# Patient Record
Sex: MALE | Race: WHITE | NOT HISPANIC OR LATINO | Employment: OTHER | URBAN - METROPOLITAN AREA
[De-identification: names, ages, dates, MRNs, and addresses within clinical notes are randomized per-mention and may not be internally consistent; named-entity substitution may affect disease eponyms.]

---

## 2020-08-28 ENCOUNTER — HOSPITAL ENCOUNTER (OUTPATIENT)
Dept: RADIOLOGY | Facility: HOSPITAL | Age: 60
Discharge: HOME | End: 2020-08-28
Attending: ORTHOPAEDIC SURGERY
Payer: MEDICARE

## 2020-08-28 ENCOUNTER — OFFICE VISIT (OUTPATIENT)
Dept: ORTHOPEDICS | Facility: CLINIC | Age: 60
End: 2020-08-28
Payer: MEDICARE

## 2020-08-28 VITALS — BODY MASS INDEX: 28 KG/M2 | RESPIRATION RATE: 16 BRPM | HEIGHT: 71 IN | WEIGHT: 200 LBS

## 2020-08-28 DIAGNOSIS — M25.511 ACUTE PAIN OF RIGHT SHOULDER: Primary | ICD-10-CM

## 2020-08-28 DIAGNOSIS — G89.29 CHRONIC RIGHT SHOULDER PAIN: Primary | ICD-10-CM

## 2020-08-28 DIAGNOSIS — M25.511 CHRONIC RIGHT SHOULDER PAIN: Primary | ICD-10-CM

## 2020-08-28 DIAGNOSIS — M25.511 ACUTE PAIN OF RIGHT SHOULDER: ICD-10-CM

## 2020-08-28 PROCEDURE — 99203 OFFICE O/P NEW LOW 30 MIN: CPT | Mod: PBBFAC,25,PN | Performed by: ORTHOPAEDIC SURGERY

## 2020-08-28 PROCEDURE — 73030 X-RAY EXAM OF SHOULDER: CPT | Mod: 26,RT,, | Performed by: RADIOLOGY

## 2020-08-28 PROCEDURE — 99203 OFFICE O/P NEW LOW 30 MIN: CPT | Mod: S$PBB,,, | Performed by: ORTHOPAEDIC SURGERY

## 2020-08-28 PROCEDURE — 99999 PR PBB SHADOW E&M-NEW PATIENT-LVL III: CPT | Mod: PBBFAC,,, | Performed by: ORTHOPAEDIC SURGERY

## 2020-08-28 PROCEDURE — 73030 X-RAY EXAM OF SHOULDER: CPT | Mod: TC,PN,RT

## 2020-08-28 PROCEDURE — 99203 PR OFFICE/OUTPT VISIT, NEW, LEVL III, 30-44 MIN: ICD-10-PCS | Mod: S$PBB,,, | Performed by: ORTHOPAEDIC SURGERY

## 2020-08-28 PROCEDURE — 99999 PR PBB SHADOW E&M-NEW PATIENT-LVL III: ICD-10-PCS | Mod: PBBFAC,,, | Performed by: ORTHOPAEDIC SURGERY

## 2020-08-28 PROCEDURE — 73030 XR SHOULDER COMPLETE 2 OR MORE VIEWS RIGHT: ICD-10-PCS | Mod: 26,RT,, | Performed by: RADIOLOGY

## 2020-08-28 RX ORDER — LISINOPRIL 40 MG/1
40 TABLET ORAL DAILY
COMMUNITY
End: 2020-09-17 | Stop reason: SDUPTHER

## 2020-08-28 RX ORDER — OMEPRAZOLE 40 MG/1
20 CAPSULE, DELAYED RELEASE ORAL DAILY
COMMUNITY
End: 2020-10-26 | Stop reason: SDUPTHER
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
## 2020-09-01 ENCOUNTER — HOSPITAL ENCOUNTER (OUTPATIENT)
Dept: RADIOLOGY | Facility: HOSPITAL | Age: 60
Discharge: HOME | End: 2020-09-01
Attending: ORTHOPAEDIC SURGERY
Payer: MEDICARE

## 2020-09-01 DIAGNOSIS — G89.29 CHRONIC RIGHT SHOULDER PAIN: ICD-10-CM

## 2020-09-01 DIAGNOSIS — M25.511 CHRONIC RIGHT SHOULDER PAIN: ICD-10-CM

## 2020-09-01 PROCEDURE — 73221 MRI JOINT UPR EXTREM W/O DYE: CPT | Mod: TC,PO,RT

## 2020-09-04 ENCOUNTER — OFFICE VISIT (OUTPATIENT)
Dept: ORTHOPEDICS | Facility: CLINIC | Age: 60
End: 2020-09-04
Payer: MEDICARE

## 2020-09-04 VITALS — RESPIRATION RATE: 16 BRPM | WEIGHT: 200 LBS | HEIGHT: 71 IN | BODY MASS INDEX: 28 KG/M2

## 2020-09-04 DIAGNOSIS — M19.011 ARTHRITIS OF SHOULDER REGION, RIGHT: Primary | ICD-10-CM

## 2020-09-04 PROCEDURE — 99213 PR OFFICE/OUTPT VISIT, EST, LEVL III, 20-29 MIN: ICD-10-PCS | Mod: S$PBB,,, | Performed by: ORTHOPAEDIC SURGERY

## 2020-09-04 PROCEDURE — 99213 OFFICE O/P EST LOW 20 MIN: CPT | Mod: S$PBB,,, | Performed by: ORTHOPAEDIC SURGERY

## 2020-09-04 PROCEDURE — 99999 PR PBB SHADOW E&M-EST. PATIENT-LVL III: CPT | Mod: PBBFAC,,, | Performed by: ORTHOPAEDIC SURGERY

## 2020-09-04 PROCEDURE — 99999 PR PBB SHADOW E&M-EST. PATIENT-LVL III: ICD-10-PCS | Mod: PBBFAC,,, | Performed by: ORTHOPAEDIC SURGERY

## 2020-09-04 PROCEDURE — 99213 OFFICE O/P EST LOW 20 MIN: CPT | Mod: PBBFAC,PN | Performed by: ORTHOPAEDIC SURGERY
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
## 2020-09-17 ENCOUNTER — OFFICE VISIT (OUTPATIENT)
Dept: FAMILY MEDICINE | Facility: CLINIC | Age: 60
End: 2020-09-17
Payer: MEDICARE

## 2020-09-17 VITALS
HEART RATE: 100 BPM | DIASTOLIC BLOOD PRESSURE: 74 MMHG | BODY MASS INDEX: 31.83 KG/M2 | TEMPERATURE: 98 F | WEIGHT: 210 LBS | SYSTOLIC BLOOD PRESSURE: 130 MMHG | HEIGHT: 68 IN

## 2020-09-17 DIAGNOSIS — I10 ESSENTIAL HYPERTENSION: ICD-10-CM

## 2020-09-17 DIAGNOSIS — Z12.5 PROSTATE CANCER SCREENING: ICD-10-CM

## 2020-09-17 DIAGNOSIS — Z28.21 INFLUENZA VACCINATION DECLINED: ICD-10-CM

## 2020-09-17 DIAGNOSIS — Z12.11 COLON CANCER SCREENING: Primary | ICD-10-CM

## 2020-09-17 DIAGNOSIS — J30.2 SEASONAL ALLERGIES: ICD-10-CM

## 2020-09-17 DIAGNOSIS — Z00.00 GENERAL MEDICAL EXAM: ICD-10-CM

## 2020-09-17 PROCEDURE — 99204 PR OFFICE/OUTPT VISIT, NEW, LEVL IV, 45-59 MIN: ICD-10-PCS | Mod: S$GLB,,, | Performed by: NURSE PRACTITIONER

## 2020-09-17 PROCEDURE — 99204 OFFICE O/P NEW MOD 45 MIN: CPT | Mod: S$GLB,,, | Performed by: NURSE PRACTITIONER

## 2020-09-17 RX ORDER — HYDROCHLOROTHIAZIDE 25 MG/1
25 TABLET ORAL DAILY
COMMUNITY
End: 2020-10-26 | Stop reason: SDUPTHER

## 2020-09-17 RX ORDER — LISINOPRIL 40 MG/1
40 TABLET ORAL DAILY
Qty: 30 TABLET | Refills: 4 | Status: SHIPPED | OUTPATIENT
Start: 2020-09-17 | End: 2020-10-26 | Stop reason: SDUPTHER

## 2020-09-17 RX ORDER — MINERAL OIL
180 ENEMA (ML) RECTAL DAILY
Qty: 30 TABLET | Refills: 2 | Status: SHIPPED | OUTPATIENT
Start: 2020-09-17 | End: 2021-05-22 | Stop reason: SDUPTHER

## 2020-09-17 RX ORDER — FLUTICASONE PROPIONATE 50 MCG
1 SPRAY, SUSPENSION (ML) NASAL DAILY
Qty: 15.8 ML | Refills: 2 | Status: SHIPPED | OUTPATIENT
Start: 2020-09-17 | End: 2021-06-17

## 2020-09-17 NOTE — PROGRESS NOTES
SUBJECTIVE:    Patient ID: Omar Trotter is a 60 y.o. male.    Chief Complaint: Establish Care (brought bottles, FLU shot declined// SW)    Pt presents to establish care. Recently moved to the area from [place]. History includes HTN. Recently has been seeing Dr. Rock d/t shoulder pain. C/o seasonal allergies and does not take any medication regularly to treat. States he was told by previous provider a few months ago that he has Hep C and needed to follow-up with someone. Scheduled to see GI doctor later this month. Unsure of name; wife made appointment. Declines colonoscopy but is agreeable to cologuard. Declines flu vaccine. Previously worked as . Now retired.       No results found for any previous visit.       Past Medical History:   Diagnosis Date    Arthritis     GERD (gastroesophageal reflux disease)     Hypertension      Past Surgical History:   Procedure Laterality Date    HERNIA REPAIR       Family History   Problem Relation Age of Onset    Cancer Mother         leukemia       Marital Status:   Alcohol History:  reports no history of alcohol use.  Tobacco History:  reports that he has been smoking cigarettes. He has a 15.00 pack-year smoking history. He has never used smokeless tobacco.  Drug History:  has no history on file for drug.    Review of patient's allergies indicates:   Allergen Reactions    Pcn [penicillins]        Current Outpatient Medications:     hydroCHLOROthiazide (HYDRODIURIL) 25 MG tablet, Take 25 mg by mouth once daily., Disp: , Rfl:     lisinopriL (PRINIVIL,ZESTRIL) 40 MG tablet, Take 1 tablet (40 mg total) by mouth once daily., Disp: 30 tablet, Rfl: 4    omeprazole (PRILOSEC) 40 MG capsule, Take 20 mg by mouth once daily., Disp: , Rfl:     fexofenadine (ALLEGRA) 180 MG tablet, Take 1 tablet (180 mg total) by mouth once daily., Disp: 30 tablet, Rfl: 2    fluticasone propionate (FLONASE) 50 mcg/actuation nasal spray, 1 spray (50 mcg total) by Each  "Nostril route once daily., Disp: 15.8 mL, Rfl: 2    Review of Systems   Constitutional: Negative.    HENT: Positive for congestion and postnasal drip. Negative for ear pain, sinus pressure, sinus pain, tinnitus and trouble swallowing.    Eyes: Negative for pain and redness.   Respiratory: Positive for cough. Negative for chest tightness, shortness of breath and wheezing.    Cardiovascular: Negative for chest pain and palpitations.   Gastrointestinal: Negative for abdominal pain, nausea and vomiting.   Genitourinary: Negative for dysuria, frequency and urgency.   Musculoskeletal: Positive for arthralgias and back pain. Negative for myalgias.   Skin: Negative for rash and wound.   Neurological: Negative for dizziness, weakness, light-headedness and headaches.   Psychiatric/Behavioral: Negative.  Negative for sleep disturbance.          Objective:      Vitals:    09/17/20 1137   BP: 130/74   Pulse: 100   Temp: 98 °F (36.7 °C)   Weight: 95.3 kg (210 lb)   Height: 5' 8" (1.727 m)     Body mass index is 31.93 kg/m².  Physical Exam  Vitals signs reviewed.   Constitutional:       General: He is not in acute distress.     Appearance: Normal appearance. He is well-developed.   HENT:      Head: Normocephalic and atraumatic.      Right Ear: Tympanic membrane normal.      Left Ear: Tympanic membrane normal.      Nose: Nose normal. No nasal deformity or mucosal edema.      Mouth/Throat:      Dentition: No dental caries or dental abscesses.      Pharynx: No oropharyngeal exudate.   Eyes:      General: Lids are normal.      Conjunctiva/sclera: Conjunctivae normal.      Pupils: Pupils are equal, round, and reactive to light.   Neck:      Musculoskeletal: Normal range of motion.      Thyroid: No thyromegaly.      Vascular: No JVD.      Trachea: No tracheal tenderness or tracheal deviation.   Cardiovascular:      Rate and Rhythm: Normal rate and regular rhythm.      Heart sounds: Normal heart sounds. No murmur.   Pulmonary:      " Effort: Pulmonary effort is normal. No accessory muscle usage or respiratory distress.      Breath sounds: Normal breath sounds.   Abdominal:      General: Bowel sounds are normal.      Palpations: Abdomen is soft.      Tenderness: There is no abdominal tenderness.   Musculoskeletal:         General: No tenderness.      Comments: Decreased ROM of right shoulder   Lymphadenopathy:      Cervical: No cervical adenopathy.   Skin:     General: Skin is warm and dry.      Capillary Refill: Capillary refill takes less than 2 seconds.      Findings: No ecchymosis.   Neurological:      Mental Status: He is alert and oriented to person, place, and time.   Psychiatric:         Mood and Affect: Mood normal.         Behavior: Behavior normal.           Assessment:       1. Colon cancer screening    2. Essential hypertension    3. Prostate cancer screening    4. Seasonal allergies    5. General medical exam    6. BMI 31.0-31.9,adult    7. Influenza vaccination declined         Plan:       Colon cancer screening  -     Cologuard Screening (Multitarget Stool DNA); Future; Expected date: 09/17/2020    Essential hypertension  -     CBC auto differential; Future; Expected date: 09/17/2020  -     Comprehensive metabolic panel; Future; Expected date: 09/17/2020  -     Lipid Panel; Future; Expected date: 09/17/2020  -     TSH w/reflex to FT4; Future; Expected date: 09/17/2020  -     Urinalysis, Reflex to Urine Culture Urine, Clean Catch; Future; Expected date: 09/17/2020  -     lisinopriL (PRINIVIL,ZESTRIL) 40 MG tablet; Take 1 tablet (40 mg total) by mouth once daily.  Dispense: 30 tablet; Refill: 4    Prostate cancer screening  -     PSA, Screening; Future; Expected date: 09/17/2020    Seasonal allergies  -     fexofenadine (ALLEGRA) 180 MG tablet; Take 1 tablet (180 mg total) by mouth once daily.  Dispense: 30 tablet; Refill: 2  -     fluticasone propionate (FLONASE) 50 mcg/actuation nasal spray; 1 spray (50 mcg total) by Each Nostril  route once daily.  Dispense: 15.8 mL; Refill: 2    General medical exam    BMI 31.0-31.9,adult    Influenza vaccination declined      Follow up in about 6 months (around 3/17/2021) for HTN.

## 2020-09-24 DIAGNOSIS — B18.2 CHRONIC VIRAL HEPATITIS C: Primary | ICD-10-CM

## 2020-10-02 ENCOUNTER — OFFICE VISIT (OUTPATIENT)
Dept: ORTHOPEDICS | Facility: CLINIC | Age: 60
End: 2020-10-02
Payer: COMMERCIAL

## 2020-10-02 VITALS — HEIGHT: 68 IN | BODY MASS INDEX: 31.83 KG/M2 | RESPIRATION RATE: 18 BRPM | WEIGHT: 210 LBS

## 2020-10-02 DIAGNOSIS — M19.011 PRIMARY OSTEOARTHRITIS OF RIGHT SHOULDER: ICD-10-CM

## 2020-10-02 DIAGNOSIS — M19.011 OSTEOARTHRITIS OF RIGHT SHOULDER: ICD-10-CM

## 2020-10-02 DIAGNOSIS — M19.011 ARTHRITIS OF SHOULDER REGION, RIGHT: Primary | ICD-10-CM

## 2020-10-02 PROCEDURE — 99204 PR OFFICE/OUTPT VISIT, NEW, LEVL IV, 45-59 MIN: ICD-10-PCS | Mod: S$PBB,,, | Performed by: ORTHOPAEDIC SURGERY

## 2020-10-02 PROCEDURE — 99999 PR PBB SHADOW E&M-EST. PATIENT-LVL III: ICD-10-PCS | Mod: PBBFAC,,, | Performed by: ORTHOPAEDIC SURGERY

## 2020-10-02 PROCEDURE — 99999 PR PBB SHADOW E&M-EST. PATIENT-LVL III: CPT | Mod: PBBFAC,,, | Performed by: ORTHOPAEDIC SURGERY

## 2020-10-02 PROCEDURE — 99213 OFFICE O/P EST LOW 20 MIN: CPT | Mod: PBBFAC,PN | Performed by: ORTHOPAEDIC SURGERY

## 2020-10-02 PROCEDURE — 99204 OFFICE O/P NEW MOD 45 MIN: CPT | Mod: S$PBB,,, | Performed by: ORTHOPAEDIC SURGERY

## 2020-10-02 RX ORDER — IBUPROFEN 800 MG/1
800 TABLET ORAL 3 TIMES DAILY
Qty: 60 TABLET | Refills: 0 | Status: SHIPPED | OUTPATIENT
Start: 2020-10-02 | End: 2020-10-27 | Stop reason: SDUPTHER

## 2020-10-02 NOTE — LETTER
October 2, 2020      Collin Rock MD  [address] [place] [address] [address] [address]           [facility]  [address] [place] [address] [address]  [place] [address]  Phone: [phone]          Patient: Omar Trotter   MR Number: [id number]   YOB: 1960   Date of Visit: 10/2/2020       Dear Dr. Collin Rock:    Thank you for referring Omar Trotter to me for evaluation. Attached you will find relevant portions of my assessment and plan of care.    If you have questions, please do not hesitate to call me. I look forward to following Omar Trotter along with you.    Sincerely,    Emilio Siddiqui MD    Enclosure  CC:  No Recipients    If you would like to receive this communication electronically, please contact [email or web address] or [phone] to request more information on [employer] [facility] access.    For providers and/or their staff who would like to refer a patient to [facility], please contact us through our one-stop-shop provider referral line, [facility] , at [phone].    If you feel you have received this communication in error or would no longer like to receive these types of communications, please e-mail [email or web address]

## 2020-10-02 NOTE — PROGRESS NOTES
Past Medical History:   Diagnosis Date    Arthritis     GERD (gastroesophageal reflux disease)     Hypertension        Past Surgical History:   Procedure Laterality Date    HERNIA REPAIR         Current Outpatient Medications   Medication Sig    fexofenadine (ALLEGRA) 180 MG tablet Take 1 tablet (180 mg total) by mouth once daily.    fluticasone propionate (FLONASE) 50 mcg/actuation nasal spray 1 spray (50 mcg total) by Each Nostril route once daily.    hydroCHLOROthiazide (HYDRODIURIL) 25 MG tablet Take 25 mg by mouth once daily.    lisinopriL (PRINIVIL,ZESTRIL) 40 MG tablet Take 1 tablet (40 mg total) by mouth once daily.    omeprazole (PRILOSEC) 40 MG capsule Take 20 mg by mouth once daily.    ibuprofen (ADVIL,MOTRIN) 800 MG tablet Take 1 tablet (800 mg total) by mouth 3 (three) times daily.     No current facility-administered medications for this visit.        Review of patient's allergies indicates:   Allergen Reactions    Pcn [penicillins]        Family History   Problem Relation Age of Onset    Cancer Mother         leukemia       Social History     Socioeconomic History    Marital status:      Spouse name: Not on file    Number of children: Not on file    Years of education: Not on file    Highest education level: Not on file   Occupational History    Not on file   Social Needs    Financial resource strain: Not on file    Food insecurity     Worry: Not on file     Inability: Not on file    Transportation needs     Medical: Not on file     Non-medical: Not on file   Tobacco Use    Smoking status: Current Every Day Smoker     Packs/day: 0.50     Years: 30.00     Pack years: 15.00     Types: Cigarettes    Smokeless tobacco: Never Used   Substance and Sexual Activity    Alcohol use: Never     Frequency: Never    Drug use: Not on file    Sexual activity: Not on file   Lifestyle    Physical activity     Days per week: Not on file     Minutes per session: Not on file    Stress:  "Not on file   Relationships    Social connections     Talks on phone: Not on file     Gets together: Not on file     Attends Hindu service: Not on file     Active member of club or organization: Not on file     Attends meetings of clubs or organizations: Not on file     Relationship status: Not on file   Other Topics Concern    Not on file   Social History Narrative    Not on file       Chief Complaint:   Chief Complaint   Patient presents with    Right Shoulder - Pain       Consulting Physician: Collin Rock MD    History of present illness:    This is a 60 y.o. year old male who complains of right shoulder pain for years.  He was referred over by my partner.  He puts pain in her shoulder at night of 10 worse with activities.  He states that his shoulder is becoming less mobile and he is having more problems.    Review of Systems:    Constitution:   Denies chills, fever, and sweats.  HENT:   Denies headaches or blurry vision.  Cardiovascular:  Denies chest pain or irregular heart beat.  Respiratory:   Denies cough or shortness of breath.  Gastrointestinal:  Denies abdominal pain, nausea, or vomiting.  Musculoskeletal:   Denies muscle cramps.  Neurological:   Denies dizziness or focal weakness.  Psychiatric/Behavior: Normal mental status.  Hematology/Lymph:  Denies bleeding problem or easy bruising/bleeding.  Skin:    Denies rash or suspicious lesions.    Examination:    Vital Signs:    Vitals:    10/02/20 1030   Resp: 18   Weight: 95.3 kg (210 lb)   Height: 5' 8" (1.727 m)   PainSc:   9       Body mass index is 31.93 kg/m².    Constitution:   Well-developed, well nourished patient in no acute distress.  Neurological:   Alert and oriented x 3 and cooperative to examination.     Psychiatric/Behavior: Normal mental status.  Respiratory:   No shortness of breath.  Eyes:    Extraoccular muscles intact  Skin:    No scars, rash or suspicious lesions.    Physical Exam: Right Shoulder Exam "     Skin  Rash:   None  Scars:   None    Inspection/Observation   Swelling:   None  Erythema:   None  Bruising:   None  Wounds:   None  Scapular Winging:  None  Scapular Dyskinesia:  None  Atrophy:   None  Masses:   None  Lymphadenopathy: None    Tenderness   AC Joint:   Yes    Range of Motion   Active Forward Flexion:  70  Active External Rotation:  0  Active Internal Rotation: Hip    Passive Forward Flexion:  90  Passive External Rotation: 10    Muscle Strength   Forward Flexion:  4+/5  External Rotation: 4+/5  Internal Rotation:  4+/5    Tests & Signs   Cross Arm:   positive  Hawkin's test:   positive  Impingement:   positive    Other   Sensation:  normal  Pulse:    2+ radial          Imaging: X-rays ordered and images interpreted today personally by me of right shoulder show severe degenerative changes.  MRI shows that there is no obviously torn rotator cuff other than a small partial tear.         Assessment: Arthritis of shoulder region, right        Plan:  We discussed treatment options including both reverse and total shoulder arthroplasties.  After our discussion he elected to proceed with a right total shoulder arthroplasty.  We discussed the risks and benefits including the potential for incomplete pain relief in the need for further procedures.  He expressed he understood and wished to proceed.    After discussing the diagnosis and reviewing treatment options, the patient/family elected to proceed with surgical intervention.    In preparation for surgery:    A pre-operative clearance request was placed with primary care physician.    Appropriate pre-operative labs were ordered.    An EKG examination was ordered.    A chest X-ray was ordered.    Pertinent risk factors and comorbidities for surgery were identified and reviewed, including HTN    Pre-operative antibiotics were ordered.    The risks, benefits, and alternatives to the procedure were explained to the patient/family including, but not limited  to: incomplete pain relief, surgical failure, hardware failure, need for further procedures, damage to nerves, arteries, blood vessels and other structures, infection, Deep Vein Thrombosis (DVT), Pulmonary Embolus (PE), Complex Regional Pain Syndrome as well as general anesthetic complications including seizure, stroke, heart attack and even death. The patient/family understood these risks and wished to proceed and signed the informed consent. All questions were answered. No guarantees were implied or stated.    We discussed COVID19 with the patient. They are aware of our current policies and procedures, were given the option of delaying surgery, and they elected to proceed.    We will obtain the necessary clearances and schedule the patient for surgery.            DISCLAIMER: This note may have been dictated using voice recognition software and may contain grammatical errors.     NOTE: Consult report sent to referring provider via [employer] EMR.

## 2020-10-05 ENCOUNTER — HOSPITAL ENCOUNTER (OUTPATIENT)
Dept: RADIOLOGY | Facility: HOSPITAL | Age: 60
Discharge: HOME | End: 2020-10-05
Attending: INTERNAL MEDICINE
Payer: COMMERCIAL

## 2020-10-05 DIAGNOSIS — B18.2 CHRONIC VIRAL HEPATITIS C: ICD-10-CM

## 2020-10-05 PROCEDURE — 76705 ECHO EXAM OF ABDOMEN: CPT | Mod: TC,PO

## 2020-10-06 DIAGNOSIS — Z01.818 PRE-OP TESTING: ICD-10-CM

## 2020-10-06 RX ORDER — SODIUM CHLORIDE 9 MG/ML
INJECTION, SOLUTION INTRAVENOUS CONTINUOUS
Status: CANCELLED | OUTPATIENT
Start: 2020-10-06

## 2020-10-06 RX ORDER — MUPIROCIN 20 MG/G
OINTMENT TOPICAL
Status: CANCELLED | OUTPATIENT
Start: 2020-10-06

## 2020-10-06 RX ORDER — TRANEXAMIC ACID 100 MG/ML
1000 INJECTION, SOLUTION INTRAVENOUS
Status: CANCELLED | OUTPATIENT
Start: 2020-10-06

## 2020-10-06 RX ORDER — CLINDAMYCIN PHOSPHATE 900 MG/50ML
900 INJECTION, SOLUTION INTRAVENOUS
Status: CANCELLED | OUTPATIENT
Start: 2020-10-06

## 2020-10-07 ENCOUNTER — TELEPHONE (OUTPATIENT)
Dept: FAMILY MEDICINE | Facility: CLINIC | Age: 60
End: 2020-10-07

## 2020-10-07 ENCOUNTER — TELEPHONE (OUTPATIENT)
Dept: ORTHOPEDICS | Facility: CLINIC | Age: 60
End: 2020-10-07

## 2020-10-07 NOTE — TELEPHONE ENCOUNTER
----- Message from Linh Sparks LPN sent at 10/6/2020  5:46 PM CDT -----  Good Evening! Mr Trotter is scheduled to have a right total shoulder replacement with Dr Siddiqui on 11/4/20. He is requiring a medical clearance. Please contact the patient to schedule an appointment.      Thanks!  Linh Sparks LPN

## 2020-10-07 NOTE — TELEPHONE ENCOUNTER
Tried calling pt no answer. Please try calling him later to schedule him with Vivien for a surgery clearance. Thanks

## 2020-10-07 NOTE — TELEPHONE ENCOUNTER
Spoke to patient. Advised we received a message from the pre service department regarding a new insurance coverage beginning in November. Advised we would need to push his surgery back to give time for the pre authorization process. Pt surgery is scheduled for 11/4/2020. Pt states his [employer] insurance does not go into effect until January. Pre service notified of effect date.

## 2020-10-07 NOTE — TELEPHONE ENCOUNTER
----- Message from Jeannine Velazquez sent at 10/7/2020 12:08 PM CDT -----  LAB, [facility], 10/1/20

## 2020-10-07 NOTE — TELEPHONE ENCOUNTER
Spoke with pt and let him know the cologuard was positive. States he sees Dr. Gooden. Stool test faxed to her. He agrees to call and get scheduled. F/u reminder created.

## 2020-10-08 ENCOUNTER — TELEPHONE (OUTPATIENT)
Dept: ORTHOPEDICS | Facility: CLINIC | Age: 60
End: 2020-10-08

## 2020-10-08 NOTE — TELEPHONE ENCOUNTER
----- Message from Lester De Santiago sent at 10/8/2020 12:58 PM CDT -----  Regarding: wants to re inna Sx  Contact: pt   call

## 2020-10-08 NOTE — TELEPHONE ENCOUNTER
Called the patient he stated he has decided to wait on his surgery until after the 1st of the new year due to insurance changes.

## 2020-10-08 NOTE — TELEPHONE ENCOUNTER
Spoke to patient. States he would like to push his surgery back to January. Surgery rescheduled to 1/13/21. Pre op testing and covid testing rescheduled. Pt verbalized understanding. Surgery notified.

## 2020-10-26 DIAGNOSIS — I10 ESSENTIAL HYPERTENSION: ICD-10-CM

## 2020-10-26 RX ORDER — LISINOPRIL 40 MG/1
40 TABLET ORAL DAILY
Qty: 30 TABLET | Refills: 4 | Status: SHIPPED | OUTPATIENT
Start: 2020-10-26 | End: 2021-09-21 | Stop reason: SDUPTHER

## 2020-10-26 RX ORDER — HYDROCHLOROTHIAZIDE 25 MG/1
25 TABLET ORAL DAILY
Qty: 90 TABLET | Refills: 1 | Status: SHIPPED | OUTPATIENT
Start: 2020-10-26 | End: 2021-05-22 | Stop reason: SDUPTHER

## 2020-10-26 RX ORDER — OMEPRAZOLE 20 MG/1
20 CAPSULE, DELAYED RELEASE ORAL DAILY
Qty: 90 CAPSULE | Refills: 1 | Status: SHIPPED | OUTPATIENT
Start: 2020-10-26 | End: 2021-03-17

## 2020-10-26 NOTE — TELEPHONE ENCOUNTER
----- Message from Wesley Wylie sent at 10/26/2020 12:32 PM CDT -----  Regarding: REFILL  Contact: EULALIA RAYO  NEEDS REFILL ON LISINOPRIL AND OMEPRAZOLE AND HYDROCHLOROTHIAZIDE  SEND TO [facility]   PT# [phone]

## 2020-10-27 RX ORDER — IBUPROFEN 800 MG/1
800 TABLET ORAL 3 TIMES DAILY
Qty: 60 TABLET | Refills: 0 | Status: SHIPPED | OUTPATIENT
Start: 2020-10-27 | End: 2020-12-01 | Stop reason: SDUPTHER

## 2020-10-27 NOTE — TELEPHONE ENCOUNTER
----- Message from Maya Guzman MA sent at 10/27/2020  7:50 AM CDT -----  Contact: pt  Pain medication   Ibuprofen     [facility]   Call back

## 2020-11-13 ENCOUNTER — TELEPHONE (OUTPATIENT)
Dept: FAMILY MEDICINE | Facility: CLINIC | Age: 60
End: 2020-11-13

## 2020-12-01 RX ORDER — IBUPROFEN 800 MG/1
800 TABLET ORAL 3 TIMES DAILY
Qty: 60 TABLET | Refills: 0 | Status: SHIPPED | OUTPATIENT
Start: 2020-12-01 | End: 2021-01-06

## 2020-12-01 NOTE — TELEPHONE ENCOUNTER
----- Message from Maya Guzman MA sent at 12/1/2020  9:24 AM CST -----  Contact: pt  Ibuprofen   [facility]   Call back

## 2020-12-01 NOTE — TELEPHONE ENCOUNTER
Last refilled 10/27/2020, last seen 10/02/2020, next appt /surgery 1/13/2021( total shoulder scheduled)

## 2020-12-15 ENCOUNTER — TELEPHONE (OUTPATIENT)
Dept: FAMILY MEDICINE | Facility: CLINIC | Age: 60
End: 2020-12-15

## 2020-12-15 NOTE — TELEPHONE ENCOUNTER
LMOR for pt to call back - need to see if he got scheduled with the GI doctor regarding his positive cologuard testing.

## 2020-12-30 ENCOUNTER — HOSPITAL ENCOUNTER (OUTPATIENT)
Dept: PREADMISSION TESTING | Facility: HOSPITAL | Age: 60
Discharge: HOME | End: 2020-12-30
Payer: MEDICARE

## 2020-12-30 DIAGNOSIS — M19.011 PRIMARY OSTEOARTHRITIS OF RIGHT SHOULDER: ICD-10-CM

## 2020-12-30 DIAGNOSIS — Z01.818 PREOP TESTING: Primary | ICD-10-CM

## 2021-01-04 RX ORDER — IBUPROFEN 800 MG/1
800 TABLET ORAL 3 TIMES DAILY
Qty: 60 TABLET | Refills: 0 | OUTPATIENT
Start: 2021-01-04

## 2021-01-06 ENCOUNTER — HOSPITAL ENCOUNTER (OUTPATIENT)
Dept: PREADMISSION TESTING | Facility: HOSPITAL | Age: 61
Discharge: HOME | End: 2021-01-06
Attending: ORTHOPAEDIC SURGERY
Payer: MEDICARE

## 2021-01-06 ENCOUNTER — HOSPITAL ENCOUNTER (OUTPATIENT)
Dept: RADIOLOGY | Facility: HOSPITAL | Age: 61
Discharge: HOME | End: 2021-01-06
Attending: ORTHOPAEDIC SURGERY
Payer: MEDICARE

## 2021-01-06 VITALS — HEIGHT: 70 IN | WEIGHT: 210 LBS | BODY MASS INDEX: 30.06 KG/M2

## 2021-01-06 DIAGNOSIS — M19.011 PRIMARY OSTEOARTHRITIS OF RIGHT SHOULDER: ICD-10-CM

## 2021-01-06 DIAGNOSIS — Z01.818 PREOP TESTING: ICD-10-CM

## 2021-01-06 LAB
ABO + RH BLD: NORMAL
ALBUMIN SERPL BCP-MCNC: 3.4 G/DL (ref 3.5–5.2)
ALP SERPL-CCNC: 162 U/L (ref 55–135)
ALT SERPL W/O P-5'-P-CCNC: 35 U/L (ref 10–44)
ANION GAP SERPL CALC-SCNC: 9 MMOL/L (ref 8–16)
AST SERPL-CCNC: 39 U/L (ref 10–40)
BASOPHILS # BLD AUTO: 0.06 K/UL (ref 0–0.2)
BASOPHILS NFR BLD: 1.1 % (ref 0–1.9)
BILIRUB DIRECT SERPL-MCNC: 0.2 MG/DL (ref 0.1–0.3)
BILIRUB SERPL-MCNC: 0.3 MG/DL (ref 0.1–1)
BLD GP AB SCN CELLS X3 SERPL QL: NORMAL
BUN SERPL-MCNC: 15 MG/DL (ref 8–23)
CALCIUM SERPL-MCNC: 9 MG/DL (ref 8.7–10.5)
CHLORIDE SERPL-SCNC: 102 MMOL/L (ref 95–110)
CO2 SERPL-SCNC: 23 MMOL/L (ref 23–29)
CREAT SERPL-MCNC: 0.9 MG/DL (ref 0.5–1.4)
DIFFERENTIAL METHOD: NORMAL
EOSINOPHIL # BLD AUTO: 0.4 K/UL (ref 0–0.5)
EOSINOPHIL NFR BLD: 7 % (ref 0–8)
ERYTHROCYTE [DISTWIDTH] IN BLOOD BY AUTOMATED COUNT: 13.1 % (ref 11.5–14.5)
EST. GFR  (AFRICAN AMERICAN): >60 ML/MIN/1.73 M^2
EST. GFR  (NON AFRICAN AMERICAN): >60 ML/MIN/1.73 M^2
GLUCOSE SERPL-MCNC: 103 MG/DL (ref 70–110)
HCT VFR BLD AUTO: 42.2 % (ref 40–54)
HGB BLD-MCNC: 14.2 G/DL (ref 14–18)
IMM GRANULOCYTES # BLD AUTO: 0.03 K/UL (ref 0–0.04)
IMM GRANULOCYTES NFR BLD AUTO: 0.5 % (ref 0–0.5)
INR PPP: 1 (ref 0.8–1.2)
LYMPHOCYTES # BLD AUTO: 1.8 K/UL (ref 1–4.8)
LYMPHOCYTES NFR BLD: 31.4 % (ref 18–48)
MCH RBC QN AUTO: 29.8 PG (ref 27–31)
MCHC RBC AUTO-ENTMCNC: 33.6 G/DL (ref 32–36)
MCV RBC AUTO: 89 FL (ref 82–98)
MONOCYTES # BLD AUTO: 0.6 K/UL (ref 0.3–1)
MONOCYTES NFR BLD: 10.3 % (ref 4–15)
NEUTROPHILS # BLD AUTO: 2.8 K/UL (ref 1.8–7.7)
NEUTROPHILS NFR BLD: 49.7 % (ref 38–73)
NRBC BLD-RTO: 0 /100 WBC
PLATELET # BLD AUTO: 164 K/UL (ref 150–350)
PMV BLD AUTO: 10.9 FL (ref 9.2–12.9)
POTASSIUM SERPL-SCNC: 4.1 MMOL/L (ref 3.5–5.1)
PROT SERPL-MCNC: 7.2 G/DL (ref 6–8.4)
PROTHROMBIN TIME: 10.8 SEC (ref 9–12.5)
RBC # BLD AUTO: 4.77 M/UL (ref 4.6–6.2)
SODIUM SERPL-SCNC: 134 MMOL/L (ref 136–145)
WBC # BLD AUTO: 5.61 K/UL (ref 3.9–12.7)

## 2021-01-06 PROCEDURE — 71046 XR CHEST PA AND LATERAL: ICD-10-PCS | Mod: 26,,, | Performed by: RADIOLOGY

## 2021-01-06 PROCEDURE — 71046 X-RAY EXAM CHEST 2 VIEWS: CPT | Mod: TC,FY

## 2021-01-06 PROCEDURE — 99900104 DSU ONLY-NO CHARGE-EA ADD'L HR (STAT)

## 2021-01-06 PROCEDURE — 80076 HEPATIC FUNCTION PANEL: CPT

## 2021-01-06 PROCEDURE — 80048 BASIC METABOLIC PNL TOTAL CA: CPT

## 2021-01-06 PROCEDURE — 93010 ELECTROCARDIOGRAM REPORT: CPT | Mod: ,,, | Performed by: INTERNAL MEDICINE

## 2021-01-06 PROCEDURE — 86900 BLOOD TYPING SEROLOGIC ABO: CPT

## 2021-01-06 PROCEDURE — 85610 PROTHROMBIN TIME: CPT | Mod: GA

## 2021-01-06 PROCEDURE — 93010 EKG 12-LEAD: ICD-10-PCS | Mod: ,,, | Performed by: INTERNAL MEDICINE

## 2021-01-06 PROCEDURE — 71046 X-RAY EXAM CHEST 2 VIEWS: CPT | Mod: 26,,, | Performed by: RADIOLOGY

## 2021-01-06 PROCEDURE — 87081 CULTURE SCREEN ONLY: CPT

## 2021-01-06 PROCEDURE — 99900103 DSU ONLY-NO CHARGE-INITIAL HR (STAT)

## 2021-01-06 PROCEDURE — 93005 ELECTROCARDIOGRAM TRACING: CPT

## 2021-01-06 PROCEDURE — 85025 COMPLETE CBC W/AUTO DIFF WBC: CPT

## 2021-01-07 ENCOUNTER — HOSPITAL ENCOUNTER (OUTPATIENT)
Dept: PREADMISSION TESTING | Facility: HOSPITAL | Age: 61
Discharge: HOME | End: 2021-01-07
Attending: ORTHOPAEDIC SURGERY
Payer: MEDICARE

## 2021-01-07 PROCEDURE — 99900103 DSU ONLY-NO CHARGE-INITIAL HR (STAT)

## 2021-01-07 PROCEDURE — 99900104 DSU ONLY-NO CHARGE-EA ADD'L HR (STAT)

## 2021-01-08 LAB — MRSA SPEC QL CULT: NORMAL

## 2021-01-10 ENCOUNTER — LAB VISIT (OUTPATIENT)
Dept: PRIMARY CARE CLINIC | Facility: CLINIC | Age: 61
End: 2021-01-10
Payer: MEDICARE

## 2021-01-10 DIAGNOSIS — Z01.818 PRE-OP TESTING: ICD-10-CM

## 2021-01-10 PROCEDURE — U0003 INFECTIOUS AGENT DETECTION BY NUCLEIC ACID (DNA OR RNA); SEVERE ACUTE RESPIRATORY SYNDROME CORONAVIRUS 2 (SARS-COV-2) (CORONAVIRUS DISEASE [COVID-19]), AMPLIFIED PROBE TECHNIQUE, MAKING USE OF HIGH THROUGHPUT TECHNOLOGIES AS DESCRIBED BY CMS-2020-01-R: HCPCS

## 2021-01-11 ENCOUNTER — TELEPHONE (OUTPATIENT)
Dept: FAMILY MEDICINE | Facility: CLINIC | Age: 61
End: 2021-01-11

## 2021-01-11 LAB — SARS-COV-2 RNA RESP QL NAA+PROBE: NOT DETECTED

## 2021-01-12 ENCOUNTER — ANESTHESIA EVENT (OUTPATIENT)
Dept: SURGERY | Facility: HOSPITAL | Age: 61
End: 2021-01-12
Payer: MEDICARE

## 2021-01-12 ENCOUNTER — OFFICE VISIT (OUTPATIENT)
Dept: FAMILY MEDICINE | Facility: CLINIC | Age: 61
End: 2021-01-12
Payer: MEDICARE

## 2021-01-12 VITALS
HEIGHT: 70 IN | HEART RATE: 80 BPM | DIASTOLIC BLOOD PRESSURE: 68 MMHG | BODY MASS INDEX: 30.49 KG/M2 | SYSTOLIC BLOOD PRESSURE: 102 MMHG | WEIGHT: 213 LBS

## 2021-01-12 DIAGNOSIS — Z01.818 PREOPERATIVE CLEARANCE: ICD-10-CM

## 2021-01-12 DIAGNOSIS — M25.511 CHRONIC RIGHT SHOULDER PAIN: ICD-10-CM

## 2021-01-12 DIAGNOSIS — I10 ESSENTIAL HYPERTENSION: Primary | ICD-10-CM

## 2021-01-12 DIAGNOSIS — R19.5 POSITIVE COLORECTAL CANCER SCREENING USING COLOGUARD TEST: ICD-10-CM

## 2021-01-12 DIAGNOSIS — G89.29 CHRONIC RIGHT SHOULDER PAIN: ICD-10-CM

## 2021-01-12 PROCEDURE — 3008F PR BODY MASS INDEX (BMI) DOCUMENTED: ICD-10-PCS | Mod: S$GLB,,, | Performed by: NURSE PRACTITIONER

## 2021-01-12 PROCEDURE — 3078F DIAST BP <80 MM HG: CPT | Mod: S$GLB,,, | Performed by: NURSE PRACTITIONER

## 2021-01-12 PROCEDURE — 3008F BODY MASS INDEX DOCD: CPT | Mod: S$GLB,,, | Performed by: NURSE PRACTITIONER

## 2021-01-12 PROCEDURE — 3074F PR MOST RECENT SYSTOLIC BLOOD PRESSURE < 130 MM HG: ICD-10-PCS | Mod: S$GLB,,, | Performed by: NURSE PRACTITIONER

## 2021-01-12 PROCEDURE — 99213 PR OFFICE/OUTPT VISIT, EST, LEVL III, 20-29 MIN: ICD-10-PCS | Mod: S$GLB,,, | Performed by: NURSE PRACTITIONER

## 2021-01-12 PROCEDURE — 3078F PR MOST RECENT DIASTOLIC BLOOD PRESSURE < 80 MM HG: ICD-10-PCS | Mod: S$GLB,,, | Performed by: NURSE PRACTITIONER

## 2021-01-12 PROCEDURE — 3074F SYST BP LT 130 MM HG: CPT | Mod: S$GLB,,, | Performed by: NURSE PRACTITIONER

## 2021-01-12 PROCEDURE — 99213 OFFICE O/P EST LOW 20 MIN: CPT | Mod: S$GLB,,, | Performed by: NURSE PRACTITIONER

## 2021-01-12 RX ORDER — SODIUM CHLORIDE, SODIUM LACTATE, POTASSIUM CHLORIDE, CALCIUM CHLORIDE 600; 310; 30; 20 MG/100ML; MG/100ML; MG/100ML; MG/100ML
INJECTION, SOLUTION INTRAVENOUS CONTINUOUS
Status: CANCELLED | OUTPATIENT
Start: 2021-01-12

## 2021-01-13 ENCOUNTER — HOSPITAL ENCOUNTER (OUTPATIENT)
Facility: HOSPITAL | Age: 61
Discharge: HOME | End: 2021-01-13
Attending: ORTHOPAEDIC SURGERY | Admitting: ORTHOPAEDIC SURGERY
Payer: MEDICARE

## 2021-01-13 ENCOUNTER — ANESTHESIA (OUTPATIENT)
Dept: SURGERY | Facility: HOSPITAL | Age: 61
End: 2021-01-13
Payer: MEDICARE

## 2021-01-13 VITALS
BODY MASS INDEX: 30.49 KG/M2 | RESPIRATION RATE: 18 BRPM | OXYGEN SATURATION: 100 % | DIASTOLIC BLOOD PRESSURE: 56 MMHG | WEIGHT: 213 LBS | SYSTOLIC BLOOD PRESSURE: 116 MMHG | HEART RATE: 100 BPM | TEMPERATURE: 98 F | HEIGHT: 70 IN

## 2021-01-13 DIAGNOSIS — M19.011 PRIMARY OSTEOARTHRITIS OF RIGHT SHOULDER: Primary | ICD-10-CM

## 2021-01-13 DIAGNOSIS — M19.011 OSTEOARTHRITIS OF RIGHT SHOULDER: ICD-10-CM

## 2021-01-13 PROCEDURE — 63600175 PHARM REV CODE 636 W HCPCS: Performed by: ANESTHESIOLOGY

## 2021-01-13 PROCEDURE — 25000003 PHARM REV CODE 250: Performed by: ORTHOPAEDIC SURGERY

## 2021-01-13 PROCEDURE — 25000003 PHARM REV CODE 250: Performed by: ANESTHESIOLOGY

## 2021-01-13 PROCEDURE — 63600175 PHARM REV CODE 636 W HCPCS

## 2021-01-13 PROCEDURE — C9290 INJ, BUPIVACAINE LIPOSOME: HCPCS | Performed by: ANESTHESIOLOGY

## 2021-01-13 PROCEDURE — P9045 ALBUMIN (HUMAN), 5%, 250 ML: HCPCS | Performed by: NURSE ANESTHETIST, CERTIFIED REGISTERED

## 2021-01-13 PROCEDURE — 27201423 OPTIME MED/SURG SUP & DEVICES STERILE SUPPLY: Performed by: ORTHOPAEDIC SURGERY

## 2021-01-13 PROCEDURE — C1776 JOINT DEVICE (IMPLANTABLE): HCPCS | Performed by: ORTHOPAEDIC SURGERY

## 2021-01-13 PROCEDURE — 64415 PR NERVE BLOCK INJ, ANES/STEROID, BRACHIAL PLEXUS, INCL IMAG GUIDANCE: ICD-10-PCS | Mod: 59,RT,, | Performed by: ANESTHESIOLOGY

## 2021-01-13 PROCEDURE — 99900104 DSU ONLY-NO CHARGE-EA ADD'L HR (STAT): Performed by: ORTHOPAEDIC SURGERY

## 2021-01-13 PROCEDURE — 27200750 HC INSULATED NEEDLE/ STIMUPLEX: Performed by: ANESTHESIOLOGY

## 2021-01-13 PROCEDURE — 99900103 DSU ONLY-NO CHARGE-INITIAL HR (STAT): Performed by: ORTHOPAEDIC SURGERY

## 2021-01-13 PROCEDURE — D9220A PRA ANESTHESIA: Mod: ANES,,, | Performed by: ANESTHESIOLOGY

## 2021-01-13 PROCEDURE — 71000039 HC RECOVERY, EACH ADD'L HOUR: Performed by: ORTHOPAEDIC SURGERY

## 2021-01-13 PROCEDURE — C1769 GUIDE WIRE: HCPCS | Performed by: ORTHOPAEDIC SURGERY

## 2021-01-13 PROCEDURE — 25000003 PHARM REV CODE 250: Performed by: NURSE ANESTHETIST, CERTIFIED REGISTERED

## 2021-01-13 PROCEDURE — 37000009 HC ANESTHESIA EA ADD 15 MINS: Performed by: ORTHOPAEDIC SURGERY

## 2021-01-13 PROCEDURE — 71000033 HC RECOVERY, INTIAL HOUR: Performed by: ORTHOPAEDIC SURGERY

## 2021-01-13 PROCEDURE — D9220A PRA ANESTHESIA: ICD-10-PCS | Mod: ANES,,, | Performed by: ANESTHESIOLOGY

## 2021-01-13 PROCEDURE — 71000015 HC POSTOP RECOV 1ST HR: Performed by: ORTHOPAEDIC SURGERY

## 2021-01-13 PROCEDURE — 63600175 PHARM REV CODE 636 W HCPCS: Performed by: NURSE ANESTHETIST, CERTIFIED REGISTERED

## 2021-01-13 PROCEDURE — D9220A PRA ANESTHESIA: ICD-10-PCS | Mod: CRNA,,, | Performed by: NURSE ANESTHETIST, CERTIFIED REGISTERED

## 2021-01-13 PROCEDURE — 36000710: Performed by: ORTHOPAEDIC SURGERY

## 2021-01-13 PROCEDURE — 64415 NJX AA&/STRD BRCH PLXS IMG: CPT | Mod: 59,RT,, | Performed by: ANESTHESIOLOGY

## 2021-01-13 PROCEDURE — 23472 RECONSTRUCT SHOULDER JOINT: CPT | Mod: RT,,, | Performed by: ORTHOPAEDIC SURGERY

## 2021-01-13 PROCEDURE — C1713 ANCHOR/SCREW BN/BN,TIS/BN: HCPCS | Performed by: ORTHOPAEDIC SURGERY

## 2021-01-13 PROCEDURE — 36000711: Performed by: ORTHOPAEDIC SURGERY

## 2021-01-13 PROCEDURE — 71000016 HC POSTOP RECOV ADDL HR: Performed by: ORTHOPAEDIC SURGERY

## 2021-01-13 PROCEDURE — 64415 NJX AA&/STRD BRCH PLXS IMG: CPT | Mod: 59,RT | Performed by: ANESTHESIOLOGY

## 2021-01-13 PROCEDURE — D9220A PRA ANESTHESIA: Mod: CRNA,,, | Performed by: NURSE ANESTHETIST, CERTIFIED REGISTERED

## 2021-01-13 PROCEDURE — 76942 ECHO GUIDE FOR BIOPSY: CPT | Performed by: ANESTHESIOLOGY

## 2021-01-13 PROCEDURE — 37000008 HC ANESTHESIA 1ST 15 MINUTES: Performed by: ORTHOPAEDIC SURGERY

## 2021-01-13 PROCEDURE — 76942 PR U/S GUIDANCE FOR NEEDLE GUIDANCE: ICD-10-PCS | Mod: 26,,, | Performed by: ANESTHESIOLOGY

## 2021-01-13 PROCEDURE — 76942 ECHO GUIDE FOR BIOPSY: CPT | Mod: 26,,, | Performed by: ANESTHESIOLOGY

## 2021-01-13 PROCEDURE — 23472 PR RECONSTR TOTAL SHOULDER IMPLANT: ICD-10-PCS | Mod: RT,,, | Performed by: ORTHOPAEDIC SURGERY

## 2021-01-13 DEVICE — CEMENT BONE SIMPLEX HV RADPQ: Type: IMPLANTABLE DEVICE | Site: SHOULDER | Status: FUNCTIONAL

## 2021-01-13 RX ORDER — SUCCINYLCHOLINE CHLORIDE 20 MG/ML
INJECTION INTRAMUSCULAR; INTRAVENOUS
Status: DISCONTINUED | OUTPATIENT
Start: 2021-01-13 | End: 2021-01-13

## 2021-01-13 RX ORDER — SODIUM CHLORIDE 9 MG/ML
INJECTION, SOLUTION INTRAVENOUS CONTINUOUS
Status: DISCONTINUED | OUTPATIENT
Start: 2021-01-13 | End: 2021-01-13 | Stop reason: HOSPADM

## 2021-01-13 RX ORDER — SODIUM CHLORIDE 0.9 % (FLUSH) 0.9 %
3 SYRINGE (ML) INJECTION
Status: DISCONTINUED | OUTPATIENT
Start: 2021-01-13 | End: 2021-01-13 | Stop reason: HOSPADM

## 2021-01-13 RX ORDER — SODIUM CHLORIDE, SODIUM LACTATE, POTASSIUM CHLORIDE, CALCIUM CHLORIDE 600; 310; 30; 20 MG/100ML; MG/100ML; MG/100ML; MG/100ML
75 INJECTION, SOLUTION INTRAVENOUS CONTINUOUS
Status: DISCONTINUED | OUTPATIENT
Start: 2021-01-13 | End: 2021-01-13 | Stop reason: HOSPADM

## 2021-01-13 RX ORDER — IBUPROFEN 400 MG/1
800 TABLET ORAL 3 TIMES DAILY
Status: DISCONTINUED | OUTPATIENT
Start: 2021-01-13 | End: 2021-01-13 | Stop reason: HOSPADM

## 2021-01-13 RX ORDER — HYDROMORPHONE HYDROCHLORIDE 2 MG/ML
0.2 INJECTION, SOLUTION INTRAMUSCULAR; INTRAVENOUS; SUBCUTANEOUS EVERY 5 MIN PRN
Status: DISCONTINUED | OUTPATIENT
Start: 2021-01-13 | End: 2021-01-13 | Stop reason: HOSPADM

## 2021-01-13 RX ORDER — CLINDAMYCIN PHOSPHATE 900 MG/50ML
900 INJECTION, SOLUTION INTRAVENOUS
Status: COMPLETED | OUTPATIENT
Start: 2021-01-13 | End: 2021-01-13

## 2021-01-13 RX ORDER — HYDROCODONE BITARTRATE AND ACETAMINOPHEN 5; 325 MG/1; MG/1
1 TABLET ORAL EVERY 4 HOURS PRN
Status: DISCONTINUED | OUTPATIENT
Start: 2021-01-13 | End: 2021-01-13 | Stop reason: HOSPADM

## 2021-01-13 RX ORDER — LIDOCAINE HYDROCHLORIDE 10 MG/ML
1 INJECTION, SOLUTION EPIDURAL; INFILTRATION; INTRACAUDAL; PERINEURAL ONCE
Status: COMPLETED | OUTPATIENT
Start: 2021-01-13 | End: 2021-01-13

## 2021-01-13 RX ORDER — ONDANSETRON 2 MG/ML
INJECTION INTRAMUSCULAR; INTRAVENOUS
Status: DISCONTINUED | OUTPATIENT
Start: 2021-01-13 | End: 2021-01-13

## 2021-01-13 RX ORDER — FENTANYL CITRATE 50 UG/ML
25 INJECTION, SOLUTION INTRAMUSCULAR; INTRAVENOUS EVERY 5 MIN PRN
Status: DISCONTINUED | OUTPATIENT
Start: 2021-01-13 | End: 2021-01-13 | Stop reason: HOSPADM

## 2021-01-13 RX ORDER — FENTANYL CITRATE 50 UG/ML
INJECTION, SOLUTION INTRAMUSCULAR; INTRAVENOUS
Status: DISCONTINUED | OUTPATIENT
Start: 2021-01-13 | End: 2021-01-13

## 2021-01-13 RX ORDER — DEXAMETHASONE SODIUM PHOSPHATE 4 MG/ML
INJECTION, SOLUTION INTRA-ARTICULAR; INTRALESIONAL; INTRAMUSCULAR; INTRAVENOUS; SOFT TISSUE
Status: DISCONTINUED | OUTPATIENT
Start: 2021-01-13 | End: 2021-01-13

## 2021-01-13 RX ORDER — KETOROLAC TROMETHAMINE 30 MG/ML
INJECTION, SOLUTION INTRAMUSCULAR; INTRAVENOUS
Status: DISCONTINUED | OUTPATIENT
Start: 2021-01-13 | End: 2021-01-13

## 2021-01-13 RX ORDER — MIDAZOLAM HYDROCHLORIDE 1 MG/ML
INJECTION, SOLUTION INTRAMUSCULAR; INTRAVENOUS
Status: DISCONTINUED | OUTPATIENT
Start: 2021-01-13 | End: 2021-01-13

## 2021-01-13 RX ORDER — OXYCODONE AND ACETAMINOPHEN 5; 325 MG/1; MG/1
1-2 TABLET ORAL EVERY 6 HOURS PRN
Qty: 28 TABLET | Refills: 0 | Status: SHIPPED | OUTPATIENT
Start: 2021-01-13 | End: 2021-01-19 | Stop reason: SDUPTHER

## 2021-01-13 RX ORDER — OXYCODONE HYDROCHLORIDE 5 MG/1
5 TABLET ORAL
Status: DISCONTINUED | OUTPATIENT
Start: 2021-01-13 | End: 2021-01-13 | Stop reason: HOSPADM

## 2021-01-13 RX ORDER — TRANEXAMIC ACID 100 MG/ML
1000 INJECTION, SOLUTION INTRAVENOUS
Status: COMPLETED | OUTPATIENT
Start: 2021-01-13 | End: 2021-01-13

## 2021-01-13 RX ORDER — DIPHENHYDRAMINE HYDROCHLORIDE 50 MG/ML
25 INJECTION INTRAMUSCULAR; INTRAVENOUS EVERY 6 HOURS PRN
Status: DISCONTINUED | OUTPATIENT
Start: 2021-01-13 | End: 2021-01-13 | Stop reason: HOSPADM

## 2021-01-13 RX ORDER — MUPIROCIN 20 MG/G
OINTMENT TOPICAL
Status: DISCONTINUED | OUTPATIENT
Start: 2021-01-13 | End: 2021-01-13 | Stop reason: HOSPADM

## 2021-01-13 RX ORDER — ROCURONIUM BROMIDE 10 MG/ML
INJECTION, SOLUTION INTRAVENOUS
Status: DISCONTINUED | OUTPATIENT
Start: 2021-01-13 | End: 2021-01-13

## 2021-01-13 RX ORDER — IBUPROFEN 800 MG/1
800 TABLET ORAL 3 TIMES DAILY
Qty: 60 TABLET | Refills: 0 | Status: SHIPPED | OUTPATIENT
Start: 2021-01-13 | End: 2021-01-26 | Stop reason: SDUPTHER

## 2021-01-13 RX ORDER — BUPIVACAINE HYDROCHLORIDE 5 MG/ML
INJECTION, SOLUTION EPIDURAL; INTRACAUDAL
Status: COMPLETED | OUTPATIENT
Start: 2021-01-13 | End: 2021-01-13

## 2021-01-13 RX ORDER — EPHEDRINE SULFATE 50 MG/ML
INJECTION, SOLUTION INTRAVENOUS
Status: DISCONTINUED | OUTPATIENT
Start: 2021-01-13 | End: 2021-01-13

## 2021-01-13 RX ORDER — LIDOCAINE HYDROCHLORIDE 20 MG/ML
INJECTION INTRAVENOUS
Status: DISCONTINUED | OUTPATIENT
Start: 2021-01-13 | End: 2021-01-13

## 2021-01-13 RX ORDER — HYDROCODONE BITARTRATE AND ACETAMINOPHEN 10; 325 MG/1; MG/1
1 TABLET ORAL EVERY 4 HOURS PRN
Status: DISCONTINUED | OUTPATIENT
Start: 2021-01-13 | End: 2021-01-13 | Stop reason: HOSPADM

## 2021-01-13 RX ORDER — PROPOFOL 10 MG/ML
VIAL (ML) INTRAVENOUS
Status: DISCONTINUED | OUTPATIENT
Start: 2021-01-13 | End: 2021-01-13

## 2021-01-13 RX ORDER — ONDANSETRON 2 MG/ML
4 INJECTION INTRAMUSCULAR; INTRAVENOUS ONCE AS NEEDED
Status: DISCONTINUED | OUTPATIENT
Start: 2021-01-13 | End: 2021-01-13 | Stop reason: HOSPADM

## 2021-01-13 RX ORDER — ALBUMIN HUMAN 50 G/1000ML
SOLUTION INTRAVENOUS CONTINUOUS PRN
Status: DISCONTINUED | OUTPATIENT
Start: 2021-01-13 | End: 2021-01-13

## 2021-01-13 RX ORDER — PHENYLEPHRINE HYDROCHLORIDE 10 MG/ML
INJECTION INTRAVENOUS
Status: DISCONTINUED | OUTPATIENT
Start: 2021-01-13 | End: 2021-01-13

## 2021-01-13 RX ORDER — VASOPRESSIN 20 [USP'U]/ML
INJECTION, SOLUTION INTRAMUSCULAR; SUBCUTANEOUS
Status: DISCONTINUED | OUTPATIENT
Start: 2021-01-13 | End: 2021-01-13

## 2021-01-13 RX ADMIN — PHENYLEPHRINE HYDROCHLORIDE 200 MCG: 10 INJECTION INTRAVENOUS at 08:01

## 2021-01-13 RX ADMIN — ROCURONIUM BROMIDE 5 MG: 10 INJECTION, SOLUTION INTRAVENOUS at 07:01

## 2021-01-13 RX ADMIN — PHENYLEPHRINE HYDROCHLORIDE 100 MCG: 10 INJECTION INTRAVENOUS at 07:01

## 2021-01-13 RX ADMIN — ROCURONIUM BROMIDE 10 MG: 10 INJECTION, SOLUTION INTRAVENOUS at 09:01

## 2021-01-13 RX ADMIN — ROCURONIUM BROMIDE 35 MG: 10 INJECTION, SOLUTION INTRAVENOUS at 08:01

## 2021-01-13 RX ADMIN — BUPIVACAINE HYDROCHLORIDE 10 ML: 5 INJECTION, SOLUTION EPIDURAL; INTRACAUDAL; PERINEURAL at 07:01

## 2021-01-13 RX ADMIN — ALBUMIN (HUMAN): 12.5 SOLUTION INTRAVENOUS at 08:01

## 2021-01-13 RX ADMIN — CLINDAMYCIN PHOSPHATE 900 MG: 18 INJECTION, SOLUTION INTRAVENOUS at 07:01

## 2021-01-13 RX ADMIN — KETOROLAC TROMETHAMINE 30 MG: 30 INJECTION, SOLUTION INTRAMUSCULAR; INTRAVENOUS at 10:01

## 2021-01-13 RX ADMIN — FENTANYL CITRATE 50 MCG: 0.05 INJECTION, SOLUTION INTRAMUSCULAR; INTRAVENOUS at 07:01

## 2021-01-13 RX ADMIN — EPHEDRINE SULFATE 15 MG: 50 INJECTION, SOLUTION INTRAMUSCULAR; INTRAVENOUS; SUBCUTANEOUS at 08:01

## 2021-01-13 RX ADMIN — OXYCODONE 5 MG: 5 TABLET ORAL at 11:01

## 2021-01-13 RX ADMIN — EPHEDRINE SULFATE 5 MG: 50 INJECTION, SOLUTION INTRAMUSCULAR; INTRAVENOUS; SUBCUTANEOUS at 08:01

## 2021-01-13 RX ADMIN — VASOPRESSIN 0.5 UNITS: 20 INJECTION, SOLUTION INTRAMUSCULAR; SUBCUTANEOUS at 09:01

## 2021-01-13 RX ADMIN — SUCCINYLCHOLINE CHLORIDE 60 MG: 20 INJECTION, SOLUTION INTRAMUSCULAR; INTRAVENOUS; PARENTERAL at 07:01

## 2021-01-13 RX ADMIN — EPHEDRINE SULFATE 10 MG: 50 INJECTION, SOLUTION INTRAMUSCULAR; INTRAVENOUS; SUBCUTANEOUS at 08:01

## 2021-01-13 RX ADMIN — VASOPRESSIN 0.5 UNITS: 20 INJECTION, SOLUTION INTRAMUSCULAR; SUBCUTANEOUS at 10:01

## 2021-01-13 RX ADMIN — PROPOFOL 40 MG: 10 INJECTION, EMULSION INTRAVENOUS at 07:01

## 2021-01-13 RX ADMIN — MIDAZOLAM 2 MG: 1 INJECTION INTRAMUSCULAR; INTRAVENOUS at 07:01

## 2021-01-13 RX ADMIN — TRANEXAMIC ACID 1000 MG: 100 INJECTION, SOLUTION INTRAVENOUS at 08:01

## 2021-01-13 RX ADMIN — ROCURONIUM BROMIDE 15 MG: 10 INJECTION, SOLUTION INTRAVENOUS at 09:01

## 2021-01-13 RX ADMIN — SODIUM CHLORIDE, SODIUM GLUCONATE, SODIUM ACETATE, POTASSIUM CHLORIDE, MAGNESIUM CHLORIDE, SODIUM PHOSPHATE, DIBASIC, AND POTASSIUM PHOSPHATE: .53; .5; .37; .037; .03; .012; .00082 INJECTION, SOLUTION INTRAVENOUS at 08:01

## 2021-01-13 RX ADMIN — PROPOFOL 160 MG: 10 INJECTION, EMULSION INTRAVENOUS at 07:01

## 2021-01-13 RX ADMIN — SUGAMMADEX 200 MG: 100 INJECTION, SOLUTION INTRAVENOUS at 10:01

## 2021-01-13 RX ADMIN — ROCURONIUM BROMIDE 10 MG: 10 INJECTION, SOLUTION INTRAVENOUS at 08:01

## 2021-01-13 RX ADMIN — SODIUM CHLORIDE, SODIUM GLUCONATE, SODIUM ACETATE, POTASSIUM CHLORIDE, MAGNESIUM CHLORIDE, SODIUM PHOSPHATE, DIBASIC, AND POTASSIUM PHOSPHATE: .53; .5; .37; .037; .03; .012; .00082 INJECTION, SOLUTION INTRAVENOUS at 07:01

## 2021-01-13 RX ADMIN — PHENYLEPHRINE HYDROCHLORIDE 0.3 MCG/KG/MIN: 10 INJECTION INTRAVENOUS at 07:01

## 2021-01-13 RX ADMIN — PHENYLEPHRINE HYDROCHLORIDE 100 MCG: 10 INJECTION INTRAVENOUS at 08:01

## 2021-01-13 RX ADMIN — SUCCINYLCHOLINE CHLORIDE 140 MG: 20 INJECTION, SOLUTION INTRAMUSCULAR; INTRAVENOUS; PARENTERAL at 07:01

## 2021-01-13 RX ADMIN — FENTANYL CITRATE 25 MCG: 0.05 INJECTION, SOLUTION INTRAMUSCULAR; INTRAVENOUS at 12:01

## 2021-01-13 RX ADMIN — ONDANSETRON 4 MG: 2 INJECTION, SOLUTION INTRAMUSCULAR; INTRAVENOUS at 10:01

## 2021-01-13 RX ADMIN — LIDOCAINE HYDROCHLORIDE: 10 INJECTION, SOLUTION EPIDURAL; INFILTRATION; INTRACAUDAL; PERINEURAL at 07:01

## 2021-01-13 RX ADMIN — DEXAMETHASONE SODIUM PHOSPHATE 4 MG: 4 INJECTION, SOLUTION INTRA-ARTICULAR; INTRALESIONAL; INTRAMUSCULAR; INTRAVENOUS; SOFT TISSUE at 08:01

## 2021-01-13 RX ADMIN — FENTANYL CITRATE 25 MCG: 0.05 INJECTION, SOLUTION INTRAMUSCULAR; INTRAVENOUS at 10:01

## 2021-01-13 RX ADMIN — BUPIVACAINE 10 ML: 13.3 INJECTION, SUSPENSION, LIPOSOMAL INFILTRATION at 07:01

## 2021-01-13 RX ADMIN — MUPIROCIN: 20 OINTMENT TOPICAL at 07:01

## 2021-01-13 RX ADMIN — LIDOCAINE HYDROCHLORIDE 100 MG: 20 INJECTION, SOLUTION INTRAVENOUS at 07:01

## 2021-01-13 RX ADMIN — VASOPRESSIN 1 UNITS: 20 INJECTION, SOLUTION INTRAMUSCULAR; SUBCUTANEOUS at 08:01

## 2021-01-15 ENCOUNTER — TELEPHONE (OUTPATIENT)
Dept: ORTHOPEDICS | Facility: CLINIC | Age: 61
End: 2021-01-15

## 2021-01-19 DIAGNOSIS — Z96.611 S/P SHOULDER REPLACEMENT, RIGHT: Primary | ICD-10-CM

## 2021-01-19 RX ORDER — OXYCODONE AND ACETAMINOPHEN 5; 325 MG/1; MG/1
1-2 TABLET ORAL EVERY 6 HOURS PRN
Qty: 28 TABLET | Refills: 0 | Status: SHIPPED | OUTPATIENT
Start: 2021-01-19 | End: 2021-01-26 | Stop reason: SDUPTHER

## 2021-01-25 DIAGNOSIS — M25.511 RIGHT SHOULDER PAIN, UNSPECIFIED CHRONICITY: Primary | ICD-10-CM

## 2021-01-26 ENCOUNTER — HOSPITAL ENCOUNTER (OUTPATIENT)
Dept: RADIOLOGY | Facility: HOSPITAL | Age: 61
Discharge: HOME | End: 2021-01-26
Attending: ORTHOPAEDIC SURGERY
Payer: MEDICARE

## 2021-01-26 ENCOUNTER — OFFICE VISIT (OUTPATIENT)
Dept: ORTHOPEDICS | Facility: CLINIC | Age: 61
End: 2021-01-26
Payer: MEDICARE

## 2021-01-26 VITALS — RESPIRATION RATE: 17 BRPM | HEIGHT: 70 IN | WEIGHT: 213 LBS | BODY MASS INDEX: 30.49 KG/M2

## 2021-01-26 DIAGNOSIS — Z96.611 S/P REVERSE TOTAL SHOULDER ARTHROPLASTY, RIGHT: Primary | ICD-10-CM

## 2021-01-26 DIAGNOSIS — Z96.611 S/P SHOULDER REPLACEMENT, RIGHT: ICD-10-CM

## 2021-01-26 DIAGNOSIS — M76.892 HIP FLEXOR TENDONITIS, LEFT: ICD-10-CM

## 2021-01-26 DIAGNOSIS — M25.511 RIGHT SHOULDER PAIN, UNSPECIFIED CHRONICITY: ICD-10-CM

## 2021-01-26 DIAGNOSIS — Z96.611 STATUS POST TOTAL SHOULDER ARTHROPLASTY, RIGHT: Primary | ICD-10-CM

## 2021-01-26 PROCEDURE — 1125F PR PAIN SEVERITY QUANTIFIED, PAIN PRESENT: ICD-10-PCS | Mod: S$GLB,,, | Performed by: ORTHOPAEDIC SURGERY

## 2021-01-26 PROCEDURE — 3008F BODY MASS INDEX DOCD: CPT | Mod: CPTII,S$GLB,, | Performed by: ORTHOPAEDIC SURGERY

## 2021-01-26 PROCEDURE — 99999 PR PBB SHADOW E&M-EST. PATIENT-LVL III: ICD-10-PCS | Mod: PBBFAC,,, | Performed by: ORTHOPAEDIC SURGERY

## 2021-01-26 PROCEDURE — 99999 PR PBB SHADOW E&M-EST. PATIENT-LVL III: CPT | Mod: PBBFAC,,, | Performed by: ORTHOPAEDIC SURGERY

## 2021-01-26 PROCEDURE — 73030 X-RAY EXAM OF SHOULDER: CPT | Mod: 26,RT,, | Performed by: RADIOLOGY

## 2021-01-26 PROCEDURE — 73030 X-RAY EXAM OF SHOULDER: CPT | Mod: TC,PN,RT

## 2021-01-26 PROCEDURE — 3008F PR BODY MASS INDEX (BMI) DOCUMENTED: ICD-10-PCS | Mod: CPTII,S$GLB,, | Performed by: ORTHOPAEDIC SURGERY

## 2021-01-26 PROCEDURE — 1125F AMNT PAIN NOTED PAIN PRSNT: CPT | Mod: S$GLB,,, | Performed by: ORTHOPAEDIC SURGERY

## 2021-01-26 PROCEDURE — 99024 PR POST-OP FOLLOW-UP VISIT: ICD-10-PCS | Mod: S$GLB,,, | Performed by: ORTHOPAEDIC SURGERY

## 2021-01-26 PROCEDURE — 73030 XR SHOULDER COMPLETE 2 OR MORE VIEWS RIGHT: ICD-10-PCS | Mod: 26,RT,, | Performed by: RADIOLOGY

## 2021-01-26 PROCEDURE — 99024 POSTOP FOLLOW-UP VISIT: CPT | Mod: S$GLB,,, | Performed by: ORTHOPAEDIC SURGERY

## 2021-01-26 RX ORDER — OXYCODONE AND ACETAMINOPHEN 5; 325 MG/1; MG/1
1-2 TABLET ORAL EVERY 6 HOURS PRN
Qty: 28 TABLET | Refills: 0 | Status: SHIPPED | OUTPATIENT
Start: 2021-01-26 | End: 2021-02-02 | Stop reason: SDUPTHER

## 2021-01-26 RX ORDER — IBUPROFEN 800 MG/1
800 TABLET ORAL 3 TIMES DAILY
Qty: 60 TABLET | Refills: 0 | Status: SHIPPED | OUTPATIENT
Start: 2021-01-26 | End: 2021-02-23 | Stop reason: SDUPTHER

## 2021-02-01 ENCOUNTER — TELEPHONE (OUTPATIENT)
Dept: ORTHOPEDICS | Facility: CLINIC | Age: 61
End: 2021-02-01

## 2021-02-01 ENCOUNTER — HOSPITAL ENCOUNTER (EMERGENCY)
Facility: HOSPITAL | Age: 61
Discharge: HOME | End: 2021-02-01
Attending: EMERGENCY MEDICINE
Payer: MEDICARE

## 2021-02-01 VITALS
DIASTOLIC BLOOD PRESSURE: 76 MMHG | WEIGHT: 215 LBS | TEMPERATURE: 97 F | OXYGEN SATURATION: 96 % | HEIGHT: 71 IN | HEART RATE: 99 BPM | SYSTOLIC BLOOD PRESSURE: 135 MMHG | RESPIRATION RATE: 20 BRPM | BODY MASS INDEX: 30.1 KG/M2

## 2021-02-01 DIAGNOSIS — M25.552 LEFT HIP PAIN: Primary | ICD-10-CM

## 2021-02-01 PROCEDURE — 96372 THER/PROPH/DIAG INJ SC/IM: CPT

## 2021-02-01 PROCEDURE — 99284 EMERGENCY DEPT VISIT MOD MDM: CPT | Mod: 25

## 2021-02-01 PROCEDURE — 63600175 PHARM REV CODE 636 W HCPCS: Performed by: EMERGENCY MEDICINE

## 2021-02-01 RX ORDER — MORPHINE SULFATE 8 MG/ML
8 INJECTION INTRAMUSCULAR; INTRAVENOUS; SUBCUTANEOUS
Status: COMPLETED | OUTPATIENT
Start: 2021-02-01 | End: 2021-02-01

## 2021-02-01 RX ADMIN — MORPHINE SULFATE 8 MG: 8 INJECTION, SOLUTION INTRAMUSCULAR; INTRAVENOUS at 01:02

## 2021-02-02 DIAGNOSIS — Z96.611 S/P SHOULDER REPLACEMENT, RIGHT: ICD-10-CM

## 2021-02-02 RX ORDER — OXYCODONE AND ACETAMINOPHEN 5; 325 MG/1; MG/1
1-2 TABLET ORAL EVERY 6 HOURS PRN
Qty: 28 TABLET | Refills: 0 | Status: SHIPPED | OUTPATIENT
Start: 2021-02-02 | End: 2021-02-10 | Stop reason: SDUPTHER

## 2021-02-04 ENCOUNTER — OFFICE VISIT (OUTPATIENT)
Dept: ORTHOPEDICS | Facility: CLINIC | Age: 61
End: 2021-02-04
Payer: MEDICARE

## 2021-02-04 VITALS — RESPIRATION RATE: 16 BRPM | HEIGHT: 71 IN | BODY MASS INDEX: 30.1 KG/M2 | WEIGHT: 215 LBS

## 2021-02-04 DIAGNOSIS — B18.2 CHRONIC VIRAL HEPATITIS C: Primary | ICD-10-CM

## 2021-02-04 DIAGNOSIS — M76.892 HIP FLEXOR TENDONITIS, LEFT: Primary | ICD-10-CM

## 2021-02-04 PROCEDURE — 3008F BODY MASS INDEX DOCD: CPT | Mod: CPTII,S$GLB,, | Performed by: ORTHOPAEDIC SURGERY

## 2021-02-04 PROCEDURE — 99999 PR PBB SHADOW E&M-EST. PATIENT-LVL III: CPT | Mod: PBBFAC,,, | Performed by: ORTHOPAEDIC SURGERY

## 2021-02-04 PROCEDURE — 1125F AMNT PAIN NOTED PAIN PRSNT: CPT | Mod: S$GLB,,, | Performed by: ORTHOPAEDIC SURGERY

## 2021-02-04 PROCEDURE — 99214 OFFICE O/P EST MOD 30 MIN: CPT | Mod: S$GLB,,, | Performed by: ORTHOPAEDIC SURGERY

## 2021-02-04 PROCEDURE — 3008F PR BODY MASS INDEX (BMI) DOCUMENTED: ICD-10-PCS | Mod: CPTII,S$GLB,, | Performed by: ORTHOPAEDIC SURGERY

## 2021-02-04 PROCEDURE — 99214 PR OFFICE/OUTPT VISIT, EST, LEVL IV, 30-39 MIN: ICD-10-PCS | Mod: S$GLB,,, | Performed by: ORTHOPAEDIC SURGERY

## 2021-02-04 PROCEDURE — 99999 PR PBB SHADOW E&M-EST. PATIENT-LVL III: ICD-10-PCS | Mod: PBBFAC,,, | Performed by: ORTHOPAEDIC SURGERY

## 2021-02-04 PROCEDURE — 1125F PR PAIN SEVERITY QUANTIFIED, PAIN PRESENT: ICD-10-PCS | Mod: S$GLB,,, | Performed by: ORTHOPAEDIC SURGERY

## 2021-02-05 ENCOUNTER — TELEPHONE (OUTPATIENT)
Dept: ORTHOPEDICS | Facility: CLINIC | Age: 61
End: 2021-02-05

## 2021-02-08 ENCOUNTER — TELEPHONE (OUTPATIENT)
Dept: ORTHOPEDICS | Facility: CLINIC | Age: 61
End: 2021-02-08

## 2021-02-10 DIAGNOSIS — Z96.611 S/P SHOULDER REPLACEMENT, RIGHT: ICD-10-CM

## 2021-02-10 RX ORDER — OXYCODONE AND ACETAMINOPHEN 5; 325 MG/1; MG/1
1-2 TABLET ORAL EVERY 6 HOURS PRN
Qty: 28 TABLET | Refills: 0 | Status: SHIPPED | OUTPATIENT
Start: 2021-02-10 | End: 2021-02-17 | Stop reason: SDUPTHER

## 2021-02-17 DIAGNOSIS — Z96.611 S/P SHOULDER REPLACEMENT, RIGHT: ICD-10-CM

## 2021-02-17 RX ORDER — OXYCODONE AND ACETAMINOPHEN 5; 325 MG/1; MG/1
1-2 TABLET ORAL EVERY 6 HOURS PRN
Qty: 28 TABLET | Refills: 0 | Status: SHIPPED | OUTPATIENT
Start: 2021-02-17 | End: 2021-02-23 | Stop reason: SDUPTHER

## 2021-02-23 ENCOUNTER — HOSPITAL ENCOUNTER (OUTPATIENT)
Dept: RADIOLOGY | Facility: HOSPITAL | Age: 61
Discharge: HOME | End: 2021-02-23
Attending: ORTHOPAEDIC SURGERY
Payer: MEDICARE

## 2021-02-23 ENCOUNTER — OFFICE VISIT (OUTPATIENT)
Dept: ORTHOPEDICS | Facility: CLINIC | Age: 61
End: 2021-02-23
Payer: MEDICARE

## 2021-02-23 VITALS — HEIGHT: 71 IN | BODY MASS INDEX: 30.1 KG/M2 | WEIGHT: 215 LBS | RESPIRATION RATE: 16 BRPM

## 2021-02-23 DIAGNOSIS — Z96.611 S/P SHOULDER REPLACEMENT, RIGHT: ICD-10-CM

## 2021-02-23 DIAGNOSIS — M76.892 HIP FLEXOR TENDONITIS, LEFT: ICD-10-CM

## 2021-02-23 DIAGNOSIS — Z96.611 S/P REVERSE TOTAL SHOULDER ARTHROPLASTY, RIGHT: Primary | ICD-10-CM

## 2021-02-23 PROCEDURE — 73722 MRI JOINT OF LWR EXTR W/DYE: CPT | Mod: TC,LT

## 2021-02-23 PROCEDURE — 3008F BODY MASS INDEX DOCD: CPT | Mod: CPTII,S$GLB,, | Performed by: ORTHOPAEDIC SURGERY

## 2021-02-23 PROCEDURE — 73525 CONTRAST X-RAY OF HIP: CPT | Mod: 26,LT,, | Performed by: RADIOLOGY

## 2021-02-23 PROCEDURE — A9577 INJ MULTIHANCE: HCPCS | Performed by: ORTHOPAEDIC SURGERY

## 2021-02-23 PROCEDURE — 27093 XR ARTHROGRAM HIP LEFT WITH MRI TO FOLLOW: ICD-10-PCS | Mod: LT,,, | Performed by: RADIOLOGY

## 2021-02-23 PROCEDURE — 73525 CONTRAST X-RAY OF HIP: CPT | Mod: TC,LT

## 2021-02-23 PROCEDURE — 27093 INJECTION FOR HIP X-RAY: CPT

## 2021-02-23 PROCEDURE — 99024 POSTOP FOLLOW-UP VISIT: CPT | Mod: S$GLB,,, | Performed by: ORTHOPAEDIC SURGERY

## 2021-02-23 PROCEDURE — 99999 PR PBB SHADOW E&M-EST. PATIENT-LVL III: CPT | Mod: PBBFAC,,, | Performed by: ORTHOPAEDIC SURGERY

## 2021-02-23 PROCEDURE — 99999 PR PBB SHADOW E&M-EST. PATIENT-LVL III: ICD-10-PCS | Mod: PBBFAC,,, | Performed by: ORTHOPAEDIC SURGERY

## 2021-02-23 PROCEDURE — 73525 XR ARTHROGRAM HIP LEFT WITH MRI TO FOLLOW: ICD-10-PCS | Mod: 26,LT,, | Performed by: RADIOLOGY

## 2021-02-23 PROCEDURE — 99024 PR POST-OP FOLLOW-UP VISIT: ICD-10-PCS | Mod: S$GLB,,, | Performed by: ORTHOPAEDIC SURGERY

## 2021-02-23 PROCEDURE — 3008F PR BODY MASS INDEX (BMI) DOCUMENTED: ICD-10-PCS | Mod: CPTII,S$GLB,, | Performed by: ORTHOPAEDIC SURGERY

## 2021-02-23 PROCEDURE — 73722 MRI JOINT OF LWR EXTR W/DYE: CPT | Mod: 26,LT,, | Performed by: RADIOLOGY

## 2021-02-23 PROCEDURE — 73722 MRI ARTHROGRAM HIP LEFT: ICD-10-PCS | Mod: 26,LT,, | Performed by: RADIOLOGY

## 2021-02-23 PROCEDURE — 27093 INJECTION FOR HIP X-RAY: CPT | Mod: LT,,, | Performed by: RADIOLOGY

## 2021-02-23 PROCEDURE — 25500020 PHARM REV CODE 255: Performed by: ORTHOPAEDIC SURGERY

## 2021-02-23 RX ORDER — OXYCODONE AND ACETAMINOPHEN 5; 325 MG/1; MG/1
1-2 TABLET ORAL EVERY 6 HOURS PRN
Qty: 28 TABLET | Refills: 0 | Status: SHIPPED | OUTPATIENT
Start: 2021-02-23 | End: 2021-03-02 | Stop reason: SDUPTHER

## 2021-02-23 RX ORDER — ALPRAZOLAM 0.25 MG/1
0.25 TABLET ORAL ONCE AS NEEDED
Qty: 2 TABLET | Refills: 0 | Status: SHIPPED | OUTPATIENT
Start: 2021-02-23 | End: 2021-03-10

## 2021-02-23 RX ORDER — IBUPROFEN 800 MG/1
800 TABLET ORAL 3 TIMES DAILY
Qty: 60 TABLET | Refills: 0 | Status: SHIPPED | OUTPATIENT
Start: 2021-02-23 | End: 2021-03-29 | Stop reason: SDUPTHER

## 2021-02-23 RX ADMIN — IOHEXOL 10 ML: 350 INJECTION, SOLUTION INTRAVENOUS at 11:02

## 2021-02-23 RX ADMIN — GADOBENATE DIMEGLUMINE 0.15 ML: 529 INJECTION, SOLUTION INTRAVENOUS at 11:02

## 2021-02-25 ENCOUNTER — OFFICE VISIT (OUTPATIENT)
Dept: ORTHOPEDICS | Facility: CLINIC | Age: 61
End: 2021-02-25
Payer: MEDICARE

## 2021-02-25 VITALS — HEIGHT: 71 IN | BODY MASS INDEX: 30.1 KG/M2 | WEIGHT: 215 LBS | RESPIRATION RATE: 16 BRPM

## 2021-02-25 DIAGNOSIS — M16.12 UNILATERAL PRIMARY OSTEOARTHRITIS, LEFT HIP: Primary | ICD-10-CM

## 2021-02-25 PROCEDURE — 99213 PR OFFICE/OUTPT VISIT, EST, LEVL III, 20-29 MIN: ICD-10-PCS | Mod: 25,S$GLB,, | Performed by: ORTHOPAEDIC SURGERY

## 2021-02-25 PROCEDURE — 3008F BODY MASS INDEX DOCD: CPT | Mod: CPTII,S$GLB,, | Performed by: ORTHOPAEDIC SURGERY

## 2021-02-25 PROCEDURE — 3008F PR BODY MASS INDEX (BMI) DOCUMENTED: ICD-10-PCS | Mod: CPTII,S$GLB,, | Performed by: ORTHOPAEDIC SURGERY

## 2021-02-25 PROCEDURE — 99999 PR PBB SHADOW E&M-EST. PATIENT-LVL III: ICD-10-PCS | Mod: PBBFAC,,, | Performed by: ORTHOPAEDIC SURGERY

## 2021-02-25 PROCEDURE — 1125F PR PAIN SEVERITY QUANTIFIED, PAIN PRESENT: ICD-10-PCS | Mod: S$GLB,,, | Performed by: ORTHOPAEDIC SURGERY

## 2021-02-25 PROCEDURE — 1125F AMNT PAIN NOTED PAIN PRSNT: CPT | Mod: S$GLB,,, | Performed by: ORTHOPAEDIC SURGERY

## 2021-02-25 PROCEDURE — 99213 OFFICE O/P EST LOW 20 MIN: CPT | Mod: 25,S$GLB,, | Performed by: ORTHOPAEDIC SURGERY

## 2021-02-25 PROCEDURE — 99999 PR PBB SHADOW E&M-EST. PATIENT-LVL III: CPT | Mod: PBBFAC,,, | Performed by: ORTHOPAEDIC SURGERY

## 2021-02-25 PROCEDURE — 20611 DRAIN/INJ JOINT/BURSA W/US: CPT | Mod: LT,S$GLB,, | Performed by: ORTHOPAEDIC SURGERY

## 2021-02-25 PROCEDURE — 20611 LARGE JOINT ASPIRATION/INJECTION: L HIP JOINT: ICD-10-PCS | Mod: LT,S$GLB,, | Performed by: ORTHOPAEDIC SURGERY

## 2021-02-25 RX ORDER — METHYLPREDNISOLONE ACETATE 40 MG/ML
40 INJECTION, SUSPENSION INTRA-ARTICULAR; INTRALESIONAL; INTRAMUSCULAR; SOFT TISSUE
Status: DISCONTINUED | OUTPATIENT
Start: 2021-02-25 | End: 2021-02-25 | Stop reason: HOSPADM

## 2021-02-25 RX ADMIN — METHYLPREDNISOLONE ACETATE 40 MG: 40 INJECTION, SUSPENSION INTRA-ARTICULAR; INTRALESIONAL; INTRAMUSCULAR; SOFT TISSUE at 09:02

## 2021-03-02 DIAGNOSIS — Z96.611 S/P SHOULDER REPLACEMENT, RIGHT: ICD-10-CM

## 2021-03-02 RX ORDER — OXYCODONE AND ACETAMINOPHEN 5; 325 MG/1; MG/1
1-2 TABLET ORAL EVERY 6 HOURS PRN
Qty: 28 TABLET | Refills: 0 | Status: SHIPPED | OUTPATIENT
Start: 2021-03-02 | End: 2021-03-08 | Stop reason: SDUPTHER

## 2021-03-04 ENCOUNTER — TELEPHONE (OUTPATIENT)
Dept: ORTHOPEDICS | Facility: CLINIC | Age: 61
End: 2021-03-04

## 2021-03-04 DIAGNOSIS — Z01.818 PREOP TESTING: ICD-10-CM

## 2021-03-04 DIAGNOSIS — M16.12 UNILATERAL PRIMARY OSTEOARTHRITIS, LEFT HIP: Primary | ICD-10-CM

## 2021-03-04 RX ORDER — CLINDAMYCIN PHOSPHATE 900 MG/50ML
900 INJECTION, SOLUTION INTRAVENOUS
Status: CANCELLED | OUTPATIENT
Start: 2021-03-04

## 2021-03-04 RX ORDER — MUPIROCIN 20 MG/G
OINTMENT TOPICAL
Status: CANCELLED | OUTPATIENT
Start: 2021-03-04

## 2021-03-04 RX ORDER — TRANEXAMIC ACID 100 MG/ML
1000 INJECTION, SOLUTION INTRAVENOUS
Status: CANCELLED | OUTPATIENT
Start: 2021-03-04

## 2021-03-05 ENCOUNTER — PATIENT MESSAGE (OUTPATIENT)
Dept: ORTHOPEDICS | Facility: CLINIC | Age: 61
End: 2021-03-05

## 2021-03-08 DIAGNOSIS — Z96.611 S/P SHOULDER REPLACEMENT, RIGHT: ICD-10-CM

## 2021-03-08 RX ORDER — OXYCODONE AND ACETAMINOPHEN 5; 325 MG/1; MG/1
1-2 TABLET ORAL EVERY 6 HOURS PRN
Qty: 28 TABLET | Refills: 0 | Status: SHIPPED | OUTPATIENT
Start: 2021-03-08 | End: 2021-03-15 | Stop reason: SDUPTHER

## 2021-03-10 ENCOUNTER — HOSPITAL ENCOUNTER (OUTPATIENT)
Dept: RADIOLOGY | Facility: HOSPITAL | Age: 61
Discharge: HOME | End: 2021-03-10
Attending: ORTHOPAEDIC SURGERY
Payer: MEDICARE

## 2021-03-10 ENCOUNTER — HOSPITAL ENCOUNTER (OUTPATIENT)
Dept: PREADMISSION TESTING | Facility: HOSPITAL | Age: 61
Discharge: HOME | End: 2021-03-10
Attending: ORTHOPAEDIC SURGERY
Payer: MEDICARE

## 2021-03-10 VITALS — BODY MASS INDEX: 30.1 KG/M2 | WEIGHT: 215 LBS | HEIGHT: 71 IN

## 2021-03-10 DIAGNOSIS — M16.12 UNILATERAL PRIMARY OSTEOARTHRITIS, LEFT HIP: Primary | ICD-10-CM

## 2021-03-10 DIAGNOSIS — Z01.818 PREOP TESTING: ICD-10-CM

## 2021-03-10 LAB
ABO + RH BLD: NORMAL
ANION GAP SERPL CALC-SCNC: 10 MMOL/L (ref 8–16)
BASOPHILS # BLD AUTO: 0.03 K/UL (ref 0–0.2)
BASOPHILS NFR BLD: 0.6 % (ref 0–1.9)
BLD GP AB SCN CELLS X3 SERPL QL: NORMAL
BUN SERPL-MCNC: 22 MG/DL (ref 8–23)
CALCIUM SERPL-MCNC: 8.5 MG/DL (ref 8.7–10.5)
CHLORIDE SERPL-SCNC: 105 MMOL/L (ref 95–110)
CO2 SERPL-SCNC: 26 MMOL/L (ref 23–29)
CREAT SERPL-MCNC: 0.9 MG/DL (ref 0.5–1.4)
DIFFERENTIAL METHOD: ABNORMAL
EOSINOPHIL # BLD AUTO: 0.2 K/UL (ref 0–0.5)
EOSINOPHIL NFR BLD: 3.9 % (ref 0–8)
ERYTHROCYTE [DISTWIDTH] IN BLOOD BY AUTOMATED COUNT: 13.7 % (ref 11.5–14.5)
EST. GFR  (AFRICAN AMERICAN): >60 ML/MIN/1.73 M^2
EST. GFR  (NON AFRICAN AMERICAN): >60 ML/MIN/1.73 M^2
GLUCOSE SERPL-MCNC: 138 MG/DL (ref 70–110)
HCT VFR BLD AUTO: 37.3 % (ref 40–54)
HGB BLD-MCNC: 12.1 G/DL (ref 14–18)
IMM GRANULOCYTES # BLD AUTO: 0.02 K/UL (ref 0–0.04)
IMM GRANULOCYTES NFR BLD AUTO: 0.4 % (ref 0–0.5)
LYMPHOCYTES # BLD AUTO: 1 K/UL (ref 1–4.8)
LYMPHOCYTES NFR BLD: 19.3 % (ref 18–48)
MCH RBC QN AUTO: 29.2 PG (ref 27–31)
MCHC RBC AUTO-ENTMCNC: 32.4 G/DL (ref 32–36)
MCV RBC AUTO: 90 FL (ref 82–98)
MONOCYTES # BLD AUTO: 0.3 K/UL (ref 0.3–1)
MONOCYTES NFR BLD: 6.4 % (ref 4–15)
NEUTROPHILS # BLD AUTO: 3.6 K/UL (ref 1.8–7.7)
NEUTROPHILS NFR BLD: 69.4 % (ref 38–73)
NRBC BLD-RTO: 0 /100 WBC
PLATELET # BLD AUTO: 167 K/UL (ref 150–350)
PMV BLD AUTO: 10.2 FL (ref 9.2–12.9)
POTASSIUM SERPL-SCNC: 4.2 MMOL/L (ref 3.5–5.1)
RBC # BLD AUTO: 4.14 M/UL (ref 4.6–6.2)
SODIUM SERPL-SCNC: 141 MMOL/L (ref 136–145)
WBC # BLD AUTO: 5.18 K/UL (ref 3.9–12.7)

## 2021-03-10 PROCEDURE — 87081 CULTURE SCREEN ONLY: CPT | Performed by: ORTHOPAEDIC SURGERY

## 2021-03-10 PROCEDURE — 86900 BLOOD TYPING SEROLOGIC ABO: CPT | Performed by: ORTHOPAEDIC SURGERY

## 2021-03-10 PROCEDURE — 99900104 DSU ONLY-NO CHARGE-EA ADD'L HR (STAT)

## 2021-03-10 PROCEDURE — 99900103 DSU ONLY-NO CHARGE-INITIAL HR (STAT)

## 2021-03-10 PROCEDURE — 80048 BASIC METABOLIC PNL TOTAL CA: CPT | Performed by: ORTHOPAEDIC SURGERY

## 2021-03-10 PROCEDURE — 85025 COMPLETE CBC W/AUTO DIFF WBC: CPT | Performed by: ORTHOPAEDIC SURGERY

## 2021-03-12 LAB — MRSA SPEC QL CULT: NORMAL

## 2021-03-15 DIAGNOSIS — Z96.611 S/P SHOULDER REPLACEMENT, RIGHT: ICD-10-CM

## 2021-03-15 RX ORDER — OXYCODONE AND ACETAMINOPHEN 5; 325 MG/1; MG/1
1 TABLET ORAL EVERY 6 HOURS PRN
Qty: 28 TABLET | Refills: 0 | Status: SHIPPED | OUTPATIENT
Start: 2021-03-15 | End: 2021-03-22 | Stop reason: SDUPTHER

## 2021-03-16 ENCOUNTER — TELEPHONE (OUTPATIENT)
Dept: ORTHOPEDICS | Facility: CLINIC | Age: 61
End: 2021-03-16

## 2021-03-16 DIAGNOSIS — Z01.818 PREOP TESTING: Primary | ICD-10-CM

## 2021-03-17 ENCOUNTER — OFFICE VISIT (OUTPATIENT)
Dept: FAMILY MEDICINE | Facility: CLINIC | Age: 61
End: 2021-03-17
Payer: MEDICARE

## 2021-03-17 VITALS
SYSTOLIC BLOOD PRESSURE: 100 MMHG | WEIGHT: 222 LBS | HEIGHT: 71 IN | HEART RATE: 60 BPM | DIASTOLIC BLOOD PRESSURE: 68 MMHG | BODY MASS INDEX: 31.08 KG/M2

## 2021-03-17 DIAGNOSIS — K21.9 GASTROESOPHAGEAL REFLUX DISEASE WITHOUT ESOPHAGITIS: Primary | ICD-10-CM

## 2021-03-17 DIAGNOSIS — I10 ESSENTIAL HYPERTENSION: ICD-10-CM

## 2021-03-17 DIAGNOSIS — M16.12 ARTHRITIS OF LEFT HIP: ICD-10-CM

## 2021-03-17 DIAGNOSIS — R19.5 POSITIVE COLORECTAL CANCER SCREENING USING COLOGUARD TEST: ICD-10-CM

## 2021-03-17 DIAGNOSIS — Z96.611 STATUS POST TOTAL SHOULDER ARTHROPLASTY, RIGHT: ICD-10-CM

## 2021-03-17 PROCEDURE — 3078F DIAST BP <80 MM HG: CPT | Mod: S$GLB,,, | Performed by: NURSE PRACTITIONER

## 2021-03-17 PROCEDURE — 3008F BODY MASS INDEX DOCD: CPT | Mod: S$GLB,,, | Performed by: NURSE PRACTITIONER

## 2021-03-17 PROCEDURE — 3074F SYST BP LT 130 MM HG: CPT | Mod: S$GLB,,, | Performed by: NURSE PRACTITIONER

## 2021-03-17 PROCEDURE — 3078F PR MOST RECENT DIASTOLIC BLOOD PRESSURE < 80 MM HG: ICD-10-PCS | Mod: S$GLB,,, | Performed by: NURSE PRACTITIONER

## 2021-03-17 PROCEDURE — 99213 PR OFFICE/OUTPT VISIT, EST, LEVL III, 20-29 MIN: ICD-10-PCS | Mod: S$GLB,,, | Performed by: NURSE PRACTITIONER

## 2021-03-17 PROCEDURE — 3008F PR BODY MASS INDEX (BMI) DOCUMENTED: ICD-10-PCS | Mod: S$GLB,,, | Performed by: NURSE PRACTITIONER

## 2021-03-17 PROCEDURE — 3074F PR MOST RECENT SYSTOLIC BLOOD PRESSURE < 130 MM HG: ICD-10-PCS | Mod: S$GLB,,, | Performed by: NURSE PRACTITIONER

## 2021-03-17 PROCEDURE — 99213 OFFICE O/P EST LOW 20 MIN: CPT | Mod: S$GLB,,, | Performed by: NURSE PRACTITIONER

## 2021-03-17 RX ORDER — OMEPRAZOLE 20 MG/1
20 CAPSULE, DELAYED RELEASE ORAL DAILY
Qty: 90 CAPSULE | Refills: 1 | Status: SHIPPED | OUTPATIENT
Start: 2021-03-17 | End: 2021-09-21 | Stop reason: SDUPTHER

## 2021-03-18 ENCOUNTER — TELEPHONE (OUTPATIENT)
Dept: FAMILY MEDICINE | Facility: CLINIC | Age: 61
End: 2021-03-18

## 2021-03-18 LAB
ALBUMIN SERPL-MCNC: 3.4 G/DL (ref 3.6–5.1)
ALBUMIN/GLOB SERPL: 1 (CALC) (ref 1–2.5)
ALP SERPL-CCNC: 249 U/L (ref 35–144)
ALT SERPL-CCNC: 33 U/L (ref 9–46)
APPEARANCE UR: CLEAR
AST SERPL-CCNC: 56 U/L (ref 10–35)
BACTERIA #/AREA URNS HPF: ABNORMAL /HPF
BACTERIA UR CULT: ABNORMAL
BASOPHILS # BLD AUTO: 58 CELLS/UL (ref 0–200)
BASOPHILS NFR BLD AUTO: 1.1 %
BILIRUB SERPL-MCNC: 0.6 MG/DL (ref 0.2–1.2)
BILIRUB UR QL STRIP: NEGATIVE
BUN SERPL-MCNC: 19 MG/DL (ref 7–25)
BUN/CREAT SERPL: ABNORMAL (CALC) (ref 6–22)
CALCIUM SERPL-MCNC: 9 MG/DL (ref 8.6–10.3)
CHLORIDE SERPL-SCNC: 101 MMOL/L (ref 98–110)
CHOLEST SERPL-MCNC: 160 MG/DL
CHOLEST/HDLC SERPL: 3.3 (CALC)
CO2 SERPL-SCNC: 25 MMOL/L (ref 20–32)
COLOR UR: ABNORMAL
CREAT SERPL-MCNC: 0.94 MG/DL (ref 0.7–1.25)
EOSINOPHIL # BLD AUTO: 360 CELLS/UL (ref 15–500)
EOSINOPHIL NFR BLD AUTO: 6.8 %
ERYTHROCYTE [DISTWIDTH] IN BLOOD BY AUTOMATED COUNT: 13.2 % (ref 11–15)
GFRSERPLBLD MDRD-ARVRAT: 87 ML/MIN/1.73M2
GLOBULIN SER CALC-MCNC: 3.5 G/DL (CALC) (ref 1.9–3.7)
GLUCOSE SERPL-MCNC: 89 MG/DL (ref 65–99)
GLUCOSE UR QL STRIP: NEGATIVE
HCT VFR BLD AUTO: 35.9 % (ref 38.5–50)
HDLC SERPL-MCNC: 48 MG/DL
HGB BLD-MCNC: 12.1 G/DL (ref 13.2–17.1)
HGB UR QL STRIP: NEGATIVE
HYALINE CASTS #/AREA URNS LPF: ABNORMAL /LPF
KETONES UR QL STRIP: ABNORMAL
LDLC SERPL CALC-MCNC: 89 MG/DL (CALC)
LEUKOCYTE ESTERASE UR QL STRIP: NEGATIVE
LYMPHOCYTES # BLD AUTO: 1399 CELLS/UL (ref 850–3900)
LYMPHOCYTES NFR BLD AUTO: 26.4 %
MCH RBC QN AUTO: 28.4 PG (ref 27–33)
MCHC RBC AUTO-ENTMCNC: 33.7 G/DL (ref 32–36)
MCV RBC AUTO: 84.3 FL (ref 80–100)
MONOCYTES # BLD AUTO: 551 CELLS/UL (ref 200–950)
MONOCYTES NFR BLD AUTO: 10.4 %
NEUTROPHILS # BLD AUTO: 2931 CELLS/UL (ref 1500–7800)
NEUTROPHILS NFR BLD AUTO: 55.3 %
NITRITE UR QL STRIP: NEGATIVE
NONHDLC SERPL-MCNC: 112 MG/DL (CALC)
PH UR STRIP: ABNORMAL [PH] (ref 5–8)
PLATELET # BLD AUTO: 220 THOUSAND/UL (ref 140–400)
PMV BLD REES-ECKER: 10.8 FL (ref 7.5–12.5)
POTASSIUM SERPL-SCNC: 4.4 MMOL/L (ref 3.5–5.3)
PROT SERPL-MCNC: 6.9 G/DL (ref 6.1–8.1)
PROT UR QL STRIP: NEGATIVE
PSA SERPL-MCNC: 0.3 NG/ML
RBC # BLD AUTO: 4.26 MILLION/UL (ref 4.2–5.8)
RBC #/AREA URNS HPF: ABNORMAL /HPF
SODIUM SERPL-SCNC: 135 MMOL/L (ref 135–146)
SP GR UR STRIP: 1.02 (ref 1–1.03)
SQUAMOUS #/AREA URNS HPF: ABNORMAL /HPF
TRIGL SERPL-MCNC: 124 MG/DL
TSH SERPL-ACNC: 3.81 MIU/L (ref 0.4–4.5)
WBC # BLD AUTO: 5.3 THOUSAND/UL (ref 3.8–10.8)
WBC #/AREA URNS HPF: ABNORMAL /HPF

## 2021-03-21 ENCOUNTER — LAB VISIT (OUTPATIENT)
Dept: PRIMARY CARE CLINIC | Facility: CLINIC | Age: 61
End: 2021-03-21
Payer: MEDICARE

## 2021-03-21 DIAGNOSIS — Z01.818 PREOP TESTING: ICD-10-CM

## 2021-03-21 PROCEDURE — U0003 INFECTIOUS AGENT DETECTION BY NUCLEIC ACID (DNA OR RNA); SEVERE ACUTE RESPIRATORY SYNDROME CORONAVIRUS 2 (SARS-COV-2) (CORONAVIRUS DISEASE [COVID-19]), AMPLIFIED PROBE TECHNIQUE, MAKING USE OF HIGH THROUGHPUT TECHNOLOGIES AS DESCRIBED BY CMS-2020-01-R: HCPCS | Performed by: ORTHOPAEDIC SURGERY

## 2021-03-21 PROCEDURE — U0005 INFEC AGEN DETEC AMPLI PROBE: HCPCS | Performed by: ORTHOPAEDIC SURGERY

## 2021-03-22 DIAGNOSIS — Z96.611 S/P SHOULDER REPLACEMENT, RIGHT: ICD-10-CM

## 2021-03-22 LAB — SARS-COV-2 RNA RESP QL NAA+PROBE: NOT DETECTED

## 2021-03-22 RX ORDER — OXYCODONE AND ACETAMINOPHEN 5; 325 MG/1; MG/1
1 TABLET ORAL EVERY 6 HOURS PRN
Qty: 28 TABLET | Refills: 0 | Status: ON HOLD | OUTPATIENT
Start: 2021-03-22 | End: 2021-03-25 | Stop reason: HOSPADM

## 2021-03-23 ENCOUNTER — ANESTHESIA EVENT (OUTPATIENT)
Dept: SURGERY | Facility: HOSPITAL | Age: 61
End: 2021-03-23
Payer: MEDICARE

## 2021-03-24 ENCOUNTER — HOSPITAL ENCOUNTER (OUTPATIENT)
Facility: HOSPITAL | Age: 61
Discharge: HOME HEALTH | End: 2021-03-25
Attending: ORTHOPAEDIC SURGERY | Admitting: ORTHOPAEDIC SURGERY
Payer: MEDICARE

## 2021-03-24 ENCOUNTER — CLINICAL SUPPORT (OUTPATIENT)
Dept: SMOKING CESSATION | Facility: CLINIC | Age: 61
End: 2021-03-24
Payer: COMMERCIAL

## 2021-03-24 ENCOUNTER — ANESTHESIA (OUTPATIENT)
Dept: SURGERY | Facility: HOSPITAL | Age: 61
End: 2021-03-24
Payer: MEDICARE

## 2021-03-24 DIAGNOSIS — Z01.818 PREOP TESTING: Primary | ICD-10-CM

## 2021-03-24 DIAGNOSIS — M16.12 UNILATERAL PRIMARY OSTEOARTHRITIS, LEFT HIP: ICD-10-CM

## 2021-03-24 DIAGNOSIS — F17.200 NICOTINE DEPENDENCE: Primary | ICD-10-CM

## 2021-03-24 PROCEDURE — 99999 PR PBB SHADOW E&M-EST. PATIENT-LVL I: CPT | Mod: PBBFAC,,,

## 2021-03-24 PROCEDURE — 97110 THERAPEUTIC EXERCISES: CPT

## 2021-03-24 PROCEDURE — D9220A PRA ANESTHESIA: Mod: ANES,,, | Performed by: ANESTHESIOLOGY

## 2021-03-24 PROCEDURE — 25000003 PHARM REV CODE 250: Performed by: ANESTHESIOLOGY

## 2021-03-24 PROCEDURE — 37000008 HC ANESTHESIA 1ST 15 MINUTES: Performed by: ORTHOPAEDIC SURGERY

## 2021-03-24 PROCEDURE — 37000009 HC ANESTHESIA EA ADD 15 MINS: Performed by: ORTHOPAEDIC SURGERY

## 2021-03-24 PROCEDURE — 99900103 DSU ONLY-NO CHARGE-INITIAL HR (STAT): Performed by: ORTHOPAEDIC SURGERY

## 2021-03-24 PROCEDURE — D9220A PRA ANESTHESIA: ICD-10-PCS | Mod: CRNA,,, | Performed by: NURSE ANESTHETIST, CERTIFIED REGISTERED

## 2021-03-24 PROCEDURE — 25000003 PHARM REV CODE 250: Performed by: ORTHOPAEDIC SURGERY

## 2021-03-24 PROCEDURE — 27201423 OPTIME MED/SURG SUP & DEVICES STERILE SUPPLY: Performed by: ORTHOPAEDIC SURGERY

## 2021-03-24 PROCEDURE — 27130 TOTAL HIP ARTHROPLASTY: CPT | Mod: LT,,, | Performed by: ORTHOPAEDIC SURGERY

## 2021-03-24 PROCEDURE — 99999 PR PBB SHADOW E&M-EST. PATIENT-LVL I: ICD-10-PCS | Mod: PBBFAC,,,

## 2021-03-24 PROCEDURE — 71000039 HC RECOVERY, EACH ADD'L HOUR: Performed by: ORTHOPAEDIC SURGERY

## 2021-03-24 PROCEDURE — 27130 PR TOTAL HIP ARTHROPLASTY: ICD-10-PCS | Mod: LT,,, | Performed by: ORTHOPAEDIC SURGERY

## 2021-03-24 PROCEDURE — D9220A PRA ANESTHESIA: ICD-10-PCS | Mod: ANES,,, | Performed by: ANESTHESIOLOGY

## 2021-03-24 PROCEDURE — 99900104 DSU ONLY-NO CHARGE-EA ADD'L HR (STAT): Performed by: ORTHOPAEDIC SURGERY

## 2021-03-24 PROCEDURE — 99406 BEHAV CHNG SMOKING 3-10 MIN: CPT | Mod: S$GLB,,, | Performed by: HOSPITALIST

## 2021-03-24 PROCEDURE — 63600175 PHARM REV CODE 636 W HCPCS: Performed by: NURSE ANESTHETIST, CERTIFIED REGISTERED

## 2021-03-24 PROCEDURE — 94799 UNLISTED PULMONARY SVC/PX: CPT

## 2021-03-24 PROCEDURE — D9220A PRA ANESTHESIA: Mod: CRNA,,, | Performed by: NURSE ANESTHETIST, CERTIFIED REGISTERED

## 2021-03-24 PROCEDURE — 36000711: Performed by: ORTHOPAEDIC SURGERY

## 2021-03-24 PROCEDURE — 99900035 HC TECH TIME PER 15 MIN (STAT)

## 2021-03-24 PROCEDURE — 25000003 PHARM REV CODE 250: Performed by: NURSE ANESTHETIST, CERTIFIED REGISTERED

## 2021-03-24 PROCEDURE — C1713 ANCHOR/SCREW BN/BN,TIS/BN: HCPCS | Performed by: ORTHOPAEDIC SURGERY

## 2021-03-24 PROCEDURE — 63600175 PHARM REV CODE 636 W HCPCS: Performed by: ORTHOPAEDIC SURGERY

## 2021-03-24 PROCEDURE — 94761 N-INVAS EAR/PLS OXIMETRY MLT: CPT

## 2021-03-24 PROCEDURE — C1776 JOINT DEVICE (IMPLANTABLE): HCPCS | Performed by: ORTHOPAEDIC SURGERY

## 2021-03-24 PROCEDURE — 71000033 HC RECOVERY, INTIAL HOUR: Performed by: ORTHOPAEDIC SURGERY

## 2021-03-24 PROCEDURE — 99406 PR TOBACCO USE CESSATION INTERMEDIATE 3-10 MINUTES: ICD-10-PCS | Mod: S$GLB,,, | Performed by: HOSPITALIST

## 2021-03-24 PROCEDURE — 97162 PT EVAL MOD COMPLEX 30 MIN: CPT

## 2021-03-24 PROCEDURE — 97116 GAIT TRAINING THERAPY: CPT

## 2021-03-24 PROCEDURE — 36000710: Performed by: ORTHOPAEDIC SURGERY

## 2021-03-24 DEVICE — IMPLANTABLE DEVICE: Type: IMPLANTABLE DEVICE | Site: HIP | Status: FUNCTIONAL

## 2021-03-24 RX ORDER — ONDANSETRON HYDROCHLORIDE 2 MG/ML
INJECTION, SOLUTION INTRAMUSCULAR; INTRAVENOUS
Status: DISCONTINUED | OUTPATIENT
Start: 2021-03-24 | End: 2021-03-24

## 2021-03-24 RX ORDER — LOPERAMIDE HYDROCHLORIDE 2 MG/1
2 CAPSULE ORAL CONTINUOUS PRN
Status: DISCONTINUED | OUTPATIENT
Start: 2021-03-24 | End: 2021-03-25 | Stop reason: HOSPADM

## 2021-03-24 RX ORDER — PHENYLEPHRINE HYDROCHLORIDE 10 MG/ML
INJECTION INTRAVENOUS
Status: DISCONTINUED | OUTPATIENT
Start: 2021-03-24 | End: 2021-03-24

## 2021-03-24 RX ORDER — KETOROLAC TROMETHAMINE 30 MG/ML
INJECTION, SOLUTION INTRAMUSCULAR; INTRAVENOUS
Status: DISCONTINUED | OUTPATIENT
Start: 2021-03-24 | End: 2021-03-24

## 2021-03-24 RX ORDER — FAMOTIDINE 20 MG/1
20 TABLET, FILM COATED ORAL 2 TIMES DAILY
Status: DISCONTINUED | OUTPATIENT
Start: 2021-03-24 | End: 2021-03-25 | Stop reason: HOSPADM

## 2021-03-24 RX ORDER — ZOLPIDEM TARTRATE 5 MG/1
5 TABLET ORAL NIGHTLY PRN
Status: DISCONTINUED | OUTPATIENT
Start: 2021-03-24 | End: 2021-03-25 | Stop reason: HOSPADM

## 2021-03-24 RX ORDER — DEXAMETHASONE SODIUM PHOSPHATE 4 MG/ML
INJECTION, SOLUTION INTRA-ARTICULAR; INTRALESIONAL; INTRAMUSCULAR; INTRAVENOUS; SOFT TISSUE
Status: DISCONTINUED | OUTPATIENT
Start: 2021-03-24 | End: 2021-03-24

## 2021-03-24 RX ORDER — ONDANSETRON 8 MG/1
8 TABLET, ORALLY DISINTEGRATING ORAL EVERY 8 HOURS PRN
Status: DISCONTINUED | OUTPATIENT
Start: 2021-03-24 | End: 2021-03-25 | Stop reason: HOSPADM

## 2021-03-24 RX ORDER — HYDROCHLOROTHIAZIDE 25 MG/1
25 TABLET ORAL DAILY
Status: DISCONTINUED | OUTPATIENT
Start: 2021-03-24 | End: 2021-03-25 | Stop reason: HOSPADM

## 2021-03-24 RX ORDER — CLINDAMYCIN PHOSPHATE 900 MG/50ML
900 INJECTION, SOLUTION INTRAVENOUS
Status: COMPLETED | OUTPATIENT
Start: 2021-03-24 | End: 2021-03-24

## 2021-03-24 RX ORDER — LIDOCAINE HCL/PF 100 MG/5ML
SYRINGE (ML) INTRAVENOUS
Status: DISCONTINUED | OUTPATIENT
Start: 2021-03-24 | End: 2021-03-24

## 2021-03-24 RX ORDER — MUPIROCIN 20 MG/G
OINTMENT TOPICAL
Status: DISCONTINUED | OUTPATIENT
Start: 2021-03-24 | End: 2021-03-24 | Stop reason: HOSPADM

## 2021-03-24 RX ORDER — CETIRIZINE HYDROCHLORIDE 10 MG/1
10 TABLET ORAL DAILY
Status: DISCONTINUED | OUTPATIENT
Start: 2021-03-24 | End: 2021-03-25 | Stop reason: HOSPADM

## 2021-03-24 RX ORDER — PROCHLORPERAZINE EDISYLATE 5 MG/ML
5 INJECTION INTRAMUSCULAR; INTRAVENOUS EVERY 30 MIN PRN
Status: DISCONTINUED | OUTPATIENT
Start: 2021-03-24 | End: 2021-03-24 | Stop reason: HOSPADM

## 2021-03-24 RX ORDER — ACETAMINOPHEN 500 MG
1000 TABLET ORAL EVERY 8 HOURS
Status: DISCONTINUED | OUTPATIENT
Start: 2021-03-24 | End: 2021-03-25 | Stop reason: HOSPADM

## 2021-03-24 RX ORDER — ACETAMINOPHEN 10 MG/ML
1000 INJECTION, SOLUTION INTRAVENOUS ONCE
Status: DISPENSED | OUTPATIENT
Start: 2021-03-24 | End: 2021-03-25

## 2021-03-24 RX ORDER — MIDAZOLAM HYDROCHLORIDE 1 MG/ML
INJECTION INTRAMUSCULAR; INTRAVENOUS
Status: DISCONTINUED | OUTPATIENT
Start: 2021-03-24 | End: 2021-03-24

## 2021-03-24 RX ORDER — OXYCODONE HYDROCHLORIDE 5 MG/1
5 TABLET ORAL
Status: DISCONTINUED | OUTPATIENT
Start: 2021-03-24 | End: 2021-03-24 | Stop reason: HOSPADM

## 2021-03-24 RX ORDER — CELECOXIB 100 MG/1
400 CAPSULE ORAL ONCE
Status: COMPLETED | OUTPATIENT
Start: 2021-03-24 | End: 2021-03-24

## 2021-03-24 RX ORDER — FENTANYL CITRATE 50 UG/ML
INJECTION, SOLUTION INTRAMUSCULAR; INTRAVENOUS
Status: DISCONTINUED | OUTPATIENT
Start: 2021-03-24 | End: 2021-03-24

## 2021-03-24 RX ORDER — MUPIROCIN 20 MG/G
1 OINTMENT TOPICAL 2 TIMES DAILY
Status: DISCONTINUED | OUTPATIENT
Start: 2021-03-24 | End: 2021-03-25 | Stop reason: HOSPADM

## 2021-03-24 RX ORDER — NAPROXEN SODIUM 220 MG/1
81 TABLET, FILM COATED ORAL 2 TIMES DAILY
Status: DISCONTINUED | OUTPATIENT
Start: 2021-03-24 | End: 2021-03-25 | Stop reason: HOSPADM

## 2021-03-24 RX ORDER — METOCLOPRAMIDE HYDROCHLORIDE 5 MG/ML
10 INJECTION INTRAMUSCULAR; INTRAVENOUS EVERY 10 MIN PRN
Status: DISCONTINUED | OUTPATIENT
Start: 2021-03-24 | End: 2021-03-24 | Stop reason: HOSPADM

## 2021-03-24 RX ORDER — TRAMADOL HYDROCHLORIDE 50 MG/1
50 TABLET ORAL EVERY 4 HOURS PRN
Status: DISCONTINUED | OUTPATIENT
Start: 2021-03-24 | End: 2021-03-25 | Stop reason: HOSPADM

## 2021-03-24 RX ORDER — EPHEDRINE SULFATE 50 MG/ML
INJECTION, SOLUTION INTRAVENOUS
Status: DISCONTINUED | OUTPATIENT
Start: 2021-03-24 | End: 2021-03-24

## 2021-03-24 RX ORDER — PROPOFOL 10 MG/ML
VIAL (ML) INTRAVENOUS CONTINUOUS PRN
Status: DISCONTINUED | OUTPATIENT
Start: 2021-03-24 | End: 2021-03-24

## 2021-03-24 RX ORDER — LACTULOSE 10 G/15ML
20 SOLUTION ORAL EVERY 6 HOURS PRN
Status: DISCONTINUED | OUTPATIENT
Start: 2021-03-24 | End: 2021-03-25 | Stop reason: HOSPADM

## 2021-03-24 RX ORDER — PREGABALIN 75 MG/1
75 CAPSULE ORAL 2 TIMES DAILY
Status: DISCONTINUED | OUTPATIENT
Start: 2021-03-24 | End: 2021-03-25 | Stop reason: HOSPADM

## 2021-03-24 RX ORDER — HYDROMORPHONE HYDROCHLORIDE 2 MG/ML
0.2 INJECTION, SOLUTION INTRAMUSCULAR; INTRAVENOUS; SUBCUTANEOUS EVERY 5 MIN PRN
Status: DISCONTINUED | OUTPATIENT
Start: 2021-03-24 | End: 2021-03-24 | Stop reason: HOSPADM

## 2021-03-24 RX ORDER — BISACODYL 10 MG
10 SUPPOSITORY, RECTAL RECTAL DAILY PRN
Status: DISCONTINUED | OUTPATIENT
Start: 2021-03-24 | End: 2021-03-25 | Stop reason: HOSPADM

## 2021-03-24 RX ORDER — TRANEXAMIC ACID 100 MG/ML
1000 INJECTION, SOLUTION INTRAVENOUS
Status: COMPLETED | OUTPATIENT
Start: 2021-03-24 | End: 2021-03-24

## 2021-03-24 RX ORDER — FLUTICASONE PROPIONATE 50 MCG
1 SPRAY, SUSPENSION (ML) NASAL DAILY
Status: DISCONTINUED | OUTPATIENT
Start: 2021-03-24 | End: 2021-03-25 | Stop reason: HOSPADM

## 2021-03-24 RX ORDER — OXYCODONE HCL 10 MG/1
10 TABLET, FILM COATED, EXTENDED RELEASE ORAL ONCE
Status: COMPLETED | OUTPATIENT
Start: 2021-03-24 | End: 2021-03-24

## 2021-03-24 RX ORDER — LISINOPRIL 40 MG/1
40 TABLET ORAL DAILY
Status: DISCONTINUED | OUTPATIENT
Start: 2021-03-24 | End: 2021-03-25 | Stop reason: HOSPADM

## 2021-03-24 RX ORDER — BUPIVACAINE HYDROCHLORIDE 5 MG/ML
INJECTION, SOLUTION EPIDURAL; INTRACAUDAL
Status: COMPLETED | OUTPATIENT
Start: 2021-03-24 | End: 2021-03-24

## 2021-03-24 RX ORDER — PROMETHAZINE HYDROCHLORIDE 25 MG/1
25 TABLET ORAL EVERY 6 HOURS PRN
Status: DISCONTINUED | OUTPATIENT
Start: 2021-03-24 | End: 2021-03-25 | Stop reason: HOSPADM

## 2021-03-24 RX ORDER — SODIUM CHLORIDE, SODIUM LACTATE, POTASSIUM CHLORIDE, CALCIUM CHLORIDE 600; 310; 30; 20 MG/100ML; MG/100ML; MG/100ML; MG/100ML
INJECTION, SOLUTION INTRAVENOUS CONTINUOUS
Status: DISCONTINUED | OUTPATIENT
Start: 2021-03-24 | End: 2021-03-24

## 2021-03-24 RX ORDER — DOCUSATE SODIUM 100 MG/1
100 CAPSULE, LIQUID FILLED ORAL EVERY 12 HOURS
Status: DISCONTINUED | OUTPATIENT
Start: 2021-03-24 | End: 2021-03-25 | Stop reason: HOSPADM

## 2021-03-24 RX ORDER — ACETAMINOPHEN 500 MG
1000 TABLET ORAL
Status: COMPLETED | OUTPATIENT
Start: 2021-03-24 | End: 2021-03-24

## 2021-03-24 RX ORDER — MORPHINE SULFATE 2 MG/ML
2 INJECTION, SOLUTION INTRAMUSCULAR; INTRAVENOUS
Status: DISCONTINUED | OUTPATIENT
Start: 2021-03-24 | End: 2021-03-25 | Stop reason: HOSPADM

## 2021-03-24 RX ORDER — SODIUM CHLORIDE 0.9 % (FLUSH) 0.9 %
10 SYRINGE (ML) INJECTION
Status: DISCONTINUED | OUTPATIENT
Start: 2021-03-24 | End: 2021-03-25 | Stop reason: HOSPADM

## 2021-03-24 RX ORDER — HYDROMORPHONE HYDROCHLORIDE 1 MG/ML
1 INJECTION, SOLUTION INTRAMUSCULAR; INTRAVENOUS; SUBCUTANEOUS EVERY 4 HOURS PRN
Status: DISCONTINUED | OUTPATIENT
Start: 2021-03-24 | End: 2021-03-25 | Stop reason: HOSPADM

## 2021-03-24 RX ORDER — LIDOCAINE HYDROCHLORIDE 10 MG/ML
1 INJECTION, SOLUTION EPIDURAL; INFILTRATION; INTRACAUDAL; PERINEURAL ONCE
Status: DISCONTINUED | OUTPATIENT
Start: 2021-03-24 | End: 2021-03-24 | Stop reason: HOSPADM

## 2021-03-24 RX ORDER — ONDANSETRON 2 MG/ML
INJECTION INTRAMUSCULAR; INTRAVENOUS
Status: DISCONTINUED | OUTPATIENT
Start: 2021-03-24 | End: 2021-03-24

## 2021-03-24 RX ORDER — PREGABALIN 75 MG/1
75 CAPSULE ORAL ONCE
Status: COMPLETED | OUTPATIENT
Start: 2021-03-24 | End: 2021-03-24

## 2021-03-24 RX ORDER — PROPOFOL 10 MG/ML
VIAL (ML) INTRAVENOUS
Status: DISCONTINUED | OUTPATIENT
Start: 2021-03-24 | End: 2021-03-24

## 2021-03-24 RX ORDER — CLINDAMYCIN PHOSPHATE 900 MG/50ML
900 INJECTION, SOLUTION INTRAVENOUS
Status: COMPLETED | OUTPATIENT
Start: 2021-03-24 | End: 2021-03-25

## 2021-03-24 RX ADMIN — EPHEDRINE SULFATE 25 MG: 50 INJECTION, SOLUTION INTRAMUSCULAR; INTRAVENOUS; SUBCUTANEOUS at 07:03

## 2021-03-24 RX ADMIN — LIDOCAINE HYDROCHLORIDE 100 MG: 20 INJECTION, SOLUTION INTRAVENOUS at 07:03

## 2021-03-24 RX ADMIN — DOCUSATE SODIUM 100 MG: 100 CAPSULE, LIQUID FILLED ORAL at 08:03

## 2021-03-24 RX ADMIN — CLINDAMYCIN PHOSPHATE 900 MG: 18 INJECTION, SOLUTION INTRAVENOUS at 07:03

## 2021-03-24 RX ADMIN — ACETAMINOPHEN 1000 MG: 500 TABLET ORAL at 06:03

## 2021-03-24 RX ADMIN — CLINDAMYCIN IN 5 PERCENT DEXTROSE 900 MG: 18 INJECTION, SOLUTION INTRAVENOUS at 03:03

## 2021-03-24 RX ADMIN — PROPOFOL 50 MCG/KG/MIN: 10 INJECTION, EMULSION INTRAVENOUS at 07:03

## 2021-03-24 RX ADMIN — CELECOXIB 400 MG: 100 CAPSULE ORAL at 06:03

## 2021-03-24 RX ADMIN — PHENYLEPHRINE HYDROCHLORIDE 200 MCG: 10 INJECTION INTRAVENOUS at 07:03

## 2021-03-24 RX ADMIN — ONDANSETRON 4 MG: 2 INJECTION, SOLUTION INTRAMUSCULAR; INTRAVENOUS at 06:03

## 2021-03-24 RX ADMIN — OXYCODONE HYDROCHLORIDE 5 MG: 5 TABLET ORAL at 09:03

## 2021-03-24 RX ADMIN — MUPIROCIN 1 G: 20 OINTMENT TOPICAL at 08:03

## 2021-03-24 RX ADMIN — EPHEDRINE SULFATE 10 MG: 50 INJECTION, SOLUTION INTRAMUSCULAR; INTRAVENOUS; SUBCUTANEOUS at 08:03

## 2021-03-24 RX ADMIN — HYDROMORPHONE HYDROCHLORIDE 1 MG: 1 INJECTION, SOLUTION INTRAMUSCULAR; INTRAVENOUS; SUBCUTANEOUS at 09:03

## 2021-03-24 RX ADMIN — OXYCODONE HYDROCHLORIDE 10 MG: 10 TABLET, FILM COATED, EXTENDED RELEASE ORAL at 06:03

## 2021-03-24 RX ADMIN — ASPIRIN 81 MG CHEWABLE TABLET 81 MG: 81 TABLET CHEWABLE at 11:03

## 2021-03-24 RX ADMIN — MIDAZOLAM HYDROCHLORIDE 2 MG: 1 INJECTION, SOLUTION INTRAMUSCULAR; INTRAVENOUS at 06:03

## 2021-03-24 RX ADMIN — PREGABALIN 75 MG: 75 CAPSULE ORAL at 08:03

## 2021-03-24 RX ADMIN — CETIRIZINE HYDROCHLORIDE 10 MG: 10 TABLET, FILM COATED ORAL at 11:03

## 2021-03-24 RX ADMIN — PHENYLEPHRINE HYDROCHLORIDE 100 MCG: 10 INJECTION INTRAVENOUS at 07:03

## 2021-03-24 RX ADMIN — BUPIVACAINE HYDROCHLORIDE 3 ML: 5 INJECTION, SOLUTION EPIDURAL; INTRACAUDAL; PERINEURAL at 06:03

## 2021-03-24 RX ADMIN — CLINDAMYCIN IN 5 PERCENT DEXTROSE 900 MG: 18 INJECTION, SOLUTION INTRAVENOUS at 11:03

## 2021-03-24 RX ADMIN — FAMOTIDINE 20 MG: 20 TABLET ORAL at 09:03

## 2021-03-24 RX ADMIN — MUPIROCIN: 20 OINTMENT TOPICAL at 06:03

## 2021-03-24 RX ADMIN — PREGABALIN 75 MG: 75 CAPSULE ORAL at 06:03

## 2021-03-24 RX ADMIN — TRANEXAMIC ACID 1000 MG: 100 INJECTION, SOLUTION INTRAVENOUS at 07:03

## 2021-03-24 RX ADMIN — CALCIUM CHLORIDE 100 MG: 100 INJECTION, SOLUTION INTRAVENOUS at 08:03

## 2021-03-24 RX ADMIN — EPHEDRINE SULFATE 5 MG: 50 INJECTION, SOLUTION INTRAMUSCULAR; INTRAVENOUS; SUBCUTANEOUS at 08:03

## 2021-03-24 RX ADMIN — FAMOTIDINE 20 MG: 20 TABLET ORAL at 08:03

## 2021-03-24 RX ADMIN — HYDROMORPHONE HYDROCHLORIDE 1 MG: 1 INJECTION, SOLUTION INTRAMUSCULAR; INTRAVENOUS; SUBCUTANEOUS at 04:03

## 2021-03-24 RX ADMIN — TRAMADOL HYDROCHLORIDE 50 MG: 50 TABLET, FILM COATED ORAL at 08:03

## 2021-03-24 RX ADMIN — TRAMADOL HYDROCHLORIDE 50 MG: 50 TABLET, FILM COATED ORAL at 11:03

## 2021-03-24 RX ADMIN — KETOROLAC TROMETHAMINE 30 MG: 30 INJECTION, SOLUTION INTRAMUSCULAR; INTRAVENOUS at 08:03

## 2021-03-24 RX ADMIN — FENTANYL CITRATE 50 MCG: 50 INJECTION, SOLUTION INTRAMUSCULAR; INTRAVENOUS at 07:03

## 2021-03-24 RX ADMIN — HYDROMORPHONE HYDROCHLORIDE 1 MG: 1 INJECTION, SOLUTION INTRAMUSCULAR; INTRAVENOUS; SUBCUTANEOUS at 12:03

## 2021-03-24 RX ADMIN — PHENYLEPHRINE HYDROCHLORIDE 0.35 MCG/KG/MIN: 10 INJECTION INTRAVENOUS at 07:03

## 2021-03-24 RX ADMIN — PROPOFOL 20 MG: 10 INJECTION, EMULSION INTRAVENOUS at 07:03

## 2021-03-24 RX ADMIN — ASPIRIN 81 MG CHEWABLE TABLET 81 MG: 81 TABLET CHEWABLE at 08:03

## 2021-03-24 RX ADMIN — DOCUSATE SODIUM 100 MG: 100 CAPSULE, LIQUID FILLED ORAL at 11:03

## 2021-03-24 RX ADMIN — DEXAMETHASONE SODIUM PHOSPHATE 4 MG: 4 INJECTION, SOLUTION INTRA-ARTICULAR; INTRALESIONAL; INTRAMUSCULAR; INTRAVENOUS; SOFT TISSUE at 07:03

## 2021-03-25 ENCOUNTER — TELEPHONE (OUTPATIENT)
Dept: FAMILY MEDICINE | Facility: CLINIC | Age: 61
End: 2021-03-25

## 2021-03-25 VITALS
WEIGHT: 210 LBS | DIASTOLIC BLOOD PRESSURE: 61 MMHG | BODY MASS INDEX: 29.4 KG/M2 | HEIGHT: 71 IN | OXYGEN SATURATION: 94 % | RESPIRATION RATE: 18 BRPM | SYSTOLIC BLOOD PRESSURE: 122 MMHG | HEART RATE: 110 BPM | TEMPERATURE: 98 F

## 2021-03-25 DIAGNOSIS — M16.12 UNILATERAL PRIMARY OSTEOARTHRITIS, LEFT HIP: Primary | ICD-10-CM

## 2021-03-25 LAB
ANION GAP SERPL CALC-SCNC: 8 MMOL/L (ref 8–16)
BUN SERPL-MCNC: 21 MG/DL (ref 8–23)
CALCIUM SERPL-MCNC: 8.4 MG/DL (ref 8.7–10.5)
CHLORIDE SERPL-SCNC: 101 MMOL/L (ref 95–110)
CO2 SERPL-SCNC: 27 MMOL/L (ref 23–29)
CREAT SERPL-MCNC: 0.9 MG/DL (ref 0.5–1.4)
ERYTHROCYTE [DISTWIDTH] IN BLOOD BY AUTOMATED COUNT: 14.2 % (ref 11.5–14.5)
EST. GFR  (AFRICAN AMERICAN): >60 ML/MIN/1.73 M^2
EST. GFR  (NON AFRICAN AMERICAN): >60 ML/MIN/1.73 M^2
GLUCOSE SERPL-MCNC: 216 MG/DL (ref 70–110)
HCT VFR BLD AUTO: 31.8 % (ref 40–54)
HGB BLD-MCNC: 10.2 G/DL (ref 14–18)
MCH RBC QN AUTO: 29 PG (ref 27–31)
MCHC RBC AUTO-ENTMCNC: 32.1 G/DL (ref 32–36)
MCV RBC AUTO: 90 FL (ref 82–98)
PLATELET # BLD AUTO: 220 K/UL (ref 150–350)
PMV BLD AUTO: 10.2 FL (ref 9.2–12.9)
POTASSIUM SERPL-SCNC: 4.8 MMOL/L (ref 3.5–5.1)
RBC # BLD AUTO: 3.52 M/UL (ref 4.6–6.2)
SODIUM SERPL-SCNC: 136 MMOL/L (ref 136–145)
TSH SERPL DL<=0.005 MIU/L-ACNC: 0.4 UIU/ML (ref 0.4–4)
WBC # BLD AUTO: 11.85 K/UL (ref 3.9–12.7)

## 2021-03-25 PROCEDURE — 97110 THERAPEUTIC EXERCISES: CPT

## 2021-03-25 PROCEDURE — 25000003 PHARM REV CODE 250: Performed by: ORTHOPAEDIC SURGERY

## 2021-03-25 PROCEDURE — 94761 N-INVAS EAR/PLS OXIMETRY MLT: CPT

## 2021-03-25 PROCEDURE — 36415 COLL VENOUS BLD VENIPUNCTURE: CPT | Performed by: INTERNAL MEDICINE

## 2021-03-25 PROCEDURE — 80048 BASIC METABOLIC PNL TOTAL CA: CPT | Performed by: INTERNAL MEDICINE

## 2021-03-25 PROCEDURE — 63600175 PHARM REV CODE 636 W HCPCS: Performed by: ORTHOPAEDIC SURGERY

## 2021-03-25 PROCEDURE — 97116 GAIT TRAINING THERAPY: CPT

## 2021-03-25 PROCEDURE — 85027 COMPLETE CBC AUTOMATED: CPT | Performed by: ORTHOPAEDIC SURGERY

## 2021-03-25 PROCEDURE — 84443 ASSAY THYROID STIM HORMONE: CPT | Performed by: INTERNAL MEDICINE

## 2021-03-25 PROCEDURE — 94799 UNLISTED PULMONARY SVC/PX: CPT

## 2021-03-25 PROCEDURE — 97165 OT EVAL LOW COMPLEX 30 MIN: CPT

## 2021-03-25 PROCEDURE — 25000242 PHARM REV CODE 250 ALT 637 W/ HCPCS: Performed by: ORTHOPAEDIC SURGERY

## 2021-03-25 RX ORDER — HYDROCODONE BITARTRATE AND ACETAMINOPHEN 7.5; 325 MG/1; MG/1
1 TABLET ORAL EVERY 6 HOURS PRN
Qty: 28 TABLET | Refills: 0 | Status: SHIPPED | OUTPATIENT
Start: 2021-03-25 | End: 2021-03-25

## 2021-03-25 RX ORDER — OXYCODONE AND ACETAMINOPHEN 7.5; 325 MG/1; MG/1
1 TABLET ORAL EVERY 6 HOURS PRN
Qty: 28 TABLET | Refills: 0 | Status: SHIPPED | OUTPATIENT
Start: 2021-03-25 | End: 2021-04-05 | Stop reason: SDUPTHER

## 2021-03-25 RX ORDER — ASPIRIN 81 MG/1
81 TABLET ORAL 2 TIMES DAILY
Qty: 60 TABLET | Refills: 0 | Status: SHIPPED | OUTPATIENT
Start: 2021-03-25 | End: 2021-06-17

## 2021-03-25 RX ADMIN — DOCUSATE SODIUM 100 MG: 100 CAPSULE, LIQUID FILLED ORAL at 08:03

## 2021-03-25 RX ADMIN — CETIRIZINE HYDROCHLORIDE 10 MG: 10 TABLET, FILM COATED ORAL at 08:03

## 2021-03-25 RX ADMIN — HYDROCHLOROTHIAZIDE 25 MG: 25 TABLET ORAL at 08:03

## 2021-03-25 RX ADMIN — LISINOPRIL 40 MG: 40 TABLET ORAL at 08:03

## 2021-03-25 RX ADMIN — MUPIROCIN 1 G: 20 OINTMENT TOPICAL at 08:03

## 2021-03-25 RX ADMIN — FLUTICASONE PROPIONATE 50 MCG: 50 SPRAY, METERED NASAL at 08:03

## 2021-03-25 RX ADMIN — FAMOTIDINE 20 MG: 20 TABLET ORAL at 08:03

## 2021-03-25 RX ADMIN — PREGABALIN 75 MG: 75 CAPSULE ORAL at 08:03

## 2021-03-25 RX ADMIN — HYDROMORPHONE HYDROCHLORIDE 1 MG: 1 INJECTION, SOLUTION INTRAMUSCULAR; INTRAVENOUS; SUBCUTANEOUS at 06:03

## 2021-03-25 RX ADMIN — HYDROMORPHONE HYDROCHLORIDE 1 MG: 1 INJECTION, SOLUTION INTRAMUSCULAR; INTRAVENOUS; SUBCUTANEOUS at 10:03

## 2021-03-25 RX ADMIN — HYDROMORPHONE HYDROCHLORIDE 1 MG: 1 INJECTION, SOLUTION INTRAMUSCULAR; INTRAVENOUS; SUBCUTANEOUS at 02:03

## 2021-03-25 RX ADMIN — ASPIRIN 81 MG CHEWABLE TABLET 81 MG: 81 TABLET CHEWABLE at 08:03

## 2021-03-25 RX ADMIN — TRAMADOL HYDROCHLORIDE 50 MG: 50 TABLET, FILM COATED ORAL at 05:03

## 2021-03-26 PROCEDURE — G0180 PR HOME HEALTH MD CERTIFICATION: ICD-10-PCS | Mod: ,,, | Performed by: ORTHOPAEDIC SURGERY

## 2021-03-26 PROCEDURE — G0180 MD CERTIFICATION HHA PATIENT: HCPCS | Mod: ,,, | Performed by: ORTHOPAEDIC SURGERY

## 2021-03-29 DIAGNOSIS — Z96.611 S/P SHOULDER REPLACEMENT, RIGHT: ICD-10-CM

## 2021-03-29 RX ORDER — IBUPROFEN 800 MG/1
800 TABLET ORAL 3 TIMES DAILY
Qty: 60 TABLET | Refills: 0 | Status: SHIPPED | OUTPATIENT
Start: 2021-03-29 | End: 2021-04-14 | Stop reason: SDUPTHER

## 2021-04-01 ENCOUNTER — TELEPHONE (OUTPATIENT)
Dept: ORTHOPEDICS | Facility: CLINIC | Age: 61
End: 2021-04-01

## 2021-04-02 ENCOUNTER — NURSE TRIAGE (OUTPATIENT)
Dept: ADMINISTRATIVE | Facility: CLINIC | Age: 61
End: 2021-04-02

## 2021-04-05 DIAGNOSIS — M16.12 UNILATERAL PRIMARY OSTEOARTHRITIS, LEFT HIP: ICD-10-CM

## 2021-04-05 DIAGNOSIS — M16.12 UNILATERAL PRIMARY OSTEOARTHRITIS, LEFT HIP: Primary | ICD-10-CM

## 2021-04-05 RX ORDER — OXYCODONE AND ACETAMINOPHEN 7.5; 325 MG/1; MG/1
1 TABLET ORAL EVERY 6 HOURS PRN
Qty: 28 TABLET | Refills: 0 | Status: SHIPPED | OUTPATIENT
Start: 2021-04-05 | End: 2021-04-16 | Stop reason: SDUPTHER

## 2021-04-06 ENCOUNTER — EXTERNAL HOME HEALTH (OUTPATIENT)
Dept: HOME HEALTH SERVICES | Facility: HOSPITAL | Age: 61
End: 2021-04-06
Payer: MEDICARE

## 2021-04-07 ENCOUNTER — TELEPHONE (OUTPATIENT)
Dept: FAMILY MEDICINE | Facility: CLINIC | Age: 61
End: 2021-04-07

## 2021-04-07 ENCOUNTER — DOCUMENT SCAN (OUTPATIENT)
Dept: HOME HEALTH SERVICES | Facility: HOSPITAL | Age: 61
End: 2021-04-07
Payer: MEDICARE

## 2021-04-08 ENCOUNTER — HOSPITAL ENCOUNTER (OUTPATIENT)
Dept: RADIOLOGY | Facility: HOSPITAL | Age: 61
Discharge: HOME | End: 2021-04-08
Attending: ORTHOPAEDIC SURGERY
Payer: MEDICARE

## 2021-04-08 ENCOUNTER — OFFICE VISIT (OUTPATIENT)
Dept: ORTHOPEDICS | Facility: CLINIC | Age: 61
End: 2021-04-08
Payer: MEDICARE

## 2021-04-08 VITALS — RESPIRATION RATE: 16 BRPM | WEIGHT: 210 LBS | HEIGHT: 71 IN | BODY MASS INDEX: 29.4 KG/M2

## 2021-04-08 DIAGNOSIS — M16.12 UNILATERAL PRIMARY OSTEOARTHRITIS, LEFT HIP: ICD-10-CM

## 2021-04-08 DIAGNOSIS — M16.12 UNILATERAL PRIMARY OSTEOARTHRITIS, LEFT HIP: Primary | ICD-10-CM

## 2021-04-08 PROCEDURE — 99999 PR PBB SHADOW E&M-EST. PATIENT-LVL III: CPT | Mod: PBBFAC,,, | Performed by: ORTHOPAEDIC SURGERY

## 2021-04-08 PROCEDURE — 1125F AMNT PAIN NOTED PAIN PRSNT: CPT | Mod: S$GLB,,, | Performed by: ORTHOPAEDIC SURGERY

## 2021-04-08 PROCEDURE — 73502 X-RAY EXAM HIP UNI 2-3 VIEWS: CPT | Mod: TC,PN,LT

## 2021-04-08 PROCEDURE — 99999 PR PBB SHADOW E&M-EST. PATIENT-LVL III: ICD-10-PCS | Mod: PBBFAC,,, | Performed by: ORTHOPAEDIC SURGERY

## 2021-04-08 PROCEDURE — 73502 X-RAY EXAM HIP UNI 2-3 VIEWS: CPT | Mod: 26,LT,, | Performed by: RADIOLOGY

## 2021-04-08 PROCEDURE — 99024 POSTOP FOLLOW-UP VISIT: CPT | Mod: S$GLB,,, | Performed by: ORTHOPAEDIC SURGERY

## 2021-04-08 PROCEDURE — 99024 PR POST-OP FOLLOW-UP VISIT: ICD-10-PCS | Mod: S$GLB,,, | Performed by: ORTHOPAEDIC SURGERY

## 2021-04-08 PROCEDURE — 3008F BODY MASS INDEX DOCD: CPT | Mod: CPTII,S$GLB,, | Performed by: ORTHOPAEDIC SURGERY

## 2021-04-08 PROCEDURE — 1125F PR PAIN SEVERITY QUANTIFIED, PAIN PRESENT: ICD-10-PCS | Mod: S$GLB,,, | Performed by: ORTHOPAEDIC SURGERY

## 2021-04-08 PROCEDURE — 3008F PR BODY MASS INDEX (BMI) DOCUMENTED: ICD-10-PCS | Mod: CPTII,S$GLB,, | Performed by: ORTHOPAEDIC SURGERY

## 2021-04-08 PROCEDURE — 73502 XR HIP 2 VIEW LEFT: ICD-10-PCS | Mod: 26,LT,, | Performed by: RADIOLOGY

## 2021-04-08 RX ORDER — OXYCODONE AND ACETAMINOPHEN 10; 325 MG/1; MG/1
1 TABLET ORAL EVERY 6 HOURS PRN
Qty: 28 TABLET | Refills: 0 | Status: SHIPPED | OUTPATIENT
Start: 2021-04-08 | End: 2021-04-15

## 2021-04-09 ENCOUNTER — TELEPHONE (OUTPATIENT)
Dept: FAMILY MEDICINE | Facility: CLINIC | Age: 61
End: 2021-04-09

## 2021-04-12 DIAGNOSIS — B18.2 CHRONIC HEPATITIS C: Primary | ICD-10-CM

## 2021-04-14 ENCOUNTER — OFFICE VISIT (OUTPATIENT)
Dept: ORTHOPEDICS | Facility: CLINIC | Age: 61
End: 2021-04-14
Payer: MEDICARE

## 2021-04-14 VITALS — BODY MASS INDEX: 29.4 KG/M2 | WEIGHT: 210 LBS | HEIGHT: 71 IN | RESPIRATION RATE: 20 BRPM

## 2021-04-14 DIAGNOSIS — Z96.611 S/P SHOULDER REPLACEMENT, RIGHT: ICD-10-CM

## 2021-04-14 DIAGNOSIS — Z96.611 S/P REVERSE TOTAL SHOULDER ARTHROPLASTY, RIGHT: Primary | ICD-10-CM

## 2021-04-14 PROCEDURE — 1125F AMNT PAIN NOTED PAIN PRSNT: CPT | Mod: S$GLB,,, | Performed by: ORTHOPAEDIC SURGERY

## 2021-04-14 PROCEDURE — 99024 POSTOP FOLLOW-UP VISIT: CPT | Mod: S$GLB,,, | Performed by: ORTHOPAEDIC SURGERY

## 2021-04-14 PROCEDURE — 3008F PR BODY MASS INDEX (BMI) DOCUMENTED: ICD-10-PCS | Mod: CPTII,S$GLB,, | Performed by: ORTHOPAEDIC SURGERY

## 2021-04-14 PROCEDURE — 99999 PR PBB SHADOW E&M-EST. PATIENT-LVL III: ICD-10-PCS | Mod: PBBFAC,,, | Performed by: ORTHOPAEDIC SURGERY

## 2021-04-14 PROCEDURE — 99024 PR POST-OP FOLLOW-UP VISIT: ICD-10-PCS | Mod: S$GLB,,, | Performed by: ORTHOPAEDIC SURGERY

## 2021-04-14 PROCEDURE — 1125F PR PAIN SEVERITY QUANTIFIED, PAIN PRESENT: ICD-10-PCS | Mod: S$GLB,,, | Performed by: ORTHOPAEDIC SURGERY

## 2021-04-14 PROCEDURE — 99999 PR PBB SHADOW E&M-EST. PATIENT-LVL III: CPT | Mod: PBBFAC,,, | Performed by: ORTHOPAEDIC SURGERY

## 2021-04-14 PROCEDURE — 3008F BODY MASS INDEX DOCD: CPT | Mod: CPTII,S$GLB,, | Performed by: ORTHOPAEDIC SURGERY

## 2021-04-14 RX ORDER — IBUPROFEN 800 MG/1
800 TABLET ORAL 3 TIMES DAILY
Qty: 60 TABLET | Refills: 0 | Status: SHIPPED | OUTPATIENT
Start: 2021-04-14 | End: 2021-04-30 | Stop reason: SDUPTHER

## 2021-04-16 DIAGNOSIS — M16.12 UNILATERAL PRIMARY OSTEOARTHRITIS, LEFT HIP: ICD-10-CM

## 2021-04-16 RX ORDER — OXYCODONE AND ACETAMINOPHEN 7.5; 325 MG/1; MG/1
1 TABLET ORAL EVERY 6 HOURS PRN
Qty: 28 TABLET | Refills: 0 | Status: SHIPPED | OUTPATIENT
Start: 2021-04-16 | End: 2021-04-23 | Stop reason: SDUPTHER

## 2021-04-19 ENCOUNTER — TELEPHONE (OUTPATIENT)
Dept: FAMILY MEDICINE | Facility: CLINIC | Age: 61
End: 2021-04-19

## 2021-04-19 ENCOUNTER — TELEPHONE (OUTPATIENT)
Dept: ORTHOPEDICS | Facility: CLINIC | Age: 61
End: 2021-04-19

## 2021-04-19 DIAGNOSIS — F41.9 ANXIETY: Primary | ICD-10-CM

## 2021-04-23 DIAGNOSIS — M16.12 UNILATERAL PRIMARY OSTEOARTHRITIS, LEFT HIP: ICD-10-CM

## 2021-04-23 RX ORDER — OXYCODONE AND ACETAMINOPHEN 7.5; 325 MG/1; MG/1
1 TABLET ORAL EVERY 6 HOURS PRN
Qty: 28 TABLET | Refills: 0 | Status: SHIPPED | OUTPATIENT
Start: 2021-04-23 | End: 2021-04-30 | Stop reason: SDUPTHER

## 2021-04-26 ENCOUNTER — TELEPHONE (OUTPATIENT)
Dept: FAMILY MEDICINE | Facility: CLINIC | Age: 61
End: 2021-04-26

## 2021-04-26 ENCOUNTER — HOSPITAL ENCOUNTER (OUTPATIENT)
Facility: HOSPITAL | Age: 61
Discharge: LEFT BEFORE DISCHARGE | End: 2021-04-26
Attending: EMERGENCY MEDICINE | Admitting: INTERNAL MEDICINE
Payer: MEDICARE

## 2021-04-26 VITALS
RESPIRATION RATE: 24 BRPM | WEIGHT: 210 LBS | HEIGHT: 71 IN | BODY MASS INDEX: 29.4 KG/M2 | OXYGEN SATURATION: 95 % | SYSTOLIC BLOOD PRESSURE: 131 MMHG | DIASTOLIC BLOOD PRESSURE: 62 MMHG | TEMPERATURE: 99 F | HEART RATE: 101 BPM

## 2021-04-26 DIAGNOSIS — Z53.29 LEFT AGAINST MEDICAL ADVICE: Primary | ICD-10-CM

## 2021-04-26 DIAGNOSIS — J18.9 PNEUMONIA OF RIGHT LOWER LOBE DUE TO INFECTIOUS ORGANISM: ICD-10-CM

## 2021-04-26 DIAGNOSIS — J44.1 COPD EXACERBATION: ICD-10-CM

## 2021-04-26 LAB
ALBUMIN SERPL BCP-MCNC: 2.9 G/DL (ref 3.5–5.2)
ALP SERPL-CCNC: 168 U/L (ref 55–135)
ALT SERPL W/O P-5'-P-CCNC: 33 U/L (ref 10–44)
ANION GAP SERPL CALC-SCNC: 11 MMOL/L (ref 8–16)
AST SERPL-CCNC: 57 U/L (ref 10–40)
BASOPHILS # BLD AUTO: 0.04 K/UL (ref 0–0.2)
BASOPHILS NFR BLD: 0.4 % (ref 0–1.9)
BILIRUB SERPL-MCNC: 0.4 MG/DL (ref 0.1–1)
BNP SERPL-MCNC: 66 PG/ML (ref 0–99)
BUN SERPL-MCNC: 27 MG/DL (ref 8–23)
CALCIUM SERPL-MCNC: 8.9 MG/DL (ref 8.7–10.5)
CHLORIDE SERPL-SCNC: 105 MMOL/L (ref 95–110)
CO2 SERPL-SCNC: 21 MMOL/L (ref 23–29)
CREAT SERPL-MCNC: 1.1 MG/DL (ref 0.5–1.4)
DIFFERENTIAL METHOD: ABNORMAL
EOSINOPHIL # BLD AUTO: 0.3 K/UL (ref 0–0.5)
EOSINOPHIL NFR BLD: 3.2 % (ref 0–8)
ERYTHROCYTE [DISTWIDTH] IN BLOOD BY AUTOMATED COUNT: 14.6 % (ref 11.5–14.5)
EST. GFR  (AFRICAN AMERICAN): >60 ML/MIN/1.73 M^2
EST. GFR  (NON AFRICAN AMERICAN): >60 ML/MIN/1.73 M^2
GLUCOSE SERPL-MCNC: 126 MG/DL (ref 70–110)
HCT VFR BLD AUTO: 31.5 % (ref 40–54)
HGB BLD-MCNC: 10 G/DL (ref 14–18)
IMM GRANULOCYTES # BLD AUTO: 0.09 K/UL (ref 0–0.04)
IMM GRANULOCYTES NFR BLD AUTO: 1 % (ref 0–0.5)
LYMPHOCYTES # BLD AUTO: 2.2 K/UL (ref 1–4.8)
LYMPHOCYTES NFR BLD: 24.1 % (ref 18–48)
MCH RBC QN AUTO: 28.3 PG (ref 27–31)
MCHC RBC AUTO-ENTMCNC: 31.7 G/DL (ref 32–36)
MCV RBC AUTO: 89 FL (ref 82–98)
MONOCYTES # BLD AUTO: 0.6 K/UL (ref 0.3–1)
MONOCYTES NFR BLD: 6.8 % (ref 4–15)
NEUTROPHILS # BLD AUTO: 5.8 K/UL (ref 1.8–7.7)
NEUTROPHILS NFR BLD: 64.5 % (ref 38–73)
NRBC BLD-RTO: 0 /100 WBC
PLATELET # BLD AUTO: 180 K/UL (ref 150–450)
PMV BLD AUTO: 11.6 FL (ref 9.2–12.9)
POTASSIUM SERPL-SCNC: 6.1 MMOL/L (ref 3.5–5.1)
PROT SERPL-MCNC: 7.6 G/DL (ref 6–8.4)
RBC # BLD AUTO: 3.53 M/UL (ref 4.6–6.2)
SARS-COV-2 RDRP RESP QL NAA+PROBE: NEGATIVE
SODIUM SERPL-SCNC: 137 MMOL/L (ref 136–145)
TROPONIN I SERPL DL<=0.01 NG/ML-MCNC: <0.006 NG/ML (ref 0–0.03)
WBC # BLD AUTO: 9 K/UL (ref 3.9–12.7)

## 2021-04-26 PROCEDURE — 80053 COMPREHEN METABOLIC PANEL: CPT | Performed by: EMERGENCY MEDICINE

## 2021-04-26 PROCEDURE — 36415 COLL VENOUS BLD VENIPUNCTURE: CPT | Performed by: EMERGENCY MEDICINE

## 2021-04-26 PROCEDURE — 94640 AIRWAY INHALATION TREATMENT: CPT

## 2021-04-26 PROCEDURE — 27100098 HC SPACER

## 2021-04-26 PROCEDURE — 25000242 PHARM REV CODE 250 ALT 637 W/ HCPCS: Performed by: EMERGENCY MEDICINE

## 2021-04-26 PROCEDURE — U0002 COVID-19 LAB TEST NON-CDC: HCPCS | Performed by: EMERGENCY MEDICINE

## 2021-04-26 PROCEDURE — 63600175 PHARM REV CODE 636 W HCPCS: Performed by: EMERGENCY MEDICINE

## 2021-04-26 PROCEDURE — 83880 ASSAY OF NATRIURETIC PEPTIDE: CPT | Performed by: EMERGENCY MEDICINE

## 2021-04-26 PROCEDURE — G0378 HOSPITAL OBSERVATION PER HR: HCPCS

## 2021-04-26 PROCEDURE — 96365 THER/PROPH/DIAG IV INF INIT: CPT

## 2021-04-26 PROCEDURE — 27000221 HC OXYGEN, UP TO 24 HOURS

## 2021-04-26 PROCEDURE — 85025 COMPLETE CBC W/AUTO DIFF WBC: CPT | Performed by: EMERGENCY MEDICINE

## 2021-04-26 PROCEDURE — 94760 N-INVAS EAR/PLS OXIMETRY 1: CPT

## 2021-04-26 PROCEDURE — 84484 ASSAY OF TROPONIN QUANT: CPT | Performed by: EMERGENCY MEDICINE

## 2021-04-26 PROCEDURE — 99284 EMERGENCY DEPT VISIT MOD MDM: CPT | Mod: 25

## 2021-04-26 RX ORDER — MOXIFLOXACIN HYDROCHLORIDE 400 MG/250ML
400 INJECTION, SOLUTION INTRAVENOUS
Status: DISCONTINUED | OUTPATIENT
Start: 2021-04-26 | End: 2021-04-26

## 2021-04-26 RX ORDER — ONDANSETRON 2 MG/ML
4 INJECTION INTRAMUSCULAR; INTRAVENOUS EVERY 12 HOURS PRN
Status: CANCELLED | OUTPATIENT
Start: 2021-04-26

## 2021-04-26 RX ORDER — LEVOFLOXACIN 5 MG/ML
750 INJECTION, SOLUTION INTRAVENOUS
Status: CANCELLED | OUTPATIENT
Start: 2021-04-26

## 2021-04-26 RX ORDER — LEVOFLOXACIN 5 MG/ML
500 INJECTION, SOLUTION INTRAVENOUS
Status: COMPLETED | OUTPATIENT
Start: 2021-04-26 | End: 2021-04-26

## 2021-04-26 RX ORDER — PREDNISONE 20 MG/1
40 TABLET ORAL DAILY
Status: CANCELLED | OUTPATIENT
Start: 2021-04-27 | End: 2021-05-02

## 2021-04-26 RX ORDER — IPRATROPIUM BROMIDE AND ALBUTEROL SULFATE 2.5; .5 MG/3ML; MG/3ML
3 SOLUTION RESPIRATORY (INHALATION)
Status: CANCELLED | OUTPATIENT
Start: 2021-04-26

## 2021-04-26 RX ORDER — LEVOFLOXACIN 750 MG/1
750 TABLET ORAL DAILY
Qty: 5 TABLET | Refills: 5 | Status: ON HOLD | OUTPATIENT
Start: 2021-04-26 | End: 2021-06-01

## 2021-04-26 RX ORDER — ASPIRIN 81 MG/1
81 TABLET ORAL 2 TIMES DAILY
Status: CANCELLED | OUTPATIENT
Start: 2021-04-26

## 2021-04-26 RX ORDER — ALBUTEROL SULFATE 90 UG/1
8 AEROSOL, METERED RESPIRATORY (INHALATION) ONCE
Status: COMPLETED | OUTPATIENT
Start: 2021-04-26 | End: 2021-04-26

## 2021-04-26 RX ORDER — PANTOPRAZOLE SODIUM 40 MG/1
40 TABLET, DELAYED RELEASE ORAL DAILY
Refills: 1 | Status: CANCELLED | OUTPATIENT
Start: 2021-04-27

## 2021-04-26 RX ORDER — OXYCODONE AND ACETAMINOPHEN 7.5; 325 MG/1; MG/1
1 TABLET ORAL EVERY 8 HOURS PRN
Status: CANCELLED | OUTPATIENT
Start: 2021-04-26

## 2021-04-26 RX ORDER — SODIUM CHLORIDE 0.9 % (FLUSH) 0.9 %
3 SYRINGE (ML) INJECTION
Status: CANCELLED | OUTPATIENT
Start: 2021-04-26

## 2021-04-26 RX ADMIN — LEVOFLOXACIN 500 MG: 500 INJECTION, SOLUTION INTRAVENOUS at 06:04

## 2021-04-26 RX ADMIN — ALBUTEROL SULFATE 8 PUFF: 90 AEROSOL, METERED RESPIRATORY (INHALATION) at 04:04

## 2021-04-27 ENCOUNTER — TELEPHONE (OUTPATIENT)
Dept: FAMILY MEDICINE | Facility: CLINIC | Age: 61
End: 2021-04-27

## 2021-04-28 ENCOUNTER — OFFICE VISIT (OUTPATIENT)
Dept: FAMILY MEDICINE | Facility: CLINIC | Age: 61
End: 2021-04-28
Payer: MEDICARE

## 2021-04-28 VITALS
WEIGHT: 220 LBS | HEIGHT: 71 IN | OXYGEN SATURATION: 95 % | DIASTOLIC BLOOD PRESSURE: 64 MMHG | SYSTOLIC BLOOD PRESSURE: 136 MMHG | BODY MASS INDEX: 30.8 KG/M2 | HEART RATE: 92 BPM

## 2021-04-28 DIAGNOSIS — J44.1 COPD EXACERBATION: Primary | ICD-10-CM

## 2021-04-28 DIAGNOSIS — Z96.642 H/O TOTAL HIP ARTHROPLASTY, LEFT: ICD-10-CM

## 2021-04-28 DIAGNOSIS — G89.29 CHRONIC MIDLINE LOW BACK PAIN WITHOUT SCIATICA: ICD-10-CM

## 2021-04-28 DIAGNOSIS — M54.50 CHRONIC MIDLINE LOW BACK PAIN WITHOUT SCIATICA: ICD-10-CM

## 2021-04-28 PROCEDURE — 99214 PR OFFICE/OUTPT VISIT, EST, LEVL IV, 30-39 MIN: ICD-10-PCS | Mod: S$GLB,,, | Performed by: NURSE PRACTITIONER

## 2021-04-28 PROCEDURE — 3008F BODY MASS INDEX DOCD: CPT | Mod: S$GLB,,, | Performed by: NURSE PRACTITIONER

## 2021-04-28 PROCEDURE — 99214 OFFICE O/P EST MOD 30 MIN: CPT | Mod: S$GLB,,, | Performed by: NURSE PRACTITIONER

## 2021-04-28 PROCEDURE — 3008F PR BODY MASS INDEX (BMI) DOCUMENTED: ICD-10-PCS | Mod: S$GLB,,, | Performed by: NURSE PRACTITIONER

## 2021-04-28 RX ORDER — PREDNISONE 10 MG/1
TABLET ORAL
Qty: 12 TABLET | Refills: 0 | Status: SHIPPED | OUTPATIENT
Start: 2021-04-28 | End: 2021-05-06

## 2021-04-29 ENCOUNTER — TELEPHONE (OUTPATIENT)
Dept: FAMILY MEDICINE | Facility: CLINIC | Age: 61
End: 2021-04-29

## 2021-04-29 ENCOUNTER — PATIENT MESSAGE (OUTPATIENT)
Dept: FAMILY MEDICINE | Facility: CLINIC | Age: 61
End: 2021-04-29

## 2021-04-30 ENCOUNTER — HOSPITAL ENCOUNTER (OUTPATIENT)
Dept: RADIOLOGY | Facility: HOSPITAL | Age: 61
Discharge: HOME | End: 2021-04-30
Attending: INTERNAL MEDICINE
Payer: MEDICARE

## 2021-04-30 DIAGNOSIS — B18.2 CHRONIC HEPATITIS C: ICD-10-CM

## 2021-04-30 DIAGNOSIS — M16.12 UNILATERAL PRIMARY OSTEOARTHRITIS, LEFT HIP: ICD-10-CM

## 2021-04-30 DIAGNOSIS — Z96.611 S/P SHOULDER REPLACEMENT, RIGHT: ICD-10-CM

## 2021-04-30 PROCEDURE — 76705 ECHO EXAM OF ABDOMEN: CPT | Mod: TC,PO

## 2021-04-30 RX ORDER — OXYCODONE AND ACETAMINOPHEN 7.5; 325 MG/1; MG/1
1 TABLET ORAL EVERY 6 HOURS PRN
Qty: 28 TABLET | Refills: 0 | Status: SHIPPED | OUTPATIENT
Start: 2021-04-30 | End: 2021-05-06 | Stop reason: SDUPTHER

## 2021-04-30 RX ORDER — IBUPROFEN 800 MG/1
800 TABLET ORAL 3 TIMES DAILY
Qty: 60 TABLET | Refills: 0 | Status: CANCELLED | OUTPATIENT
Start: 2021-04-30

## 2021-04-30 RX ORDER — IBUPROFEN 800 MG/1
800 TABLET ORAL 3 TIMES DAILY
Qty: 60 TABLET | Refills: 0 | Status: ON HOLD | OUTPATIENT
Start: 2021-04-30 | End: 2021-06-03 | Stop reason: SDUPTHER

## 2021-05-03 ENCOUNTER — TELEPHONE (OUTPATIENT)
Dept: ORTHOPEDICS | Facility: CLINIC | Age: 61
End: 2021-05-03

## 2021-05-04 DIAGNOSIS — M16.12 UNILATERAL PRIMARY OSTEOARTHRITIS, LEFT HIP: Primary | ICD-10-CM

## 2021-05-06 ENCOUNTER — HOSPITAL ENCOUNTER (OUTPATIENT)
Dept: RADIOLOGY | Facility: HOSPITAL | Age: 61
Discharge: HOME | End: 2021-05-06
Attending: ORTHOPAEDIC SURGERY
Payer: MEDICARE

## 2021-05-06 ENCOUNTER — OFFICE VISIT (OUTPATIENT)
Dept: ORTHOPEDICS | Facility: CLINIC | Age: 61
End: 2021-05-06
Payer: MEDICARE

## 2021-05-06 VITALS — BODY MASS INDEX: 30.8 KG/M2 | WEIGHT: 220 LBS | HEIGHT: 71 IN | RESPIRATION RATE: 16 BRPM

## 2021-05-06 DIAGNOSIS — M16.12 UNILATERAL PRIMARY OSTEOARTHRITIS, LEFT HIP: ICD-10-CM

## 2021-05-06 DIAGNOSIS — Z96.642 HISTORY OF TOTAL HIP REPLACEMENT, LEFT: Primary | ICD-10-CM

## 2021-05-06 PROCEDURE — 73502 X-RAY EXAM HIP UNI 2-3 VIEWS: CPT | Mod: TC,PN,LT

## 2021-05-06 PROCEDURE — 73502 XR HIP 2 VIEW LEFT: ICD-10-PCS | Mod: 26,LT,, | Performed by: RADIOLOGY

## 2021-05-06 PROCEDURE — 3008F PR BODY MASS INDEX (BMI) DOCUMENTED: ICD-10-PCS | Mod: CPTII,S$GLB,, | Performed by: ORTHOPAEDIC SURGERY

## 2021-05-06 PROCEDURE — 99024 PR POST-OP FOLLOW-UP VISIT: ICD-10-PCS | Mod: S$GLB,,, | Performed by: ORTHOPAEDIC SURGERY

## 2021-05-06 PROCEDURE — 1125F PR PAIN SEVERITY QUANTIFIED, PAIN PRESENT: ICD-10-PCS | Mod: S$GLB,,, | Performed by: ORTHOPAEDIC SURGERY

## 2021-05-06 PROCEDURE — 99999 PR PBB SHADOW E&M-EST. PATIENT-LVL III: ICD-10-PCS | Mod: PBBFAC,,, | Performed by: ORTHOPAEDIC SURGERY

## 2021-05-06 PROCEDURE — 99999 PR PBB SHADOW E&M-EST. PATIENT-LVL III: CPT | Mod: PBBFAC,,, | Performed by: ORTHOPAEDIC SURGERY

## 2021-05-06 PROCEDURE — 73502 X-RAY EXAM HIP UNI 2-3 VIEWS: CPT | Mod: 26,LT,, | Performed by: RADIOLOGY

## 2021-05-06 PROCEDURE — 3008F BODY MASS INDEX DOCD: CPT | Mod: CPTII,S$GLB,, | Performed by: ORTHOPAEDIC SURGERY

## 2021-05-06 PROCEDURE — 99024 POSTOP FOLLOW-UP VISIT: CPT | Mod: S$GLB,,, | Performed by: ORTHOPAEDIC SURGERY

## 2021-05-06 PROCEDURE — 1125F AMNT PAIN NOTED PAIN PRSNT: CPT | Mod: S$GLB,,, | Performed by: ORTHOPAEDIC SURGERY

## 2021-05-06 RX ORDER — OXYCODONE AND ACETAMINOPHEN 7.5; 325 MG/1; MG/1
1 TABLET ORAL EVERY 6 HOURS PRN
Qty: 28 TABLET | Refills: 0 | Status: SHIPPED | OUTPATIENT
Start: 2021-05-06 | End: 2021-05-12 | Stop reason: SDUPTHER

## 2021-05-12 DIAGNOSIS — M16.12 UNILATERAL PRIMARY OSTEOARTHRITIS, LEFT HIP: ICD-10-CM

## 2021-05-12 RX ORDER — OXYCODONE AND ACETAMINOPHEN 7.5; 325 MG/1; MG/1
1 TABLET ORAL EVERY 6 HOURS PRN
Qty: 28 TABLET | Refills: 0 | Status: SHIPPED | OUTPATIENT
Start: 2021-05-12 | End: 2021-05-20

## 2021-05-17 ENCOUNTER — OFFICE VISIT (OUTPATIENT)
Dept: PAIN MEDICINE | Facility: CLINIC | Age: 61
End: 2021-05-17
Payer: MEDICARE

## 2021-05-17 ENCOUNTER — HOSPITAL ENCOUNTER (OUTPATIENT)
Dept: RADIOLOGY | Facility: HOSPITAL | Age: 61
Discharge: HOME | End: 2021-05-17
Attending: ANESTHESIOLOGY
Payer: MEDICARE

## 2021-05-17 VITALS
HEIGHT: 71 IN | DIASTOLIC BLOOD PRESSURE: 83 MMHG | BODY MASS INDEX: 30.8 KG/M2 | HEART RATE: 105 BPM | SYSTOLIC BLOOD PRESSURE: 148 MMHG | WEIGHT: 220 LBS

## 2021-05-17 DIAGNOSIS — M54.50 CHRONIC MIDLINE LOW BACK PAIN WITHOUT SCIATICA: ICD-10-CM

## 2021-05-17 DIAGNOSIS — G89.29 CHRONIC MIDLINE LOW BACK PAIN WITHOUT SCIATICA: ICD-10-CM

## 2021-05-17 DIAGNOSIS — M19.011 OSTEOARTHRITIS OF RIGHT SHOULDER, UNSPECIFIED OSTEOARTHRITIS TYPE: ICD-10-CM

## 2021-05-17 DIAGNOSIS — M47.896 OTHER SPONDYLOSIS, LUMBAR REGION: Primary | ICD-10-CM

## 2021-05-17 DIAGNOSIS — M47.896 OTHER SPONDYLOSIS, LUMBAR REGION: ICD-10-CM

## 2021-05-17 DIAGNOSIS — M16.12 UNILATERAL PRIMARY OSTEOARTHRITIS, LEFT HIP: ICD-10-CM

## 2021-05-17 PROCEDURE — 3008F BODY MASS INDEX DOCD: CPT | Mod: CPTII,S$GLB,, | Performed by: ANESTHESIOLOGY

## 2021-05-17 PROCEDURE — 99999 PR PBB SHADOW E&M-EST. PATIENT-LVL III: CPT | Mod: PBBFAC,,, | Performed by: ANESTHESIOLOGY

## 2021-05-17 PROCEDURE — 1125F PR PAIN SEVERITY QUANTIFIED, PAIN PRESENT: ICD-10-PCS | Mod: S$GLB,,, | Performed by: ANESTHESIOLOGY

## 2021-05-17 PROCEDURE — 72110 X-RAY EXAM L-2 SPINE 4/>VWS: CPT | Mod: TC,FY

## 2021-05-17 PROCEDURE — 1125F AMNT PAIN NOTED PAIN PRSNT: CPT | Mod: S$GLB,,, | Performed by: ANESTHESIOLOGY

## 2021-05-17 PROCEDURE — 72110 XR LUMBAR SPINE COMPLETE 5 VIEW: ICD-10-PCS | Mod: 26,,, | Performed by: RADIOLOGY

## 2021-05-17 PROCEDURE — 99204 PR OFFICE/OUTPT VISIT, NEW, LEVL IV, 45-59 MIN: ICD-10-PCS | Mod: S$GLB,,, | Performed by: ANESTHESIOLOGY

## 2021-05-17 PROCEDURE — 99204 OFFICE O/P NEW MOD 45 MIN: CPT | Mod: S$GLB,,, | Performed by: ANESTHESIOLOGY

## 2021-05-17 PROCEDURE — 3008F PR BODY MASS INDEX (BMI) DOCUMENTED: ICD-10-PCS | Mod: CPTII,S$GLB,, | Performed by: ANESTHESIOLOGY

## 2021-05-17 PROCEDURE — 72110 X-RAY EXAM L-2 SPINE 4/>VWS: CPT | Mod: 26,,, | Performed by: RADIOLOGY

## 2021-05-17 PROCEDURE — 99999 PR PBB SHADOW E&M-EST. PATIENT-LVL III: ICD-10-PCS | Mod: PBBFAC,,, | Performed by: ANESTHESIOLOGY

## 2021-05-17 RX ORDER — DULOXETIN HYDROCHLORIDE 30 MG/1
30 CAPSULE, DELAYED RELEASE ORAL 2 TIMES DAILY
COMMUNITY
Start: 2021-05-12 | End: 2021-07-06

## 2021-05-20 DIAGNOSIS — Z96.642 HISTORY OF TOTAL HIP REPLACEMENT, LEFT: Primary | ICD-10-CM

## 2021-05-20 RX ORDER — OXYCODONE AND ACETAMINOPHEN 5; 325 MG/1; MG/1
1 TABLET ORAL EVERY 6 HOURS PRN
Qty: 28 TABLET | Refills: 0 | Status: SHIPPED | OUTPATIENT
Start: 2021-05-20 | End: 2021-05-26

## 2021-05-26 ENCOUNTER — OFFICE VISIT (OUTPATIENT)
Dept: PAIN MEDICINE | Facility: CLINIC | Age: 61
End: 2021-05-26
Payer: MEDICARE

## 2021-05-26 ENCOUNTER — OFFICE VISIT (OUTPATIENT)
Dept: ORTHOPEDICS | Facility: CLINIC | Age: 61
End: 2021-05-26
Payer: MEDICARE

## 2021-05-26 VITALS
WEIGHT: 220 LBS | SYSTOLIC BLOOD PRESSURE: 160 MMHG | BODY MASS INDEX: 30.8 KG/M2 | HEIGHT: 71 IN | DIASTOLIC BLOOD PRESSURE: 87 MMHG | HEART RATE: 108 BPM

## 2021-05-26 VITALS — RESPIRATION RATE: 18 BRPM | WEIGHT: 220 LBS | BODY MASS INDEX: 30.8 KG/M2 | HEIGHT: 71 IN

## 2021-05-26 DIAGNOSIS — Z96.611 S/P REVERSE TOTAL SHOULDER ARTHROPLASTY, RIGHT: Primary | ICD-10-CM

## 2021-05-26 DIAGNOSIS — F11.90 CHRONIC, CONTINUOUS USE OF OPIOIDS: ICD-10-CM

## 2021-05-26 DIAGNOSIS — G89.4 CHRONIC PAIN DISORDER: ICD-10-CM

## 2021-05-26 DIAGNOSIS — M47.896 OTHER SPONDYLOSIS, LUMBAR REGION: Primary | ICD-10-CM

## 2021-05-26 DIAGNOSIS — M16.12 UNILATERAL PRIMARY OSTEOARTHRITIS, LEFT HIP: ICD-10-CM

## 2021-05-26 DIAGNOSIS — Z01.818 PRE-OP TESTING: ICD-10-CM

## 2021-05-26 PROCEDURE — 3008F PR BODY MASS INDEX (BMI) DOCUMENTED: ICD-10-PCS | Mod: CPTII,S$GLB,, | Performed by: ANESTHESIOLOGY

## 2021-05-26 PROCEDURE — 99213 OFFICE O/P EST LOW 20 MIN: CPT | Mod: S$GLB,,, | Performed by: ORTHOPAEDIC SURGERY

## 2021-05-26 PROCEDURE — 99213 PR OFFICE/OUTPT VISIT, EST, LEVL III, 20-29 MIN: ICD-10-PCS | Mod: S$GLB,,, | Performed by: ORTHOPAEDIC SURGERY

## 2021-05-26 PROCEDURE — 1126F AMNT PAIN NOTED NONE PRSNT: CPT | Mod: S$GLB,,, | Performed by: ORTHOPAEDIC SURGERY

## 2021-05-26 PROCEDURE — 80307 DRUG TEST PRSMV CHEM ANLYZR: CPT | Performed by: ANESTHESIOLOGY

## 2021-05-26 PROCEDURE — 99999 PR PBB SHADOW E&M-EST. PATIENT-LVL III: CPT | Mod: PBBFAC,,, | Performed by: ANESTHESIOLOGY

## 2021-05-26 PROCEDURE — 99214 OFFICE O/P EST MOD 30 MIN: CPT | Mod: S$GLB,,, | Performed by: ANESTHESIOLOGY

## 2021-05-26 PROCEDURE — 99999 PR PBB SHADOW E&M-EST. PATIENT-LVL III: ICD-10-PCS | Mod: PBBFAC,,, | Performed by: ANESTHESIOLOGY

## 2021-05-26 PROCEDURE — 1125F AMNT PAIN NOTED PAIN PRSNT: CPT | Mod: S$GLB,,, | Performed by: ANESTHESIOLOGY

## 2021-05-26 PROCEDURE — 1125F PR PAIN SEVERITY QUANTIFIED, PAIN PRESENT: ICD-10-PCS | Mod: S$GLB,,, | Performed by: ANESTHESIOLOGY

## 2021-05-26 PROCEDURE — 3008F BODY MASS INDEX DOCD: CPT | Mod: CPTII,S$GLB,, | Performed by: ANESTHESIOLOGY

## 2021-05-26 PROCEDURE — 99999 PR PBB SHADOW E&M-EST. PATIENT-LVL III: CPT | Mod: PBBFAC,,, | Performed by: ORTHOPAEDIC SURGERY

## 2021-05-26 PROCEDURE — 3008F PR BODY MASS INDEX (BMI) DOCUMENTED: ICD-10-PCS | Mod: CPTII,S$GLB,, | Performed by: ORTHOPAEDIC SURGERY

## 2021-05-26 PROCEDURE — 99999 PR PBB SHADOW E&M-EST. PATIENT-LVL III: ICD-10-PCS | Mod: PBBFAC,,, | Performed by: ORTHOPAEDIC SURGERY

## 2021-05-26 PROCEDURE — 99214 PR OFFICE/OUTPT VISIT, EST, LEVL IV, 30-39 MIN: ICD-10-PCS | Mod: S$GLB,,, | Performed by: ANESTHESIOLOGY

## 2021-05-26 PROCEDURE — 3008F BODY MASS INDEX DOCD: CPT | Mod: CPTII,S$GLB,, | Performed by: ORTHOPAEDIC SURGERY

## 2021-05-26 PROCEDURE — 1126F PR PAIN SEVERITY QUANTIFIED, NO PAIN PRESENT: ICD-10-PCS | Mod: S$GLB,,, | Performed by: ORTHOPAEDIC SURGERY

## 2021-05-26 RX ORDER — OXYCODONE AND ACETAMINOPHEN 5; 325 MG/1; MG/1
1 TABLET ORAL EVERY 8 HOURS PRN
Qty: 90 TABLET | Refills: 0 | Status: SHIPPED | OUTPATIENT
Start: 2021-05-26 | End: 2021-06-03

## 2021-05-26 RX ORDER — GABAPENTIN 300 MG/1
300 CAPSULE ORAL 3 TIMES DAILY
Qty: 90 CAPSULE | Refills: 1 | Status: SHIPPED | OUTPATIENT
Start: 2021-05-26 | End: 2021-07-28 | Stop reason: SDUPTHER

## 2021-05-28 ENCOUNTER — HOSPITAL ENCOUNTER (EMERGENCY)
Facility: HOSPITAL | Age: 61
Discharge: HOME | End: 2021-05-28
Attending: EMERGENCY MEDICINE
Payer: MEDICARE

## 2021-05-28 VITALS
HEIGHT: 71 IN | DIASTOLIC BLOOD PRESSURE: 72 MMHG | TEMPERATURE: 98 F | WEIGHT: 220.44 LBS | RESPIRATION RATE: 18 BRPM | BODY MASS INDEX: 30.86 KG/M2 | OXYGEN SATURATION: 99 % | HEART RATE: 96 BPM | SYSTOLIC BLOOD PRESSURE: 146 MMHG

## 2021-05-28 DIAGNOSIS — S73.005A DISLOCATION OF LEFT HIP, INITIAL ENCOUNTER: Primary | ICD-10-CM

## 2021-05-28 PROCEDURE — 63600175 PHARM REV CODE 636 W HCPCS: Performed by: EMERGENCY MEDICINE

## 2021-05-28 PROCEDURE — 96374 THER/PROPH/DIAG INJ IV PUSH: CPT

## 2021-05-28 PROCEDURE — 99284 EMERGENCY DEPT VISIT MOD MDM: CPT | Mod: 25

## 2021-05-28 PROCEDURE — 25000003 PHARM REV CODE 250: Performed by: EMERGENCY MEDICINE

## 2021-05-28 PROCEDURE — 99900035 HC TECH TIME PER 15 MIN (STAT)

## 2021-05-28 RX ORDER — PROPOFOL 10 MG/ML
50 VIAL (ML) INTRAVENOUS
Status: COMPLETED | OUTPATIENT
Start: 2021-05-28 | End: 2021-05-28

## 2021-05-28 RX ORDER — KETAMINE HYDROCHLORIDE 10 MG/ML
50 INJECTION, SOLUTION INTRAMUSCULAR; INTRAVENOUS
Status: COMPLETED | OUTPATIENT
Start: 2021-05-28 | End: 2021-05-28

## 2021-05-28 RX ADMIN — PROPOFOL 50 MG: 10 INJECTION, EMULSION INTRAVENOUS at 02:05

## 2021-05-28 RX ADMIN — KETAMINE HYDROCHLORIDE 50 MG: 10 INJECTION, SOLUTION INTRAMUSCULAR; INTRAVENOUS at 02:05

## 2021-05-31 DIAGNOSIS — Z96.642 HISTORY OF TOTAL HIP REPLACEMENT, LEFT: Primary | ICD-10-CM

## 2021-05-31 LAB
6MAM UR QL: NOT DETECTED
7AMINOCLONAZEPAM UR QL: NOT DETECTED
A-OH ALPRAZ UR QL: NOT DETECTED
ALPHA-OH-MIDAZOLAM: NOT DETECTED
ALPRAZ UR QL: NOT DETECTED
AMPHET UR QL SCN: NOT DETECTED
ANNOTATION COMMENT IMP: NORMAL
ANNOTATION COMMENT IMP: NORMAL
BARBITURATES UR QL: NOT DETECTED
BUPRENORPHINE UR QL: NOT DETECTED
BZE UR QL: NOT DETECTED
CARBOXYTHC UR QL: PRESENT
CARISOPRODOL UR QL: NOT DETECTED
CLONAZEPAM UR QL: NOT DETECTED
CODEINE UR QL: NOT DETECTED
CREAT UR-MCNC: 324.1 MG/DL (ref 20–400)
DIAZEPAM UR QL: NOT DETECTED
ETHYL GLUCURONIDE UR QL: NOT DETECTED
FENTANYL UR QL: NOT DETECTED
GABAPENTIN: NOT DETECTED
HYDROCODONE UR QL: NOT DETECTED
HYDROMORPHONE UR QL: NOT DETECTED
LORAZEPAM UR QL: NOT DETECTED
MDA UR QL: NOT DETECTED
MDEA UR QL: NOT DETECTED
MDMA UR QL: NOT DETECTED
ME-PHENIDATE UR QL: NOT DETECTED
METHADONE UR QL: NOT DETECTED
METHAMPHET UR QL: NOT DETECTED
MIDAZOLAM UR QL SCN: NOT DETECTED
MORPHINE UR QL: NOT DETECTED
NALOXONE: NOT DETECTED
NORBUPRENORPHINE UR QL CFM: NOT DETECTED
NORDIAZEPAM UR QL: PRESENT
NORFENTANYL UR QL: NOT DETECTED
NORHYDROCODONE UR QL CFM: NOT DETECTED
NORMEPERIDINE UR QL CFM: NOT DETECTED
NOROXYCODONE UR QL CFM: PRESENT
NOROXYMORPHONE UR QL SCN: NOT DETECTED
OXAZEPAM UR QL: PRESENT
OXYCODONE UR QL: PRESENT
OXYMORPHONE UR QL: PRESENT
PATHOLOGY STUDY: NORMAL
PCP UR QL: NOT DETECTED
PHENTERMINE UR QL: NOT DETECTED
PREGABALIN: NOT DETECTED
SERVICE CMNT-IMP: NORMAL
TAPENTADOL UR QL SCN: NOT DETECTED
TAPENTADOL-O-SULF: NOT DETECTED
TEMAZEPAM UR QL: PRESENT
TRAMADOL UR QL: NOT DETECTED
ZOLPIDEM METABOLITE: NOT DETECTED
ZOLPIDEM UR QL: NOT DETECTED

## 2021-05-31 RX ORDER — OXYCODONE AND ACETAMINOPHEN 7.5; 325 MG/1; MG/1
1 TABLET ORAL EVERY 4 HOURS PRN
Qty: 28 TABLET | Refills: 0 | Status: SHIPPED | OUTPATIENT
Start: 2021-05-31 | End: 2021-06-03

## 2021-06-01 ENCOUNTER — HOSPITAL ENCOUNTER (INPATIENT)
Facility: HOSPITAL | Age: 61
LOS: 1 days | Discharge: HOME HEALTH | DRG: 468 | End: 2021-06-03
Attending: STUDENT IN AN ORGANIZED HEALTH CARE EDUCATION/TRAINING PROGRAM | Admitting: STUDENT IN AN ORGANIZED HEALTH CARE EDUCATION/TRAINING PROGRAM
Payer: MEDICARE

## 2021-06-01 ENCOUNTER — ANESTHESIA EVENT (OUTPATIENT)
Dept: SURGERY | Facility: HOSPITAL | Age: 61
DRG: 468 | End: 2021-06-01
Payer: MEDICARE

## 2021-06-01 ENCOUNTER — TELEPHONE (OUTPATIENT)
Dept: PAIN MEDICINE | Facility: CLINIC | Age: 61
End: 2021-06-01

## 2021-06-01 ENCOUNTER — OFFICE VISIT (OUTPATIENT)
Dept: ORTHOPEDICS | Facility: CLINIC | Age: 61
End: 2021-06-01
Payer: MEDICARE

## 2021-06-01 VITALS — WEIGHT: 220 LBS | RESPIRATION RATE: 16 BRPM | HEIGHT: 71 IN | BODY MASS INDEX: 30.8 KG/M2

## 2021-06-01 DIAGNOSIS — S73.035A: Primary | ICD-10-CM

## 2021-06-01 DIAGNOSIS — Z96.611 S/P SHOULDER REPLACEMENT, RIGHT: ICD-10-CM

## 2021-06-01 DIAGNOSIS — R07.9 CHEST PAIN: ICD-10-CM

## 2021-06-01 DIAGNOSIS — Z91.89 AT HIGH RISK FOR PRESSURE INJURY OF SKIN: ICD-10-CM

## 2021-06-01 DIAGNOSIS — S73.035A: ICD-10-CM

## 2021-06-01 PROBLEM — K74.60 CIRRHOSIS OF LIVER: Status: ACTIVE | Noted: 2020-10-05

## 2021-06-01 PROBLEM — E66.9 OBESITY (BMI 30-39.9): Status: ACTIVE | Noted: 2021-06-01

## 2021-06-01 PROBLEM — M19.011 OSTEOARTHRITIS OF RIGHT SHOULDER: Status: ACTIVE | Noted: 2019-08-01

## 2021-06-01 LAB
ALBUMIN SERPL BCP-MCNC: 3 G/DL (ref 3.5–5.2)
ALP SERPL-CCNC: 114 U/L (ref 55–135)
ALT SERPL W/O P-5'-P-CCNC: 22 U/L (ref 10–44)
ANION GAP SERPL CALC-SCNC: 4 MMOL/L (ref 8–16)
AST SERPL-CCNC: 24 U/L (ref 10–40)
BASOPHILS # BLD AUTO: 0.03 K/UL (ref 0–0.2)
BASOPHILS NFR BLD: 0.7 % (ref 0–1.9)
BILIRUB SERPL-MCNC: 0.6 MG/DL (ref 0.1–1)
BUN SERPL-MCNC: 18 MG/DL (ref 8–23)
CALCIUM SERPL-MCNC: 8.9 MG/DL (ref 8.7–10.5)
CHLORIDE SERPL-SCNC: 106 MMOL/L (ref 95–110)
CO2 SERPL-SCNC: 31 MMOL/L (ref 23–29)
CREAT SERPL-MCNC: 0.8 MG/DL (ref 0.5–1.4)
DIFFERENTIAL METHOD: ABNORMAL
EOSINOPHIL # BLD AUTO: 0.2 K/UL (ref 0–0.5)
EOSINOPHIL NFR BLD: 5.6 % (ref 0–8)
ERYTHROCYTE [DISTWIDTH] IN BLOOD BY AUTOMATED COUNT: 14.9 % (ref 11.5–14.5)
EST. GFR  (AFRICAN AMERICAN): >60 ML/MIN/1.73 M^2
EST. GFR  (NON AFRICAN AMERICAN): >60 ML/MIN/1.73 M^2
GLUCOSE SERPL-MCNC: 102 MG/DL (ref 70–110)
HCT VFR BLD AUTO: 32.3 % (ref 40–54)
HGB BLD-MCNC: 10.3 G/DL (ref 14–18)
IMM GRANULOCYTES # BLD AUTO: 0.02 K/UL (ref 0–0.04)
IMM GRANULOCYTES NFR BLD AUTO: 0.5 % (ref 0–0.5)
INR PPP: 1.1 (ref 0.8–1.2)
LYMPHOCYTES # BLD AUTO: 1.1 K/UL (ref 1–4.8)
LYMPHOCYTES NFR BLD: 24.5 % (ref 18–48)
MCH RBC QN AUTO: 27.5 PG (ref 27–31)
MCHC RBC AUTO-ENTMCNC: 31.9 G/DL (ref 32–36)
MCV RBC AUTO: 86 FL (ref 82–98)
MONOCYTES # BLD AUTO: 0.4 K/UL (ref 0.3–1)
MONOCYTES NFR BLD: 8.3 % (ref 4–15)
NEUTROPHILS # BLD AUTO: 2.6 K/UL (ref 1.8–7.7)
NEUTROPHILS NFR BLD: 60.4 % (ref 38–73)
NRBC BLD-RTO: 0 /100 WBC
PLATELET # BLD AUTO: 135 K/UL (ref 150–450)
PMV BLD AUTO: 10.1 FL (ref 9.2–12.9)
POTASSIUM SERPL-SCNC: 3.6 MMOL/L (ref 3.5–5.1)
PROT SERPL-MCNC: 6.5 G/DL (ref 6–8.4)
PROTHROMBIN TIME: 11.3 SEC (ref 9–12.5)
RBC # BLD AUTO: 3.74 M/UL (ref 4.6–6.2)
SARS-COV-2 RDRP RESP QL NAA+PROBE: NEGATIVE
SODIUM SERPL-SCNC: 141 MMOL/L (ref 136–145)
WBC # BLD AUTO: 4.32 K/UL (ref 3.9–12.7)

## 2021-06-01 PROCEDURE — 1125F AMNT PAIN NOTED PAIN PRSNT: CPT | Mod: S$GLB,,, | Performed by: ORTHOPAEDIC SURGERY

## 2021-06-01 PROCEDURE — 76937 US GUIDE VASCULAR ACCESS: CPT

## 2021-06-01 PROCEDURE — G0378 HOSPITAL OBSERVATION PER HR: HCPCS

## 2021-06-01 PROCEDURE — 99999 PR PBB SHADOW E&M-EST. PATIENT-LVL III: ICD-10-PCS | Mod: PBBFAC,,, | Performed by: ORTHOPAEDIC SURGERY

## 2021-06-01 PROCEDURE — 99024 POSTOP FOLLOW-UP VISIT: CPT | Mod: S$GLB,,, | Performed by: ORTHOPAEDIC SURGERY

## 2021-06-01 PROCEDURE — 99999 PR PBB SHADOW E&M-EST. PATIENT-LVL III: CPT | Mod: PBBFAC,,, | Performed by: ORTHOPAEDIC SURGERY

## 2021-06-01 PROCEDURE — 85610 PROTHROMBIN TIME: CPT | Performed by: STUDENT IN AN ORGANIZED HEALTH CARE EDUCATION/TRAINING PROGRAM

## 2021-06-01 PROCEDURE — 63600175 PHARM REV CODE 636 W HCPCS: Performed by: STUDENT IN AN ORGANIZED HEALTH CARE EDUCATION/TRAINING PROGRAM

## 2021-06-01 PROCEDURE — 3008F BODY MASS INDEX DOCD: CPT | Mod: CPTII,S$GLB,, | Performed by: ORTHOPAEDIC SURGERY

## 2021-06-01 PROCEDURE — 25000003 PHARM REV CODE 250: Performed by: STUDENT IN AN ORGANIZED HEALTH CARE EDUCATION/TRAINING PROGRAM

## 2021-06-01 PROCEDURE — 96376 TX/PRO/DX INJ SAME DRUG ADON: CPT

## 2021-06-01 PROCEDURE — G0379 DIRECT REFER HOSPITAL OBSERV: HCPCS

## 2021-06-01 PROCEDURE — 99024 PR POST-OP FOLLOW-UP VISIT: ICD-10-PCS | Mod: S$GLB,,, | Performed by: ORTHOPAEDIC SURGERY

## 2021-06-01 PROCEDURE — 80053 COMPREHEN METABOLIC PANEL: CPT | Performed by: STUDENT IN AN ORGANIZED HEALTH CARE EDUCATION/TRAINING PROGRAM

## 2021-06-01 PROCEDURE — 36410 VNPNXR 3YR/> PHY/QHP DX/THER: CPT

## 2021-06-01 PROCEDURE — 36415 COLL VENOUS BLD VENIPUNCTURE: CPT | Performed by: STUDENT IN AN ORGANIZED HEALTH CARE EDUCATION/TRAINING PROGRAM

## 2021-06-01 PROCEDURE — C1751 CATH, INF, PER/CENT/MIDLINE: HCPCS

## 2021-06-01 PROCEDURE — 85025 COMPLETE CBC W/AUTO DIFF WBC: CPT | Performed by: STUDENT IN AN ORGANIZED HEALTH CARE EDUCATION/TRAINING PROGRAM

## 2021-06-01 PROCEDURE — 96374 THER/PROPH/DIAG INJ IV PUSH: CPT

## 2021-06-01 PROCEDURE — U0002 COVID-19 LAB TEST NON-CDC: HCPCS | Performed by: STUDENT IN AN ORGANIZED HEALTH CARE EDUCATION/TRAINING PROGRAM

## 2021-06-01 PROCEDURE — 1125F PR PAIN SEVERITY QUANTIFIED, PAIN PRESENT: ICD-10-PCS | Mod: S$GLB,,, | Performed by: ORTHOPAEDIC SURGERY

## 2021-06-01 PROCEDURE — 3008F PR BODY MASS INDEX (BMI) DOCUMENTED: ICD-10-PCS | Mod: CPTII,S$GLB,, | Performed by: ORTHOPAEDIC SURGERY

## 2021-06-01 RX ORDER — SODIUM CHLORIDE, SODIUM LACTATE, POTASSIUM CHLORIDE, CALCIUM CHLORIDE 600; 310; 30; 20 MG/100ML; MG/100ML; MG/100ML; MG/100ML
10 INJECTION, SOLUTION INTRAVENOUS CONTINUOUS
Status: CANCELLED | OUTPATIENT
Start: 2021-06-01

## 2021-06-01 RX ORDER — HYDROCHLOROTHIAZIDE 25 MG/1
25 TABLET ORAL DAILY
Status: DISCONTINUED | OUTPATIENT
Start: 2021-06-02 | End: 2021-06-01

## 2021-06-01 RX ORDER — LISINOPRIL 40 MG/1
40 TABLET ORAL NIGHTLY
Status: DISCONTINUED | OUTPATIENT
Start: 2021-06-01 | End: 2021-06-03 | Stop reason: HOSPADM

## 2021-06-01 RX ORDER — SUCRALFATE 1 G/10ML
1 SUSPENSION ORAL EVERY 6 HOURS
Status: DISCONTINUED | OUTPATIENT
Start: 2021-06-01 | End: 2021-06-01

## 2021-06-01 RX ORDER — TRAZODONE HYDROCHLORIDE 100 MG/1
100 TABLET ORAL NIGHTLY
COMMUNITY
Start: 2021-05-20 | End: 2021-06-14

## 2021-06-01 RX ORDER — PANTOPRAZOLE SODIUM 40 MG/1
40 TABLET, DELAYED RELEASE ORAL DAILY
Refills: 1 | Status: DISCONTINUED | OUTPATIENT
Start: 2021-06-02 | End: 2021-06-01

## 2021-06-01 RX ORDER — OXYCODONE AND ACETAMINOPHEN 5; 325 MG/1; MG/1
1 TABLET ORAL EVERY 8 HOURS PRN
Status: DISCONTINUED | OUTPATIENT
Start: 2021-06-01 | End: 2021-06-03 | Stop reason: HOSPADM

## 2021-06-01 RX ORDER — PANTOPRAZOLE SODIUM 40 MG/1
40 TABLET, DELAYED RELEASE ORAL NIGHTLY
Status: DISCONTINUED | OUTPATIENT
Start: 2021-06-01 | End: 2021-06-03 | Stop reason: HOSPADM

## 2021-06-01 RX ORDER — LISINOPRIL 40 MG/1
40 TABLET ORAL DAILY
Status: DISCONTINUED | OUTPATIENT
Start: 2021-06-02 | End: 2021-06-01

## 2021-06-01 RX ORDER — GABAPENTIN 300 MG/1
300 CAPSULE ORAL 3 TIMES DAILY
Status: DISCONTINUED | OUTPATIENT
Start: 2021-06-01 | End: 2021-06-03 | Stop reason: HOSPADM

## 2021-06-01 RX ORDER — TRAZODONE HYDROCHLORIDE 50 MG/1
100 TABLET ORAL NIGHTLY
Status: DISCONTINUED | OUTPATIENT
Start: 2021-06-01 | End: 2021-06-03 | Stop reason: HOSPADM

## 2021-06-01 RX ORDER — MUPIROCIN 20 MG/G
OINTMENT TOPICAL
Status: CANCELLED | OUTPATIENT
Start: 2021-06-01

## 2021-06-01 RX ORDER — CLINDAMYCIN PHOSPHATE 900 MG/50ML
900 INJECTION, SOLUTION INTRAVENOUS
Status: CANCELLED | OUTPATIENT
Start: 2021-06-01

## 2021-06-01 RX ORDER — IBUPROFEN 400 MG/1
800 TABLET ORAL EVERY 6 HOURS PRN
Status: DISCONTINUED | OUTPATIENT
Start: 2021-06-01 | End: 2021-06-03 | Stop reason: HOSPADM

## 2021-06-01 RX ORDER — DULOXETIN HYDROCHLORIDE 30 MG/1
30 CAPSULE, DELAYED RELEASE ORAL 2 TIMES DAILY
Status: DISCONTINUED | OUTPATIENT
Start: 2021-06-01 | End: 2021-06-03 | Stop reason: HOSPADM

## 2021-06-01 RX ORDER — MORPHINE SULFATE 4 MG/ML
4 INJECTION, SOLUTION INTRAMUSCULAR; INTRAVENOUS EVERY 4 HOURS PRN
Status: DISCONTINUED | OUTPATIENT
Start: 2021-06-01 | End: 2021-06-03 | Stop reason: HOSPADM

## 2021-06-01 RX ORDER — SODIUM CHLORIDE 0.9 % (FLUSH) 0.9 %
10 SYRINGE (ML) INJECTION
Status: DISCONTINUED | OUTPATIENT
Start: 2021-06-01 | End: 2021-06-03 | Stop reason: HOSPADM

## 2021-06-01 RX ORDER — SODIUM CHLORIDE, SODIUM LACTATE, POTASSIUM CHLORIDE, CALCIUM CHLORIDE 600; 310; 30; 20 MG/100ML; MG/100ML; MG/100ML; MG/100ML
500 INJECTION, SOLUTION INTRAVENOUS ONCE
Status: CANCELLED | OUTPATIENT
Start: 2021-06-01 | End: 2021-06-01

## 2021-06-01 RX ORDER — SODIUM CHLORIDE 0.9 % (FLUSH) 0.9 %
3 SYRINGE (ML) INJECTION EVERY 8 HOURS
Status: CANCELLED | OUTPATIENT
Start: 2021-06-01

## 2021-06-01 RX ORDER — HYDROCHLOROTHIAZIDE 25 MG/1
25 TABLET ORAL NIGHTLY
Status: DISCONTINUED | OUTPATIENT
Start: 2021-06-01 | End: 2021-06-03 | Stop reason: HOSPADM

## 2021-06-01 RX ORDER — MAG HYDROX/ALUMINUM HYD/SIMETH 200-200-20
30 SUSPENSION, ORAL (FINAL DOSE FORM) ORAL
Status: DISCONTINUED | OUTPATIENT
Start: 2021-06-01 | End: 2021-06-01

## 2021-06-01 RX ORDER — FLUTICASONE PROPIONATE 50 MCG
1 SPRAY, SUSPENSION (ML) NASAL DAILY PRN
Status: DISCONTINUED | OUTPATIENT
Start: 2021-06-01 | End: 2021-06-03 | Stop reason: HOSPADM

## 2021-06-01 RX ORDER — AMOXICILLIN 250 MG
1 CAPSULE ORAL 2 TIMES DAILY
Status: DISCONTINUED | OUTPATIENT
Start: 2021-06-01 | End: 2021-06-03 | Stop reason: HOSPADM

## 2021-06-01 RX ORDER — POLYETHYLENE GLYCOL 3350 17 G/17G
17 POWDER, FOR SOLUTION ORAL DAILY
Status: DISCONTINUED | OUTPATIENT
Start: 2021-06-02 | End: 2021-06-03 | Stop reason: HOSPADM

## 2021-06-01 RX ADMIN — DOCUSATE SODIUM 50 MG AND SENNOSIDES 8.6 MG 1 TABLET: 8.6; 5 TABLET, FILM COATED ORAL at 08:06

## 2021-06-01 RX ADMIN — HYDROCHLOROTHIAZIDE 25 MG: 25 TABLET ORAL at 08:06

## 2021-06-01 RX ADMIN — MORPHINE SULFATE 4 MG: 4 INJECTION, SOLUTION INTRAMUSCULAR; INTRAVENOUS at 11:06

## 2021-06-01 RX ADMIN — IBUPROFEN 800 MG: 400 TABLET, FILM COATED ORAL at 05:06

## 2021-06-01 RX ADMIN — MORPHINE SULFATE 4 MG: 4 INJECTION, SOLUTION INTRAMUSCULAR; INTRAVENOUS at 07:06

## 2021-06-01 RX ADMIN — TRAZODONE HYDROCHLORIDE 100 MG: 50 TABLET ORAL at 08:06

## 2021-06-01 RX ADMIN — LISINOPRIL 40 MG: 40 TABLET ORAL at 08:06

## 2021-06-01 RX ADMIN — GABAPENTIN 300 MG: 300 CAPSULE ORAL at 08:06

## 2021-06-01 RX ADMIN — MORPHINE SULFATE 4 MG: 4 INJECTION, SOLUTION INTRAMUSCULAR; INTRAVENOUS at 03:06

## 2021-06-01 RX ADMIN — PANTOPRAZOLE SODIUM 40 MG: 40 TABLET, DELAYED RELEASE ORAL at 08:06

## 2021-06-01 RX ADMIN — GABAPENTIN 300 MG: 300 CAPSULE ORAL at 01:06

## 2021-06-01 RX ADMIN — DULOXETINE HYDROCHLORIDE 30 MG: 30 CAPSULE, DELAYED RELEASE ORAL at 08:06

## 2021-06-01 RX ADMIN — OXYCODONE HYDROCHLORIDE AND ACETAMINOPHEN 1 TABLET: 5; 325 TABLET ORAL at 01:06

## 2021-06-02 ENCOUNTER — TELEPHONE (OUTPATIENT)
Dept: ORTHOPEDICS | Facility: CLINIC | Age: 61
End: 2021-06-02

## 2021-06-02 ENCOUNTER — ANESTHESIA (OUTPATIENT)
Dept: SURGERY | Facility: HOSPITAL | Age: 61
DRG: 468 | End: 2021-06-02
Payer: MEDICARE

## 2021-06-02 LAB
ABO + RH BLD: NORMAL
ALBUMIN SERPL BCP-MCNC: 3 G/DL (ref 3.5–5.2)
ALP SERPL-CCNC: 112 U/L (ref 55–135)
ALT SERPL W/O P-5'-P-CCNC: 20 U/L (ref 10–44)
ANION GAP SERPL CALC-SCNC: 10 MMOL/L (ref 8–16)
AST SERPL-CCNC: 23 U/L (ref 10–40)
BASOPHILS # BLD AUTO: 0.03 K/UL (ref 0–0.2)
BASOPHILS NFR BLD: 0.7 % (ref 0–1.9)
BILIRUB SERPL-MCNC: 0.6 MG/DL (ref 0.1–1)
BLD GP AB SCN CELLS X3 SERPL QL: NORMAL
BUN SERPL-MCNC: 20 MG/DL (ref 8–23)
CALCIUM SERPL-MCNC: 8.9 MG/DL (ref 8.7–10.5)
CHLORIDE SERPL-SCNC: 105 MMOL/L (ref 95–110)
CO2 SERPL-SCNC: 26 MMOL/L (ref 23–29)
CREAT SERPL-MCNC: 0.7 MG/DL (ref 0.5–1.4)
DIFFERENTIAL METHOD: ABNORMAL
EOSINOPHIL # BLD AUTO: 0.3 K/UL (ref 0–0.5)
EOSINOPHIL NFR BLD: 7 % (ref 0–8)
ERYTHROCYTE [DISTWIDTH] IN BLOOD BY AUTOMATED COUNT: 14.6 % (ref 11.5–14.5)
EST. GFR  (AFRICAN AMERICAN): >60 ML/MIN/1.73 M^2
EST. GFR  (NON AFRICAN AMERICAN): >60 ML/MIN/1.73 M^2
GLUCOSE SERPL-MCNC: 90 MG/DL (ref 70–110)
HCT VFR BLD AUTO: 32.1 % (ref 40–54)
HGB BLD-MCNC: 10.1 G/DL (ref 14–18)
IMM GRANULOCYTES # BLD AUTO: 0.03 K/UL (ref 0–0.04)
IMM GRANULOCYTES NFR BLD AUTO: 0.7 % (ref 0–0.5)
LYMPHOCYTES # BLD AUTO: 1.4 K/UL (ref 1–4.8)
LYMPHOCYTES NFR BLD: 32.9 % (ref 18–48)
MCH RBC QN AUTO: 27.2 PG (ref 27–31)
MCHC RBC AUTO-ENTMCNC: 31.5 G/DL (ref 32–36)
MCV RBC AUTO: 87 FL (ref 82–98)
MONOCYTES # BLD AUTO: 0.4 K/UL (ref 0.3–1)
MONOCYTES NFR BLD: 8.9 % (ref 4–15)
NEUTROPHILS # BLD AUTO: 2.1 K/UL (ref 1.8–7.7)
NEUTROPHILS NFR BLD: 49.8 % (ref 38–73)
NRBC BLD-RTO: 0 /100 WBC
PLATELET # BLD AUTO: 141 K/UL (ref 150–450)
PMV BLD AUTO: 10.2 FL (ref 9.2–12.9)
POTASSIUM SERPL-SCNC: 3.7 MMOL/L (ref 3.5–5.1)
PROT SERPL-MCNC: 6.4 G/DL (ref 6–8.4)
RBC # BLD AUTO: 3.71 M/UL (ref 4.6–6.2)
SODIUM SERPL-SCNC: 141 MMOL/L (ref 136–145)
WBC # BLD AUTO: 4.28 K/UL (ref 3.9–12.7)

## 2021-06-02 PROCEDURE — 94761 N-INVAS EAR/PLS OXIMETRY MLT: CPT

## 2021-06-02 PROCEDURE — 25000003 PHARM REV CODE 250: Performed by: ORTHOPAEDIC SURGERY

## 2021-06-02 PROCEDURE — 94760 N-INVAS EAR/PLS OXIMETRY 1: CPT

## 2021-06-02 PROCEDURE — 37000009 HC ANESTHESIA EA ADD 15 MINS: Performed by: ORTHOPAEDIC SURGERY

## 2021-06-02 PROCEDURE — 93010 EKG 12-LEAD: ICD-10-PCS | Mod: ,,, | Performed by: INTERNAL MEDICINE

## 2021-06-02 PROCEDURE — 25000003 PHARM REV CODE 250: Performed by: ANESTHESIOLOGY

## 2021-06-02 PROCEDURE — 85025 COMPLETE CBC W/AUTO DIFF WBC: CPT | Performed by: STUDENT IN AN ORGANIZED HEALTH CARE EDUCATION/TRAINING PROGRAM

## 2021-06-02 PROCEDURE — 63600175 PHARM REV CODE 636 W HCPCS: Performed by: ANESTHESIOLOGY

## 2021-06-02 PROCEDURE — C1776 JOINT DEVICE (IMPLANTABLE): HCPCS | Performed by: ORTHOPAEDIC SURGERY

## 2021-06-02 PROCEDURE — 63600175 PHARM REV CODE 636 W HCPCS

## 2021-06-02 PROCEDURE — 76942 ECHO GUIDE FOR BIOPSY: CPT | Mod: 26,,, | Performed by: ANESTHESIOLOGY

## 2021-06-02 PROCEDURE — 36000711: Performed by: ORTHOPAEDIC SURGERY

## 2021-06-02 PROCEDURE — C1713 ANCHOR/SCREW BN/BN,TIS/BN: HCPCS | Performed by: ORTHOPAEDIC SURGERY

## 2021-06-02 PROCEDURE — 63600175 PHARM REV CODE 636 W HCPCS: Performed by: STUDENT IN AN ORGANIZED HEALTH CARE EDUCATION/TRAINING PROGRAM

## 2021-06-02 PROCEDURE — 25000003 PHARM REV CODE 250: Performed by: REGISTERED NURSE

## 2021-06-02 PROCEDURE — 93005 ELECTROCARDIOGRAM TRACING: CPT

## 2021-06-02 PROCEDURE — 36000710: Performed by: ORTHOPAEDIC SURGERY

## 2021-06-02 PROCEDURE — 99900035 HC TECH TIME PER 15 MIN (STAT)

## 2021-06-02 PROCEDURE — 93010 ELECTROCARDIOGRAM REPORT: CPT | Mod: ,,, | Performed by: INTERNAL MEDICINE

## 2021-06-02 PROCEDURE — 63600175 PHARM REV CODE 636 W HCPCS: Performed by: REGISTERED NURSE

## 2021-06-02 PROCEDURE — D9220A PRA ANESTHESIA: Mod: CRNA,,, | Performed by: REGISTERED NURSE

## 2021-06-02 PROCEDURE — 94799 UNLISTED PULMONARY SVC/PX: CPT

## 2021-06-02 PROCEDURE — 97162 PT EVAL MOD COMPLEX 30 MIN: CPT

## 2021-06-02 PROCEDURE — C9290 INJ, BUPIVACAINE LIPOSOME: HCPCS | Performed by: ANESTHESIOLOGY

## 2021-06-02 PROCEDURE — 37000008 HC ANESTHESIA 1ST 15 MINUTES: Performed by: ORTHOPAEDIC SURGERY

## 2021-06-02 PROCEDURE — 64450 PERIPHERAL BLOCK: ICD-10-PCS | Mod: 59,LT,, | Performed by: ANESTHESIOLOGY

## 2021-06-02 PROCEDURE — 99900103 DSU ONLY-NO CHARGE-INITIAL HR (STAT): Performed by: ORTHOPAEDIC SURGERY

## 2021-06-02 PROCEDURE — 36415 COLL VENOUS BLD VENIPUNCTURE: CPT | Performed by: STUDENT IN AN ORGANIZED HEALTH CARE EDUCATION/TRAINING PROGRAM

## 2021-06-02 PROCEDURE — G0378 HOSPITAL OBSERVATION PER HR: HCPCS

## 2021-06-02 PROCEDURE — 27200750 HC INSULATED NEEDLE/ STIMUPLEX: Performed by: ANESTHESIOLOGY

## 2021-06-02 PROCEDURE — 71000039 HC RECOVERY, EACH ADD'L HOUR: Performed by: ORTHOPAEDIC SURGERY

## 2021-06-02 PROCEDURE — 71000033 HC RECOVERY, INTIAL HOUR: Performed by: ORTHOPAEDIC SURGERY

## 2021-06-02 PROCEDURE — 76942 ECHO GUIDE FOR BIOPSY: CPT | Performed by: ANESTHESIOLOGY

## 2021-06-02 PROCEDURE — 97110 THERAPEUTIC EXERCISES: CPT

## 2021-06-02 PROCEDURE — 80053 COMPREHEN METABOLIC PANEL: CPT | Performed by: STUDENT IN AN ORGANIZED HEALTH CARE EDUCATION/TRAINING PROGRAM

## 2021-06-02 PROCEDURE — 96375 TX/PRO/DX INJ NEW DRUG ADDON: CPT

## 2021-06-02 PROCEDURE — 27134 PR REVISE TOTAL HIP REPLACEMENT: ICD-10-PCS | Mod: 78,LT,, | Performed by: ORTHOPAEDIC SURGERY

## 2021-06-02 PROCEDURE — D9220A PRA ANESTHESIA: ICD-10-PCS | Mod: CRNA,,, | Performed by: REGISTERED NURSE

## 2021-06-02 PROCEDURE — D9220A PRA ANESTHESIA: Mod: ANES,,, | Performed by: ANESTHESIOLOGY

## 2021-06-02 PROCEDURE — 27134 REVISE HIP JOINT REPLACEMENT: CPT | Mod: 78,LT,, | Performed by: ORTHOPAEDIC SURGERY

## 2021-06-02 PROCEDURE — 64450 NJX AA&/STRD OTHER PN/BRANCH: CPT | Mod: 59,LT,, | Performed by: ANESTHESIOLOGY

## 2021-06-02 PROCEDURE — 76942 PERIPHERAL BLOCK: ICD-10-PCS | Mod: 26,,, | Performed by: ANESTHESIOLOGY

## 2021-06-02 PROCEDURE — 96376 TX/PRO/DX INJ SAME DRUG ADON: CPT

## 2021-06-02 PROCEDURE — 63600175 PHARM REV CODE 636 W HCPCS: Performed by: ORTHOPAEDIC SURGERY

## 2021-06-02 PROCEDURE — 87081 CULTURE SCREEN ONLY: CPT | Performed by: ORTHOPAEDIC SURGERY

## 2021-06-02 PROCEDURE — 86900 BLOOD TYPING SEROLOGIC ABO: CPT | Performed by: STUDENT IN AN ORGANIZED HEALTH CARE EDUCATION/TRAINING PROGRAM

## 2021-06-02 PROCEDURE — D9220A PRA ANESTHESIA: ICD-10-PCS | Mod: ANES,,, | Performed by: ANESTHESIOLOGY

## 2021-06-02 DEVICE — COVER REFLECTION I APEX THRD H: Type: IMPLANTABLE DEVICE | Site: HIP | Status: FUNCTIONAL

## 2021-06-02 RX ORDER — FENTANYL CITRATE 50 UG/ML
25 INJECTION, SOLUTION INTRAMUSCULAR; INTRAVENOUS EVERY 5 MIN PRN
Status: COMPLETED | OUTPATIENT
Start: 2021-06-02 | End: 2021-06-02

## 2021-06-02 RX ORDER — OXYCODONE HYDROCHLORIDE 5 MG/1
5 TABLET ORAL ONCE AS NEEDED
Status: DISCONTINUED | OUTPATIENT
Start: 2021-06-02 | End: 2021-06-02 | Stop reason: HOSPADM

## 2021-06-02 RX ORDER — LACTULOSE 10 G/15ML
20 SOLUTION ORAL EVERY 6 HOURS PRN
Status: DISCONTINUED | OUTPATIENT
Start: 2021-06-02 | End: 2021-06-03 | Stop reason: HOSPADM

## 2021-06-02 RX ORDER — PROMETHAZINE HYDROCHLORIDE 25 MG/1
25 TABLET ORAL EVERY 6 HOURS PRN
Status: DISCONTINUED | OUTPATIENT
Start: 2021-06-02 | End: 2021-06-03 | Stop reason: HOSPADM

## 2021-06-02 RX ORDER — ZOLPIDEM TARTRATE 5 MG/1
5 TABLET ORAL NIGHTLY PRN
Status: DISCONTINUED | OUTPATIENT
Start: 2021-06-02 | End: 2021-06-02

## 2021-06-02 RX ORDER — LIDOCAINE HYDROCHLORIDE 10 MG/ML
1 INJECTION, SOLUTION EPIDURAL; INFILTRATION; INTRACAUDAL; PERINEURAL ONCE
Status: DISCONTINUED | OUTPATIENT
Start: 2021-06-02 | End: 2021-06-02 | Stop reason: HOSPADM

## 2021-06-02 RX ORDER — MORPHINE SULFATE 2 MG/ML
2 INJECTION, SOLUTION INTRAMUSCULAR; INTRAVENOUS
Status: DISCONTINUED | OUTPATIENT
Start: 2021-06-02 | End: 2021-06-03 | Stop reason: HOSPADM

## 2021-06-02 RX ORDER — MUPIROCIN 20 MG/G
1 OINTMENT TOPICAL 2 TIMES DAILY
Status: DISCONTINUED | OUTPATIENT
Start: 2021-06-02 | End: 2021-06-03 | Stop reason: HOSPADM

## 2021-06-02 RX ORDER — LOPERAMIDE HYDROCHLORIDE 2 MG/1
2 CAPSULE ORAL CONTINUOUS PRN
Status: DISCONTINUED | OUTPATIENT
Start: 2021-06-02 | End: 2021-06-03 | Stop reason: HOSPADM

## 2021-06-02 RX ORDER — PROPOFOL 10 MG/ML
VIAL (ML) INTRAVENOUS
Status: DISCONTINUED | OUTPATIENT
Start: 2021-06-02 | End: 2021-06-02

## 2021-06-02 RX ORDER — DEXAMETHASONE SODIUM PHOSPHATE 4 MG/ML
INJECTION, SOLUTION INTRA-ARTICULAR; INTRALESIONAL; INTRAMUSCULAR; INTRAVENOUS; SOFT TISSUE
Status: DISCONTINUED | OUTPATIENT
Start: 2021-06-02 | End: 2021-06-02

## 2021-06-02 RX ORDER — BISACODYL 10 MG
10 SUPPOSITORY, RECTAL RECTAL DAILY PRN
Status: DISCONTINUED | OUTPATIENT
Start: 2021-06-02 | End: 2021-06-03 | Stop reason: HOSPADM

## 2021-06-02 RX ORDER — ACETAMINOPHEN 10 MG/ML
1000 INJECTION, SOLUTION INTRAVENOUS ONCE
Status: COMPLETED | OUTPATIENT
Start: 2021-06-02 | End: 2021-06-02

## 2021-06-02 RX ORDER — ACETAMINOPHEN 10 MG/ML
INJECTION, SOLUTION INTRAVENOUS
Status: DISCONTINUED | OUTPATIENT
Start: 2021-06-02 | End: 2021-06-02

## 2021-06-02 RX ORDER — HYDROMORPHONE HYDROCHLORIDE 2 MG/ML
INJECTION, SOLUTION INTRAMUSCULAR; INTRAVENOUS; SUBCUTANEOUS
Status: DISCONTINUED | OUTPATIENT
Start: 2021-06-02 | End: 2021-06-02

## 2021-06-02 RX ORDER — FENTANYL CITRATE 50 UG/ML
INJECTION, SOLUTION INTRAMUSCULAR; INTRAVENOUS
Status: DISCONTINUED | OUTPATIENT
Start: 2021-06-02 | End: 2021-06-02

## 2021-06-02 RX ORDER — MEPERIDINE HYDROCHLORIDE 50 MG/ML
12.5 INJECTION INTRAMUSCULAR; INTRAVENOUS; SUBCUTANEOUS ONCE AS NEEDED
Status: DISCONTINUED | OUTPATIENT
Start: 2021-06-02 | End: 2021-06-02 | Stop reason: HOSPADM

## 2021-06-02 RX ORDER — VASOPRESSIN 20 [USP'U]/ML
INJECTION, SOLUTION INTRAMUSCULAR; SUBCUTANEOUS
Status: DISCONTINUED | OUTPATIENT
Start: 2021-06-02 | End: 2021-06-02

## 2021-06-02 RX ORDER — CLINDAMYCIN PHOSPHATE 900 MG/50ML
900 INJECTION, SOLUTION INTRAVENOUS
Status: COMPLETED | OUTPATIENT
Start: 2021-06-02 | End: 2021-06-02

## 2021-06-02 RX ORDER — ONDANSETRON 2 MG/ML
INJECTION INTRAMUSCULAR; INTRAVENOUS
Status: DISCONTINUED | OUTPATIENT
Start: 2021-06-02 | End: 2021-06-02

## 2021-06-02 RX ORDER — ROCURONIUM BROMIDE 10 MG/ML
INJECTION, SOLUTION INTRAVENOUS
Status: DISCONTINUED | OUTPATIENT
Start: 2021-06-02 | End: 2021-06-02

## 2021-06-02 RX ORDER — KETOROLAC TROMETHAMINE 30 MG/ML
INJECTION, SOLUTION INTRAMUSCULAR; INTRAVENOUS
Status: DISCONTINUED | OUTPATIENT
Start: 2021-06-02 | End: 2021-06-02

## 2021-06-02 RX ORDER — BUPIVACAINE HYDROCHLORIDE 5 MG/ML
INJECTION, SOLUTION EPIDURAL; INTRACAUDAL
Status: DISCONTINUED | OUTPATIENT
Start: 2021-06-02 | End: 2021-06-02

## 2021-06-02 RX ORDER — HYDROMORPHONE HYDROCHLORIDE 1 MG/ML
1 INJECTION, SOLUTION INTRAMUSCULAR; INTRAVENOUS; SUBCUTANEOUS EVERY 4 HOURS PRN
Status: DISCONTINUED | OUTPATIENT
Start: 2021-06-02 | End: 2021-06-03 | Stop reason: HOSPADM

## 2021-06-02 RX ORDER — TRANEXAMIC ACID 100 MG/ML
INJECTION, SOLUTION INTRAVENOUS
Status: DISCONTINUED | OUTPATIENT
Start: 2021-06-02 | End: 2021-06-02

## 2021-06-02 RX ORDER — ACETAMINOPHEN 500 MG
1000 TABLET ORAL EVERY 8 HOURS
Status: DISCONTINUED | OUTPATIENT
Start: 2021-06-03 | End: 2021-06-03 | Stop reason: HOSPADM

## 2021-06-02 RX ORDER — ONDANSETRON 2 MG/ML
4 INJECTION INTRAMUSCULAR; INTRAVENOUS ONCE AS NEEDED
Status: DISCONTINUED | OUTPATIENT
Start: 2021-06-02 | End: 2021-06-02 | Stop reason: HOSPADM

## 2021-06-02 RX ORDER — CLINDAMYCIN PHOSPHATE 900 MG/50ML
900 INJECTION, SOLUTION INTRAVENOUS
Status: COMPLETED | OUTPATIENT
Start: 2021-06-02 | End: 2021-06-03

## 2021-06-02 RX ORDER — PROCHLORPERAZINE EDISYLATE 5 MG/ML
5 INJECTION INTRAMUSCULAR; INTRAVENOUS EVERY 30 MIN PRN
Status: DISCONTINUED | OUTPATIENT
Start: 2021-06-02 | End: 2021-06-02 | Stop reason: HOSPADM

## 2021-06-02 RX ORDER — TRAMADOL HYDROCHLORIDE 50 MG/1
50 TABLET ORAL EVERY 4 HOURS PRN
Status: DISCONTINUED | OUTPATIENT
Start: 2021-06-02 | End: 2021-06-03 | Stop reason: HOSPADM

## 2021-06-02 RX ORDER — LIDOCAINE HYDROCHLORIDE 20 MG/ML
INJECTION INTRAVENOUS
Status: DISCONTINUED | OUTPATIENT
Start: 2021-06-02 | End: 2021-06-02

## 2021-06-02 RX ORDER — SUCCINYLCHOLINE CHLORIDE 20 MG/ML
INJECTION INTRAMUSCULAR; INTRAVENOUS
Status: DISCONTINUED | OUTPATIENT
Start: 2021-06-02 | End: 2021-06-02

## 2021-06-02 RX ORDER — KETAMINE HYDROCHLORIDE 10 MG/ML
INJECTION, SOLUTION INTRAMUSCULAR; INTRAVENOUS
Status: DISCONTINUED | OUTPATIENT
Start: 2021-06-02 | End: 2021-06-02

## 2021-06-02 RX ORDER — HYDROMORPHONE HYDROCHLORIDE 2 MG/ML
0.2 INJECTION, SOLUTION INTRAMUSCULAR; INTRAVENOUS; SUBCUTANEOUS EVERY 5 MIN PRN
Status: DISCONTINUED | OUTPATIENT
Start: 2021-06-02 | End: 2021-06-02 | Stop reason: HOSPADM

## 2021-06-02 RX ORDER — EPHEDRINE SULFATE 50 MG/ML
INJECTION, SOLUTION INTRAVENOUS
Status: DISCONTINUED | OUTPATIENT
Start: 2021-06-02 | End: 2021-06-02

## 2021-06-02 RX ORDER — MUPIROCIN 20 MG/G
OINTMENT TOPICAL
Status: DISCONTINUED | OUTPATIENT
Start: 2021-06-02 | End: 2021-06-02 | Stop reason: HOSPADM

## 2021-06-02 RX ORDER — ONDANSETRON 8 MG/1
8 TABLET, ORALLY DISINTEGRATING ORAL EVERY 8 HOURS PRN
Status: DISCONTINUED | OUTPATIENT
Start: 2021-06-02 | End: 2021-06-03 | Stop reason: HOSPADM

## 2021-06-02 RX ORDER — LORAZEPAM 2 MG/ML
0.25 INJECTION INTRAMUSCULAR ONCE AS NEEDED
Status: DISCONTINUED | OUTPATIENT
Start: 2021-06-02 | End: 2021-06-02 | Stop reason: HOSPADM

## 2021-06-02 RX ORDER — NAPROXEN SODIUM 220 MG/1
81 TABLET, FILM COATED ORAL 2 TIMES DAILY
Status: DISCONTINUED | OUTPATIENT
Start: 2021-06-02 | End: 2021-06-03 | Stop reason: HOSPADM

## 2021-06-02 RX ORDER — MIDAZOLAM HYDROCHLORIDE 1 MG/ML
INJECTION, SOLUTION INTRAMUSCULAR; INTRAVENOUS
Status: DISCONTINUED | OUTPATIENT
Start: 2021-06-02 | End: 2021-06-02

## 2021-06-02 RX ADMIN — FENTANYL CITRATE 25 MCG: 50 INJECTION INTRAMUSCULAR; INTRAVENOUS at 01:06

## 2021-06-02 RX ADMIN — CLINDAMYCIN PHOSPHATE 900 MG: 18 INJECTION, SOLUTION INTRAVENOUS at 10:06

## 2021-06-02 RX ADMIN — ACETAMINOPHEN 1000 MG: 10 INJECTION, SOLUTION INTRAVENOUS at 11:06

## 2021-06-02 RX ADMIN — KETAMINE HYDROCHLORIDE 30 MG: 10 INJECTION, SOLUTION INTRAMUSCULAR; INTRAVENOUS at 10:06

## 2021-06-02 RX ADMIN — MORPHINE SULFATE 4 MG: 4 INJECTION, SOLUTION INTRAMUSCULAR; INTRAVENOUS at 04:06

## 2021-06-02 RX ADMIN — PANTOPRAZOLE SODIUM 40 MG: 40 TABLET, DELAYED RELEASE ORAL at 08:06

## 2021-06-02 RX ADMIN — PROPOFOL 150 MG: 10 INJECTION, EMULSION INTRAVENOUS at 10:06

## 2021-06-02 RX ADMIN — PHENYLEPHRINE HYDROCHLORIDE 0.3 MCG/KG/MIN: 10 INJECTION INTRAVENOUS at 10:06

## 2021-06-02 RX ADMIN — ACETAMINOPHEN 1000 MG: 10 INJECTION INTRAVENOUS at 05:06

## 2021-06-02 RX ADMIN — HYDROMORPHONE HYDROCHLORIDE 0.2 MG: 2 INJECTION INTRAMUSCULAR; INTRAVENOUS; SUBCUTANEOUS at 01:06

## 2021-06-02 RX ADMIN — TRAMADOL HYDROCHLORIDE 50 MG: 50 TABLET, FILM COATED ORAL at 07:06

## 2021-06-02 RX ADMIN — SODIUM CHLORIDE, SODIUM GLUCONATE, SODIUM ACETATE, POTASSIUM CHLORIDE, MAGNESIUM CHLORIDE, SODIUM PHOSPHATE, DIBASIC, AND POTASSIUM PHOSPHATE: .53; .5; .37; .037; .03; .012; .00082 INJECTION, SOLUTION INTRAVENOUS at 10:06

## 2021-06-02 RX ADMIN — LISINOPRIL 40 MG: 40 TABLET ORAL at 08:06

## 2021-06-02 RX ADMIN — HYDROCHLOROTHIAZIDE 25 MG: 25 TABLET ORAL at 08:06

## 2021-06-02 RX ADMIN — DULOXETINE HYDROCHLORIDE 30 MG: 30 CAPSULE, DELAYED RELEASE ORAL at 08:06

## 2021-06-02 RX ADMIN — EPHEDRINE SULFATE 5 MG: 50 INJECTION, SOLUTION INTRAMUSCULAR; INTRAVENOUS; SUBCUTANEOUS at 11:06

## 2021-06-02 RX ADMIN — CLINDAMYCIN IN 5 PERCENT DEXTROSE 900 MG: 18 INJECTION, SOLUTION INTRAVENOUS at 05:06

## 2021-06-02 RX ADMIN — TRANEXAMIC ACID 1000 MG: 100 INJECTION, SOLUTION INTRAVENOUS at 11:06

## 2021-06-02 RX ADMIN — DEXAMETHASONE SODIUM PHOSPHATE 4 MG: 4 INJECTION, SOLUTION INTRA-ARTICULAR; INTRALESIONAL; INTRAMUSCULAR; INTRAVENOUS; SOFT TISSUE at 10:06

## 2021-06-02 RX ADMIN — ONDANSETRON 4 MG: 2 INJECTION, SOLUTION INTRAMUSCULAR; INTRAVENOUS at 11:06

## 2021-06-02 RX ADMIN — FENTANYL CITRATE 50 MCG: 50 INJECTION, SOLUTION INTRAMUSCULAR; INTRAVENOUS at 11:06

## 2021-06-02 RX ADMIN — KETAMINE HYDROCHLORIDE 20 MG: 10 INJECTION, SOLUTION INTRAMUSCULAR; INTRAVENOUS at 11:06

## 2021-06-02 RX ADMIN — LIDOCAINE HYDROCHLORIDE 50 MG: 20 INJECTION, SOLUTION INTRAVENOUS at 10:06

## 2021-06-02 RX ADMIN — BUPIVACAINE HYDROCHLORIDE 10 ML: 5 INJECTION, SOLUTION EPIDURAL; INTRACAUDAL; PERINEURAL at 10:06

## 2021-06-02 RX ADMIN — SUCCINYLCHOLINE CHLORIDE 160 MG: 20 INJECTION, SOLUTION INTRAMUSCULAR; INTRAVENOUS; PARENTERAL at 10:06

## 2021-06-02 RX ADMIN — ROCURONIUM BROMIDE 5 MG: 10 INJECTION, SOLUTION INTRAVENOUS at 10:06

## 2021-06-02 RX ADMIN — MIDAZOLAM 2 MG: 1 INJECTION INTRAMUSCULAR; INTRAVENOUS at 09:06

## 2021-06-02 RX ADMIN — TRAZODONE HYDROCHLORIDE 100 MG: 50 TABLET ORAL at 08:06

## 2021-06-02 RX ADMIN — SODIUM CHLORIDE, SODIUM GLUCONATE, SODIUM ACETATE, POTASSIUM CHLORIDE, MAGNESIUM CHLORIDE, SODIUM PHOSPHATE, DIBASIC, AND POTASSIUM PHOSPHATE: .53; .5; .37; .037; .03; .012; .00082 INJECTION, SOLUTION INTRAVENOUS at 01:06

## 2021-06-02 RX ADMIN — FENTANYL CITRATE 100 MCG: 50 INJECTION, SOLUTION INTRAMUSCULAR; INTRAVENOUS at 09:06

## 2021-06-02 RX ADMIN — MORPHINE SULFATE 4 MG: 4 INJECTION, SOLUTION INTRAMUSCULAR; INTRAVENOUS at 09:06

## 2021-06-02 RX ADMIN — VASOPRESSIN 1 UNITS: 20 INJECTION, SOLUTION INTRAMUSCULAR; SUBCUTANEOUS at 11:06

## 2021-06-02 RX ADMIN — HYDROMORPHONE HYDROCHLORIDE 0.4 MG: 2 INJECTION, SOLUTION INTRAMUSCULAR; INTRAVENOUS; SUBCUTANEOUS at 12:06

## 2021-06-02 RX ADMIN — MORPHINE SULFATE 4 MG: 4 INJECTION, SOLUTION INTRAMUSCULAR; INTRAVENOUS at 08:06

## 2021-06-02 RX ADMIN — EPHEDRINE SULFATE 10 MG: 50 INJECTION, SOLUTION INTRAMUSCULAR; INTRAVENOUS; SUBCUTANEOUS at 10:06

## 2021-06-02 RX ADMIN — BUPIVACAINE 10 ML: 13.3 INJECTION, SUSPENSION, LIPOSOMAL INFILTRATION at 10:06

## 2021-06-02 RX ADMIN — GABAPENTIN 300 MG: 300 CAPSULE ORAL at 08:06

## 2021-06-02 RX ADMIN — EPHEDRINE SULFATE 5 MG: 50 INJECTION, SOLUTION INTRAMUSCULAR; INTRAVENOUS; SUBCUTANEOUS at 10:06

## 2021-06-02 RX ADMIN — OXYCODONE HYDROCHLORIDE AND ACETAMINOPHEN 1 TABLET: 5; 325 TABLET ORAL at 08:06

## 2021-06-02 RX ADMIN — DOCUSATE SODIUM 50 MG AND SENNOSIDES 8.6 MG 1 TABLET: 8.6; 5 TABLET, FILM COATED ORAL at 08:06

## 2021-06-02 RX ADMIN — ASPIRIN 81 MG CHEWABLE TABLET 81 MG: 81 TABLET CHEWABLE at 08:06

## 2021-06-02 RX ADMIN — SODIUM CHLORIDE, SODIUM GLUCONATE, SODIUM ACETATE, POTASSIUM CHLORIDE, MAGNESIUM CHLORIDE, SODIUM PHOSPHATE, DIBASIC, AND POTASSIUM PHOSPHATE: .53; .5; .37; .037; .03; .012; .00082 INJECTION, SOLUTION INTRAVENOUS at 11:06

## 2021-06-02 RX ADMIN — MUPIROCIN 1 G: 20 OINTMENT TOPICAL at 08:06

## 2021-06-02 RX ADMIN — KETOROLAC TROMETHAMINE 30 MG: 30 INJECTION, SOLUTION INTRAMUSCULAR; INTRAVENOUS at 12:06

## 2021-06-02 RX ADMIN — GLYCOPYRROLATE 0.2 MG: 0.2 INJECTION, SOLUTION INTRAMUSCULAR; INTRAVITREAL at 10:06

## 2021-06-02 RX ADMIN — HYDROMORPHONE HYDROCHLORIDE 1 MG: 1 INJECTION, SOLUTION INTRAMUSCULAR; INTRAVENOUS; SUBCUTANEOUS at 11:06

## 2021-06-03 ENCOUNTER — TELEPHONE (OUTPATIENT)
Dept: ORTHOPEDICS | Facility: CLINIC | Age: 61
End: 2021-06-03

## 2021-06-03 ENCOUNTER — HOSPITAL ENCOUNTER (EMERGENCY)
Facility: HOSPITAL | Age: 61
Discharge: HOME | End: 2021-06-03
Attending: EMERGENCY MEDICINE
Payer: MEDICARE

## 2021-06-03 VITALS
HEART RATE: 118 BPM | RESPIRATION RATE: 20 BRPM | OXYGEN SATURATION: 96 % | HEIGHT: 71 IN | WEIGHT: 220.44 LBS | DIASTOLIC BLOOD PRESSURE: 63 MMHG | BODY MASS INDEX: 30.86 KG/M2 | SYSTOLIC BLOOD PRESSURE: 109 MMHG | TEMPERATURE: 98 F

## 2021-06-03 DIAGNOSIS — S73.035A: Primary | ICD-10-CM

## 2021-06-03 DIAGNOSIS — Z51.89 VISIT FOR WOUND CHECK: Primary | ICD-10-CM

## 2021-06-03 PROBLEM — Z96.642 HISTORY OF LEFT HIP REPLACEMENT: Status: ACTIVE | Noted: 2021-06-03

## 2021-06-03 LAB
ALBUMIN SERPL BCP-MCNC: 2.8 G/DL (ref 3.5–5.2)
ALP SERPL-CCNC: 106 U/L (ref 55–135)
ALT SERPL W/O P-5'-P-CCNC: 21 U/L (ref 10–44)
ANION GAP SERPL CALC-SCNC: 10 MMOL/L (ref 8–16)
AST SERPL-CCNC: 30 U/L (ref 10–40)
BASOPHILS # BLD AUTO: 0.01 K/UL (ref 0–0.2)
BASOPHILS NFR BLD: 0.1 % (ref 0–1.9)
BILIRUB SERPL-MCNC: 0.3 MG/DL (ref 0.1–1)
BUN SERPL-MCNC: 18 MG/DL (ref 8–23)
CALCIUM SERPL-MCNC: 8.2 MG/DL (ref 8.7–10.5)
CHLORIDE SERPL-SCNC: 103 MMOL/L (ref 95–110)
CO2 SERPL-SCNC: 27 MMOL/L (ref 23–29)
CREAT SERPL-MCNC: 0.8 MG/DL (ref 0.5–1.4)
DIFFERENTIAL METHOD: ABNORMAL
EOSINOPHIL # BLD AUTO: 0.1 K/UL (ref 0–0.5)
EOSINOPHIL NFR BLD: 0.6 % (ref 0–8)
ERYTHROCYTE [DISTWIDTH] IN BLOOD BY AUTOMATED COUNT: 14.6 % (ref 11.5–14.5)
EST. GFR  (AFRICAN AMERICAN): >60 ML/MIN/1.73 M^2
EST. GFR  (NON AFRICAN AMERICAN): >60 ML/MIN/1.73 M^2
GLUCOSE SERPL-MCNC: 120 MG/DL (ref 70–110)
HCT VFR BLD AUTO: 25.8 % (ref 40–54)
HGB BLD-MCNC: 8.2 G/DL (ref 14–18)
IMM GRANULOCYTES # BLD AUTO: 0.06 K/UL (ref 0–0.04)
IMM GRANULOCYTES NFR BLD AUTO: 0.8 % (ref 0–0.5)
LYMPHOCYTES # BLD AUTO: 1.1 K/UL (ref 1–4.8)
LYMPHOCYTES NFR BLD: 14.5 % (ref 18–48)
MCH RBC QN AUTO: 27.6 PG (ref 27–31)
MCHC RBC AUTO-ENTMCNC: 31.8 G/DL (ref 32–36)
MCV RBC AUTO: 87 FL (ref 82–98)
MONOCYTES # BLD AUTO: 0.6 K/UL (ref 0.3–1)
MONOCYTES NFR BLD: 7.9 % (ref 4–15)
NEUTROPHILS # BLD AUTO: 6 K/UL (ref 1.8–7.7)
NEUTROPHILS NFR BLD: 76.1 % (ref 38–73)
NRBC BLD-RTO: 0 /100 WBC
PLATELET # BLD AUTO: 134 K/UL (ref 150–450)
PMV BLD AUTO: 11 FL (ref 9.2–12.9)
POTASSIUM SERPL-SCNC: 4.2 MMOL/L (ref 3.5–5.1)
PROT SERPL-MCNC: 6 G/DL (ref 6–8.4)
RBC # BLD AUTO: 2.97 M/UL (ref 4.6–6.2)
SODIUM SERPL-SCNC: 140 MMOL/L (ref 136–145)
WBC # BLD AUTO: 7.85 K/UL (ref 3.9–12.7)

## 2021-06-03 PROCEDURE — 97535 SELF CARE MNGMENT TRAINING: CPT

## 2021-06-03 PROCEDURE — 80053 COMPREHEN METABOLIC PANEL: CPT | Performed by: ORTHOPAEDIC SURGERY

## 2021-06-03 PROCEDURE — 25000003 PHARM REV CODE 250: Performed by: ORTHOPAEDIC SURGERY

## 2021-06-03 PROCEDURE — 85025 COMPLETE CBC W/AUTO DIFF WBC: CPT | Performed by: ORTHOPAEDIC SURGERY

## 2021-06-03 PROCEDURE — 36415 COLL VENOUS BLD VENIPUNCTURE: CPT | Performed by: ORTHOPAEDIC SURGERY

## 2021-06-03 PROCEDURE — 97165 OT EVAL LOW COMPLEX 30 MIN: CPT

## 2021-06-03 PROCEDURE — 96376 TX/PRO/DX INJ SAME DRUG ADON: CPT

## 2021-06-03 PROCEDURE — 94761 N-INVAS EAR/PLS OXIMETRY MLT: CPT

## 2021-06-03 PROCEDURE — 94760 N-INVAS EAR/PLS OXIMETRY 1: CPT

## 2021-06-03 PROCEDURE — 94799 UNLISTED PULMONARY SVC/PX: CPT

## 2021-06-03 PROCEDURE — 63600175 PHARM REV CODE 636 W HCPCS: Performed by: ORTHOPAEDIC SURGERY

## 2021-06-03 PROCEDURE — 97110 THERAPEUTIC EXERCISES: CPT

## 2021-06-03 PROCEDURE — 99281 EMR DPT VST MAYX REQ PHY/QHP: CPT | Mod: 25

## 2021-06-03 PROCEDURE — 97116 GAIT TRAINING THERAPY: CPT

## 2021-06-03 PROCEDURE — 12000002 HC ACUTE/MED SURGE SEMI-PRIVATE ROOM

## 2021-06-03 RX ORDER — OXYCODONE AND ACETAMINOPHEN 7.5; 325 MG/1; MG/1
1 TABLET ORAL EVERY 6 HOURS PRN
Qty: 28 TABLET | Refills: 0 | Status: SHIPPED | OUTPATIENT
Start: 2021-06-03 | End: 2021-06-25

## 2021-06-03 RX ORDER — IBUPROFEN 800 MG/1
800 TABLET ORAL EVERY 6 HOURS PRN
Qty: 60 TABLET | Refills: 0 | Status: SHIPPED | OUTPATIENT
Start: 2021-06-03 | End: 2021-06-28 | Stop reason: SDUPTHER

## 2021-06-03 RX ORDER — HYDROCODONE BITARTRATE AND ACETAMINOPHEN 7.5; 325 MG/1; MG/1
1 TABLET ORAL EVERY 6 HOURS PRN
Qty: 28 TABLET | Refills: 0 | Status: SHIPPED | OUTPATIENT
Start: 2021-06-03 | End: 2021-06-10

## 2021-06-03 RX ADMIN — ASPIRIN 81 MG CHEWABLE TABLET 81 MG: 81 TABLET CHEWABLE at 08:06

## 2021-06-03 RX ADMIN — DOCUSATE SODIUM 50 MG AND SENNOSIDES 8.6 MG 1 TABLET: 8.6; 5 TABLET, FILM COATED ORAL at 08:06

## 2021-06-03 RX ADMIN — POLYETHYLENE GLYCOL 3350 17 G: 17 POWDER, FOR SOLUTION ORAL at 10:06

## 2021-06-03 RX ADMIN — CLINDAMYCIN IN 5 PERCENT DEXTROSE 900 MG: 18 INJECTION, SOLUTION INTRAVENOUS at 02:06

## 2021-06-03 RX ADMIN — DULOXETINE HYDROCHLORIDE 30 MG: 30 CAPSULE, DELAYED RELEASE ORAL at 08:06

## 2021-06-03 RX ADMIN — MUPIROCIN 1 G: 20 OINTMENT TOPICAL at 10:06

## 2021-06-03 RX ADMIN — GABAPENTIN 300 MG: 300 CAPSULE ORAL at 08:06

## 2021-06-03 RX ADMIN — MORPHINE SULFATE 4 MG: 4 INJECTION, SOLUTION INTRAMUSCULAR; INTRAVENOUS at 07:06

## 2021-06-03 RX ADMIN — MORPHINE SULFATE 4 MG: 4 INJECTION, SOLUTION INTRAMUSCULAR; INTRAVENOUS at 02:06

## 2021-06-03 RX ADMIN — IBUPROFEN 800 MG: 400 TABLET, FILM COATED ORAL at 10:06

## 2021-06-03 RX ADMIN — OXYCODONE HYDROCHLORIDE AND ACETAMINOPHEN 1 TABLET: 5; 325 TABLET ORAL at 04:06

## 2021-06-04 ENCOUNTER — TELEPHONE (OUTPATIENT)
Dept: SPINE | Facility: CLINIC | Age: 61
End: 2021-06-04

## 2021-06-04 ENCOUNTER — TELEPHONE (OUTPATIENT)
Dept: MEDSURG UNIT | Facility: HOSPITAL | Age: 61
End: 2021-06-04

## 2021-06-04 LAB — MRSA SPEC QL CULT: NORMAL

## 2021-06-04 PROCEDURE — G0180 PR HOME HEALTH MD CERTIFICATION: ICD-10-PCS | Mod: ,,, | Performed by: ORTHOPAEDIC SURGERY

## 2021-06-04 PROCEDURE — G0180 MD CERTIFICATION HHA PATIENT: HCPCS | Mod: ,,, | Performed by: ORTHOPAEDIC SURGERY

## 2021-06-07 ENCOUNTER — PATIENT OUTREACH (OUTPATIENT)
Dept: ADMINISTRATIVE | Facility: CLINIC | Age: 61
End: 2021-06-07

## 2021-06-07 ENCOUNTER — TELEPHONE (OUTPATIENT)
Dept: PAIN MEDICINE | Facility: CLINIC | Age: 61
End: 2021-06-07

## 2021-06-07 ENCOUNTER — PATIENT OUTREACH (OUTPATIENT)
Dept: FAMILY MEDICINE | Facility: CLINIC | Age: 61
End: 2021-06-07

## 2021-06-07 ENCOUNTER — TELEPHONE (OUTPATIENT)
Dept: FAMILY MEDICINE | Facility: CLINIC | Age: 61
End: 2021-06-07

## 2021-06-08 ENCOUNTER — TELEPHONE (OUTPATIENT)
Dept: ORTHOPEDICS | Facility: CLINIC | Age: 61
End: 2021-06-08

## 2021-06-08 ENCOUNTER — HOSPITAL ENCOUNTER (OUTPATIENT)
Dept: RADIOLOGY | Facility: HOSPITAL | Age: 61
Discharge: HOME | End: 2021-06-08
Attending: ORTHOPAEDIC SURGERY
Payer: MEDICARE

## 2021-06-08 ENCOUNTER — OFFICE VISIT (OUTPATIENT)
Dept: ORTHOPEDICS | Facility: CLINIC | Age: 61
End: 2021-06-08
Payer: MEDICARE

## 2021-06-08 VITALS — HEIGHT: 71 IN | RESPIRATION RATE: 16 BRPM | BODY MASS INDEX: 30.8 KG/M2 | WEIGHT: 220 LBS

## 2021-06-08 DIAGNOSIS — Z96.611 S/P REVERSE TOTAL SHOULDER ARTHROPLASTY, RIGHT: ICD-10-CM

## 2021-06-08 DIAGNOSIS — Z96.642 HISTORY OF REVISION OF TOTAL REPLACEMENT OF LEFT HIP JOINT: Primary | ICD-10-CM

## 2021-06-08 PROCEDURE — 1125F AMNT PAIN NOTED PAIN PRSNT: CPT | Mod: S$GLB,,, | Performed by: ORTHOPAEDIC SURGERY

## 2021-06-08 PROCEDURE — 73502 XR HIP WITH PELVIS WHEN PERFORMED, 2 OR 3 VIEWS LEFT: ICD-10-PCS | Mod: 26,LT,, | Performed by: RADIOLOGY

## 2021-06-08 PROCEDURE — 3008F BODY MASS INDEX DOCD: CPT | Mod: CPTII,S$GLB,, | Performed by: ORTHOPAEDIC SURGERY

## 2021-06-08 PROCEDURE — 3008F PR BODY MASS INDEX (BMI) DOCUMENTED: ICD-10-PCS | Mod: CPTII,S$GLB,, | Performed by: ORTHOPAEDIC SURGERY

## 2021-06-08 PROCEDURE — 99024 PR POST-OP FOLLOW-UP VISIT: ICD-10-PCS | Mod: S$GLB,,, | Performed by: ORTHOPAEDIC SURGERY

## 2021-06-08 PROCEDURE — 1125F PR PAIN SEVERITY QUANTIFIED, PAIN PRESENT: ICD-10-PCS | Mod: S$GLB,,, | Performed by: ORTHOPAEDIC SURGERY

## 2021-06-08 PROCEDURE — 73502 X-RAY EXAM HIP UNI 2-3 VIEWS: CPT | Mod: 26,LT,, | Performed by: RADIOLOGY

## 2021-06-08 PROCEDURE — 99999 PR PBB SHADOW E&M-EST. PATIENT-LVL IV: ICD-10-PCS | Mod: PBBFAC,,, | Performed by: ORTHOPAEDIC SURGERY

## 2021-06-08 PROCEDURE — 73502 X-RAY EXAM HIP UNI 2-3 VIEWS: CPT | Mod: TC,PN,LT

## 2021-06-08 PROCEDURE — 99999 PR PBB SHADOW E&M-EST. PATIENT-LVL IV: CPT | Mod: PBBFAC,,, | Performed by: ORTHOPAEDIC SURGERY

## 2021-06-08 PROCEDURE — 99024 POSTOP FOLLOW-UP VISIT: CPT | Mod: S$GLB,,, | Performed by: ORTHOPAEDIC SURGERY

## 2021-06-08 RX ORDER — HYDROCODONE BITARTRATE AND ACETAMINOPHEN 7.5; 325 MG/1; MG/1
1 TABLET ORAL EVERY 6 HOURS PRN
Qty: 28 TABLET | Refills: 0 | Status: SHIPPED | OUTPATIENT
Start: 2021-06-08 | End: 2021-06-14

## 2021-06-09 VITALS
HEIGHT: 70 IN | OXYGEN SATURATION: 95 % | RESPIRATION RATE: 18 BRPM | DIASTOLIC BLOOD PRESSURE: 92 MMHG | TEMPERATURE: 98 F | SYSTOLIC BLOOD PRESSURE: 155 MMHG | HEART RATE: 108 BPM | WEIGHT: 225.75 LBS | BODY MASS INDEX: 32.32 KG/M2

## 2021-06-10 ENCOUNTER — TELEPHONE (OUTPATIENT)
Dept: FAMILY MEDICINE | Facility: CLINIC | Age: 61
End: 2021-06-10

## 2021-06-14 ENCOUNTER — OFFICE VISIT (OUTPATIENT)
Dept: ORTHOPEDICS | Facility: CLINIC | Age: 61
End: 2021-06-14
Payer: MEDICARE

## 2021-06-14 ENCOUNTER — TELEPHONE (OUTPATIENT)
Dept: ORTHOPEDICS | Facility: CLINIC | Age: 61
End: 2021-06-14

## 2021-06-14 VITALS — RESPIRATION RATE: 16 BRPM | WEIGHT: 220 LBS | BODY MASS INDEX: 30.8 KG/M2 | HEIGHT: 71 IN

## 2021-06-14 DIAGNOSIS — T81.31XA DEHISCENCE OF INCISION, INITIAL ENCOUNTER: Primary | ICD-10-CM

## 2021-06-14 PROCEDURE — 3008F BODY MASS INDEX DOCD: CPT | Mod: CPTII,S$GLB,, | Performed by: ORTHOPAEDIC SURGERY

## 2021-06-14 PROCEDURE — 1125F PR PAIN SEVERITY QUANTIFIED, PAIN PRESENT: ICD-10-PCS | Mod: S$GLB,,, | Performed by: ORTHOPAEDIC SURGERY

## 2021-06-14 PROCEDURE — 99024 PR POST-OP FOLLOW-UP VISIT: ICD-10-PCS | Mod: S$GLB,,, | Performed by: ORTHOPAEDIC SURGERY

## 2021-06-14 PROCEDURE — 99999 PR PBB SHADOW E&M-EST. PATIENT-LVL III: CPT | Mod: PBBFAC,,, | Performed by: ORTHOPAEDIC SURGERY

## 2021-06-14 PROCEDURE — 99999 PR PBB SHADOW E&M-EST. PATIENT-LVL III: ICD-10-PCS | Mod: PBBFAC,,, | Performed by: ORTHOPAEDIC SURGERY

## 2021-06-14 PROCEDURE — 1125F AMNT PAIN NOTED PAIN PRSNT: CPT | Mod: S$GLB,,, | Performed by: ORTHOPAEDIC SURGERY

## 2021-06-14 PROCEDURE — 99024 POSTOP FOLLOW-UP VISIT: CPT | Mod: S$GLB,,, | Performed by: ORTHOPAEDIC SURGERY

## 2021-06-14 PROCEDURE — 3008F PR BODY MASS INDEX (BMI) DOCUMENTED: ICD-10-PCS | Mod: CPTII,S$GLB,, | Performed by: ORTHOPAEDIC SURGERY

## 2021-06-14 RX ORDER — CLINDAMYCIN HYDROCHLORIDE 300 MG/1
300 CAPSULE ORAL EVERY 6 HOURS
Qty: 40 CAPSULE | Refills: 0 | Status: SHIPPED | OUTPATIENT
Start: 2021-06-14 | End: 2021-06-17

## 2021-06-14 RX ORDER — MUPIROCIN 20 MG/G
OINTMENT TOPICAL
Status: CANCELLED | OUTPATIENT
Start: 2021-06-14

## 2021-06-14 RX ORDER — MIRTAZAPINE 30 MG/1
30 TABLET, ORALLY DISINTEGRATING ORAL NIGHTLY
COMMUNITY
End: 2021-08-05

## 2021-06-14 RX ORDER — CLINDAMYCIN PHOSPHATE 900 MG/50ML
900 INJECTION, SOLUTION INTRAVENOUS
Status: CANCELLED | OUTPATIENT
Start: 2021-06-14

## 2021-06-15 ENCOUNTER — ANESTHESIA EVENT (OUTPATIENT)
Dept: SURGERY | Facility: HOSPITAL | Age: 61
End: 2021-06-15
Payer: MEDICARE

## 2021-06-15 ENCOUNTER — TELEPHONE (OUTPATIENT)
Dept: FAMILY MEDICINE | Facility: CLINIC | Age: 61
End: 2021-06-15

## 2021-06-16 ENCOUNTER — EXTERNAL HOME HEALTH (OUTPATIENT)
Dept: HOME HEALTH SERVICES | Facility: HOSPITAL | Age: 61
End: 2021-06-16
Payer: MEDICARE

## 2021-06-16 ENCOUNTER — ANESTHESIA (OUTPATIENT)
Dept: SURGERY | Facility: HOSPITAL | Age: 61
End: 2021-06-16
Payer: MEDICARE

## 2021-06-16 ENCOUNTER — HOSPITAL ENCOUNTER (OUTPATIENT)
Facility: HOSPITAL | Age: 61
Discharge: HOME | End: 2021-06-16
Attending: ORTHOPAEDIC SURGERY | Admitting: ORTHOPAEDIC SURGERY
Payer: MEDICARE

## 2021-06-16 DIAGNOSIS — T81.31XA DEHISCENCE OF INCISION, INITIAL ENCOUNTER: Primary | ICD-10-CM

## 2021-06-16 LAB
BASOPHILS # BLD AUTO: 0.05 K/UL (ref 0–0.2)
BASOPHILS # BLD AUTO: 0.05 K/UL (ref 0–0.2)
BASOPHILS NFR BLD: 0.9 % (ref 0–1.9)
BASOPHILS NFR BLD: 0.9 % (ref 0–1.9)
DIFFERENTIAL METHOD: ABNORMAL
DIFFERENTIAL METHOD: ABNORMAL
EOSINOPHIL # BLD AUTO: 0.3 K/UL (ref 0–0.5)
EOSINOPHIL # BLD AUTO: 0.3 K/UL (ref 0–0.5)
EOSINOPHIL NFR BLD: 5.1 % (ref 0–8)
EOSINOPHIL NFR BLD: 5.1 % (ref 0–8)
ERYTHROCYTE [DISTWIDTH] IN BLOOD BY AUTOMATED COUNT: 15.2 % (ref 11.5–14.5)
ERYTHROCYTE [DISTWIDTH] IN BLOOD BY AUTOMATED COUNT: 15.2 % (ref 11.5–14.5)
GRAM STN SPEC: NORMAL
GRAM STN SPEC: NORMAL
HCT VFR BLD AUTO: 29.3 % (ref 40–54)
HCT VFR BLD AUTO: 29.3 % (ref 40–54)
HGB BLD-MCNC: 9.2 G/DL (ref 14–18)
HGB BLD-MCNC: 9.2 G/DL (ref 14–18)
IMM GRANULOCYTES # BLD AUTO: 0.05 K/UL (ref 0–0.04)
IMM GRANULOCYTES # BLD AUTO: 0.05 K/UL (ref 0–0.04)
IMM GRANULOCYTES NFR BLD AUTO: 0.9 % (ref 0–0.5)
IMM GRANULOCYTES NFR BLD AUTO: 0.9 % (ref 0–0.5)
LYMPHOCYTES # BLD AUTO: 1.2 K/UL (ref 1–4.8)
LYMPHOCYTES # BLD AUTO: 1.2 K/UL (ref 1–4.8)
LYMPHOCYTES NFR BLD: 21.5 % (ref 18–48)
LYMPHOCYTES NFR BLD: 21.5 % (ref 18–48)
MCH RBC QN AUTO: 27.1 PG (ref 27–31)
MCH RBC QN AUTO: 27.1 PG (ref 27–31)
MCHC RBC AUTO-ENTMCNC: 31.4 G/DL (ref 32–36)
MCHC RBC AUTO-ENTMCNC: 31.4 G/DL (ref 32–36)
MCV RBC AUTO: 86 FL (ref 82–98)
MCV RBC AUTO: 86 FL (ref 82–98)
MONOCYTES # BLD AUTO: 0.4 K/UL (ref 0.3–1)
MONOCYTES # BLD AUTO: 0.4 K/UL (ref 0.3–1)
MONOCYTES NFR BLD: 7.8 % (ref 4–15)
MONOCYTES NFR BLD: 7.8 % (ref 4–15)
NEUTROPHILS # BLD AUTO: 3.6 K/UL (ref 1.8–7.7)
NEUTROPHILS # BLD AUTO: 3.6 K/UL (ref 1.8–7.7)
NEUTROPHILS NFR BLD: 63.8 % (ref 38–73)
NEUTROPHILS NFR BLD: 63.8 % (ref 38–73)
NRBC BLD-RTO: 0 /100 WBC
NRBC BLD-RTO: 0 /100 WBC
PLATELET # BLD AUTO: 248 K/UL (ref 150–450)
PLATELET # BLD AUTO: 248 K/UL (ref 150–450)
PMV BLD AUTO: 9.6 FL (ref 9.2–12.9)
PMV BLD AUTO: 9.6 FL (ref 9.2–12.9)
RBC # BLD AUTO: 3.4 M/UL (ref 4.6–6.2)
RBC # BLD AUTO: 3.4 M/UL (ref 4.6–6.2)
SARS-COV-2 RDRP RESP QL NAA+PROBE: NEGATIVE
WBC # BLD AUTO: 5.67 K/UL (ref 3.9–12.7)
WBC # BLD AUTO: 5.67 K/UL (ref 3.9–12.7)

## 2021-06-16 PROCEDURE — 37000008 HC ANESTHESIA 1ST 15 MINUTES: Performed by: ORTHOPAEDIC SURGERY

## 2021-06-16 PROCEDURE — 11042 DBRDMT SUBQ TIS 1ST 20SQCM/<: CPT | Mod: 78,,, | Performed by: ORTHOPAEDIC SURGERY

## 2021-06-16 PROCEDURE — 71000016 HC POSTOP RECOV ADDL HR: Performed by: ORTHOPAEDIC SURGERY

## 2021-06-16 PROCEDURE — D9220A PRA ANESTHESIA: Mod: CRNA,,, | Performed by: NURSE ANESTHETIST, CERTIFIED REGISTERED

## 2021-06-16 PROCEDURE — 25000003 PHARM REV CODE 250: Performed by: ANESTHESIOLOGY

## 2021-06-16 PROCEDURE — 37000009 HC ANESTHESIA EA ADD 15 MINS: Performed by: ORTHOPAEDIC SURGERY

## 2021-06-16 PROCEDURE — 63600175 PHARM REV CODE 636 W HCPCS: Performed by: NURSE ANESTHETIST, CERTIFIED REGISTERED

## 2021-06-16 PROCEDURE — 36415 COLL VENOUS BLD VENIPUNCTURE: CPT | Performed by: ORTHOPAEDIC SURGERY

## 2021-06-16 PROCEDURE — 71000015 HC POSTOP RECOV 1ST HR: Performed by: ORTHOPAEDIC SURGERY

## 2021-06-16 PROCEDURE — 25000003 PHARM REV CODE 250: Performed by: ORTHOPAEDIC SURGERY

## 2021-06-16 PROCEDURE — U0002 COVID-19 LAB TEST NON-CDC: HCPCS | Performed by: ORTHOPAEDIC SURGERY

## 2021-06-16 PROCEDURE — 71000033 HC RECOVERY, INTIAL HOUR: Performed by: ORTHOPAEDIC SURGERY

## 2021-06-16 PROCEDURE — 36000705 HC OR TIME LEV I EA ADD 15 MIN: Performed by: ORTHOPAEDIC SURGERY

## 2021-06-16 PROCEDURE — 85025 COMPLETE CBC W/AUTO DIFF WBC: CPT | Performed by: ORTHOPAEDIC SURGERY

## 2021-06-16 PROCEDURE — 71000039 HC RECOVERY, EACH ADD'L HOUR: Performed by: ORTHOPAEDIC SURGERY

## 2021-06-16 PROCEDURE — D9220A PRA ANESTHESIA: Mod: ANES,,, | Performed by: ANESTHESIOLOGY

## 2021-06-16 PROCEDURE — D9220A PRA ANESTHESIA: ICD-10-PCS | Mod: CRNA,,, | Performed by: NURSE ANESTHETIST, CERTIFIED REGISTERED

## 2021-06-16 PROCEDURE — D9220A PRA ANESTHESIA: ICD-10-PCS | Mod: ANES,,, | Performed by: ANESTHESIOLOGY

## 2021-06-16 PROCEDURE — 99900104 DSU ONLY-NO CHARGE-EA ADD'L HR (STAT): Performed by: ORTHOPAEDIC SURGERY

## 2021-06-16 PROCEDURE — 99900103 DSU ONLY-NO CHARGE-INITIAL HR (STAT)

## 2021-06-16 PROCEDURE — 87205 SMEAR GRAM STAIN: CPT | Performed by: ORTHOPAEDIC SURGERY

## 2021-06-16 PROCEDURE — 11042 PR DEBRIDEMENT, SKIN, SUB-Q TISSUE,=<20 SQ CM: ICD-10-PCS | Mod: 78,,, | Performed by: ORTHOPAEDIC SURGERY

## 2021-06-16 PROCEDURE — 25000003 PHARM REV CODE 250: Performed by: NURSE ANESTHETIST, CERTIFIED REGISTERED

## 2021-06-16 PROCEDURE — 99900103 DSU ONLY-NO CHARGE-INITIAL HR (STAT): Performed by: ORTHOPAEDIC SURGERY

## 2021-06-16 PROCEDURE — 63600175 PHARM REV CODE 636 W HCPCS: Performed by: ANESTHESIOLOGY

## 2021-06-16 PROCEDURE — 87075 CULTR BACTERIA EXCEPT BLOOD: CPT | Performed by: ORTHOPAEDIC SURGERY

## 2021-06-16 PROCEDURE — 36000704 HC OR TIME LEV I 1ST 15 MIN: Performed by: ORTHOPAEDIC SURGERY

## 2021-06-16 PROCEDURE — 87070 CULTURE OTHR SPECIMN AEROBIC: CPT | Performed by: ORTHOPAEDIC SURGERY

## 2021-06-16 RX ORDER — FENTANYL CITRATE 50 UG/ML
25 INJECTION, SOLUTION INTRAMUSCULAR; INTRAVENOUS EVERY 5 MIN PRN
Status: COMPLETED | OUTPATIENT
Start: 2021-06-16 | End: 2021-06-16

## 2021-06-16 RX ORDER — HYDROMORPHONE HYDROCHLORIDE 2 MG/ML
0.2 INJECTION, SOLUTION INTRAMUSCULAR; INTRAVENOUS; SUBCUTANEOUS EVERY 5 MIN PRN
Status: DISCONTINUED | OUTPATIENT
Start: 2021-06-16 | End: 2024-03-15

## 2021-06-16 RX ORDER — OXYCODONE HYDROCHLORIDE 5 MG/1
5 TABLET ORAL ONCE
Status: COMPLETED | OUTPATIENT
Start: 2021-06-16 | End: 2021-06-16

## 2021-06-16 RX ORDER — LIDOCAINE HYDROCHLORIDE 20 MG/ML
JELLY TOPICAL
Status: DISCONTINUED | OUTPATIENT
Start: 2021-06-16 | End: 2021-06-16

## 2021-06-16 RX ORDER — DEXAMETHASONE SODIUM PHOSPHATE 4 MG/ML
INJECTION, SOLUTION INTRA-ARTICULAR; INTRALESIONAL; INTRAMUSCULAR; INTRAVENOUS; SOFT TISSUE
Status: DISCONTINUED | OUTPATIENT
Start: 2021-06-16 | End: 2021-06-16

## 2021-06-16 RX ORDER — MIDAZOLAM HYDROCHLORIDE 1 MG/ML
INJECTION INTRAMUSCULAR; INTRAVENOUS
Status: DISCONTINUED | OUTPATIENT
Start: 2021-06-16 | End: 2021-06-16

## 2021-06-16 RX ORDER — PHENYLEPHRINE HYDROCHLORIDE 10 MG/ML
INJECTION INTRAVENOUS
Status: DISCONTINUED | OUTPATIENT
Start: 2021-06-16 | End: 2021-06-16

## 2021-06-16 RX ORDER — FENTANYL CITRATE 50 UG/ML
INJECTION, SOLUTION INTRAMUSCULAR; INTRAVENOUS
Status: DISCONTINUED | OUTPATIENT
Start: 2021-06-16 | End: 2021-06-16

## 2021-06-16 RX ORDER — LIDOCAINE HCL/PF 100 MG/5ML
SYRINGE (ML) INTRAVENOUS
Status: DISCONTINUED | OUTPATIENT
Start: 2021-06-16 | End: 2021-06-16

## 2021-06-16 RX ORDER — HYDROCODONE BITARTRATE AND ACETAMINOPHEN 7.5; 325 MG/1; MG/1
1 TABLET ORAL EVERY 6 HOURS PRN
Qty: 28 TABLET | Refills: 0 | Status: SHIPPED | OUTPATIENT
Start: 2021-06-16 | End: 2021-06-17

## 2021-06-16 RX ORDER — PROPOFOL 10 MG/ML
VIAL (ML) INTRAVENOUS
Status: DISCONTINUED | OUTPATIENT
Start: 2021-06-16 | End: 2021-06-16

## 2021-06-16 RX ORDER — ONDANSETRON HYDROCHLORIDE 2 MG/ML
INJECTION, SOLUTION INTRAMUSCULAR; INTRAVENOUS
Status: DISCONTINUED | OUTPATIENT
Start: 2021-06-16 | End: 2021-06-16

## 2021-06-16 RX ORDER — MUPIROCIN 20 MG/G
OINTMENT TOPICAL
Status: DISCONTINUED | OUTPATIENT
Start: 2021-06-16 | End: 2021-06-16 | Stop reason: HOSPADM

## 2021-06-16 RX ORDER — CLINDAMYCIN PHOSPHATE 900 MG/50ML
900 INJECTION, SOLUTION INTRAVENOUS
Status: COMPLETED | OUTPATIENT
Start: 2021-06-16 | End: 2021-06-16

## 2021-06-16 RX ORDER — KETAMINE HYDROCHLORIDE 10 MG/ML
INJECTION, SOLUTION INTRAMUSCULAR; INTRAVENOUS
Status: DISCONTINUED | OUTPATIENT
Start: 2021-06-16 | End: 2021-06-16

## 2021-06-16 RX ORDER — SULFAMETHOXAZOLE AND TRIMETHOPRIM 800; 160 MG/1; MG/1
1 TABLET ORAL 2 TIMES DAILY
Qty: 28 TABLET | Refills: 1 | Status: SHIPPED | OUTPATIENT
Start: 2021-06-16 | End: 2021-06-25 | Stop reason: SDUPTHER

## 2021-06-16 RX ORDER — SUCCINYLCHOLINE CHLORIDE 20 MG/ML
INJECTION INTRAMUSCULAR; INTRAVENOUS
Status: DISCONTINUED | OUTPATIENT
Start: 2021-06-16 | End: 2021-06-16

## 2021-06-16 RX ORDER — OXYCODONE HYDROCHLORIDE 5 MG/1
5 TABLET ORAL
Status: DISCONTINUED | OUTPATIENT
Start: 2021-06-16 | End: 2024-03-15

## 2021-06-16 RX ORDER — KETOROLAC TROMETHAMINE 30 MG/ML
INJECTION, SOLUTION INTRAMUSCULAR; INTRAVENOUS
Status: DISCONTINUED | OUTPATIENT
Start: 2021-06-16 | End: 2021-06-16

## 2021-06-16 RX ORDER — ROCURONIUM BROMIDE 10 MG/ML
INJECTION, SOLUTION INTRAVENOUS
Status: DISCONTINUED | OUTPATIENT
Start: 2021-06-16 | End: 2021-06-16

## 2021-06-16 RX ORDER — LIDOCAINE HYDROCHLORIDE 10 MG/ML
1 INJECTION, SOLUTION EPIDURAL; INFILTRATION; INTRACAUDAL; PERINEURAL ONCE
Status: DISCONTINUED | OUTPATIENT
Start: 2021-06-16 | End: 2024-03-15

## 2021-06-16 RX ADMIN — SUCCINYLCHOLINE CHLORIDE 120 MG: 20 INJECTION, SOLUTION INTRAMUSCULAR; INTRAVENOUS; PARENTERAL at 11:06

## 2021-06-16 RX ADMIN — HYDROMORPHONE HYDROCHLORIDE 0.2 MG: 2 INJECTION, SOLUTION INTRAMUSCULAR; INTRAVENOUS; SUBCUTANEOUS at 01:06

## 2021-06-16 RX ADMIN — PHENYLEPHRINE HYDROCHLORIDE 100 MCG: 10 INJECTION INTRAVENOUS at 11:06

## 2021-06-16 RX ADMIN — PHENYLEPHRINE HYDROCHLORIDE 200 MCG: 10 INJECTION INTRAVENOUS at 11:06

## 2021-06-16 RX ADMIN — CLINDAMYCIN PHOSPHATE 900 MG: 18 INJECTION, SOLUTION INTRAVENOUS at 11:06

## 2021-06-16 RX ADMIN — FENTANYL CITRATE 25 MCG: 50 INJECTION, SOLUTION INTRAMUSCULAR; INTRAVENOUS at 12:06

## 2021-06-16 RX ADMIN — HYDROMORPHONE HYDROCHLORIDE 0.2 MG: 2 INJECTION, SOLUTION INTRAMUSCULAR; INTRAVENOUS; SUBCUTANEOUS at 12:06

## 2021-06-16 RX ADMIN — ROCURONIUM BROMIDE 10 MG: 10 INJECTION, SOLUTION INTRAVENOUS at 11:06

## 2021-06-16 RX ADMIN — OXYCODONE 5 MG: 5 TABLET ORAL at 12:06

## 2021-06-16 RX ADMIN — MIDAZOLAM HYDROCHLORIDE 2 MG: 1 INJECTION, SOLUTION INTRAMUSCULAR; INTRAVENOUS at 11:06

## 2021-06-16 RX ADMIN — KETAMINE HYDROCHLORIDE 50 MG: 10 INJECTION, SOLUTION INTRAMUSCULAR; INTRAVENOUS at 11:06

## 2021-06-16 RX ADMIN — FENTANYL CITRATE 100 MCG: 50 INJECTION, SOLUTION INTRAMUSCULAR; INTRAVENOUS at 11:06

## 2021-06-16 RX ADMIN — MUPIROCIN: 20 OINTMENT TOPICAL at 09:06

## 2021-06-16 RX ADMIN — SODIUM CHLORIDE, SODIUM GLUCONATE, SODIUM ACETATE, POTASSIUM CHLORIDE, MAGNESIUM CHLORIDE, SODIUM PHOSPHATE, DIBASIC, AND POTASSIUM PHOSPHATE: .53; .5; .37; .037; .03; .012; .00082 INJECTION, SOLUTION INTRAVENOUS at 11:06

## 2021-06-16 RX ADMIN — OXYCODONE HYDROCHLORIDE 5 MG: 5 TABLET ORAL at 02:06

## 2021-06-16 RX ADMIN — DEXAMETHASONE SODIUM PHOSPHATE 4 MG: 4 INJECTION, SOLUTION INTRA-ARTICULAR; INTRALESIONAL; INTRAMUSCULAR; INTRAVENOUS; SOFT TISSUE at 11:06

## 2021-06-16 RX ADMIN — ONDANSETRON 4 MG: 2 INJECTION, SOLUTION INTRAMUSCULAR; INTRAVENOUS at 11:06

## 2021-06-16 RX ADMIN — LIDOCAINE HYDROCHLORIDE 2 ML: 20 JELLY TOPICAL at 11:06

## 2021-06-16 RX ADMIN — KETOROLAC TROMETHAMINE 30 MG: 30 INJECTION, SOLUTION INTRAMUSCULAR; INTRAVENOUS at 12:06

## 2021-06-16 RX ADMIN — LIDOCAINE HYDROCHLORIDE 75 MG: 20 INJECTION, SOLUTION INTRAVENOUS at 11:06

## 2021-06-16 RX ADMIN — SODIUM CHLORIDE, SODIUM GLUCONATE, SODIUM ACETATE, POTASSIUM CHLORIDE, MAGNESIUM CHLORIDE, SODIUM PHOSPHATE, DIBASIC, AND POTASSIUM PHOSPHATE: .53; .5; .37; .037; .03; .012; .00082 INJECTION, SOLUTION INTRAVENOUS at 12:06

## 2021-06-16 RX ADMIN — PROPOFOL 140 MG: 10 INJECTION, EMULSION INTRAVENOUS at 11:06

## 2021-06-17 ENCOUNTER — OFFICE VISIT (OUTPATIENT)
Dept: FAMILY MEDICINE | Facility: CLINIC | Age: 61
End: 2021-06-17
Payer: MEDICARE

## 2021-06-17 ENCOUNTER — TELEPHONE (OUTPATIENT)
Dept: FAMILY MEDICINE | Facility: CLINIC | Age: 61
End: 2021-06-17

## 2021-06-17 ENCOUNTER — TELEPHONE (OUTPATIENT)
Dept: ORTHOPEDICS | Facility: CLINIC | Age: 61
End: 2021-06-17

## 2021-06-17 VITALS
HEART RATE: 84 BPM | HEIGHT: 71 IN | RESPIRATION RATE: 18 BRPM | TEMPERATURE: 98 F | HEART RATE: 90 BPM | BODY MASS INDEX: 30.8 KG/M2 | SYSTOLIC BLOOD PRESSURE: 136 MMHG | OXYGEN SATURATION: 97 % | BODY MASS INDEX: 30.38 KG/M2 | HEIGHT: 71 IN | WEIGHT: 220 LBS | DIASTOLIC BLOOD PRESSURE: 72 MMHG | DIASTOLIC BLOOD PRESSURE: 85 MMHG | WEIGHT: 217 LBS | SYSTOLIC BLOOD PRESSURE: 160 MMHG

## 2021-06-17 DIAGNOSIS — Z96.642 HISTORY OF REVISION OF TOTAL REPLACEMENT OF LEFT HIP JOINT: ICD-10-CM

## 2021-06-17 DIAGNOSIS — T81.31XA DEHISCENCE OF INCISION, INITIAL ENCOUNTER: Primary | ICD-10-CM

## 2021-06-17 DIAGNOSIS — T81.31XS DEHISCENCE OF OPERATIVE WOUND, SEQUELA: ICD-10-CM

## 2021-06-17 DIAGNOSIS — M62.838 MUSCLE SPASM OF BOTH LOWER LEGS: Primary | ICD-10-CM

## 2021-06-17 DIAGNOSIS — Z96.642 H/O TOTAL HIP ARTHROPLASTY, LEFT: ICD-10-CM

## 2021-06-17 DIAGNOSIS — Z09 HOSPITAL DISCHARGE FOLLOW-UP: ICD-10-CM

## 2021-06-17 PROCEDURE — 3008F BODY MASS INDEX DOCD: CPT | Mod: S$GLB,,, | Performed by: NURSE PRACTITIONER

## 2021-06-17 PROCEDURE — 99495 TCM SERVICES (MODERATE COMPLEXITY): ICD-10-PCS | Mod: S$GLB,,, | Performed by: NURSE PRACTITIONER

## 2021-06-17 PROCEDURE — 99495 TRANSJ CARE MGMT MOD F2F 14D: CPT | Mod: S$GLB,,, | Performed by: NURSE PRACTITIONER

## 2021-06-17 PROCEDURE — 3008F PR BODY MASS INDEX (BMI) DOCUMENTED: ICD-10-PCS | Mod: S$GLB,,, | Performed by: NURSE PRACTITIONER

## 2021-06-17 RX ORDER — HYDROCODONE BITARTRATE AND ACETAMINOPHEN 7.5; 325 MG/1; MG/1
1 TABLET ORAL EVERY 6 HOURS PRN
Qty: 28 TABLET | Refills: 0 | Status: CANCELLED | OUTPATIENT
Start: 2021-06-17 | End: 2021-06-24

## 2021-06-17 RX ORDER — METHOCARBAMOL 750 MG/1
750 TABLET, FILM COATED ORAL 4 TIMES DAILY
Qty: 40 TABLET | Refills: 1 | Status: SHIPPED | OUTPATIENT
Start: 2021-06-17 | End: 2021-06-27

## 2021-06-17 RX ORDER — HYDROCODONE BITARTRATE AND ACETAMINOPHEN 7.5; 325 MG/1; MG/1
1 TABLET ORAL EVERY 6 HOURS PRN
Qty: 28 TABLET | Refills: 0 | Status: SHIPPED | OUTPATIENT
Start: 2021-06-17 | End: 2021-06-17 | Stop reason: SDUPTHER

## 2021-06-17 RX ORDER — HYDROCODONE BITARTRATE AND ACETAMINOPHEN 7.5; 325 MG/1; MG/1
1 TABLET ORAL EVERY 6 HOURS PRN
Qty: 28 TABLET | Refills: 0 | Status: SHIPPED | OUTPATIENT
Start: 2021-06-17 | End: 2021-06-18 | Stop reason: SDUPTHER

## 2021-06-18 ENCOUNTER — TELEPHONE (OUTPATIENT)
Dept: PAIN MEDICINE | Facility: CLINIC | Age: 61
End: 2021-06-18

## 2021-06-18 ENCOUNTER — TELEPHONE (OUTPATIENT)
Dept: ORTHOPEDICS | Facility: CLINIC | Age: 61
End: 2021-06-18

## 2021-06-18 DIAGNOSIS — Z96.642 HISTORY OF REVISION OF TOTAL REPLACEMENT OF LEFT HIP JOINT: ICD-10-CM

## 2021-06-18 DIAGNOSIS — T81.31XA DEHISCENCE OF INCISION, INITIAL ENCOUNTER: Primary | ICD-10-CM

## 2021-06-18 RX ORDER — HYDROCODONE BITARTRATE AND ACETAMINOPHEN 7.5; 325 MG/1; MG/1
1 TABLET ORAL EVERY 6 HOURS PRN
Qty: 28 TABLET | Refills: 0 | Status: SHIPPED | OUTPATIENT
Start: 2021-06-18 | End: 2021-06-25

## 2021-06-19 LAB — BACTERIA SPEC AEROBE CULT: NO GROWTH

## 2021-06-21 ENCOUNTER — OFFICE VISIT (OUTPATIENT)
Dept: ORTHOPEDICS | Facility: CLINIC | Age: 61
End: 2021-06-21
Payer: MEDICARE

## 2021-06-21 VITALS — HEIGHT: 71 IN | WEIGHT: 217 LBS | BODY MASS INDEX: 30.38 KG/M2 | RESPIRATION RATE: 18 BRPM

## 2021-06-21 DIAGNOSIS — T81.31XD DEHISCENCE OF INCISION, SUBSEQUENT ENCOUNTER: Primary | ICD-10-CM

## 2021-06-21 PROCEDURE — 1125F AMNT PAIN NOTED PAIN PRSNT: CPT | Mod: S$GLB,,, | Performed by: ORTHOPAEDIC SURGERY

## 2021-06-21 PROCEDURE — 99999 PR PBB SHADOW E&M-EST. PATIENT-LVL III: ICD-10-PCS | Mod: PBBFAC,,, | Performed by: ORTHOPAEDIC SURGERY

## 2021-06-21 PROCEDURE — 1125F PR PAIN SEVERITY QUANTIFIED, PAIN PRESENT: ICD-10-PCS | Mod: S$GLB,,, | Performed by: ORTHOPAEDIC SURGERY

## 2021-06-21 PROCEDURE — 3008F PR BODY MASS INDEX (BMI) DOCUMENTED: ICD-10-PCS | Mod: CPTII,S$GLB,, | Performed by: ORTHOPAEDIC SURGERY

## 2021-06-21 PROCEDURE — 99999 PR PBB SHADOW E&M-EST. PATIENT-LVL III: CPT | Mod: PBBFAC,,, | Performed by: ORTHOPAEDIC SURGERY

## 2021-06-21 PROCEDURE — 99024 PR POST-OP FOLLOW-UP VISIT: ICD-10-PCS | Mod: S$GLB,,, | Performed by: ORTHOPAEDIC SURGERY

## 2021-06-21 PROCEDURE — 3008F BODY MASS INDEX DOCD: CPT | Mod: CPTII,S$GLB,, | Performed by: ORTHOPAEDIC SURGERY

## 2021-06-21 PROCEDURE — 99024 POSTOP FOLLOW-UP VISIT: CPT | Mod: S$GLB,,, | Performed by: ORTHOPAEDIC SURGERY

## 2021-06-23 ENCOUNTER — DOCUMENT SCAN (OUTPATIENT)
Dept: HOME HEALTH SERVICES | Facility: HOSPITAL | Age: 61
End: 2021-06-23
Payer: MEDICARE

## 2021-06-23 LAB — BACTERIA SPEC ANAEROBE CULT: NORMAL

## 2021-06-24 ENCOUNTER — TELEPHONE (OUTPATIENT)
Dept: PAIN MEDICINE | Facility: CLINIC | Age: 61
End: 2021-06-24

## 2021-06-24 ENCOUNTER — TELEPHONE (OUTPATIENT)
Dept: ORTHOPEDICS | Facility: CLINIC | Age: 61
End: 2021-06-24

## 2021-06-24 ENCOUNTER — DOCUMENT SCAN (OUTPATIENT)
Dept: HOME HEALTH SERVICES | Facility: HOSPITAL | Age: 61
End: 2021-06-24
Payer: MEDICARE

## 2021-06-25 ENCOUNTER — OFFICE VISIT (OUTPATIENT)
Dept: PAIN MEDICINE | Facility: CLINIC | Age: 61
End: 2021-06-25
Payer: MEDICARE

## 2021-06-25 VITALS
HEART RATE: 120 BPM | BODY MASS INDEX: 30.38 KG/M2 | HEIGHT: 71 IN | SYSTOLIC BLOOD PRESSURE: 127 MMHG | DIASTOLIC BLOOD PRESSURE: 70 MMHG | WEIGHT: 217 LBS

## 2021-06-25 DIAGNOSIS — M47.896 OTHER SPONDYLOSIS, LUMBAR REGION: ICD-10-CM

## 2021-06-25 DIAGNOSIS — Z79.891 CHRONIC USE OF OPIATE DRUG FOR THERAPEUTIC PURPOSE: Primary | ICD-10-CM

## 2021-06-25 DIAGNOSIS — M16.12 UNILATERAL PRIMARY OSTEOARTHRITIS, LEFT HIP: ICD-10-CM

## 2021-06-25 DIAGNOSIS — S73.035A: ICD-10-CM

## 2021-06-25 DIAGNOSIS — M19.011 OSTEOARTHRITIS OF RIGHT SHOULDER, UNSPECIFIED OSTEOARTHRITIS TYPE: ICD-10-CM

## 2021-06-25 DIAGNOSIS — T81.31XD DEHISCENCE OF INCISION, SUBSEQUENT ENCOUNTER: Primary | ICD-10-CM

## 2021-06-25 PROCEDURE — 99999 PR PBB SHADOW E&M-EST. PATIENT-LVL III: CPT | Mod: PBBFAC,,, | Performed by: PHYSICIAN ASSISTANT

## 2021-06-25 PROCEDURE — 99214 OFFICE O/P EST MOD 30 MIN: CPT | Mod: S$GLB,,, | Performed by: PHYSICIAN ASSISTANT

## 2021-06-25 PROCEDURE — 80307 DRUG TEST PRSMV CHEM ANLYZR: CPT | Performed by: PHYSICIAN ASSISTANT

## 2021-06-25 PROCEDURE — 1125F AMNT PAIN NOTED PAIN PRSNT: CPT | Mod: S$GLB,,, | Performed by: PHYSICIAN ASSISTANT

## 2021-06-25 PROCEDURE — 99999 PR PBB SHADOW E&M-EST. PATIENT-LVL III: ICD-10-PCS | Mod: PBBFAC,,, | Performed by: PHYSICIAN ASSISTANT

## 2021-06-25 PROCEDURE — 1111F DSCHRG MED/CURRENT MED MERGE: CPT | Mod: CPTII,S$GLB,, | Performed by: PHYSICIAN ASSISTANT

## 2021-06-25 PROCEDURE — 1111F PR DISCHARGE MEDS RECONCILED W/ CURRENT OUTPATIENT MED LIST: ICD-10-PCS | Mod: CPTII,S$GLB,, | Performed by: PHYSICIAN ASSISTANT

## 2021-06-25 PROCEDURE — 3008F BODY MASS INDEX DOCD: CPT | Mod: CPTII,S$GLB,, | Performed by: PHYSICIAN ASSISTANT

## 2021-06-25 PROCEDURE — 3008F PR BODY MASS INDEX (BMI) DOCUMENTED: ICD-10-PCS | Mod: CPTII,S$GLB,, | Performed by: PHYSICIAN ASSISTANT

## 2021-06-25 PROCEDURE — 99214 PR OFFICE/OUTPT VISIT, EST, LEVL IV, 30-39 MIN: ICD-10-PCS | Mod: S$GLB,,, | Performed by: PHYSICIAN ASSISTANT

## 2021-06-25 PROCEDURE — 1125F PR PAIN SEVERITY QUANTIFIED, PAIN PRESENT: ICD-10-PCS | Mod: S$GLB,,, | Performed by: PHYSICIAN ASSISTANT

## 2021-06-25 RX ORDER — OXYCODONE AND ACETAMINOPHEN 5; 325 MG/1; MG/1
1 TABLET ORAL EVERY 6 HOURS PRN
Qty: 120 TABLET | Refills: 0 | Status: SHIPPED | OUTPATIENT
Start: 2021-07-24 | End: 2021-06-25 | Stop reason: SDUPTHER

## 2021-06-25 RX ORDER — SULFAMETHOXAZOLE AND TRIMETHOPRIM 800; 160 MG/1; MG/1
1 TABLET ORAL 2 TIMES DAILY
Qty: 28 TABLET | Refills: 1 | Status: SHIPPED | OUTPATIENT
Start: 2021-06-25 | End: 2021-07-09

## 2021-06-25 RX ORDER — OXYCODONE AND ACETAMINOPHEN 5; 325 MG/1; MG/1
1 TABLET ORAL EVERY 6 HOURS PRN
Qty: 120 TABLET | Refills: 0 | Status: SHIPPED | OUTPATIENT
Start: 2021-06-25 | End: 2021-07-24

## 2021-06-28 ENCOUNTER — OFFICE VISIT (OUTPATIENT)
Dept: ORTHOPEDICS | Facility: CLINIC | Age: 61
End: 2021-06-28
Payer: MEDICARE

## 2021-06-28 VITALS — BODY MASS INDEX: 30.38 KG/M2 | HEIGHT: 71 IN | RESPIRATION RATE: 16 BRPM | WEIGHT: 217 LBS

## 2021-06-28 DIAGNOSIS — Z96.642 STATUS POST HIP REPLACEMENT, LEFT: ICD-10-CM

## 2021-06-28 PROCEDURE — 99024 POSTOP FOLLOW-UP VISIT: CPT | Mod: S$GLB,,, | Performed by: ORTHOPAEDIC SURGERY

## 2021-06-28 PROCEDURE — 3008F PR BODY MASS INDEX (BMI) DOCUMENTED: ICD-10-PCS | Mod: CPTII,S$GLB,, | Performed by: ORTHOPAEDIC SURGERY

## 2021-06-28 PROCEDURE — 1125F AMNT PAIN NOTED PAIN PRSNT: CPT | Mod: S$GLB,,, | Performed by: ORTHOPAEDIC SURGERY

## 2021-06-28 PROCEDURE — 99024 PR POST-OP FOLLOW-UP VISIT: ICD-10-PCS | Mod: S$GLB,,, | Performed by: ORTHOPAEDIC SURGERY

## 2021-06-28 PROCEDURE — 99999 PR PBB SHADOW E&M-EST. PATIENT-LVL III: ICD-10-PCS | Mod: PBBFAC,,, | Performed by: ORTHOPAEDIC SURGERY

## 2021-06-28 PROCEDURE — 3008F BODY MASS INDEX DOCD: CPT | Mod: CPTII,S$GLB,, | Performed by: ORTHOPAEDIC SURGERY

## 2021-06-28 PROCEDURE — 99999 PR PBB SHADOW E&M-EST. PATIENT-LVL III: CPT | Mod: PBBFAC,,, | Performed by: ORTHOPAEDIC SURGERY

## 2021-06-28 PROCEDURE — 1125F PR PAIN SEVERITY QUANTIFIED, PAIN PRESENT: ICD-10-PCS | Mod: S$GLB,,, | Performed by: ORTHOPAEDIC SURGERY

## 2021-06-28 RX ORDER — IBUPROFEN 800 MG/1
800 TABLET ORAL EVERY 6 HOURS PRN
Qty: 90 TABLET | Refills: 3 | Status: SHIPPED | OUTPATIENT
Start: 2021-06-28 | End: 2022-08-19

## 2021-07-01 ENCOUNTER — TELEPHONE (OUTPATIENT)
Dept: PAIN MEDICINE | Facility: CLINIC | Age: 61
End: 2021-07-01

## 2021-07-01 LAB
6MAM UR QL: NOT DETECTED
7AMINOCLONAZEPAM UR QL: NOT DETECTED
A-OH ALPRAZ UR QL: NOT DETECTED
ALPHA-OH-MIDAZOLAM: NOT DETECTED
ALPRAZ UR QL: NOT DETECTED
AMPHET UR QL SCN: NOT DETECTED
ANNOTATION COMMENT IMP: NORMAL
ANNOTATION COMMENT IMP: NORMAL
BARBITURATES UR QL: NOT DETECTED
BUPRENORPHINE UR QL: NOT DETECTED
BZE UR QL: NOT DETECTED
CARBOXYTHC UR QL: PRESENT
CARISOPRODOL UR QL: NOT DETECTED
CLONAZEPAM UR QL: NOT DETECTED
CODEINE UR QL: NOT DETECTED
CREAT UR-MCNC: 207.4 MG/DL (ref 20–400)
DIAZEPAM UR QL: NOT DETECTED
ETHYL GLUCURONIDE UR QL: NOT DETECTED
FENTANYL UR QL: NOT DETECTED
GABAPENTIN: PRESENT
HYDROCODONE UR QL: PRESENT
HYDROMORPHONE UR QL: PRESENT
LORAZEPAM UR QL: NOT DETECTED
MDA UR QL: NOT DETECTED
MDEA UR QL: NOT DETECTED
MDMA UR QL: NOT DETECTED
ME-PHENIDATE UR QL: NOT DETECTED
METHADONE UR QL: NOT DETECTED
METHAMPHET UR QL: NOT DETECTED
MIDAZOLAM UR QL SCN: NOT DETECTED
MORPHINE UR QL: NOT DETECTED
NALOXONE: NOT DETECTED
NORBUPRENORPHINE UR QL CFM: NOT DETECTED
NORDIAZEPAM UR QL: NOT DETECTED
NORFENTANYL UR QL: NOT DETECTED
NORHYDROCODONE UR QL CFM: PRESENT
NORMEPERIDINE UR QL CFM: NOT DETECTED
NOROXYCODONE UR QL CFM: NOT DETECTED
NOROXYMORPHONE UR QL SCN: NOT DETECTED
OXAZEPAM UR QL: PRESENT
OXYCODONE UR QL: NOT DETECTED
OXYMORPHONE UR QL: NOT DETECTED
PATHOLOGY STUDY: NORMAL
PCP UR QL: NOT DETECTED
PHENTERMINE UR QL: NOT DETECTED
PREGABALIN: NOT DETECTED
SERVICE CMNT-IMP: NORMAL
TAPENTADOL UR QL SCN: NOT DETECTED
TAPENTADOL-O-SULF: NOT DETECTED
TEMAZEPAM UR QL: NOT DETECTED
TRAMADOL UR QL: NOT DETECTED
ZOLPIDEM METABOLITE: NOT DETECTED
ZOLPIDEM UR QL: NOT DETECTED

## 2021-07-06 ENCOUNTER — OFFICE VISIT (OUTPATIENT)
Dept: ORTHOPEDICS | Facility: CLINIC | Age: 61
End: 2021-07-06
Payer: MEDICARE

## 2021-07-06 VITALS — RESPIRATION RATE: 16 BRPM | BODY MASS INDEX: 30.38 KG/M2 | WEIGHT: 217 LBS | HEIGHT: 71 IN

## 2021-07-06 DIAGNOSIS — Z96.642 STATUS POST HIP REPLACEMENT, LEFT: Primary | ICD-10-CM

## 2021-07-06 PROCEDURE — 99024 PR POST-OP FOLLOW-UP VISIT: ICD-10-PCS | Mod: S$GLB,,, | Performed by: ORTHOPAEDIC SURGERY

## 2021-07-06 PROCEDURE — 99024 POSTOP FOLLOW-UP VISIT: CPT | Mod: S$GLB,,, | Performed by: ORTHOPAEDIC SURGERY

## 2021-07-06 PROCEDURE — 99999 PR PBB SHADOW E&M-EST. PATIENT-LVL III: ICD-10-PCS | Mod: PBBFAC,,, | Performed by: ORTHOPAEDIC SURGERY

## 2021-07-06 PROCEDURE — 99999 PR PBB SHADOW E&M-EST. PATIENT-LVL III: CPT | Mod: PBBFAC,,, | Performed by: ORTHOPAEDIC SURGERY

## 2021-07-06 PROCEDURE — 3008F BODY MASS INDEX DOCD: CPT | Mod: CPTII,S$GLB,, | Performed by: ORTHOPAEDIC SURGERY

## 2021-07-06 PROCEDURE — 3008F PR BODY MASS INDEX (BMI) DOCUMENTED: ICD-10-PCS | Mod: CPTII,S$GLB,, | Performed by: ORTHOPAEDIC SURGERY

## 2021-07-06 RX ORDER — DULOXETIN HYDROCHLORIDE 60 MG/1
120 CAPSULE, DELAYED RELEASE ORAL DAILY
COMMUNITY
Start: 2021-07-03 | End: 2023-05-05

## 2021-07-06 RX ORDER — TRAZODONE HYDROCHLORIDE 100 MG/1
TABLET ORAL
COMMUNITY
Start: 2021-06-25 | End: 2021-09-23

## 2021-07-21 ENCOUNTER — TELEPHONE (OUTPATIENT)
Dept: FAMILY MEDICINE | Facility: CLINIC | Age: 61
End: 2021-07-21

## 2021-07-21 ENCOUNTER — DOCUMENT SCAN (OUTPATIENT)
Dept: HOME HEALTH SERVICES | Facility: HOSPITAL | Age: 61
End: 2021-07-21
Payer: MEDICARE

## 2021-07-28 ENCOUNTER — TELEPHONE (OUTPATIENT)
Dept: FAMILY MEDICINE | Facility: CLINIC | Age: 61
End: 2021-07-28

## 2021-07-28 DIAGNOSIS — M47.896 OTHER SPONDYLOSIS, LUMBAR REGION: ICD-10-CM

## 2021-07-28 RX ORDER — GABAPENTIN 300 MG/1
300 CAPSULE ORAL 3 TIMES DAILY
Qty: 90 CAPSULE | Refills: 1 | Status: SHIPPED | OUTPATIENT
Start: 2021-07-28 | End: 2021-10-04

## 2021-07-28 RX ORDER — METHOCARBAMOL 750 MG/1
750 TABLET, FILM COATED ORAL 4 TIMES DAILY
Qty: 40 TABLET | Refills: 0 | Status: SHIPPED | OUTPATIENT
Start: 2021-07-28 | End: 2021-08-07

## 2021-07-30 DIAGNOSIS — Z96.642 STATUS POST HIP REPLACEMENT, LEFT: Primary | ICD-10-CM

## 2021-08-05 ENCOUNTER — HOSPITAL ENCOUNTER (OUTPATIENT)
Dept: RADIOLOGY | Facility: HOSPITAL | Age: 61
Discharge: HOME | End: 2021-08-05
Attending: ORTHOPAEDIC SURGERY
Payer: MEDICARE

## 2021-08-05 ENCOUNTER — OFFICE VISIT (OUTPATIENT)
Dept: ORTHOPEDICS | Facility: CLINIC | Age: 61
End: 2021-08-05
Payer: MEDICARE

## 2021-08-05 VITALS — HEIGHT: 71 IN | WEIGHT: 217 LBS | RESPIRATION RATE: 18 BRPM | BODY MASS INDEX: 30.38 KG/M2

## 2021-08-05 DIAGNOSIS — Z96.642 STATUS POST HIP REPLACEMENT, LEFT: ICD-10-CM

## 2021-08-05 DIAGNOSIS — T81.31XD DEHISCENCE OF INCISION, SUBSEQUENT ENCOUNTER: Primary | ICD-10-CM

## 2021-08-05 PROCEDURE — 99024 POSTOP FOLLOW-UP VISIT: CPT | Mod: S$GLB,,, | Performed by: ORTHOPAEDIC SURGERY

## 2021-08-05 PROCEDURE — 1159F MED LIST DOCD IN RCRD: CPT | Mod: CPTII,S$GLB,, | Performed by: ORTHOPAEDIC SURGERY

## 2021-08-05 PROCEDURE — 1125F AMNT PAIN NOTED PAIN PRSNT: CPT | Mod: CPTII,S$GLB,, | Performed by: ORTHOPAEDIC SURGERY

## 2021-08-05 PROCEDURE — 1125F PR PAIN SEVERITY QUANTIFIED, PAIN PRESENT: ICD-10-PCS | Mod: CPTII,S$GLB,, | Performed by: ORTHOPAEDIC SURGERY

## 2021-08-05 PROCEDURE — 73502 X-RAY EXAM HIP UNI 2-3 VIEWS: CPT | Mod: 26,LT,, | Performed by: RADIOLOGY

## 2021-08-05 PROCEDURE — 3008F BODY MASS INDEX DOCD: CPT | Mod: CPTII,S$GLB,, | Performed by: ORTHOPAEDIC SURGERY

## 2021-08-05 PROCEDURE — 99999 PR PBB SHADOW E&M-EST. PATIENT-LVL III: CPT | Mod: PBBFAC,,, | Performed by: ORTHOPAEDIC SURGERY

## 2021-08-05 PROCEDURE — 99999 PR PBB SHADOW E&M-EST. PATIENT-LVL III: ICD-10-PCS | Mod: PBBFAC,,, | Performed by: ORTHOPAEDIC SURGERY

## 2021-08-05 PROCEDURE — 1160F RVW MEDS BY RX/DR IN RCRD: CPT | Mod: CPTII,S$GLB,, | Performed by: ORTHOPAEDIC SURGERY

## 2021-08-05 PROCEDURE — 3008F PR BODY MASS INDEX (BMI) DOCUMENTED: ICD-10-PCS | Mod: CPTII,S$GLB,, | Performed by: ORTHOPAEDIC SURGERY

## 2021-08-05 PROCEDURE — 1159F PR MEDICATION LIST DOCUMENTED IN MEDICAL RECORD: ICD-10-PCS | Mod: CPTII,S$GLB,, | Performed by: ORTHOPAEDIC SURGERY

## 2021-08-05 PROCEDURE — 73502 XR HIP WITH PELVIS WHEN PERFORMED, 2 OR 3 VIEWS LEFT: ICD-10-PCS | Mod: 26,LT,, | Performed by: RADIOLOGY

## 2021-08-05 PROCEDURE — 99024 PR POST-OP FOLLOW-UP VISIT: ICD-10-PCS | Mod: S$GLB,,, | Performed by: ORTHOPAEDIC SURGERY

## 2021-08-05 PROCEDURE — 73502 X-RAY EXAM HIP UNI 2-3 VIEWS: CPT | Mod: TC,PN,LT

## 2021-08-05 PROCEDURE — 1160F PR REVIEW ALL MEDS BY PRESCRIBER/CLIN PHARMACIST DOCUMENTED: ICD-10-PCS | Mod: CPTII,S$GLB,, | Performed by: ORTHOPAEDIC SURGERY

## 2021-08-05 RX ORDER — SULFAMETHOXAZOLE AND TRIMETHOPRIM 800; 160 MG/1; MG/1
1 TABLET ORAL 2 TIMES DAILY
Qty: 28 TABLET | Refills: 0 | Status: SHIPPED | OUTPATIENT
Start: 2021-08-05 | End: 2021-09-23

## 2021-08-05 RX ORDER — SULFAMETHOXAZOLE AND TRIMETHOPRIM 800; 160 MG/1; MG/1
TABLET ORAL
COMMUNITY
Start: 2021-07-23 | End: 2021-08-05 | Stop reason: SDUPTHER

## 2021-08-10 ENCOUNTER — OFFICE VISIT (OUTPATIENT)
Dept: ORTHOPEDICS | Facility: CLINIC | Age: 61
End: 2021-08-10
Payer: MEDICARE

## 2021-08-10 VITALS — BODY MASS INDEX: 30.38 KG/M2 | HEIGHT: 71 IN | RESPIRATION RATE: 16 BRPM | WEIGHT: 217 LBS

## 2021-08-10 DIAGNOSIS — T81.31XD DEHISCENCE OF INCISION, SUBSEQUENT ENCOUNTER: Primary | ICD-10-CM

## 2021-08-10 PROCEDURE — 1160F RVW MEDS BY RX/DR IN RCRD: CPT | Mod: CPTII,S$GLB,, | Performed by: ORTHOPAEDIC SURGERY

## 2021-08-10 PROCEDURE — 99999 PR PBB SHADOW E&M-EST. PATIENT-LVL III: ICD-10-PCS | Mod: PBBFAC,,, | Performed by: ORTHOPAEDIC SURGERY

## 2021-08-10 PROCEDURE — 3008F BODY MASS INDEX DOCD: CPT | Mod: CPTII,S$GLB,, | Performed by: ORTHOPAEDIC SURGERY

## 2021-08-10 PROCEDURE — 3008F PR BODY MASS INDEX (BMI) DOCUMENTED: ICD-10-PCS | Mod: CPTII,S$GLB,, | Performed by: ORTHOPAEDIC SURGERY

## 2021-08-10 PROCEDURE — 1159F MED LIST DOCD IN RCRD: CPT | Mod: CPTII,S$GLB,, | Performed by: ORTHOPAEDIC SURGERY

## 2021-08-10 PROCEDURE — 99024 POSTOP FOLLOW-UP VISIT: CPT | Mod: S$GLB,,, | Performed by: ORTHOPAEDIC SURGERY

## 2021-08-10 PROCEDURE — 1159F PR MEDICATION LIST DOCUMENTED IN MEDICAL RECORD: ICD-10-PCS | Mod: CPTII,S$GLB,, | Performed by: ORTHOPAEDIC SURGERY

## 2021-08-10 PROCEDURE — 1160F PR REVIEW ALL MEDS BY PRESCRIBER/CLIN PHARMACIST DOCUMENTED: ICD-10-PCS | Mod: CPTII,S$GLB,, | Performed by: ORTHOPAEDIC SURGERY

## 2021-08-10 PROCEDURE — 99999 PR PBB SHADOW E&M-EST. PATIENT-LVL III: CPT | Mod: PBBFAC,,, | Performed by: ORTHOPAEDIC SURGERY

## 2021-08-10 PROCEDURE — 99024 PR POST-OP FOLLOW-UP VISIT: ICD-10-PCS | Mod: S$GLB,,, | Performed by: ORTHOPAEDIC SURGERY

## 2021-08-10 RX ORDER — METHOCARBAMOL 750 MG/1
750 TABLET, FILM COATED ORAL 4 TIMES DAILY
Qty: 40 TABLET | Refills: 0 | Status: SHIPPED | OUTPATIENT
Start: 2021-08-10 | End: 2021-08-20

## 2021-08-19 ENCOUNTER — OFFICE VISIT (OUTPATIENT)
Dept: ORTHOPEDICS | Facility: CLINIC | Age: 61
End: 2021-08-19
Payer: MEDICARE

## 2021-08-19 VITALS — WEIGHT: 217 LBS | RESPIRATION RATE: 16 BRPM | BODY MASS INDEX: 30.38 KG/M2 | HEIGHT: 71 IN

## 2021-08-19 DIAGNOSIS — Z96.642 STATUS POST HIP REPLACEMENT, LEFT: Primary | ICD-10-CM

## 2021-08-19 PROCEDURE — 1159F MED LIST DOCD IN RCRD: CPT | Mod: CPTII,S$GLB,, | Performed by: ORTHOPAEDIC SURGERY

## 2021-08-19 PROCEDURE — 1160F RVW MEDS BY RX/DR IN RCRD: CPT | Mod: CPTII,S$GLB,, | Performed by: ORTHOPAEDIC SURGERY

## 2021-08-19 PROCEDURE — 99999 PR PBB SHADOW E&M-EST. PATIENT-LVL III: CPT | Mod: PBBFAC,,, | Performed by: ORTHOPAEDIC SURGERY

## 2021-08-19 PROCEDURE — 99999 PR PBB SHADOW E&M-EST. PATIENT-LVL III: ICD-10-PCS | Mod: PBBFAC,,, | Performed by: ORTHOPAEDIC SURGERY

## 2021-08-19 PROCEDURE — 3008F BODY MASS INDEX DOCD: CPT | Mod: CPTII,S$GLB,, | Performed by: ORTHOPAEDIC SURGERY

## 2021-08-19 PROCEDURE — 3008F PR BODY MASS INDEX (BMI) DOCUMENTED: ICD-10-PCS | Mod: CPTII,S$GLB,, | Performed by: ORTHOPAEDIC SURGERY

## 2021-08-19 PROCEDURE — 99024 PR POST-OP FOLLOW-UP VISIT: ICD-10-PCS | Mod: S$GLB,,, | Performed by: ORTHOPAEDIC SURGERY

## 2021-08-19 PROCEDURE — 1125F AMNT PAIN NOTED PAIN PRSNT: CPT | Mod: CPTII,S$GLB,, | Performed by: ORTHOPAEDIC SURGERY

## 2021-08-19 PROCEDURE — 1159F PR MEDICATION LIST DOCUMENTED IN MEDICAL RECORD: ICD-10-PCS | Mod: CPTII,S$GLB,, | Performed by: ORTHOPAEDIC SURGERY

## 2021-08-19 PROCEDURE — 99024 POSTOP FOLLOW-UP VISIT: CPT | Mod: S$GLB,,, | Performed by: ORTHOPAEDIC SURGERY

## 2021-08-19 PROCEDURE — 1125F PR PAIN SEVERITY QUANTIFIED, PAIN PRESENT: ICD-10-PCS | Mod: CPTII,S$GLB,, | Performed by: ORTHOPAEDIC SURGERY

## 2021-08-19 PROCEDURE — 1160F PR REVIEW ALL MEDS BY PRESCRIBER/CLIN PHARMACIST DOCUMENTED: ICD-10-PCS | Mod: CPTII,S$GLB,, | Performed by: ORTHOPAEDIC SURGERY

## 2021-08-25 RX ORDER — METHOCARBAMOL 750 MG/1
750 TABLET, FILM COATED ORAL 4 TIMES DAILY
Qty: 40 TABLET | Refills: 0 | Status: SHIPPED | OUTPATIENT
Start: 2021-08-25 | End: 2021-09-17 | Stop reason: SDUPTHER

## 2021-09-01 ENCOUNTER — TELEPHONE (OUTPATIENT)
Dept: PHYSICAL MEDICINE AND REHAB | Facility: CLINIC | Age: 61
End: 2021-09-01

## 2021-09-17 ENCOUNTER — TELEPHONE (OUTPATIENT)
Dept: PAIN MEDICINE | Facility: CLINIC | Age: 61
End: 2021-09-17

## 2021-09-21 DIAGNOSIS — I10 ESSENTIAL HYPERTENSION: ICD-10-CM

## 2021-09-21 DIAGNOSIS — M25.551 BILATERAL HIP PAIN: ICD-10-CM

## 2021-09-21 DIAGNOSIS — Z96.642 STATUS POST HIP REPLACEMENT, LEFT: Primary | ICD-10-CM

## 2021-09-21 DIAGNOSIS — K21.9 GASTROESOPHAGEAL REFLUX DISEASE WITHOUT ESOPHAGITIS: ICD-10-CM

## 2021-09-21 DIAGNOSIS — M25.552 BILATERAL HIP PAIN: ICD-10-CM

## 2021-09-21 RX ORDER — LISINOPRIL 40 MG/1
40 TABLET ORAL DAILY
Qty: 30 TABLET | Refills: 4 | Status: SHIPPED | OUTPATIENT
Start: 2021-09-21 | End: 2022-03-04 | Stop reason: SDUPTHER

## 2021-09-21 RX ORDER — OMEPRAZOLE 20 MG/1
20 CAPSULE, DELAYED RELEASE ORAL DAILY
Qty: 90 CAPSULE | Refills: 1 | Status: SHIPPED | OUTPATIENT
Start: 2021-09-21 | End: 2021-12-21 | Stop reason: SDUPTHER

## 2021-09-23 ENCOUNTER — OFFICE VISIT (OUTPATIENT)
Dept: ORTHOPEDICS | Facility: CLINIC | Age: 61
End: 2021-09-23
Payer: MEDICARE

## 2021-09-23 ENCOUNTER — OFFICE VISIT (OUTPATIENT)
Dept: FAMILY MEDICINE | Facility: CLINIC | Age: 61
End: 2021-09-23
Payer: MEDICARE

## 2021-09-23 ENCOUNTER — HOSPITAL ENCOUNTER (OUTPATIENT)
Dept: RADIOLOGY | Facility: HOSPITAL | Age: 61
Discharge: HOME | End: 2021-09-23
Attending: ORTHOPAEDIC SURGERY
Payer: MEDICARE

## 2021-09-23 VITALS
WEIGHT: 219 LBS | HEART RATE: 84 BPM | HEIGHT: 71 IN | SYSTOLIC BLOOD PRESSURE: 136 MMHG | BODY MASS INDEX: 30.66 KG/M2 | DIASTOLIC BLOOD PRESSURE: 78 MMHG

## 2021-09-23 VITALS — WEIGHT: 217 LBS | RESPIRATION RATE: 16 BRPM | BODY MASS INDEX: 30.38 KG/M2 | HEIGHT: 71 IN

## 2021-09-23 DIAGNOSIS — Z96.642 H/O TOTAL HIP ARTHROPLASTY, LEFT: ICD-10-CM

## 2021-09-23 DIAGNOSIS — T81.31XS DEHISCENCE OF OPERATIVE WOUND, SEQUELA: ICD-10-CM

## 2021-09-23 DIAGNOSIS — Z96.642 STATUS POST HIP REPLACEMENT, LEFT: Primary | ICD-10-CM

## 2021-09-23 DIAGNOSIS — N40.1 BENIGN PROSTATIC HYPERPLASIA WITH URINARY FREQUENCY: Primary | ICD-10-CM

## 2021-09-23 DIAGNOSIS — M25.552 BILATERAL HIP PAIN: ICD-10-CM

## 2021-09-23 DIAGNOSIS — M54.50 CHRONIC MIDLINE LOW BACK PAIN WITHOUT SCIATICA: ICD-10-CM

## 2021-09-23 DIAGNOSIS — D50.9 IRON DEFICIENCY ANEMIA, UNSPECIFIED IRON DEFICIENCY ANEMIA TYPE: ICD-10-CM

## 2021-09-23 DIAGNOSIS — R35.0 BENIGN PROSTATIC HYPERPLASIA WITH URINARY FREQUENCY: Primary | ICD-10-CM

## 2021-09-23 DIAGNOSIS — J44.9 CHRONIC OBSTRUCTIVE PULMONARY DISEASE, UNSPECIFIED COPD TYPE: ICD-10-CM

## 2021-09-23 DIAGNOSIS — F17.200 CURRENT EVERY DAY SMOKER: ICD-10-CM

## 2021-09-23 DIAGNOSIS — M25.551 BILATERAL HIP PAIN: ICD-10-CM

## 2021-09-23 DIAGNOSIS — G89.29 CHRONIC MIDLINE LOW BACK PAIN WITHOUT SCIATICA: ICD-10-CM

## 2021-09-23 PROCEDURE — 4010F ACE/ARB THERAPY RXD/TAKEN: CPT | Mod: CPTII,S$GLB,, | Performed by: ORTHOPAEDIC SURGERY

## 2021-09-23 PROCEDURE — 3008F PR BODY MASS INDEX (BMI) DOCUMENTED: ICD-10-PCS | Mod: S$GLB,,, | Performed by: NURSE PRACTITIONER

## 2021-09-23 PROCEDURE — 99999 PR PBB SHADOW E&M-EST. PATIENT-LVL III: ICD-10-PCS | Mod: PBBFAC,,, | Performed by: ORTHOPAEDIC SURGERY

## 2021-09-23 PROCEDURE — 3008F BODY MASS INDEX DOCD: CPT | Mod: CPTII,S$GLB,, | Performed by: ORTHOPAEDIC SURGERY

## 2021-09-23 PROCEDURE — 1160F PR REVIEW ALL MEDS BY PRESCRIBER/CLIN PHARMACIST DOCUMENTED: ICD-10-PCS | Mod: CPTII,S$GLB,, | Performed by: ORTHOPAEDIC SURGERY

## 2021-09-23 PROCEDURE — 3078F PR MOST RECENT DIASTOLIC BLOOD PRESSURE < 80 MM HG: ICD-10-PCS | Mod: S$GLB,,, | Performed by: NURSE PRACTITIONER

## 2021-09-23 PROCEDURE — 3075F PR MOST RECENT SYSTOLIC BLOOD PRESS GE 130-139MM HG: ICD-10-PCS | Mod: S$GLB,,, | Performed by: NURSE PRACTITIONER

## 2021-09-23 PROCEDURE — 99999 PR PBB SHADOW E&M-EST. PATIENT-LVL III: CPT | Mod: PBBFAC,,, | Performed by: ORTHOPAEDIC SURGERY

## 2021-09-23 PROCEDURE — 4010F PR ACE/ARB THEARPY RXD/TAKEN: ICD-10-PCS | Mod: CPTII,S$GLB,, | Performed by: ORTHOPAEDIC SURGERY

## 2021-09-23 PROCEDURE — 1159F MED LIST DOCD IN RCRD: CPT | Mod: CPTII,S$GLB,, | Performed by: ORTHOPAEDIC SURGERY

## 2021-09-23 PROCEDURE — 99214 PR OFFICE/OUTPT VISIT, EST, LEVL IV, 30-39 MIN: ICD-10-PCS | Mod: S$GLB,,, | Performed by: NURSE PRACTITIONER

## 2021-09-23 PROCEDURE — 99214 OFFICE O/P EST MOD 30 MIN: CPT | Mod: S$GLB,,, | Performed by: NURSE PRACTITIONER

## 2021-09-23 PROCEDURE — 73521 X-RAY EXAM HIPS BI 2 VIEWS: CPT | Mod: 26,,, | Performed by: RADIOLOGY

## 2021-09-23 PROCEDURE — 4010F ACE/ARB THERAPY RXD/TAKEN: CPT | Mod: S$GLB,,, | Performed by: NURSE PRACTITIONER

## 2021-09-23 PROCEDURE — 73521 X-RAY EXAM HIPS BI 2 VIEWS: CPT | Mod: TC,PN

## 2021-09-23 PROCEDURE — 3008F BODY MASS INDEX DOCD: CPT | Mod: S$GLB,,, | Performed by: NURSE PRACTITIONER

## 2021-09-23 PROCEDURE — 3008F PR BODY MASS INDEX (BMI) DOCUMENTED: ICD-10-PCS | Mod: CPTII,S$GLB,, | Performed by: ORTHOPAEDIC SURGERY

## 2021-09-23 PROCEDURE — 73521 XR HIPS BILATERAL 2 VIEW INCL AP PELVIS: ICD-10-PCS | Mod: 26,,, | Performed by: RADIOLOGY

## 2021-09-23 PROCEDURE — 99213 OFFICE O/P EST LOW 20 MIN: CPT | Mod: S$GLB,,, | Performed by: ORTHOPAEDIC SURGERY

## 2021-09-23 PROCEDURE — 4010F PR ACE/ARB THEARPY RXD/TAKEN: ICD-10-PCS | Mod: S$GLB,,, | Performed by: NURSE PRACTITIONER

## 2021-09-23 PROCEDURE — 3075F SYST BP GE 130 - 139MM HG: CPT | Mod: S$GLB,,, | Performed by: NURSE PRACTITIONER

## 2021-09-23 PROCEDURE — 99213 PR OFFICE/OUTPT VISIT, EST, LEVL III, 20-29 MIN: ICD-10-PCS | Mod: S$GLB,,, | Performed by: ORTHOPAEDIC SURGERY

## 2021-09-23 PROCEDURE — 1160F PR REVIEW ALL MEDS BY PRESCRIBER/CLIN PHARMACIST DOCUMENTED: ICD-10-PCS | Mod: S$GLB,,, | Performed by: NURSE PRACTITIONER

## 2021-09-23 PROCEDURE — 3078F DIAST BP <80 MM HG: CPT | Mod: S$GLB,,, | Performed by: NURSE PRACTITIONER

## 2021-09-23 PROCEDURE — 1160F RVW MEDS BY RX/DR IN RCRD: CPT | Mod: S$GLB,,, | Performed by: NURSE PRACTITIONER

## 2021-09-23 PROCEDURE — 1159F PR MEDICATION LIST DOCUMENTED IN MEDICAL RECORD: ICD-10-PCS | Mod: CPTII,S$GLB,, | Performed by: ORTHOPAEDIC SURGERY

## 2021-09-23 PROCEDURE — 1160F RVW MEDS BY RX/DR IN RCRD: CPT | Mod: CPTII,S$GLB,, | Performed by: ORTHOPAEDIC SURGERY

## 2021-09-23 RX ORDER — HYDROCODONE BITARTRATE AND ACETAMINOPHEN 5; 325 MG/1; MG/1
1 TABLET ORAL EVERY 6 HOURS PRN
Qty: 28 TABLET | Refills: 0 | Status: SHIPPED | OUTPATIENT
Start: 2021-09-23 | End: 2022-08-19

## 2021-09-23 RX ORDER — HYDROCHLOROTHIAZIDE 25 MG/1
TABLET ORAL
COMMUNITY
Start: 2021-08-24 | End: 2022-05-31

## 2021-09-23 RX ORDER — TRAZODONE HYDROCHLORIDE 150 MG/1
150 TABLET ORAL NIGHTLY
COMMUNITY
Start: 2021-09-07 | End: 2023-05-05

## 2021-09-23 RX ORDER — TAMSULOSIN HYDROCHLORIDE 0.4 MG/1
0.4 CAPSULE ORAL DAILY
Qty: 30 CAPSULE | Refills: 4 | Status: SHIPPED | OUTPATIENT
Start: 2021-09-23 | End: 2021-10-11 | Stop reason: SDUPTHER

## 2021-09-28 ENCOUNTER — TELEPHONE (OUTPATIENT)
Dept: FAMILY MEDICINE | Facility: CLINIC | Age: 61
End: 2021-09-28

## 2021-10-11 DIAGNOSIS — R35.0 BENIGN PROSTATIC HYPERPLASIA WITH URINARY FREQUENCY: ICD-10-CM

## 2021-10-11 DIAGNOSIS — N40.1 BENIGN PROSTATIC HYPERPLASIA WITH URINARY FREQUENCY: ICD-10-CM

## 2021-10-11 RX ORDER — METHOCARBAMOL 750 MG/1
750 TABLET, FILM COATED ORAL 4 TIMES DAILY
Qty: 40 TABLET | Refills: 0 | Status: CANCELLED | OUTPATIENT
Start: 2021-10-11 | End: 2021-10-21

## 2021-10-11 RX ORDER — TAMSULOSIN HYDROCHLORIDE 0.4 MG/1
0.8 CAPSULE ORAL DAILY
Qty: 60 CAPSULE | Refills: 4 | Status: SHIPPED | OUTPATIENT
Start: 2021-10-11 | End: 2021-11-22 | Stop reason: SDUPTHER

## 2021-11-04 RX ORDER — METHOCARBAMOL 500 MG/1
500 TABLET, FILM COATED ORAL 4 TIMES DAILY
Qty: 40 TABLET | Refills: 0 | Status: SHIPPED | OUTPATIENT
Start: 2021-11-04 | End: 2021-11-14

## 2021-11-18 DIAGNOSIS — N40.1 BENIGN PROSTATIC HYPERPLASIA WITH URINARY FREQUENCY: ICD-10-CM

## 2021-11-18 DIAGNOSIS — R35.0 BENIGN PROSTATIC HYPERPLASIA WITH URINARY FREQUENCY: ICD-10-CM

## 2021-11-22 RX ORDER — TAMSULOSIN HYDROCHLORIDE 0.4 MG/1
0.8 CAPSULE ORAL DAILY
Qty: 60 CAPSULE | Refills: 0 | Status: SHIPPED | OUTPATIENT
Start: 2021-11-22 | End: 2021-12-21 | Stop reason: SDUPTHER

## 2021-12-01 DIAGNOSIS — M62.838 MUSCLE SPASM OF BOTH LOWER LEGS: Primary | ICD-10-CM

## 2021-12-01 RX ORDER — METHOCARBAMOL 500 MG/1
500 TABLET, FILM COATED ORAL 4 TIMES DAILY
Qty: 40 TABLET | Refills: 1 | Status: SHIPPED | OUTPATIENT
Start: 2021-12-01 | End: 2021-12-21 | Stop reason: SDUPTHER

## 2021-12-20 DIAGNOSIS — Z96.642 STATUS POST HIP REPLACEMENT, LEFT: Primary | ICD-10-CM

## 2021-12-21 ENCOUNTER — OFFICE VISIT (OUTPATIENT)
Dept: FAMILY MEDICINE | Facility: CLINIC | Age: 61
End: 2021-12-21
Payer: MEDICARE

## 2021-12-21 VITALS
SYSTOLIC BLOOD PRESSURE: 150 MMHG | HEIGHT: 71 IN | DIASTOLIC BLOOD PRESSURE: 88 MMHG | BODY MASS INDEX: 30.66 KG/M2 | HEART RATE: 97 BPM | WEIGHT: 219 LBS

## 2021-12-21 DIAGNOSIS — N40.1 BENIGN PROSTATIC HYPERPLASIA WITH URINARY FREQUENCY: ICD-10-CM

## 2021-12-21 DIAGNOSIS — K21.9 GASTROESOPHAGEAL REFLUX DISEASE WITHOUT ESOPHAGITIS: ICD-10-CM

## 2021-12-21 DIAGNOSIS — R06.83 SNORING: ICD-10-CM

## 2021-12-21 DIAGNOSIS — Z96.642 HISTORY OF LEFT HIP REPLACEMENT: ICD-10-CM

## 2021-12-21 DIAGNOSIS — B18.2 CHRONIC HEPATITIS C WITHOUT HEPATIC COMA: ICD-10-CM

## 2021-12-21 DIAGNOSIS — M62.838 MUSCLE SPASM OF BOTH LOWER LEGS: ICD-10-CM

## 2021-12-21 DIAGNOSIS — I10 PRIMARY HYPERTENSION: Primary | ICD-10-CM

## 2021-12-21 DIAGNOSIS — R35.1 BPH ASSOCIATED WITH NOCTURIA: ICD-10-CM

## 2021-12-21 DIAGNOSIS — K74.60 HEPATIC CIRRHOSIS, UNSPECIFIED HEPATIC CIRRHOSIS TYPE, UNSPECIFIED WHETHER ASCITES PRESENT: ICD-10-CM

## 2021-12-21 DIAGNOSIS — G47.30 SLEEP APNEA, UNSPECIFIED TYPE: ICD-10-CM

## 2021-12-21 DIAGNOSIS — N32.81 OAB (OVERACTIVE BLADDER): ICD-10-CM

## 2021-12-21 DIAGNOSIS — N40.1 BPH ASSOCIATED WITH NOCTURIA: ICD-10-CM

## 2021-12-21 DIAGNOSIS — R35.0 BENIGN PROSTATIC HYPERPLASIA WITH URINARY FREQUENCY: ICD-10-CM

## 2021-12-21 DIAGNOSIS — M19.011 OSTEOARTHRITIS OF RIGHT SHOULDER, UNSPECIFIED OSTEOARTHRITIS TYPE: ICD-10-CM

## 2021-12-21 DIAGNOSIS — E66.9 OBESITY (BMI 30-39.9): ICD-10-CM

## 2021-12-21 PROCEDURE — 4010F ACE/ARB THERAPY RXD/TAKEN: CPT | Mod: S$GLB,,, | Performed by: FAMILY MEDICINE

## 2021-12-21 PROCEDURE — 99214 PR OFFICE/OUTPT VISIT, EST, LEVL IV, 30-39 MIN: ICD-10-PCS | Mod: S$GLB,,, | Performed by: FAMILY MEDICINE

## 2021-12-21 PROCEDURE — 99214 OFFICE O/P EST MOD 30 MIN: CPT | Mod: S$GLB,,, | Performed by: FAMILY MEDICINE

## 2021-12-21 PROCEDURE — 4010F PR ACE/ARB THEARPY RXD/TAKEN: ICD-10-PCS | Mod: S$GLB,,, | Performed by: FAMILY MEDICINE

## 2021-12-21 RX ORDER — TAMSULOSIN HYDROCHLORIDE 0.4 MG/1
0.8 CAPSULE ORAL DAILY
Qty: 180 CAPSULE | Refills: 1 | Status: SHIPPED | OUTPATIENT
Start: 2021-12-21 | End: 2022-01-31 | Stop reason: SDUPTHER

## 2021-12-21 RX ORDER — OMEPRAZOLE 20 MG/1
20 CAPSULE, DELAYED RELEASE ORAL DAILY
Qty: 90 CAPSULE | Refills: 1 | Status: SHIPPED | OUTPATIENT
Start: 2021-12-21 | End: 2022-01-31 | Stop reason: SDUPTHER

## 2021-12-21 RX ORDER — METOPROLOL SUCCINATE 50 MG/1
50 TABLET, EXTENDED RELEASE ORAL DAILY
Qty: 30 TABLET | Refills: 3 | Status: SHIPPED | OUTPATIENT
Start: 2021-12-21 | End: 2022-01-31 | Stop reason: SDUPTHER

## 2021-12-21 RX ORDER — METHOCARBAMOL 500 MG/1
500 TABLET, FILM COATED ORAL 4 TIMES DAILY
Qty: 40 TABLET | Refills: 1 | Status: SHIPPED | OUTPATIENT
Start: 2021-12-21 | End: 2022-01-31 | Stop reason: SDUPTHER

## 2021-12-21 RX ORDER — SOLIFENACIN SUCCINATE 10 MG/1
10 TABLET, FILM COATED ORAL DAILY
Qty: 30 TABLET | Refills: 4 | Status: SHIPPED | OUTPATIENT
Start: 2021-12-21 | End: 2022-08-19 | Stop reason: ALTCHOICE

## 2021-12-29 ENCOUNTER — HOSPITAL ENCOUNTER (OUTPATIENT)
Dept: RADIOLOGY | Facility: HOSPITAL | Age: 61
Discharge: HOME | End: 2021-12-29
Attending: ORTHOPAEDIC SURGERY
Payer: MEDICARE

## 2021-12-29 DIAGNOSIS — Z96.642 STATUS POST HIP REPLACEMENT, LEFT: ICD-10-CM

## 2021-12-29 PROCEDURE — 73502 X-RAY EXAM HIP UNI 2-3 VIEWS: CPT | Mod: 26,LT,, | Performed by: RADIOLOGY

## 2021-12-29 PROCEDURE — 73502 X-RAY EXAM HIP UNI 2-3 VIEWS: CPT | Mod: TC,PN,LT

## 2021-12-29 PROCEDURE — 73502 XR HIP WITH PELVIS WHEN PERFORMED, 2 OR 3 VIEWS LEFT: ICD-10-PCS | Mod: 26,LT,, | Performed by: RADIOLOGY

## 2021-12-30 ENCOUNTER — OFFICE VISIT (OUTPATIENT)
Dept: ORTHOPEDICS | Facility: CLINIC | Age: 61
End: 2021-12-30
Payer: MEDICARE

## 2021-12-30 VITALS — HEIGHT: 71 IN | RESPIRATION RATE: 16 BRPM | WEIGHT: 219 LBS | BODY MASS INDEX: 30.66 KG/M2

## 2021-12-30 DIAGNOSIS — Z96.642 HISTORY OF TOTAL HIP REPLACEMENT, LEFT: Primary | ICD-10-CM

## 2021-12-30 PROCEDURE — 1159F PR MEDICATION LIST DOCUMENTED IN MEDICAL RECORD: ICD-10-PCS | Mod: CPTII,S$GLB,, | Performed by: ORTHOPAEDIC SURGERY

## 2021-12-30 PROCEDURE — 1160F RVW MEDS BY RX/DR IN RCRD: CPT | Mod: CPTII,S$GLB,, | Performed by: ORTHOPAEDIC SURGERY

## 2021-12-30 PROCEDURE — 99213 OFFICE O/P EST LOW 20 MIN: CPT | Mod: S$GLB,,, | Performed by: ORTHOPAEDIC SURGERY

## 2021-12-30 PROCEDURE — 99999 PR PBB SHADOW E&M-EST. PATIENT-LVL III: ICD-10-PCS | Mod: PBBFAC,,, | Performed by: ORTHOPAEDIC SURGERY

## 2021-12-30 PROCEDURE — 99999 PR PBB SHADOW E&M-EST. PATIENT-LVL III: CPT | Mod: PBBFAC,,, | Performed by: ORTHOPAEDIC SURGERY

## 2021-12-30 PROCEDURE — 3008F PR BODY MASS INDEX (BMI) DOCUMENTED: ICD-10-PCS | Mod: CPTII,S$GLB,, | Performed by: ORTHOPAEDIC SURGERY

## 2021-12-30 PROCEDURE — 4010F PR ACE/ARB THEARPY RXD/TAKEN: ICD-10-PCS | Mod: CPTII,S$GLB,, | Performed by: ORTHOPAEDIC SURGERY

## 2021-12-30 PROCEDURE — 1160F PR REVIEW ALL MEDS BY PRESCRIBER/CLIN PHARMACIST DOCUMENTED: ICD-10-PCS | Mod: CPTII,S$GLB,, | Performed by: ORTHOPAEDIC SURGERY

## 2021-12-30 PROCEDURE — 3008F BODY MASS INDEX DOCD: CPT | Mod: CPTII,S$GLB,, | Performed by: ORTHOPAEDIC SURGERY

## 2021-12-30 PROCEDURE — 99213 PR OFFICE/OUTPT VISIT, EST, LEVL III, 20-29 MIN: ICD-10-PCS | Mod: S$GLB,,, | Performed by: ORTHOPAEDIC SURGERY

## 2021-12-30 PROCEDURE — 4010F ACE/ARB THERAPY RXD/TAKEN: CPT | Mod: CPTII,S$GLB,, | Performed by: ORTHOPAEDIC SURGERY

## 2021-12-30 PROCEDURE — 1159F MED LIST DOCD IN RCRD: CPT | Mod: CPTII,S$GLB,, | Performed by: ORTHOPAEDIC SURGERY

## 2021-12-30 RX ORDER — OXYCODONE AND ACETAMINOPHEN 5; 325 MG/1; MG/1
1 TABLET ORAL EVERY 6 HOURS PRN
Status: ON HOLD | COMMUNITY
End: 2023-05-09 | Stop reason: CLARIF

## 2021-12-30 RX ORDER — TOPIRAMATE 50 MG/1
50 TABLET, FILM COATED ORAL DAILY
COMMUNITY
Start: 2021-12-28 | End: 2023-05-05

## 2021-12-30 RX ORDER — MIRTAZAPINE 30 MG/1
30 TABLET, FILM COATED ORAL NIGHTLY
COMMUNITY
Start: 2021-12-28 | End: 2023-05-05

## 2022-01-31 ENCOUNTER — OFFICE VISIT (OUTPATIENT)
Dept: FAMILY MEDICINE | Facility: CLINIC | Age: 62
End: 2022-01-31
Payer: MEDICARE

## 2022-01-31 VITALS
DIASTOLIC BLOOD PRESSURE: 82 MMHG | OXYGEN SATURATION: 96 % | BODY MASS INDEX: 30.24 KG/M2 | HEIGHT: 71 IN | SYSTOLIC BLOOD PRESSURE: 136 MMHG | WEIGHT: 216 LBS | HEART RATE: 80 BPM

## 2022-01-31 DIAGNOSIS — M54.50 CHRONIC MIDLINE LOW BACK PAIN WITHOUT SCIATICA: ICD-10-CM

## 2022-01-31 DIAGNOSIS — M62.838 MUSCLE SPASM OF BOTH LOWER LEGS: ICD-10-CM

## 2022-01-31 DIAGNOSIS — Z96.642 HISTORY OF LEFT HIP REPLACEMENT: ICD-10-CM

## 2022-01-31 DIAGNOSIS — N40.1 BENIGN PROSTATIC HYPERPLASIA WITH URINARY FREQUENCY: ICD-10-CM

## 2022-01-31 DIAGNOSIS — N32.81 OAB (OVERACTIVE BLADDER): ICD-10-CM

## 2022-01-31 DIAGNOSIS — G47.30 SLEEP APNEA, UNSPECIFIED TYPE: Primary | ICD-10-CM

## 2022-01-31 DIAGNOSIS — Z12.5 PROSTATE CANCER SCREENING: ICD-10-CM

## 2022-01-31 DIAGNOSIS — G89.29 CHRONIC MIDLINE LOW BACK PAIN WITHOUT SCIATICA: ICD-10-CM

## 2022-01-31 DIAGNOSIS — B18.2 CHRONIC HEPATITIS C WITHOUT HEPATIC COMA: ICD-10-CM

## 2022-01-31 DIAGNOSIS — I10 PRIMARY HYPERTENSION: ICD-10-CM

## 2022-01-31 DIAGNOSIS — F17.200 CURRENT EVERY DAY SMOKER: ICD-10-CM

## 2022-01-31 DIAGNOSIS — R35.0 BENIGN PROSTATIC HYPERPLASIA WITH URINARY FREQUENCY: ICD-10-CM

## 2022-01-31 DIAGNOSIS — I10 ESSENTIAL HYPERTENSION: ICD-10-CM

## 2022-01-31 DIAGNOSIS — K21.9 GASTROESOPHAGEAL REFLUX DISEASE WITHOUT ESOPHAGITIS: ICD-10-CM

## 2022-01-31 PROCEDURE — 3079F DIAST BP 80-89 MM HG: CPT | Mod: S$GLB,,, | Performed by: NURSE PRACTITIONER

## 2022-01-31 PROCEDURE — 3008F PR BODY MASS INDEX (BMI) DOCUMENTED: ICD-10-PCS | Mod: S$GLB,,, | Performed by: NURSE PRACTITIONER

## 2022-01-31 PROCEDURE — 99214 OFFICE O/P EST MOD 30 MIN: CPT | Mod: 25,S$GLB,, | Performed by: NURSE PRACTITIONER

## 2022-01-31 PROCEDURE — 99406 PR TOBACCO USE CESSATION INTERMEDIATE 3-10 MINUTES: ICD-10-PCS | Mod: S$GLB,,, | Performed by: NURSE PRACTITIONER

## 2022-01-31 PROCEDURE — 3079F PR MOST RECENT DIASTOLIC BLOOD PRESSURE 80-89 MM HG: ICD-10-PCS | Mod: S$GLB,,, | Performed by: NURSE PRACTITIONER

## 2022-01-31 PROCEDURE — 3075F PR MOST RECENT SYSTOLIC BLOOD PRESS GE 130-139MM HG: ICD-10-PCS | Mod: S$GLB,,, | Performed by: NURSE PRACTITIONER

## 2022-01-31 PROCEDURE — 1160F PR REVIEW ALL MEDS BY PRESCRIBER/CLIN PHARMACIST DOCUMENTED: ICD-10-PCS | Mod: S$GLB,,, | Performed by: NURSE PRACTITIONER

## 2022-01-31 PROCEDURE — 99406 BEHAV CHNG SMOKING 3-10 MIN: CPT | Mod: S$GLB,,, | Performed by: NURSE PRACTITIONER

## 2022-01-31 PROCEDURE — 3008F BODY MASS INDEX DOCD: CPT | Mod: S$GLB,,, | Performed by: NURSE PRACTITIONER

## 2022-01-31 PROCEDURE — 1160F RVW MEDS BY RX/DR IN RCRD: CPT | Mod: S$GLB,,, | Performed by: NURSE PRACTITIONER

## 2022-01-31 PROCEDURE — 99214 PR OFFICE/OUTPT VISIT, EST, LEVL IV, 30-39 MIN: ICD-10-PCS | Mod: 25,S$GLB,, | Performed by: NURSE PRACTITIONER

## 2022-01-31 PROCEDURE — 3075F SYST BP GE 130 - 139MM HG: CPT | Mod: S$GLB,,, | Performed by: NURSE PRACTITIONER

## 2022-01-31 RX ORDER — SOLIFENACIN SUCCINATE 5 MG/1
5 TABLET, FILM COATED ORAL DAILY
Qty: 30 TABLET | Refills: 5 | Status: SHIPPED | OUTPATIENT
Start: 2022-01-31 | End: 2022-08-08 | Stop reason: SDUPTHER

## 2022-01-31 RX ORDER — SOLIFENACIN SUCCINATE 10 MG/1
10 TABLET, FILM COATED ORAL DAILY
Qty: 30 TABLET | Refills: 4 | Status: CANCELLED | OUTPATIENT
Start: 2022-01-31 | End: 2023-01-31

## 2022-01-31 RX ORDER — TAMSULOSIN HYDROCHLORIDE 0.4 MG/1
0.8 CAPSULE ORAL DAILY
Qty: 180 CAPSULE | Refills: 1 | Status: SHIPPED | OUTPATIENT
Start: 2022-01-31 | End: 2022-09-07 | Stop reason: SDUPTHER

## 2022-01-31 RX ORDER — OMEPRAZOLE 20 MG/1
20 CAPSULE, DELAYED RELEASE ORAL DAILY
Qty: 90 CAPSULE | Refills: 1 | Status: SHIPPED | OUTPATIENT
Start: 2022-01-31 | End: 2023-02-09 | Stop reason: SDUPTHER

## 2022-01-31 RX ORDER — METOPROLOL SUCCINATE 50 MG/1
50 TABLET, EXTENDED RELEASE ORAL DAILY
Qty: 30 TABLET | Refills: 3 | Status: SHIPPED | OUTPATIENT
Start: 2022-01-31 | End: 2023-07-13 | Stop reason: SDUPTHER

## 2022-01-31 RX ORDER — METHOCARBAMOL 500 MG/1
500 TABLET, FILM COATED ORAL 4 TIMES DAILY
Qty: 40 TABLET | Refills: 1 | Status: SHIPPED | OUTPATIENT
Start: 2022-01-31 | End: 2022-03-04 | Stop reason: SDUPTHER

## 2022-01-31 NOTE — PROGRESS NOTES
SUBJECTIVE:    Patient ID: Omar Trotter is a 61 y.o. male.    Chief Complaint: Follow-up (4 week f./u for med check//no med bottles//declined flu vac//colonoscopy scheduled for next week dr. Gooden//negar)    Pt presents for follow-up. Saw Dr. London in Dec. At that time, he was taking 4 Flomax pills per day to combat urinary symptoms. Advised he should not take anymore than 2 and was also started on Vesicare. Pt reports the Vesicare helped a lot with his symptoms but stopped taking a few days ago d/t feeling dried out. Reports throat and mouth were extremely dry. However, medication did significantly improve his symptoms and he was able to sleep through the night. He was also started on Metoprolol which has improved his BP today. Order for sleep study placed at last visit but pt never received a call.  He is currently seeing Dr. Godoy with pain management for back issues. He is currently maintained on Oxycodone. Also had a rhizotomy which helped somewhat. Will have another next week. He did see a neurosurgeon who wants to do surgery but having issues with insurance approval. He was told would have to quit smoking in order to have surgery. Currently smoking about 1 pack every 3 days. Also vaping to cut down on cigarettes. Thinks he can do it on his own and does not want referral.   Hip is doing much better. Wound has resolved. He is able to get around some without his cane.  Plans for colonoscopy with Dr. Gooden on 2/10. They will discuss possible Hep C treatment after that.   Declines flu vaccine. Does not plan to receive covid vaccine.                No visits with results within 6 Month(s) from this visit.   Latest known visit with results is:   Office Visit on 06/25/2021   Component Date Value Ref Range Status    DRUGS EXPECTED 06/25/2021 See Interp   Final    PAIN MANAGEMENT DRUG PANEL INTERP 06/25/2021 Inconsistent   Final    CODEINE 06/25/2021 Not Detected   Final    MORPHINE 06/25/2021 Not Detected    Final    6-ACETYLMORPHINE 06/25/2021 Not Detected   Final    OXYCODONE 06/25/2021 Not Detected   Final    NOROYXCODONE 06/25/2021 Not Detected   Final    OXYMORPHONE 06/25/2021 Not Detected   Final    NOROXYMORPHONE 06/25/2021 Not Detected   Final    HYDROCODONE 06/25/2021 Present   Final    NORHYDROCODONE 06/25/2021 Present   Final    HYDROMORPHONE 06/25/2021 Present   Final    BUPRENORPHINE 06/25/2021 Not Detected   Final    NORUBPRENORPHINE 06/25/2021 Not Detected   Final    FENTANYL 06/25/2021 Not Detected   Final    NORFENTANYL 06/25/2021 Not Detected   Final    MEPERIDINE METABOLITE 06/25/2021 Not Detected   Final    TAPENTADOL 06/25/2021 Not Detected   Final    TAPENTADOL-O-SULF 06/25/2021 Not Detected   Final    METHADONE 06/25/2021 Not Detected   Final    TRAMADOL 06/25/2021 Not Detected   Final    AMPHETAMINE 06/25/2021 Not Detected   Final    METHAMPHETAMINE 06/25/2021 Not Detected   Final    MDMA- ECSTASY 06/25/2021 Not Detected   Final    MDA 06/25/2021 Not Detected   Final    MDEA- Isabell 06/25/2021 Not Detected   Final    METHYLPHENIDATE 06/25/2021 Not Detected   Final    PHENTERMINE 06/25/2021 Not Detected   Final    BENZOYLECGONINE 06/25/2021 Not Detected   Final    ALPRAZOLAM 06/25/2021 Not Detected   Final    ALPHA-OH-ALPRAZOLAM 06/25/2021 Not Detected   Final    CLONAZEPAM 06/25/2021 Not Detected   Final    7-AMINOCLONAZEPAM 06/25/2021 Not Detected   Final    DIAZEPAM 06/25/2021 Not Detected   Final    NORDIAZEPAM 06/25/2021 Not Detected   Final    OXAZEPAM 06/25/2021 Present   Final    TEMAZEPAM 06/25/2021 Not Detected   Final    Lorazepam 06/25/2021 Not Detected   Final    MIDAZOLAM 06/25/2021 Not Detected   Final    ZOLPIDEM 06/25/2021 Not Detected   Final    BARBITURATES 06/25/2021 Not Detected   Final    Creatinine, Urine 06/25/2021 207.4  20.0 - 400.0 mg/dL Final    ETHYL GLUCURONIDE 06/25/2021 Not Detected   Final    MARIJUANA METABOLITE 06/25/2021  Present   Final    PCP 06/25/2021 Not Detected   Final    CARISOPRODOL 06/25/2021 Not Detected   Final    Naloxone 06/25/2021 Not Detected   Final    Gabapentin 06/25/2021 Present   Final    Pregabalin 06/25/2021 Not Detected   Final    Alpha-OH-Midazolam 06/25/2021 Not Detected   Final    Zolpidem Metabolite 06/25/2021 Not Detected   Final    PAIN MANAGEMENT DRUG PANEL 06/25/2021 See Below   Final    EER PAIN MGT MONTEMAYOR,HIGH RES/EMIT, IN* 06/25/2021 See Note   Final       Past Medical History:   Diagnosis Date    Arthritis     GERD (gastroesophageal reflux disease)     Hepatitis C     STATES DX 10-20. NO S/S. NO FURTHER TESTS OR TX. WITH INSURANCE IN 2021 CAN GET IT EVALUATED.    History of left hip replacement 05/20/2021    History of MRSA infection     History of right shoulder replacement 01/15/2021    Hypertension     Wears glasses      Past Surgical History:   Procedure Laterality Date    APPENDECTOMY      ARTHROPLASTY OF SHOULDER Right 1/13/2021    Procedure: ARTHROPLASTY, SHOULDER TOTAL;  Surgeon: Emilio Siddiqui MD;  Location: [facility];  Service: Orthopedics;  Laterality: Right;  GENERAL AND BLOCK    HERNIA REPAIR      HIP REPLACEMENT ARTHROPLASTY Left 3/24/2021    Procedure: ARTHROPLASTY, HIP REPLACEMENT;  Surgeon: Miles Rivas II, MD;  Location: [facility] OR;  Service: Orthopedics;  Laterality: Left;    JOINT REPLACEMENT Right     shoulder    REVISION TOTAL HIP ARTHROPLASTY Left 6/2/2021    Procedure: REVISION, TOTAL ARTHROPLASTY, HIP;  Surgeon: Miles Rivas II, MD;  Location: [facility] OR;  Service: Orthopedics;  Laterality: Left;     Family History   Problem Relation Age of Onset    Cancer Mother         leukemia       Marital Status:   Alcohol History:  reports no history of alcohol use.  Tobacco History:  reports that he has been smoking cigarettes. He has a 15.00 pack-year smoking history. He has never used smokeless tobacco.  Drug History:  reports no history of drug  use.    Review of patient's allergies indicates:   Allergen Reactions    Pcn [penicillins]      As a child.       Current Outpatient Medications:     DULoxetine (CYMBALTA) 60 MG capsule, Take 120 mg by mouth once daily., Disp: , Rfl:     gabapentin (NEURONTIN) 300 MG capsule, TAKE 1 CAPSULE BY MOUTH THREE TIMES DAILY, Disp: 90 capsule, Rfl: 0    hydroCHLOROthiazide (HYDRODIURIL) 25 MG tablet, , Disp: , Rfl:     HYDROcodone-acetaminophen (NORCO) 5-325 mg per tablet, Take 1 tablet by mouth every 6 (six) hours as needed for Pain. (Patient not taking: Reported on 12/30/2021), Disp: 28 tablet, Rfl: 0    ibuprofen (ADVIL,MOTRIN) 800 MG tablet, Take 1 tablet (800 mg total) by mouth every 6 (six) hours as needed for Pain. (Patient not taking: Reported on 12/30/2021), Disp: 90 tablet, Rfl: 3    lisinopriL (PRINIVIL,ZESTRIL) 40 MG tablet, Take 1 tablet (40 mg total) by mouth once daily., Disp: 30 tablet, Rfl: 4    methocarbamoL (ROBAXIN) 500 MG Tab, Take 1 tablet (500 mg total) by mouth 4 (four) times daily., Disp: 40 tablet, Rfl: 1    metoprolol succinate (TOPROL-XL) 50 MG 24 hr tablet, Take 1 tablet (50 mg total) by mouth once daily., Disp: 30 tablet, Rfl: 3    mirtazapine (REMERON) 30 MG tablet, Take 30 mg by mouth every evening., Disp: , Rfl:     omeprazole (PRILOSEC) 20 MG capsule, Take 1 capsule (20 mg total) by mouth once daily., Disp: 90 capsule, Rfl: 1    oxyCODONE-acetaminophen (PERCOCET) 5-325 mg per tablet, oxycodone-acetaminophen 5 mg-325 mg tablet  TAKE 1 TABLET BY MOUTH FOUR TIMES DAILY AS NEEDED FOR PAIN, Disp: , Rfl:     solifenacin (VESICARE) 10 MG tablet, Take 1 tablet (10 mg total) by mouth once daily. For overactive bladder, Disp: 30 tablet, Rfl: 4    solifenacin (VESICARE) 5 MG tablet, Take 1 tablet (5 mg total) by mouth once daily., Disp: 30 tablet, Rfl: 5    tamsulosin (FLOMAX) 0.4 mg Cap, Take 2 capsules (0.8 mg total) by mouth once daily., Disp: 180 capsule, Rfl: 1    topiramate  "(TOPAMAX) 50 MG tablet, Take 50 mg by mouth once daily., Disp: , Rfl:     traZODone (DESYREL) 150 MG tablet, , Disp: , Rfl:   No current facility-administered medications for this visit.    Facility-Administered Medications Ordered in Other Visits:     electrolyte-S (ISOLYTE), , Intravenous, Continuous, Mabel Infante MD, Stopped at 06/16/21 1300    HYDROmorphone (PF) injection 0.2 mg, 0.2 mg, Intravenous, Q5 Min PRN, Mabel Infante MD, 0.2 mg at 06/16/21 1310    LIDOcaine (PF) 10 mg/ml (1%) injection 10 mg, 1 mL, Intradermal, Once, Mabel Infante MD    oxyCODONE immediate release tablet 5 mg, 5 mg, Oral, Q3H PRN, Mabel Infante MD, 5 mg at 06/16/21 1232    Review of Systems   Constitutional: Positive for activity change. Negative for fatigue, fever and unexpected weight change.   HENT: Negative for sore throat and trouble swallowing.         Seasonal allergies- takes zyrtec   Respiratory: Negative for chest tightness and shortness of breath.    Cardiovascular: Negative for chest pain and palpitations.   Gastrointestinal: Negative.    Musculoskeletal: Positive for arthralgias, back pain and gait problem.   Neurological: Negative for dizziness and headaches.   Psychiatric/Behavioral: Negative for dysphoric mood and sleep disturbance.          Objective:      Vitals:    01/31/22 1134   BP: 136/82   Pulse: 80   SpO2: 96%   Weight: 98 kg (216 lb)   Height: 5' 11" (1.803 m)     Body mass index is 30.13 kg/m².  Physical Exam  Constitutional:       Appearance: Normal appearance.   HENT:      Head: Normocephalic and atraumatic.      Mouth/Throat:      Mouth: Mucous membranes are moist.      Pharynx: Oropharynx is clear.   Cardiovascular:      Rate and Rhythm: Normal rate and regular rhythm.      Heart sounds: Normal heart sounds.   Pulmonary:      Effort: Pulmonary effort is normal. No respiratory distress.   Abdominal:      General: There is no distension.      Tenderness: There is no abdominal " tenderness.   Musculoskeletal:      Comments: Walks with limp. Able to bend 90 degrees at the waist.    Skin:     General: Skin is warm.   Neurological:      Mental Status: He is alert and oriented to person, place, and time.   Psychiatric:         Mood and Affect: Mood normal.           Assessment:       1. Sleep apnea, unspecified type    2. Muscle spasm of both lower legs    3. Primary hypertension    4. Gastroesophageal reflux disease without esophagitis    5. OAB (overactive bladder)    6. Benign prostatic hyperplasia with urinary frequency    7. Prostate cancer screening    8. Current every day smoker    9. Chronic hepatitis C without hepatic coma    10. History of left hip replacement    11. Essential hypertension    12. Chronic midline low back pain without sciatica         Plan:       Sleep apnea, unspecified type  -     Ambulatory referral/consult to Pulmonology; Future; Expected date: 02/07/2022    Muscle spasm of both lower legs  -     methocarbamoL (ROBAXIN) 500 MG Tab; Take 1 tablet (500 mg total) by mouth 4 (four) times daily.  Dispense: 40 tablet; Refill: 1    Primary hypertension  -     metoprolol succinate (TOPROL-XL) 50 MG 24 hr tablet; Take 1 tablet (50 mg total) by mouth once daily.  Dispense: 30 tablet; Refill: 3  -     CBC Auto Differential; Future; Expected date: 05/23/2022  -     Comprehensive Metabolic Panel; Future; Expected date: 05/23/2022  -     Lipid Panel; Future; Expected date: 05/23/2022  -     TSH w/reflex to FT4; Future; Expected date: 05/23/2022  -     Urinalysis, Reflex to Urine Culture Urine, Clean Catch; Future; Expected date: 05/23/2022  -     Microalbumin/Creatinine Ratio, Urine; Future; Expected date: 05/23/2022    Gastroesophageal reflux disease without esophagitis  -     omeprazole (PRILOSEC) 20 MG capsule; Take 1 capsule (20 mg total) by mouth once daily.  Dispense: 90 capsule; Refill: 1    OAB (overactive bladder)  -     solifenacin (VESICARE) 5 MG tablet; Take 1 tablet  (5 mg total) by mouth once daily.  Dispense: 30 tablet; Refill: 5  - Will try decreased dose of Vesicare. If still poor side effects, will consider different med.     Benign prostatic hyperplasia with urinary frequency  -     tamsulosin (FLOMAX) 0.4 mg Cap; Take 2 capsules (0.8 mg total) by mouth once daily.  Dispense: 180 capsule; Refill: 1    Prostate cancer screening  -     PSA, Screening; Future; Expected date: 05/23/2022    Current every day smoker  Assistance with smoking cessation was offered, including:  []  Medications  [x]  Counseling  []  Printed Information on Smoking Cessation  [x]  Referral to a Smoking Cessation Program    Patient was counseled regarding smoking for 3-10 minutes.      Chronic hepatitis C without hepatic coma  - Per Dr. Gooden    History of left hip replacement  - Much improved      Chronic midline low back pain without sciatica  - Per Dr. Godoy. Considering surgery    Follow up in about 4 months (around 5/31/2022) for HTN- with labs.

## 2022-03-04 DIAGNOSIS — I10 ESSENTIAL HYPERTENSION: ICD-10-CM

## 2022-03-04 DIAGNOSIS — M62.838 MUSCLE SPASM OF BOTH LOWER LEGS: ICD-10-CM

## 2022-03-04 RX ORDER — LISINOPRIL 40 MG/1
40 TABLET ORAL DAILY
Qty: 30 TABLET | Refills: 4 | Status: SHIPPED | OUTPATIENT
Start: 2022-03-04 | End: 2022-08-08 | Stop reason: SDUPTHER

## 2022-03-04 RX ORDER — METHOCARBAMOL 500 MG/1
500 TABLET, FILM COATED ORAL 4 TIMES DAILY
Qty: 40 TABLET | Refills: 1 | Status: SHIPPED | OUTPATIENT
Start: 2022-03-04 | End: 2022-04-22 | Stop reason: SDUPTHER

## 2022-03-04 NOTE — TELEPHONE ENCOUNTER
----- Message from Donna Cervantes sent at 3/4/2022  9:23 AM CST -----  Pt needs refill on lisinopril, methobaromal 500mg  [facility]  [phone]

## 2022-04-22 DIAGNOSIS — M62.838 MUSCLE SPASM OF BOTH LOWER LEGS: ICD-10-CM

## 2022-04-22 RX ORDER — METHOCARBAMOL 500 MG/1
500 TABLET, FILM COATED ORAL 4 TIMES DAILY
Qty: 40 TABLET | Refills: 1 | Status: SHIPPED | OUTPATIENT
Start: 2022-04-22 | End: 2022-05-31 | Stop reason: SDUPTHER

## 2022-04-22 NOTE — TELEPHONE ENCOUNTER
----- Message from Cecelia Grigsby sent at 4/22/2022 11:20 AM CDT -----  Pt calling for a refill on Methocarbamol to [facility]    # [phone]

## 2022-05-11 ENCOUNTER — TELEPHONE (OUTPATIENT)
Dept: FAMILY MEDICINE | Facility: CLINIC | Age: 62
End: 2022-05-11

## 2022-05-25 ENCOUNTER — TELEPHONE (OUTPATIENT)
Dept: FAMILY MEDICINE | Facility: CLINIC | Age: 62
End: 2022-05-25

## 2022-05-25 LAB
ALBUMIN SERPL-MCNC: 3.7 G/DL (ref 3.6–5.1)
ALBUMIN/GLOB SERPL: 1 (CALC) (ref 1–2.5)
ALP SERPL-CCNC: 167 U/L (ref 35–144)
ALT SERPL-CCNC: 11 U/L (ref 9–46)
AST SERPL-CCNC: 18 U/L (ref 10–35)
BASOPHILS # BLD AUTO: 51 CELLS/UL (ref 0–200)
BASOPHILS NFR BLD AUTO: 0.8 %
BILIRUB SERPL-MCNC: 0.5 MG/DL (ref 0.2–1.2)
BUN SERPL-MCNC: 16 MG/DL (ref 7–25)
BUN/CREAT SERPL: ABNORMAL (CALC) (ref 6–22)
CALCIUM SERPL-MCNC: 8.7 MG/DL (ref 8.6–10.3)
CHLORIDE SERPL-SCNC: 111 MMOL/L (ref 98–110)
CHOLEST SERPL-MCNC: 135 MG/DL
CHOLEST/HDLC SERPL: 3.3 (CALC)
CO2 SERPL-SCNC: 22 MMOL/L (ref 20–32)
CREAT SERPL-MCNC: 0.94 MG/DL (ref 0.7–1.25)
EOSINOPHIL # BLD AUTO: 160 CELLS/UL (ref 15–500)
EOSINOPHIL NFR BLD AUTO: 2.5 %
ERYTHROCYTE [DISTWIDTH] IN BLOOD BY AUTOMATED COUNT: 15 % (ref 11–15)
GLOBULIN SER CALC-MCNC: 3.7 G/DL (CALC) (ref 1.9–3.7)
GLUCOSE SERPL-MCNC: 86 MG/DL (ref 65–139)
HCT VFR BLD AUTO: 31 % (ref 38.5–50)
HDLC SERPL-MCNC: 41 MG/DL
HGB BLD-MCNC: 9.6 G/DL (ref 13.2–17.1)
LDLC SERPL CALC-MCNC: 76 MG/DL (CALC)
LYMPHOCYTES # BLD AUTO: 1274 CELLS/UL (ref 850–3900)
LYMPHOCYTES NFR BLD AUTO: 19.9 %
MCH RBC QN AUTO: 24.1 PG (ref 27–33)
MCHC RBC AUTO-ENTMCNC: 31 G/DL (ref 32–36)
MCV RBC AUTO: 77.9 FL (ref 80–100)
MONOCYTES # BLD AUTO: 410 CELLS/UL (ref 200–950)
MONOCYTES NFR BLD AUTO: 6.4 %
NEUTROPHILS # BLD AUTO: 4506 CELLS/UL (ref 1500–7800)
NEUTROPHILS NFR BLD AUTO: 70.4 %
NONHDLC SERPL-MCNC: 94 MG/DL (CALC)
PLATELET # BLD AUTO: 239 THOUSAND/UL (ref 140–400)
PMV BLD REES-ECKER: 10.3 FL (ref 7.5–12.5)
POTASSIUM SERPL-SCNC: 4.1 MMOL/L (ref 3.5–5.3)
PROT SERPL-MCNC: 7.4 G/DL (ref 6.1–8.1)
PSA SERPL-MCNC: 0.29 NG/ML
RBC # BLD AUTO: 3.98 MILLION/UL (ref 4.2–5.8)
SODIUM SERPL-SCNC: 141 MMOL/L (ref 135–146)
TRIGL SERPL-MCNC: 96 MG/DL
TSH SERPL-ACNC: 1.87 MIU/L (ref 0.4–4.5)
WBC # BLD AUTO: 6.4 THOUSAND/UL (ref 3.8–10.8)

## 2022-05-26 LAB
ALBUMIN/CREAT UR: 7 MCG/MG CREAT
APPEARANCE UR: CLEAR
BACTERIA #/AREA URNS HPF: ABNORMAL /HPF
BACTERIA UR CULT: ABNORMAL
BACTERIA UR CULT: ABNORMAL
BILIRUB UR QL STRIP: NEGATIVE
COLOR UR: ABNORMAL
CREAT UR-MCNC: 240 MG/DL (ref 20–320)
GLUCOSE UR QL STRIP: NEGATIVE
HGB UR QL STRIP: NEGATIVE
HYALINE CASTS #/AREA URNS LPF: ABNORMAL /LPF
KETONES UR QL STRIP: ABNORMAL
LEUKOCYTE ESTERASE UR QL STRIP: ABNORMAL
MICROALBUMIN UR-MCNC: 1.6 MG/DL
NITRITE UR QL STRIP: NEGATIVE
PH UR STRIP: 6 [PH] (ref 5–8)
PROT UR QL STRIP: ABNORMAL
RBC #/AREA URNS HPF: ABNORMAL /HPF
SP GR UR STRIP: 1.03 (ref 1–1.03)
SQUAMOUS #/AREA URNS HPF: ABNORMAL /HPF
WBC #/AREA URNS HPF: ABNORMAL /HPF

## 2022-05-31 ENCOUNTER — OFFICE VISIT (OUTPATIENT)
Dept: FAMILY MEDICINE | Facility: CLINIC | Age: 62
End: 2022-05-31
Payer: MEDICARE

## 2022-05-31 VITALS
HEART RATE: 90 BPM | OXYGEN SATURATION: 98 % | HEIGHT: 71 IN | WEIGHT: 216.19 LBS | BODY MASS INDEX: 30.27 KG/M2 | SYSTOLIC BLOOD PRESSURE: 144 MMHG | DIASTOLIC BLOOD PRESSURE: 82 MMHG | TEMPERATURE: 98 F

## 2022-05-31 DIAGNOSIS — M19.011 OSTEOARTHRITIS OF RIGHT SHOULDER, UNSPECIFIED OSTEOARTHRITIS TYPE: ICD-10-CM

## 2022-05-31 DIAGNOSIS — D50.9 IRON DEFICIENCY ANEMIA, UNSPECIFIED IRON DEFICIENCY ANEMIA TYPE: ICD-10-CM

## 2022-05-31 DIAGNOSIS — B18.2 CHRONIC HEPATITIS C WITHOUT HEPATIC COMA: ICD-10-CM

## 2022-05-31 DIAGNOSIS — G47.30 SLEEP APNEA, UNSPECIFIED TYPE: ICD-10-CM

## 2022-05-31 DIAGNOSIS — K74.60 HEPATIC CIRRHOSIS, UNSPECIFIED HEPATIC CIRRHOSIS TYPE, UNSPECIFIED WHETHER ASCITES PRESENT: ICD-10-CM

## 2022-05-31 DIAGNOSIS — M62.838 MUSCLE SPASM OF BOTH LOWER LEGS: ICD-10-CM

## 2022-05-31 DIAGNOSIS — E66.9 OBESITY (BMI 30-39.9): ICD-10-CM

## 2022-05-31 DIAGNOSIS — K21.9 GASTROESOPHAGEAL REFLUX DISEASE, UNSPECIFIED WHETHER ESOPHAGITIS PRESENT: ICD-10-CM

## 2022-05-31 DIAGNOSIS — I10 PRIMARY HYPERTENSION: Primary | ICD-10-CM

## 2022-05-31 DIAGNOSIS — G47.33 OSA (OBSTRUCTIVE SLEEP APNEA): ICD-10-CM

## 2022-05-31 DIAGNOSIS — N40.1 BPH ASSOCIATED WITH NOCTURIA: ICD-10-CM

## 2022-05-31 DIAGNOSIS — R35.1 BPH ASSOCIATED WITH NOCTURIA: ICD-10-CM

## 2022-05-31 PROCEDURE — 3077F SYST BP >= 140 MM HG: CPT | Mod: CPTII,S$GLB,, | Performed by: PHYSICIAN ASSISTANT

## 2022-05-31 PROCEDURE — 1160F PR REVIEW ALL MEDS BY PRESCRIBER/CLIN PHARMACIST DOCUMENTED: ICD-10-PCS | Mod: CPTII,S$GLB,, | Performed by: PHYSICIAN ASSISTANT

## 2022-05-31 PROCEDURE — 3008F BODY MASS INDEX DOCD: CPT | Mod: CPTII,S$GLB,, | Performed by: PHYSICIAN ASSISTANT

## 2022-05-31 PROCEDURE — 3061F PR NEG MICROALBUMINURIA RESULT DOCUMENTED/REVIEW: ICD-10-PCS | Mod: CPTII,S$GLB,, | Performed by: PHYSICIAN ASSISTANT

## 2022-05-31 PROCEDURE — 4010F PR ACE/ARB THEARPY RXD/TAKEN: ICD-10-PCS | Mod: CPTII,S$GLB,, | Performed by: PHYSICIAN ASSISTANT

## 2022-05-31 PROCEDURE — 3061F NEG MICROALBUMINURIA REV: CPT | Mod: CPTII,S$GLB,, | Performed by: PHYSICIAN ASSISTANT

## 2022-05-31 PROCEDURE — 99214 PR OFFICE/OUTPT VISIT, EST, LEVL IV, 30-39 MIN: ICD-10-PCS | Mod: S$GLB,,, | Performed by: PHYSICIAN ASSISTANT

## 2022-05-31 PROCEDURE — 1159F PR MEDICATION LIST DOCUMENTED IN MEDICAL RECORD: ICD-10-PCS | Mod: CPTII,S$GLB,, | Performed by: PHYSICIAN ASSISTANT

## 2022-05-31 PROCEDURE — 3066F NEPHROPATHY DOC TX: CPT | Mod: CPTII,S$GLB,, | Performed by: PHYSICIAN ASSISTANT

## 2022-05-31 PROCEDURE — 3079F DIAST BP 80-89 MM HG: CPT | Mod: CPTII,S$GLB,, | Performed by: PHYSICIAN ASSISTANT

## 2022-05-31 PROCEDURE — 99214 OFFICE O/P EST MOD 30 MIN: CPT | Mod: S$GLB,,, | Performed by: PHYSICIAN ASSISTANT

## 2022-05-31 PROCEDURE — 3066F PR DOCUMENTATION OF TREATMENT FOR NEPHROPATHY: ICD-10-PCS | Mod: CPTII,S$GLB,, | Performed by: PHYSICIAN ASSISTANT

## 2022-05-31 PROCEDURE — 3079F PR MOST RECENT DIASTOLIC BLOOD PRESSURE 80-89 MM HG: ICD-10-PCS | Mod: CPTII,S$GLB,, | Performed by: PHYSICIAN ASSISTANT

## 2022-05-31 PROCEDURE — 4010F ACE/ARB THERAPY RXD/TAKEN: CPT | Mod: CPTII,S$GLB,, | Performed by: PHYSICIAN ASSISTANT

## 2022-05-31 PROCEDURE — 3077F PR MOST RECENT SYSTOLIC BLOOD PRESSURE >= 140 MM HG: ICD-10-PCS | Mod: CPTII,S$GLB,, | Performed by: PHYSICIAN ASSISTANT

## 2022-05-31 PROCEDURE — 1160F RVW MEDS BY RX/DR IN RCRD: CPT | Mod: CPTII,S$GLB,, | Performed by: PHYSICIAN ASSISTANT

## 2022-05-31 PROCEDURE — 1159F MED LIST DOCD IN RCRD: CPT | Mod: CPTII,S$GLB,, | Performed by: PHYSICIAN ASSISTANT

## 2022-05-31 PROCEDURE — 3008F PR BODY MASS INDEX (BMI) DOCUMENTED: ICD-10-PCS | Mod: CPTII,S$GLB,, | Performed by: PHYSICIAN ASSISTANT

## 2022-05-31 RX ORDER — AMLODIPINE BESYLATE 5 MG/1
5 TABLET ORAL DAILY
Qty: 90 TABLET | Refills: 1 | Status: SHIPPED | OUTPATIENT
Start: 2022-05-31 | End: 2023-07-13 | Stop reason: SDUPTHER

## 2022-05-31 RX ORDER — METHOCARBAMOL 500 MG/1
500 TABLET, FILM COATED ORAL NIGHTLY PRN
Qty: 40 TABLET | Refills: 1 | Status: SHIPPED | OUTPATIENT
Start: 2022-05-31 | End: 2022-08-01

## 2022-05-31 NOTE — PROGRESS NOTES
SUBJECTIVE:    Patient ID: Omar Trotter is a 61 y.o. male.    Chief Complaint: Follow-up (No bottles/ 4 month follow up/pna denied//dp)    Pt is a 61 y.o. male who presents today for a 4-month follow-up.  He is a previous patient of Vivien Cisneros NP.  He did not bring his medication list in office today. As for his HTN, BP is noted to be elevated in office today.  At home BP has been ranging in the 140's/70-90'mmHg. He is currently managed on Lisinopril 40mg q.d. monotherapy. He reports stopping his HCTZ because it was making him urinate to much. Also, he reports stopping his Metoprolol Succ 50mg q.d. due to insurance coverage and financial strains. Today, he denies any CP, palpitations, lightheadedness, or dizziness.  Also, today he would like to discuss a sleep study.  His roommate reports he snores loudly at night and stops breathing.  Additionally, he reports apneic episodes that wake him at night.    He has a history of BPH - currently managed on Flomax 0.8 mg q.d. and VESIcare 10mg q.d.. PSA is UTD (0.29). Overall, he reports feeling well and denies any LUTS today.  He is currently in the process of following up with Dr. Gooden (GI) who is managing his Chronic Hep C. Lab work has been ordered, is pending, and treatment will depend on the pending lab results.    Dr. Godoy (Pain Management) manages his chronic lower back pain and DDD.  He manages his pain with p.r.n. Oxycodone and has a Rhizotomy scheduled for 6/6/12. He is considering surgery with Dr. Serrano (Neurosurgery) at this time, but due to financial reasons, has not scheduled the procedure. He currently ambulates with a cane and is beginning to experienced bilateral sciatica.     UTD: C-scope (02/10/2022) - Dr. Gooden - RTC 3 years.    Lab work is UTD and was reviewed with the patient in office today.    Office Visit on 01/31/2022   Component Date Value Ref Range Status    WBC 05/24/2022 6.4  3.8 - 10.8 Thousand/uL Final    RBC  05/24/2022 3.98 (A) 4.20 - 5.80 Million/uL Final    Hemoglobin 05/24/2022 9.6 (A) 13.2 - 17.1 g/dL Final    Hematocrit 05/24/2022 31.0 (A) 38.5 - 50.0 % Final    MCV 05/24/2022 77.9 (A) 80.0 - 100.0 fL Final    MCH 05/24/2022 24.1 (A) 27.0 - 33.0 pg Final    MCHC 05/24/2022 31.0 (A) 32.0 - 36.0 g/dL Final    RDW 05/24/2022 15.0  11.0 - 15.0 % Final    Platelets 05/24/2022 239  140 - 400 Thousand/uL Final    MPV 05/24/2022 10.3  7.5 - 12.5 fL Final    Neutrophils, Abs 05/24/2022 4,506  1,500 - 7,800 cells/uL Final    Lymph # 05/24/2022 1,274  850 - 3,900 cells/uL Final    Mono # 05/24/2022 410  200 - 950 cells/uL Final    Eos # 05/24/2022 160  15 - 500 cells/uL Final    Baso # 05/24/2022 51  0 - 200 cells/uL Final    Neutrophils Relative 05/24/2022 70.4  % Final    Lymph % 05/24/2022 19.9  % Final    Mono % 05/24/2022 6.4  % Final    Eosinophil % 05/24/2022 2.5  % Final    Basophil % 05/24/2022 0.8  % Final    Glucose 05/24/2022 86  65 - 139 mg/dL Final    BUN 05/24/2022 16  7 - 25 mg/dL Final    Creatinine 05/24/2022 0.94  0.70 - 1.25 mg/dL Final    eGFR if non  05/24/2022 86  > OR = 60 mL/min/1.73m2 Final    eGFR if  05/24/2022 100  > OR = 60 mL/min/1.73m2 Final    BUN/Creatinine Ratio 05/24/2022 NOT APPLICABLE  6 - 22 (calc) Final    Sodium 05/24/2022 141  135 - 146 mmol/L Final    Potassium 05/24/2022 4.1  3.5 - 5.3 mmol/L Final    Chloride 05/24/2022 111 (A) 98 - 110 mmol/L Final    CO2 05/24/2022 22  20 - 32 mmol/L Final    Calcium 05/24/2022 8.7  8.6 - 10.3 mg/dL Final    Total Protein 05/24/2022 7.4  6.1 - 8.1 g/dL Final    Albumin 05/24/2022 3.7  3.6 - 5.1 g/dL Final    Globulin, Total 05/24/2022 3.7  1.9 - 3.7 g/dL (calc) Final    Albumin/Globulin Ratio 05/24/2022 1.0  1.0 - 2.5 (calc) Final    Total Bilirubin 05/24/2022 0.5  0.2 - 1.2 mg/dL Final    Alkaline Phosphatase 05/24/2022 167 (A) 35 - 144 U/L Final    AST 05/24/2022 18  10 - 35  U/L Final    ALT 05/24/2022 11  9 - 46 U/L Final    Cholesterol 05/24/2022 135  <200 mg/dL Final    HDL 05/24/2022 41  > OR = 40 mg/dL Final    Triglycerides 05/24/2022 96  <150 mg/dL Final    LDL Cholesterol 05/24/2022 76  mg/dL (calc) Final    HDL/Cholesterol Ratio 05/24/2022 3.3  <5.0 (calc) Final    Non HDL Chol. (LDL+VLDL) 05/24/2022 94  <130 mg/dL (calc) Final    PROSTATE SPECIFIC ANTIGEN, SCR - Q* 05/24/2022 0.29  < OR = 4.00 ng/mL Final    TSH w/reflex to FT4 05/24/2022 1.87  0.40 - 4.50 mIU/L Final    Color, UA 05/24/2022 DARK YELLOW  YELLOW Final    Appearance, UA 05/24/2022 CLEAR  CLEAR Final    Specific Gravity, UA 05/24/2022 1.030  1.001 - 1.035 Final    pH, UA 05/24/2022 6.0  5.0 - 8.0 Final    Glucose, UA 05/24/2022 NEGATIVE  NEGATIVE Final    Bilirubin, UA 05/24/2022 NEGATIVE  NEGATIVE Final    Ketones, UA 05/24/2022 TRACE (A) NEGATIVE Final    Occult Blood UA 05/24/2022 NEGATIVE  NEGATIVE Final    Protein, UA 05/24/2022 TRACE (A) NEGATIVE Final    Nitrite, UA 05/24/2022 NEGATIVE  NEGATIVE Final    Leukocytes, UA 05/24/2022 TRACE (A) NEGATIVE Final    WBC Casts, UA 05/24/2022 NONE SEEN  < OR = 5 /HPF Final    RBC Casts, UA 05/24/2022 NONE SEEN  < OR = 2 /HPF Final    Squam Epithel, UA 05/24/2022 NONE SEEN  < OR = 5 /HPF Final    Bacteria, UA 05/24/2022 NONE SEEN  NONE SEEN /HPF Final    Hyaline Casts, UA 05/24/2022 NONE SEEN  NONE SEEN /LPF Final    Reflexive Urine Culture 05/24/2022    Final    Urine Culture, Routine 05/24/2022    Final    Creatinine, Urine 05/24/2022 240  20 - 320 mg/dL Final    Microalb, Ur 05/24/2022 1.6  See Note: mg/dL Final    Microalb/Creat Ratio 05/24/2022 7  <30 mcg/mg creat Final       Past Medical History:   Diagnosis Date    Arthritis     GERD (gastroesophageal reflux disease)     Hepatitis C     STATES DX 10-20. NO S/S. NO FURTHER TESTS OR TX. WITH INSURANCE IN 2021 CAN GET IT EVALUATED.    History of left hip replacement 05/20/2021     History of MRSA infection     History of right shoulder replacement 01/15/2021    Hypertension     Wears glasses      Past Surgical History:   Procedure Laterality Date    APPENDECTOMY      ARTHROPLASTY OF SHOULDER Right 1/13/2021    Procedure: ARTHROPLASTY, SHOULDER TOTAL;  Surgeon: Emilio Siddiqui MD;  Location: [facility] OR;  Service: Orthopedics;  Laterality: Right;  GENERAL AND BLOCK    HERNIA REPAIR      HIP REPLACEMENT ARTHROPLASTY Left 3/24/2021    Procedure: ARTHROPLASTY, HIP REPLACEMENT;  Surgeon: Miles Rivas II, MD;  Location: [facility] OR;  Service: Orthopedics;  Laterality: Left;    JOINT REPLACEMENT Right     shoulder    REVISION TOTAL HIP ARTHROPLASTY Left 6/2/2021    Procedure: REVISION, TOTAL ARTHROPLASTY, HIP;  Surgeon: Miles Rivas II, MD;  Location: [facility] OR;  Service: Orthopedics;  Laterality: Left;     Family History   Problem Relation Age of Onset    Cancer Mother         leukemia       Marital Status:   Alcohol History:  reports no history of alcohol use.  Tobacco History:  reports that he has been smoking cigarettes. He has a 15.00 pack-year smoking history. He has never used smokeless tobacco.  Drug History:  reports no history of drug use.    Health Maintenance Topics with due status: Not Due       Topic Last Completion Date    TETANUS VACCINE 05/02/2018    Colorectal Cancer Screening 02/10/2022    Lipid Panel 05/24/2022    Influenza Vaccine Not Due     Immunization History   Administered Date(s) Administered    Pneumococcal Polysaccharide - 23 Valent 05/02/2018    Tdap 05/02/2018       Review of patient's allergies indicates:   Allergen Reactions    Pcn [penicillins]      As a child.       Current Outpatient Medications:     DULoxetine (CYMBALTA) 60 MG capsule, Take 120 mg by mouth once daily., Disp: , Rfl:     gabapentin (NEURONTIN) 300 MG capsule, TAKE 1 CAPSULE BY MOUTH THREE TIMES DAILY, Disp: 90 capsule, Rfl: 0    lisinopriL (PRINIVIL,ZESTRIL) 40 MG tablet,  Take 1 tablet (40 mg total) by mouth once daily., Disp: 30 tablet, Rfl: 4    metoprolol succinate (TOPROL-XL) 50 MG 24 hr tablet, Take 1 tablet (50 mg total) by mouth once daily., Disp: 30 tablet, Rfl: 3    mirtazapine (REMERON) 30 MG tablet, Take 30 mg by mouth every evening., Disp: , Rfl:     omeprazole (PRILOSEC) 20 MG capsule, Take 1 capsule (20 mg total) by mouth once daily., Disp: 90 capsule, Rfl: 1    oxyCODONE-acetaminophen (PERCOCET) 5-325 mg per tablet, oxycodone-acetaminophen 5 mg-325 mg tablet  TAKE 1 TABLET BY MOUTH FOUR TIMES DAILY AS NEEDED FOR PAIN, Disp: , Rfl:     solifenacin (VESICARE) 10 MG tablet, Take 1 tablet (10 mg total) by mouth once daily. For overactive bladder, Disp: 30 tablet, Rfl: 4    solifenacin (VESICARE) 5 MG tablet, Take 1 tablet (5 mg total) by mouth once daily., Disp: 30 tablet, Rfl: 5    tamsulosin (FLOMAX) 0.4 mg Cap, Take 2 capsules (0.8 mg total) by mouth once daily., Disp: 180 capsule, Rfl: 1    topiramate (TOPAMAX) 50 MG tablet, Take 50 mg by mouth once daily., Disp: , Rfl:     traZODone (DESYREL) 150 MG tablet, , Disp: , Rfl:     amLODIPine (NORVASC) 5 MG tablet, Take 1 tablet (5 mg total) by mouth once daily., Disp: 90 tablet, Rfl: 1    HYDROcodone-acetaminophen (NORCO) 5-325 mg per tablet, Take 1 tablet by mouth every 6 (six) hours as needed for Pain. (Patient not taking: No sig reported), Disp: 28 tablet, Rfl: 0    ibuprofen (ADVIL,MOTRIN) 800 MG tablet, Take 1 tablet (800 mg total) by mouth every 6 (six) hours as needed for Pain. (Patient not taking: No sig reported), Disp: 90 tablet, Rfl: 3    methocarbamoL (ROBAXIN) 500 MG Tab, Take 1 tablet (500 mg total) by mouth nightly as needed., Disp: 40 tablet, Rfl: 1  No current facility-administered medications for this visit.    Facility-Administered Medications Ordered in Other Visits:     electrolyte-S (ISOLYTE), , Intravenous, Continuous, Mabel Infante MD, Stopped at 06/16/21 1300    HYDROmorphone  "(PF) injection 0.2 mg, 0.2 mg, Intravenous, Q5 Min PRN, Mabel Infante MD, 0.2 mg at 06/16/21 1310    LIDOcaine (PF) 10 mg/ml (1%) injection 10 mg, 1 mL, Intradermal, Once, Mabel Infante MD    oxyCODONE immediate release tablet 5 mg, 5 mg, Oral, Q3H PRN, Mabel Infante MD, 5 mg at 06/16/21 1232    Review of Systems   Constitutional: Negative for activity change, chills, fatigue and fever.   HENT: Negative for congestion, ear discharge and ear pain.    Respiratory: Positive for apnea ("Wake up in the middle of the night."). Negative for cough, shortness of breath and wheezing.    Cardiovascular: Negative for chest pain and palpitations.   Gastrointestinal: Negative for abdominal pain, constipation, diarrhea, nausea and vomiting.   Genitourinary: Negative for difficulty urinating, dysuria, frequency, hematuria and urgency.   Musculoskeletal: Positive for back pain. Negative for arthralgias, gait problem and myalgias.   Skin: Negative for rash.   Neurological: Negative for dizziness, syncope, light-headedness and headaches.   Psychiatric/Behavioral: Negative for behavioral problems.          Objective:      Vitals:    05/31/22 1135 05/31/22 1201   BP: (!) 140/92 (!) 144/82   Pulse: 90    Temp: 98.2 °F (36.8 °C)    SpO2: 98%    Weight: 98.1 kg (216 lb 3.2 oz)    Height: 5' 11" (1.803 m)      Physical Exam  Vitals and nursing note reviewed.   Constitutional:       General: He is not in acute distress.     Appearance: He is well-developed. He is obese. He is not ill-appearing, toxic-appearing or diaphoretic.   HENT:      Head: Normocephalic and atraumatic.      Right Ear: Tympanic membrane, ear canal and external ear normal. There is no impacted cerumen.      Left Ear: Tympanic membrane, ear canal and external ear normal. There is no impacted cerumen.      Nose: Nose normal.      Mouth/Throat:      Mouth: Mucous membranes are moist.      Pharynx: Oropharynx is clear.   Eyes:      General: No scleral " icterus.        Right eye: No discharge.         Left eye: No discharge.      Extraocular Movements: Extraocular movements intact.      Conjunctiva/sclera: Conjunctivae normal.      Pupils: Pupils are equal, round, and reactive to light.   Neck:      Thyroid: No thyromegaly.   Cardiovascular:      Rate and Rhythm: Normal rate and regular rhythm.      Pulses: Normal pulses.      Heart sounds: Normal heart sounds. No murmur heard.    No friction rub.   Pulmonary:      Effort: Pulmonary effort is normal. No respiratory distress.      Breath sounds: Normal breath sounds. No wheezing, rhonchi or rales.   Abdominal:      General: Bowel sounds are normal. There is no distension.      Palpations: Abdomen is soft.      Tenderness: There is no abdominal tenderness.   Musculoskeletal:         General: No tenderness or deformity. Normal range of motion.      Cervical back: Normal range of motion and neck supple.      Lumbar back: Normal. No spasms.      Right lower leg: No edema.      Left lower leg: No edema.      Comments: 90 degree flexion at the waist;  Bilateral shoulders have active ROM.  Crepitus noted to the bilateral knees.     Skin:     General: Skin is warm and dry.      Findings: Bruising ( noted over the bilateral forearms.) present. No rash.   Neurological:      Mental Status: He is alert and oriented to person, place, and time.      Cranial Nerves: No cranial nerve deficit.      Coordination: Coordination normal.      Gait: Gait abnormal (Ambulates with the assistance of a cane, hunched over with a limp noted.).   Psychiatric:         Behavior: Behavior normal.         Thought Content: Thought content normal.         Judgment: Judgment normal.           Assessment:       1. Primary hypertension    2. Muscle spasm of both lower legs    3. Sleep apnea, unspecified type    4. GURVINDER (obstructive sleep apnea)    5. Iron deficiency anemia, unspecified iron deficiency anemia type    6. BPH associated with nocturia    7.  Chronic hepatitis C without hepatic coma    8. Obesity (BMI 30-39.9)    9. Gastroesophageal reflux disease, unspecified whether esophagitis present    10. Osteoarthritis of right shoulder, unspecified osteoarthritis type    11. Hepatic cirrhosis, unspecified hepatic cirrhosis type, unspecified whether ascites present           Plan:       Primary hypertension  BP elevated at home and in office today.   Continue lisinopril 40mg q.d. and start Amlodipine 5mg q.d..  Begin monitoring BP daily, creating a log, and return for a BP check/nurse visit in 2 weeks.  -     amLODIPine (NORVASC) 5 MG tablet; Take 1 tablet (5 mg total) by mouth once daily.  Dispense: 90 tablet; Refill: 1    Muscle spasm of both lower legs  Currently stable and well controlled with Robaxin 500mg q.h.s  Continue as is - refills sent today.  -     methocarbamoL (ROBAXIN) 500 MG Tab; Take 1 tablet (500 mg total) by mouth nightly as needed.  Dispense: 40 tablet; Refill: 1    GURVINDER (obstructive sleep apnea)  Amb ref to Dr. Coronel sent today requesting sleep study.  -     Ambulatory referral/consult to Pulmonology; Future; Expected date: 05/31/2022    Iron deficiency anemia, unspecified iron deficiency anemia type  CBC is trending toward baseline and has improved compared to previous lab work.  I recommended having an Iron study and Ferratin completed, but patient deferred at this time and would like to follow up with Dr. Gooden regarding this. He states she was managing this in the past and d/c his Iron supplementations.     BPH associated with nocturia  PSA is UTD (0.29).  Stable - Continue Flomax 0.8mg and Vesicar 10mg daily.     Chronic hepatitis C without hepatic coma  Currently being worked up/managed by Dr. Gooden (GI).   Recent LFTs are within referance range.     Obesity (BMI 30-39.9)    Gastroesophageal reflux disease, unspecified whether esophagitis present    Osteoarthritis of right shoulder, unspecified osteoarthritis type    Hepatic  cirrhosis, unspecified hepatic cirrhosis type, unspecified whether ascites present    Lab work was reviewed with patient in office visit today.     Follow up in about 2 weeks (around 6/14/2022) for BP Check-Up w/Nurse, office visit with me in 6 months, With labs prior to visit.        5/31/2022 Shaun Lentz PA-C

## 2022-06-15 ENCOUNTER — CLINICAL SUPPORT (OUTPATIENT)
Dept: FAMILY MEDICINE | Facility: CLINIC | Age: 62
End: 2022-06-15

## 2022-06-15 VITALS — SYSTOLIC BLOOD PRESSURE: 112 MMHG | DIASTOLIC BLOOD PRESSURE: 58 MMHG

## 2022-06-15 NOTE — PROGRESS NOTES
Patient came in for blood pressure check and machine check. He states he has taken his Amlodipine 5mg and Lisinopril 40mg today. Reviewed readings with Shaun. Per Shaun, keep taking meds as prescribed.

## 2022-08-08 DIAGNOSIS — I10 ESSENTIAL HYPERTENSION: ICD-10-CM

## 2022-08-08 DIAGNOSIS — N32.81 OAB (OVERACTIVE BLADDER): ICD-10-CM

## 2022-08-08 RX ORDER — SOLIFENACIN SUCCINATE 5 MG/1
5 TABLET, FILM COATED ORAL DAILY
Qty: 90 TABLET | Refills: 1 | Status: SHIPPED | OUTPATIENT
Start: 2022-08-08 | End: 2022-08-19 | Stop reason: ALTCHOICE

## 2022-08-08 RX ORDER — LISINOPRIL 40 MG/1
40 TABLET ORAL DAILY
Qty: 90 TABLET | Refills: 1 | Status: SHIPPED | OUTPATIENT
Start: 2022-08-08 | End: 2023-02-09 | Stop reason: SDUPTHER

## 2022-08-08 NOTE — TELEPHONE ENCOUNTER
----- Message from Ghada Prieto MA sent at 8/8/2022  9:54 AM CDT -----  Regarding: refills  Patient needs refills on lisinopril 40 mg and solifenacin 5 mg  Tim montano  [phone]

## 2022-08-19 ENCOUNTER — OFFICE VISIT (OUTPATIENT)
Dept: FAMILY MEDICINE | Facility: CLINIC | Age: 62
End: 2022-08-19
Payer: MEDICARE

## 2022-08-19 VITALS
SYSTOLIC BLOOD PRESSURE: 138 MMHG | BODY MASS INDEX: 30.24 KG/M2 | WEIGHT: 216 LBS | HEIGHT: 71 IN | TEMPERATURE: 98 F | DIASTOLIC BLOOD PRESSURE: 70 MMHG | HEART RATE: 98 BPM

## 2022-08-19 DIAGNOSIS — G47.33 OSA (OBSTRUCTIVE SLEEP APNEA): ICD-10-CM

## 2022-08-19 DIAGNOSIS — N40.1 BPH ASSOCIATED WITH NOCTURIA: ICD-10-CM

## 2022-08-19 DIAGNOSIS — G89.29 CHRONIC MIDLINE LOW BACK PAIN WITHOUT SCIATICA: ICD-10-CM

## 2022-08-19 DIAGNOSIS — N32.81 OAB (OVERACTIVE BLADDER): ICD-10-CM

## 2022-08-19 DIAGNOSIS — J44.9 CHRONIC OBSTRUCTIVE PULMONARY DISEASE, UNSPECIFIED COPD TYPE: ICD-10-CM

## 2022-08-19 DIAGNOSIS — I10 PRIMARY HYPERTENSION: ICD-10-CM

## 2022-08-19 DIAGNOSIS — N39.41 URGE INCONTINENCE OF URINE: Primary | ICD-10-CM

## 2022-08-19 DIAGNOSIS — E66.9 OBESITY (BMI 30-39.9): ICD-10-CM

## 2022-08-19 DIAGNOSIS — R35.1 BPH ASSOCIATED WITH NOCTURIA: ICD-10-CM

## 2022-08-19 DIAGNOSIS — M54.50 CHRONIC MIDLINE LOW BACK PAIN WITHOUT SCIATICA: ICD-10-CM

## 2022-08-19 PROCEDURE — 3075F PR MOST RECENT SYSTOLIC BLOOD PRESS GE 130-139MM HG: ICD-10-PCS | Mod: CPTII,S$GLB,, | Performed by: PHYSICIAN ASSISTANT

## 2022-08-19 PROCEDURE — 4010F ACE/ARB THERAPY RXD/TAKEN: CPT | Mod: CPTII,S$GLB,, | Performed by: PHYSICIAN ASSISTANT

## 2022-08-19 PROCEDURE — 99213 OFFICE O/P EST LOW 20 MIN: CPT | Mod: S$GLB,,, | Performed by: PHYSICIAN ASSISTANT

## 2022-08-19 PROCEDURE — 3061F NEG MICROALBUMINURIA REV: CPT | Mod: CPTII,S$GLB,, | Performed by: PHYSICIAN ASSISTANT

## 2022-08-19 PROCEDURE — 3066F PR DOCUMENTATION OF TREATMENT FOR NEPHROPATHY: ICD-10-PCS | Mod: CPTII,S$GLB,, | Performed by: PHYSICIAN ASSISTANT

## 2022-08-19 PROCEDURE — 3061F PR NEG MICROALBUMINURIA RESULT DOCUMENTED/REVIEW: ICD-10-PCS | Mod: CPTII,S$GLB,, | Performed by: PHYSICIAN ASSISTANT

## 2022-08-19 PROCEDURE — 99213 PR OFFICE/OUTPT VISIT, EST, LEVL III, 20-29 MIN: ICD-10-PCS | Mod: S$GLB,,, | Performed by: PHYSICIAN ASSISTANT

## 2022-08-19 PROCEDURE — 3066F NEPHROPATHY DOC TX: CPT | Mod: CPTII,S$GLB,, | Performed by: PHYSICIAN ASSISTANT

## 2022-08-19 PROCEDURE — 4010F PR ACE/ARB THEARPY RXD/TAKEN: ICD-10-PCS | Mod: CPTII,S$GLB,, | Performed by: PHYSICIAN ASSISTANT

## 2022-08-19 PROCEDURE — 3008F PR BODY MASS INDEX (BMI) DOCUMENTED: ICD-10-PCS | Mod: CPTII,S$GLB,, | Performed by: PHYSICIAN ASSISTANT

## 2022-08-19 PROCEDURE — 3078F DIAST BP <80 MM HG: CPT | Mod: CPTII,S$GLB,, | Performed by: PHYSICIAN ASSISTANT

## 2022-08-19 PROCEDURE — 3075F SYST BP GE 130 - 139MM HG: CPT | Mod: CPTII,S$GLB,, | Performed by: PHYSICIAN ASSISTANT

## 2022-08-19 PROCEDURE — 1160F PR REVIEW ALL MEDS BY PRESCRIBER/CLIN PHARMACIST DOCUMENTED: ICD-10-PCS | Mod: CPTII,S$GLB,, | Performed by: PHYSICIAN ASSISTANT

## 2022-08-19 PROCEDURE — 1159F MED LIST DOCD IN RCRD: CPT | Mod: CPTII,S$GLB,, | Performed by: PHYSICIAN ASSISTANT

## 2022-08-19 PROCEDURE — 1160F RVW MEDS BY RX/DR IN RCRD: CPT | Mod: CPTII,S$GLB,, | Performed by: PHYSICIAN ASSISTANT

## 2022-08-19 PROCEDURE — 1159F PR MEDICATION LIST DOCUMENTED IN MEDICAL RECORD: ICD-10-PCS | Mod: CPTII,S$GLB,, | Performed by: PHYSICIAN ASSISTANT

## 2022-08-19 PROCEDURE — 3008F BODY MASS INDEX DOCD: CPT | Mod: CPTII,S$GLB,, | Performed by: PHYSICIAN ASSISTANT

## 2022-08-19 PROCEDURE — 3078F PR MOST RECENT DIASTOLIC BLOOD PRESSURE < 80 MM HG: ICD-10-PCS | Mod: CPTII,S$GLB,, | Performed by: PHYSICIAN ASSISTANT

## 2022-08-19 RX ORDER — MIRABEGRON 25 MG/1
25 TABLET, FILM COATED, EXTENDED RELEASE ORAL DAILY
Qty: 90 TABLET | Refills: 1 | Status: SHIPPED | OUTPATIENT
Start: 2022-08-19 | End: 2022-08-24

## 2022-08-19 NOTE — PROGRESS NOTES
"  SUBJECTIVE:    Patient ID: Omar Trotter is a 62 y.o. male.    Chief Complaint: Urinary Incontinence (No bottles // went over meds verbally // back pain // discuss about medication // abc )    Pt is a 62 y.o. male who presents today with a CC of "bladder incontinence." He reports this has been an ongoing issue and was previously managed with VESIcare.  This medication was efficacious, but he states it is too expensive and is interested in alternative options.  He has been off this medication for few days now and reports experiencing urinary incontinence.  He is unable to make it to the restroom in time and is experiencing leakage.  He denies any decreased urine production, flank pain, hematuria, or difficulty starting a stream.    Today, he is also requesting a handicap tag for his vehicle secondary to his chronic back pain. Dr. Godoy (Pain Management) manages his chronic lower back pain and DDD.    No visits with results within 6 Month(s) from this visit.   Latest known visit with results is:   Office Visit on 01/31/2022   Component Date Value Ref Range Status    WBC 05/24/2022 6.4  3.8 - 10.8 Thousand/uL Final    RBC 05/24/2022 3.98 (A) 4.20 - 5.80 Million/uL Final    Hemoglobin 05/24/2022 9.6 (A) 13.2 - 17.1 g/dL Final    Hematocrit 05/24/2022 31.0 (A) 38.5 - 50.0 % Final    MCV 05/24/2022 77.9 (A) 80.0 - 100.0 fL Final    MCH 05/24/2022 24.1 (A) 27.0 - 33.0 pg Final    MCHC 05/24/2022 31.0 (A) 32.0 - 36.0 g/dL Final    RDW 05/24/2022 15.0  11.0 - 15.0 % Final    Platelets 05/24/2022 239  140 - 400 Thousand/uL Final    MPV 05/24/2022 10.3  7.5 - 12.5 fL Final    Neutrophils, Abs 05/24/2022 4,506  1,500 - 7,800 cells/uL Final    Lymph # 05/24/2022 1,274  850 - 3,900 cells/uL Final    Mono # 05/24/2022 410  200 - 950 cells/uL Final    Eos # 05/24/2022 160  15 - 500 cells/uL Final    Baso # 05/24/2022 51  0 - 200 cells/uL Final    Neutrophils Relative 05/24/2022 70.4  % Final    Lymph % " 05/24/2022 19.9  % Final    Mono % 05/24/2022 6.4  % Final    Eosinophil % 05/24/2022 2.5  % Final    Basophil % 05/24/2022 0.8  % Final    Glucose 05/24/2022 86  65 - 139 mg/dL Final    BUN 05/24/2022 16  7 - 25 mg/dL Final    Creatinine 05/24/2022 0.94  0.70 - 1.25 mg/dL Final    eGFR if non  05/24/2022 86  > OR = 60 mL/min/1.73m2 Final    eGFR if  05/24/2022 100  > OR = 60 mL/min/1.73m2 Final    BUN/Creatinine Ratio 05/24/2022 NOT APPLICABLE  6 - 22 (calc) Final    Sodium 05/24/2022 141  135 - 146 mmol/L Final    Potassium 05/24/2022 4.1  3.5 - 5.3 mmol/L Final    Chloride 05/24/2022 111 (A) 98 - 110 mmol/L Final    CO2 05/24/2022 22  20 - 32 mmol/L Final    Calcium 05/24/2022 8.7  8.6 - 10.3 mg/dL Final    Total Protein 05/24/2022 7.4  6.1 - 8.1 g/dL Final    Albumin 05/24/2022 3.7  3.6 - 5.1 g/dL Final    Globulin, Total 05/24/2022 3.7  1.9 - 3.7 g/dL (calc) Final    Albumin/Globulin Ratio 05/24/2022 1.0  1.0 - 2.5 (calc) Final    Total Bilirubin 05/24/2022 0.5  0.2 - 1.2 mg/dL Final    Alkaline Phosphatase 05/24/2022 167 (A) 35 - 144 U/L Final    AST 05/24/2022 18  10 - 35 U/L Final    ALT 05/24/2022 11  9 - 46 U/L Final    Cholesterol 05/24/2022 135  <200 mg/dL Final    HDL 05/24/2022 41  > OR = 40 mg/dL Final    Triglycerides 05/24/2022 96  <150 mg/dL Final    LDL Cholesterol 05/24/2022 76  mg/dL (calc) Final    HDL/Cholesterol Ratio 05/24/2022 3.3  <5.0 (calc) Final    Non HDL Chol. (LDL+VLDL) 05/24/2022 94  <130 mg/dL (calc) Final    PROSTATE SPECIFIC ANTIGEN, SCR - Q* 05/24/2022 0.29  < OR = 4.00 ng/mL Final    TSH w/reflex to FT4 05/24/2022 1.87  0.40 - 4.50 mIU/L Final    Color, UA 05/24/2022 DARK YELLOW  YELLOW Final    Appearance, UA 05/24/2022 CLEAR  CLEAR Final    Specific Gravity, UA 05/24/2022 1.030  1.001 - 1.035 Final    pH, UA 05/24/2022 6.0  5.0 - 8.0 Final    Glucose, UA 05/24/2022 NEGATIVE  NEGATIVE Final    Bilirubin, UA  05/24/2022 NEGATIVE  NEGATIVE Final    Ketones, UA 05/24/2022 TRACE (A) NEGATIVE Final    Occult Blood UA 05/24/2022 NEGATIVE  NEGATIVE Final    Protein, UA 05/24/2022 TRACE (A) NEGATIVE Final    Nitrite, UA 05/24/2022 NEGATIVE  NEGATIVE Final    Leukocytes, UA 05/24/2022 TRACE (A) NEGATIVE Final    WBC Casts, UA 05/24/2022 NONE SEEN  < OR = 5 /HPF Final    RBC Casts, UA 05/24/2022 NONE SEEN  < OR = 2 /HPF Final    Squam Epithel, UA 05/24/2022 NONE SEEN  < OR = 5 /HPF Final    Bacteria, UA 05/24/2022 NONE SEEN  NONE SEEN /HPF Final    Hyaline Casts, UA 05/24/2022 NONE SEEN  NONE SEEN /LPF Final    Reflexive Urine Culture 05/24/2022    Final    Urine Culture, Routine 05/24/2022    Final    Creatinine, Urine 05/24/2022 240  20 - 320 mg/dL Final    Microalb, Ur 05/24/2022 1.6  See Note: mg/dL Final    Microalb/Creat Ratio 05/24/2022 7  <30 mcg/mg creat Final       Past Medical History:   Diagnosis Date    Arthritis     GERD (gastroesophageal reflux disease)     Hepatitis C     STATES DX 10-20. NO S/S. NO FURTHER TESTS OR TX. WITH INSURANCE IN 2021 CAN GET IT EVALUATED.    History of left hip replacement 05/20/2021    History of MRSA infection     History of right shoulder replacement 01/15/2021    Hypertension     Wears glasses      Past Surgical History:   Procedure Laterality Date    APPENDECTOMY      ARTHROPLASTY OF SHOULDER Right 1/13/2021    Procedure: ARTHROPLASTY, SHOULDER TOTAL;  Surgeon: Emilio Siddiqui MD;  Location: [facility] OR;  Service: Orthopedics;  Laterality: Right;  GENERAL AND BLOCK    HERNIA REPAIR      HIP REPLACEMENT ARTHROPLASTY Left 3/24/2021    Procedure: ARTHROPLASTY, HIP REPLACEMENT;  Surgeon: Miles Rivas II, MD;  Location: [facility] OR;  Service: Orthopedics;  Laterality: Left;    JOINT REPLACEMENT Right     shoulder    REVISION TOTAL HIP ARTHROPLASTY Left 6/2/2021    Procedure: REVISION, TOTAL ARTHROPLASTY, HIP;  Surgeon: Miles Rivas II, MD;  Location: [facility]  OR;  Service: Orthopedics;  Laterality: Left;     Family History   Problem Relation Age of Onset    Cancer Mother         leukemia       Marital Status:   Alcohol History:  reports no history of alcohol use.  Tobacco History:  reports that he has been smoking cigarettes. He has a 15.00 pack-year smoking history. He has never used smokeless tobacco.  Drug History:  reports no history of drug use.    Health Maintenance Topics with due status: Not Due       Topic Last Completion Date    TETANUS VACCINE 05/02/2018    Colorectal Cancer Screening 02/10/2022    Lipid Panel 05/24/2022    Influenza Vaccine Not Due     Immunization History   Administered Date(s) Administered    Pneumococcal Polysaccharide - 23 Valent 05/02/2018    Tdap 05/02/2018       Review of patient's allergies indicates:   Allergen Reactions    Pcn [penicillins]      As a child.       Current Outpatient Medications:     amLODIPine (NORVASC) 5 MG tablet, Take 1 tablet (5 mg total) by mouth once daily., Disp: 90 tablet, Rfl: 1    DULoxetine (CYMBALTA) 60 MG capsule, Take 120 mg by mouth once daily., Disp: , Rfl:     gabapentin (NEURONTIN) 300 MG capsule, TAKE 1 CAPSULE BY MOUTH THREE TIMES DAILY, Disp: 90 capsule, Rfl: 0    lisinopriL (PRINIVIL,ZESTRIL) 40 MG tablet, Take 1 tablet (40 mg total) by mouth once daily., Disp: 90 tablet, Rfl: 1    methocarbamoL (ROBAXIN) 500 MG Tab, TAKE 1 TABLET BY MOUTH NIGHTLY AS NEEDED, Disp: 40 tablet, Rfl: 0    metoprolol succinate (TOPROL-XL) 50 MG 24 hr tablet, Take 1 tablet (50 mg total) by mouth once daily., Disp: 30 tablet, Rfl: 3    mirtazapine (REMERON) 30 MG tablet, Take 30 mg by mouth every evening., Disp: , Rfl:     omeprazole (PRILOSEC) 20 MG capsule, Take 1 capsule (20 mg total) by mouth once daily., Disp: 90 capsule, Rfl: 1    oxyCODONE-acetaminophen (PERCOCET) 5-325 mg per tablet, oxycodone-acetaminophen 5 mg-325 mg tablet  TAKE 1 TABLET BY MOUTH FOUR TIMES DAILY AS NEEDED FOR PAIN, Disp:  ", Rfl:     tamsulosin (FLOMAX) 0.4 mg Cap, Take 2 capsules (0.8 mg total) by mouth once daily., Disp: 180 capsule, Rfl: 1    topiramate (TOPAMAX) 50 MG tablet, Take 50 mg by mouth once daily., Disp: , Rfl:     mirabegron (MYRBETRIQ) 25 mg Tb24 ER tablet, Take 1 tablet (25 mg total) by mouth once daily., Disp: 90 tablet, Rfl: 1    traZODone (DESYREL) 150 MG tablet, , Disp: , Rfl:   No current facility-administered medications for this visit.    Facility-Administered Medications Ordered in Other Visits:     electrolyte-S (ISOLYTE), , Intravenous, Continuous, Mabel Infante MD, Stopped at 06/16/21 1300    HYDROmorphone (PF) injection 0.2 mg, 0.2 mg, Intravenous, Q5 Min PRN, Mabel Infante MD, 0.2 mg at 06/16/21 1310    LIDOcaine (PF) 10 mg/ml (1%) injection 10 mg, 1 mL, Intradermal, Once, Mabel Infante MD    oxyCODONE immediate release tablet 5 mg, 5 mg, Oral, Q3H PRN, Mabel Infante MD, 5 mg at 06/16/21 1232    Review of Systems   Constitutional: Negative for activity change, chills, fatigue and fever.   Respiratory: Negative for cough, shortness of breath and wheezing.    Cardiovascular: Negative for chest pain and palpitations.   Gastrointestinal: Negative for abdominal pain, constipation, diarrhea, nausea and vomiting.   Genitourinary: Negative for decreased urine volume, difficulty urinating, dysuria, flank pain, frequency, hematuria and urgency.        "Urinary incontinence."    Musculoskeletal: Positive for back pain and gait problem. Negative for arthralgias and myalgias.   Skin: Negative for rash.   Neurological: Negative for dizziness.   Psychiatric/Behavioral: Negative for behavioral problems.          Objective:      Vitals:    08/19/22 1023   BP: 138/70   Pulse: 98   Temp: 97.9 °F (36.6 °C)   Weight: 98 kg (216 lb)   Height: 5' 11" (1.803 m)     Physical Exam  Vitals and nursing note reviewed.   Constitutional:       General: He is not in acute distress.     Appearance: He " is well-developed. He is obese. He is not ill-appearing, toxic-appearing or diaphoretic.   HENT:      Head: Normocephalic and atraumatic.      Right Ear: External ear normal. There is no impacted cerumen.      Left Ear: External ear normal. There is no impacted cerumen.      Nose: Nose normal.      Mouth/Throat:      Mouth: Mucous membranes are moist.      Pharynx: Oropharynx is clear.   Eyes:      General: No scleral icterus.        Right eye: No discharge.         Left eye: No discharge.      Extraocular Movements: Extraocular movements intact.      Conjunctiva/sclera: Conjunctivae normal.      Pupils: Pupils are equal, round, and reactive to light.   Neck:      Thyroid: No thyromegaly.   Cardiovascular:      Rate and Rhythm: Normal rate and regular rhythm.      Pulses: Normal pulses.      Heart sounds: Normal heart sounds. No murmur heard.    No friction rub.   Pulmonary:      Effort: Pulmonary effort is normal. No respiratory distress.      Breath sounds: Wheezing (Faint expiratory wheezing noted.) present. No rhonchi or rales.   Abdominal:      General: Bowel sounds are normal. There is no distension.      Palpations: Abdomen is soft.      Tenderness: There is no abdominal tenderness. There is no right CVA tenderness, left CVA tenderness, guarding or rebound.   Musculoskeletal:         General: No tenderness or deformity. Normal range of motion.      Cervical back: Normal range of motion and neck supple.      Lumbar back: Normal. No spasms.      Right lower leg: No edema.      Left lower leg: No edema.      Comments: 90 degree flexion at the waist  Crepitus noted to the bilateral knees.     Skin:     General: Skin is warm and dry.      Findings: No bruising or rash.   Neurological:      Mental Status: He is alert and oriented to person, place, and time.      Cranial Nerves: No cranial nerve deficit.      Coordination: Coordination normal.      Gait: Gait abnormal (Ambulates with the assistance of a cane,  hunched over with a limp noted.).   Psychiatric:         Behavior: Behavior normal.         Thought Content: Thought content normal.         Judgment: Judgment normal.           Assessment:       1. Urge incontinence of urine    2. OAB (overactive bladder)    3. Primary hypertension    4. GURVINDER (obstructive sleep apnea)    5. Obesity (BMI 30-39.9)    6. BPH associated with nocturia    7. Chronic obstructive pulmonary disease, unspecified COPD type    8. Chronic midline low back pain without sciatica           Plan:       Urge incontinence of urine  Stop VESIcare secondary to expense.  Start Myrbetriq 25mg q.d.   GoodRx card provided to patient today.   -     mirabegron (MYRBETRIQ) 25 mg Tb24 ER tablet; Take 1 tablet (25 mg total) by mouth once daily.  Dispense: 90 tablet; Refill: 1    OAB (overactive bladder)    Primary hypertension  Stable and well controlled.  Continue as is.    UGRVINDER (obstructive sleep apnea)    Obesity (BMI 30-39.9)    BPH associated with nocturia    Chronic obstructive pulmonary disease, unspecified COPD type    Chronic midline low back pain without sciatica  Handicap form filled out and provided to patient today to present to the [employer].    Follow up if symptoms worsen or fail to improve, for Urinary incontinence, as scheduled..        8/19/2022 Shaun Lentz PA-C

## 2022-08-23 DIAGNOSIS — N32.81 OAB (OVERACTIVE BLADDER): Primary | ICD-10-CM

## 2022-08-23 NOTE — TELEPHONE ENCOUNTER
Spoke to pt, he is requesting a medication cheaper than myrbetriq. Pt states it cost $40+ for 1 month supply.    Pt is wanting a list of medication that can be used for his dx. He will call the pharmacy to see what will be cheaper.

## 2022-08-23 NOTE — TELEPHONE ENCOUNTER
----- Message from Everton Niranjan sent at 8/23/2022  3:52 PM CDT -----  Contact: patient  PT needs a list off diff medication that can be prescribed to him for cheaper thru his insurance  [phone]

## 2022-08-23 NOTE — TELEPHONE ENCOUNTER
I can send in a prescription of Oxybutynin ER 5mg q.d. in place of the Mybetriq.  Please ask patient if he would like to attempt this medication.

## 2022-08-24 RX ORDER — OXYBUTYNIN CHLORIDE 5 MG/1
5 TABLET, EXTENDED RELEASE ORAL DAILY
Qty: 30 TABLET | Refills: 5 | Status: SHIPPED | OUTPATIENT
Start: 2022-08-24 | End: 2023-05-01 | Stop reason: SDUPTHER

## 2022-08-24 NOTE — TELEPHONE ENCOUNTER
Pt requesting a list of meds that he can present to his insurance. They will be able to tell him the out of pocket cost for each med.

## 2022-09-07 DIAGNOSIS — R35.0 BENIGN PROSTATIC HYPERPLASIA WITH URINARY FREQUENCY: ICD-10-CM

## 2022-09-07 DIAGNOSIS — N40.1 BENIGN PROSTATIC HYPERPLASIA WITH URINARY FREQUENCY: ICD-10-CM

## 2022-09-07 DIAGNOSIS — M62.838 MUSCLE SPASM OF BOTH LOWER LEGS: ICD-10-CM

## 2022-09-07 RX ORDER — TAMSULOSIN HYDROCHLORIDE 0.4 MG/1
0.8 CAPSULE ORAL DAILY
Qty: 180 CAPSULE | Refills: 1 | Status: SHIPPED | OUTPATIENT
Start: 2022-09-07 | End: 2023-05-02 | Stop reason: SDUPTHER

## 2022-09-07 RX ORDER — METHOCARBAMOL 500 MG/1
TABLET, FILM COATED ORAL
Qty: 40 TABLET | Refills: 0 | Status: SHIPPED | OUTPATIENT
Start: 2022-09-07 | End: 2023-01-17 | Stop reason: SDUPTHER

## 2022-09-07 NOTE — TELEPHONE ENCOUNTER
----- Message from Svetlana Jensen sent at 9/7/2022  9:28 AM CDT -----  Patient called and stated that he need a refill of his tamsulosin, and his methocarbamol called into [facility] on [address] if any questions please give him a call at [phone]

## 2022-11-10 ENCOUNTER — HOSPITAL ENCOUNTER (INPATIENT)
Facility: HOSPITAL | Age: 62
LOS: 1 days | Discharge: HOME | DRG: 194 | End: 2022-11-12
Attending: STUDENT IN AN ORGANIZED HEALTH CARE EDUCATION/TRAINING PROGRAM | Admitting: INTERNAL MEDICINE
Payer: MEDICARE

## 2022-11-10 DIAGNOSIS — J18.9 PNEUMONIA: ICD-10-CM

## 2022-11-10 DIAGNOSIS — I95.9 HYPOTENSION: ICD-10-CM

## 2022-11-10 DIAGNOSIS — D64.9 ANEMIA, UNSPECIFIED TYPE: ICD-10-CM

## 2022-11-10 DIAGNOSIS — J18.9 PNEUMONIA OF RIGHT LOWER LOBE DUE TO INFECTIOUS ORGANISM: ICD-10-CM

## 2022-11-10 DIAGNOSIS — N17.9 AKI (ACUTE KIDNEY INJURY): Primary | ICD-10-CM

## 2022-11-10 LAB
ABO + RH BLD: NORMAL
ALBUMIN SERPL BCP-MCNC: 3.2 G/DL (ref 3.5–5.2)
ALP SERPL-CCNC: 100 U/L (ref 55–135)
ALT SERPL W/O P-5'-P-CCNC: 20 U/L (ref 10–44)
AMMONIA PLAS-SCNC: 23 UMOL/L (ref 10–50)
ANION GAP SERPL CALC-SCNC: 10 MMOL/L (ref 8–16)
APTT PPP: 36 SEC (ref 23.3–35.1)
AST SERPL-CCNC: 21 U/L (ref 10–40)
BACTERIA #/AREA URNS HPF: NEGATIVE /HPF
BASOPHILS # BLD AUTO: 0.03 K/UL (ref 0–0.2)
BASOPHILS NFR BLD: 0.3 % (ref 0–1.9)
BILIRUB SERPL-MCNC: 0.7 MG/DL (ref 0.1–1)
BILIRUB UR QL STRIP: NEGATIVE
BLD GP AB SCN CELLS X3 SERPL QL: NORMAL
BNP SERPL-MCNC: 61 PG/ML (ref 0–99)
BUN SERPL-MCNC: 76 MG/DL (ref 8–23)
CALCIUM SERPL-MCNC: 8.6 MG/DL (ref 8.7–10.5)
CHLORIDE SERPL-SCNC: 108 MMOL/L (ref 95–110)
CLARITY UR: ABNORMAL
CO2 SERPL-SCNC: 20 MMOL/L (ref 23–29)
COLOR UR: YELLOW
CREAT SERPL-MCNC: 4 MG/DL (ref 0.5–1.4)
DIFFERENTIAL METHOD: ABNORMAL
EOSINOPHIL # BLD AUTO: 0.1 K/UL (ref 0–0.5)
EOSINOPHIL NFR BLD: 1.1 % (ref 0–8)
ERYTHROCYTE [DISTWIDTH] IN BLOOD BY AUTOMATED COUNT: 18.1 % (ref 11.5–14.5)
EST. GFR  (NO RACE VARIABLE): 15.9 ML/MIN/1.73 M^2
GLUCOSE SERPL-MCNC: 96 MG/DL (ref 70–110)
GLUCOSE UR QL STRIP: NEGATIVE
HCT VFR BLD AUTO: 25 % (ref 40–54)
HGB BLD-MCNC: 7.4 G/DL (ref 14–18)
HGB UR QL STRIP: ABNORMAL
HYALINE CASTS #/AREA URNS LPF: 43 /LPF
IMM GRANULOCYTES # BLD AUTO: 0.09 K/UL (ref 0–0.04)
IMM GRANULOCYTES NFR BLD AUTO: 0.8 % (ref 0–0.5)
INFLUENZA A, MOLECULAR: NEGATIVE
INFLUENZA B, MOLECULAR: NEGATIVE
INR PPP: 1.4
KETONES UR QL STRIP: NEGATIVE
LACTATE SERPL-SCNC: 1.3 MMOL/L (ref 0.5–1.9)
LEUKOCYTE ESTERASE UR QL STRIP: NEGATIVE
LIPASE SERPL-CCNC: 26 U/L (ref 4–60)
LYMPHOCYTES # BLD AUTO: 2.1 K/UL (ref 1–4.8)
LYMPHOCYTES NFR BLD: 18.8 % (ref 18–48)
MAGNESIUM SERPL-MCNC: 2.4 MG/DL (ref 1.6–2.6)
MCH RBC QN AUTO: 22.7 PG (ref 27–31)
MCHC RBC AUTO-ENTMCNC: 29.6 G/DL (ref 32–36)
MCV RBC AUTO: 77 FL (ref 82–98)
MICROSCOPIC COMMENT: ABNORMAL
MONOCYTES # BLD AUTO: 1 K/UL (ref 0.3–1)
MONOCYTES NFR BLD: 8.7 % (ref 4–15)
NEUTROPHILS # BLD AUTO: 7.7 K/UL (ref 1.8–7.7)
NEUTROPHILS NFR BLD: 70.3 % (ref 38–73)
NITRITE UR QL STRIP: NEGATIVE
NRBC BLD-RTO: 0 /100 WBC
PH UR STRIP: 6 [PH] (ref 5–8)
PHOSPHATE SERPL-MCNC: 4.7 MG/DL (ref 2.7–4.5)
PLATELET # BLD AUTO: 231 K/UL (ref 150–450)
PMV BLD AUTO: 10.4 FL (ref 9.2–12.9)
POTASSIUM SERPL-SCNC: 4.4 MMOL/L (ref 3.5–5.1)
PROCALCITONIN SERPL IA-MCNC: 1.3 NG/ML (ref 0–0.5)
PROT SERPL-MCNC: 7.2 G/DL (ref 6–8.4)
PROT UR QL STRIP: ABNORMAL
PROTHROMBIN TIME: 15.8 SEC (ref 11.4–13.7)
RBC # BLD AUTO: 3.26 M/UL (ref 4.6–6.2)
RBC #/AREA URNS HPF: 11 /HPF (ref 0–4)
SARS-COV-2 RDRP RESP QL NAA+PROBE: NEGATIVE
SODIUM SERPL-SCNC: 138 MMOL/L (ref 136–145)
SP GR UR STRIP: 1.02 (ref 1–1.03)
SPECIMEN SOURCE: NORMAL
SQUAMOUS #/AREA URNS HPF: 5 /HPF
TROPONIN I SERPL DL<=0.01 NG/ML-MCNC: <0.03 NG/ML
URN SPEC COLLECT METH UR: ABNORMAL
UROBILINOGEN UR STRIP-ACNC: ABNORMAL EU/DL
WBC # BLD AUTO: 10.89 K/UL (ref 3.9–12.7)
WBC #/AREA URNS HPF: 5 /HPF (ref 0–5)

## 2022-11-10 PROCEDURE — 84145 PROCALCITONIN (PCT): CPT | Performed by: STUDENT IN AN ORGANIZED HEALTH CARE EDUCATION/TRAINING PROGRAM

## 2022-11-10 PROCEDURE — 85610 PROTHROMBIN TIME: CPT | Performed by: STUDENT IN AN ORGANIZED HEALTH CARE EDUCATION/TRAINING PROGRAM

## 2022-11-10 PROCEDURE — G0378 HOSPITAL OBSERVATION PER HR: HCPCS

## 2022-11-10 PROCEDURE — 82140 ASSAY OF AMMONIA: CPT | Performed by: STUDENT IN AN ORGANIZED HEALTH CARE EDUCATION/TRAINING PROGRAM

## 2022-11-10 PROCEDURE — 96366 THER/PROPH/DIAG IV INF ADDON: CPT

## 2022-11-10 PROCEDURE — 84484 ASSAY OF TROPONIN QUANT: CPT | Performed by: STUDENT IN AN ORGANIZED HEALTH CARE EDUCATION/TRAINING PROGRAM

## 2022-11-10 PROCEDURE — 99285 EMERGENCY DEPT VISIT HI MDM: CPT | Mod: 25

## 2022-11-10 PROCEDURE — 83880 ASSAY OF NATRIURETIC PEPTIDE: CPT | Performed by: STUDENT IN AN ORGANIZED HEALTH CARE EDUCATION/TRAINING PROGRAM

## 2022-11-10 PROCEDURE — 96361 HYDRATE IV INFUSION ADD-ON: CPT

## 2022-11-10 PROCEDURE — 81001 URINALYSIS AUTO W/SCOPE: CPT | Performed by: STUDENT IN AN ORGANIZED HEALTH CARE EDUCATION/TRAINING PROGRAM

## 2022-11-10 PROCEDURE — 93010 ELECTROCARDIOGRAM REPORT: CPT | Mod: ,,, | Performed by: INTERNAL MEDICINE

## 2022-11-10 PROCEDURE — 85730 THROMBOPLASTIN TIME PARTIAL: CPT | Performed by: STUDENT IN AN ORGANIZED HEALTH CARE EDUCATION/TRAINING PROGRAM

## 2022-11-10 PROCEDURE — 83690 ASSAY OF LIPASE: CPT | Performed by: STUDENT IN AN ORGANIZED HEALTH CARE EDUCATION/TRAINING PROGRAM

## 2022-11-10 PROCEDURE — 87502 INFLUENZA DNA AMP PROBE: CPT | Performed by: STUDENT IN AN ORGANIZED HEALTH CARE EDUCATION/TRAINING PROGRAM

## 2022-11-10 PROCEDURE — 85025 COMPLETE CBC W/AUTO DIFF WBC: CPT | Performed by: STUDENT IN AN ORGANIZED HEALTH CARE EDUCATION/TRAINING PROGRAM

## 2022-11-10 PROCEDURE — 93010 EKG 12-LEAD: ICD-10-PCS | Mod: ,,, | Performed by: INTERNAL MEDICINE

## 2022-11-10 PROCEDURE — 93005 ELECTROCARDIOGRAM TRACING: CPT | Performed by: INTERNAL MEDICINE

## 2022-11-10 PROCEDURE — 84100 ASSAY OF PHOSPHORUS: CPT | Performed by: STUDENT IN AN ORGANIZED HEALTH CARE EDUCATION/TRAINING PROGRAM

## 2022-11-10 PROCEDURE — 80053 COMPREHEN METABOLIC PANEL: CPT | Performed by: STUDENT IN AN ORGANIZED HEALTH CARE EDUCATION/TRAINING PROGRAM

## 2022-11-10 PROCEDURE — 96365 THER/PROPH/DIAG IV INF INIT: CPT

## 2022-11-10 PROCEDURE — 86850 RBC ANTIBODY SCREEN: CPT | Performed by: STUDENT IN AN ORGANIZED HEALTH CARE EDUCATION/TRAINING PROGRAM

## 2022-11-10 PROCEDURE — 63600175 PHARM REV CODE 636 W HCPCS: Performed by: STUDENT IN AN ORGANIZED HEALTH CARE EDUCATION/TRAINING PROGRAM

## 2022-11-10 PROCEDURE — 87040 BLOOD CULTURE FOR BACTERIA: CPT | Performed by: STUDENT IN AN ORGANIZED HEALTH CARE EDUCATION/TRAINING PROGRAM

## 2022-11-10 PROCEDURE — C9113 INJ PANTOPRAZOLE SODIUM, VIA: HCPCS | Performed by: STUDENT IN AN ORGANIZED HEALTH CARE EDUCATION/TRAINING PROGRAM

## 2022-11-10 PROCEDURE — 83735 ASSAY OF MAGNESIUM: CPT | Performed by: STUDENT IN AN ORGANIZED HEALTH CARE EDUCATION/TRAINING PROGRAM

## 2022-11-10 PROCEDURE — 83605 ASSAY OF LACTIC ACID: CPT | Performed by: STUDENT IN AN ORGANIZED HEALTH CARE EDUCATION/TRAINING PROGRAM

## 2022-11-10 PROCEDURE — U0002 COVID-19 LAB TEST NON-CDC: HCPCS | Performed by: STUDENT IN AN ORGANIZED HEALTH CARE EDUCATION/TRAINING PROGRAM

## 2022-11-10 PROCEDURE — 96375 TX/PRO/DX INJ NEW DRUG ADDON: CPT

## 2022-11-10 RX ORDER — HYDROCODONE BITARTRATE AND ACETAMINOPHEN 5; 325 MG/1; MG/1
1 TABLET ORAL EVERY 4 HOURS PRN
Status: ON HOLD | COMMUNITY
End: 2022-11-12 | Stop reason: HOSPADM

## 2022-11-10 RX ORDER — CEFEPIME HYDROCHLORIDE 1 G/50ML
1 INJECTION, SOLUTION INTRAVENOUS ONCE
Status: COMPLETED | OUTPATIENT
Start: 2022-11-10 | End: 2022-11-10

## 2022-11-10 RX ORDER — SOLIFENACIN SUCCINATE 5 MG/1
5 TABLET, FILM COATED ORAL DAILY
COMMUNITY
End: 2023-05-05

## 2022-11-10 RX ORDER — PANTOPRAZOLE SODIUM 40 MG/10ML
40 INJECTION, POWDER, LYOPHILIZED, FOR SOLUTION INTRAVENOUS
Status: COMPLETED | OUTPATIENT
Start: 2022-11-10 | End: 2022-11-10

## 2022-11-10 RX ORDER — SOLIFENACIN SUCCINATE 10 MG/1
10 TABLET, FILM COATED ORAL DAILY
COMMUNITY
End: 2023-05-05

## 2022-11-10 RX ADMIN — SODIUM CHLORIDE, SODIUM LACTATE, POTASSIUM CHLORIDE, AND CALCIUM CHLORIDE 2000 ML: .6; .31; .03; .02 INJECTION, SOLUTION INTRAVENOUS at 04:11

## 2022-11-10 RX ADMIN — PANTOPRAZOLE SODIUM 40 MG: 40 INJECTION, POWDER, FOR SOLUTION INTRAVENOUS at 07:11

## 2022-11-10 RX ADMIN — CEFEPIME HYDROCHLORIDE 1 G: 1 INJECTION, SOLUTION INTRAVENOUS at 07:11

## 2022-11-10 NOTE — ED PROVIDER NOTES
Encounter Date: 11/10/2022       History     Chief Complaint   Patient presents with    General Illness     WEAK ALL OVER X 3 Days    Tremors     X 3 days     62-year-old male with chronic low back pain, GERD, hepatitis-C presents for evaluation of multiple generalized tremors today.  His daughter reports that he has these tremors each time he stands, and they resolve when he sits.  Air brief and intermittent.  He is unable to provide a clear history but endorses chronic low back pain, and otherwise he has no complaints.  He answers questions appropriately but is intermittently tired appearing.    Review of patient's allergies indicates:   Allergen Reactions    Pcn [penicillins]      As a child.     Past Medical History:   Diagnosis Date    Arthritis     GERD (gastroesophageal reflux disease)     Hepatitis C     STATES DX 10-20. NO S/S. NO FURTHER TESTS OR TX. WITH INSURANCE IN 2021 CAN GET IT EVALUATED.    History of left hip replacement 05/20/2021    History of MRSA infection     History of right shoulder replacement 01/15/2021    Hypertension     Wears glasses      Past Surgical History:   Procedure Laterality Date    APPENDECTOMY      ARTHROPLASTY OF SHOULDER Right 1/13/2021    Procedure: ARTHROPLASTY, SHOULDER TOTAL;  Surgeon: Emilio Siddiqui MD;  Location: [facility] OR;  Service: Orthopedics;  Laterality: Right;  GENERAL AND BLOCK    HERNIA REPAIR      HIP REPLACEMENT ARTHROPLASTY Left 3/24/2021    Procedure: ARTHROPLASTY, HIP REPLACEMENT;  Surgeon: Miles Rivas II, MD;  Location: [facility] OR;  Service: Orthopedics;  Laterality: Left;    JOINT REPLACEMENT Right     shoulder    REVISION TOTAL HIP ARTHROPLASTY Left 6/2/2021    Procedure: REVISION, TOTAL ARTHROPLASTY, HIP;  Surgeon: Miles Rivas II, MD;  Location: [facility] OR;  Service: Orthopedics;  Laterality: Left;     Family History   Problem Relation Age of Onset    Cancer Mother         leukemia     Social History     Tobacco Use    Smoking status: Every Day      Packs/day: 0.50     Years: 30.00     Pack years: 15.00     Types: Cigarettes    Smokeless tobacco: Never   Substance Use Topics    Alcohol use: Never    Drug use: Never     Review of Systems   Unable to perform ROS: Mental status change     Physical Exam     Initial Vitals [11/10/22 1528]   BP Pulse Resp Temp SpO2   (!) 71/48 98 18 98.3 °F (36.8 °C) 96 %      MAP       --         Physical Exam    Nursing note and vitals reviewed.  Constitutional: He appears well-developed and well-nourished. No distress.   Confused but pleasant male   HENT:   Head: Normocephalic and atraumatic.   Mouth/Throat: Oropharynx is clear and moist. No oropharyngeal exudate.   Eyes: Conjunctivae and EOM are normal. Pupils are equal, round, and reactive to light.   Neck: No thyromegaly present.   Normal range of motion.  Cardiovascular:  Normal rate, regular rhythm and intact distal pulses.           Pulmonary/Chest: Breath sounds normal. No respiratory distress.   Abdominal: Abdomen is soft. Bowel sounds are normal. He exhibits distension. There is no abdominal tenderness.   Musculoskeletal:         General: No tenderness or edema. Normal range of motion.      Cervical back: Normal range of motion.     Neurological: He is alert and oriented to person, place, and time. He has normal strength. No cranial nerve deficit.   GCS 14 for intermittent confusion, delayed response.  Intermittently he is alert and oriented and answers questions appropriately, but other times he is slow to respond   Skin: Skin is warm and dry. No rash noted.       ED Course   Procedures  Labs Reviewed   CBC W/ AUTO DIFFERENTIAL - Abnormal; Notable for the following components:       Result Value    RBC 3.26 (*)     Hemoglobin 7.4 (*)     Hematocrit 25.0 (*)     MCV 77 (*)     MCH 22.7 (*)     MCHC 29.6 (*)     RDW 18.1 (*)     Immature Granulocytes 0.8 (*)     Immature Grans (Abs) 0.09 (*)     All other components within normal limits   COMPREHENSIVE METABOLIC PANEL  - Abnormal; Notable for the following components:    CO2 20 (*)     BUN 76 (*)     Creatinine 4.0 (*)     Calcium 8.6 (*)     Albumin 3.2 (*)     eGFR 15.9 (*)     All other components within normal limits   URINALYSIS, REFLEX TO URINE CULTURE - Abnormal; Notable for the following components:    Appearance, UA Hazy (*)     Protein, UA 1+ (*)     Occult Blood UA 1+ (*)     Urobilinogen, UA 2.0-3.0 (*)     All other components within normal limits    Narrative:     Specimen Source->Urine   PHOSPHORUS - Abnormal; Notable for the following components:    Phosphorus 4.7 (*)     All other components within normal limits   APTT - Abnormal; Notable for the following components:    aPTT 36.0 (*)     All other components within normal limits   PROTIME-INR - Abnormal; Notable for the following components:    PT 15.8 (*)     All other components within normal limits   PROCALCITONIN - Abnormal; Notable for the following components:    Procalcitonin 1.30 (*)     All other components within normal limits   URINALYSIS MICROSCOPIC - Abnormal; Notable for the following components:    RBC, UA 11 (*)     Hyaline Casts, UA 43 (*)     All other components within normal limits    Narrative:     Specimen Source->Urine   CULTURE, BLOOD   CULTURE, BLOOD   LACTIC ACID, PLASMA   MAGNESIUM   B-TYPE NATRIURETIC PEPTIDE   LIPASE   TROPONIN I   INFLUENZA A AND B ANTIGEN    Narrative:     Specimen Source->Nasopharyngeal Swab   AMMONIA   SARS-COV-2 RNA AMPLIFICATION, QUAL   DRUGS OF ABUSE SCREEN, BLOOD   TYPE & SCREEN        ECG Results              EKG 12-lead (In process)  Result time 11/10/22 16:18:19      In process by Interface, Lab In [facility] (11/10/22 16:18:19)                   Narrative:    Test Reason : I95.9,    Vent. Rate : 085 BPM     Atrial Rate : 085 BPM     P-R Int : 140 ms          QRS Dur : 090 ms      QT Int : 340 ms       P-R-T Axes : 066 058 064 degrees     QTc Int : 404 ms    Normal sinus rhythm  Normal ECG  When compared with  ECG of 02-JUN-2021 04:26,  No significant change was found    Referred By: [email or web address]   SELF           Confirmed By:                                   Imaging Results              CT Chest Abdomen Pelvis Without Contrast (XPD) (Final result)  Result time 11/10/22 19:20:38   Procedure changed from CT Abdomen Pelvis  Without Contrast     Final result by Isela Blanc MD (11/10/22 19:20:38)                   Narrative:    CMS MANDATED QUALITY DATA - CT RADIATION - 436    All CT scans at this facility utilize dose modulation, iterative reconstruction, and/or weight based dosing when appropriate to reduce radiation dose to as low as reasonably achievable.      Reason: Abdominal abscess/infection suspected; persistent cough, dizziness, sepsis    TECHNIQUE: Chest, abdomen and pelvic CT without IV contrast.    COMPARISON: None    FINDINGS:  CT chest: The visualized portion of the base of the neck is unremarkable.    The heart and great vessels are normal in size. There is coronary artery calcification.  There are mildly prominent right paratracheal nodes which are not pathologically enlarged.    There are diffuse groundglass opacities within the upper lobes, right middle lobe and right lower lobe suggestive of infectious or inflammatory process. There are no pleural effusions. Trachea and bronchi are normal.    The esophagus is normal. There is a right humeral prosthesis. There are no acute osseous abnormalities.      CT ABDOMEN: The liver, spleen and pancreas have a normal noncontrast appearance. The gallbladder and adrenal glands are normal.    The kidneys are symmetric in size without hydronephrosis. There are tiny nonobstructing left renal stones. There is no perinephric stranding. There are no thick-walled or dilated bowel loops. There is no mesenteric or retroperitoneal adenopathy. The aorta is normal in caliber. There are no acute osseous abnormalities. There are degenerative changes of the spine.    CT PELVIS:  The bladder is normal. There are prostatic calcifications. There is no pelvic adenopathy. There is a left hip prosthesis.    IMPRESSION:  Diffuse groundglass opacities in the upper lobes, right middle lobe and right lower lobe suggestive of pneumonia in either viral or bacterial    No acute abnormality within the abdomen or pelvis    Electronically signed by:  Isela Blanc MD  11/10/2022 7:20 PM CST Workstation: [id number]                                     CT Head Without Contrast (Final result)  Result time 11/10/22 17:20:56      Final result by Isela Blanc MD (11/10/22 17:20:56)                   Narrative:    All CT scans at this facility used dose modulation, iterative reconstruction and/or weight-based dosing when appropriate to reduce radiation doses  as low as reasonably achievable.    CLINICAL INFORMATION:  Syncope, recurrent    FINDINGS:   The ventricles and sulci are normal in size and configuration for age.  There is no intraparenchymal hemorrhage, mass or midline shift.  There are no extra-axial fluid collections.  The paranasal sinus and mastoid air cells are clear.    IMPRESSION:   NO ACUTE INTRACRANIAL PROCESS.    Electronically signed by:  Isela Blanc MD  11/10/2022 5:20 PM CST Workstation: [id number]                                     X-Ray Chest AP Portable (Final result)  Result time 11/10/22 16:28:52      Final result by Silverio Monahan MD (11/10/22 16:28:52)                   Narrative:    Chest single view    CLINICAL DATA: Dizziness, sepsis    FINDINGS: AP view is compared to June 2021.    Heart size is normal. The mediastinum is unremarkable. The left lung is clear. Ill-defined alveolar infiltrate in the right mid and lower lung is compatible with pneumonia. There are no pleural effusions.    Changes of right shoulder arthroplasty are noted.    IMPRESSION:  1. Ill-defined alveolar infiltrate in the right mid and lower lung compatible with pneumonia. Radiographic  "follow-up to document resolution is recommended.    Electronically signed by:  Silverio Monahan MD  11/10/2022 4:28 PM [facility] Workstation: [id number]                                     Medications   vancomycin - pharmacy to dose (has no administration in time range)   vancomycin 1,500 mg in dextrose 5 % 500 mL IVPB (has no administration in time range)   lactated ringers bolus 2,000 mL (0 mLs Intravenous Stopped 11/10/22 1830)   pantoprazole injection 40 mg (40 mg Intravenous Given 11/10/22 1936)   cefepime in dextrose 5 % 1 gram/50 mL IVPB 1 g (1 g Intravenous New Bag 11/10/22 1936)     Medical Decision Making:   Initial Assessment:   62-year-old male presents for generalized weakness x3 days and intermittent tremors when he stands.  Initial blood pressure 71/48 but repeat in the room without intervention was 140/52.  Patient is afebrile vitals are otherwise normal.  Patient is intermittently oriented but otherwise confused.  Differential Diagnosis:   Sepsis, JACKLYN, uremia, liver failure, hepatic encephalopathy, influenza, COVID, drug ingestion  Clinical Tests:   Sepsis Perfusion Assessment: "I attest a sepsis perfusion exam was performed within 6 hours of sepsis, severe sepsis, or septic shock presentation, following fluid resuscitation."  ED Management:  Patient initially well-appearing on my exam, answering questions appropriately with normal blood pressure.  On reexamination, patient was intermittently confused and blood pressure was lower.  Sepsis workup initiated with broad-spectrum antibiotics, blood cultures, fluid bolus.  After 1 L IV fluids, patient became hypoxic so fluids were withheld.  Patient given vancomycin and cefepime for broad-spectrum antibiotic coverage.  Blood cultures were drawn along with UA and urine culture.  With in and out catheter, scant urine withdrawn.  Patient has JACKLYN with BUN of 76 and creatinine of 3 without clear cause.  Ammonia normal, ALT and AST normal, doubt hepatic " encephalopathy.  Influenza normal.  Hemoglobin 7.4 today down from 9.4, patient denies any abdominal pain or dark stools.  Will give 40 mg pantoprazole.  Rectal exam without gross blood. COVID test pending. Will admit to hospital medicine for further managment.            ED Course as of 11/10/22 2107   Thu Nov 10, 2022   1824 CT head wtihout acute intracranial process [BS]   1825 CXR w/ Ill-defined alveolar infiltrate in the right mid and lower lung compatible with pneumonia.  [BS]   1827 Ammonia [BS]   1827 CBC auto differential(!)  Type and screen ordered [BS]   1933 CT chest/abdomen and pelvis notable for diffuse groundglass opacities in the upper lobes, right middle lobe and right lower lobe suggestive of pneumonia in either viral or bacterial [BS]      ED Course User Index  [BS] Romero Rico MD                 Attending Critical Care:   Critical Care Times:   Direct Patient Care (initial evaluation, reassessments, and time considering the case)................................................................15 minutes.   Additional History from reviewing old medical records or taking additional history from the family, EMS, PCP, etc.......................5 minutes.   Ordering, Reviewing, and Interpreting Diagnostic Studies...............................................................................................................5 minutes.   Documentation..................................................................................................................................................................................5 minutes.   ==============================================================  · Total Critical Care Time - exclusive of procedural time: 30 minutes.  ==============================================================  Critical care was necessary to treat or prevent imminent or life-threatening deterioration of the following conditions: pneumonia, tomi, sepsis.   Critical care was time  spent personally by me on the following activities: obtaining history from patient or relative, examination of patient, review of x-rays / CT sent with the patient, ordering lab, x-rays, and/or EKG, development of treatment plan with patient or relative, ordering and performing treatments and interventions, interpretation of cardiac measurements and re-evaluation of patient's conition.   Critical Care Condition: potentially life-threatening     Clinical Impression:   Final diagnoses:  [I95.9] Hypotension  [N17.9] JACKLYN (acute kidney injury) (Primary)  [D64.9] Anemia, unspecified type  [J18.9] Pneumonia of right lower lobe due to infectious organism        ED Disposition Condition    Admit Stable                Romero Rico MD  11/10/22 1934       Romero Rico MD  11/10/22 2003

## 2022-11-10 NOTE — Clinical Note
Diagnosis: JACKLYN (acute kidney injury) [[id number]]   Future Attending Provider: ZAHRA CRANE [[id number]]   Admitting Provider:: ZAHRA CRANE [[id number]]

## 2022-11-11 PROBLEM — N17.9 AKI (ACUTE KIDNEY INJURY): Status: ACTIVE | Noted: 2022-11-11

## 2022-11-11 PROBLEM — D64.9 ANEMIA: Status: ACTIVE | Noted: 2022-11-11

## 2022-11-11 LAB
ANION GAP SERPL CALC-SCNC: 9 MMOL/L (ref 8–16)
BASOPHILS # BLD AUTO: 0.04 K/UL (ref 0–0.2)
BASOPHILS NFR BLD: 0.6 % (ref 0–1.9)
BUN SERPL-MCNC: 63 MG/DL (ref 8–23)
CALCIUM SERPL-MCNC: 8.6 MG/DL (ref 8.7–10.5)
CHLORIDE SERPL-SCNC: 113 MMOL/L (ref 95–110)
CO2 SERPL-SCNC: 19 MMOL/L (ref 23–29)
CREAT SERPL-MCNC: 2.3 MG/DL (ref 0.5–1.4)
DIFFERENTIAL METHOD: ABNORMAL
EOSINOPHIL # BLD AUTO: 0.2 K/UL (ref 0–0.5)
EOSINOPHIL NFR BLD: 3.3 % (ref 0–8)
ERYTHROCYTE [DISTWIDTH] IN BLOOD BY AUTOMATED COUNT: 18.2 % (ref 11.5–14.5)
EST. GFR  (NO RACE VARIABLE): 30.9 ML/MIN/1.73 M^2
ESTIMATED AVG GLUCOSE: 140 MG/DL (ref 68–131)
GLUCOSE SERPL-MCNC: 134 MG/DL (ref 70–110)
GLUCOSE SERPL-MCNC: 147 MG/DL (ref 70–110)
GLUCOSE SERPL-MCNC: 178 MG/DL (ref 70–110)
GLUCOSE SERPL-MCNC: 81 MG/DL (ref 70–110)
GLUCOSE SERPL-MCNC: 95 MG/DL (ref 70–110)
HBA1C MFR BLD: 6.5 % (ref 4.5–6.2)
HCT VFR BLD AUTO: 25.7 % (ref 40–54)
HGB BLD-MCNC: 7.6 G/DL (ref 14–18)
IMM GRANULOCYTES # BLD AUTO: 0.04 K/UL (ref 0–0.04)
IMM GRANULOCYTES NFR BLD AUTO: 0.6 % (ref 0–0.5)
LYMPHOCYTES # BLD AUTO: 1.4 K/UL (ref 1–4.8)
LYMPHOCYTES NFR BLD: 19.6 % (ref 18–48)
MAGNESIUM SERPL-MCNC: 2.4 MG/DL (ref 1.6–2.6)
MCH RBC QN AUTO: 22.4 PG (ref 27–31)
MCHC RBC AUTO-ENTMCNC: 29.6 G/DL (ref 32–36)
MCV RBC AUTO: 76 FL (ref 82–98)
MONOCYTES # BLD AUTO: 0.6 K/UL (ref 0.3–1)
MONOCYTES NFR BLD: 9.3 % (ref 4–15)
NEUTROPHILS # BLD AUTO: 4.6 K/UL (ref 1.8–7.7)
NEUTROPHILS NFR BLD: 66.6 % (ref 38–73)
NRBC BLD-RTO: 0 /100 WBC
PLATELET # BLD AUTO: 207 K/UL (ref 150–450)
PMV BLD AUTO: 10.6 FL (ref 9.2–12.9)
POTASSIUM SERPL-SCNC: 3.7 MMOL/L (ref 3.5–5.1)
RBC # BLD AUTO: 3.39 M/UL (ref 4.6–6.2)
SODIUM SERPL-SCNC: 141 MMOL/L (ref 136–145)
WBC # BLD AUTO: 6.89 K/UL (ref 3.9–12.7)

## 2022-11-11 PROCEDURE — 80361 OPIATES 1 OR MORE: CPT | Performed by: STUDENT IN AN ORGANIZED HEALTH CARE EDUCATION/TRAINING PROGRAM

## 2022-11-11 PROCEDURE — 99900035 HC TECH TIME PER 15 MIN (STAT)

## 2022-11-11 PROCEDURE — 63600175 PHARM REV CODE 636 W HCPCS: Performed by: INTERNAL MEDICINE

## 2022-11-11 PROCEDURE — 83735 ASSAY OF MAGNESIUM: CPT | Performed by: INTERNAL MEDICINE

## 2022-11-11 PROCEDURE — 80365 DRUG SCREENING OXYCODONE: CPT | Performed by: STUDENT IN AN ORGANIZED HEALTH CARE EDUCATION/TRAINING PROGRAM

## 2022-11-11 PROCEDURE — 36415 COLL VENOUS BLD VENIPUNCTURE: CPT | Performed by: STUDENT IN AN ORGANIZED HEALTH CARE EDUCATION/TRAINING PROGRAM

## 2022-11-11 PROCEDURE — 99900031 HC PATIENT EDUCATION (STAT)

## 2022-11-11 PROCEDURE — 96375 TX/PRO/DX INJ NEW DRUG ADDON: CPT

## 2022-11-11 PROCEDURE — 99222 PR INITIAL HOSPITAL CARE,LEVL II: ICD-10-PCS | Mod: ,,, | Performed by: INTERNAL MEDICINE

## 2022-11-11 PROCEDURE — 25000242 PHARM REV CODE 250 ALT 637 W/ HCPCS: Performed by: INTERNAL MEDICINE

## 2022-11-11 PROCEDURE — 21400001 HC TELEMETRY ROOM

## 2022-11-11 PROCEDURE — 99222 1ST HOSP IP/OBS MODERATE 55: CPT | Mod: ,,, | Performed by: INTERNAL MEDICINE

## 2022-11-11 PROCEDURE — 83036 HEMOGLOBIN GLYCOSYLATED A1C: CPT | Performed by: INTERNAL MEDICINE

## 2022-11-11 PROCEDURE — 25000003 PHARM REV CODE 250: Performed by: INTERNAL MEDICINE

## 2022-11-11 PROCEDURE — 25000003 PHARM REV CODE 250: Performed by: STUDENT IN AN ORGANIZED HEALTH CARE EDUCATION/TRAINING PROGRAM

## 2022-11-11 PROCEDURE — 80349 CANNABINOIDS NATURAL: CPT | Performed by: STUDENT IN AN ORGANIZED HEALTH CARE EDUCATION/TRAINING PROGRAM

## 2022-11-11 PROCEDURE — 80307 DRUG TEST PRSMV CHEM ANLYZR: CPT | Performed by: STUDENT IN AN ORGANIZED HEALTH CARE EDUCATION/TRAINING PROGRAM

## 2022-11-11 PROCEDURE — 94640 AIRWAY INHALATION TREATMENT: CPT

## 2022-11-11 PROCEDURE — 85025 COMPLETE CBC W/AUTO DIFF WBC: CPT | Performed by: INTERNAL MEDICINE

## 2022-11-11 PROCEDURE — 80048 BASIC METABOLIC PNL TOTAL CA: CPT | Performed by: INTERNAL MEDICINE

## 2022-11-11 PROCEDURE — 63600175 PHARM REV CODE 636 W HCPCS: Performed by: STUDENT IN AN ORGANIZED HEALTH CARE EDUCATION/TRAINING PROGRAM

## 2022-11-11 PROCEDURE — 94761 N-INVAS EAR/PLS OXIMETRY MLT: CPT

## 2022-11-11 RX ORDER — IPRATROPIUM BROMIDE AND ALBUTEROL SULFATE 2.5; .5 MG/3ML; MG/3ML
3 SOLUTION RESPIRATORY (INHALATION) EVERY 4 HOURS PRN
Status: DISCONTINUED | OUTPATIENT
Start: 2022-11-11 | End: 2022-11-12 | Stop reason: HOSPADM

## 2022-11-11 RX ORDER — CEFEPIME HYDROCHLORIDE 1 G/50ML
2 INJECTION, SOLUTION INTRAVENOUS
Status: DISCONTINUED | OUTPATIENT
Start: 2022-11-11 | End: 2022-11-12 | Stop reason: HOSPADM

## 2022-11-11 RX ORDER — METOPROLOL SUCCINATE 50 MG/1
50 TABLET, EXTENDED RELEASE ORAL DAILY
Status: DISCONTINUED | OUTPATIENT
Start: 2022-11-11 | End: 2022-11-11

## 2022-11-11 RX ORDER — LEVOFLOXACIN 500 MG/1
500 TABLET, FILM COATED ORAL DAILY
Status: DISCONTINUED | OUTPATIENT
Start: 2022-11-11 | End: 2022-11-11

## 2022-11-11 RX ORDER — AMOXICILLIN 250 MG
1 CAPSULE ORAL 2 TIMES DAILY PRN
Status: DISCONTINUED | OUTPATIENT
Start: 2022-11-11 | End: 2022-11-12 | Stop reason: HOSPADM

## 2022-11-11 RX ORDER — CEFEPIME HYDROCHLORIDE 1 G/50ML
1 INJECTION, SOLUTION INTRAVENOUS
Status: DISCONTINUED | OUTPATIENT
Start: 2022-11-11 | End: 2022-11-11

## 2022-11-11 RX ORDER — LISINOPRIL 20 MG/1
40 TABLET ORAL DAILY
Status: DISCONTINUED | OUTPATIENT
Start: 2022-11-11 | End: 2022-11-11

## 2022-11-11 RX ORDER — TRAZODONE HYDROCHLORIDE 50 MG/1
150 TABLET ORAL NIGHTLY
Status: DISCONTINUED | OUTPATIENT
Start: 2022-11-11 | End: 2022-11-11

## 2022-11-11 RX ORDER — GLUCAGON 1 MG
1 KIT INJECTION
Status: DISCONTINUED | OUTPATIENT
Start: 2022-11-11 | End: 2022-11-12 | Stop reason: HOSPADM

## 2022-11-11 RX ORDER — SODIUM CHLORIDE, SODIUM LACTATE, POTASSIUM CHLORIDE, CALCIUM CHLORIDE 600; 310; 30; 20 MG/100ML; MG/100ML; MG/100ML; MG/100ML
INJECTION, SOLUTION INTRAVENOUS CONTINUOUS
Status: DISCONTINUED | OUTPATIENT
Start: 2022-11-11 | End: 2022-11-11

## 2022-11-11 RX ORDER — IBUPROFEN 200 MG
16 TABLET ORAL
Status: DISCONTINUED | OUTPATIENT
Start: 2022-11-11 | End: 2022-11-12 | Stop reason: HOSPADM

## 2022-11-11 RX ORDER — ONDANSETRON 2 MG/ML
4 INJECTION INTRAMUSCULAR; INTRAVENOUS EVERY 6 HOURS PRN
Status: DISCONTINUED | OUTPATIENT
Start: 2022-11-11 | End: 2022-11-12 | Stop reason: HOSPADM

## 2022-11-11 RX ORDER — TOPIRAMATE 25 MG/1
50 TABLET ORAL DAILY
Status: DISCONTINUED | OUTPATIENT
Start: 2022-11-11 | End: 2022-11-12 | Stop reason: HOSPADM

## 2022-11-11 RX ORDER — SIMETHICONE 80 MG
1 TABLET,CHEWABLE ORAL 4 TIMES DAILY PRN
Status: DISCONTINUED | OUTPATIENT
Start: 2022-11-11 | End: 2022-11-12 | Stop reason: HOSPADM

## 2022-11-11 RX ORDER — AMLODIPINE BESYLATE 5 MG/1
5 TABLET ORAL DAILY
Status: DISCONTINUED | OUTPATIENT
Start: 2022-11-11 | End: 2022-11-11

## 2022-11-11 RX ORDER — NALOXONE HCL 0.4 MG/ML
0.02 VIAL (ML) INJECTION
Status: DISCONTINUED | OUTPATIENT
Start: 2022-11-11 | End: 2022-11-12 | Stop reason: HOSPADM

## 2022-11-11 RX ORDER — LEVOFLOXACIN 250 MG/1
250 TABLET ORAL DAILY
Status: DISCONTINUED | OUTPATIENT
Start: 2022-11-12 | End: 2022-11-12 | Stop reason: HOSPADM

## 2022-11-11 RX ORDER — SODIUM CHLORIDE 0.9 % (FLUSH) 0.9 %
10 SYRINGE (ML) INJECTION
Status: DISCONTINUED | OUTPATIENT
Start: 2022-11-11 | End: 2022-11-12 | Stop reason: HOSPADM

## 2022-11-11 RX ORDER — TALC
6 POWDER (GRAM) TOPICAL NIGHTLY PRN
Status: DISCONTINUED | OUTPATIENT
Start: 2022-11-11 | End: 2022-11-12 | Stop reason: HOSPADM

## 2022-11-11 RX ORDER — PANTOPRAZOLE SODIUM 40 MG/1
40 TABLET, DELAYED RELEASE ORAL
Status: DISCONTINUED | OUTPATIENT
Start: 2022-11-11 | End: 2022-11-12 | Stop reason: HOSPADM

## 2022-11-11 RX ORDER — IBUPROFEN 200 MG
24 TABLET ORAL
Status: DISCONTINUED | OUTPATIENT
Start: 2022-11-11 | End: 2022-11-12 | Stop reason: HOSPADM

## 2022-11-11 RX ORDER — TAMSULOSIN HYDROCHLORIDE 0.4 MG/1
0.8 CAPSULE ORAL DAILY
Status: DISCONTINUED | OUTPATIENT
Start: 2022-11-11 | End: 2022-11-12 | Stop reason: HOSPADM

## 2022-11-11 RX ORDER — ACETAMINOPHEN 325 MG/1
650 TABLET ORAL EVERY 8 HOURS PRN
Status: DISCONTINUED | OUTPATIENT
Start: 2022-11-11 | End: 2022-11-12 | Stop reason: HOSPADM

## 2022-11-11 RX ORDER — MORPHINE SULFATE 4 MG/ML
4 INJECTION, SOLUTION INTRAMUSCULAR; INTRAVENOUS EVERY 4 HOURS PRN
Status: DISCONTINUED | OUTPATIENT
Start: 2022-11-11 | End: 2022-11-12 | Stop reason: HOSPADM

## 2022-11-11 RX ORDER — LEVOFLOXACIN 500 MG/1
500 TABLET, FILM COATED ORAL ONCE
Status: COMPLETED | OUTPATIENT
Start: 2022-11-11 | End: 2022-11-11

## 2022-11-11 RX ORDER — POLYETHYLENE GLYCOL 3350 17 G/17G
17 POWDER, FOR SOLUTION ORAL 2 TIMES DAILY PRN
Status: DISCONTINUED | OUTPATIENT
Start: 2022-11-11 | End: 2022-11-12 | Stop reason: HOSPADM

## 2022-11-11 RX ORDER — MIRTAZAPINE 15 MG/1
30 TABLET, FILM COATED ORAL NIGHTLY
Status: DISCONTINUED | OUTPATIENT
Start: 2022-11-11 | End: 2022-11-11

## 2022-11-11 RX ORDER — SODIUM CHLORIDE 9 MG/ML
INJECTION, SOLUTION INTRAVENOUS CONTINUOUS
Status: DISCONTINUED | OUTPATIENT
Start: 2022-11-11 | End: 2022-11-12 | Stop reason: HOSPADM

## 2022-11-11 RX ADMIN — CEFEPIME HYDROCHLORIDE 2 G: 2 INJECTION, SOLUTION INTRAVENOUS at 09:11

## 2022-11-11 RX ADMIN — IBUPROFEN 600 MG: 200 TABLET, FILM COATED ORAL at 03:11

## 2022-11-11 RX ADMIN — MORPHINE SULFATE 4 MG: 4 INJECTION, SOLUTION INTRAMUSCULAR; INTRAVENOUS at 08:11

## 2022-11-11 RX ADMIN — ACETAMINOPHEN 650 MG: 325 TABLET ORAL at 06:11

## 2022-11-11 RX ADMIN — LEVOFLOXACIN 500 MG: 500 TABLET, FILM COATED ORAL at 02:11

## 2022-11-11 RX ADMIN — TOPIRAMATE 50 MG: 25 TABLET, FILM COATED ORAL at 09:11

## 2022-11-11 RX ADMIN — TAMSULOSIN HYDROCHLORIDE 0.8 MG: 0.4 CAPSULE ORAL at 09:11

## 2022-11-11 RX ADMIN — SODIUM CHLORIDE: 0.9 INJECTION, SOLUTION INTRAVENOUS at 09:11

## 2022-11-11 RX ADMIN — VANCOMYCIN HYDROCHLORIDE 1500 MG: 1.5 INJECTION, POWDER, LYOPHILIZED, FOR SOLUTION INTRAVENOUS at 12:11

## 2022-11-11 RX ADMIN — PANTOPRAZOLE SODIUM 40 MG: 40 TABLET, DELAYED RELEASE ORAL at 06:11

## 2022-11-11 RX ADMIN — IPRATROPIUM BROMIDE AND ALBUTEROL SULFATE 3 ML: .5; 3 SOLUTION RESPIRATORY (INHALATION) at 02:11

## 2022-11-11 RX ADMIN — CEFEPIME HYDROCHLORIDE 2 G: 2 INJECTION, SOLUTION INTRAVENOUS at 10:11

## 2022-11-11 NOTE — CARE UPDATE
11/11/22 1424   Patient Assessment/Suction   Level of Consciousness (AVPU) alert   Respiratory Effort Mild   All Lung Fields Breath Sounds diminished   Cough Frequency infrequent   Cough Type congested   PRE-TX-O2   O2 Device (Oxygen Therapy) room air   SpO2 (!) 94 %   Pulse Oximetry Type Intermittent   $ Pulse Oximetry - Multiple Charge Pulse Oximetry - Multiple   Pulse 88   Resp 18   Aerosol Therapy   $ Aerosol Therapy Charges Aerosol Treatment   Daily Review of Necessity (SVN) completed   Respiratory Treatment Status (SVN) given   Treatment Route (SVN) oxygen;mouthpiece   Patient Position (SVN) Ghosh's;HOB elevated   Post Treatment Assessment (SVN) breath sounds improved   Signs of Intolerance (SVN) none   Preset CPAP/BiPAP Settings   $ Is patient using? No/refused   Education   $ Education Bronchodilator;15 min   Respiratory Evaluation   $ Care Plan Tech Time 15 min     Pt states he does not use home cpap. Declined hospital unit

## 2022-11-11 NOTE — PLAN OF CARE
[facility]  Initial Discharge Assessment       Primary Care Provider: ZANA Ring    Admission Diagnosis: JACKLYN (acute kidney injury) [N17.9]  Pneumonia [J18.9]    Admission Date: 11/10/2022  Expected Discharge Date: 11/11/2022     met with Pt at bedside to complete discharge assessment. Pt AAOx4s. Demographics, PCP, and insurance verified. No home health. No dialysis. Pt reports ability to complete ADLs with the assistance of: a straight cane and a shower chair. Pt verbalized plan to discharge home via family transport. Pt has no other needs to be addressed at this time.    Discharge Barriers Identified: None    Payor: [employer]ARE / Plan: [employer] CHOICES 65 / Product Type: Medicare Advantage /     Extended Emergency Contact Information  Primary Emergency Contact: Jasmina Rodriguez  Mobile Phone: [phone]  Relation: Daughter  Preferred language: English   needed? No    Discharge Plan A: Home  Discharge Plan B: Home      [facility] [address]  [address]  [place] [address]  Phone: [phone] Fax: [phone]    [facility] [employer] [id number] - [place] [address] - [address] [facility]  [address] [place] [address]  Phone: [phone] Fax: [phone]      Initial Assessment (most recent)       Adult Discharge Assessment - 11/11/22 1238          Discharge Assessment    Assessment Type Discharge Planning Assessment     Confirmed/corrected address, phone number and insurance Yes     Confirmed Demographics Correct on Facesheet     Source of Information patient     Does patient/caregiver understand observation status Yes     Communicated NATALIA with patient/caregiver Yes     Reason For Admission Pneumonia     Lives With other (see comments)   with roommate    Facility Arrived From: Home     Do you expect to return to your current living situation?  Yes     Do you have help at home or someone to help you manage your care at home? No     Prior to hospitilization cognitive status: Alert/Oriented     Current cognitive status: Alert/Oriented     Walking or Climbing Stairs Difficulty ambulation difficulty, requires equipment     Mobility Management straight cane     Dressing/Bathing Difficulty bathing difficulty, requires equipment     Dressing/Bathing Management shower chair     Home Accessibility wheelchair accessible     Home Layout Able to live on 1st floor     Equipment Currently Used at Home cane, straight;shower chair     Readmission within 30 days? No     Patient currently being followed by outpatient case management? No     Do you currently have service(s) that help you manage your care at home? No     Do you take prescription medications? Yes     Do you have prescription coverage? Yes     Coverage Payor:  [employer] - [employer] 65     Do you have any problems affording any of your prescribed medications? No   Pt mentioned copays were expensive, but could not recall medications    Is the patient taking medications as prescribed? yes     Who is going to help you get home at discharge? Pt verbalized family member will transport     How do you get to doctors appointments? car, drives self     Are you on dialysis? No     Do you take coumadin? No     Discharge Plan A Home     Discharge Plan B Home     DME Needed Upon Discharge  none     Discharge Plan discussed with: Patient     Discharge Barriers Identified None        Physical Activity    On average, how many days per week do you engage in moderate to strenuous exercise (like a brisk walk)? 7 days     On average, how many minutes do you engage in exercise at this level? 30 min        Financial Resource Strain    How hard is it for you to pay for the very basics like food, housing, medical care, and heating? Not hard at all        Housing Stability    In the last 12 months, was  there a time when you were not able to pay the mortgage or rent on time? No     In the last 12 months, how many places have you lived? 1     In the last 12 months, was there a time when you did not have a steady place to sleep or slept in a shelter (including now)? No        Transportation Needs    In the past 12 months, has lack of transportation kept you from medical appointments or from getting medications? No     In the past 12 months, has lack of transportation kept you from meetings, work, or from getting things needed for daily living? No        Food Insecurity    Within the past 12 months, you worried that your food would run out before you got the money to buy more. Never true     Within the past 12 months, the food you bought just didn't last and you didn't have money to get more. Never true        Stress    Do you feel stress - tense, restless, nervous, or anxious, or unable to sleep at night because your mind is troubled all the time - these days? To some extent        Social Connections    In a typical week, how many times do you talk on the phone with family, friends, or neighbors? Twice a week     How often do you get together with friends or relatives? Twice a week     How often do you attend [employer] or [employer] services? 1 to 4 times per year     Do you belong to any clubs or organizations such as [employer] groups, unions, fraternal or athletic groups, or school groups? No     How often do you attend meetings of the clubs or organizations you belong to? Never     Are you , , , , never , or living with a partner?         Alcohol Use    Q1: How often do you have a drink containing alcohol? Never     Q2: How many drinks containing alcohol do you have on a typical day when you are drinking? Patient does not drink     Q3: How often do you have six or more drinks on one occasion? Never

## 2022-11-11 NOTE — ASSESSMENT & PLAN NOTE
Reason for admission  Broad-spectrum IV antibiotics   Community-acquired  Bacterial versus viral

## 2022-11-11 NOTE — PLAN OF CARE
11/11/22 1424   Patient Assessment/Suction   Level of Consciousness (AVPU) alert   Respiratory Effort Mild   All Lung Fields Breath Sounds diminished   Cough Frequency infrequent   Cough Type congested   PRE-TX-O2   O2 Device (Oxygen Therapy) room air   SpO2 (!) 94 %   Pulse Oximetry Type Intermittent   $ Pulse Oximetry - Multiple Charge Pulse Oximetry - Multiple   Pulse 88   Resp 18   Aerosol Therapy   $ Aerosol Therapy Charges Aerosol Treatment   Daily Review of Necessity (SVN) completed   Respiratory Treatment Status (SVN) given   Treatment Route (SVN) oxygen;mouthpiece   Patient Position (SVN) Ghosh's;HOB elevated   Post Treatment Assessment (SVN) breath sounds improved   Signs of Intolerance (SVN) none   Education   $ Education Bronchodilator;15 min   Respiratory Evaluation   $ Care Plan Tech Time 15 min

## 2022-11-11 NOTE — H&P
Formerly [facility] - Emergency Dept  Hospital Medicine  History & Physical    Patient Name: Omar Trotter  MRN: [id number]  Patient Class: OP- Observation  Admission Date: 11/10/2022  Attending Physician: Miguel Meyer MD   Primary Care Provider: ZANA Ring         Patient information was obtained from patient, past medical records and ER records.     Subjective:     Principal Problem:Pneumonia    Chief Complaint:   Chief Complaint   Patient presents with    General Illness     WEAK ALL OVER X 3 Days    Tremors     X 3 days        HPI: The patient is a 62-year-old male, who presents emergency room complaining of weakness all over for the last 3 days.  He states it started this sore throat and the weakness progressed.  He denies any chest pain or nausea or vomiting.  He admits to a temperature of 102 but afebrile here.  On exam, he is noted to have crackles in his lower lung zones on the right.  There was no wheezing heard and moderate air movement.  Blood pressure been noted to be 90/50 but had a map of about 70 heart rate of 85.  He denies any known ill contacts or other exposures.  Initially, he was given aggressive fluid support but developed shortness of breath.  This was stopped per the emergency room.  On my exam, no rales or wheezes heard.  Moderate air movement.  Chest x-ray showed right middle and lower lobe infiltrate.  His COVID test and flu test were negative.      Plan to admit to observation continue IV antibiotics and symptomatic relief.      Past Medical History:   Diagnosis Date    Arthritis     GERD (gastroesophageal reflux disease)     Hepatitis C     STATES DX 10-20. NO S/S. NO FURTHER TESTS OR TX. WITH INSURANCE IN 2021 CAN GET IT EVALUATED.    History of left hip replacement 05/20/2021    History of MRSA infection     History of right shoulder replacement 01/15/2021    Hypertension     Wears glasses        Past Surgical History:   Procedure Laterality Date    APPENDECTOMY       ARTHROPLASTY OF SHOULDER Right 1/13/2021    Procedure: ARTHROPLASTY, SHOULDER TOTAL;  Surgeon: Emilio Siddiqui MD;  Location: [facility] OR;  Service: Orthopedics;  Laterality: Right;  GENERAL AND BLOCK    HERNIA REPAIR      HIP REPLACEMENT ARTHROPLASTY Left 3/24/2021    Procedure: ARTHROPLASTY, HIP REPLACEMENT;  Surgeon: Miles Rivas II, MD;  Location: [facility] OR;  Service: Orthopedics;  Laterality: Left;    JOINT REPLACEMENT Right     shoulder    REVISION TOTAL HIP ARTHROPLASTY Left 6/2/2021    Procedure: REVISION, TOTAL ARTHROPLASTY, HIP;  Surgeon: Miles Rivas II, MD;  Location: [facility] OR;  Service: Orthopedics;  Laterality: Left;       Review of patient's allergies indicates:   Allergen Reactions    Pcn [penicillins]      As a child.       Current Facility-Administered Medications on File Prior to Encounter   Medication    electrolyte-S (ISOLYTE)    HYDROmorphone (PF) injection 0.2 mg    LIDOcaine (PF) 10 mg/ml (1%) injection 10 mg    oxyCODONE immediate release tablet 5 mg     Current Outpatient Medications on File Prior to Encounter   Medication Sig    amLODIPine (NORVASC) 5 MG tablet Take 1 tablet (5 mg total) by mouth once daily.    DULoxetine (CYMBALTA) 60 MG capsule Take 120 mg by mouth once daily.    gabapentin (NEURONTIN) 300 MG capsule TAKE 1 CAPSULE BY MOUTH THREE TIMES DAILY (Patient taking differently: Take 300 mg by mouth 3 (three) times daily.)    lisinopriL (PRINIVIL,ZESTRIL) 40 MG tablet Take 1 tablet (40 mg total) by mouth once daily.    methocarbamoL (ROBAXIN) 500 MG Tab TAKE 1 TABLET BY MOUTH NIGHTLY AS NEEDED  Strength: 500 mg (Patient taking differently: Take 500 mg by mouth nightly as needed. TAKE 1 TABLET BY MOUTH NIGHTLY AS NEEDED  Strength: 500 mg)    metoprolol succinate (TOPROL-XL) 50 MG 24 hr tablet Take 1 tablet (50 mg total) by mouth once daily.    mirtazapine (REMERON) 30 MG tablet Take 30 mg by mouth every evening.    omeprazole (PRILOSEC) 20 MG capsule Take  1 capsule (20 mg total) by mouth once daily.    oxybutynin (DITROPAN-XL) 5 MG TR24 Take 1 tablet (5 mg total) by mouth once daily.    oxyCODONE-acetaminophen (PERCOCET) 5-325 mg per tablet Take 1 tablet by mouth every 6 (six) hours as needed.    solifenacin (VESICARE) 10 MG tablet Take 10 mg by mouth once daily.    tamsulosin (FLOMAX) 0.4 mg Cap Take 2 capsules (0.8 mg total) by mouth once daily.    topiramate (TOPAMAX) 50 MG tablet Take 50 mg by mouth once daily.    HYDROcodone-acetaminophen (NORCO) 5-325 mg per tablet Take 1 tablet by mouth every 4 (four) hours as needed.    solifenacin (VESICARE) 5 MG tablet Take 5 mg by mouth once daily.    traZODone (DESYREL) 150 MG tablet Take 150 mg by mouth nightly.     Family History       Problem Relation (Age of Onset)    Cancer Mother          Tobacco Use    Smoking status: Every Day     Packs/day: 0.50     Years: 30.00     Pack years: 15.00     Types: Cigarettes    Smokeless tobacco: Never   Substance and Sexual Activity    Alcohol use: Never    Drug use: Never    Sexual activity: Yes     Partners: Female     Review of Systems   Constitutional: Negative.    HENT: Negative.     Eyes: Negative.    Respiratory: Negative.     Cardiovascular: Negative.    Gastrointestinal: Negative.    Endocrine: Negative.    Genitourinary: Negative.    Musculoskeletal: Negative.    Allergic/Immunologic: Negative.    Neurological:  Positive for weakness.   Hematological: Negative.    Objective:     Vital Signs (Most Recent):  Temp: 97.8 °F (36.6 °C) (11/10/22 1720)  Pulse: 85 (11/10/22 1900)  Resp: 16 (11/10/22 1801)  BP: (!) 98/51 (11/10/22 1900)  SpO2: (!) 93 % (11/10/22 1858)   Vital Signs (24h Range):  Temp:  [97.8 °F (36.6 °C)-98.3 °F (36.8 °C)] 97.8 °F (36.6 °C)  Pulse:  [82-98] 85  Resp:  [14-18] 16  SpO2:  [93 %-97 %] 93 %  BP: ()/(48-78) 98/51     Weight: 95.3 kg (210 lb)  Body mass index is 29.29 kg/m².    Physical Exam  Vitals and nursing note reviewed. Exam  conducted with a chaperone present.   HENT:      Head: Normocephalic and atraumatic.      Mouth/Throat:      Mouth: Mucous membranes are dry.      Comments: Mucous membranes dry  Hypertrophic taste buds  Right-sided lymphadenopathy submandibular  Tender to palpation  Cardiovascular:      Rate and Rhythm: Normal rate and regular rhythm.   Pulmonary:      Breath sounds: Rhonchi present.   Abdominal:      General: Bowel sounds are normal.      Palpations: Abdomen is soft.   Musculoskeletal:         General: Normal range of motion.   Skin:     General: Skin is warm and dry.      Capillary Refill: Capillary refill takes less than 2 seconds.   Neurological:      General: No focal deficit present.      Mental Status: He is alert.           Significant Labs: All pertinent labs within the past 24 hours have been reviewed.    Significant Imaging: I have reviewed all pertinent imaging results/findings within the past 24 hours.    Assessment/Plan:     * Pneumonia  Reason for admission  Broad-spectrum IV antibiotics   Community-acquired  Bacterial versus viral        Snoring  History of snoring  Consider with GURVINDER  Offer p.r.n. CPAP  Consult pulmonology    Cirrhosis of liver  Stable      COPD exacerbation  Exacerbation  No wheezes heard at the time of my exam  IV Solu-Medrol  Continue antibiotics      VTE Risk Mitigation (From admission, onward)    None             Miguel Meyer MD  Department of Hospital Medicine   [facility] - Emergency Dept

## 2022-11-11 NOTE — PLAN OF CARE
Problem: Adult Inpatient Plan of Care  Goal: Plan of Care Review  Outcome: Ongoing, Progressing     Problem: Infection (Pneumonia)  Goal: Resolution of Infection Signs and Symptoms  Outcome: Ongoing, Progressing     Problem: Fluid Imbalance (Pneumonia)  Goal: Fluid Balance  Outcome: Ongoing, Progressing

## 2022-11-11 NOTE — CONSULTS
"[facility]  Adult Nutrition   Consult Note (Nutrition Education)     SUMMARY     Recommendations  1) RD gave pt diet information on a renal diet without HD due to pt being unavailable.  2) Continue current regular diet as tolerated and encourage intake.   3)  obtain daily menu preferences daily.    Goals:   1) Pt to understand his renal diet.   2) Pt to meet 75 to 100% of his EEN and EPN.    Nutrition Goal Status: progressing towards goal    Communication of RD Recs: other (comment)    Dietitian Rounds Brief  See other note    Diet order:   Current Diet Order: Regular      Evaluation of Received Nutrient/Fluid Intake  Energy Calories Required: not meeting needs  Protein Required: not meeting needs  Fluid Required: meeting needs     % Intake of Estimated Energy Needs: 75 - 100 %  % Meal Intake: 75 - 100 %      Intake/Output Summary (Last 24 hours) at 11/11/2022 1619  Last data filed at 11/11/2022 1616  Gross per 24 hour   Intake 410 ml   Output 1450 ml   Net -1040 ml        Anthropometrics  Temp: 98.1 °F (36.7 °C)  Height Method: Stated  Height: 5' 11" (180.3 cm)  Height (inches): 71 in  Weight Method: Bed Scale  Weight: 95.5 kg (210 lb 8.6 oz)  Weight (lb): 210.54 lb  Ideal Body Weight (IBW), Male: 172 lb  % Ideal Body Weight, Male (lb): 122.09 %  BMI (Calculated): 29.4  BMI Grade: 25 - 29.9 - overweight       Estimated/Assessed Needs  Weight Used For Calorie Calculations: 95.5 kg (210 lb 8.6 oz)  Energy Calorie Requirements (kcal): 6671-0295 kcals/day (20-25 kcals/kg ABW)  Energy Need Method: Kcal/kg  Protein Requirements: 117-156 g/day (1.5-2 g/kg IBW)  Weight Used For Protein Calculations: 78 kg (171 lb 15.3 oz)     Estimated Fluid Requirement Method: RDA Method  RDA Method (mL): 1910       Reason for Assessment  Reason For Assessment: consult  Diagnosis: other (see comments) (pneumonia)  Relevant Medical History: Hypertension Arthritis, Wears glasses, GERD (gastroesophageal reflux disease), " Hepatitis, NO FURTHER TESTS OR TX. WITH INSURANCE IN 2021 CAN GET IT EVALUATED, History of MRSA infection, History of right shoulder replacement, History of left hip replacement  Interdisciplinary Rounds: did not attend    Nutrition/Diet History  Spiritual, Cultural Beliefs, Anabaptist Practices, Values that Affect Care: no  Food Allergies: NKFA  Factors Affecting Nutritional Intake: NPO (diet just ordered today at 0930)    Nutrition Risk Screen  Nutrition Risk Screen: no indicators present     MST Score: 0  Have you recently lost weight without trying?: No  Weight loss score: 0  Have you been eating poorly because of a decreased appetite?: No  Appetite score: 0       Weight History:  Wt Readings from Last 5 Encounters:   11/11/22 95.5 kg (210 lb 8.6 oz)   08/19/22 98 kg (216 lb)   05/31/22 98.1 kg (216 lb 3.2 oz)   01/31/22 98 kg (216 lb)   12/30/21 99.3 kg (219 lb)        Lab/Procedures/Meds: Pertinent Labs/Meds Reviewed    Medications:Pertinent Medications Reviewed  Scheduled Meds:   ceFEPime (MAXIPIME) IVPB  2 g Intravenous Q12H    [START ON 11/12/2022] levoFLOXacin  250 mg Oral Daily    pantoprazole  40 mg Oral Before breakfast    tamsulosin  0.8 mg Oral Daily    topiramate  50 mg Oral Daily     Continuous Infusions:   sodium chloride 0.9% 100 mL/hr at 11/11/22 0953     PRN Meds:.acetaminophen, albuterol-ipratropium, dextrose 10%, dextrose 10%, glucagon (human recombinant), glucose, glucose, ibuprofen, melatonin, morphine, naloxone, ondansetron, polyethylene glycol, senna-docusate 8.6-50 mg, simethicone, sodium chloride 0.9%    Labs: Pertinent Labs Reviewed  Clinical Chemistry:  Recent Labs   Lab 11/10/22  1717 11/11/22  0421    141   K 4.4 3.7    113*   CO2 20* 19*   GLU 96 81   BUN 76* 63*   CREATININE 4.0* 2.3*   CALCIUM 8.6* 8.6*   PROT 7.2  --    ALBUMIN 3.2*  --    BILITOT 0.7  --    ALKPHOS 100  --    AST 21  --    ALT 20  --    ANIONGAP 10 9   MG 2.4 2.4   PHOS 4.7*  --    LIPASE 26  --       CBC:   Recent Labs   Lab 11/11/22 0421   WBC 6.89   RBC 3.39*   HGB 7.6*   HCT 25.7*      MCV 76*   MCH 22.4*   MCHC 29.6*         Inflammatory Labs:  No results for input(s): CRP in the last 168 hours.  Diabetes:  Recent Labs   Lab 11/11/22 0421   HGBA1C 6.5*          Nutrition Risk  Level of Risk/Frequency of Follow-up: high     Nutrition Follow-Up  RD Follow-up?: Yes      Belkis Flores, JIA 11/11/2022 4:19 PM

## 2022-11-11 NOTE — CONSULTS
Pulmonary/Critical Care Consult      Patient name: Omar Trotter  MRN: [id number]  Date: 11/11/2022    Admit Date: 11/10/2022  Consult Requested By: Marc Jo MD    Reason for Consult: Pneumonia, GURIVNDER    HPI:    11/11/2022 - 63 yo admitted with fever, generalized weakness, sore throat and found to have RML, RLL pneumonia.  Covid and flu tests were negative.  This AM he was not very talkative and didn't answer many questions.  Chart has been reviewed.  There is a reported h/o snoring.    Review of Systems    Review of Systems   Constitutional:  Positive for fever and malaise/fatigue.   HENT:  Positive for sore throat.    Respiratory:  Positive for cough and sputum production (minimal). Negative for hemoptysis and shortness of breath.    Cardiovascular:  Negative for chest pain, palpitations, orthopnea and leg swelling.   Gastrointestinal:  Negative for nausea and vomiting.   Genitourinary:  Negative for dysuria.   Neurological:  Positive for weakness.   Psychiatric/Behavioral:  Negative for depression, substance abuse and suicidal ideas.      Past Medical History    Past Medical History:   Diagnosis Date    Arthritis     GERD (gastroesophageal reflux disease)     Hepatitis C     STATES DX 10-20. NO S/S. NO FURTHER TESTS OR TX. WITH INSURANCE IN 2021 CAN GET IT EVALUATED.    History of left hip replacement 05/20/2021    History of MRSA infection     History of right shoulder replacement 01/15/2021    Hypertension     Wears glasses        Past Surgical History    Past Surgical History:   Procedure Laterality Date    APPENDECTOMY      ARTHROPLASTY OF SHOULDER Right 1/13/2021    Procedure: ARTHROPLASTY, SHOULDER TOTAL;  Surgeon: Emilio Siddiqui MD;  Location: [facility] OR;  Service: Orthopedics;  Laterality: Right;  GENERAL AND BLOCK    HERNIA REPAIR      HIP REPLACEMENT ARTHROPLASTY Left 3/24/2021    Procedure: ARTHROPLASTY, HIP REPLACEMENT;  Surgeon: Miles Rivas II, MD;  Location: [facility] OR;  Service: Orthopedics;   "Laterality: Left;    JOINT REPLACEMENT Right     shoulder    REVISION TOTAL HIP ARTHROPLASTY Left 6/2/2021    Procedure: REVISION, TOTAL ARTHROPLASTY, HIP;  Surgeon: Miles Rivas II, MD;  Location: [facility];  Service: Orthopedics;  Laterality: Left;       Medications (scheduled):      ceFEPime (MAXIPIME) IVPB  2 g Intravenous Q12H    pantoprazole  40 mg Oral Before breakfast    tamsulosin  0.8 mg Oral Daily    topiramate  50 mg Oral Daily       Medications (infusions):      sodium chloride 0.9% 100 mL/hr at 11/11/22 0953       Medications (prn):     acetaminophen, albuterol-ipratropium, dextrose 10%, dextrose 10%, glucagon (human recombinant), glucose, glucose, melatonin, morphine, naloxone, ondansetron, polyethylene glycol, senna-docusate 8.6-50 mg, simethicone, sodium chloride 0.9%    Family History:   Family History   Problem Relation Age of Onset    Cancer Mother         leukemia       Social History: Tobacco:   Social History     Tobacco Use   Smoking Status Every Day    Packs/day: 0.50    Years: 30.00    Pack years: 15.00    Types: Cigarettes   Smokeless Tobacco Never                                EtOH:   Social History     Substance and Sexual Activity   Alcohol Use Never                                Drugs:   Social History     Substance and Sexual Activity   Drug Use Never       Physical Exam    Vital signs:  Temp:  [97.5 °F (36.4 °C)-98.3 °F (36.8 °C)]   Pulse:  [82-98]   Resp:  [14-20]   BP: ()/(44-78)   SpO2:  [93 %-97 %]     Intake/Output:   Intake/Output Summary (Last 24 hours) at 11/11/2022 1306  Last data filed at 11/11/2022 1133  Gross per 24 hour   Intake 290 ml   Output 925 ml   Net -635 ml        BMI: Estimated body mass index is 29.36 kg/m² as calculated from the following:    Height as of this encounter: 5' 11" (1.803 m).    Weight as of this encounter: 95.5 kg (210 lb 8.6 oz).    Physical Exam  Vitals and nursing note reviewed.   Constitutional:       General: He is not in acute " distress.     Appearance: Normal appearance. He is obese. He is not ill-appearing, toxic-appearing or diaphoretic.      Comments: Sleepy, arousable, not very talkative   HENT:      Head: Normocephalic and atraumatic.      Right Ear: External ear normal.      Left Ear: External ear normal.      Nose: Nose normal.      Mouth/Throat:      Mouth: Mucous membranes are moist.      Pharynx: Oropharynx is clear. No oropharyngeal exudate.   Eyes:      General: No scleral icterus.        Right eye: No discharge.         Left eye: No discharge.      Extraocular Movements: Extraocular movements intact.      Conjunctiva/sclera: Conjunctivae normal.      Pupils: Pupils are equal, round, and reactive to light.   Neck:      Vascular: No carotid bruit.   Cardiovascular:      Rate and Rhythm: Normal rate and regular rhythm.      Pulses: Normal pulses.      Heart sounds: Normal heart sounds. No murmur heard.    No friction rub. No gallop.   Pulmonary:      Effort: Pulmonary effort is normal. No respiratory distress.      Breath sounds: No stridor. Rales (right base posterior) present. No wheezing or rhonchi.   Chest:      Chest wall: No tenderness.   Abdominal:      General: Bowel sounds are normal. There is no distension.      Tenderness: There is no abdominal tenderness. There is no guarding.   Musculoskeletal:         General: No swelling. Normal range of motion.      Cervical back: Normal range of motion and neck supple. No rigidity or tenderness.      Right lower leg: No edema.      Left lower leg: No edema.   Lymphadenopathy:      Cervical: No cervical adenopathy.   Skin:     General: Skin is warm and dry.      Capillary Refill: Capillary refill takes less than 2 seconds.      Coloration: Skin is not jaundiced.      Findings: No bruising.   Neurological:      General: No focal deficit present.      Mental Status: He is alert and oriented to person, place, and time. Mental status is at baseline.      Cranial Nerves: No cranial  nerve deficit.      Sensory: No sensory deficit.      Motor: No weakness.   Psychiatric:         Mood and Affect: Mood normal.         Behavior: Behavior normal.         Thought Content: Thought content normal.         Judgment: Judgment normal.       Laboratory    Recent Labs   Lab 11/11/22 0421   WBC 6.89   RBC 3.39*   HGB 7.6*   HCT 25.7*      MCV 76*   MCH 22.4*   MCHC 29.6*       Recent Labs   Lab 11/10/22  1717 11/11/22  0421   CALCIUM 8.6* 8.6*   PROT 7.2  --     141   K 4.4 3.7   CO2 20* 19*    113*   BUN 76* 63*   CREATININE 4.0* 2.3*   ALKPHOS 100  --    ALT 20  --    AST 21  --    BILITOT 0.7  --        Recent Labs   Lab 11/10/22  1717   INR 1.4   APTT 36.0*       Recent Labs   Lab 11/10/22  1717   TROPONINI <0.030       Additional labs: reviewed    Microbiology:       Microbiology Results (last 7 days)       Procedure Component Value Units Date/Time    Culture, Respiratory with Gram Stain [[id number]]     Order Status: Sent Specimen: Sputum, Expectorated     Blood Culture #2 **CANNOT BE ORDERED STAT** [[id number]] Collected: 11/10/22 1735    Order Status: Completed Specimen: Blood from Peripheral, Upper Arm, Left Updated: 11/11/22 0158     Blood Culture, Routine No Growth to date    Blood Culture #1 **CANNOT BE ORDERED STAT** [[id number]] Collected: 11/10/22 1929    Order Status: Completed Specimen: Blood from Peripheral, Antecubital, Left Updated: 11/11/22 0158     Blood Culture, Routine No Growth to date    Blood culture x two cultures. Draw prior to antibiotics. [[id number]]     Order Status: Canceled Specimen: Blood     Blood culture x two cultures. Draw prior to antibiotics. [[id number]]     Order Status: Canceled Specimen: Blood             Radiology    CT Chest Abdomen Pelvis Without Contrast (XPD)  CMS MANDATED QUALITY DATA - CT RADIATION - 436    All CT scans at this facility utilize dose modulation, iterative reconstruction, and/or weight based dosing when appropriate to reduce  radiation dose to as low as reasonably achievable.    Reason: Abdominal abscess/infection suspected; persistent cough, dizziness, sepsis    TECHNIQUE: Chest, abdomen and pelvic CT without IV contrast.    COMPARISON: None    FINDINGS:  CT chest: The visualized portion of the base of the neck is unremarkable.    The heart and great vessels are normal in size. There is coronary artery calcification.  There are mildly prominent right paratracheal nodes which are not pathologically enlarged.    There are diffuse groundglass opacities within the upper lobes, right middle lobe and right lower lobe suggestive of infectious or inflammatory process. There are no pleural effusions. Trachea and bronchi are normal.    The esophagus is normal. There is a right humeral prosthesis. There are no acute osseous abnormalities.    CT ABDOMEN: The liver, spleen and pancreas have a normal noncontrast appearance. The gallbladder and adrenal glands are normal.    The kidneys are symmetric in size without hydronephrosis. There are tiny nonobstructing left renal stones. There is no perinephric stranding. There are no thick-walled or dilated bowel loops. There is no mesenteric or retroperitoneal adenopathy. The aorta is normal in caliber. There are no acute osseous abnormalities. There are degenerative changes of the spine.    CT PELVIS: The bladder is normal. There are prostatic calcifications. There is no pelvic adenopathy. There is a left hip prosthesis.    IMPRESSION:  Diffuse groundglass opacities in the upper lobes, right middle lobe and right lower lobe suggestive of pneumonia in either viral or bacterial    No acute abnormality within the abdomen or pelvis    Electronically signed by:  Isela Blanc MD  11/10/2022 7:20 PM [facility] Workstation: [id number]  CT Head Without Contrast  All CT scans at this facility used dose modulation, iterative reconstruction and/or weight-based dosing when appropriate to reduce radiation doses  as low as  reasonably achievable.    CLINICAL INFORMATION:  Syncope, recurrent    FINDINGS:   The ventricles and sulci are normal in size and configuration for age.  There is no intraparenchymal hemorrhage, mass or midline shift.  There are no extra-axial fluid collections.  The paranasal sinus and mastoid air cells are clear.    IMPRESSION:   NO ACUTE INTRACRANIAL PROCESS.    Electronically signed by:  Isela Blanc MD  11/10/2022 5:20 PM [facility] Workstation: [id number]  X-Ray Chest AP Portable  Chest single view    CLINICAL DATA: Dizziness, sepsis    FINDINGS: AP view is compared to June 2021.    Heart size is normal. The mediastinum is unremarkable. The left lung is clear. Ill-defined alveolar infiltrate in the right mid and lower lung is compatible with pneumonia. There are no pleural effusions.    Changes of right shoulder arthroplasty are noted.    IMPRESSION:  1. Ill-defined alveolar infiltrate in the right mid and lower lung compatible with pneumonia. Radiographic follow-up to document resolution is recommended.    Electronically signed by:  Silverio Monahan MD  11/10/2022 4:28 PM [facility] Workstation: [id number]        Additional Studies    reviewed    Ventilator Information              No results for input(s): PH, PCO2, PO2, HCO3, POCSATURATED, BE in the last 72 hours.      Impression    Active Hospital Problems    Diagnosis  POA    *Pneumonia [J18.9]  Yes    Snoring [R06.83]  Yes    COPD exacerbation [J44.1]  Yes    Cirrhosis of liver [K74.60]  Yes      Resolved Hospital Problems   No resolved problems to display.       Plan    Continue antibiotics but I would recommend changing to levaquin as HCAP not very likely  Respiratory treatments if needed  Continue IVF and follow renal function - already better  Any ? Of GURVINDER will need further evaluation as outpt  Watch sats  Increase activity as able    Thank you for this consult.  I will follow with you while the patient is hospitalized.        Lexa Quesada MD  [facility]  Pulmonary/Critical Care  11/11/2022

## 2022-11-11 NOTE — CONSULTS
"[facility]  Adult Nutrition   Consult Note (Nutrition Education)     SUMMARY     Recommendations:  1) Continue current Regular diet as tolerated and encourage intake.   2)  continue to obtain daily menu preferences.    Goals:  Pt to meet 75% or more of his EE and EP needs.    Nutrition Goal Status: progressing towards goal    Communication of RD Recs: other (comment)    Dietitian Rounds Brief  Consult for Diet Education: Did not educate pt due to pt having A1C and BNP WNL's and being on a regular diet. Pts regular diet was just ordered this morning at around 0930 and he ate 75% at lunch. His LBM was 11/9/2022. Will continue to follow prn.    Diet order:   Current Diet Order: Regular      Evaluation of Received Nutrient/Fluid Intake  Energy Calories Required: not meeting needs  Protein Required: not meeting needs  Fluid Required: meeting needs     % Intake of Estimated Energy Needs: 75 - 100 %  % Meal Intake: 75 - 100 %      Intake/Output Summary (Last 24 hours) at 11/11/2022 1424  Last data filed at 11/11/2022 1133  Gross per 24 hour   Intake 290 ml   Output 925 ml   Net -635 ml        Anthropometrics  Temp: 98.2 °F (36.8 °C)  Height Method: Stated  Height: 5' 11" (180.3 cm)  Height (inches): 71 in  Weight Method: Bed Scale  Weight: 95.5 kg (210 lb 8.6 oz)  Weight (lb): 210.54 lb  Ideal Body Weight (IBW), Male: 172 lb  % Ideal Body Weight, Male (lb): 122.09 %  BMI (Calculated): 29.4  BMI Grade: 25 - 29.9 - overweight       Estimated/Assessed Needs  Weight Used For Calorie Calculations: 95.5 kg (210 lb 8.6 oz)  Energy Calorie Requirements (kcal): 2503-7986 kcals/day (20-25 kcals/kg ABW)  Energy Need Method: Kcal/kg  Protein Requirements: 117-156 g/day (1.5-2 g/kg IBW)  Weight Used For Protein Calculations: 78 kg (171 lb 15.3 oz)     Estimated Fluid Requirement Method: RDA Method  RDA Method (mL): 1910       Reason for Assessment  Reason For Assessment: consult  Diagnosis: other (see comments) " (pneumonia)  Relevant Medical History: Hypertension Arthritis, Wears glasses, GERD (gastroesophageal reflux disease), Hepatitis, NO FURTHER TESTS OR TX. WITH INSURANCE IN 2021 CAN GET IT EVALUATED, History of MRSA infection, History of right shoulder replacement, History of left hip replacement  Interdisciplinary Rounds: did not attend    Nutrition/Diet History  Spiritual, Cultural Beliefs, Mormonism Practices, Values that Affect Care: no  Food Allergies: NKFA  Factors Affecting Nutritional Intake: NPO (diet just ordered today at 0930)    Nutrition Risk Screen  Nutrition Risk Screen: no indicators present     MST Score: 0  Have you recently lost weight without trying?: No  Weight loss score: 0  Have you been eating poorly because of a decreased appetite?: No  Appetite score: 0       Weight History:  Wt Readings from Last 5 Encounters:   11/11/22 95.5 kg (210 lb 8.6 oz)   08/19/22 98 kg (216 lb)   05/31/22 98.1 kg (216 lb 3.2 oz)   01/31/22 98 kg (216 lb)   12/30/21 99.3 kg (219 lb)        Lab/Procedures/Meds: Pertinent Labs/Meds Reviewed    Medications:Pertinent Medications Reviewed  Scheduled Meds:   ceFEPime (MAXIPIME) IVPB  2 g Intravenous Q12H    [START ON 11/12/2022] levoFLOXacin  250 mg Oral Daily    levoFLOXacin  500 mg Oral Once    pantoprazole  40 mg Oral Before breakfast    tamsulosin  0.8 mg Oral Daily    topiramate  50 mg Oral Daily     Continuous Infusions:   sodium chloride 0.9% 100 mL/hr at 11/11/22 0953     PRN Meds:.acetaminophen, albuterol-ipratropium, dextrose 10%, dextrose 10%, glucagon (human recombinant), glucose, glucose, melatonin, morphine, naloxone, ondansetron, polyethylene glycol, senna-docusate 8.6-50 mg, simethicone, sodium chloride 0.9%    Labs: Pertinent Labs Reviewed  Clinical Chemistry:  Recent Labs   Lab 11/10/22  1717 11/11/22  0421    141   K 4.4 3.7    113*   CO2 20* 19*   GLU 96 81   BUN 76* 63*   CREATININE 4.0* 2.3*   CALCIUM 8.6* 8.6*   PROT 7.2  --    ALBUMIN  3.2*  --    BILITOT 0.7  --    ALKPHOS 100  --    AST 21  --    ALT 20  --    ANIONGAP 10 9   MG 2.4 2.4   PHOS 4.7*  --    LIPASE 26  --      CBC:   Recent Labs   Lab 11/11/22 0421   WBC 6.89   RBC 3.39*   HGB 7.6*   HCT 25.7*      MCV 76*   MCH 22.4*   MCHC 29.6*     Cardiac Profile:  Recent Labs   Lab 11/10/22  1717   BNP 61   TROPONINI <0.030     Diabetes:  Recent Labs   Lab 11/11/22 0421   HGBA1C 6.5*     Monitor and Evaluation  Food and Nutrient Intake: energy intake, food and beverage intake  Food and Nutrient Adminstration: diet order  Knowledge/Beliefs/Attitudes: food and nutrition knowledge/skill, beliefs and attitudes  Physical Activity and Function: nutrition-related ADLs and IADLs, factors affecting access to physical activity  Anthropometric Measurements: weight, weight change, body mass index  Biochemical Data, Medical Tests and Procedures: lipid profile, inflammatory profile, glucose/endocrine profile, gastrointestinal profile, electrolyte and renal panel  Nutrition-Focused Physical Findings: overall appearance     Nutrition Risk  Level of Risk/Frequency of Follow-up: high     Nutrition Follow-Up  RD Follow-up?: Yes      Belkis Flores, JIA 11/11/2022 2:24 PM

## 2022-11-11 NOTE — PROGRESS NOTES
"Pharmacist Renal Dose Adjustment Note    Omar Trotter is a 62 y.o. male being treated with the medication Cefepime    Patient Data:    Vital Signs (Most Recent):  Temp: 97.7 °F (36.5 °C) (11/11/22 0758)  Pulse: 83 (11/11/22 0758)  Resp: 17 (11/11/22 0832)  BP: 130/68 (11/11/22 0758)  SpO2: (!) 94 % (11/11/22 0758)   Vital Signs (72h Range):  Temp:  [97.5 °F (36.4 °C)-98.3 °F (36.8 °C)]   Pulse:  [82-98]   Resp:  [14-20]   BP: ()/(48-78)   SpO2:  [93 %-97 %]      Recent Labs   Lab 11/10/22  1717 11/11/22  0421   CREATININE 4.0* 2.3*     Ht: 5' 11" (1.803 m)  Wt: 95.5 kg (210 lb 8.6 oz)  Estimated Creatinine Clearance: 38.3 mL/min (A) (based on SCr of 2.3 mg/dL (H)).    Cefepime 1 gm IV q 24 hrs will be changed back to Cefepime 1 gm IV q 12 hrs due to CrCl 30 - 60 ml/min.    Pharmacist's Name: Linwood Sierra  Pharmacist's Extension: [phone]    "

## 2022-11-11 NOTE — PROGRESS NOTES
Pharmacokinetic Initial Assessment: IV Vancomycin    Assessment/Plan:    Initiate intravenous vancomycin with loading dose of 1500 mg once with subsequent doses when random concentrations are less than 20 mcg/mL  Desired empiric serum trough concentration is 10 to 15 mcg/mL  Draw vancomycin random level on 11/11 at 2300.  Pharmacy will continue to follow and monitor vancomycin.      Please contact pharmacy at extension [phone] with any questions regarding this assessment.     Thank you for the consult,   Wilbur Botello       Patient brief summary:  Omar Trotter is a 62 y.o. male initiated on antimicrobial therapy with IV Vancomycin for treatment of suspected lower respiratory infection    Drug Allergies:   Review of patient's allergies indicates:   Allergen Reactions    Pcn [penicillins]      As a child.       Actual Body Weight:   95.3 kg    Renal Function:   Estimated Creatinine Clearance: 22 mL/min (A) (based on SCr of 4 mg/dL (H)).,     CBC (last 72 hours):  Recent Labs   Lab Result Units 11/10/22  1717   WBC K/uL 10.89   Hemoglobin g/dL 7.4*   Hematocrit % 25.0*   Platelets K/uL 231   Gran % % 70.3   Lymph % % 18.8   Mono % % 8.7   Eosinophil % % 1.1   Basophil % % 0.3   Differential Method  Automated       Metabolic Panel (last 72 hours):  Recent Labs   Lab Result Units 11/10/22  1717 11/10/22  1730   Sodium mmol/L 138  --    Potassium mmol/L 4.4  --    Chloride mmol/L 108  --    CO2 mmol/L 20*  --    Glucose mg/dL 96  --    Glucose, UA   --  Negative   BUN mg/dL 76*  --    Creatinine mg/dL 4.0*  --    Albumin g/dL 3.2*  --    Total Bilirubin mg/dL 0.7  --    Alkaline Phosphatase U/L 100  --    AST U/L 21  --    ALT U/L 20  --    Magnesium mg/dL 2.4  --    Phosphorus mg/dL 4.7*  --        Drug levels (last 3 results):  No results for input(s): VANCOMYCINRA, VANCORANDOM, VANCOMYCINPE, VANCOPEAK, VANCOMYCINTR, VANCOTROUGH in the last 72 hours.    Microbiologic Results:  Microbiology Results (last 7 days)        Procedure Component Value Units Date/Time    Culture, Respiratory with Gram Stain [[id number]]     Order Status: Sent Specimen: Sputum, Expectorated     Blood Culture #2 **CANNOT BE ORDERED STAT** [[id number]] Collected: 11/10/22 1735    Order Status: Completed Specimen: Blood from Peripheral, Upper Arm, Left Updated: 11/11/22 0158     Blood Culture, Routine No Growth to date    Blood Culture #1 **CANNOT BE ORDERED STAT** [[id number]] Collected: 11/10/22 1929    Order Status: Completed Specimen: Blood from Peripheral, Antecubital, Left Updated: 11/11/22 0158     Blood Culture, Routine No Growth to date    Blood culture x two cultures. Draw prior to antibiotics. [[id number]]     Order Status: Canceled Specimen: Blood     Blood culture x two cultures. Draw prior to antibiotics. [[id number]]     Order Status: Canceled Specimen: Blood

## 2022-11-11 NOTE — HPI
The patient is a 62-year-old male, who presents emergency room complaining of weakness all over for the last 3 days.  He states it started this sore throat and the weakness progressed.  He denies any chest pain or nausea or vomiting.  He admits to a temperature of 102 but afebrile here.  On exam, he is noted to have crackles in his lower lung zones on the right.  There was no wheezing heard and moderate air movement.  Blood pressure been noted to be 90/50 but had a map of about 70 heart rate of 85.  He denies any known ill contacts or other exposures.  Initially, he was given aggressive fluid support but developed shortness of breath.  This was stopped per the emergency room.  On my exam, no rales or wheezes heard.  Moderate air movement.  Chest x-ray showed right middle and lower lobe infiltrate.  His COVID test and flu test were negative.      Plan to admit to observation continue IV antibiotics and symptomatic relief.

## 2022-11-11 NOTE — PROGRESS NOTES
Pharmacist Renal Dose Adjustment Note    Omar Trotter is a 62 y.o. male being treated with the medication Levofloxacin.    Patient Data:    Vital Signs (Most Recent):  Temp: 98.2 °F (36.8 °C) (11/11/22 1130)  Pulse: 94 (11/11/22 1130)  Resp: 18 (11/11/22 1130)  BP: (!) 96/50 (11/11/22 1142)  SpO2: (!) 94 % (11/11/22 1130)   Vital Signs (72h Range):  Temp:  [97.5 °F (36.4 °C)-98.3 °F (36.8 °C)]   Pulse:  [82-98]   Resp:  [14-20]   BP: ()/(44-78)   SpO2:  [93 %-97 %]      Recent Labs   Lab 11/10/22  1717 11/11/22  0421   CREATININE 4.0* 2.3*     Serum creatinine: 2.3 mg/dL (H) 11/11/22 0421  Estimated creatinine clearance: 38.3 mL/min (A)    Levofloxacin 500 mg po daily will be changed to Levofloxacin 500 mg po x 1 dose then Levofloxacin 250 mg po daily due to CrCl-20-49.  Pharmacist's Name: Danica Williamson  Pharmacist's Extension: [phone]

## 2022-11-11 NOTE — SUBJECTIVE & OBJECTIVE
Past Medical History:   Diagnosis Date    Arthritis     GERD (gastroesophageal reflux disease)     Hepatitis C     STATES DX 10-20. NO S/S. NO FURTHER TESTS OR TX. WITH INSURANCE IN 2021 CAN GET IT EVALUATED.    History of left hip replacement 05/20/2021    History of MRSA infection     History of right shoulder replacement 01/15/2021    Hypertension     Wears glasses        Past Surgical History:   Procedure Laterality Date    APPENDECTOMY      ARTHROPLASTY OF SHOULDER Right 1/13/2021    Procedure: ARTHROPLASTY, SHOULDER TOTAL;  Surgeon: Emilio Siddiqui MD;  Location: [facility] OR;  Service: Orthopedics;  Laterality: Right;  GENERAL AND BLOCK    HERNIA REPAIR      HIP REPLACEMENT ARTHROPLASTY Left 3/24/2021    Procedure: ARTHROPLASTY, HIP REPLACEMENT;  Surgeon: Miles Rivas II, MD;  Location: [facility] OR;  Service: Orthopedics;  Laterality: Left;    JOINT REPLACEMENT Right     shoulder    REVISION TOTAL HIP ARTHROPLASTY Left 6/2/2021    Procedure: REVISION, TOTAL ARTHROPLASTY, HIP;  Surgeon: Miles Rivas II, MD;  Location: [facility] OR;  Service: Orthopedics;  Laterality: Left;       Review of patient's allergies indicates:   Allergen Reactions    Pcn [penicillins]      As a child.       Current Facility-Administered Medications on File Prior to Encounter   Medication    electrolyte-S (ISOLYTE)    HYDROmorphone (PF) injection 0.2 mg    LIDOcaine (PF) 10 mg/ml (1%) injection 10 mg    oxyCODONE immediate release tablet 5 mg     Current Outpatient Medications on File Prior to Encounter   Medication Sig    amLODIPine (NORVASC) 5 MG tablet Take 1 tablet (5 mg total) by mouth once daily.    DULoxetine (CYMBALTA) 60 MG capsule Take 120 mg by mouth once daily.    gabapentin (NEURONTIN) 300 MG capsule TAKE 1 CAPSULE BY MOUTH THREE TIMES DAILY (Patient taking differently: Take 300 mg by mouth 3 (three) times daily.)    lisinopriL (PRINIVIL,ZESTRIL) 40 MG tablet Take 1 tablet (40 mg total) by mouth once daily.    methocarbamoL  (ROBAXIN) 500 MG Tab TAKE 1 TABLET BY MOUTH NIGHTLY AS NEEDED  Strength: 500 mg (Patient taking differently: Take 500 mg by mouth nightly as needed. TAKE 1 TABLET BY MOUTH NIGHTLY AS NEEDED  Strength: 500 mg)    metoprolol succinate (TOPROL-XL) 50 MG 24 hr tablet Take 1 tablet (50 mg total) by mouth once daily.    mirtazapine (REMERON) 30 MG tablet Take 30 mg by mouth every evening.    omeprazole (PRILOSEC) 20 MG capsule Take 1 capsule (20 mg total) by mouth once daily.    oxybutynin (DITROPAN-XL) 5 MG TR24 Take 1 tablet (5 mg total) by mouth once daily.    oxyCODONE-acetaminophen (PERCOCET) 5-325 mg per tablet Take 1 tablet by mouth every 6 (six) hours as needed.    solifenacin (VESICARE) 10 MG tablet Take 10 mg by mouth once daily.    tamsulosin (FLOMAX) 0.4 mg Cap Take 2 capsules (0.8 mg total) by mouth once daily.    topiramate (TOPAMAX) 50 MG tablet Take 50 mg by mouth once daily.    HYDROcodone-acetaminophen (NORCO) 5-325 mg per tablet Take 1 tablet by mouth every 4 (four) hours as needed.    solifenacin (VESICARE) 5 MG tablet Take 5 mg by mouth once daily.    traZODone (DESYREL) 150 MG tablet Take 150 mg by mouth nightly.     Family History       Problem Relation (Age of Onset)    Cancer Mother          Tobacco Use    Smoking status: Every Day     Packs/day: 0.50     Years: 30.00     Pack years: 15.00     Types: Cigarettes    Smokeless tobacco: Never   Substance and Sexual Activity    Alcohol use: Never    Drug use: Never    Sexual activity: Yes     Partners: Female     Review of Systems   Constitutional: Negative.    HENT: Negative.     Eyes: Negative.    Respiratory: Negative.     Cardiovascular: Negative.    Gastrointestinal: Negative.    Endocrine: Negative.    Genitourinary: Negative.    Musculoskeletal: Negative.    Allergic/Immunologic: Negative.    Neurological:  Positive for weakness.   Hematological: Negative.    Objective:     Vital Signs (Most Recent):  Temp: 97.8 °F (36.6 °C) (11/10/22  1720)  Pulse: 85 (11/10/22 1900)  Resp: 16 (11/10/22 1801)  BP: (!) 98/51 (11/10/22 1900)  SpO2: (!) 93 % (11/10/22 1858)   Vital Signs (24h Range):  Temp:  [97.8 °F (36.6 °C)-98.3 °F (36.8 °C)] 97.8 °F (36.6 °C)  Pulse:  [82-98] 85  Resp:  [14-18] 16  SpO2:  [93 %-97 %] 93 %  BP: ()/(48-78) 98/51     Weight: 95.3 kg (210 lb)  Body mass index is 29.29 kg/m².    Physical Exam  Vitals and nursing note reviewed. Exam conducted with a chaperone present.   HENT:      Head: Normocephalic and atraumatic.      Mouth/Throat:      Mouth: Mucous membranes are dry.      Comments: Mucous membranes dry  Hypertrophic taste buds  Right-sided lymphadenopathy submandibular  Tender to palpation  Cardiovascular:      Rate and Rhythm: Normal rate and regular rhythm.   Pulmonary:      Breath sounds: Rhonchi present.   Abdominal:      General: Bowel sounds are normal.      Palpations: Abdomen is soft.   Musculoskeletal:         General: Normal range of motion.   Skin:     General: Skin is warm and dry.      Capillary Refill: Capillary refill takes less than 2 seconds.   Neurological:      General: No focal deficit present.      Mental Status: He is alert.           Significant Labs: All pertinent labs within the past 24 hours have been reviewed.    Significant Imaging: I have reviewed all pertinent imaging results/findings within the past 24 hours.

## 2022-11-12 VITALS
WEIGHT: 210.56 LBS | HEART RATE: 92 BPM | RESPIRATION RATE: 18 BRPM | TEMPERATURE: 98 F | OXYGEN SATURATION: 96 % | BODY MASS INDEX: 29.48 KG/M2 | SYSTOLIC BLOOD PRESSURE: 157 MMHG | DIASTOLIC BLOOD PRESSURE: 70 MMHG | HEIGHT: 71 IN

## 2022-11-12 PROBLEM — J44.1 COPD EXACERBATION: Status: RESOLVED | Noted: 2021-04-26 | Resolved: 2022-11-12

## 2022-11-12 PROBLEM — N17.9 AKI (ACUTE KIDNEY INJURY): Status: RESOLVED | Noted: 2022-11-11 | Resolved: 2022-11-12

## 2022-11-12 PROBLEM — J18.9 PNEUMONIA: Status: RESOLVED | Noted: 2022-11-10 | Resolved: 2022-11-12

## 2022-11-12 LAB
ANION GAP SERPL CALC-SCNC: 6 MMOL/L (ref 8–16)
BASOPHILS # BLD AUTO: 0.02 K/UL (ref 0–0.2)
BASOPHILS NFR BLD: 0.4 % (ref 0–1.9)
BUN SERPL-MCNC: 32 MG/DL (ref 8–23)
CALCIUM SERPL-MCNC: 8.6 MG/DL (ref 8.7–10.5)
CHLORIDE SERPL-SCNC: 116 MMOL/L (ref 95–110)
CO2 SERPL-SCNC: 20 MMOL/L (ref 23–29)
CREAT SERPL-MCNC: 1 MG/DL (ref 0.5–1.4)
DIFFERENTIAL METHOD: ABNORMAL
EOSINOPHIL # BLD AUTO: 0.2 K/UL (ref 0–0.5)
EOSINOPHIL NFR BLD: 3 % (ref 0–8)
ERYTHROCYTE [DISTWIDTH] IN BLOOD BY AUTOMATED COUNT: 18.4 % (ref 11.5–14.5)
EST. GFR  (NO RACE VARIABLE): >60 ML/MIN/1.73 M^2
GLUCOSE SERPL-MCNC: 119 MG/DL (ref 70–110)
GLUCOSE SERPL-MCNC: 155 MG/DL (ref 70–110)
GLUCOSE SERPL-MCNC: 94 MG/DL (ref 70–110)
HCT VFR BLD AUTO: 27.6 % (ref 40–54)
HGB BLD-MCNC: 7.9 G/DL (ref 14–18)
IMM GRANULOCYTES # BLD AUTO: 0.1 K/UL (ref 0–0.04)
IMM GRANULOCYTES NFR BLD AUTO: 2 % (ref 0–0.5)
LYMPHOCYTES # BLD AUTO: 0.9 K/UL (ref 1–4.8)
LYMPHOCYTES NFR BLD: 17.8 % (ref 18–48)
MAGNESIUM SERPL-MCNC: 2 MG/DL (ref 1.6–2.6)
MCH RBC QN AUTO: 22.1 PG (ref 27–31)
MCHC RBC AUTO-ENTMCNC: 28.6 G/DL (ref 32–36)
MCV RBC AUTO: 77 FL (ref 82–98)
MONOCYTES # BLD AUTO: 0.4 K/UL (ref 0.3–1)
MONOCYTES NFR BLD: 8.2 % (ref 4–15)
NEUTROPHILS # BLD AUTO: 3.4 K/UL (ref 1.8–7.7)
NEUTROPHILS NFR BLD: 68.6 % (ref 38–73)
NRBC BLD-RTO: 1 /100 WBC
PLATELET # BLD AUTO: 202 K/UL (ref 150–450)
PMV BLD AUTO: 10.1 FL (ref 9.2–12.9)
POTASSIUM SERPL-SCNC: 4.2 MMOL/L (ref 3.5–5.1)
RBC # BLD AUTO: 3.58 M/UL (ref 4.6–6.2)
SODIUM SERPL-SCNC: 142 MMOL/L (ref 136–145)
WBC # BLD AUTO: 5 K/UL (ref 3.9–12.7)

## 2022-11-12 PROCEDURE — 83735 ASSAY OF MAGNESIUM: CPT | Performed by: INTERNAL MEDICINE

## 2022-11-12 PROCEDURE — 25000003 PHARM REV CODE 250: Performed by: INTERNAL MEDICINE

## 2022-11-12 PROCEDURE — 85025 COMPLETE CBC W/AUTO DIFF WBC: CPT | Performed by: INTERNAL MEDICINE

## 2022-11-12 PROCEDURE — 99232 SBSQ HOSP IP/OBS MODERATE 35: CPT | Mod: ,,, | Performed by: INTERNAL MEDICINE

## 2022-11-12 PROCEDURE — 80048 BASIC METABOLIC PNL TOTAL CA: CPT | Performed by: INTERNAL MEDICINE

## 2022-11-12 PROCEDURE — 36415 COLL VENOUS BLD VENIPUNCTURE: CPT | Performed by: INTERNAL MEDICINE

## 2022-11-12 PROCEDURE — 99232 PR SUBSEQUENT HOSPITAL CARE,LEVL II: ICD-10-PCS | Mod: ,,, | Performed by: INTERNAL MEDICINE

## 2022-11-12 RX ORDER — LEVOFLOXACIN 750 MG/1
750 TABLET ORAL DAILY
Qty: 7 TABLET | Refills: 0 | Status: SHIPPED | OUTPATIENT
Start: 2022-11-12 | End: 2022-11-19

## 2022-11-12 RX ADMIN — IBUPROFEN 600 MG: 200 TABLET, FILM COATED ORAL at 12:11

## 2022-11-12 RX ADMIN — TOPIRAMATE 50 MG: 25 TABLET, FILM COATED ORAL at 09:11

## 2022-11-12 RX ADMIN — TAMSULOSIN HYDROCHLORIDE 0.8 MG: 0.4 CAPSULE ORAL at 09:11

## 2022-11-12 RX ADMIN — PANTOPRAZOLE SODIUM 40 MG: 40 TABLET, DELAYED RELEASE ORAL at 07:11

## 2022-11-12 RX ADMIN — LEVOFLOXACIN 250 MG: 250 TABLET, FILM COATED ORAL at 07:11

## 2022-11-12 RX ADMIN — IBUPROFEN 600 MG: 200 TABLET, FILM COATED ORAL at 11:11

## 2022-11-12 NOTE — HOSPITAL COURSE
Patient got admitted with acute pneumonia/JACKLYN and mild COPD exacerbation  Pt was treated with iv fluids/nebs/Antibiotics  Pts condition  improved  and was later discharged to home

## 2022-11-12 NOTE — PLAN OF CARE
Problem: Adult Inpatient Plan of Care  Goal: Plan of Care Review  Outcome: Met  Goal: Patient-Specific Goal (Individualized)  Outcome: Met  Goal: Absence of Hospital-Acquired Illness or Injury  Outcome: Met  Goal: Optimal Comfort and Wellbeing  Outcome: Met  Goal: Readiness for Transition of Care  Outcome: Met     Problem: Fluid Imbalance (Pneumonia)  Goal: Fluid Balance  Outcome: Met     Problem: Infection (Pneumonia)  Goal: Resolution of Infection Signs and Symptoms  Outcome: Met     Problem: Respiratory Compromise (Pneumonia)  Goal: Effective Oxygenation and Ventilation  Outcome: Met     Problem: Skin Injury Risk Increased  Goal: Skin Health and Integrity  Outcome: Met     Problem: Fluid and Electrolyte Imbalance (Acute Kidney Injury/Impairment)  Goal: Fluid and Electrolyte Balance  Outcome: Met     Problem: Oral Intake Inadequate (Acute Kidney Injury/Impairment)  Goal: Optimal Nutrition Intake  Outcome: Met     Problem: Renal Function Impairment (Acute Kidney Injury/Impairment)  Goal: Effective Renal Function  Outcome: Met

## 2022-11-12 NOTE — DISCHARGE SUMMARY
[facility] Medicine  Discharge Summary      Patient Name: Omar Trotter  MRN: [id number]  [facility]: [id number]  Patient Class: IP- Inpatient  Admission Date: 11/10/2022  Hospital Length of Stay: 1 days  Discharge Date and Time:  11/12/2022 4:22 PM  Attending Physician: No att. providers found   Discharging Provider: Marc Jo MD  Primary Care Provider: ZANA Ring    Primary Care Team: Networked reference to record PCT     HPI:   The patient is a 62-year-old male, who presents emergency room complaining of weakness all over for the last 3 days.  He states it started this sore throat and the weakness progressed.  He denies any chest pain or nausea or vomiting.  He admits to a temperature of 102 but afebrile here.  On exam, he is noted to have crackles in his lower lung zones on the right.  There was no wheezing heard and moderate air movement.  Blood pressure been noted to be 90/50 but had a map of about 70 heart rate of 85.  He denies any known ill contacts or other exposures.  Initially, he was given aggressive fluid support but developed shortness of breath.  This was stopped per the emergency room.  On my exam, no rales or wheezes heard.  Moderate air movement.  Chest x-ray showed right middle and lower lobe infiltrate.  His COVID test and flu test were negative.      Plan to admit to observation continue IV antibiotics and symptomatic relief.      * No surgery found *      Hospital Course:   Patient got admitted with acute pneumonia/JACKLYN and mild COPD exacerbation  Pt was treated with iv fluids/nebs/Antibiotics  Pts condition  improved  and was later discharged to home        Goals of Care Treatment Preferences:  Code Status: Full Code      Consults:   Consults (From admission, onward)        Status Ordering Provider     Inpatient consult to Registered Dietitian/Nutritionist  Once        Provider:  (Not yet assigned)    Completed ZAHRA CRANE     Inpatient consult to Pulmonology  Once         Provider:  Lexa Quesada MD    Completed ZAHRA CRANE          No new Assessment & Plan notes have been filed under this hospital service since the last note was generated.  Service: Hospital Medicine    Final Active Diagnoses:    Diagnosis Date Noted POA    Anemia [D64.9] 11/11/2022 Unknown    Cirrhosis of liver [K74.60] 10/05/2020 Yes      Problems Resolved During this Admission:    Diagnosis Date Noted Date Resolved POA    PRINCIPAL PROBLEM:  Pneumonia [J18.9] 11/10/2022 11/12/2022 Yes    JACKLYN (acute kidney injury) [N17.9] 11/11/2022 11/12/2022 Yes    COPD exacerbation [J44.1] 04/26/2021 11/12/2022 Yes       Discharged Condition: good    Disposition: Home or Self Care    Follow Up:   Follow-up Information     ZANA Ring Follow up in 1 week(s).    Specialty: Family Medicine  Why: Monday for hosptial follow up appointment  Contact information:  [address] [place] [id number] [phone]                       Patient Instructions:   No discharge procedures on file.    Significant Diagnostic Studies: Labs:   CMP   Recent Labs   Lab 11/10/22  1717 11/11/22  0421 11/12/22  0830    141 142   K 4.4 3.7 4.2    113* 116*   CO2 20* 19* 20*   GLU 96 81 94   BUN 76* 63* 32*   CREATININE 4.0* 2.3* 1.0   CALCIUM 8.6* 8.6* 8.6*   PROT 7.2  --   --    ALBUMIN 3.2*  --   --    BILITOT 0.7  --   --    ALKPHOS 100  --   --    AST 21  --   --    ALT 20  --   --    ANIONGAP 10 9 6*    and CBC   Recent Labs   Lab 11/10/22  1717 11/11/22  0421 11/12/22  0830   WBC 10.89 6.89 5.00   HGB 7.4* 7.6* 7.9*   HCT 25.0* 25.7* 27.6*    207 202       Pending Diagnostic Studies:     Procedure Component Value Units Date/Time    Drugs of Abuse Screen, Blood [[id number]] Collected: 11/11/22 1108    Order Status: Sent Lab Status: In process Updated: 11/11/22 1134    Specimen: Blood     Narrative:      Recoll. [id number] by [email or web address] at 11/11/2022 09:41, reason: no specimen  Recoll. [id number] by  [email or web address] at 11/11/2022 09:42, reason: no specimen         Medications:  Reconciled Home Medications:      Medication List      START taking these medications    levoFLOXacin 750 MG tablet  Commonly known as: LEVAQUIN  Take 1 tablet (750 mg total) by mouth once daily. for 7 days        CHANGE how you take these medications    methocarbamoL 500 MG Tab  Commonly known as: ROBAXIN  TAKE 1 TABLET BY MOUTH NIGHTLY AS NEEDED  Strength: 500 mg  What changed:   · how much to take  · how to take this  · when to take this  · reasons to take this        CONTINUE taking these medications    amLODIPine 5 MG tablet  Commonly known as: NORVASC  Take 1 tablet (5 mg total) by mouth once daily.     DULoxetine 60 MG capsule  Commonly known as: CYMBALTA  Take 120 mg by mouth once daily.     gabapentin 300 MG capsule  Commonly known as: NEURONTIN  TAKE 1 CAPSULE BY MOUTH THREE TIMES DAILY     lisinopriL 40 MG tablet  Commonly known as: PRINIVIL,ZESTRIL  Take 1 tablet (40 mg total) by mouth once daily.     metoprolol succinate 50 MG 24 hr tablet  Commonly known as: TOPROL-XL  Take 1 tablet (50 mg total) by mouth once daily.     mirtazapine 30 MG tablet  Commonly known as: REMERON  Take 30 mg by mouth every evening.     omeprazole 20 MG capsule  Commonly known as: PRILOSEC  Take 1 capsule (20 mg total) by mouth once daily.     oxybutynin 5 MG Tr24  Commonly known as: DITROPAN-XL  Take 1 tablet (5 mg total) by mouth once daily.     oxyCODONE-acetaminophen 5-325 mg per tablet  Commonly known as: PERCOCET  Take 1 tablet by mouth every 6 (six) hours as needed.     * solifenacin 5 MG tablet  Commonly known as: VESICARE  Take 5 mg by mouth once daily.     * solifenacin 10 MG tablet  Commonly known as: VESICARE  Take 10 mg by mouth once daily.     tamsulosin 0.4 mg Cap  Commonly known as: FLOMAX  Take 2 capsules (0.8 mg total) by mouth once daily.     topiramate 50 MG tablet  Commonly known as: TOPAMAX  Take 50 mg by mouth once daily.     traZODone  150 MG tablet  Commonly known as: DESYREL  Take 150 mg by mouth nightly.         * This list has 2 medication(s) that are the same as other medications prescribed for you. Read the directions carefully, and ask your doctor or other care provider to review them with you.            STOP taking these medications    HYDROcodone-acetaminophen 5-325 mg per tablet  Commonly known as: NORCO            Indwelling Lines/Drains at time of discharge:   Lines/Drains/Airways     None               Physical Exam   Cardiovascular: Normal rate.   Neurological: He is alert.     Time spent on the discharge of patient: 44 minutes         Marc Jo MD  Department of Hospital Medicine  [facility]

## 2022-11-12 NOTE — SUBJECTIVE & OBJECTIVE
Interval History:     Review of Systems   Constitutional:  Negative for activity change and appetite change.   HENT:  Negative for congestion and dental problem.    Eyes:  Negative for discharge and itching.   Respiratory:  Negative for shortness of breath.    Cardiovascular:  Negative for chest pain.   Gastrointestinal:  Negative for abdominal distention and abdominal pain.   Endocrine: Negative for cold intolerance.   Genitourinary:  Negative for difficulty urinating and dysuria.   Musculoskeletal:  Negative for arthralgias and back pain.   Skin:  Negative for color change.   Neurological:  Negative for dizziness and facial asymmetry.   Hematological:  Negative for adenopathy.   Psychiatric/Behavioral:  Negative for agitation and behavioral problems.    Objective:     Vital Signs (Most Recent):  Temp: 98.8 °F (37.1 °C) (11/11/22 1921)  Pulse: 84 (11/11/22 1921)  Resp: 18 (11/11/22 1921)  BP: (!) 101/55 (11/11/22 1921)  SpO2: (!) 93 % (11/11/22 1921)   Vital Signs (24h Range):  Temp:  [97.5 °F (36.4 °C)-98.8 °F (37.1 °C)] 98.8 °F (37.1 °C)  Pulse:  [] 84  Resp:  [17-20] 18  SpO2:  [93 %-96 %] 93 %  BP: ()/(44-70) 101/55     Weight: 95.5 kg (210 lb 8.6 oz)  Body mass index is 29.36 kg/m².    Intake/Output Summary (Last 24 hours) at 11/11/2022 2111  Last data filed at 11/11/2022 2043  Gross per 24 hour   Intake 410 ml   Output 1650 ml   Net -1240 ml      Physical Exam  Vitals and nursing note reviewed.   Constitutional:       Appearance: He is well-developed.   HENT:      Head: Atraumatic.      Right Ear: External ear normal.      Left Ear: External ear normal.      Nose: Nose normal.      Mouth/Throat:      Mouth: Mucous membranes are moist.   Cardiovascular:      Rate and Rhythm: Normal rate.   Pulmonary:      Effort: Pulmonary effort is normal.   Musculoskeletal:         General: Normal range of motion.      Cervical back: Full passive range of motion without pain and normal range of motion.   Skin:      General: Skin is warm.   Neurological:      Mental Status: He is alert and oriented to person, place, and time.   Psychiatric:         Behavior: Behavior normal.       Significant Labs: All pertinent labs within the past 24 hours have been reviewed.  CBC:   Recent Labs   Lab 11/10/22  1717 11/11/22  0421   WBC 10.89 6.89   HGB 7.4* 7.6*   HCT 25.0* 25.7*    207     CMP:   Recent Labs   Lab 11/10/22  1717 11/11/22  0421    141   K 4.4 3.7    113*   CO2 20* 19*   GLU 96 81   BUN 76* 63*   CREATININE 4.0* 2.3*   CALCIUM 8.6* 8.6*   PROT 7.2  --    ALBUMIN 3.2*  --    BILITOT 0.7  --    ALKPHOS 100  --    AST 21  --    ALT 20  --    ANIONGAP 10 9       Significant Imaging: I have reviewed all pertinent imaging results/findings within the past 24 hours.

## 2022-11-12 NOTE — PROGRESS NOTES
Pulmonary/Critical Care  Progress Note      Patient name: Omar Trotter  MRN: [id number]  Date: 11/12/2022    Admit Date: 11/10/2022  Consult Requested By: Marc Jo MD    Reason for Consult: Pneumonia, GURVINDER    HPI:    11/11/2022 - 63 yo admitted with fever, generalized weakness, sore throat and found to have RML, RLL pneumonia.  Covid and flu tests were negative.  This AM he was not very talkative and didn't answer many questions.  Chart has been reviewed.  There is a reported h/o snoring.    11/12/2022 - Stable overnight, more talkative, respiratory status is stable.  Has minimal cough.  Main c/o is back and hip pain (a chronic issue).  No new labs reported.  Sats 93-97 % on RA    Review of Systems    Review of Systems   Constitutional:  Positive for fever and malaise/fatigue.   HENT:  Positive for sore throat.    Respiratory:  Positive for cough and sputum production (minimal). Negative for hemoptysis and shortness of breath.    Cardiovascular:  Negative for chest pain, palpitations, orthopnea and leg swelling.   Gastrointestinal:  Negative for nausea and vomiting.   Genitourinary:  Negative for dysuria.   Neurological:  Positive for weakness.   Psychiatric/Behavioral:  Negative for depression, substance abuse and suicidal ideas.      Past Medical History    Past Medical History:   Diagnosis Date    JACKLYN (acute kidney injury) 11/11/2022    Arthritis     GERD (gastroesophageal reflux disease)     Hepatitis C     STATES DX 10-20. NO S/S. NO FURTHER TESTS OR TX. WITH INSURANCE IN 2021 CAN GET IT EVALUATED.    History of left hip replacement 05/20/2021    History of MRSA infection     History of right shoulder replacement 01/15/2021    Hypertension     Wears glasses        Past Surgical History    Past Surgical History:   Procedure Laterality Date    APPENDECTOMY      ARTHROPLASTY OF SHOULDER Right 1/13/2021    Procedure: ARTHROPLASTY, SHOULDER TOTAL;  Surgeon: Emilio Siddiqui MD;  Location: [facility] OR;  Service:  Orthopedics;  Laterality: Right;  GENERAL AND BLOCK    HERNIA REPAIR      HIP REPLACEMENT ARTHROPLASTY Left 3/24/2021    Procedure: ARTHROPLASTY, HIP REPLACEMENT;  Surgeon: Miles Rivas II, MD;  Location: [facility] OR;  Service: Orthopedics;  Laterality: Left;    JOINT REPLACEMENT Right     shoulder    REVISION TOTAL HIP ARTHROPLASTY Left 6/2/2021    Procedure: REVISION, TOTAL ARTHROPLASTY, HIP;  Surgeon: Miles Rivas II, MD;  Location: [facility] OR;  Service: Orthopedics;  Laterality: Left;       Medications (scheduled):      ceFEPime (MAXIPIME) IVPB  2 g Intravenous Q12H    levoFLOXacin  250 mg Oral Daily    pantoprazole  40 mg Oral Before breakfast    tamsulosin  0.8 mg Oral Daily    topiramate  50 mg Oral Daily       Medications (infusions):      sodium chloride 0.9% 100 mL/hr at 11/11/22 0953       Medications (prn):     acetaminophen, albuterol-ipratropium, dextrose 10%, dextrose 10%, glucagon (human recombinant), glucose, glucose, ibuprofen, melatonin, morphine, naloxone, ondansetron, polyethylene glycol, senna-docusate 8.6-50 mg, simethicone, sodium chloride 0.9%    Family History:   Family History   Problem Relation Age of Onset    Cancer Mother         leukemia       Social History: Tobacco:   Social History     Tobacco Use   Smoking Status Every Day    Packs/day: 0.50    Years: 30.00    Pack years: 15.00    Types: Cigarettes   Smokeless Tobacco Never                                EtOH:   Social History     Substance and Sexual Activity   Alcohol Use Never                                Drugs:   Social History     Substance and Sexual Activity   Drug Use Never       Physical Exam    Vital signs:  Temp:  [97.8 °F (36.6 °C)-98.8 °F (37.1 °C)]   Pulse:  []   Resp:  [18]   BP: ()/(44-70)   SpO2:  [93 %-97 %]     Intake/Output:   Intake/Output Summary (Last 24 hours) at 11/12/2022 0836  Last data filed at 11/12/2022 0732  Gross per 24 hour   Intake 1660 ml   Output 2026 ml   Net -366 ml          BMI:  "Estimated body mass index is 29.36 kg/m² as calculated from the following:    Height as of this encounter: 5' 11" (1.803 m).    Weight as of this encounter: 95.5 kg (210 lb 8.6 oz).    Physical Exam  Vitals and nursing note reviewed.   Constitutional:       General: He is not in acute distress.     Appearance: Normal appearance. He is obese. He is not ill-appearing, toxic-appearing or diaphoretic.   HENT:      Head: Normocephalic and atraumatic.      Right Ear: External ear normal.      Left Ear: External ear normal.      Nose: Nose normal.      Mouth/Throat:      Mouth: Mucous membranes are moist.      Pharynx: Oropharynx is clear. No oropharyngeal exudate.   Eyes:      General: No scleral icterus.        Right eye: No discharge.         Left eye: No discharge.      Extraocular Movements: Extraocular movements intact.      Conjunctiva/sclera: Conjunctivae normal.      Pupils: Pupils are equal, round, and reactive to light.   Neck:      Vascular: No carotid bruit.   Cardiovascular:      Rate and Rhythm: Normal rate and regular rhythm.      Pulses: Normal pulses.      Heart sounds: Normal heart sounds. No murmur heard.    No friction rub. No gallop.   Pulmonary:      Effort: Pulmonary effort is normal. No respiratory distress.      Breath sounds: No stridor. Rales (right base posterior) present. No wheezing or rhonchi.   Chest:      Chest wall: No tenderness.   Abdominal:      General: Bowel sounds are normal. There is no distension.      Tenderness: There is no abdominal tenderness. There is no guarding.   Musculoskeletal:         General: No swelling. Normal range of motion.      Cervical back: Normal range of motion and neck supple. No rigidity or tenderness.      Right lower leg: No edema.      Left lower leg: No edema.   Lymphadenopathy:      Cervical: No cervical adenopathy.   Skin:     General: Skin is warm and dry.      Capillary Refill: Capillary refill takes less than 2 seconds.      Coloration: Skin is not " jaundiced.      Findings: No bruising.   Neurological:      General: No focal deficit present.      Mental Status: He is alert and oriented to person, place, and time. Mental status is at baseline.      Cranial Nerves: No cranial nerve deficit.      Sensory: No sensory deficit.      Motor: No weakness.   Psychiatric:         Mood and Affect: Mood normal.         Behavior: Behavior normal.         Thought Content: Thought content normal.         Judgment: Judgment normal.       Laboratory    No results for input(s): WBC, RBC, HGB, HCT, PLT, MCV, MCH, MCHC in the last 24 hours.      No results for input(s): GLUCOSE, CALCIUM, PROT, NA, K, CO2, CL, BUN, CREATININE, ALKPHOS, ALT, AST, BILITOT in the last 24 hours.    Invalid input(s):  ALBUMIN      No results for input(s): PT, INR, APTT in the last 24 hours.      No results for input(s): CPK, CPKMB, TROPONINI, MB in the last 24 hours.      Additional labs: reviewed    Microbiology:       Microbiology Results (last 7 days)       Procedure Component Value Units Date/Time    Blood Culture #2 **CANNOT BE ORDERED STAT** [[id number]] Collected: 11/10/22 1735    Order Status: Completed Specimen: Blood from Peripheral, Upper Arm, Left Updated: 11/11/22 2032     Blood Culture, Routine No Growth to date      No Growth to date    Blood Culture #1 **CANNOT BE ORDERED STAT** [[id number]] Collected: 11/10/22 1929    Order Status: Completed Specimen: Blood from Peripheral, Antecubital, Left Updated: 11/11/22 2032     Blood Culture, Routine No Growth to date      No Growth to date    Culture, Respiratory with Gram Stain [[id number]]     Order Status: Sent Specimen: Sputum, Expectorated     Blood culture x two cultures. Draw prior to antibiotics. [[id number]]     Order Status: Canceled Specimen: Blood     Blood culture x two cultures. Draw prior to antibiotics. [[id number]]     Order Status: Canceled Specimen: Blood             Radiology    CT Chest Abdomen Pelvis Without Contrast (XPD)  CMS  MANDATED QUALITY DATA - CT RADIATION - 436    All CT scans at this facility utilize dose modulation, iterative reconstruction, and/or weight based dosing when appropriate to reduce radiation dose to as low as reasonably achievable.    Reason: Abdominal abscess/infection suspected; persistent cough, dizziness, sepsis    TECHNIQUE: Chest, abdomen and pelvic CT without IV contrast.    COMPARISON: None    FINDINGS:  CT chest: The visualized portion of the base of the neck is unremarkable.    The heart and great vessels are normal in size. There is coronary artery calcification.  There are mildly prominent right paratracheal nodes which are not pathologically enlarged.    There are diffuse groundglass opacities within the upper lobes, right middle lobe and right lower lobe suggestive of infectious or inflammatory process. There are no pleural effusions. Trachea and bronchi are normal.    The esophagus is normal. There is a right humeral prosthesis. There are no acute osseous abnormalities.    CT ABDOMEN: The liver, spleen and pancreas have a normal noncontrast appearance. The gallbladder and adrenal glands are normal.    The kidneys are symmetric in size without hydronephrosis. There are tiny nonobstructing left renal stones. There is no perinephric stranding. There are no thick-walled or dilated bowel loops. There is no mesenteric or retroperitoneal adenopathy. The aorta is normal in caliber. There are no acute osseous abnormalities. There are degenerative changes of the spine.    CT PELVIS: The bladder is normal. There are prostatic calcifications. There is no pelvic adenopathy. There is a left hip prosthesis.    IMPRESSION:  Diffuse groundglass opacities in the upper lobes, right middle lobe and right lower lobe suggestive of pneumonia in either viral or bacterial    No acute abnormality within the abdomen or pelvis    Electronically signed by:  Isela Blanc MD  11/10/2022 7:20 PM CST Workstation: [id number]  CT  Head Without Contrast  All CT scans at this facility used dose modulation, iterative reconstruction and/or weight-based dosing when appropriate to reduce radiation doses  as low as reasonably achievable.    CLINICAL INFORMATION:  Syncope, recurrent    FINDINGS:   The ventricles and sulci are normal in size and configuration for age.  There is no intraparenchymal hemorrhage, mass or midline shift.  There are no extra-axial fluid collections.  The paranasal sinus and mastoid air cells are clear.    IMPRESSION:   NO ACUTE INTRACRANIAL PROCESS.    Electronically signed by:  Isela Blanc MD  11/10/2022 5:20 PM CST Workstation: [id number]  X-Ray Chest AP Portable  Chest single view    CLINICAL DATA: Dizziness, sepsis    FINDINGS: AP view is compared to June 2021.    Heart size is normal. The mediastinum is unremarkable. The left lung is clear. Ill-defined alveolar infiltrate in the right mid and lower lung is compatible with pneumonia. There are no pleural effusions.    Changes of right shoulder arthroplasty are noted.    IMPRESSION:  1. Ill-defined alveolar infiltrate in the right mid and lower lung compatible with pneumonia. Radiographic follow-up to document resolution is recommended.    Electronically signed by:  Silverio Monahan MD  11/10/2022 4:28 PM CST Workstation: [id number]        Additional Studies    reviewed    Ventilator Information              No results for input(s): PH, PCO2, PO2, HCO3, POCSATURATED, BE in the last 72 hours.      Impression    Active Hospital Problems    Diagnosis  POA    *Pneumonia [J18.9]  Yes    JAKCLYN (acute kidney injury) [N17.9]  Yes    Anemia [D64.9]  Unknown    COPD exacerbation [J44.1]  Yes    Cirrhosis of liver [K74.60]  Yes      Resolved Hospital Problems   No resolved problems to display.       Plan    Continue antibiotics but I would recommend changing to levaquin as HCAP not very likely  Respiratory treatments if needed  Continue IVF and follow renal function   Any ?  Of GURVINDER will need further evaluation as outpt  Watch sats  Increase activity as able  Respiratory status is stable would start transitioning toward outpt treatment for pneumonia    Thank you for this consult.  I will follow with you while the patient is hospitalized.        Lexa Quesada MD  [facility] Pulmonary/Critical Care  11/12/2022

## 2022-11-12 NOTE — PLAN OF CARE
Patient ambulated to the restroom today with assist X2, very unsteady. Sat up on the side of the bed. BP low this afternoon and PRN pain medication not able to be administered IV as requested. Additional PRN PO medication ordered and given with little relief.      Problem: Adult Inpatient Plan of Care  Goal: Plan of Care Review  Outcome: Ongoing, Progressing  Flowsheets (Taken 11/11/2022 1806)  Plan of Care Reviewed With: patient     Problem: Fluid Imbalance (Pneumonia)  Goal: Fluid Balance  Outcome: Ongoing, Progressing  Intervention: Monitor and Manage Fluid Balance  Flowsheets (Taken 11/11/2022 1806)  Fluid/Electrolyte Management: intravenous fluids adjusted     Problem: Infection (Pneumonia)  Goal: Resolution of Infection Signs and Symptoms  Outcome: Ongoing, Progressing

## 2022-11-12 NOTE — PLAN OF CARE
Patient cleared for discharge from case management standpoint.       11/12/22 0679   Final Note   Assessment Type Final Discharge Note   Anticipated Discharge Disposition Home   Hospital Resources/Appts/Education Provided Appointments scheduled and added to AVS;Provided patient/caregiver with written discharge plan information;Provided education on problems/symptoms using teachback

## 2022-11-12 NOTE — NURSING
Pt IV removed, telemetry monitor removed and AVS discharge paperwork review with Pt.  Pt was rolled down to first floor in wheelchair and assisted into  family member's vehicle.

## 2022-11-12 NOTE — PROGRESS NOTES
[facility] Medicine  Progress Note    Patient Name: Omar Trotter  MRN: [id number]  Patient Class: IP- Inpatient   Admission Date: 11/10/2022  Length of Stay: 0 days  Attending Physician: Marc Jo MD  Primary Care Provider: ZANA Ring        Subjective:     Principal Problem:Pneumonia        HPI:  The patient is a 62-year-old male, who presents emergency room complaining of weakness all over for the last 3 days.  He states it started this sore throat and the weakness progressed.  He denies any chest pain or nausea or vomiting.  He admits to a temperature of 102 but afebrile here.  On exam, he is noted to have crackles in his lower lung zones on the right.  There was no wheezing heard and moderate air movement.  Blood pressure been noted to be 90/50 but had a map of about 70 heart rate of 85.  He denies any known ill contacts or other exposures.  Initially, he was given aggressive fluid support but developed shortness of breath.  This was stopped per the emergency room.  On my exam, no rales or wheezes heard.  Moderate air movement.  Chest x-ray showed right middle and lower lobe infiltrate.  His COVID test and flu test were negative.      Plan to admit to observation continue IV antibiotics and symptomatic relief.      Overview/Hospital Course:  11/11  Very sleepy   Asking for pain meds  More stable compared to admission      Interval History:     Review of Systems   Constitutional:  Negative for activity change and appetite change.   HENT:  Negative for congestion and dental problem.    Eyes:  Negative for discharge and itching.   Respiratory:  Negative for shortness of breath.    Cardiovascular:  Negative for chest pain.   Gastrointestinal:  Negative for abdominal distention and abdominal pain.   Endocrine: Negative for cold intolerance.   Genitourinary:  Negative for difficulty urinating and dysuria.   Musculoskeletal:  Negative for arthralgias and back pain.   Skin:  Negative for  color change.   Neurological:  Negative for dizziness and facial asymmetry.   Hematological:  Negative for adenopathy.   Psychiatric/Behavioral:  Negative for agitation and behavioral problems.    Objective:     Vital Signs (Most Recent):  Temp: 98.8 °F (37.1 °C) (11/11/22 1921)  Pulse: 84 (11/11/22 1921)  Resp: 18 (11/11/22 1921)  BP: (!) 101/55 (11/11/22 1921)  SpO2: (!) 93 % (11/11/22 1921)   Vital Signs (24h Range):  Temp:  [97.5 °F (36.4 °C)-98.8 °F (37.1 °C)] 98.8 °F (37.1 °C)  Pulse:  [] 84  Resp:  [17-20] 18  SpO2:  [93 %-96 %] 93 %  BP: ()/(44-70) 101/55     Weight: 95.5 kg (210 lb 8.6 oz)  Body mass index is 29.36 kg/m².    Intake/Output Summary (Last 24 hours) at 11/11/2022 2111  Last data filed at 11/11/2022 2043  Gross per 24 hour   Intake 410 ml   Output 1650 ml   Net -1240 ml      Physical Exam  Vitals and nursing note reviewed.   Constitutional:       Appearance: He is well-developed.   HENT:      Head: Atraumatic.      Right Ear: External ear normal.      Left Ear: External ear normal.      Nose: Nose normal.      Mouth/Throat:      Mouth: Mucous membranes are moist.   Cardiovascular:      Rate and Rhythm: Normal rate.   Pulmonary:      Effort: Pulmonary effort is normal.   Musculoskeletal:         General: Normal range of motion.      Cervical back: Full passive range of motion without pain and normal range of motion.   Skin:     General: Skin is warm.   Neurological:      Mental Status: He is alert and oriented to person, place, and time.   Psychiatric:         Behavior: Behavior normal.       Significant Labs: All pertinent labs within the past 24 hours have been reviewed.  CBC:   Recent Labs   Lab 11/10/22  1717 11/11/22  0421   WBC 10.89 6.89   HGB 7.4* 7.6*   HCT 25.0* 25.7*    207     CMP:   Recent Labs   Lab 11/10/22  1717 11/11/22  0421    141   K 4.4 3.7    113*   CO2 20* 19*   GLU 96 81   BUN 76* 63*   CREATININE 4.0* 2.3*   CALCIUM 8.6* 8.6*   PROT 7.2  --     ALBUMIN 3.2*  --    BILITOT 0.7  --    ALKPHOS 100  --    AST 21  --    ALT 20  --    ANIONGAP 10 9       Significant Imaging: I have reviewed all pertinent imaging results/findings within the past 24 hours.      Assessment/Plan:      * Pneumonia  Continue iv abx         Anemia  pRBC as needed       JACKLYN (acute kidney injury)  Continue iv fluids .       Cirrhosis of liver  Stable      COPD exacerbation  Nebs PRN basis      VTE Risk Mitigation (From admission, onward)         Ordered     IP VTE LOW RISK PATIENT  Once         11/11/22 0320     Place sequential compression device  Until discontinued         11/11/22 0320                Discharge Planning   NATALIA: 11/14/2022     Code Status: Full Code   Is the patient medically ready for discharge?:     Reason for patient still in hospital (select all that apply): Treatment  Discharge Plan A: Home                  Marc Jo MD  Department of Hospital Medicine   [facility]

## 2022-11-14 ENCOUNTER — PATIENT OUTREACH (OUTPATIENT)
Dept: FAMILY MEDICINE | Facility: CLINIC | Age: 62
End: 2022-11-14

## 2022-11-14 ENCOUNTER — TELEPHONE (OUTPATIENT)
Dept: FAMILY MEDICINE | Facility: CLINIC | Age: 62
End: 2022-11-14

## 2022-11-14 NOTE — PROGRESS NOTES
Discharge Information     Discharge Date:   11/12/22    Primary Discharge Diagnosis:  PNA      Discharge Summary:  Reviewed      Medication & Order Review     Were medication changes made or new medications added?   Yes    If so, has the patient filled the prescriptions?  Yes     Was Home Health ordered? No    If so, has Home Health contacted patient and/or initiated services?  No    Name of Home Health Agency? N/A    Durable Medical Equipment ordered?  No     If so, has the DME provider contacted patient and delivered equipment?  N/A    Follow Up               Any problems since discharge? No    How is the patient feeling since returning home?      Have you set up recommended follow up appointments?  Yes    Schedule Hospital Follow-up appointment within 7-14 days (preferably 7).      Notes:          HFU scheduled. Call charted.  Pt states he is doing okay, states he is very drained. Admitted with PNA. States he has antibiotics to take at home. Pt denies any needs at this time. Advised to call if has has any questions/ concerns. Pt voiced understanding.     Carmel Judd

## 2022-11-14 NOTE — TELEPHONE ENCOUNTER
HFU scheduled. Call charted.  Pt states he is doing okay, states he is very drained. Admitted with PNA. States he has antibiotics to take at home. Pt denies any needs at this time. Advised to call if has has any questions/ concerns. Pt voiced understanding.

## 2022-11-14 NOTE — TELEPHONE ENCOUNTER
Call patient - needs post-hospital phone call within 2 business days and hospital follow up visit scheduled within 7-14 days.

## 2022-11-15 LAB
BACTERIA BLD CULT: NORMAL
BACTERIA BLD CULT: NORMAL

## 2022-11-16 ENCOUNTER — TELEPHONE (OUTPATIENT)
Dept: FAMILY MEDICINE | Facility: CLINIC | Age: 62
End: 2022-11-16

## 2022-11-16 NOTE — TELEPHONE ENCOUNTER
"Spoke to pt states he does not want to r/s his pain management appt.so he would like ot r/s HFU appt. Pt discharged on 11/12/22 with PNA.  Pt states he has been getting dizzy and "passing out" pt states he had an episode today. Advised pt he needs to be evaluated at the ER. Pt then goes on to say he never passed out, just got slightly dizzy when standing up, but it is not as bad as it was before he went to hospital. Advised pt to slowing change positions to help with his dizziness, but if he does pass out or dizziness gets worse he needs to be evaluated at the ER. Pt voiced understanding.   "

## 2022-11-19 LAB
11OH-THC SPEC-MCNC: 1.1 NG/ML
CANNABIDIOL SERPLBLD CFM-MCNC: NEGATIVE NG/ML
CANNABINOIDS SPEC QL CFM: POSITIVE
CANNABINOL SERPLBLD CFM-MCNC: NEGATIVE NG/ML
CARBOXYTHC SPEC-MCNC: 32.9 NG/ML
THC SERPLBLD CFM-MCNC: 1 NG/ML

## 2022-11-27 LAB
6MAM SERPLBLD-MCNC: NEGATIVE NG/ML
AMPHETAMINES SERPL QL SCN: NEGATIVE NG/ML
BARBITURATES SERPL QL SCN: NEGATIVE UG/ML
BENZODIAZ SERPL QL SCN: NEGATIVE NG/ML
BENZODIAZ SERPL QL SCN: NEGATIVE NG/ML
CANNABINOIDS SERPL QL SCN: ABNORMAL NG/ML
CODEINE SERPLBLD CFM-MCNC: NEGATIVE NG/ML
DHC SERPLBLD CFM-MCNC: NEGATIVE NG/ML
HYDROCODONE SERPLBLD CFM-MCNC: NEGATIVE NG/ML
HYDROMORPHONE SERPLBLD CFM-MCNC: NEGATIVE NG/ML
METHADONE SERPL QL SCN: NEGATIVE NG/ML
MORPHINE SERPLBLD-MCNC: 4 NG/ML
OPIATES SERPL QL SCN: ABNORMAL NG/ML
OPIATES SPEC QL: POSITIVE
OXYCODONE SERPLBLD CFM-MCNC: 8.8 NG/ML
OXYCODONE+OXYMORPHONE SERPLBLD QL SCN: ABNORMAL NG/ML
OXYCODONES CONFIRMATION: POSITIVE
OXYMORPHONE SERPLBLD CFM-MCNC: NEGATIVE NG/ML
PCP SERPL QL SCN: NEGATIVE NG/ML
PROPOXYPH SERPL QL SCN: NEGATIVE NG/ML

## 2022-11-29 ENCOUNTER — OFFICE VISIT (OUTPATIENT)
Dept: FAMILY MEDICINE | Facility: CLINIC | Age: 62
End: 2022-11-29
Payer: MEDICARE

## 2022-11-29 VITALS
OXYGEN SATURATION: 98 % | SYSTOLIC BLOOD PRESSURE: 124 MMHG | WEIGHT: 208.38 LBS | TEMPERATURE: 98 F | BODY MASS INDEX: 32.71 KG/M2 | DIASTOLIC BLOOD PRESSURE: 64 MMHG | HEART RATE: 100 BPM | HEIGHT: 67 IN

## 2022-11-29 DIAGNOSIS — Z09 HOSPITAL DISCHARGE FOLLOW-UP: Primary | ICD-10-CM

## 2022-11-29 DIAGNOSIS — K21.9 GASTROESOPHAGEAL REFLUX DISEASE WITHOUT ESOPHAGITIS: ICD-10-CM

## 2022-11-29 DIAGNOSIS — I10 PRIMARY HYPERTENSION: ICD-10-CM

## 2022-11-29 DIAGNOSIS — I77.89 OTHER SPECIFIED DISORDERS OF ARTERIES AND ARTERIOLES: ICD-10-CM

## 2022-11-29 DIAGNOSIS — N17.9 AKI (ACUTE KIDNEY INJURY): ICD-10-CM

## 2022-11-29 DIAGNOSIS — G47.30 SLEEP APNEA, UNSPECIFIED TYPE: ICD-10-CM

## 2022-11-29 DIAGNOSIS — N32.81 OAB (OVERACTIVE BLADDER): ICD-10-CM

## 2022-11-29 DIAGNOSIS — R42 DIZZINESS: ICD-10-CM

## 2022-11-29 PROCEDURE — 3078F PR MOST RECENT DIASTOLIC BLOOD PRESSURE < 80 MM HG: ICD-10-PCS | Mod: CPTII,S$GLB,, | Performed by: PHYSICIAN ASSISTANT

## 2022-11-29 PROCEDURE — 3008F BODY MASS INDEX DOCD: CPT | Mod: CPTII,S$GLB,, | Performed by: PHYSICIAN ASSISTANT

## 2022-11-29 PROCEDURE — 4010F ACE/ARB THERAPY RXD/TAKEN: CPT | Mod: CPTII,S$GLB,, | Performed by: PHYSICIAN ASSISTANT

## 2022-11-29 PROCEDURE — 3008F PR BODY MASS INDEX (BMI) DOCUMENTED: ICD-10-PCS | Mod: CPTII,S$GLB,, | Performed by: PHYSICIAN ASSISTANT

## 2022-11-29 PROCEDURE — 1111F PR DISCHARGE MEDS RECONCILED W/ CURRENT OUTPATIENT MED LIST: ICD-10-PCS | Mod: CPTII,S$GLB,, | Performed by: PHYSICIAN ASSISTANT

## 2022-11-29 PROCEDURE — 3061F PR NEG MICROALBUMINURIA RESULT DOCUMENTED/REVIEW: ICD-10-PCS | Mod: CPTII,S$GLB,, | Performed by: PHYSICIAN ASSISTANT

## 2022-11-29 PROCEDURE — 1160F RVW MEDS BY RX/DR IN RCRD: CPT | Mod: CPTII,S$GLB,, | Performed by: PHYSICIAN ASSISTANT

## 2022-11-29 PROCEDURE — 3074F SYST BP LT 130 MM HG: CPT | Mod: CPTII,S$GLB,, | Performed by: PHYSICIAN ASSISTANT

## 2022-11-29 PROCEDURE — 4010F PR ACE/ARB THEARPY RXD/TAKEN: ICD-10-PCS | Mod: CPTII,S$GLB,, | Performed by: PHYSICIAN ASSISTANT

## 2022-11-29 PROCEDURE — 1159F PR MEDICATION LIST DOCUMENTED IN MEDICAL RECORD: ICD-10-PCS | Mod: CPTII,S$GLB,, | Performed by: PHYSICIAN ASSISTANT

## 2022-11-29 PROCEDURE — 3074F PR MOST RECENT SYSTOLIC BLOOD PRESSURE < 130 MM HG: ICD-10-PCS | Mod: CPTII,S$GLB,, | Performed by: PHYSICIAN ASSISTANT

## 2022-11-29 PROCEDURE — 3066F NEPHROPATHY DOC TX: CPT | Mod: CPTII,S$GLB,, | Performed by: PHYSICIAN ASSISTANT

## 2022-11-29 PROCEDURE — 1111F DSCHRG MED/CURRENT MED MERGE: CPT | Mod: CPTII,S$GLB,, | Performed by: PHYSICIAN ASSISTANT

## 2022-11-29 PROCEDURE — 3066F PR DOCUMENTATION OF TREATMENT FOR NEPHROPATHY: ICD-10-PCS | Mod: CPTII,S$GLB,, | Performed by: PHYSICIAN ASSISTANT

## 2022-11-29 PROCEDURE — 1159F MED LIST DOCD IN RCRD: CPT | Mod: CPTII,S$GLB,, | Performed by: PHYSICIAN ASSISTANT

## 2022-11-29 PROCEDURE — 3061F NEG MICROALBUMINURIA REV: CPT | Mod: CPTII,S$GLB,, | Performed by: PHYSICIAN ASSISTANT

## 2022-11-29 PROCEDURE — 3044F PR MOST RECENT HEMOGLOBIN A1C LEVEL <7.0%: ICD-10-PCS | Mod: CPTII,S$GLB,, | Performed by: PHYSICIAN ASSISTANT

## 2022-11-29 PROCEDURE — 99214 OFFICE O/P EST MOD 30 MIN: CPT | Mod: S$GLB,,, | Performed by: PHYSICIAN ASSISTANT

## 2022-11-29 PROCEDURE — 3044F HG A1C LEVEL LT 7.0%: CPT | Mod: CPTII,S$GLB,, | Performed by: PHYSICIAN ASSISTANT

## 2022-11-29 PROCEDURE — 1160F PR REVIEW ALL MEDS BY PRESCRIBER/CLIN PHARMACIST DOCUMENTED: ICD-10-PCS | Mod: CPTII,S$GLB,, | Performed by: PHYSICIAN ASSISTANT

## 2022-11-29 PROCEDURE — 99214 PR OFFICE/OUTPT VISIT, EST, LEVL IV, 30-39 MIN: ICD-10-PCS | Mod: S$GLB,,, | Performed by: PHYSICIAN ASSISTANT

## 2022-11-29 PROCEDURE — 3078F DIAST BP <80 MM HG: CPT | Mod: CPTII,S$GLB,, | Performed by: PHYSICIAN ASSISTANT

## 2022-11-29 NOTE — PROGRESS NOTES
" Patient ID: Omar Trotter is a 62 y.o. male.    Chief Complaint: Follow-up (No bottles//Pt is here for a HFU from [facility].//Pt states that he is feeling better, pt is still having dizzy spells and weakness.//Decline flu vaccine//NADINE )    Admit Date: 11/10/22   Discharge Date: 11/12/22  Discharge Facility: Hospital    Medication Reconciliation:  Medications changed/added/deleted. Levaquin 750mg   New Prescriptions filled after discharge: yes  Discharge summary reviewed:  yes  Pending test results at discharge reviewed:   yes  Follow up appointments scheduled:  yes   With PCP  Follow up labs/tests ordered:   yes  Home Health ordered on discharge:   no  Home Health company name:  N/A  DME ordered at discharge:   no    History of hospital stay:  Hospital Course:   Patient got admitted with acute pneumonia/JACKLYN and mild COPD exacerbation  Pt was treated with iv fluids/nebs/Antibiotics  Pts condition  improved  and was later discharged to home     How patient is feeling since discharge from the hospital?    Since discharge, he reports a significant improvement in his SOB after completing the Levaquin. He states his weakness is also improving. He denies any SOB, coughing, apnea, or wheezing.  His appetite continues to improve and he is averaging 2 regular meals/day.  As for hydration, he is consuming 32oz of water daily and is attempting to remain well hydrated.  He denies any decreased urine production, flank pain, or hematuria.  He does continue to experiencing occasional dizzy spells that have been present prior to his admission. These dizzy spells typically occur after changing positions and last for a "few seconds," then subside on their own.  He denies any syncopal episodes, CP, or palpitations.  BP is stable and well controlled.      Patient follow up phone call documented on separate encounter.    Admission on 11/10/2022, Discharged on 11/12/2022   Component Date Value Ref Range Status    WBC 11/10/2022 10.89  3.90 - " 12.70 K/uL Final    RBC 11/10/2022 3.26 (L)  4.60 - 6.20 M/uL Final    Hemoglobin 11/10/2022 7.4 (L)  14.0 - 18.0 g/dL Final    Hematocrit 11/10/2022 25.0 (L)  40.0 - 54.0 % Final    MCV 11/10/2022 77 (L)  82 - 98 fL Final    MCH 11/10/2022 22.7 (L)  27.0 - 31.0 pg Final    MCHC 11/10/2022 29.6 (L)  32.0 - 36.0 g/dL Final    RDW 11/10/2022 18.1 (H)  11.5 - 14.5 % Final    Platelets 11/10/2022 231  150 - 450 K/uL Final    MPV 11/10/2022 10.4  9.2 - 12.9 fL Final    Immature Granulocytes 11/10/2022 0.8 (H)  0.0 - 0.5 % Final    Gran # (ANC) 11/10/2022 7.7  1.8 - 7.7 K/uL Final    Immature Grans (Abs) 11/10/2022 0.09 (H)  0.00 - 0.04 K/uL Final    Lymph # 11/10/2022 2.1  1.0 - 4.8 K/uL Final    Mono # 11/10/2022 1.0  0.3 - 1.0 K/uL Final    Eos # 11/10/2022 0.1  0.0 - 0.5 K/uL Final    Baso # 11/10/2022 0.03  0.00 - 0.20 K/uL Final    nRBC 11/10/2022 0  0 /100 WBC Final    Gran % 11/10/2022 70.3  38.0 - 73.0 % Final    Lymph % 11/10/2022 18.8  18.0 - 48.0 % Final    Mono % 11/10/2022 8.7  4.0 - 15.0 % Final    Eosinophil % 11/10/2022 1.1  0.0 - 8.0 % Final    Basophil % 11/10/2022 0.3  0.0 - 1.9 % Final    Differential Method 11/10/2022 Automated   Final    Sodium 11/10/2022 138  136 - 145 mmol/L Final    Potassium 11/10/2022 4.4  3.5 - 5.1 mmol/L Final    Chloride 11/10/2022 108  95 - 110 mmol/L Final    CO2 11/10/2022 20 (L)  23 - 29 mmol/L Final    Glucose 11/10/2022 96  70 - 110 mg/dL Final    BUN 11/10/2022 76 (H)  8 - 23 mg/dL Final    Creatinine 11/10/2022 4.0 (H)  0.5 - 1.4 mg/dL Final    Calcium 11/10/2022 8.6 (L)  8.7 - 10.5 mg/dL Final    Total Protein 11/10/2022 7.2  6.0 - 8.4 g/dL Final    Albumin 11/10/2022 3.2 (L)  3.5 - 5.2 g/dL Final    Total Bilirubin 11/10/2022 0.7  0.1 - 1.0 mg/dL Final    Alkaline Phosphatase 11/10/2022 100  55 - 135 U/L Final    AST 11/10/2022 21  10 - 40 U/L Final    ALT 11/10/2022 20  10 - 44 U/L Final    Anion Gap 11/10/2022 10  8 - 16 mmol/L Final    eGFR 11/10/2022 15.9 (A)   >60 mL/min/1.73 m^2 Final    Lactate (Lactic Acid) 11/10/2022 1.3  0.5 - 1.9 mmol/L Final    Specimen UA 11/10/2022 Urine, Clean Catch   Final    Color, UA 11/10/2022 Yellow  Yellow, Straw, Sharmaine Final    Appearance, UA 11/10/2022 Hazy (A)  Clear Final    pH, UA 11/10/2022 6.0  5.0 - 8.0 Final    Specific Gravity, UA 11/10/2022 1.025  1.005 - 1.030 Final    Protein, UA 11/10/2022 1+ (A)  Negative Final    Glucose, UA 11/10/2022 Negative  Negative Final    Ketones, UA 11/10/2022 Negative  Negative Final    Bilirubin (UA) 11/10/2022 Negative  Negative Final    Occult Blood UA 11/10/2022 1+ (A)  Negative Final    Nitrite, UA 11/10/2022 Negative  Negative Final    Urobilinogen, UA 11/10/2022 2.0-3.0 (A)  Negative EU/dL Final    Leukocytes, UA 11/10/2022 Negative  Negative Final    Magnesium 11/10/2022 2.4  1.6 - 2.6 mg/dL Final    Phosphorus 11/10/2022 4.7 (H)  2.7 - 4.5 mg/dL Final    aPTT 11/10/2022 36.0 (H)  23.3 - 35.1 sec Final    PT 11/10/2022 15.8 (H)  11.4 - 13.7 sec Final    INR 11/10/2022 1.4   Final    BNP 11/10/2022 61  0 - 99 pg/mL Final    Lipase 11/10/2022 26  4 - 60 U/L Final    Troponin I 11/10/2022 <0.030  <=0.040 ng/mL Final    Procalcitonin 11/10/2022 1.30 (H)  0.00 - 0.50 ng/mL Final    Influenza A, Molecular 11/10/2022 Negative  Negative Final    Influenza B, Molecular 11/10/2022 Negative  Negative Final    Flu A & B Source 11/10/2022 Nasal swab   Final    Amphetamine Scrn 11/11/2022 Negative  Cutoff:50 ng/mL Final    Barbiturate Scrn 11/11/2022 Negative  Cutoff:0.1 ug/mL Final    Benzodiazepines 11/11/2022 Negative  Cutoff:20 ng/mL Final    Cocaine Lvl 11/11/2022 Negative  Cutoff:25 ng/mL Final    Phencyclidine, Blood 11/11/2022 Negative  Cutoff:8 ng/mL Final    THC (Marijuana) Metabolite, bl 11/11/2022 ++POSITIVE++ (A)  Cutoff:5 ng/mL Final    Opiates, Blood 11/11/2022 ++POSITIVE++ (A)  Cutoff:5 ng/mL Final    Oxycodone 11/11/2022 ++POSITIVE++ (A)  Cutoff:5 ng/mL Final    Methadone (Dolophine),  Serum 11/11/2022 Negative  Cutoff:25 ng/mL Final    Propoxyphene,Serum 11/11/2022 Negative  Cutoff:50 ng/mL Final    Ammonia 11/10/2022 23  10 - 50 umol/L Final    RBC, UA 11/10/2022 11 (H)  0 - 4 /hpf Final    WBC, UA 11/10/2022 5  0 - 5 /hpf Final    Bacteria 11/10/2022 Negative  None-Occ /hpf Final    Squam Epithel, UA 11/10/2022 5  /hpf Final    Hyaline Casts, UA 11/10/2022 43 (A)  0-1/lpf /lpf Final    Microscopic Comment 11/10/2022 SEE COMMENT   Final    Group & Rh 11/10/2022 O POS   Final    Indirect Manav 11/10/2022 NEG   Final    Blood Culture, Routine 11/10/2022 No growth after 5 days.   Final    Blood Culture, Routine 11/10/2022 No growth after 5 days.   Final    SARS-CoV-2 RNA, Amplification, Qual 11/10/2022 Negative  Negative Final    Sodium 11/11/2022 141  136 - 145 mmol/L Final    Potassium 11/11/2022 3.7  3.5 - 5.1 mmol/L Final    Chloride 11/11/2022 113 (H)  95 - 110 mmol/L Final    CO2 11/11/2022 19 (L)  23 - 29 mmol/L Final    Glucose 11/11/2022 81  70 - 110 mg/dL Final    BUN 11/11/2022 63 (H)  8 - 23 mg/dL Final    Creatinine 11/11/2022 2.3 (H)  0.5 - 1.4 mg/dL Final    Calcium 11/11/2022 8.6 (L)  8.7 - 10.5 mg/dL Final    Anion Gap 11/11/2022 9  8 - 16 mmol/L Final    eGFR 11/11/2022 30.9 (A)  >60 mL/min/1.73 m^2 Final    Magnesium 11/11/2022 2.4  1.6 - 2.6 mg/dL Final    Hemoglobin A1C 11/11/2022 6.5 (H)  4.5 - 6.2 % Final    Estimated Avg Glucose 11/11/2022 140 (H)  68 - 131 mg/dL Final    WBC 11/11/2022 6.89  3.90 - 12.70 K/uL Final    RBC 11/11/2022 3.39 (L)  4.60 - 6.20 M/uL Final    Hemoglobin 11/11/2022 7.6 (L)  14.0 - 18.0 g/dL Final    Hematocrit 11/11/2022 25.7 (L)  40.0 - 54.0 % Final    MCV 11/11/2022 76 (L)  82 - 98 fL Final    MCH 11/11/2022 22.4 (L)  27.0 - 31.0 pg Final    MCHC 11/11/2022 29.6 (L)  32.0 - 36.0 g/dL Final    RDW 11/11/2022 18.2 (H)  11.5 - 14.5 % Final    Platelets 11/11/2022 207  150 - 450 K/uL Final    MPV 11/11/2022 10.6  9.2 - 12.9 fL Final    Immature  Granulocytes 11/11/2022 0.6 (H)  0.0 - 0.5 % Final    Gran # (ANC) 11/11/2022 4.6  1.8 - 7.7 K/uL Final    Immature Grans (Abs) 11/11/2022 0.04  0.00 - 0.04 K/uL Final    Lymph # 11/11/2022 1.4  1.0 - 4.8 K/uL Final    Mono # 11/11/2022 0.6  0.3 - 1.0 K/uL Final    Eos # 11/11/2022 0.2  0.0 - 0.5 K/uL Final    Baso # 11/11/2022 0.04  0.00 - 0.20 K/uL Final    nRBC 11/11/2022 0  0 /100 WBC Final    Gran % 11/11/2022 66.6  38.0 - 73.0 % Final    Lymph % 11/11/2022 19.6  18.0 - 48.0 % Final    Mono % 11/11/2022 9.3  4.0 - 15.0 % Final    Eosinophil % 11/11/2022 3.3  0.0 - 8.0 % Final    Basophil % 11/11/2022 0.6  0.0 - 1.9 % Final    Differential Method 11/11/2022 Automated   Final    POC Glucose 11/11/2022 95  70 - 110 Final    POC Glucose 11/11/2022 178 (H)  70 - 110 Final    POC Glucose 11/11/2022 147 (H)  70 - 110 Final    POC Glucose 11/11/2022 134 (H)  70 - 110 Final    Sodium 11/12/2022 142  136 - 145 mmol/L Final    Potassium 11/12/2022 4.2  3.5 - 5.1 mmol/L Final    Chloride 11/12/2022 116 (H)  95 - 110 mmol/L Final    CO2 11/12/2022 20 (L)  23 - 29 mmol/L Final    Glucose 11/12/2022 94  70 - 110 mg/dL Final    BUN 11/12/2022 32 (H)  8 - 23 mg/dL Final    Creatinine 11/12/2022 1.0  0.5 - 1.4 mg/dL Final    Calcium 11/12/2022 8.6 (L)  8.7 - 10.5 mg/dL Final    Anion Gap 11/12/2022 6 (L)  8 - 16 mmol/L Final    eGFR 11/12/2022 >60.0  >60 mL/min/1.73 m^2 Final    Magnesium 11/12/2022 2.0  1.6 - 2.6 mg/dL Final    WBC 11/12/2022 5.00  3.90 - 12.70 K/uL Final    RBC 11/12/2022 3.58 (L)  4.60 - 6.20 M/uL Final    Hemoglobin 11/12/2022 7.9 (L)  14.0 - 18.0 g/dL Final    Hematocrit 11/12/2022 27.6 (L)  40.0 - 54.0 % Final    MCV 11/12/2022 77 (L)  82 - 98 fL Final    MCH 11/12/2022 22.1 (L)  27.0 - 31.0 pg Final    MCHC 11/12/2022 28.6 (L)  32.0 - 36.0 g/dL Final    RDW 11/12/2022 18.4 (H)  11.5 - 14.5 % Final    Platelets 11/12/2022 202  150 - 450 K/uL Final    MPV 11/12/2022 10.1  9.2 - 12.9 fL Final    Immature  Granulocytes 11/12/2022 2.0 (H)  0.0 - 0.5 % Final    Gran # (ANC) 11/12/2022 3.4  1.8 - 7.7 K/uL Final    Immature Grans (Abs) 11/12/2022 0.10 (H)  0.00 - 0.04 K/uL Final    Lymph # 11/12/2022 0.9 (L)  1.0 - 4.8 K/uL Final    Mono # 11/12/2022 0.4  0.3 - 1.0 K/uL Final    Eos # 11/12/2022 0.2  0.0 - 0.5 K/uL Final    Baso # 11/12/2022 0.02  0.00 - 0.20 K/uL Final    nRBC 11/12/2022 1 (A)  0 /100 WBC Final    Gran % 11/12/2022 68.6  38.0 - 73.0 % Final    Lymph % 11/12/2022 17.8 (L)  18.0 - 48.0 % Final    Mono % 11/12/2022 8.2  4.0 - 15.0 % Final    Eosinophil % 11/12/2022 3.0  0.0 - 8.0 % Final    Basophil % 11/12/2022 0.4  0.0 - 1.9 % Final    Differential Method 11/12/2022 Automated   Final    POC Glucose 11/12/2022 155 (H)  70 - 110 Final    POC Glucose 11/12/2022 119 (H)  70 - 110 Final    Cannabinoid Confirmation 11/11/2022 Positive   Final    TETRAHYDROCANNABINOL 11/11/2022 1.0  ng/mL Final    Carboxy-THC 11/11/2022 32.9  ng/mL Final    Hydroxy-THC 11/11/2022 1.1  ng/mL Final    Cannabinol 11/11/2022 Negative  ng/mL Final    Cannabidiol 11/11/2022 Negative  ng/mL Final    Oxycodones Confirmation 11/11/2022 Positive   Final    Oxycodone, Quant, Serum 11/11/2022 8.8  ng/mL Final    Oxymorphone, Quant, Serum 11/11/2022 Negative  ng/mL Final    Opiate Confirmation 11/11/2022 Positive   Final    Codeine, Quant, Serum 11/11/2022 Negative  ng/mL Final    Morphine, Quant, Serum 11/11/2022 4.0  ng/mL Final    6-acetylmorphine, Quant, Serum 11/11/2022 Negative   Final    Hydocodone, Quant, Serum 11/11/2022 Negative  ng/mL Final    Hydromorphone, Quant, Serum 11/11/2022 Negative  ng/mL Final    Dihydrocodeine by LC MS/MS 11/11/2022 Negative  ng/mL Final       Past Medical History:   Diagnosis Date    JACKLYN (acute kidney injury) 11/11/2022    Arthritis     GERD (gastroesophageal reflux disease)     Hepatitis C     STATES DX 10-20. NO S/S. NO FURTHER TESTS OR TX. WITH INSURANCE IN 2021 CAN GET IT EVALUATED.    History of  left hip replacement 05/20/2021    History of MRSA infection     History of right shoulder replacement 01/15/2021    Hypertension     Wears glasses      Past Surgical History:   Procedure Laterality Date    APPENDECTOMY      ARTHROPLASTY OF SHOULDER Right 1/13/2021    Procedure: ARTHROPLASTY, SHOULDER TOTAL;  Surgeon: Emilio Siddiqui MD;  Location: [facility];  Service: Orthopedics;  Laterality: Right;  GENERAL AND BLOCK    HERNIA REPAIR      HIP REPLACEMENT ARTHROPLASTY Left 3/24/2021    Procedure: ARTHROPLASTY, HIP REPLACEMENT;  Surgeon: Miles Rivas II, MD;  Location: [facility] OR;  Service: Orthopedics;  Laterality: Left;    JOINT REPLACEMENT Right     shoulder    REVISION TOTAL HIP ARTHROPLASTY Left 6/2/2021    Procedure: REVISION, TOTAL ARTHROPLASTY, HIP;  Surgeon: Miles Rivas II, MD;  Location: [facility] OR;  Service: Orthopedics;  Laterality: Left;     Family History   Problem Relation Age of Onset    Cancer Mother         leukemia       Marital Status:   Alcohol History:  reports no history of alcohol use.  Tobacco History:  reports that he has been smoking cigarettes. He has a 15.00 pack-year smoking history. He has never used smokeless tobacco.  Drug History:  reports no history of drug use.    Review of patient's allergies indicates:   Allergen Reactions    Pcn [penicillins]      As a child.       Current Outpatient Medications:     amLODIPine (NORVASC) 5 MG tablet, Take 1 tablet (5 mg total) by mouth once daily., Disp: 90 tablet, Rfl: 1    DULoxetine (CYMBALTA) 60 MG capsule, Take 120 mg by mouth once daily., Disp: , Rfl:     gabapentin (NEURONTIN) 300 MG capsule, TAKE 1 CAPSULE BY MOUTH THREE TIMES DAILY (Patient taking differently: Take 300 mg by mouth 3 (three) times daily.), Disp: 90 capsule, Rfl: 0    lisinopriL (PRINIVIL,ZESTRIL) 40 MG tablet, Take 1 tablet (40 mg total) by mouth once daily., Disp: 90 tablet, Rfl: 1    methocarbamoL (ROBAXIN) 500 MG Tab, TAKE 1 TABLET BY MOUTH NIGHTLY AS  NEEDED Strength: 500 mg (Patient taking differently: Take 500 mg by mouth nightly as needed. TAKE 1 TABLET BY MOUTH NIGHTLY AS NEEDED Strength: 500 mg), Disp: 40 tablet, Rfl: 0    metoprolol succinate (TOPROL-XL) 50 MG 24 hr tablet, Take 1 tablet (50 mg total) by mouth once daily., Disp: 30 tablet, Rfl: 3    mirtazapine (REMERON) 30 MG tablet, Take 30 mg by mouth every evening., Disp: , Rfl:     omeprazole (PRILOSEC) 20 MG capsule, Take 1 capsule (20 mg total) by mouth once daily., Disp: 90 capsule, Rfl: 1    oxybutynin (DITROPAN-XL) 5 MG TR24, Take 1 tablet (5 mg total) by mouth once daily., Disp: 30 tablet, Rfl: 5    oxyCODONE-acetaminophen (PERCOCET) 5-325 mg per tablet, Take 1 tablet by mouth every 6 (six) hours as needed., Disp: , Rfl:     solifenacin (VESICARE) 10 MG tablet, Take 10 mg by mouth once daily., Disp: , Rfl:     solifenacin (VESICARE) 5 MG tablet, Take 5 mg by mouth once daily., Disp: , Rfl:     tamsulosin (FLOMAX) 0.4 mg Cap, Take 2 capsules (0.8 mg total) by mouth once daily., Disp: 180 capsule, Rfl: 1    topiramate (TOPAMAX) 50 MG tablet, Take 50 mg by mouth once daily., Disp: , Rfl:     traZODone (DESYREL) 150 MG tablet, Take 150 mg by mouth nightly., Disp: , Rfl:   No current facility-administered medications for this visit.    Facility-Administered Medications Ordered in Other Visits:     electrolyte-S (ISOLYTE), , Intravenous, Continuous, Mabel Infante MD, Stopped at 06/16/21 1300    HYDROmorphone (PF) injection 0.2 mg, 0.2 mg, Intravenous, Q5 Min PRN, Mabel Infante MD, 0.2 mg at 06/16/21 1310    LIDOcaine (PF) 10 mg/ml (1%) injection 10 mg, 1 mL, Intradermal, Once, Mabel Infante MD    oxyCODONE immediate release tablet 5 mg, 5 mg, Oral, Q3H PRN, Mabel Infante MD, 5 mg at 06/16/21 1232    Review of Systems   Constitutional:  Negative for activity change, appetite change, chills, fatigue and fever.   HENT:  Negative for congestion and sore throat.    Respiratory:   "Negative for cough, shortness of breath and wheezing.    Cardiovascular:  Negative for chest pain, palpitations and leg swelling.   Gastrointestinal:  Negative for abdominal pain, constipation, diarrhea, nausea and vomiting.   Genitourinary:  Negative for decreased urine volume, difficulty urinating, dysuria, flank pain and hematuria.   Musculoskeletal:  Negative for arthralgias and myalgias.   Skin:  Negative for rash.   Neurological:  Positive for dizziness. Negative for syncope, weakness, light-headedness and headaches.   Psychiatric/Behavioral:  Negative for behavioral problems.       Objective:      Vitals:    11/29/22 1141   BP: 124/64   Pulse: 100   Temp: 98.3 °F (36.8 °C)   SpO2: 98%   Weight: 94.5 kg (208 lb 6.4 oz)   Height: 5' 7.2" (1.707 m)     Physical Exam  Vitals and nursing note reviewed.   Constitutional:       General: He is not in acute distress.     Appearance: He is well-developed. He is obese. He is not ill-appearing, toxic-appearing or diaphoretic.   HENT:      Head: Normocephalic and atraumatic.      Right Ear: External ear normal.      Left Ear: External ear normal.      Nose: Nose normal. No rhinorrhea.      Mouth/Throat:      Mouth: Mucous membranes are moist.      Pharynx: Oropharynx is clear.   Eyes:      General: No scleral icterus.        Right eye: No discharge.         Left eye: No discharge.      Extraocular Movements: Extraocular movements intact.      Conjunctiva/sclera: Conjunctivae normal.      Pupils: Pupils are equal, round, and reactive to light.   Neck:      Thyroid: No thyromegaly.      Vascular: No carotid bruit.   Cardiovascular:      Rate and Rhythm: Normal rate and regular rhythm.      Pulses: Normal pulses.      Heart sounds: Normal heart sounds. No murmur heard.    No friction rub.      Comments: No JVD noted.  Pulmonary:      Effort: Pulmonary effort is normal. No respiratory distress.      Breath sounds: Normal breath sounds. No wheezing, rhonchi or rales.      " Comments: Patient breathing comfortably on room air, with no use of respiratory accessory muscles noted on inspection.  Adequate breath sounds, with no signs of consolidation appreciated upon auscultation in the diffuse bilateral lung fields.  Abdominal:      General: Bowel sounds are normal. There is no distension.      Palpations: Abdomen is soft.      Tenderness: There is no abdominal tenderness.   Musculoskeletal:         General: No tenderness or deformity. Normal range of motion.      Cervical back: Normal range of motion and neck supple.      Lumbar back: Normal. No spasms.      Right lower leg: No edema.      Left lower leg: No edema.      Comments: 90 degree flexion at the waist;  Bilateral shoulders have active ROM.  Crepitus noted to the bilateral knees.     Lymphadenopathy:      Cervical: No cervical adenopathy.   Skin:     General: Skin is warm and dry.      Coloration: Skin is not jaundiced or pale.      Findings: No rash.   Neurological:      General: No focal deficit present.      Mental Status: He is alert and oriented to person, place, and time.      Cranial Nerves: No cranial nerve deficit.      Coordination: Coordination normal.      Gait: Gait abnormal (Ambulates with the assistance of a cane, hunched over with a limp noted.).   Psychiatric:         Attention and Perception: Attention normal.         Mood and Affect: Mood and affect normal.         Behavior: Behavior normal.         Thought Content: Thought content normal.         Judgment: Judgment normal.       Assessment:       1. Hospital discharge follow-up    2. JACKLYN (acute kidney injury)    3. Dizziness    4. Other specified disorders of arteries and arterioles    5. OAB (overactive bladder)    6. Primary hypertension    7. Gastroesophageal reflux disease without esophagitis    8. Sleep apnea, unspecified type         Plan:       Hospital discharge follow-up  Hospital discharge summary reviewed.    JACKLYN (acute kidney injury)  Patient  currently consuming 32 oz of water daily and is attempting to remain well hydrated.   Avoid OTC NSAIDs and increase daily water intake to 6-8, 8oz cups of water daily.   Will obtain updated labs today to reassess WBC count and kidney function.   -     Comprehensive Metabolic Panel; Future; Expected date: 11/29/2022  -     CBC Auto Differential; Future; Expected date: 11/29/2022    Dizziness  Bilateral carotid ultrasound ordered today.  Avoid rapid changes in body position and contact the office if symptoms persist.   -     US Carotid Bilateral; Future; Expected date: 11/29/2022    Other specified disorders of arteries and arterioles  -     US Carotid Bilateral; Future; Expected date: 11/29/2022    OAB (overactive bladder)    Primary hypertension  Stable and well controlled in office today. Continue as is.     Gastroesophageal reflux disease without esophagitis    Sleep apnea, unspecified type    Follow up in about 3 months (around 2/28/2023) for Annual Wellness, With labs prior to visit.

## 2023-01-17 DIAGNOSIS — M62.838 MUSCLE SPASM OF BOTH LOWER LEGS: ICD-10-CM

## 2023-01-17 RX ORDER — METHOCARBAMOL 500 MG/1
TABLET, FILM COATED ORAL
Qty: 40 TABLET | Refills: 0 | Status: SHIPPED | OUTPATIENT
Start: 2023-01-17 | End: 2023-01-17 | Stop reason: SDUPTHER

## 2023-01-17 RX ORDER — METHOCARBAMOL 500 MG/1
TABLET, FILM COATED ORAL
Qty: 40 TABLET | Refills: 0 | Status: SHIPPED | OUTPATIENT
Start: 2023-01-17 | End: 2023-05-01 | Stop reason: SDUPTHER

## 2023-01-17 NOTE — TELEPHONE ENCOUNTER
----- Message from Aleja Blum MA sent at 1/17/2023 11:51 AM CST -----  Regarding: FW: refill    ----- Message -----  From: Mati Peter MA  Sent: 1/17/2023  11:49 AM CST  To: [facility] Staff  Subject: refill                                           Pt needs methocarbomal sent to [facility] on [address].

## 2023-02-09 ENCOUNTER — TELEPHONE (OUTPATIENT)
Dept: FAMILY MEDICINE | Facility: CLINIC | Age: 63
End: 2023-02-09

## 2023-02-09 DIAGNOSIS — I10 PRIMARY HYPERTENSION: Primary | ICD-10-CM

## 2023-02-09 DIAGNOSIS — Z79.899 ENCOUNTER FOR LONG-TERM (CURRENT) USE OF OTHER MEDICATIONS: ICD-10-CM

## 2023-02-09 DIAGNOSIS — K21.9 GASTROESOPHAGEAL REFLUX DISEASE WITHOUT ESOPHAGITIS: ICD-10-CM

## 2023-02-09 DIAGNOSIS — I10 ESSENTIAL HYPERTENSION: ICD-10-CM

## 2023-02-09 RX ORDER — OMEPRAZOLE 20 MG/1
20 CAPSULE, DELAYED RELEASE ORAL DAILY
Qty: 30 CAPSULE | Refills: 0 | Status: SHIPPED | OUTPATIENT
Start: 2023-02-09 | End: 2023-03-22 | Stop reason: SDUPTHER

## 2023-02-09 RX ORDER — LISINOPRIL 40 MG/1
40 TABLET ORAL DAILY
Qty: 30 TABLET | Refills: 0 | Status: SHIPPED | OUTPATIENT
Start: 2023-02-09 | End: 2023-03-22 | Stop reason: SDUPTHER

## 2023-02-09 NOTE — TELEPHONE ENCOUNTER
Appt with you on 3/2/23. Short supply set up to last until upcoming appt. Pt notified and voiced understanding. Pt aware to bring med bottles to upcoming appt.

## 2023-02-09 NOTE — TELEPHONE ENCOUNTER
----- Message from Ghada Nguyen sent at 2/9/2023 10:43 AM CST -----  Refill on: [email or web address]  Omeprazole    [address] [facility]    [phone]

## 2023-02-09 NOTE — TELEPHONE ENCOUNTER
Spoke with pt in regards to pre-visit labs. Verbalized that we have placed lab order for you to have done before your upcoming appointment on 03/02/2023. Verbalized that the lab order are placed through [facility], so they can come into the office anytime, no appointment necessary. Just make sure that you are fasting for you labs. Pt acknowledge understanding.

## 2023-03-09 DIAGNOSIS — Z96.642 HISTORY OF TOTAL HIP REPLACEMENT, LEFT: Primary | ICD-10-CM

## 2023-03-14 ENCOUNTER — HOSPITAL ENCOUNTER (OUTPATIENT)
Dept: RADIOLOGY | Facility: HOSPITAL | Age: 63
Discharge: HOME | End: 2023-03-14
Attending: ORTHOPAEDIC SURGERY
Payer: MEDICARE

## 2023-03-14 ENCOUNTER — OFFICE VISIT (OUTPATIENT)
Dept: ORTHOPEDICS | Facility: CLINIC | Age: 63
End: 2023-03-14
Payer: MEDICARE

## 2023-03-14 VITALS — WEIGHT: 208.38 LBS | HEIGHT: 67 IN | BODY MASS INDEX: 32.71 KG/M2 | RESPIRATION RATE: 18 BRPM

## 2023-03-14 DIAGNOSIS — Z96.642 HISTORY OF TOTAL HIP REPLACEMENT, LEFT: ICD-10-CM

## 2023-03-14 DIAGNOSIS — Z96.642 HISTORY OF TOTAL HIP REPLACEMENT, LEFT: Primary | ICD-10-CM

## 2023-03-14 DIAGNOSIS — M54.16 LUMBAR RADICULOPATHY, CHRONIC: Primary | ICD-10-CM

## 2023-03-14 PROCEDURE — 3008F PR BODY MASS INDEX (BMI) DOCUMENTED: ICD-10-PCS | Mod: CPTII,S$GLB,, | Performed by: ORTHOPAEDIC SURGERY

## 2023-03-14 PROCEDURE — 1159F PR MEDICATION LIST DOCUMENTED IN MEDICAL RECORD: ICD-10-PCS | Mod: CPTII,S$GLB,, | Performed by: ORTHOPAEDIC SURGERY

## 2023-03-14 PROCEDURE — 99999 PR PBB SHADOW E&M-EST. PATIENT-LVL II: CPT | Mod: PBBFAC,,, | Performed by: ORTHOPAEDIC SURGERY

## 2023-03-14 PROCEDURE — 73501 X-RAY EXAM HIP UNI 1 VIEW: CPT | Mod: TC,PN,LT

## 2023-03-14 PROCEDURE — 4010F ACE/ARB THERAPY RXD/TAKEN: CPT | Mod: CPTII,S$GLB,, | Performed by: ORTHOPAEDIC SURGERY

## 2023-03-14 PROCEDURE — 3008F BODY MASS INDEX DOCD: CPT | Mod: CPTII,S$GLB,, | Performed by: ORTHOPAEDIC SURGERY

## 2023-03-14 PROCEDURE — 99999 PR PBB SHADOW E&M-EST. PATIENT-LVL II: ICD-10-PCS | Mod: PBBFAC,,, | Performed by: ORTHOPAEDIC SURGERY

## 2023-03-14 PROCEDURE — 1160F PR REVIEW ALL MEDS BY PRESCRIBER/CLIN PHARMACIST DOCUMENTED: ICD-10-PCS | Mod: CPTII,S$GLB,, | Performed by: ORTHOPAEDIC SURGERY

## 2023-03-14 PROCEDURE — 1159F MED LIST DOCD IN RCRD: CPT | Mod: CPTII,S$GLB,, | Performed by: ORTHOPAEDIC SURGERY

## 2023-03-14 PROCEDURE — 1160F RVW MEDS BY RX/DR IN RCRD: CPT | Mod: CPTII,S$GLB,, | Performed by: ORTHOPAEDIC SURGERY

## 2023-03-14 PROCEDURE — 99214 PR OFFICE/OUTPT VISIT, EST, LEVL IV, 30-39 MIN: ICD-10-PCS | Mod: S$GLB,,, | Performed by: ORTHOPAEDIC SURGERY

## 2023-03-14 PROCEDURE — 73501 XR HIP 1 VIEW LEFT (WITH PELVIS WHEN PERFORMED): ICD-10-PCS | Mod: 26,LT,, | Performed by: RADIOLOGY

## 2023-03-14 PROCEDURE — 73501 X-RAY EXAM HIP UNI 1 VIEW: CPT | Mod: 26,LT,, | Performed by: RADIOLOGY

## 2023-03-14 PROCEDURE — 99214 OFFICE O/P EST MOD 30 MIN: CPT | Mod: S$GLB,,, | Performed by: ORTHOPAEDIC SURGERY

## 2023-03-14 PROCEDURE — 4010F PR ACE/ARB THEARPY RXD/TAKEN: ICD-10-PCS | Mod: CPTII,S$GLB,, | Performed by: ORTHOPAEDIC SURGERY

## 2023-03-14 NOTE — PROGRESS NOTES
CC:  62-year-old male presents for evaluation of left hip pain.  The patient had a left total hip arthroplasty 06/02/2021.  He is almost 2 years out.  The patient also has severe lumbar degenerative disease and has seen a neurosurgeon in his scheduled for back surgery whenever he is ready to proceed.  The patient is not sure the pain is coming from his back or his hip.  He wanted to have his hip checked out before he move forward with any spine surgery.  He currently rates his pain as a 10/10.  When asked he reports pain that radiates around his hip but he also has radicular pain down his left leg to his foot.  He also reports pain on the right side that radiates down his right leg as well.    Past Medical History:   Diagnosis Date    JACKLYN (acute kidney injury) 11/11/2022    Arthritis     GERD (gastroesophageal reflux disease)     Hepatitis C     STATES DX 10-20. NO S/S. NO FURTHER TESTS OR TX. WITH INSURANCE IN 2021 CAN GET IT EVALUATED.    History of left hip replacement 05/20/2021    History of MRSA infection     History of right shoulder replacement 01/15/2021    Hypertension     Wears glasses        Past Surgical History:   Procedure Laterality Date    APPENDECTOMY      ARTHROPLASTY OF SHOULDER Right 1/13/2021    Procedure: ARTHROPLASTY, SHOULDER TOTAL;  Surgeon: Emilio Siddiqui MD;  Location: [facility] OR;  Service: Orthopedics;  Laterality: Right;  GENERAL AND BLOCK    HERNIA REPAIR      HIP REPLACEMENT ARTHROPLASTY Left 3/24/2021    Procedure: ARTHROPLASTY, HIP REPLACEMENT;  Surgeon: Miles Rivas II, MD;  Location: [facility] OR;  Service: Orthopedics;  Laterality: Left;    JOINT REPLACEMENT Right     shoulder    REVISION TOTAL HIP ARTHROPLASTY Left 6/2/2021    Procedure: REVISION, TOTAL ARTHROPLASTY, HIP;  Surgeon: Miles Rivas II, MD;  Location: [facility] OR;  Service: Orthopedics;  Laterality: Left;       Current Outpatient Medications on File Prior to Visit   Medication Sig Dispense Refill    amLODIPine  (NORVASC) 5 MG tablet Take 1 tablet (5 mg total) by mouth once daily. 90 tablet 1    DULoxetine (CYMBALTA) 60 MG capsule Take 120 mg by mouth once daily.      gabapentin (NEURONTIN) 300 MG capsule TAKE 1 CAPSULE BY MOUTH THREE TIMES DAILY (Patient taking differently: Take 300 mg by mouth 3 (three) times daily.) 90 capsule 0    lisinopriL (PRINIVIL,ZESTRIL) 40 MG tablet Take 1 tablet (40 mg total) by mouth once daily. 30 tablet 0    methocarbamoL (ROBAXIN) 500 MG Tab TAKE 1 TABLET BY MOUTH NIGHTLY AS NEEDED 40 tablet 0    metoprolol succinate (TOPROL-XL) 50 MG 24 hr tablet Take 1 tablet (50 mg total) by mouth once daily. 30 tablet 3    mirtazapine (REMERON) 30 MG tablet Take 30 mg by mouth every evening.      omeprazole (PRILOSEC) 20 MG capsule Take 1 capsule (20 mg total) by mouth once daily. 30 capsule 0    oxybutynin (DITROPAN-XL) 5 MG TR24 Take 1 tablet (5 mg total) by mouth once daily. 30 tablet 5    oxyCODONE-acetaminophen (PERCOCET) 5-325 mg per tablet Take 1 tablet by mouth every 6 (six) hours as needed.      solifenacin (VESICARE) 10 MG tablet Take 10 mg by mouth once daily.      solifenacin (VESICARE) 5 MG tablet Take 5 mg by mouth once daily.      tamsulosin (FLOMAX) 0.4 mg Cap Take 2 capsules (0.8 mg total) by mouth once daily. 180 capsule 1    topiramate (TOPAMAX) 50 MG tablet Take 50 mg by mouth once daily.      traZODone (DESYREL) 150 MG tablet Take 150 mg by mouth nightly.       Current Facility-Administered Medications on File Prior to Visit   Medication Dose Route Frequency Provider Last Rate Last Admin    electrolyte-S (ISOLYTE)   Intravenous Continuous Mabel Infante MD   Stopped at 06/16/21 1300    HYDROmorphone (PF) injection 0.2 mg  0.2 mg Intravenous Q5 Min PRN Mabel Infante MD   0.2 mg at 06/16/21 1310    LIDOcaine (PF) 10 mg/ml (1%) injection 10 mg  1 mL Intradermal Once Mabel Infante MD        oxyCODONE immediate release tablet 5 mg  5 mg Oral Q3H PRN Mabel FOX  MD Naresh   5 mg at 06/16/21 1232       ROS:    Constitution: Denies chills, fever, and sweats.  HENT: Denies headaches or blurry vision.  Cardiovascular: Denies chest pain or irregular heart beat.  Respiratory: Denies cough or shortness of breath.  Gastrointestinal: Denies abdominal pain, nausea, or vomiting.  Genitourinary:  Denies urinary incontinence, bladder and kidney issues  Musculoskeletal:  Denies muscle cramps.  Neurological: Denies dizziness or focal weakness.  Psychiatric/Behavioral: Normal mental status.  Hematologic/Lymphatic: Denies bleeding problem or easy bruising/bleeding.  Skin: Denies rash or suspicious lesions.    Physical examination     Gen - No acute distress, well nourished, well groomed   Eyes - Extraoccular motions intact, pupils equally round and reactive to light and accommodation   ENT - normocephalic, atruamtic, oropharynx clear   Neck - Supple, no abnormal masses   Cardiovascular - regular rate and rhythm   Pulmonary - clear to auscultation bilaterally, no wheezes, ronchi, or rales   Abdomen - soft, non-tender, non-distended, positive bowel sounds   Psych - The patient is alert and oriented x3 with normal mood and affect    Examination of the Left Lower Extremity    Skin is intact throughout  Motor in intact EHL,FHL,TA,chase  +2 DP/PT  Sensation LT intact D/P/1st    Examination of the Left Hip    C-Sign positive, but patient also reports radicular pain down the left leg.  He also reports pain in his lower lumbar spine and lumbosacral area.  Logroll negative  Stenchfield positive    Pain with ROM negative    ROM:    Flexion   120  Extension   30  Abduction   45  Adduction   20  External Rotation 45  Internal Rotation 35    Flexion contracture negative    FADIR negative  FADER negative    Tenderness to palpation over lateral and posterolateral greater tochanter negative    X-ray images were examined and personally interpreted by me.  Three views left hip dated 03/14/2023 show a left  total hip arthroplasty that is well fixed and in good alignment with no evidence of loosening.    Dx:  Patient with complex picture of left hip pain in addition to lumbosacral pain with radiculopathy down both left and right legs.    Plan:  Just to be sure we are going to rule out aseptic loosening and infection of the left hip.  We are going to send the patient for a bone scan in addition to a sed rate, CRP and CBC.  He can follow up after the studies are complete.

## 2023-03-22 DIAGNOSIS — K21.9 GASTROESOPHAGEAL REFLUX DISEASE WITHOUT ESOPHAGITIS: ICD-10-CM

## 2023-03-22 DIAGNOSIS — I10 ESSENTIAL HYPERTENSION: ICD-10-CM

## 2023-03-22 RX ORDER — OMEPRAZOLE 20 MG/1
20 CAPSULE, DELAYED RELEASE ORAL DAILY
Qty: 30 CAPSULE | Refills: 0 | Status: SHIPPED | OUTPATIENT
Start: 2023-03-22 | End: 2023-05-01 | Stop reason: SDUPTHER

## 2023-03-22 RX ORDER — LISINOPRIL 40 MG/1
40 TABLET ORAL DAILY
Qty: 30 TABLET | Refills: 0 | Status: SHIPPED | OUTPATIENT
Start: 2023-03-22 | End: 2023-05-01 | Stop reason: SDUPTHER

## 2023-03-22 NOTE — TELEPHONE ENCOUNTER
----- Message from Svetlana Jensen sent at 3/22/2023  2:51 PM CDT -----  Patient called and stated that he need a refill of his lisinopril,and his  omeprazole called into [facility] [address] [facility] if any questions please give him a call at [phone]

## 2023-04-26 ENCOUNTER — TELEPHONE (OUTPATIENT)
Dept: FAMILY MEDICINE | Facility: CLINIC | Age: 63
End: 2023-04-26

## 2023-04-26 NOTE — TELEPHONE ENCOUNTER
Left message that fasting lab and urine are due a week prior to visit, orders at [facility], encouraged water, advised he can take morning meds that do not need to be taken with food.

## 2023-05-01 DIAGNOSIS — I10 ESSENTIAL HYPERTENSION: ICD-10-CM

## 2023-05-01 DIAGNOSIS — M62.838 MUSCLE SPASM OF BOTH LOWER LEGS: ICD-10-CM

## 2023-05-01 DIAGNOSIS — K21.9 GASTROESOPHAGEAL REFLUX DISEASE WITHOUT ESOPHAGITIS: ICD-10-CM

## 2023-05-01 DIAGNOSIS — N32.81 OAB (OVERACTIVE BLADDER): ICD-10-CM

## 2023-05-01 RX ORDER — LISINOPRIL 40 MG/1
40 TABLET ORAL DAILY
Qty: 30 TABLET | Refills: 0 | Status: SHIPPED | OUTPATIENT
Start: 2023-05-01 | End: 2023-05-30 | Stop reason: SDUPTHER

## 2023-05-01 RX ORDER — METHOCARBAMOL 500 MG/1
TABLET, FILM COATED ORAL
Qty: 40 TABLET | Refills: 0 | Status: SHIPPED | OUTPATIENT
Start: 2023-05-01 | End: 2023-07-13 | Stop reason: SDUPTHER

## 2023-05-01 RX ORDER — OXYBUTYNIN CHLORIDE 5 MG/1
5 TABLET, EXTENDED RELEASE ORAL DAILY
Qty: 30 TABLET | Refills: 0 | Status: SHIPPED | OUTPATIENT
Start: 2023-05-01 | End: 2023-07-13 | Stop reason: SDUPTHER

## 2023-05-01 RX ORDER — OMEPRAZOLE 20 MG/1
20 CAPSULE, DELAYED RELEASE ORAL DAILY
Qty: 30 CAPSULE | Refills: 0 | Status: ON HOLD | OUTPATIENT
Start: 2023-05-01 | End: 2023-05-09 | Stop reason: HOSPADM

## 2023-05-01 NOTE — TELEPHONE ENCOUNTER
----- Message from Svetlana Jensen sent at 5/1/2023 12:36 PM CDT -----  Patient called and stated that he need a refill of his lisinopril,methocarbamol, oxybutynin and his omeprazole called into [facility] on [facility] ( this will be his new pharmacy so please change in the system) if any questions please give him a call at [phone]

## 2023-05-02 DIAGNOSIS — N40.1 BENIGN PROSTATIC HYPERPLASIA WITH URINARY FREQUENCY: ICD-10-CM

## 2023-05-02 DIAGNOSIS — R35.0 BENIGN PROSTATIC HYPERPLASIA WITH URINARY FREQUENCY: ICD-10-CM

## 2023-05-02 RX ORDER — TAMSULOSIN HYDROCHLORIDE 0.4 MG/1
0.8 CAPSULE ORAL DAILY
Qty: 180 CAPSULE | Refills: 3 | Status: SHIPPED | OUTPATIENT
Start: 2023-05-02 | End: 2023-07-13 | Stop reason: SDUPTHER

## 2023-05-02 NOTE — TELEPHONE ENCOUNTER
----- Message from Aleja Blum MA sent at 5/2/2023 11:08 AM CDT -----  Regarding: FW: refill    ----- Message -----  From: Ghada Prieto MA  Sent: 5/2/2023  11:06 AM CDT  To: Omar London Staff  Subject: refill                                           Needs refill on tamsulosin   [facility] please make as main pharm.as well  [phone]

## 2023-05-05 ENCOUNTER — HOSPITAL ENCOUNTER (INPATIENT)
Facility: HOSPITAL | Age: 63
LOS: 4 days | Discharge: HOME | DRG: 378 | End: 2023-05-09
Attending: EMERGENCY MEDICINE | Admitting: INTERNAL MEDICINE
Payer: MEDICARE

## 2023-05-05 ENCOUNTER — TELEPHONE (OUTPATIENT)
Dept: FAMILY MEDICINE | Facility: CLINIC | Age: 63
End: 2023-05-05

## 2023-05-05 DIAGNOSIS — R07.9 CHEST PAIN: ICD-10-CM

## 2023-05-05 DIAGNOSIS — D62 ACUTE BLOOD LOSS ANEMIA: ICD-10-CM

## 2023-05-05 DIAGNOSIS — D50.0 IRON DEFICIENCY ANEMIA DUE TO CHRONIC BLOOD LOSS: ICD-10-CM

## 2023-05-05 DIAGNOSIS — D64.9 ANEMIA, UNSPECIFIED TYPE: Primary | ICD-10-CM

## 2023-05-05 LAB
ABO + RH BLD: NORMAL
ALBUMIN SERPL BCP-MCNC: 3.1 G/DL (ref 3.5–5.2)
ALBUMIN SERPL-MCNC: 3.4 G/DL (ref 3.6–5.1)
ALBUMIN/CREAT UR: 64 MCG/MG CREAT
ALBUMIN/GLOB SERPL: 1 (CALC) (ref 1–2.5)
ALP SERPL-CCNC: 115 U/L (ref 35–144)
ALP SERPL-CCNC: 121 U/L (ref 55–135)
ALT SERPL W/O P-5'-P-CCNC: 12 U/L (ref 10–44)
ALT SERPL-CCNC: 8 U/L (ref 9–46)
ANION GAP SERPL CALC-SCNC: 5 MMOL/L (ref 8–16)
ANISOCYTOSIS BLD QL SMEAR: SLIGHT
APPEARANCE UR: CLEAR
APTT PPP: 25.9 SEC (ref 21–32)
AST SERPL-CCNC: 15 U/L (ref 10–35)
AST SERPL-CCNC: 17 U/L (ref 10–40)
BACTERIA #/AREA URNS HPF: NORMAL /HPF
BACTERIA UR CULT: NORMAL
BASOPHILS # BLD AUTO: 0.02 K/UL (ref 0–0.2)
BASOPHILS # BLD AUTO: 41 CELLS/UL (ref 0–200)
BASOPHILS NFR BLD AUTO: 0.9 %
BASOPHILS NFR BLD: 0.7 % (ref 0–1.9)
BILIRUB SERPL-MCNC: 0.5 MG/DL (ref 0.1–1)
BILIRUB SERPL-MCNC: 0.5 MG/DL (ref 0.2–1.2)
BILIRUB UR QL STRIP: NEGATIVE
BLD GP AB SCN CELLS X3 SERPL QL: NORMAL
BLD PROD TYP BPU: NORMAL
BLOOD UNIT EXPIRATION DATE: NORMAL
BLOOD UNIT TYPE CODE: 5100
BLOOD UNIT TYPE: NORMAL
BUN SERPL-MCNC: 11 MG/DL (ref 8–23)
BUN SERPL-MCNC: 14 MG/DL (ref 7–25)
BUN/CREAT SERPL: ABNORMAL (CALC) (ref 6–22)
CALCIUM SERPL-MCNC: 8.3 MG/DL (ref 8.7–10.5)
CALCIUM SERPL-MCNC: 8.8 MG/DL (ref 8.6–10.3)
CHLORIDE SERPL-SCNC: 103 MMOL/L (ref 98–110)
CHLORIDE SERPL-SCNC: 106 MMOL/L (ref 95–110)
CHOLEST SERPL-MCNC: 96 MG/DL
CHOLEST/HDLC SERPL: 2.6 (CALC)
CO2 SERPL-SCNC: 26 MMOL/L (ref 20–32)
CO2 SERPL-SCNC: 27 MMOL/L (ref 23–29)
CODING SYSTEM: NORMAL
COLOR UR: YELLOW
CREAT SERPL-MCNC: 0.8 MG/DL (ref 0.5–1.4)
CREAT SERPL-MCNC: 0.91 MG/DL (ref 0.7–1.35)
CREAT UR-MCNC: 28 MG/DL (ref 20–320)
CROSSMATCH INTERPRETATION: NORMAL
DIFFERENTIAL METHOD: ABNORMAL
DISPENSE STATUS: NORMAL
EGFR: 94 ML/MIN/1.73M2
EOSINOPHIL # BLD AUTO: 0.1 K/UL (ref 0–0.5)
EOSINOPHIL # BLD AUTO: 81 CELLS/UL (ref 15–500)
EOSINOPHIL NFR BLD AUTO: 1.8 %
EOSINOPHIL NFR BLD: 2.4 % (ref 0–8)
ERYTHROCYTE [DISTWIDTH] IN BLOOD BY AUTOMATED COUNT: 17.1 % (ref 11–15)
ERYTHROCYTE [DISTWIDTH] IN BLOOD BY AUTOMATED COUNT: 18.8 % (ref 11.5–14.5)
EST. GFR  (NO RACE VARIABLE): >60 ML/MIN/1.73 M^2
GLOBULIN SER CALC-MCNC: 3.3 G/DL (CALC) (ref 1.9–3.7)
GLUCOSE SERPL-MCNC: 113 MG/DL (ref 70–110)
GLUCOSE SERPL-MCNC: 93 MG/DL (ref 65–99)
GLUCOSE UR QL STRIP: NEGATIVE
HCT VFR BLD AUTO: 19.7 % (ref 38.5–50)
HCT VFR BLD AUTO: 20.1 % (ref 40–54)
HDLC SERPL-MCNC: 37 MG/DL
HGB BLD-MCNC: 5.2 G/DL (ref 14–18)
HGB BLD-MCNC: 5.4 G/DL (ref 13.2–17.1)
HGB UR QL STRIP: NEGATIVE
HYALINE CASTS #/AREA URNS LPF: NORMAL /LPF
HYPOCHROMIA BLD QL SMEAR: ABNORMAL
IMM GRANULOCYTES # BLD AUTO: 0.01 K/UL (ref 0–0.04)
IMM GRANULOCYTES NFR BLD AUTO: 0.3 % (ref 0–0.5)
INR PPP: 1.1 (ref 0.8–1.2)
KETONES UR QL STRIP: NEGATIVE
LDLC SERPL CALC-MCNC: 45 MG/DL (CALC)
LEUKOCYTE ESTERASE UR QL STRIP: NEGATIVE
LYMPHOCYTES # BLD AUTO: 0.8 K/UL (ref 1–4.8)
LYMPHOCYTES # BLD AUTO: 941 CELLS/UL (ref 850–3900)
LYMPHOCYTES NFR BLD AUTO: 20.9 %
LYMPHOCYTES NFR BLD: 28.6 % (ref 18–48)
MCH RBC QN AUTO: 18.1 PG (ref 27–31)
MCH RBC QN AUTO: 18.4 PG (ref 27–33)
MCHC RBC AUTO-ENTMCNC: 25.9 G/DL (ref 32–36)
MCHC RBC AUTO-ENTMCNC: 27.4 G/DL (ref 32–36)
MCV RBC AUTO: 67 FL (ref 80–100)
MCV RBC AUTO: 70 FL (ref 82–98)
MICROALBUMIN UR-MCNC: 1.8 MG/DL
MONOCYTES # BLD AUTO: 0.3 K/UL (ref 0.3–1)
MONOCYTES # BLD AUTO: 360 CELLS/UL (ref 200–950)
MONOCYTES NFR BLD AUTO: 8 %
MONOCYTES NFR BLD: 8.8 % (ref 4–15)
MORPHOLOGY BLD-IMP: ABNORMAL
NEUTROPHILS # BLD AUTO: 1.7 K/UL (ref 1.8–7.7)
NEUTROPHILS # BLD AUTO: 3078 CELLS/UL (ref 1500–7800)
NEUTROPHILS NFR BLD AUTO: 68.4 %
NEUTROPHILS NFR BLD: 59.2 % (ref 38–73)
NITRITE UR QL STRIP: NEGATIVE
NONHDLC SERPL-MCNC: 59 MG/DL (CALC)
NRBC BLD-RTO: 0 /100 WBC
NUM UNITS TRANS PACKED RBC: NORMAL
PH UR STRIP: 6.5 [PH] (ref 5–8)
PLATELET # BLD AUTO: 153 K/UL (ref 150–450)
PLATELET # BLD AUTO: 179 THOUSAND/UL (ref 140–400)
PLATELET BLD QL SMEAR: ABNORMAL
PMV BLD AUTO: 10 FL (ref 9.2–12.9)
PMV BLD REES-ECKER: 10.6 FL (ref 7.5–12.5)
POIKILOCYTOSIS BLD QL SMEAR: SLIGHT
POTASSIUM SERPL-SCNC: 3.9 MMOL/L (ref 3.5–5.1)
POTASSIUM SERPL-SCNC: 4.2 MMOL/L (ref 3.5–5.3)
PROT SERPL-MCNC: 6.7 G/DL (ref 6.1–8.1)
PROT SERPL-MCNC: 6.9 G/DL (ref 6–8.4)
PROT UR QL STRIP: NEGATIVE
PROTHROMBIN TIME: 11.9 SEC (ref 9–12.5)
RBC # BLD AUTO: 2.88 M/UL (ref 4.6–6.2)
RBC # BLD AUTO: 2.94 MILLION/UL (ref 4.2–5.8)
RBC #/AREA URNS HPF: NORMAL /HPF
SERVICE CMNT-IMP: NORMAL
SODIUM SERPL-SCNC: 134 MMOL/L (ref 135–146)
SODIUM SERPL-SCNC: 138 MMOL/L (ref 136–145)
SP GR UR STRIP: 1 (ref 1–1.03)
SPECIMEN OUTDATE: NORMAL
SQUAMOUS #/AREA URNS HPF: NORMAL /HPF
TRIGL SERPL-MCNC: 59 MG/DL
TSH SERPL-ACNC: 1.79 MIU/L (ref 0.4–4.5)
WBC # BLD AUTO: 2.94 K/UL (ref 3.9–12.7)
WBC # BLD AUTO: 4.5 THOUSAND/UL (ref 3.8–10.8)
WBC #/AREA URNS HPF: NORMAL /HPF

## 2023-05-05 PROCEDURE — 25000003 PHARM REV CODE 250: Performed by: INTERNAL MEDICINE

## 2023-05-05 PROCEDURE — 86920 COMPATIBILITY TEST SPIN: CPT | Performed by: INTERNAL MEDICINE

## 2023-05-05 PROCEDURE — 86900 BLOOD TYPING SEROLOGIC ABO: CPT | Performed by: STUDENT IN AN ORGANIZED HEALTH CARE EDUCATION/TRAINING PROGRAM

## 2023-05-05 PROCEDURE — 86920 COMPATIBILITY TEST SPIN: CPT | Performed by: STUDENT IN AN ORGANIZED HEALTH CARE EDUCATION/TRAINING PROGRAM

## 2023-05-05 PROCEDURE — 80053 COMPREHEN METABOLIC PANEL: CPT

## 2023-05-05 PROCEDURE — 85025 COMPLETE CBC W/AUTO DIFF WBC: CPT

## 2023-05-05 PROCEDURE — P9016 RBC LEUKOCYTES REDUCED: HCPCS | Performed by: STUDENT IN AN ORGANIZED HEALTH CARE EDUCATION/TRAINING PROGRAM

## 2023-05-05 PROCEDURE — C9113 INJ PANTOPRAZOLE SODIUM, VIA: HCPCS | Performed by: INTERNAL MEDICINE

## 2023-05-05 PROCEDURE — 63600175 PHARM REV CODE 636 W HCPCS: Performed by: INTERNAL MEDICINE

## 2023-05-05 PROCEDURE — 12000002 HC ACUTE/MED SURGE SEMI-PRIVATE ROOM

## 2023-05-05 PROCEDURE — 99285 EMERGENCY DEPT VISIT HI MDM: CPT

## 2023-05-05 PROCEDURE — 85610 PROTHROMBIN TIME: CPT | Performed by: STUDENT IN AN ORGANIZED HEALTH CARE EDUCATION/TRAINING PROGRAM

## 2023-05-05 PROCEDURE — 85730 THROMBOPLASTIN TIME PARTIAL: CPT | Performed by: STUDENT IN AN ORGANIZED HEALTH CARE EDUCATION/TRAINING PROGRAM

## 2023-05-05 RX ORDER — TAMSULOSIN HYDROCHLORIDE 0.4 MG/1
0.8 CAPSULE ORAL DAILY
Status: DISCONTINUED | OUTPATIENT
Start: 2023-05-06 | End: 2023-05-09 | Stop reason: HOSPADM

## 2023-05-05 RX ORDER — BUTALBITAL, ACETAMINOPHEN AND CAFFEINE 50; 325; 40 MG/1; MG/1; MG/1
1 TABLET ORAL EVERY 4 HOURS PRN
Status: DISCONTINUED | OUTPATIENT
Start: 2023-05-05 | End: 2023-05-09 | Stop reason: HOSPADM

## 2023-05-05 RX ORDER — LISINOPRIL 20 MG/1
40 TABLET ORAL DAILY
Status: DISCONTINUED | OUTPATIENT
Start: 2023-05-06 | End: 2023-05-09 | Stop reason: HOSPADM

## 2023-05-05 RX ORDER — SODIUM CHLORIDE 0.9 % (FLUSH) 0.9 %
10 SYRINGE (ML) INJECTION EVERY 12 HOURS PRN
Status: DISCONTINUED | OUTPATIENT
Start: 2023-05-05 | End: 2023-05-09 | Stop reason: HOSPADM

## 2023-05-05 RX ORDER — IBUPROFEN 200 MG
24 TABLET ORAL
Status: DISCONTINUED | OUTPATIENT
Start: 2023-05-05 | End: 2023-05-09 | Stop reason: HOSPADM

## 2023-05-05 RX ORDER — OXYBUTYNIN CHLORIDE 5 MG/1
5 TABLET, EXTENDED RELEASE ORAL DAILY
Status: DISCONTINUED | OUTPATIENT
Start: 2023-05-06 | End: 2023-05-09 | Stop reason: HOSPADM

## 2023-05-05 RX ORDER — LANOLIN ALCOHOL/MO/W.PET/CERES
800 CREAM (GRAM) TOPICAL
Status: DISCONTINUED | OUTPATIENT
Start: 2023-05-05 | End: 2023-05-09 | Stop reason: HOSPADM

## 2023-05-05 RX ORDER — HYDROCODONE BITARTRATE AND ACETAMINOPHEN 500; 5 MG/1; MG/1
TABLET ORAL
Status: DISCONTINUED | OUTPATIENT
Start: 2023-05-05 | End: 2023-05-09 | Stop reason: HOSPADM

## 2023-05-05 RX ORDER — METHADONE HYDROCHLORIDE 10 MG/1
40 TABLET ORAL EVERY OTHER DAY
Status: DISCONTINUED | OUTPATIENT
Start: 2023-05-06 | End: 2023-05-06

## 2023-05-05 RX ORDER — METHADONE HYDROCHLORIDE 40 MG/1
80 TABLET ORAL SEE ADMIN INSTRUCTIONS
COMMUNITY
End: 2024-03-15

## 2023-05-05 RX ORDER — PANTOPRAZOLE SODIUM 40 MG/10ML
40 INJECTION, POWDER, LYOPHILIZED, FOR SOLUTION INTRAVENOUS 2 TIMES DAILY
Status: DISCONTINUED | OUTPATIENT
Start: 2023-05-05 | End: 2023-05-09 | Stop reason: HOSPADM

## 2023-05-05 RX ORDER — GLUCAGON 1 MG
1 KIT INJECTION
Status: DISCONTINUED | OUTPATIENT
Start: 2023-05-05 | End: 2023-05-09 | Stop reason: HOSPADM

## 2023-05-05 RX ORDER — GABAPENTIN 600 MG/1
600 TABLET ORAL 4 TIMES DAILY
COMMUNITY
Start: 2023-03-26 | End: 2023-07-13 | Stop reason: SDUPTHER

## 2023-05-05 RX ORDER — IBUPROFEN 200 MG
16 TABLET ORAL
Status: DISCONTINUED | OUTPATIENT
Start: 2023-05-05 | End: 2023-05-09 | Stop reason: HOSPADM

## 2023-05-05 RX ORDER — NALOXONE HCL 0.4 MG/ML
0.02 VIAL (ML) INJECTION
Status: DISCONTINUED | OUTPATIENT
Start: 2023-05-05 | End: 2023-05-09 | Stop reason: HOSPADM

## 2023-05-05 RX ORDER — GABAPENTIN 300 MG/1
600 CAPSULE ORAL 4 TIMES DAILY
Status: DISCONTINUED | OUTPATIENT
Start: 2023-05-05 | End: 2023-05-09 | Stop reason: HOSPADM

## 2023-05-05 RX ORDER — METHOCARBAMOL 500 MG/1
500 TABLET, FILM COATED ORAL NIGHTLY PRN
Status: DISCONTINUED | OUTPATIENT
Start: 2023-05-05 | End: 2023-05-09 | Stop reason: HOSPADM

## 2023-05-05 RX ADMIN — PANTOPRAZOLE SODIUM 40 MG: 40 INJECTION, POWDER, LYOPHILIZED, FOR SOLUTION INTRAVENOUS at 09:05

## 2023-05-05 RX ADMIN — GABAPENTIN 600 MG: 300 CAPSULE ORAL at 08:05

## 2023-05-05 RX ADMIN — BUTALBITAL, ACETAMINOPHEN, AND CAFFEINE 1 TABLET: 50; 325; 40 TABLET, COATED ORAL at 09:05

## 2023-05-05 NOTE — TELEPHONE ENCOUNTER
Profound anemia on labs.  Needs to be evaluated in the ER and considered for transfusion.  Please let patient know his tank has nothing left.  I am shock that he is not fatigued, dizzy and short of breath.  Not otherwise familiar with this patient.  Previously saw Shaun

## 2023-05-05 NOTE — ED PROVIDER NOTES
Encounter Date: 5/5/2023       History     Chief Complaint   Patient presents with    Abnormal Labs     Dr. London advised to come for low blood counts     HPI  63 yo man hx of back issues, htn, copd presents for eval after pre op labs drawn for back surgery at end of the month with anemia, he doesn't know the counts. No symptoms of anemia. No blood in stool, dark tarry stools, no blood in urine. Does report bleeding more over the past week when he accidentally cut himself, also reports bruising to arms. No AC, no antiplatelets. No known bleeding disorders.      Review of patient's allergies indicates:   Allergen Reactions    Pcn [penicillins]      As a child.     Past Medical History:   Diagnosis Date    JACKLYN (acute kidney injury) 11/11/2022    Arthritis     GERD (gastroesophageal reflux disease)     Hepatitis C     STATES DX 10-20. NO S/S. NO FURTHER TESTS OR TX. WITH INSURANCE IN 2021 CAN GET IT EVALUATED.    History of left hip replacement 05/20/2021    History of MRSA infection     History of right shoulder replacement 01/15/2021    Hypertension     Wears glasses      Past Surgical History:   Procedure Laterality Date    APPENDECTOMY      ARTHROPLASTY OF SHOULDER Right 1/13/2021    Procedure: ARTHROPLASTY, SHOULDER TOTAL;  Surgeon: Emilio Siddiqui MD;  Location: [facility] OR;  Service: Orthopedics;  Laterality: Right;  GENERAL AND BLOCK    HERNIA REPAIR      HIP REPLACEMENT ARTHROPLASTY Left 3/24/2021    Procedure: ARTHROPLASTY, HIP REPLACEMENT;  Surgeon: Miles Rivas II, MD;  Location: [facility] OR;  Service: Orthopedics;  Laterality: Left;    JOINT REPLACEMENT Right     shoulder    REVISION TOTAL HIP ARTHROPLASTY Left 6/2/2021    Procedure: REVISION, TOTAL ARTHROPLASTY, HIP;  Surgeon: Miles Rivas II, MD;  Location: [facility] OR;  Service: Orthopedics;  Laterality: Left;     Family History   Problem Relation Age of Onset    Cancer Mother         leukemia     Social History     Tobacco Use    Smoking status: Every  Day     Packs/day: 0.50     Years: 30.00     Pack years: 15.00     Types: Cigarettes    Smokeless tobacco: Never   Substance Use Topics    Alcohol use: Never    Drug use: Never     Review of Systems   Constitutional:  Negative for fever.   HENT:  Negative for sore throat.    Respiratory:  Negative for shortness of breath.    Cardiovascular:  Negative for chest pain.   Gastrointestinal:  Negative for nausea.   Genitourinary:  Negative for dysuria.   Musculoskeletal:  Positive for back pain.   Skin:  Negative for rash.   Neurological:  Negative for weakness.     Physical Exam     Initial Vitals [05/05/23 1417]   BP Pulse Resp Temp SpO2   (!) 148/67 95 16 98.1 °F (36.7 °C) 98 %      MAP       --         Physical Exam    Constitutional: He appears well-developed.   HENT:   Head: Normocephalic and atraumatic.   Eyes: Right eye exhibits no discharge. Left eye exhibits no discharge.   Pale conjuctiva   Neck: No tracheal deviation present.   Cardiovascular:  Normal rate and regular rhythm.           Pulmonary/Chest: Breath sounds normal. No stridor. No respiratory distress.   Abdominal: Abdomen is soft. Bowel sounds are normal. There is no abdominal tenderness.   Musculoskeletal:         General: No edema.     Neurological: He is alert and oriented to person, place, and time.   Skin: Skin is warm and dry. There is pallor.       ED Course   Procedures  Labs Reviewed   CBC W/ AUTO DIFFERENTIAL - Abnormal; Notable for the following components:       Result Value    WBC 2.94 (*)     RBC 2.88 (*)     Hemoglobin 5.2 (*)     Hematocrit 20.1 (*)     MCV 70 (*)     MCH 18.1 (*)     MCHC 25.9 (*)     RDW 18.8 (*)     Gran # (ANC) 1.7 (*)     Lymph # 0.8 (*)     All other components within normal limits    Narrative:     hgb/hct critical result(s) repeated. Called and verbal readback   obtained from Betzaida Mitchell RN by LS1 05/05/2023 17:09   COMPREHENSIVE METABOLIC PANEL - Abnormal; Notable for the following components:    Glucose  113 (*)     Calcium 8.3 (*)     Albumin 3.1 (*)     Anion Gap 5 (*)     All other components within normal limits   APTT   PROTIME-INR   OCCULT BLOOD X 1, STOOL   TYPE & SCREEN   PREPARE RBC SOFT   PREPARE RBC SOFT          Imaging Results    None          Medications   0.9%  NaCl infusion (for blood administration) (has no administration in time range)   sodium chloride 0.9% flush 10 mL (has no administration in time range)   naloxone 0.4 mg/mL injection 0.02 mg (has no administration in time range)   glucose chewable tablet 16 g (has no administration in time range)   glucose chewable tablet 24 g (has no administration in time range)   dextrose 50% injection 12.5 g (has no administration in time range)   dextrose 50% injection 25 g (has no administration in time range)   glucagon (human recombinant) injection 1 mg (has no administration in time range)   potassium bicarbonate disintegrating tablet 50 mEq (has no administration in time range)   potassium bicarbonate disintegrating tablet 35 mEq (has no administration in time range)   potassium bicarbonate disintegrating tablet 60 mEq (has no administration in time range)   magnesium oxide tablet 800 mg (has no administration in time range)   magnesium oxide tablet 800 mg (has no administration in time range)   0.9%  NaCl infusion (for blood administration) (has no administration in time range)     Medical Decision Making:   History:   Old Medical Records: I decided to obtain old medical records.  Old Records Summarized: records from clinic visits.       <> Summary of Records: Hgb 9s a year ago, 7s six months ago, prior admit for pneumonia  Initial Assessment:   Hgb 5s yesterday, no obvious bleeding complaints, no systemic symptoms of anemia, vss, htn, a little pale but non toxic  Differential Diagnosis:   Anemia, metabolic issue, coagulopathy  Clinical Tests:   Lab Tests: Ordered and Reviewed  ED Management:  Admit for blood                        Clinical  Impression:   Final diagnoses:  [D64.9] Anemia, unspecified type (Primary)        ED Disposition Condition    Admit                 Rm Alvarez MD  Resident  05/05/23 0362

## 2023-05-05 NOTE — TELEPHONE ENCOUNTER
Mireille tucker [facility] stated Hemoglobin is 5.4 on 5/4/2023    Last Hemoglobin done 11/12/22 was 7.9       Spoke to pt and let him know His Hemoglobin was low at 5.4 and asked if pt was not having any symptoms such as SOB, Weakness. Pt stated he is not having any symptoms and he stated he will try to go that he is in [place] right now and is having car troubles and he thinks it may be to expensive with his insurance. Explained to pt he may need a blood transfusion and it is dangerous to have his Hemoglobin that low. Pt stated he will try to go when he can get a ride.

## 2023-05-06 LAB
ANION GAP SERPL CALC-SCNC: 6 MMOL/L (ref 8–16)
BASOPHILS # BLD AUTO: 0.02 K/UL (ref 0–0.2)
BASOPHILS NFR BLD: 0.7 % (ref 0–1.9)
BLD PROD TYP BPU: NORMAL
BLD PROD TYP BPU: NORMAL
BLOOD UNIT EXPIRATION DATE: NORMAL
BLOOD UNIT EXPIRATION DATE: NORMAL
BLOOD UNIT TYPE CODE: 5100
BLOOD UNIT TYPE CODE: 5100
BLOOD UNIT TYPE: NORMAL
BLOOD UNIT TYPE: NORMAL
BUN SERPL-MCNC: 10 MG/DL (ref 8–23)
CALCIUM SERPL-MCNC: 8.1 MG/DL (ref 8.7–10.5)
CHLORIDE SERPL-SCNC: 107 MMOL/L (ref 95–110)
CO2 SERPL-SCNC: 26 MMOL/L (ref 23–29)
CODING SYSTEM: NORMAL
CODING SYSTEM: NORMAL
CREAT SERPL-MCNC: 0.9 MG/DL (ref 0.5–1.4)
CROSSMATCH INTERPRETATION: NORMAL
CROSSMATCH INTERPRETATION: NORMAL
DIFFERENTIAL METHOD: ABNORMAL
DISPENSE STATUS: NORMAL
DISPENSE STATUS: NORMAL
EOSINOPHIL # BLD AUTO: 0.1 K/UL (ref 0–0.5)
EOSINOPHIL NFR BLD: 3.8 % (ref 0–8)
ERYTHROCYTE [DISTWIDTH] IN BLOOD BY AUTOMATED COUNT: 19.8 % (ref 11.5–14.5)
EST. GFR  (NO RACE VARIABLE): >60 ML/MIN/1.73 M^2
GLUCOSE SERPL-MCNC: 85 MG/DL (ref 70–110)
HCT VFR BLD AUTO: 24.1 % (ref 40–54)
HGB BLD-MCNC: 5.5 G/DL (ref 14–18)
HGB BLD-MCNC: 6.5 G/DL (ref 14–18)
HGB BLD-MCNC: 7.8 G/DL (ref 14–18)
IMM GRANULOCYTES # BLD AUTO: 0 K/UL (ref 0–0.04)
IMM GRANULOCYTES NFR BLD AUTO: 0 % (ref 0–0.5)
LYMPHOCYTES # BLD AUTO: 1.1 K/UL (ref 1–4.8)
LYMPHOCYTES NFR BLD: 38 % (ref 18–48)
MCH RBC QN AUTO: 19.6 PG (ref 27–31)
MCHC RBC AUTO-ENTMCNC: 27 G/DL (ref 32–36)
MCV RBC AUTO: 73 FL (ref 82–98)
MONOCYTES # BLD AUTO: 0.3 K/UL (ref 0.3–1)
MONOCYTES NFR BLD: 9.8 % (ref 4–15)
NEUTROPHILS # BLD AUTO: 1.4 K/UL (ref 1.8–7.7)
NEUTROPHILS NFR BLD: 47.7 % (ref 38–73)
NRBC BLD-RTO: 1 /100 WBC
NUM UNITS TRANS PACKED RBC: NORMAL
NUM UNITS TRANS PACKED RBC: NORMAL
PLATELET # BLD AUTO: 152 K/UL (ref 150–450)
PMV BLD AUTO: 10.1 FL (ref 9.2–12.9)
POTASSIUM SERPL-SCNC: 4 MMOL/L (ref 3.5–5.1)
RBC # BLD AUTO: 3.32 M/UL (ref 4.6–6.2)
SODIUM SERPL-SCNC: 139 MMOL/L (ref 136–145)
WBC # BLD AUTO: 2.87 K/UL (ref 3.9–12.7)

## 2023-05-06 PROCEDURE — 85025 COMPLETE CBC W/AUTO DIFF WBC: CPT | Performed by: INTERNAL MEDICINE

## 2023-05-06 PROCEDURE — C9113 INJ PANTOPRAZOLE SODIUM, VIA: HCPCS | Performed by: INTERNAL MEDICINE

## 2023-05-06 PROCEDURE — 12000002 HC ACUTE/MED SURGE SEMI-PRIVATE ROOM

## 2023-05-06 PROCEDURE — 80048 BASIC METABOLIC PNL TOTAL CA: CPT | Performed by: INTERNAL MEDICINE

## 2023-05-06 PROCEDURE — 85018 HEMOGLOBIN: CPT | Mod: 91 | Performed by: INTERNAL MEDICINE

## 2023-05-06 PROCEDURE — P9016 RBC LEUKOCYTES REDUCED: HCPCS | Performed by: INTERNAL MEDICINE

## 2023-05-06 PROCEDURE — C1751 CATH, INF, PER/CENT/MIDLINE: HCPCS

## 2023-05-06 PROCEDURE — 25000003 PHARM REV CODE 250: Performed by: INTERNAL MEDICINE

## 2023-05-06 PROCEDURE — P9016 RBC LEUKOCYTES REDUCED: HCPCS | Performed by: STUDENT IN AN ORGANIZED HEALTH CARE EDUCATION/TRAINING PROGRAM

## 2023-05-06 PROCEDURE — 36415 COLL VENOUS BLD VENIPUNCTURE: CPT | Performed by: INTERNAL MEDICINE

## 2023-05-06 PROCEDURE — 85018 HEMOGLOBIN: CPT | Performed by: INTERNAL MEDICINE

## 2023-05-06 PROCEDURE — 25000003 PHARM REV CODE 250: Performed by: STUDENT IN AN ORGANIZED HEALTH CARE EDUCATION/TRAINING PROGRAM

## 2023-05-06 PROCEDURE — 63600175 PHARM REV CODE 636 W HCPCS: Performed by: INTERNAL MEDICINE

## 2023-05-06 PROCEDURE — 36410 VNPNXR 3YR/> PHY/QHP DX/THER: CPT

## 2023-05-06 RX ORDER — HYDROCODONE BITARTRATE AND ACETAMINOPHEN 500; 5 MG/1; MG/1
TABLET ORAL
Status: DISCONTINUED | OUTPATIENT
Start: 2023-05-06 | End: 2023-05-09 | Stop reason: HOSPADM

## 2023-05-06 RX ORDER — METHADONE HYDROCHLORIDE 10 MG/1
40 TABLET ORAL EVERY OTHER DAY
Status: DISCONTINUED | OUTPATIENT
Start: 2023-05-06 | End: 2023-05-09 | Stop reason: HOSPADM

## 2023-05-06 RX ADMIN — PANTOPRAZOLE SODIUM 40 MG: 40 INJECTION, POWDER, LYOPHILIZED, FOR SOLUTION INTRAVENOUS at 09:05

## 2023-05-06 RX ADMIN — GABAPENTIN 600 MG: 300 CAPSULE ORAL at 12:05

## 2023-05-06 RX ADMIN — TAMSULOSIN HYDROCHLORIDE 0.8 MG: 0.4 CAPSULE ORAL at 09:05

## 2023-05-06 RX ADMIN — OXYBUTYNIN CHLORIDE 5 MG: 5 TABLET, EXTENDED RELEASE ORAL at 09:05

## 2023-05-06 RX ADMIN — LISINOPRIL 40 MG: 20 TABLET ORAL at 09:05

## 2023-05-06 RX ADMIN — GABAPENTIN 600 MG: 300 CAPSULE ORAL at 08:05

## 2023-05-06 RX ADMIN — GABAPENTIN 600 MG: 300 CAPSULE ORAL at 05:05

## 2023-05-06 RX ADMIN — PANTOPRAZOLE SODIUM 40 MG: 40 INJECTION, POWDER, LYOPHILIZED, FOR SOLUTION INTRAVENOUS at 08:05

## 2023-05-06 RX ADMIN — GABAPENTIN 600 MG: 300 CAPSULE ORAL at 09:05

## 2023-05-06 RX ADMIN — METHADONE HYDROCHLORIDE 40 MG: 10 TABLET ORAL at 12:05

## 2023-05-06 NOTE — NURSING
Attempt to flush 20 PIV to right upper arm.  Does not flush and not blood return.  Unable to give the Pantoprazole IV at this time.  Attempt X1 to left upper arm was unsuccessful.

## 2023-05-06 NOTE — PLAN OF CARE
[facility]  Initial Discharge Assessment       Primary Care Provider: Lexus Higginbotham NP    Admission Diagnosis: Anemia, unspecified type [D64.9]    Admission Date: 5/5/2023  Expected Discharge Date: TBD    Assessment completed with patient at bedside in 1115. Patient lives alone, ambulates with a cane, no additional DME, no HH. Patient's friend to pick him up from hospital at discharge. No discharge needs anticipated.     Discharge Barriers Identified: None    Payor: [employer] MANAGED MEDICARE / Plan: AET[employer] MEDICARE PLAN PPO / Product Type: Medicare Advantage /     Extended Emergency Contact Information  Primary Emergency Contact: Jasmina Rodriguez  Mobile Phone: [phone]  Relation: Daughter  Preferred language: English   needed? No    Discharge Plan A: Home with family  Discharge Plan B: Home with family      [facility] - [place] - [address]  [address]  [place] [address]  Phone: [phone] Fax: [phone]      Initial Assessment (most recent)       Adult Discharge Assessment - 05/06/23 1115          Discharge Assessment    Assessment Type Discharge Planning Assessment     Confirmed/corrected address, phone number and insurance Yes     Confirmed Demographics Correct on Facesheet     Source of Information patient     Communicated NATALIA with patient/caregiver Date not available/Unable to determine     Reason For Admission Acute blood loss anemia     People in Home alone     Facility Arrived From: home     Do you expect to return to your current living situation? Yes     Do you have help at home or someone to help you manage your care at home? No     Prior to hospitilization cognitive status: Alert/Oriented     Current cognitive status: Alert/Oriented     Walking or Climbing Stairs ambulation difficulty, requires equipment     Equipment Currently Used at Home cane, straight     Readmission within 30 days? No     Patient currently being followed by  outpatient case management? No     Do you currently have service(s) that help you manage your care at home? No     Do you take prescription medications? Yes     Do you have prescription coverage? Yes     Coverage Payor:  AET[employer] MANAGED MEDICARE - AETNA MEDICARE PLAN PPO     Do you have any problems affording any of your prescribed medications? No     Is the patient taking medications as prescribed? yes     Who is going to help you get home at discharge? friend     How do you get to doctors appointments? car, drives self     Are you on dialysis? No     Do you take coumadin? No     Discharge Plan A Home with family     Discharge Plan B Home with family     DME Needed Upon Discharge  none     Discharge Plan discussed with: Patient     Discharge Barriers Identified None

## 2023-05-06 NOTE — NURSING
Dynamic infusion at bedside. New midline to right upper arm.  Site dressed per Dynamic infusion.  Flushes without difficulty.

## 2023-05-06 NOTE — NURSING
Patient c/o pain at IV site.  Noted swelling above insertion site.  Blood transfusion stopped and second nurse Estuardo confirmed infiltration    IV removed, pressure dressing applied.  Patient stated once the transfusion stopped, the IV site no longer caused pain.  IV removed, cannula in tact.  Site without redness and there is minor swelling above the insertion site.      Supervisor called and will call out IV specialist

## 2023-05-06 NOTE — NURSING
Two RN check with charge nurse MARY KAY Glez for blood transfusion.  Inquired with patient if he had any questions concerning the transfusion and transfusion reaction.  Patient stated he was fine.  Blood to 18g PIV left AC at 100/hr.

## 2023-05-06 NOTE — NURSING
Education provided to patient regarding s/s of transfusion reaction.  Discussed the need to be notified if any of the rash, itching, chest pain, SOB, nausea/vomiting or any other symptom that the patient feels is not a normal situation.  Consent obtained and is on chart.  Charge Nurse MARY KAY Glez to blood bank to  blood  VSS and recorded per flow sheet.

## 2023-05-06 NOTE — CONSULTS
Consult Note  Gastroenterology    Consult Requested By: hospital medicine  Reason for Consult: severe symptomatic anemia    SUBJECTIVE:     History of Present Illness:  Patient is a 62 y.o. male presents with recent severe fatigue and JOHNSON.  He is poised for back surgery and pre op labs revealed a microcytic anemia with a Hgb of 5.2.  Pt denies any GI sx's - has had no pain, dyspepsia, GERD sx's, dark stool or hematochezia. He takes a large amount of Goody's powder and motrin for has chronic back pain.  Last colonoscopy was done 2/22 with Dr Gooden - ten small polyps were removed, diverticulosis and small hemorrhoids noted. Onset of symptoms was gradual starting several weeks ago with gradually worsening course since that time. Patient denies abdominal pain, bright red blood per rectum, change in bowel habits, constipation, diarrhea, dysphagia, jaundice, and melena. Symptoms are aggravated by none. Symptoms improve with none. Past history includes as above. Previous studies include hemogram.     Review of patient's allergies indicates:   Allergen Reactions    Pcn [penicillins]      As a child.       Past Medical History:   Diagnosis Date    JACKLYN (acute kidney injury) 11/11/2022    Arthritis     GERD (gastroesophageal reflux disease)     Hepatitis C     STATES DX 10-20. NO S/S. NO FURTHER TESTS OR TX. WITH INSURANCE IN 2021 CAN GET IT EVALUATED.    History of left hip replacement 05/20/2021    History of MRSA infection     History of right shoulder replacement 01/15/2021    Hypertension     Wears glasses      Past Surgical History:   Procedure Laterality Date    APPENDECTOMY      ARTHROPLASTY OF SHOULDER Right 1/13/2021    Procedure: ARTHROPLASTY, SHOULDER TOTAL;  Surgeon: Emilio Siddiqui MD;  Location: [facility];  Service: Orthopedics;  Laterality: Right;  GENERAL AND BLOCK    HERNIA REPAIR      HIP REPLACEMENT ARTHROPLASTY Left 3/24/2021    Procedure: ARTHROPLASTY, HIP REPLACEMENT;  Surgeon: Miles Rivas II, MD;   Location: [facility] OR;  Service: Orthopedics;  Laterality: Left;    JOINT REPLACEMENT Right     shoulder    REVISION TOTAL HIP ARTHROPLASTY Left 6/2/2021    Procedure: REVISION, TOTAL ARTHROPLASTY, HIP;  Surgeon: Miles Rivas II, MD;  Location: [facility] OR;  Service: Orthopedics;  Laterality: Left;     Family History   Problem Relation Age of Onset    Cancer Mother         leukemia     Social History     Tobacco Use    Smoking status: Every Day     Packs/day: 0.50     Years: 30.00     Pack years: 15.00     Types: Cigarettes    Smokeless tobacco: Never   Substance Use Topics    Alcohol use: Never    Drug use: Never       Review of Systems:  GENERAL:  No weight loss, fever or chills.  HEENT:  No changes in vision, rhinorrhea, nasal congestion.  PULMONARY:  No shortness of breath, coughing or wheezing.  CARDIOVASCULAR:  No chest pain, palpitations or syncope.  GI:  No abdominal pain, nausea, vomiting, diarrhea or constipation.  :  No dysuria, urgency or frequency.  MUSCULOSKELETAL:  No joint pain or arthralgia.  SKIN:  No rashes or skin breakdown.  ENDOCRINE:  No polyuria or polydipsia. No thyroid disease.  NEURO/PSYCH:  No headaches or trouble with cognition. Stable mood.        OBJECTIVE:     Vital Signs (Most Recent)  Temp: 98.1 °F (36.7 °C) (05/06/23 1111)  Pulse: 90 (05/06/23 1111)  Resp: 18 (05/06/23 1253)  BP: (!) 169/74 (05/06/23 1111)  SpO2: 97 % (05/06/23 1111)    Temperature Range Min/Max (Last 24H):  Temp:  [98 °F (36.7 °C)-98.7 °F (37.1 °C)]     Physical Exam:  GENERAL:  No apparent distress. Alert and oriented times three  HEENT:  PERRLA, EOMI. Conjunctivae intact. Posterior pharynx clear  NECK:  Supple with no lymphadenopathy or thyromegaly  LUNGS:  No respiratory distress. Clear to auscultation bilaterally with good air movement  CARDIAC:  RRR without murmur, rub or gallop  ABDOMEN:  Positive bowel sounds. Nontender and nondistended. No organomegaly  EXTREMITIES:  Peripheral pulses are 2+. Hands and feet  are warm. Good capillary refill in fingers. No clubbing, cyanosis or edema  SKIN:  Skin color, texture and turgor normal. No rashes, ulcerations or nodules  NEUROLOGIC:  CN II-XII intact. Light touch sensation intact. Muscle strength 5/5 in all extremities     Laboratory:  I have reviewed all pertinent lab results within the past 24 hours.  CBC:   Recent Labs   Lab 05/06/23  1028   WBC 2.87*   RBC 3.32*   HGB 6.5*   HCT 24.1*      MCV 73*   MCH 19.6*   MCHC 27.0*     CMP:   Recent Labs   Lab 05/05/23  1609 05/06/23  1028   * 85   CALCIUM 8.3* 8.1*   ALBUMIN 3.1*  --    PROT 6.9  --     139   K 3.9 4.0   CO2 27 26    107   BUN 11 10   CREATININE 0.8 0.9   ALKPHOS 121  --    ALT 12  --    AST 17  --    BILITOT 0.5  --      Coagulation:   Recent Labs   Lab 05/05/23  1609   LABPROT 11.9   INR 1.1   APTT 25.9       Diagnostic Results:  No Further    Imaging Reports:  Imaging Results    None           ASSESSMENT/PLAN:     History of Present Illness:  Patient is a 62 y.o. male presents with recent severe fatigue and JOHNSON.  He is poised for back surgery and pre op labs revealed a microcytic anemia with a Hgb of 5.2.  Pt denies any GI sx's - has had no pain, dyspepsia, GERD sx's, dark stool or hematochezia. He takes a large amount of Goody's powder and motrin for has chronic back pain.  Last colonoscopy was done 2/22 with Dr Gooden - ten small polyps were removed, diverticulosis and small hemorrhoids noted.   Need to R/O UGI source for blood loss    Plan: EGD/ push enteroscopy tomorrow - continue PPI.

## 2023-05-06 NOTE — PLAN OF CARE
Problem: Adult Inpatient Plan of Care  Goal: Optimal Comfort and Wellbeing  Outcome: Ongoing, Progressing     Problem: Pain Acute  Goal: Acceptable Pain Control and Functional Ability  Outcome: Ongoing, Progressing  Intervention: Prevent or Manage Pain  Flowsheets (Taken 5/5/2023 2251)  Bowel Elimination Promotion:   adequate fluid intake promoted   ambulation promoted     Problem: Fall Injury Risk  Goal: Absence of Fall and Fall-Related Injury  Outcome: Ongoing, Progressing     Problem: Adult Inpatient Plan of Care  Goal: Plan of Care Review  Flowsheets (Taken 5/5/2023 4194)  Plan of Care Reviewed With: patient

## 2023-05-06 NOTE — NURSING
Nurses Note -- 4 Eyes      5/5/2023   8:03 PM      Skin assessed during: Admit      [x] No Altered Skin Integrity Present    []Prevention Measures Documented      [] Yes- Altered Skin Integrity Present or Discovered   [] LDA Added if Not in Epic (Describe Wound)   [] New Altered Skin Integrity was Present on Admit and Documented in LDA   [] Wound Image Taken    Wound Care Consulted? No    Attending Nurse:  Asuncion Aj RN     Second RN/Staff Member:Amaris Bustamante RN [id number]      Patient arrived from ED via stretcher.  Ambulated from stretcher to bed without difficulty and with use of cane.  RR even and unlabored BBS CTA.  Abdomen SNT with normactive bowel sounds.  BLE with non-pitting edema.  No skin breakdown.  Oriented patient room, bed controls, TV controls and call bell.  Discussed plan of care, the need for blood transfusion and ordered medications.  Allowed time for questions and ansers.

## 2023-05-06 NOTE — NURSING
Large duffle bag dropped off by patient friend.  Brought to bedside with witness VIVI Marte.  Patient concerned about a locked box that has home medication.  Educated and explained to patient that the medications needed to be brought to pharmacy.  Patient became anxious and stated that he already talked to pharmacy and was told that he could keep his locked box.  Patient opened box and presented two liquid filled, unopened bottles of methadone.  Explained to patient that the medication needs to be brought to pharmacy for a label to be placed.  Patient allowed me to bring the locked box to pharmacy.  Pharmacy would not accept and stated that we have methadone here.  Patient did not want to have the lock box out of his site.  Box was left with patient and patient placed box back into his duffle bag.

## 2023-05-06 NOTE — H&P
[facility] - Emergency Dept    History & Physical      Patient Name: Omar Trotter  MRN: [id number]  Admission Date: 5/5/2023  Attending Physician: Shantanu Ruiz MD   Primary Care Provider: Lexus Higginbotham NP         Patient information was obtained from patient and ER records.     Subjective:     Principal Problem:<principal problem not specified>    Chief Complaint:   Chief Complaint   Patient presents with    Abnormal Labs     Dr. London advised to come for low blood counts        HPI:  62-year-old male  hx of OPD HTN, GERD, Hep C (unknown if treated), obesity, adhesive capsulitis right shoulder, hypertension, BPH, overactive bladder,back issues presents for eval after pre op labs drawn for back surgery at end of the month with anemia (5.4).    No significant symptoms of anemia. No blood in stool, dark tarry stools, no blood in urine. Does report bleeding more over the past week when he accidentally cut himself, also reports bruising to arms.  He does have back pain for which she will be getting surgery 5/26. He does take BC powders for pain.  No known bleeding disorders. No falls.     In the ER h/h 5.4. 2 units blood ordered and admitted to internal medicine service.    Review systems otherwise negative    Past Medical History:   Diagnosis Date    JACKLYN (acute kidney injury) 11/11/2022    Arthritis     GERD (gastroesophageal reflux disease)     Hepatitis C     STATES DX 10-20. NO S/S. NO FURTHER TESTS OR TX. WITH INSURANCE IN 2021 CAN GET IT EVALUATED.    History of left hip replacement 05/20/2021    History of MRSA infection     History of right shoulder replacement 01/15/2021    Hypertension     Wears glasses        Past Surgical History:   Procedure Laterality Date    APPENDECTOMY      ARTHROPLASTY OF SHOULDER Right 1/13/2021    Procedure: ARTHROPLASTY, SHOULDER TOTAL;  Surgeon: Emilio Siddiqui MD;  Location: [facility];  Service: Orthopedics;  Laterality: Right;  GENERAL AND BLOCK     HERNIA REPAIR      HIP REPLACEMENT ARTHROPLASTY Left 3/24/2021    Procedure: ARTHROPLASTY, HIP REPLACEMENT;  Surgeon: Miles Rivas II, MD;  Location: [facility] OR;  Service: Orthopedics;  Laterality: Left;    JOINT REPLACEMENT Right     shoulder    REVISION TOTAL HIP ARTHROPLASTY Left 6/2/2021    Procedure: REVISION, TOTAL ARTHROPLASTY, HIP;  Surgeon: Miles Rivas II, MD;  Location: [facility] OR;  Service: Orthopedics;  Laterality: Left;       Review of patient's allergies indicates:   Allergen Reactions    Pcn [penicillins]      As a child.       Current Facility-Administered Medications on File Prior to Encounter   Medication    electrolyte-S (ISOLYTE)    HYDROmorphone (PF) injection 0.2 mg    LIDOcaine (PF) 10 mg/ml (1%) injection 10 mg    oxyCODONE immediate release tablet 5 mg     Current Outpatient Medications on File Prior to Encounter   Medication Sig    lisinopriL (PRINIVIL,ZESTRIL) 40 MG tablet Take 1 tablet (40 mg total) by mouth once daily.    methadone (METHADOSE) 40 mg disintegrating tablet Take 40 mg by mouth As instructed for Pain. Patient goes to clinic three days a week    methocarbamoL (ROBAXIN) 500 MG Tab TAKE 1 TABLET BY MOUTH NIGHTLY AS NEEDED (Patient taking differently: Take 500 mg by mouth nightly as needed. TAKE 1 TABLET BY MOUTH NIGHTLY AS NEEDED)    omeprazole (PRILOSEC) 20 MG capsule Take 1 capsule (20 mg total) by mouth once daily.    oxybutynin (DITROPAN-XL) 5 MG TR24 Take 1 tablet (5 mg total) by mouth once daily.    tamsulosin (FLOMAX) 0.4 mg Cap Take 2 capsules (0.8 mg total) by mouth once daily.    amLODIPine (NORVASC) 5 MG tablet Take 1 tablet (5 mg total) by mouth once daily.    gabapentin (NEURONTIN) 300 MG capsule TAKE 1 CAPSULE BY MOUTH THREE TIMES DAILY (Patient taking differently: Take 300 mg by mouth 3 (three) times daily.)    gabapentin (NEURONTIN) 600 MG tablet Take 600 mg by mouth 4 (four) times daily.    metoprolol succinate (TOPROL-XL) 50 MG 24 hr tablet Take 1 tablet  (50 mg total) by mouth once daily. (Patient not taking: Reported on 5/5/2023)    oxyCODONE-acetaminophen (PERCOCET) 5-325 mg per tablet Take 1 tablet by mouth every 6 (six) hours as needed.    [DISCONTINUED] DULoxetine (CYMBALTA) 60 MG capsule Take 120 mg by mouth once daily.    [DISCONTINUED] mirtazapine (REMERON) 30 MG tablet Take 30 mg by mouth every evening.    [DISCONTINUED] solifenacin (VESICARE) 10 MG tablet Take 10 mg by mouth once daily.    [DISCONTINUED] solifenacin (VESICARE) 5 MG tablet Take 5 mg by mouth once daily.    [DISCONTINUED] topiramate (TOPAMAX) 50 MG tablet Take 50 mg by mouth once daily.    [DISCONTINUED] traZODone (DESYREL) 150 MG tablet Take 150 mg by mouth nightly.     Family History       Problem Relation (Age of Onset)    Cancer Mother          Tobacco Use    Smoking status: Every Day     Packs/day: 0.50     Years: 30.00     Pack years: 15.00     Types: Cigarettes    Smokeless tobacco: Never   Substance and Sexual Activity    Alcohol use: Never    Drug use: Never    Sexual activity: Yes     Partners: Female     Review of Systems  Objective:     Vital Signs (Most Recent):  Temp: 98.1 °F (36.7 °C) (05/05/23 1417)  Pulse: 95 (05/05/23 1417)  Resp: 16 (05/05/23 1417)  BP: (!) 148/67 (05/05/23 1417)  SpO2: 98 % (05/05/23 1417) Vital Signs (24h Range):  Temp:  [98.1 °F (36.7 °C)] 98.1 °F (36.7 °C)  Pulse:  [95] 95  Resp:  [16] 16  SpO2:  [98 %] 98 %  BP: (148)/(67) 148/67     Weight: 90.7 kg (200 lb)  Body mass index is 37.79 kg/m².    Physical Exam  Constitutional:       Appearance: Normal appearance. He is normal weight.   HENT:      Head: Normocephalic.      Right Ear: Tympanic membrane normal.      Left Ear: Tympanic membrane normal.      Nose: Nose normal.      Mouth/Throat:      Mouth: Mucous membranes are dry.   Eyes:      Pupils: Pupils are equal, round, and reactive to light.   Cardiovascular:      Rate and Rhythm: Normal rate and regular rhythm.      Pulses: Normal pulses.      Heart  sounds: Normal heart sounds.   Pulmonary:      Effort: Pulmonary effort is normal.   Abdominal:      Palpations: Abdomen is soft.   Skin:     General: Skin is warm.      Capillary Refill: Capillary refill takes less than 2 seconds.   Neurological:      General: No focal deficit present.      Mental Status: He is alert and oriented to person, place, and time.   Psychiatric:         Mood and Affect: Mood normal.         Behavior: Behavior normal.         CRANIAL NERVES     CN III, IV, VI   Pupils are equal, round, and reactive to light.    Significant Labs: All pertinent labs within the past 24 hours have been reviewed.    Significant Imaging: I have reviewed all pertinent imaging results/findings within the past 24 hours.    Assessment/Plan:     There are no hospital problems to display for this patient.    VTE Risk Mitigation (From admission, onward)           Ordered     IP VTE HIGH RISK PATIENT  Once         05/05/23 1840     Place sequential compression device  Until discontinued         05/05/23 1840                    #Anemia 5.4, detected on outpatient lab work, sent to the ER for this; 5.2 today  #Hx Cirrhosis of liver  #Hx chronic pain, BC powders often because of MSK pain  -2 units blood, send occult blood stool, NPO  -Colonoscopy February 2022 showed 10 sessile polyps found in the rectum, rectosigmoid colon and sigmoid colon, 3-4 mm in size, which were removed. Many small and large mouth diverticula within the sigmoid colon and descending colon. Nonbleeding internal hemorrhoids mild.   -Consulted GI, patient of Dr. Gooden  --Awaiting Dr. Christianson response  -NPO after midnight  -BID PPI      #DJD due for back surgery 5/26  -takes methadone occasionally    PMHx:COPD HTN, GERD, Hep C (unknown if treated), obesity, adhesive capsulitis right shoulder, hypertension, BPH, overactive bladder  -Restart PTA meds as ordered    SCDs       Shantanu Ruiz MD  Department of Hospital Medicine   [facility]  - Emergency Dept

## 2023-05-06 NOTE — NURSING
Charge nurse MARY KAY Glez called house supervisor regarding IV start for patient.  No ETA was given when original call was made to [facility].  Patient informed

## 2023-05-06 NOTE — PROGRESS NOTES
[facility] Medicine  Progress Note    Patient Name: Omar Trotter  MRN: [id number]  Patient Class: IP- Inpatient   Admission Date: 5/5/2023  Length of Stay: 1 days  Attending Physician: Shantanu Ruiz MD  Primary Care Provider: Lexus Higginbotham NP        Subjective:     Principal Problem:Acute blood loss anemia    Interval History: Patient is okay. He is exhausted and hungry. Exhausted as he has been dealing with fatigue, weakness, and his chronic severe back pain for years. He is concerned about whether his planned surgery this month will proceed. Denies dizziness /headache, denies noting any bleeding, no sob/cough, no nausea/vomiting/abdominal pain, no fevers/chills. He is just hungry.     Review of Systems  ROS reviewed and documented as above.  Objective:     Vital Signs (Most Recent):  Temp: 98.1 °F (36.7 °C) (05/06/23 1111)  Pulse: 90 (05/06/23 1111)  Resp: 18 (05/06/23 1253)  BP: (!) 169/74 (05/06/23 1111)  SpO2: 97 % (05/06/23 1111)   Vital Signs (24h Range):  Temp:  [98 °F (36.7 °C)-98.7 °F (37.1 °C)] 98.1 °F (36.7 °C)  Pulse:  [84-94] 90  Resp:  [18-28] 18  SpO2:  [97 %-100 %] 97 %  BP: (130-169)/(62-83) 169/74     Weight: 83.1 kg (183 lb 3.2 oz)  Body mass index is 25.55 kg/m².    Intake/Output Summary (Last 24 hours) at 5/6/2023 1534  Last data filed at 5/6/2023 1111  Gross per 24 hour   Intake 63.33 ml   Output 900 ml   Net -836.67 ml      Physical Exam  Constitutional:       Appearance: He is not toxic-appearing.      Comments: Fatigued appearing   HENT:      Head: Normocephalic and atraumatic.   Eyes:      Extraocular Movements: Extraocular movements intact.      Pupils: Pupils are equal, round, and reactive to light.   Cardiovascular:      Rate and Rhythm: Normal rate and regular rhythm.   Pulmonary:      Breath sounds: No wheezing or rhonchi.   Abdominal:      General: There is no distension.      Palpations: Abdomen is soft.      Tenderness: There is no  abdominal tenderness.   Musculoskeletal:      Cervical back: Neck supple. No tenderness.      Right lower leg: No edema.      Left lower leg: No edema.   Skin:     General: Skin is warm and dry.   Neurological:      Mental Status: He is alert.   Psychiatric:         Mood and Affect: Mood normal.         Thought Content: Thought content normal.         Judgment: Judgment normal.           Significant Labs: All pertinent labs within the past 24 hours have been reviewed.    Significant Imaging: I have reviewed all pertinent imaging results/findings within the past 24 hours.    Assessment/Plan:      Active Diagnoses:    Diagnosis Date Noted POA    PRINCIPAL PROBLEM:  Acute blood loss anemia [D62] 05/05/2023 Unknown      Problems Resolved During this Admission:     Severe anemia likely acute blood loss   Hx Cirrhosis of liver  Hx chronic pain utilizing BC powders   DJD due for back surgery 5/26    -S/p 2 units blood   -Hgb did not respond appropriately  -Only went up to 6.5  -Transfuse another 2 units due to concern for GI bleed   -RN contacted GI - to keep NPO for likely EGD today   -BID PPI  -Continue home methadone (converted to tabs) for pain control, avoid aspirin, nsaids  -Trend labs      Other Chronic conditions  PMHx:COPD HTN, GERD, Hep C (unknown if treated), obesity, adhesive capsulitis right shoulder, hypertension, BPH, overactive bladder  -Restart PTA meds as ordered     SCDs  VTE Risk Mitigation (From admission, onward)           Ordered     IP VTE HIGH RISK PATIENT  Once         05/05/23 1840     Place sequential compression device  Until discontinued         05/05/23 1840                       Mariposa Sierra MD  Department of Hospital Medicine   Count includes the [facility]

## 2023-05-06 NOTE — NURSING
Unit #1 of 1 PRBC's to right midline at 100 cc/hr.  Charge Nurse Amaris RN two nurse verifier for blood.  Consent was verified, re-educated patient on s/s of transfusion reaction.  Patient without complaints at this time.

## 2023-05-07 ENCOUNTER — ANESTHESIA EVENT (OUTPATIENT)
Dept: SURGERY | Facility: HOSPITAL | Age: 63
DRG: 378 | End: 2023-05-07
Payer: MEDICARE

## 2023-05-07 ENCOUNTER — ANESTHESIA (OUTPATIENT)
Dept: SURGERY | Facility: HOSPITAL | Age: 63
DRG: 378 | End: 2023-05-07
Payer: MEDICARE

## 2023-05-07 LAB
ANION GAP SERPL CALC-SCNC: 8 MMOL/L (ref 8–16)
BASOPHILS # BLD AUTO: 0.04 K/UL (ref 0–0.2)
BASOPHILS NFR BLD: 0.5 % (ref 0–1.9)
BUN SERPL-MCNC: 13 MG/DL (ref 8–23)
CALCIUM SERPL-MCNC: 8.5 MG/DL (ref 8.7–10.5)
CHLORIDE SERPL-SCNC: 107 MMOL/L (ref 95–110)
CO2 SERPL-SCNC: 24 MMOL/L (ref 23–29)
CREAT SERPL-MCNC: 0.8 MG/DL (ref 0.5–1.4)
DIFFERENTIAL METHOD: ABNORMAL
EOSINOPHIL # BLD AUTO: 0.1 K/UL (ref 0–0.5)
EOSINOPHIL NFR BLD: 0.7 % (ref 0–8)
ERYTHROCYTE [DISTWIDTH] IN BLOOD BY AUTOMATED COUNT: 21.6 % (ref 11.5–14.5)
EST. GFR  (NO RACE VARIABLE): >60 ML/MIN/1.73 M^2
GLUCOSE SERPL-MCNC: 80 MG/DL (ref 70–110)
HCT VFR BLD AUTO: 32.2 % (ref 40–54)
HGB BLD-MCNC: 7.5 G/DL (ref 14–18)
HGB BLD-MCNC: 8.6 G/DL (ref 14–18)
HGB BLD-MCNC: 9.3 G/DL (ref 14–18)
IMM GRANULOCYTES # BLD AUTO: 0.02 K/UL (ref 0–0.04)
IMM GRANULOCYTES NFR BLD AUTO: 0.2 % (ref 0–0.5)
LYMPHOCYTES # BLD AUTO: 0.7 K/UL (ref 1–4.8)
LYMPHOCYTES NFR BLD: 8 % (ref 18–48)
MAGNESIUM SERPL-MCNC: 2.1 MG/DL (ref 1.6–2.6)
MCH RBC QN AUTO: 21.5 PG (ref 27–31)
MCHC RBC AUTO-ENTMCNC: 28.9 G/DL (ref 32–36)
MCV RBC AUTO: 75 FL (ref 82–98)
MONOCYTES # BLD AUTO: 0.4 K/UL (ref 0.3–1)
MONOCYTES NFR BLD: 5.4 % (ref 4–15)
NEUTROPHILS # BLD AUTO: 7 K/UL (ref 1.8–7.7)
NEUTROPHILS NFR BLD: 85.2 % (ref 38–73)
NRBC BLD-RTO: 1 /100 WBC
PLATELET # BLD AUTO: 158 K/UL (ref 150–450)
PMV BLD AUTO: 9.9 FL (ref 9.2–12.9)
POTASSIUM SERPL-SCNC: 4.3 MMOL/L (ref 3.5–5.1)
RBC # BLD AUTO: 4.32 M/UL (ref 4.6–6.2)
SODIUM SERPL-SCNC: 139 MMOL/L (ref 136–145)
WBC # BLD AUTO: 8.2 K/UL (ref 3.9–12.7)

## 2023-05-07 PROCEDURE — 63600175 PHARM REV CODE 636 W HCPCS: Performed by: INTERNAL MEDICINE

## 2023-05-07 PROCEDURE — C9113 INJ PANTOPRAZOLE SODIUM, VIA: HCPCS | Performed by: INTERNAL MEDICINE

## 2023-05-07 PROCEDURE — 83735 ASSAY OF MAGNESIUM: CPT | Performed by: STUDENT IN AN ORGANIZED HEALTH CARE EDUCATION/TRAINING PROGRAM

## 2023-05-07 PROCEDURE — 85018 HEMOGLOBIN: CPT | Mod: 91 | Performed by: INTERNAL MEDICINE

## 2023-05-07 PROCEDURE — 37000008 HC ANESTHESIA 1ST 15 MINUTES: Performed by: INTERNAL MEDICINE

## 2023-05-07 PROCEDURE — 37000009 HC ANESTHESIA EA ADD 15 MINS: Performed by: INTERNAL MEDICINE

## 2023-05-07 PROCEDURE — 25000003 PHARM REV CODE 250: Performed by: INTERNAL MEDICINE

## 2023-05-07 PROCEDURE — 85025 COMPLETE CBC W/AUTO DIFF WBC: CPT | Performed by: INTERNAL MEDICINE

## 2023-05-07 PROCEDURE — 43235 EGD DIAGNOSTIC BRUSH WASH: CPT | Performed by: INTERNAL MEDICINE

## 2023-05-07 PROCEDURE — 80048 BASIC METABOLIC PNL TOTAL CA: CPT | Performed by: INTERNAL MEDICINE

## 2023-05-07 PROCEDURE — 63600175 PHARM REV CODE 636 W HCPCS: Performed by: NURSE ANESTHETIST, CERTIFIED REGISTERED

## 2023-05-07 PROCEDURE — 36415 COLL VENOUS BLD VENIPUNCTURE: CPT | Performed by: INTERNAL MEDICINE

## 2023-05-07 PROCEDURE — 12000002 HC ACUTE/MED SURGE SEMI-PRIVATE ROOM

## 2023-05-07 RX ORDER — ACETAMINOPHEN 325 MG/1
650 TABLET ORAL EVERY 4 HOURS PRN
Status: DISCONTINUED | OUTPATIENT
Start: 2023-05-07 | End: 2023-05-09 | Stop reason: HOSPADM

## 2023-05-07 RX ORDER — SODIUM CHLORIDE, SODIUM LACTATE, POTASSIUM CHLORIDE, CALCIUM CHLORIDE 600; 310; 30; 20 MG/100ML; MG/100ML; MG/100ML; MG/100ML
INJECTION, SOLUTION INTRAVENOUS CONTINUOUS PRN
Status: DISCONTINUED | OUTPATIENT
Start: 2023-05-07 | End: 2023-05-07

## 2023-05-07 RX ORDER — POLYETHYLENE GLYCOL 3350, SODIUM SULFATE ANHYDROUS, SODIUM BICARBONATE, SODIUM CHLORIDE, POTASSIUM CHLORIDE 236; 22.74; 6.74; 5.86; 2.97 G/4L; G/4L; G/4L; G/4L; G/4L
4000 POWDER, FOR SOLUTION ORAL ONCE
Status: COMPLETED | OUTPATIENT
Start: 2023-05-08 | End: 2023-05-08

## 2023-05-07 RX ORDER — AMLODIPINE BESYLATE 5 MG/1
5 TABLET ORAL DAILY
Status: DISCONTINUED | OUTPATIENT
Start: 2023-05-07 | End: 2023-05-09 | Stop reason: HOSPADM

## 2023-05-07 RX ORDER — HYDRALAZINE HYDROCHLORIDE 20 MG/ML
10 INJECTION INTRAMUSCULAR; INTRAVENOUS EVERY 8 HOURS PRN
Status: DISCONTINUED | OUTPATIENT
Start: 2023-05-07 | End: 2023-05-09 | Stop reason: HOSPADM

## 2023-05-07 RX ORDER — PHENYLEPHRINE HYDROCHLORIDE 10 MG/ML
INJECTION INTRAVENOUS
Status: DISCONTINUED | OUTPATIENT
Start: 2023-05-07 | End: 2023-05-07

## 2023-05-07 RX ORDER — PROPOFOL 10 MG/ML
VIAL (ML) INTRAVENOUS
Status: DISCONTINUED | OUTPATIENT
Start: 2023-05-07 | End: 2023-05-07

## 2023-05-07 RX ADMIN — SODIUM CHLORIDE, SODIUM LACTATE, POTASSIUM CHLORIDE, AND CALCIUM CHLORIDE: .6; .31; .03; .02 INJECTION, SOLUTION INTRAVENOUS at 10:05

## 2023-05-07 RX ADMIN — LISINOPRIL 40 MG: 20 TABLET ORAL at 08:05

## 2023-05-07 RX ADMIN — GABAPENTIN 600 MG: 300 CAPSULE ORAL at 02:05

## 2023-05-07 RX ADMIN — PROPOFOL 50 MG: 10 INJECTION, EMULSION INTRAVENOUS at 10:05

## 2023-05-07 RX ADMIN — OXYBUTYNIN CHLORIDE 5 MG: 5 TABLET, EXTENDED RELEASE ORAL at 08:05

## 2023-05-07 RX ADMIN — TAMSULOSIN HYDROCHLORIDE 0.8 MG: 0.4 CAPSULE ORAL at 08:05

## 2023-05-07 RX ADMIN — GABAPENTIN 600 MG: 300 CAPSULE ORAL at 08:05

## 2023-05-07 RX ADMIN — PHENYLEPHRINE HYDROCHLORIDE 100 MCG: 10 INJECTION INTRAVENOUS at 10:05

## 2023-05-07 RX ADMIN — PROPOFOL 100 MG: 10 INJECTION, EMULSION INTRAVENOUS at 10:05

## 2023-05-07 RX ADMIN — HYDRALAZINE HYDROCHLORIDE 10 MG: 20 INJECTION INTRAMUSCULAR; INTRAVENOUS at 01:05

## 2023-05-07 RX ADMIN — ACETAMINOPHEN 650 MG: 325 TABLET ORAL at 08:05

## 2023-05-07 RX ADMIN — PANTOPRAZOLE SODIUM 40 MG: 40 INJECTION, POWDER, LYOPHILIZED, FOR SOLUTION INTRAVENOUS at 08:05

## 2023-05-07 RX ADMIN — GABAPENTIN 600 MG: 300 CAPSULE ORAL at 05:05

## 2023-05-07 RX ADMIN — AMLODIPINE BESYLATE 5 MG: 5 TABLET ORAL at 01:05

## 2023-05-07 NOTE — [employer]ATION PATIENT INSTRUCTIONS
Discharge Summary/Instructions after an Endoscopic Procedure  Patient Name: Omar Trotter  Patient MRN: [id number]  Patient YOB: 1960  [place]ver, May 7, 2023  Carloz Christianson MD  RESTRICTIONS:  During your procedure today, you received medications for sedation.  These   medications may affect your judgment, balance and coordination.  Therefore,   for 24 hours, you have the following restrictions:   - DO NOT drive a car, operate machinery, make legal/financial decisions,   sign important papers or drink alcohol.    ACTIVITY:  Today: no heavy lifting, straining or running due to procedural   sedation/anesthesia.  The following day: return to full activity including work.  DIET:  Eat and drink normally unless instructed otherwise.     TREATMENT FOR COMMON SIDE EFFECTS:  - Mild abdominal pain, nausea, belching, bloating or excessive gas:  rest,   eat lightly and use a heating pad.  - Sore Throat: treat with throat lozenges and/or gargle with warm salt   water.  - Because air was used during the procedure, expelling large amounts of air   from your rectum or belching is normal.  - If a bowel prep was taken, you may not have a bowel movement for 1-3 days.    This is normal.  SYMPTOMS TO WATCH FOR AND REPORT TO YOUR PHYSICIAN:  1. Abdominal pain or bloating, other than gas cramps.  2. Chest pain.  3. Back pain.  4. Signs of infection such as: chills or fever occurring within 24 hours   after the procedure.  5. Rectal bleeding, which would show as bright red, maroon, or black stools.   (A tablespoon of blood from the rectum is not serious, especially if   hemorrhoids are present.)  6. Vomiting.  7. Weakness or dizziness.  GO DIRECTLY TO THE NEAREST EMERGENCY ROOM IF YOU HAVE ANY OF THE FOLLOWING:      Difficulty breathing              Chills and/or fever over 101 F   Persistent vomiting and/or vomiting blood   Severe abdominal pain   Severe chest pain   Black, tarry stools   Bleeding- more than one  tablespoon   Any other symptom or condition that you feel may need urgent attention  Your doctor recommends these additional instructions:  If any biopsies were taken, your doctors clinic will contact you in 1 to 2   weeks with any results.  - Return patient to hospital [facility]aba for ongoing care.   - schedule EGD /push + colonoscopy tomorrow  For questions, problems or results please call your physician - Carloz Christianson MD at Work:  [phone].  [facility], EMERGENCY ROOM PHONE NUMBER: [phone]  IF A COMPLICATION OR EMERGENCY SITUATION ARISES AND YOU ARE UNABLE TO REACH   YOUR PHYSICIAN - GO DIRECTLY TO THE EMERGENCY ROOM.  Carloz Christianson MD  5/7/2023 10:37:55 AM  This report has been verified and signed electronically.  Dear patient,  As a result of recent federal legislation (The Federal Cures Act), you may   receive lab or pathology results from your procedure in your [facility]   account before your physician is able to contact you. Your physician or   their representative will relay the results to you with their   recommendations at their soonest availability.  Thank you,  PROVATION

## 2023-05-07 NOTE — ANESTHESIA POSTPROCEDURE EVALUATION
Anesthesia Post Evaluation    Patient: Omar Trotter    Procedure(s) Performed: Procedure(s) (LRB):  EGD (ESOPHAGOGASTRODUODENOSCOPY) (N/A)    Final Anesthesia Type: general      Patient location during evaluation: PACU  Patient participation: Yes- Able to Participate  Level of consciousness: awake and alert  Post-procedure vital signs: reviewed and stable  Pain management: adequate  Airway patency: patent    PONV status at discharge: No PONV  Anesthetic complications: no      Cardiovascular status: hemodynamically stable  Respiratory status: unassisted, spontaneous ventilation and room air  Hydration status: euvolemic  Follow-up not needed.            Event Time   Out of Recovery 05/07/2023 11:06:00         Pain/Hawa Score: Pain Rating Prior to Med Admin: 4 (5/7/2023  8:10 AM)  Pain Rating Post Med Admin: 1 (5/7/2023  9:10 AM)

## 2023-05-07 NOTE — TRANSFER OF CARE
"Anesthesia Transfer of Care Note    Patient: Omar Trotter    Procedure(s) Performed: Procedure(s) (LRB):  EGD (ESOPHAGOGASTRODUODENOSCOPY) (N/A)    Patient location: PACU    Anesthesia Type: MAC    Transport from OR: Transported from OR on room air with adequate spontaneous ventilation    Post pain: adequate analgesia    Post assessment: no apparent anesthetic complications    Post vital signs: stable    Level of consciousness: responds to stimulation    Complications: none    Transfer of care protocol was followed      Last vitals:   Visit Vitals  /64   Pulse 108   Temp 37.8 °C (100.1 °F)   Resp 18   Ht 5' 11" (1.803 m)   Wt 83.1 kg (183 lb 3.2 oz)   SpO2 (!) 94%   BMI 25.55 kg/m²     "

## 2023-05-07 NOTE — PROGRESS NOTES
[facility] Medicine  Progress Note    Patient Name: Omar Trotter  MRN: [id number]  Patient Class: IP- Inpatient   Admission Date: 5/5/2023  Length of Stay: 2 days  Attending Physician: Shantanu Ruiz MD  Primary Care Provider: Lexus Higginbotham NP        Subjective:     Principal Problem:Acute blood loss anemia    Interval History: Patient is okay and in no pain. Seen after EGD. Understands findings and plans. Does not have his glasses and concerned they are in the PACU. He is exhausted and does not feel any better since receiving blood. Denies dizziness /headache, sob/cough, nausea/vomiting/abdominal pain, fevers/chills.     Review of Systems  ROS reviewed and documented as above.  Objective:     Vital Signs (Most Recent):  Temp: 100.1 °F (37.8 °C) (05/07/23 0810)  Pulse: 78 (05/07/23 1055)  Resp: 16 (05/07/23 1055)  BP: (!) 111/56 (05/07/23 1055)  SpO2: 96 % (05/07/23 1055)   Vital Signs (24h Range):  Temp:  [97.6 °F (36.4 °C)-100.1 °F (37.8 °C)] 100.1 °F (37.8 °C)  Pulse:  [] 78  Resp:  [16-20] 16  SpO2:  [94 %-100 %] 96 %  BP: ()/(56-98) 111/56     Weight: 83.1 kg (183 lb 3.2 oz)  Body mass index is 25.55 kg/m².    Intake/Output Summary (Last 24 hours) at 5/7/2023 1533  Last data filed at 5/7/2023 1529  Gross per 24 hour   Intake 2009.17 ml   Output 2035 ml   Net -25.83 ml        Physical Exam  Constitutional:       Appearance: He is not toxic-appearing.      Comments: Fatigued appearing   HENT:      Head: Normocephalic and atraumatic.   Eyes:      Extraocular Movements: Extraocular movements intact.      Pupils: Pupils are equal, round, and reactive to light.   Cardiovascular:      Rate and Rhythm: Normal rate and regular rhythm.   Pulmonary:      Breath sounds: No wheezing or rhonchi.   Abdominal:      General: There is no distension.      Palpations: Abdomen is soft.      Tenderness: There is no abdominal tenderness.   Musculoskeletal:      Cervical back: Neck  supple. No tenderness.      Right lower leg: No edema.      Left lower leg: No edema.   Skin:     General: Skin is warm and dry.   Neurological:      Mental Status: He is alert.   Psychiatric:         Mood and Affect: Mood normal.         Thought Content: Thought content normal.         Judgment: Judgment normal.           Significant Labs: All pertinent labs within the past 24 hours have been reviewed.    Significant Imaging: I have reviewed all pertinent imaging results/findings within the past 24 hours.    Assessment/Plan:      Active Diagnoses:    Diagnosis Date Noted POA    PRINCIPAL PROBLEM:  Acute blood loss anemia [D62] 05/05/2023 Yes      Problems Resolved During this Admission:     Severe anemia likely acute blood loss   Hx Cirrhosis of liver  Hx chronic pain utilizing BC powders   DJD due for back surgery 5/26    -S/p 4 units blood   -Hgb initially did not respond appropriately and is now >7  -EGD today showed food residue in stomach and duodenum  -Continue clear liquid diet  -Will need EGD/push and colonoscopy tomorrow  -BID PPI  -Monitor H/H  -Continue home methadone (converted to tabs) for pain control, avoid aspirin, nsaids  -Trend labs      Other Chronic conditions  PMHx:COPD HTN, GERD, Hep C (unknown if treated), obesity, adhesive capsulitis right shoulder, hypertension, BPH, overactive bladder  -Restart PTA meds as ordered     SCDs  VTE Risk Mitigation (From admission, onward)           Ordered     IP VTE HIGH RISK PATIENT  Once         05/05/23 1840     Place sequential compression device  Until discontinued         05/05/23 1840                       Mariposa Sierra MD  Department of Hospital Medicine   [facility]

## 2023-05-07 NOTE — ANESTHESIA PREPROCEDURE EVALUATION
05/07/2023  Omar Trotter is a 62 y.o., male.      Patient Active Problem List   Diagnosis    Osteoarthritis of right shoulder    Unilateral primary osteoarthritis, left hip    Preop testing    GERD (gastroesophageal reflux disease)    Hypertension    Hepatitis C    Adhesive capsulitis of right shoulder    Cirrhosis of liver    Left leg injury    Obesity (BMI 30-39.9)    History of left hip replacement    Dehiscence of incision, initial encounter    OAB (overactive bladder)    BPH associated with nocturia    Anemia    Acute blood loss anemia       Past Surgical History:   Procedure Laterality Date    APPENDECTOMY      ARTHROPLASTY OF SHOULDER Right 1/13/2021    Procedure: ARTHROPLASTY, SHOULDER TOTAL;  Surgeon: Emilio Siddiqui MD;  Location: [facility] OR;  Service: Orthopedics;  Laterality: Right;  GENERAL AND BLOCK    HERNIA REPAIR      HIP REPLACEMENT ARTHROPLASTY Left 3/24/2021    Procedure: ARTHROPLASTY, HIP REPLACEMENT;  Surgeon: Miles Rivas II, MD;  Location: [facility] OR;  Service: Orthopedics;  Laterality: Left;    JOINT REPLACEMENT Right     shoulder    REVISION TOTAL HIP ARTHROPLASTY Left 6/2/2021    Procedure: REVISION, TOTAL ARTHROPLASTY, HIP;  Surgeon: Miles Rivas II, MD;  Location: [facility] OR;  Service: Orthopedics;  Laterality: Left;        Tobacco Use:  The patient  reports that he has been smoking cigarettes. He has a 15.00 pack-year smoking history. He has never used smokeless tobacco.     Results for orders placed or performed during the hospital encounter of 11/10/22   EKG 12-lead    Collection Time: 11/10/22  4:18 PM    Narrative    Test Reason : I95.9,    Vent. Rate : 085 BPM     Atrial Rate : 085 BPM     P-R Int : 140 ms          QRS Dur : 090 ms      QT Int : 340 ms       P-R-T Axes : 066 058 064 degrees     QTc Int : 404 ms    Normal sinus rhythm  Normal  ECG  When compared with ECG of 02-JUN-2021 04:26,  No significant change was found  Confirmed by Milton Ramachandran MD ([id number]) on 11/17/2022 7:12:48 PM    Referred By: JOSE   SELF           Confirmed By:Milton Ramachandran MD        Imaging Results    None          Lab Results   Component Value Date    WBC 8.20 05/07/2023    HGB 9.3 (L) 05/07/2023    HCT 32.2 (L) 05/07/2023    MCV 75 (L) 05/07/2023     05/07/2023     BMP  Lab Results   Component Value Date     05/07/2023    K 4.3 05/07/2023     05/07/2023    CO2 24 05/07/2023    BUN 13 05/07/2023    CREATININE 0.8 05/07/2023    CALCIUM 8.5 (L) 05/07/2023    ANIONGAP 8 05/07/2023    GLU 80 05/07/2023    GLU 85 05/06/2023     (H) 05/05/2023       No results found for this or any previous visit.          Pre-op Assessment    I have reviewed the Patient Summary Reports.     I have reviewed the Nursing Notes. I have reviewed the NPO Status.   I have reviewed the Medications.     Review of Systems  Anesthesia Hx:  No problems with previous Anesthesia  Denies Family Hx of Anesthesia complications.   Denies Personal Hx of Anesthesia complications.   Social:  Smoker    Hematology/Oncology:     Oncology Normal    -- Anemia:   EENT/Dental:EENT/Dental Normal   Cardiovascular:   Hypertension ECG has been reviewed.    Pulmonary:  Pulmonary Normal    Renal/:   BPH    Hepatic/GI:   GERD Hepatitis, C    Musculoskeletal:   Arthritis     Neurological:  Neurology Normal    Endocrine:  Endocrine Normal    Psych:  Psychiatric Normal           Physical Exam  General: Well nourished, Cooperative, Alert and Oriented    Airway:  Mallampati: II   Mouth Opening: Normal  TM Distance: Normal  Tongue: Normal  Neck ROM: Normal ROM    Dental:  Intact    Chest/Lungs:  Clear to auscultation    Heart:  Rate: Normal  Rhythm: Regular Rhythm  Sounds: Normal        Anesthesia Plan  Type of Anesthesia, risks & benefits discussed:    Anesthesia Type: Gen Natural Airway  Intra-op  Monitoring Plan: Standard ASA Monitors  Informed Consent: Informed consent signed with the Patient and all parties understand the risks and agree with anesthesia plan.  All questions answered.   ASA Score: 3  Anesthesia Plan Notes: POM    Ready For Surgery From Anesthesia Perspective.     .

## 2023-05-08 ENCOUNTER — ANESTHESIA (OUTPATIENT)
Dept: SURGERY | Facility: HOSPITAL | Age: 63
DRG: 378 | End: 2023-05-08
Payer: MEDICARE

## 2023-05-08 ENCOUNTER — ANESTHESIA EVENT (OUTPATIENT)
Dept: SURGERY | Facility: HOSPITAL | Age: 63
DRG: 378 | End: 2023-05-08
Payer: MEDICARE

## 2023-05-08 LAB
FERRITIN SERPL-MCNC: 32 NG/ML (ref 20–300)
HGB BLD-MCNC: 7.9 G/DL (ref 14–18)
HGB BLD-MCNC: 8.4 G/DL (ref 14–18)
IRON SERPL-MCNC: 15 UG/DL (ref 45–160)
SATURATED IRON: 3 % (ref 20–50)
TOTAL IRON BINDING CAPACITY: 493 UG/DL (ref 250–450)
TRANSFERRIN SERPL-MCNC: 352 MG/DL (ref 200–375)
TRANSFERRIN SERPL-MCNC: 352 MG/DL (ref 200–375)

## 2023-05-08 PROCEDURE — 45380 COLONOSCOPY AND BIOPSY: CPT | Performed by: INTERNAL MEDICINE

## 2023-05-08 PROCEDURE — 37000008 HC ANESTHESIA 1ST 15 MINUTES: Performed by: INTERNAL MEDICINE

## 2023-05-08 PROCEDURE — 82728 ASSAY OF FERRITIN: CPT | Performed by: STUDENT IN AN ORGANIZED HEALTH CARE EDUCATION/TRAINING PROGRAM

## 2023-05-08 PROCEDURE — D9220A PRA ANESTHESIA: Mod: ANES,,, | Performed by: ANESTHESIOLOGY

## 2023-05-08 PROCEDURE — 88305 TISSUE EXAM BY PATHOLOGIST: CPT | Mod: TC

## 2023-05-08 PROCEDURE — 63600175 PHARM REV CODE 636 W HCPCS: Performed by: INTERNAL MEDICINE

## 2023-05-08 PROCEDURE — 25000003 PHARM REV CODE 250: Performed by: INTERNAL MEDICINE

## 2023-05-08 PROCEDURE — D9220A PRA ANESTHESIA: Mod: CRNA,,, | Performed by: NURSE ANESTHETIST, CERTIFIED REGISTERED

## 2023-05-08 PROCEDURE — 27200043 HC FORCEPS, BIOPSY: Performed by: INTERNAL MEDICINE

## 2023-05-08 PROCEDURE — C9113 INJ PANTOPRAZOLE SODIUM, VIA: HCPCS | Performed by: INTERNAL MEDICINE

## 2023-05-08 PROCEDURE — 45382 COLONOSCOPY W/CONTROL BLEED: CPT | Performed by: INTERNAL MEDICINE

## 2023-05-08 PROCEDURE — 37000009 HC ANESTHESIA EA ADD 15 MINS: Performed by: INTERNAL MEDICINE

## 2023-05-08 PROCEDURE — D9220A PRA ANESTHESIA: ICD-10-PCS | Mod: CRNA,,, | Performed by: NURSE ANESTHETIST, CERTIFIED REGISTERED

## 2023-05-08 PROCEDURE — 84466 ASSAY OF TRANSFERRIN: CPT | Performed by: STUDENT IN AN ORGANIZED HEALTH CARE EDUCATION/TRAINING PROGRAM

## 2023-05-08 PROCEDURE — 12000002 HC ACUTE/MED SURGE SEMI-PRIVATE ROOM

## 2023-05-08 PROCEDURE — 25000003 PHARM REV CODE 250: Performed by: STUDENT IN AN ORGANIZED HEALTH CARE EDUCATION/TRAINING PROGRAM

## 2023-05-08 PROCEDURE — 27202049 HC PROBE, APC ERBE: Performed by: INTERNAL MEDICINE

## 2023-05-08 PROCEDURE — 85018 HEMOGLOBIN: CPT | Mod: 91 | Performed by: INTERNAL MEDICINE

## 2023-05-08 PROCEDURE — 36415 COLL VENOUS BLD VENIPUNCTURE: CPT | Performed by: STUDENT IN AN ORGANIZED HEALTH CARE EDUCATION/TRAINING PROGRAM

## 2023-05-08 PROCEDURE — 44360 SMALL BOWEL ENDOSCOPY: CPT | Performed by: INTERNAL MEDICINE

## 2023-05-08 PROCEDURE — 63600175 PHARM REV CODE 636 W HCPCS: Performed by: NURSE ANESTHETIST, CERTIFIED REGISTERED

## 2023-05-08 PROCEDURE — 36415 COLL VENOUS BLD VENIPUNCTURE: CPT | Performed by: INTERNAL MEDICINE

## 2023-05-08 PROCEDURE — D9220A PRA ANESTHESIA: ICD-10-PCS | Mod: ANES,,, | Performed by: ANESTHESIOLOGY

## 2023-05-08 RX ORDER — SODIUM CHLORIDE, SODIUM LACTATE, POTASSIUM CHLORIDE, CALCIUM CHLORIDE 600; 310; 30; 20 MG/100ML; MG/100ML; MG/100ML; MG/100ML
INJECTION, SOLUTION INTRAVENOUS CONTINUOUS PRN
Status: DISCONTINUED | OUTPATIENT
Start: 2023-05-08 | End: 2023-05-08

## 2023-05-08 RX ORDER — LANOLIN ALCOHOL/MO/W.PET/CERES
1 CREAM (GRAM) TOPICAL EVERY OTHER DAY
Status: DISCONTINUED | OUTPATIENT
Start: 2023-05-08 | End: 2023-05-09 | Stop reason: HOSPADM

## 2023-05-08 RX ORDER — PROPOFOL 10 MG/ML
INJECTION, EMULSION INTRAVENOUS
Status: DISCONTINUED | OUTPATIENT
Start: 2023-05-08 | End: 2023-05-08

## 2023-05-08 RX ADMIN — PROPOFOL 50 MG: 10 INJECTION, EMULSION INTRAVENOUS at 07:05

## 2023-05-08 RX ADMIN — POLYETHYLENE GLYCOL 3350, SODIUM SULFATE ANHYDROUS, SODIUM BICARBONATE, SODIUM CHLORIDE, POTASSIUM CHLORIDE 4000 ML: 236; 22.74; 6.74; 5.86; 2.97 POWDER, FOR SOLUTION ORAL at 12:05

## 2023-05-08 RX ADMIN — FERROUS SULFATE TAB 325 MG (65 MG ELEMENTAL FE) 1 EACH: 325 (65 FE) TAB at 09:05

## 2023-05-08 RX ADMIN — LISINOPRIL 40 MG: 20 TABLET ORAL at 08:05

## 2023-05-08 RX ADMIN — OXYBUTYNIN CHLORIDE 5 MG: 5 TABLET, EXTENDED RELEASE ORAL at 08:05

## 2023-05-08 RX ADMIN — TAMSULOSIN HYDROCHLORIDE 0.8 MG: 0.4 CAPSULE ORAL at 08:05

## 2023-05-08 RX ADMIN — AMLODIPINE BESYLATE 5 MG: 5 TABLET ORAL at 08:05

## 2023-05-08 RX ADMIN — GABAPENTIN 600 MG: 300 CAPSULE ORAL at 04:05

## 2023-05-08 RX ADMIN — GABAPENTIN 600 MG: 300 CAPSULE ORAL at 08:05

## 2023-05-08 RX ADMIN — PANTOPRAZOLE SODIUM 40 MG: 40 INJECTION, POWDER, LYOPHILIZED, FOR SOLUTION INTRAVENOUS at 08:05

## 2023-05-08 RX ADMIN — SODIUM CHLORIDE, SODIUM LACTATE, POTASSIUM CHLORIDE, AND CALCIUM CHLORIDE: .6; .31; .03; .02 INJECTION, SOLUTION INTRAVENOUS at 07:05

## 2023-05-08 RX ADMIN — METHADONE HYDROCHLORIDE 40 MG: 10 TABLET ORAL at 08:05

## 2023-05-08 RX ADMIN — GABAPENTIN 600 MG: 300 CAPSULE ORAL at 12:05

## 2023-05-08 RX ADMIN — PANTOPRAZOLE SODIUM 40 MG: 40 INJECTION, POWDER, LYOPHILIZED, FOR SOLUTION INTRAVENOUS at 09:05

## 2023-05-08 RX ADMIN — GABAPENTIN 600 MG: 300 CAPSULE ORAL at 09:05

## 2023-05-08 NOTE — ANESTHESIA POSTPROCEDURE EVALUATION
Anesthesia Post Evaluation    Patient: Omar Trotter    Procedure(s) Performed: Procedure(s) (LRB):  ENTEROSCOPY (Left)  COLONOSCOPY (N/A)    Final Anesthesia Type: general      Patient location during evaluation: GI PACU  Patient participation: Yes- Able to Participate  Level of consciousness: awake and alert and oriented  Post-procedure vital signs: reviewed and stable  Pain management: adequate  Airway patency: patent    PONV status at discharge: No PONV  Anesthetic complications: no      Cardiovascular status: blood pressure returned to baseline, hemodynamically stable and stable  Respiratory status: unassisted, spontaneous ventilation and room air  Hydration status: euvolemic  Follow-up not needed.          Vitals Value Taken Time   /68 05/08/23 0801   Temp 36.7 05/08/23 0801   Pulse 90 05/08/23 0801   Resp 18 05/08/23 0801   SpO2 99 05/08/23 0801         No case tracking events are documented in the log.      Pain/Hawa Score: Pain Rating Prior to Med Admin: 4 (5/7/2023  8:10 AM)  Pain Rating Post Med Admin: 1 (5/7/2023  9:10 AM)

## 2023-05-08 NOTE — PROGRESS NOTES
[facility] Medicine  Progress Note    Patient Name: Omar Trotter  MRN: [id number]  Patient Class: IP- Inpatient   Admission Date: 5/5/2023  Length of Stay: 3 days  Attending Physician: Shantanu Ruiz MD  Primary Care Provider: Lexus Higginbotham NP        Subjective:     Principal Problem:Acute blood loss anemia    Interval History: Patient is okay and in no pain. Seen after procedures. Understands findings and plans. He still states he is exhausted and does not feel any better since receiving blood. Denies dizziness /headache, sob/cough, nausea/vomiting/abdominal pain, fevers/chills.     Review of Systems  ROS reviewed and documented as above.  Objective:     Vital Signs (Most Recent):  Temp: 97.9 °F (36.6 °C) (05/08/23 1723)  Pulse: 73 (05/08/23 1723)  Resp: 18 (05/08/23 1723)  BP: (!) 155/69 (05/08/23 1723)  SpO2: 96 % (05/08/23 1723)   Vital Signs (24h Range):  Temp:  [97.6 °F (36.4 °C)-98.7 °F (37.1 °C)] 97.9 °F (36.6 °C)  Pulse:  [] 73  Resp:  [13-20] 18  SpO2:  [95 %-100 %] 96 %  BP: (144-188)/(69-87) 155/69     Weight: 83.1 kg (183 lb 3.2 oz)  Body mass index is 25.55 kg/m².    Intake/Output Summary (Last 24 hours) at 5/8/2023 1812  Last data filed at 5/8/2023 1724  Gross per 24 hour   Intake 1500 ml   Output 1300 ml   Net 200 ml        Physical Exam  Constitutional:       Appearance: He is not toxic-appearing.      Comments: Fatigued appearing   HENT:      Head: Normocephalic and atraumatic.   Eyes:      General: No scleral icterus.     Extraocular Movements: Extraocular movements intact.   Cardiovascular:      Rate and Rhythm: Normal rate and regular rhythm.   Pulmonary:      Breath sounds: No wheezing or rhonchi.   Abdominal:      General: There is no distension.      Palpations: Abdomen is soft.      Tenderness: There is no abdominal tenderness.   Musculoskeletal:      Cervical back: Neck supple. No tenderness.      Right lower leg: No edema.      Left lower leg:  No edema.   Skin:     General: Skin is warm and dry.   Neurological:      Mental Status: He is alert.   Psychiatric:         Mood and Affect: Mood normal.         Thought Content: Thought content normal.         Judgment: Judgment normal.           Significant Labs: All pertinent labs within the past 24 hours have been reviewed.    Significant Imaging: I have reviewed all pertinent imaging results/findings within the past 24 hours.    Assessment/Plan:      Active Diagnoses:    Diagnosis Date Noted POA    PRINCIPAL PROBLEM:  Acute blood loss anemia [D62] 05/05/2023 Yes      Problems Resolved During this Admission:   Colon with Angiectasia with active oozing s/p ablation   Severe anemia likely acute blood loss due to above  Likely Portal hypertensive gastropathy  Hx Cirrhosis of liver  Hx chronic pain utilizing BC powders   DJD due for back surgery 5/26    -S/p 4 units blood during hospital course  -Hgb initially did not respond appropriately and is now staying >7  -EGD and colon with findings as above - portal hypertensive gastropathy and angiectasia with active oozing  -Advance diet   -ferrous sulfate every other day  -repeat cbc at 2 and 4 weeks  -Will continue BID PPI for now but can likely stop on d/c  -Monitor H/H - if continue anemia worsening, would need further work-up including capsule study   -Follow-up with patient's GI Dr. Gooden in 2 weeks  -Continue home methadone (converted to tabs) for pain control, avoid aspirin, nsaids  -Trend labs   -Encourage ambulation  -Tentative discharge tomorrow     Other Chronic conditions  PMHx:COPD HTN, GERD, Hep C (unknown if treated), obesity, adhesive capsulitis right shoulder, hypertension, BPH, overactive bladder  -Restarted PTA meds as ordered     SCDs  VTE Risk Mitigation (From admission, onward)           Ordered     IP VTE HIGH RISK PATIENT  Once         05/05/23 1840     Place sequential compression device  Until discontinued         05/05/23 1840                        Mariposa Sierra MD  Department of Hospital Medicine   [facility], 1928–1983

## 2023-05-08 NOTE — TRANSFER OF CARE
"Anesthesia Transfer of Care Note    Patient: Omar Trotter    Procedure(s) Performed: Procedure(s) (LRB):  ENTEROSCOPY (Left)  COLONOSCOPY (N/A)    Patient location: GI    Anesthesia Type: general    Transport from OR: Transported from OR on room air with adequate spontaneous ventilation    Post pain: adequate analgesia    Post assessment: no apparent anesthetic complications and tolerated procedure well    Post vital signs: stable    Level of consciousness: awake, alert and oriented    Nausea/Vomiting: no nausea/vomiting    Complications: none    Transfer of care protocol was followed      Last vitals:   Visit Vitals  BP (!) 167/85 (BP Location: Left arm, Patient Position: Lying)   Pulse 95   Temp 37.1 °C (98.7 °F) (Oral)   Resp 18   Ht 5' 11" (1.803 m)   Wt 83.1 kg (183 lb 3.2 oz)   SpO2 99%   BMI 25.55 kg/m²     "

## 2023-05-08 NOTE — ANESTHESIA PREPROCEDURE EVALUATION
05/08/2023  Omar Trotter is a 62 y.o., male.      Patient Active Problem List   Diagnosis    Osteoarthritis of right shoulder    Unilateral primary osteoarthritis, left hip    Preop testing    GERD (gastroesophageal reflux disease)    Hypertension    Hepatitis C    Adhesive capsulitis of right shoulder    Cirrhosis of liver    Left leg injury    Obesity (BMI 30-39.9)    History of left hip replacement    Dehiscence of incision, initial encounter    OAB (overactive bladder)    BPH associated with nocturia    Anemia    Acute blood loss anemia       Past Surgical History:   Procedure Laterality Date    APPENDECTOMY      ARTHROPLASTY OF SHOULDER Right 1/13/2021    Procedure: ARTHROPLASTY, SHOULDER TOTAL;  Surgeon: Emilio Siddiqui MD;  Location: [facility] OR;  Service: Orthopedics;  Laterality: Right;  GENERAL AND BLOCK    HERNIA REPAIR      HIP REPLACEMENT ARTHROPLASTY Left 3/24/2021    Procedure: ARTHROPLASTY, HIP REPLACEMENT;  Surgeon: Miles Rivas II, MD;  Location: [facility] OR;  Service: Orthopedics;  Laterality: Left;    JOINT REPLACEMENT Right     shoulder    REVISION TOTAL HIP ARTHROPLASTY Left 6/2/2021    Procedure: REVISION, TOTAL ARTHROPLASTY, HIP;  Surgeon: Miles Rivas II, MD;  Location: [facility] OR;  Service: Orthopedics;  Laterality: Left;        Tobacco Use:  The patient  reports that he has been smoking cigarettes. He has a 15.00 pack-year smoking history. He has never used smokeless tobacco.     Results for orders placed or performed during the hospital encounter of 11/10/22   EKG 12-lead    Collection Time: 11/10/22  4:18 PM    Narrative    Test Reason : I95.9,    Vent. Rate : 085 BPM     Atrial Rate : 085 BPM     P-R Int : 140 ms          QRS Dur : 090 ms      QT Int : 340 ms       P-R-T Axes : 066 058 064 degrees     QTc Int : 404 ms    Normal sinus rhythm  Normal  ECG  When compared with ECG of 02-JUN-2021 04:26,  No significant change was found  Confirmed by Milton Ramachandran MD ([id number]) on 11/17/2022 7:12:48 PM    Referred By: JOSE   SELF           Confirmed By:Milton Ramachandran MD             Lab Results   Component Value Date    WBC 8.20 05/07/2023    HGB 7.5 (L) 05/07/2023    HCT 32.2 (L) 05/07/2023    MCV 75 (L) 05/07/2023     05/07/2023     BMP  Lab Results   Component Value Date     05/07/2023    K 4.3 05/07/2023     05/07/2023    CO2 24 05/07/2023    BUN 13 05/07/2023    CREATININE 0.8 05/07/2023    CALCIUM 8.5 (L) 05/07/2023    ANIONGAP 8 05/07/2023    GLU 80 05/07/2023    GLU 85 05/06/2023     (H) 05/05/2023       No results found for this or any previous visit.          Pre-op Assessment    I have reviewed the Patient Summary Reports.     I have reviewed the Nursing Notes. I have reviewed the NPO Status.   I have reviewed the Medications.     Review of Systems  Anesthesia Hx:  No problems with previous Anesthesia  Denies Family Hx of Anesthesia complications.   Denies Personal Hx of Anesthesia complications.   Social:  Smoker    Hematology/Oncology:     Oncology Normal    -- Anemia:   EENT/Dental:EENT/Dental Normal   Cardiovascular:   Hypertension ECG has been reviewed.    Pulmonary:  Pulmonary Normal    Renal/:   BPH    Hepatic/GI:   GERD Hepatitis, C    Musculoskeletal:   Arthritis     Neurological:  Neurology Normal    Endocrine:  Endocrine Normal    Psych:  Psychiatric Normal           Physical Exam  General: Well nourished, Cooperative, Alert and Oriented    Airway:  Mallampati: II   Mouth Opening: Normal  TM Distance: Normal  Tongue: Normal  Neck ROM: Normal ROM    Dental:  Intact    Chest/Lungs:  Clear to auscultation    Heart:  Rate: Normal  Rhythm: Regular Rhythm  Sounds: Normal        Anesthesia Plan  Type of Anesthesia, risks & benefits discussed:    Anesthesia Type: Gen Natural Airway  Intra-op Monitoring Plan: Standard ASA  Monitors  Informed Consent: Informed consent signed with the Patient and all parties understand the risks and agree with anesthesia plan.  All questions answered.   ASA Score: 3  Anesthesia Plan Notes: POM    Ready For Surgery From Anesthesia Perspective.     .

## 2023-05-08 NOTE — [employer] PATIENT INSTRUCTIONS
Discharge Summary/Instructions after an Endoscopic Procedure  Patient Name: Omar Trotter  Patient MRN: [id number]  Patient YOB: 1960  Monday, May 8, 2023  Bobby Bourgeois MD  RESTRICTIONS:  During your procedure today, you received medications for sedation.  These   medications may affect your judgment, balance and coordination.  Therefore,   for 24 hours, you have the following restrictions:   - DO NOT drive a car, operate machinery, make legal/financial decisions,   sign important papers or drink alcohol.    ACTIVITY:  Today: no heavy lifting, straining or running due to procedural   sedation/anesthesia.  The following day: return to full activity including work.  DIET:  Eat and drink normally unless instructed otherwise.     TREATMENT FOR COMMON SIDE EFFECTS:  - Mild abdominal pain, nausea, belching, bloating or excessive gas:  rest,   eat lightly and use a heating pad.  - Sore Throat: treat with throat lozenges and/or gargle with warm salt   water.  - Because air was used during the procedure, expelling large amounts of air   from your rectum or belching is normal.  - If a bowel prep was taken, you may not have a bowel movement for 1-3 days.    This is normal.  SYMPTOMS TO WATCH FOR AND REPORT TO YOUR PHYSICIAN:  1. Abdominal pain or bloating, other than gas cramps.  2. Chest pain.  3. Back pain.  4. Signs of infection such as: chills or fever occurring within 24 hours   after the procedure.  5. Rectal bleeding, which would show as bright red, maroon, or black stools.   (A tablespoon of blood from the rectum is not serious, especially if   hemorrhoids are present.)  6. Vomiting.  7. Weakness or dizziness.  GO DIRECTLY TO THE NEAREST EMERGENCY ROOM IF YOU HAVE ANY OF THE FOLLOWING:      Difficulty breathing              Chills and/or fever over 101 F   Persistent vomiting and/or vomiting blood   Severe abdominal pain   Severe chest pain   Black, tarry stools   Bleeding- more than one  tablespoon   Any other symptom or condition that you feel may need urgent attention  Your doctor recommends these additional instructions:  If any biopsies were taken, your doctors clinic will contact you in 1 to 2   weeks with any results.  - Return patient to hospital saba for ongoing care.  For questions, problems or results please call your physician - Bobby Bourgeois MD at Work:  [phone].  [facility], EMERGENCY ROOM PHONE NUMBER: [phone]  IF A COMPLICATION OR EMERGENCY SITUATION ARISES AND YOU ARE UNABLE TO REACH   YOUR PHYSICIAN - GO DIRECTLY TO THE EMERGENCY ROOM.  MD Bobby Stout MD  5/8/2023 8:10:51 AM  This report has been verified and signed electronically.  Dear patient,  As a result of recent federal legislation (The Federal Cures Act), you may   receive lab or pathology results from your procedure in your [facility]   account before your physician is able to contact you. Your physician or   their representative will relay the results to you with their   recommendations at their soonest availability.  Thank you,  [employer]

## 2023-05-08 NOTE — BRIEF OP NOTE
Push  =findings suspicious for portal hypertensive gastropathy    Colon  =diminutive ICV angiectasia with active oozing ablated    A/P  Follow up with Dr Gooden  recommend ferrous sulfate 325 every other day and check cbc in 2 and 4 weeks.  If ongoing anemia would recommend patency capsule then real capsule.

## 2023-05-08 NOTE — [employer]OVATION PATIENT INSTRUCTIONS
Discharge Summary/Instructions after an Endoscopic Procedure  Patient Name: Omar Trotter  Patient MRN: [id number]  Patient YOB: 1960  Monday, May 8, 2023  Bobby Bourgeois MD  RESTRICTIONS:  During your procedure today, you received medications for sedation.  These   medications may affect your judgment, balance and coordination.  Therefore,   for 24 hours, you have the following restrictions:   - DO NOT drive a car, operate machinery, make legal/financial decisions,   sign important papers or drink alcohol.    ACTIVITY:  Today: no heavy lifting, straining or running due to procedural   sedation/anesthesia.  The following day: return to full activity including work.  DIET:  Eat and drink normally unless instructed otherwise.     TREATMENT FOR COMMON SIDE EFFECTS:  - Mild abdominal pain, nausea, belching, bloating or excessive gas:  rest,   eat lightly and use a heating pad.  - Sore Throat: treat with throat lozenges and/or gargle with warm salt   water.  - Because air was used during the procedure, expelling large amounts of air   from your rectum or belching is normal.  - If a bowel prep was taken, you may not have a bowel movement for 1-3 days.    This is normal.  SYMPTOMS TO WATCH FOR AND REPORT TO YOUR PHYSICIAN:  1. Abdominal pain or bloating, other than gas cramps.  2. Chest pain.  3. Back pain.  4. Signs of infection such as: chills or fever occurring within 24 hours   after the procedure.  5. Rectal bleeding, which would show as bright red, maroon, or black stools.   (A tablespoon of blood from the rectum is not serious, especially if   hemorrhoids are present.)  6. Vomiting.  7. Weakness or dizziness.  GO DIRECTLY TO THE NEAREST EMERGENCY ROOM IF YOU HAVE ANY OF THE FOLLOWING:      Difficulty breathing              Chills and/or fever over 101 F   Persistent vomiting and/or vomiting blood   Severe abdominal pain   Severe chest pain   Black, tarry stools   Bleeding- more than one  tablespoon   Any other symptom or condition that you feel may need urgent attention  Your doctor recommends these additional instructions:  If any biopsies were taken, your doctors clinic will contact you in 1 to 2   weeks with any results.  - Return patient to hospital [facility] for ongoing care.   - Resume regular diet.   - Follow up with Dr Gooden  - recommend ferrous sulfate 325 every other day and check cbc in 2 and 4   weeks.  If ongoing anemia would recommend patency capsule then real   capsule.  - Continue present medications.   - Repeat colonoscopy in 1 year for surveillance.   - Return to GI office in 2 weeks.  For questions, problems or results please call your physician - Bobby Bourgeois MD at Work:  [phone].  [facility], EMERGENCY ROOM PHONE NUMBER: [phone]  IF A COMPLICATION OR EMERGENCY SITUATION ARISES AND YOU ARE UNABLE TO REACH   YOUR PHYSICIAN - GO DIRECTLY TO THE EMERGENCY ROOM.  MD Bobby Stout MD  5/8/2023 8:15:28 AM  This report has been verified and signed electronically.  Dear patient,  As a result of recent federal legislation (The Federal Cures Act), you may   receive lab or pathology results from your procedure in your [facility]   account before your physician is able to contact you. Your physician or   their representative will relay the results to you with their   recommendations at their soonest availability.  Thank you,  [employer]

## 2023-05-09 VITALS
TEMPERATURE: 98 F | HEART RATE: 92 BPM | BODY MASS INDEX: 25.65 KG/M2 | DIASTOLIC BLOOD PRESSURE: 85 MMHG | WEIGHT: 183.19 LBS | RESPIRATION RATE: 17 BRPM | OXYGEN SATURATION: 98 % | SYSTOLIC BLOOD PRESSURE: 185 MMHG | HEIGHT: 71 IN

## 2023-05-09 LAB
ANION GAP SERPL CALC-SCNC: 4 MMOL/L (ref 8–16)
ANISOCYTOSIS BLD QL SMEAR: SLIGHT
BASOPHILS # BLD AUTO: 0.03 K/UL (ref 0–0.2)
BASOPHILS NFR BLD: 0.6 % (ref 0–1.9)
BLD PROD TYP BPU: NORMAL
BLD PROD TYP BPU: NORMAL
BLOOD UNIT EXPIRATION DATE: NORMAL
BLOOD UNIT EXPIRATION DATE: NORMAL
BLOOD UNIT TYPE CODE: 5100
BLOOD UNIT TYPE CODE: 5100
BLOOD UNIT TYPE: NORMAL
BLOOD UNIT TYPE: NORMAL
BUN SERPL-MCNC: 12 MG/DL (ref 8–23)
CALCIUM SERPL-MCNC: 8.2 MG/DL (ref 8.7–10.5)
CHLORIDE SERPL-SCNC: 108 MMOL/L (ref 95–110)
CO2 SERPL-SCNC: 26 MMOL/L (ref 23–29)
CODING SYSTEM: NORMAL
CODING SYSTEM: NORMAL
CREAT SERPL-MCNC: 0.8 MG/DL (ref 0.5–1.4)
CROSSMATCH INTERPRETATION: NORMAL
CROSSMATCH INTERPRETATION: NORMAL
DIFFERENTIAL METHOD: ABNORMAL
DISPENSE STATUS: NORMAL
DISPENSE STATUS: NORMAL
EOSINOPHIL # BLD AUTO: 0.1 K/UL (ref 0–0.5)
EOSINOPHIL NFR BLD: 2.7 % (ref 0–8)
ERYTHROCYTE [DISTWIDTH] IN BLOOD BY AUTOMATED COUNT: 23.6 % (ref 11.5–14.5)
EST. GFR  (NO RACE VARIABLE): >60 ML/MIN/1.73 M^2
GLUCOSE SERPL-MCNC: 91 MG/DL (ref 70–110)
HCT VFR BLD AUTO: 27.6 % (ref 40–54)
HGB BLD-MCNC: 8 G/DL (ref 14–18)
HYPOCHROMIA BLD QL SMEAR: ABNORMAL
IMM GRANULOCYTES # BLD AUTO: 0.02 K/UL (ref 0–0.04)
IMM GRANULOCYTES NFR BLD AUTO: 0.4 % (ref 0–0.5)
LYMPHOCYTES # BLD AUTO: 1.1 K/UL (ref 1–4.8)
LYMPHOCYTES NFR BLD: 21.1 % (ref 18–48)
MAGNESIUM SERPL-MCNC: 1.9 MG/DL (ref 1.6–2.6)
MCH RBC QN AUTO: 21.7 PG (ref 27–31)
MCHC RBC AUTO-ENTMCNC: 29 G/DL (ref 32–36)
MCV RBC AUTO: 75 FL (ref 82–98)
MONOCYTES # BLD AUTO: 0.3 K/UL (ref 0.3–1)
MONOCYTES NFR BLD: 6.6 % (ref 4–15)
NEUTROPHILS # BLD AUTO: 3.6 K/UL (ref 1.8–7.7)
NEUTROPHILS NFR BLD: 68.6 % (ref 38–73)
NRBC BLD-RTO: 0 /100 WBC
NUM UNITS TRANS PACKED RBC: NORMAL
NUM UNITS TRANS PACKED RBC: NORMAL
PLATELET # BLD AUTO: 142 K/UL (ref 150–450)
PLATELET BLD QL SMEAR: ABNORMAL
PMV BLD AUTO: 9.9 FL (ref 9.2–12.9)
POTASSIUM SERPL-SCNC: 3.7 MMOL/L (ref 3.5–5.1)
RBC # BLD AUTO: 3.69 M/UL (ref 4.6–6.2)
SODIUM SERPL-SCNC: 138 MMOL/L (ref 136–145)
WBC # BLD AUTO: 5.17 K/UL (ref 3.9–12.7)

## 2023-05-09 PROCEDURE — 63600175 PHARM REV CODE 636 W HCPCS: Performed by: INTERNAL MEDICINE

## 2023-05-09 PROCEDURE — 25000003 PHARM REV CODE 250: Performed by: INTERNAL MEDICINE

## 2023-05-09 PROCEDURE — 83735 ASSAY OF MAGNESIUM: CPT | Performed by: INTERNAL MEDICINE

## 2023-05-09 PROCEDURE — C9113 INJ PANTOPRAZOLE SODIUM, VIA: HCPCS | Performed by: INTERNAL MEDICINE

## 2023-05-09 PROCEDURE — 85025 COMPLETE CBC W/AUTO DIFF WBC: CPT | Performed by: INTERNAL MEDICINE

## 2023-05-09 PROCEDURE — 36415 COLL VENOUS BLD VENIPUNCTURE: CPT | Performed by: INTERNAL MEDICINE

## 2023-05-09 PROCEDURE — 80048 BASIC METABOLIC PNL TOTAL CA: CPT | Performed by: INTERNAL MEDICINE

## 2023-05-09 RX ORDER — PANTOPRAZOLE SODIUM 40 MG/1
40 TABLET, DELAYED RELEASE ORAL DAILY
Qty: 30 TABLET | Refills: 0 | Status: SHIPPED | OUTPATIENT
Start: 2023-05-09 | End: 2023-07-13 | Stop reason: SDUPTHER

## 2023-05-09 RX ORDER — AMLODIPINE BESYLATE 5 MG/1
5 TABLET ORAL ONCE
Status: DISCONTINUED | OUTPATIENT
Start: 2023-05-09 | End: 2023-05-09 | Stop reason: HOSPADM

## 2023-05-09 RX ORDER — FERROUS SULFATE 325(65) MG
325 TABLET, DELAYED RELEASE (ENTERIC COATED) ORAL EVERY OTHER DAY
Qty: 15 TABLET | Refills: 0 | Status: SHIPPED | OUTPATIENT
Start: 2023-05-09 | End: 2023-06-08

## 2023-05-09 RX ADMIN — TAMSULOSIN HYDROCHLORIDE 0.8 MG: 0.4 CAPSULE ORAL at 08:05

## 2023-05-09 RX ADMIN — LISINOPRIL 40 MG: 20 TABLET ORAL at 08:05

## 2023-05-09 RX ADMIN — PANTOPRAZOLE SODIUM 40 MG: 40 INJECTION, POWDER, LYOPHILIZED, FOR SOLUTION INTRAVENOUS at 08:05

## 2023-05-09 RX ADMIN — GABAPENTIN 600 MG: 300 CAPSULE ORAL at 08:05

## 2023-05-09 RX ADMIN — GABAPENTIN 600 MG: 300 CAPSULE ORAL at 12:05

## 2023-05-09 RX ADMIN — OXYBUTYNIN CHLORIDE 5 MG: 5 TABLET, EXTENDED RELEASE ORAL at 08:05

## 2023-05-09 RX ADMIN — AMLODIPINE BESYLATE 5 MG: 5 TABLET ORAL at 08:05

## 2023-05-09 NOTE — NURSING
Assessed patient today - alert and oriented - up ad brian - does have a cane he uses for ambulation - patient is expected to discharge today - vital signs within normal parameters - no complaints of pain at this time - no visible signs or symptoms of distress - will continue to monitor throughout shift for any changes in condition - DEIDRA Michael RN

## 2023-05-09 NOTE — PLAN OF CARE
PCP hospital follow up added to avs       05/09/23 1006   Post-Acute Status   Hospital Resources/Appts/Education Provided Appointments scheduled by Navigator/Coordinator

## 2023-05-09 NOTE — PLAN OF CARE
Pt clear for discharge from CM standpoint. Discharging to home       05/09/23 1116   Final Note   Assessment Type Final Discharge Note   Anticipated Discharge Disposition Home   Hospital Resources/Appts/Education Provided Appointments scheduled by Navigator/Coordinator

## 2023-05-10 ENCOUNTER — PATIENT OUTREACH (OUTPATIENT)
Dept: FAMILY MEDICINE | Facility: CLINIC | Age: 63
End: 2023-05-10

## 2023-05-10 NOTE — PROGRESS NOTES
Discharge Information     Discharge Date:   Anemia     Primary Discharge Diagnosis:  5/9/2023       Discharge Summary:  Reviewed      Medication & Order Review     Were medication changes made or new medications added?   Yes    If so, has the patient filled the prescriptions?  Yes     Was Home Health ordered? No    If so, has Home Health contacted patient and/or initiated services?  No    Name of Home Health Agency? N/A    Durable Medical Equipment ordered?  No     If so, has the DME provider contacted patient and delivered equipment?  N/A    Follow Up               Any problems since discharge? No    How is the patient feeling since returning home?  Pt stated he feeling better since being home from the hospital.     Have you set up recommended follow up appointments? Dr. Gooden, pt stated he is going to call to schedule the appointment     Schedule Hospital Follow-up appointment within 7-14 days (preferably 7).      Notes:  Pt stated he feeling better since being home from the hospital. Pt is scheduled for [facility]us tomorrow at 10:20am . Pt stated he is concerned about some medication that was on his dicharge list because he is not taking all of them and is going to discuss it will Lexus tomorrow.       Allyson Stevenson

## 2023-05-10 NOTE — DISCHARGE SUMMARY
"On license of [facility] Medicine  Discharge Summary      Patient Name: Omar Trotter  MRN: [id number]  Admission Date: 5/5/2023  Hospital Length of Stay: 4 days  Discharge Date and Time: 5/9/2023 12:55 PM  Attending Physician: No att. providers found   Discharging Provider: Mariposa Sierra MD  Primary Care Provider: Lexus Higginbotham NP        HPI:   From H&P-  "62-year-old male  hx of OPD HTN, GERD, Hep C (unknown if treated), obesity, adhesive capsulitis right shoulder, hypertension, BPH, overactive bladder,back issues presents for eval after pre op labs drawn for back surgery at end of the month with anemia (5.4).     No significant symptoms of anemia. No blood in stool, dark tarry stools, no blood in urine. Does report bleeding more over the past week when he accidentally cut himself, also reports bruising to arms.  He does have back pain for which she will be getting surgery 5/26. He does take BC powders for pain.  No known bleeding disorders. No falls.      In the ER h/h 5.4. 2 units blood ordered and admitted to internal medicine service."    Procedure(s) (LRB):  ENTEROSCOPY (Left)  COLONOSCOPY (N/A)      Hospital Course:   -S/p 4 units blood during hospital course  -Hgb initially did not respond appropriately and is now staying >7  -EGD and colon  - portal hypertensive gastropathy and colon angiectasia with active oozing   -Advance diet   -ferrous sulfate every other day  -repeat cbc at 2 and 4 weeks  -Will continue PPI daily due to complaint of reflux but GI can deescalate accordingly in the outpatient  -Follow-up with patient's GI Dr. Gooden in 2 weeks  -Post-hospital follow-up with PCP as well    Physical Exam:  Physical Exam  Constitutional:       Appearance: He is not toxic-appearing.      Comments: Fatigued appearing   HENT:      Head: Normocephalic and atraumatic.   Eyes:      General: No scleral icterus.     Extraocular Movements: Extraocular movements intact.   Cardiovascular:      " Rate and Rhythm: Normal rate and regular rhythm.   Pulmonary:      Breath sounds: No wheezing or rhonchi.   Abdominal:      General: There is no distension.      Palpations: Abdomen is soft.      Tenderness: There is no abdominal tenderness.   Musculoskeletal:      Cervical back: Neck supple. No tenderness.      Right lower leg: No edema.      Left lower leg: No edema.   Skin:     General: Skin is warm and dry.   Neurological:      Mental Status: He is alert.   Psychiatric:         Mood and Affect: Mood normal.         Thought Content: Thought content normal.         Judgment: Judgment normal.     Consults:     Final Active Diagnoses:    Diagnosis Date Noted POA    PRINCIPAL PROBLEM:  Acute blood loss anemia [D62] 05/05/2023 Yes      Problems Resolved During this Admission:      Discharged Condition: stable    Disposition: Home or Self Care    Follow Up:   Follow-up Information       Anat Gooden MD. Schedule an appointment as soon as possible for a visit in 2 week(s).    Specialty: Gastroenterology  Contact information:  [address]  [place] [address]  [phone]               Lexus Higginbotham NP Follow up.    Specialty: Family Medicine  Why: 5/11 @ 11  Contact information:  [address]  [address]  [place] [address]  [phone]                           Patient Instructions:      CBC auto differential   Standing Status: Future Standing Exp. Date: 07/07/24   Order Comments: Send to Dr. Gooden     Medications:  Reconciled Home Medications:      Medication List        START taking these medications      ferrous sulfate 325 (65 FE) MG EC tablet  Take 1 tablet (325 mg total) by mouth every other day.     pantoprazole 40 MG tablet  Commonly known as: PROTONIX  Take 1 tablet (40 mg total) by mouth once daily.            CHANGE how you take these medications      methocarbamoL 500 MG Tab  Commonly known as: ROBAXIN  TAKE 1 TABLET BY MOUTH NIGHTLY AS NEEDED  What changed:   how much to take  how to take  this  when to take this  reasons to take this            CONTINUE taking these medications      amLODIPine 5 MG tablet  Commonly known as: NORVASC  Take 1 tablet (5 mg total) by mouth once daily.     * gabapentin 300 MG capsule  Commonly known as: NEURONTIN  TAKE 1 CAPSULE BY MOUTH THREE TIMES DAILY     * gabapentin 600 MG tablet  Commonly known as: NEURONTIN  Take 600 mg by mouth 4 (four) times daily.     lisinopriL 40 MG tablet  Commonly known as: PRINIVIL,ZESTRIL  Take 1 tablet (40 mg total) by mouth once daily.     methadone 40 mg disintegrating tablet  Commonly known as: METHADOSE  Take 40 mg by mouth As instructed for Pain. Patient goes to clinic three days a week     metoprolol succinate 50 MG 24 hr tablet  Commonly known as: TOPROL-XL  Take 1 tablet (50 mg total) by mouth once daily.     oxybutynin 5 MG Tr24  Commonly known as: DITROPAN-XL  Take 1 tablet (5 mg total) by mouth once daily.     tamsulosin 0.4 mg Cap  Commonly known as: FLOMAX  Take 2 capsules (0.8 mg total) by mouth once daily.           * This list has 2 medication(s) that are the same as other medications prescribed for you. Read the directions carefully, and ask your doctor or other care provider to review them with you.                STOP taking these medications      omeprazole 20 MG capsule  Commonly known as: PRILOSEC              Significant Diagnostic Studies: Labs: All labs within the past 24 hours have been reviewed    Pending Diagnostic Studies:       None          Indwelling Lines/Drains at time of discharge:   Lines/Drains/Airways       None                   Time spent on the discharge of patient: 36 minutes         Mariposa Sierra MD  Department of Hospital Medicine  [facility]

## 2023-05-11 ENCOUNTER — OFFICE VISIT (OUTPATIENT)
Dept: FAMILY MEDICINE | Facility: CLINIC | Age: 63
End: 2023-05-11
Payer: MEDICARE

## 2023-05-11 ENCOUNTER — TELEPHONE (OUTPATIENT)
Dept: FAMILY MEDICINE | Facility: CLINIC | Age: 63
End: 2023-05-11

## 2023-05-11 VITALS
SYSTOLIC BLOOD PRESSURE: 164 MMHG | HEIGHT: 71 IN | DIASTOLIC BLOOD PRESSURE: 74 MMHG | OXYGEN SATURATION: 98 % | WEIGHT: 189.19 LBS | BODY MASS INDEX: 26.49 KG/M2 | HEART RATE: 93 BPM

## 2023-05-11 DIAGNOSIS — M51.36 DDD (DEGENERATIVE DISC DISEASE), LUMBAR: ICD-10-CM

## 2023-05-11 DIAGNOSIS — K92.2 LOWER GI BLEED: Primary | ICD-10-CM

## 2023-05-11 DIAGNOSIS — I10 PRIMARY HYPERTENSION: ICD-10-CM

## 2023-05-11 DIAGNOSIS — D62 ANEMIA DUE TO ACUTE BLOOD LOSS: ICD-10-CM

## 2023-05-11 PROCEDURE — 99496 TRANSJ CARE MGMT HIGH F2F 7D: CPT | Mod: S$GLB,,, | Performed by: NURSE PRACTITIONER

## 2023-05-11 PROCEDURE — 1160F PR REVIEW ALL MEDS BY PRESCRIBER/CLIN PHARMACIST DOCUMENTED: ICD-10-PCS | Mod: CPTII,S$GLB,, | Performed by: NURSE PRACTITIONER

## 2023-05-11 PROCEDURE — 3066F PR DOCUMENTATION OF TREATMENT FOR NEPHROPATHY: ICD-10-PCS | Mod: CPTII,S$GLB,, | Performed by: NURSE PRACTITIONER

## 2023-05-11 PROCEDURE — 1159F MED LIST DOCD IN RCRD: CPT | Mod: CPTII,S$GLB,, | Performed by: NURSE PRACTITIONER

## 2023-05-11 PROCEDURE — 3066F NEPHROPATHY DOC TX: CPT | Mod: CPTII,S$GLB,, | Performed by: NURSE PRACTITIONER

## 2023-05-11 PROCEDURE — 3077F PR MOST RECENT SYSTOLIC BLOOD PRESSURE >= 140 MM HG: ICD-10-PCS | Mod: CPTII,S$GLB,, | Performed by: NURSE PRACTITIONER

## 2023-05-11 PROCEDURE — 3008F PR BODY MASS INDEX (BMI) DOCUMENTED: ICD-10-PCS | Mod: CPTII,S$GLB,, | Performed by: NURSE PRACTITIONER

## 2023-05-11 PROCEDURE — 3060F POS MICROALBUMINURIA REV: CPT | Mod: CPTII,S$GLB,, | Performed by: NURSE PRACTITIONER

## 2023-05-11 PROCEDURE — 4010F PR ACE/ARB THEARPY RXD/TAKEN: ICD-10-PCS | Mod: CPTII,S$GLB,, | Performed by: NURSE PRACTITIONER

## 2023-05-11 PROCEDURE — 3060F PR POS MICROALBUMINURIA RESULT DOCUMENTED/REVIEW: ICD-10-PCS | Mod: CPTII,S$GLB,, | Performed by: NURSE PRACTITIONER

## 2023-05-11 PROCEDURE — 3078F DIAST BP <80 MM HG: CPT | Mod: CPTII,S$GLB,, | Performed by: NURSE PRACTITIONER

## 2023-05-11 PROCEDURE — 1159F PR MEDICATION LIST DOCUMENTED IN MEDICAL RECORD: ICD-10-PCS | Mod: CPTII,S$GLB,, | Performed by: NURSE PRACTITIONER

## 2023-05-11 PROCEDURE — 1111F PR DISCHARGE MEDS RECONCILED W/ CURRENT OUTPATIENT MED LIST: ICD-10-PCS | Mod: CPTII,S$GLB,, | Performed by: NURSE PRACTITIONER

## 2023-05-11 PROCEDURE — 3008F BODY MASS INDEX DOCD: CPT | Mod: CPTII,S$GLB,, | Performed by: NURSE PRACTITIONER

## 2023-05-11 PROCEDURE — 1111F DSCHRG MED/CURRENT MED MERGE: CPT | Mod: CPTII,S$GLB,, | Performed by: NURSE PRACTITIONER

## 2023-05-11 PROCEDURE — 1160F RVW MEDS BY RX/DR IN RCRD: CPT | Mod: CPTII,S$GLB,, | Performed by: NURSE PRACTITIONER

## 2023-05-11 PROCEDURE — 3078F PR MOST RECENT DIASTOLIC BLOOD PRESSURE < 80 MM HG: ICD-10-PCS | Mod: CPTII,S$GLB,, | Performed by: NURSE PRACTITIONER

## 2023-05-11 PROCEDURE — 99496 TRANSITIONAL CARE MANAGE SERVICE 7 DAY DISCHARGE: ICD-10-PCS | Mod: S$GLB,,, | Performed by: NURSE PRACTITIONER

## 2023-05-11 PROCEDURE — 3077F SYST BP >= 140 MM HG: CPT | Mod: CPTII,S$GLB,, | Performed by: NURSE PRACTITIONER

## 2023-05-11 PROCEDURE — 4010F ACE/ARB THERAPY RXD/TAKEN: CPT | Mod: CPTII,S$GLB,, | Performed by: NURSE PRACTITIONER

## 2023-05-11 NOTE — TELEPHONE ENCOUNTER
----- Message from Calin Brown MA sent at 2023 11:16 AM CDT -----  Regardin week f/u  [phone] per cindy needs to see this pt back in 4 weeks please contact this pt when the appt has  been assigned

## 2023-05-11 NOTE — PROGRESS NOTES
Patient ID: Omar Trotter is a 62 y.o. male.    Chief Complaint: Follow-up (Pt brought medication list//Pt is here for a HFU, dx with anemia.//Pt states that he is feeling better//NADINE )        Admit Date: 5/5/23   Discharge Date: 5/9/23  Discharge Facility: Hospital    Medication Reconciliation:  Medications changed/added/deleted. Ferrous sulfate QOD and pantoprazole added, omeprazole discontinued  New Prescriptions filled after discharge: yes  Discharge summary reviewed:  yes  Pending test results at discharge reviewed:   not applicable  Follow up appointments scheduled:  no   with Gastroenterology  has not scheduled f/u with Dr. Gooden yet  Follow up labs/tests ordered:   yes  Home Health ordered on discharge:   no  Home Health company name:   DME ordered at discharge:   no    History of hospital stay: Pt referred to ER after outpt labs showed severe anemia with Hgb 5.4- Pt admitted thru ER and recived total of 4uPRBCs. Seen by Dr. Bourgeois and underwent EGD and cscope- EGD revealed mild portal hypertensive gastropathy, normal esophagus/duodenum and jejunum. Colon revealed A single bleeding colonic angioectasia. Treated with argon plasma coagulation. GI recommended ferrous sulfate QOD and check cbc in 2 and 4 weeks- advised if ongoing anemia recommend pill endoscopy, f/u with Dr. Gooden. Repeat cscope 1 year. Hgb on day of discharge=8.0    How patient is feeling since discharge from the hospital?  Pt reports overall feeling better though really wasn't feeling that bad- admits to some mild fatigue and SOB prior to admit. Fatigue/SOB have improved. Denies any black or bloody stool, having regular Bms. No reflux symptoms.    Pt reports he is scheduled for lumbar surgery L4-S1 fusion 5/25 with Dr. Patel and needs surgical clearance today  - reports has been waiting on this surgery for over 6 months and has been rescheduled a couple times. Currently on methadone for chronic back pain- follows with clinic in  "[place]. Pt reports was taking a lot of BC powders prior to admit for anemia which he has stopped. Reports has not scheduled f/u with Dr. Gooden yet d/t copay cost- states will have to wait till next month    Reports not taking amlodipine and metoprolol- states doesn't want to take a lot of BP medications so stopped it- "my BP always fluctuates". Pt tells me he thinks "you medical people" just want to give a lot of medication. "Nothings wrong with me other than my back"    Patient follow up phone call documented on separate encounter.    No results displayed because visit has over 200 results.      Telephone on 02/09/2023   Component Date Value Ref Range Status    Cholesterol 05/04/2023 96  <200 mg/dL Final    HDL 05/04/2023 37 (L)  > OR = 40 mg/dL Final    Triglycerides 05/04/2023 59  <150 mg/dL Final    LDL Cholesterol 05/04/2023 45  mg/dL (calc) Final    HDL/Cholesterol Ratio 05/04/2023 2.6  <5.0 (calc) Final    Non HDL Chol. (LDL+VLDL) 05/04/2023 59  <130 mg/dL (calc) Final    Creatinine, Urine 05/04/2023 28  20 - 320 mg/dL Final    Microalb, Ur 05/04/2023 1.8  See Note: mg/dL Final    Microalb/Creat Ratio 05/04/2023 64 (H)  <30 mcg/mg creat Final    Color, UA 05/04/2023 YELLOW  YELLOW Final    Appearance, UA 05/04/2023 CLEAR  CLEAR Final    Specific Gravity, UA 05/04/2023 1.005  1.001 - 1.035 Final    pH, UA 05/04/2023 6.5  5.0 - 8.0 Final    Glucose, UA 05/04/2023 NEGATIVE  NEGATIVE Final    Bilirubin, UA 05/04/2023 NEGATIVE  NEGATIVE Final    Ketones, UA 05/04/2023 NEGATIVE  NEGATIVE Final    Occult Blood UA 05/04/2023 NEGATIVE  NEGATIVE Final    Protein, UA 05/04/2023 NEGATIVE  NEGATIVE Final    Nitrite, UA 05/04/2023 NEGATIVE  NEGATIVE Final    Leukocytes, UA 05/04/2023 NEGATIVE  NEGATIVE Final    WBC Casts, UA 05/04/2023 NONE SEEN  < OR = 5 /HPF Final    RBC Casts, UA 05/04/2023 NONE SEEN  < OR = 2 /HPF Final    Squam Epithel, UA 05/04/2023 NONE SEEN  < OR = 5 /HPF Final    Bacteria, UA 05/04/2023 NONE SEEN "  NONE SEEN /HPF Final    Hyaline Casts, UA 05/04/2023 NONE SEEN  NONE SEEN /LPF Final    Service Cmt: 05/04/2023    Final    Reflexive Urine Culture 05/04/2023    Final    TSH w/reflex to FT4 05/04/2023 1.79  0.40 - 4.50 mIU/L Final    Glucose 05/04/2023 93  65 - 99 mg/dL Final    BUN 05/04/2023 14  7 - 25 mg/dL Final    Creatinine 05/04/2023 0.91  0.70 - 1.35 mg/dL Final    eGFR 05/04/2023 94  > OR = 60 mL/min/1.73m2 Final    BUN/Creatinine Ratio 05/04/2023 NOT APPLICABLE  6 - 22 (calc) Final    Sodium 05/04/2023 134 (L)  135 - 146 mmol/L Final    Potassium 05/04/2023 4.2  3.5 - 5.3 mmol/L Final    Chloride 05/04/2023 103  98 - 110 mmol/L Final    CO2 05/04/2023 26  20 - 32 mmol/L Final    Calcium 05/04/2023 8.8  8.6 - 10.3 mg/dL Final    Total Protein 05/04/2023 6.7  6.1 - 8.1 g/dL Final    Albumin 05/04/2023 3.4 (L)  3.6 - 5.1 g/dL Final    Globulin, Total 05/04/2023 3.3  1.9 - 3.7 g/dL (calc) Final    Albumin/Globulin Ratio 05/04/2023 1.0  1.0 - 2.5 (calc) Final    Total Bilirubin 05/04/2023 0.5  0.2 - 1.2 mg/dL Final    Alkaline Phosphatase 05/04/2023 115  35 - 144 U/L Final    AST 05/04/2023 15  10 - 35 U/L Final    ALT 05/04/2023 8 (L)  9 - 46 U/L Final    WBC 05/04/2023 4.5  3.8 - 10.8 Thousand/uL Final    RBC 05/04/2023 2.94 (L)  4.20 - 5.80 Million/uL Final    Hemoglobin 05/04/2023 5.4 (LL)  13.2 - 17.1 g/dL Final    Hematocrit 05/04/2023 19.7 (L)  38.5 - 50.0 % Final    MCV 05/04/2023 67.0 (L)  80.0 - 100.0 fL Final    MCH 05/04/2023 18.4 (L)  27.0 - 33.0 pg Final    MCHC 05/04/2023 27.4 (L)  32.0 - 36.0 g/dL Final    RDW 05/04/2023 17.1 (H)  11.0 - 15.0 % Final    Platelets 05/04/2023 179  140 - 400 Thousand/uL Final    MPV 05/04/2023 10.6  7.5 - 12.5 fL Final    Neutrophils, Abs 05/04/2023 3,078  1,500 - 7,800 cells/uL Final    Lymph # 05/04/2023 941  850 - 3,900 cells/uL Final    Mono # 05/04/2023 360  200 - 950 cells/uL Final    Eos # 05/04/2023 81  15 - 500 cells/uL Final    Baso # 05/04/2023 41  0 -  200 cells/uL Final    Neutrophils Relative 05/04/2023 68.4  % Final    Lymph % 05/04/2023 20.9  % Final    Mono % 05/04/2023 8.0  % Final    Eosinophil % 05/04/2023 1.8  % Final    Basophil % 05/04/2023 0.9  % Final    CBC Morphology 05/04/2023   NORMAL Final   Admission on 11/10/2022, Discharged on 11/12/2022   Component Date Value Ref Range Status    WBC 11/10/2022 10.89  3.90 - 12.70 K/uL Final    RBC 11/10/2022 3.26 (L)  4.60 - 6.20 M/uL Final    Hemoglobin 11/10/2022 7.4 (L)  14.0 - 18.0 g/dL Final    Hematocrit 11/10/2022 25.0 (L)  40.0 - 54.0 % Final    MCV 11/10/2022 77 (L)  82 - 98 fL Final    MCH 11/10/2022 22.7 (L)  27.0 - 31.0 pg Final    MCHC 11/10/2022 29.6 (L)  32.0 - 36.0 g/dL Final    RDW 11/10/2022 18.1 (H)  11.5 - 14.5 % Final    Platelets 11/10/2022 231  150 - 450 K/uL Final    MPV 11/10/2022 10.4  9.2 - 12.9 fL Final    Immature Granulocytes 11/10/2022 0.8 (H)  0.0 - 0.5 % Final    Gran # (ANC) 11/10/2022 7.7  1.8 - 7.7 K/uL Final    Immature Grans (Abs) 11/10/2022 0.09 (H)  0.00 - 0.04 K/uL Final    Lymph # 11/10/2022 2.1  1.0 - 4.8 K/uL Final    Mono # 11/10/2022 1.0  0.3 - 1.0 K/uL Final    Eos # 11/10/2022 0.1  0.0 - 0.5 K/uL Final    Baso # 11/10/2022 0.03  0.00 - 0.20 K/uL Final    nRBC 11/10/2022 0  0 /100 WBC Final    Gran % 11/10/2022 70.3  38.0 - 73.0 % Final    Lymph % 11/10/2022 18.8  18.0 - 48.0 % Final    Mono % 11/10/2022 8.7  4.0 - 15.0 % Final    Eosinophil % 11/10/2022 1.1  0.0 - 8.0 % Final    Basophil % 11/10/2022 0.3  0.0 - 1.9 % Final    Differential Method 11/10/2022 Automated   Final    Sodium 11/10/2022 138  136 - 145 mmol/L Final    Potassium 11/10/2022 4.4  3.5 - 5.1 mmol/L Final    Chloride 11/10/2022 108  95 - 110 mmol/L Final    CO2 11/10/2022 20 (L)  23 - 29 mmol/L Final    Glucose 11/10/2022 96  70 - 110 mg/dL Final    BUN 11/10/2022 76 (H)  8 - 23 mg/dL Final    Creatinine 11/10/2022 4.0 (H)  0.5 - 1.4 mg/dL Final    Calcium 11/10/2022 8.6 (L)  8.7 - 10.5 mg/dL  Final    Total Protein 11/10/2022 7.2  6.0 - 8.4 g/dL Final    Albumin 11/10/2022 3.2 (L)  3.5 - 5.2 g/dL Final    Total Bilirubin 11/10/2022 0.7  0.1 - 1.0 mg/dL Final    Alkaline Phosphatase 11/10/2022 100  55 - 135 U/L Final    AST 11/10/2022 21  10 - 40 U/L Final    ALT 11/10/2022 20  10 - 44 U/L Final    Anion Gap 11/10/2022 10  8 - 16 mmol/L Final    eGFR 11/10/2022 15.9 (A)  >60 mL/min/1.73 m^2 Final    Lactate (Lactic Acid) 11/10/2022 1.3  0.5 - 1.9 mmol/L Final    Specimen UA 11/10/2022 Urine, Clean Catch   Final    Color, UA 11/10/2022 Yellow  Yellow, Straw, Sharmaine Final    Appearance, UA 11/10/2022 Hazy (A)  Clear Final    pH, UA 11/10/2022 6.0  5.0 - 8.0 Final    Specific Gravity, UA 11/10/2022 1.025  1.005 - 1.030 Final    Protein, UA 11/10/2022 1+ (A)  Negative Final    Glucose, UA 11/10/2022 Negative  Negative Final    Ketones, UA 11/10/2022 Negative  Negative Final    Bilirubin (UA) 11/10/2022 Negative  Negative Final    Occult Blood UA 11/10/2022 1+ (A)  Negative Final    Nitrite, UA 11/10/2022 Negative  Negative Final    Urobilinogen, UA 11/10/2022 2.0-3.0 (A)  Negative EU/dL Final    Leukocytes, UA 11/10/2022 Negative  Negative Final    Magnesium 11/10/2022 2.4  1.6 - 2.6 mg/dL Final    Phosphorus 11/10/2022 4.7 (H)  2.7 - 4.5 mg/dL Final    aPTT 11/10/2022 36.0 (H)  23.3 - 35.1 sec Final    PT 11/10/2022 15.8 (H)  11.4 - 13.7 sec Final    INR 11/10/2022 1.4   Final    BNP 11/10/2022 61  0 - 99 pg/mL Final    Lipase 11/10/2022 26  4 - 60 U/L Final    Troponin I 11/10/2022 <0.030  <=0.040 ng/mL Final    Procalcitonin 11/10/2022 1.30 (H)  0.00 - 0.50 ng/mL Final    Influenza A, Molecular 11/10/2022 Negative  Negative Final    Influenza B, Molecular 11/10/2022 Negative  Negative Final    Flu A & B Source 11/10/2022 Nasal swab   Final    Amphetamine Scrn 11/11/2022 Negative  Cutoff:50 ng/mL Final    Barbiturate Scrn 11/11/2022 Negative  Cutoff:0.1 ug/mL Final    Benzodiazepines 11/11/2022 Negative   Cutoff:20 ng/mL Final    Cocaine Lvl 11/11/2022 Negative  Cutoff:25 ng/mL Final    Phencyclidine, Blood 11/11/2022 Negative  Cutoff:8 ng/mL Final    THC (Marijuana) Metabolite, bl 11/11/2022 ++POSITIVE++ (A)  Cutoff:5 ng/mL Final    Opiates, Blood 11/11/2022 ++POSITIVE++ (A)  Cutoff:5 ng/mL Final    Oxycodone 11/11/2022 ++POSITIVE++ (A)  Cutoff:5 ng/mL Final    Methadone (Dolophine), Serum 11/11/2022 Negative  Cutoff:25 ng/mL Final    Propoxyphene,Serum 11/11/2022 Negative  Cutoff:50 ng/mL Final    Ammonia 11/10/2022 23  10 - 50 umol/L Final    RBC, UA 11/10/2022 11 (H)  0 - 4 /hpf Final    WBC, UA 11/10/2022 5  0 - 5 /hpf Final    Bacteria 11/10/2022 Negative  None-Occ /hpf Final    Squam Epithel, UA 11/10/2022 5  /hpf Final    Hyaline Casts, UA 11/10/2022 43 (A)  0-1/lpf /lpf Final    Microscopic Comment 11/10/2022 SEE COMMENT   Final    Group & Rh 11/10/2022 O POS   Final    Indirect Manav 11/10/2022 NEG   Final    Blood Culture, Routine 11/10/2022 No growth after 5 days.   Final    Blood Culture, Routine 11/10/2022 No growth after 5 days.   Final    SARS-CoV-2 RNA, Amplification, Qual 11/10/2022 Negative  Negative Final    Sodium 11/11/2022 141  136 - 145 mmol/L Final    Potassium 11/11/2022 3.7  3.5 - 5.1 mmol/L Final    Chloride 11/11/2022 113 (H)  95 - 110 mmol/L Final    CO2 11/11/2022 19 (L)  23 - 29 mmol/L Final    Glucose 11/11/2022 81  70 - 110 mg/dL Final    BUN 11/11/2022 63 (H)  8 - 23 mg/dL Final    Creatinine 11/11/2022 2.3 (H)  0.5 - 1.4 mg/dL Final    Calcium 11/11/2022 8.6 (L)  8.7 - 10.5 mg/dL Final    Anion Gap 11/11/2022 9  8 - 16 mmol/L Final    eGFR 11/11/2022 30.9 (A)  >60 mL/min/1.73 m^2 Final    Magnesium 11/11/2022 2.4  1.6 - 2.6 mg/dL Final    Hemoglobin A1C 11/11/2022 6.5 (H)  4.5 - 6.2 % Final    Estimated Avg Glucose 11/11/2022 140 (H)  68 - 131 mg/dL Final    WBC 11/11/2022 6.89  3.90 - 12.70 K/uL Final    RBC 11/11/2022 3.39 (L)  4.60 - 6.20 M/uL Final    Hemoglobin 11/11/2022 7.6  (L)  14.0 - 18.0 g/dL Final    Hematocrit 11/11/2022 25.7 (L)  40.0 - 54.0 % Final    MCV 11/11/2022 76 (L)  82 - 98 fL Final    MCH 11/11/2022 22.4 (L)  27.0 - 31.0 pg Final    MCHC 11/11/2022 29.6 (L)  32.0 - 36.0 g/dL Final    RDW 11/11/2022 18.2 (H)  11.5 - 14.5 % Final    Platelets 11/11/2022 207  150 - 450 K/uL Final    MPV 11/11/2022 10.6  9.2 - 12.9 fL Final    Immature Granulocytes 11/11/2022 0.6 (H)  0.0 - 0.5 % Final    Gran # (ANC) 11/11/2022 4.6  1.8 - 7.7 K/uL Final    Immature Grans (Abs) 11/11/2022 0.04  0.00 - 0.04 K/uL Final    Lymph # 11/11/2022 1.4  1.0 - 4.8 K/uL Final    Mono # 11/11/2022 0.6  0.3 - 1.0 K/uL Final    Eos # 11/11/2022 0.2  0.0 - 0.5 K/uL Final    Baso # 11/11/2022 0.04  0.00 - 0.20 K/uL Final    nRBC 11/11/2022 0  0 /100 WBC Final    Gran % 11/11/2022 66.6  38.0 - 73.0 % Final    Lymph % 11/11/2022 19.6  18.0 - 48.0 % Final    Mono % 11/11/2022 9.3  4.0 - 15.0 % Final    Eosinophil % 11/11/2022 3.3  0.0 - 8.0 % Final    Basophil % 11/11/2022 0.6  0.0 - 1.9 % Final    Differential Method 11/11/2022 Automated   Final    POC Glucose 11/11/2022 95  70 - 110 Final    POC Glucose 11/11/2022 178 (H)  70 - 110 Final    POC Glucose 11/11/2022 147 (H)  70 - 110 Final    POC Glucose 11/11/2022 134 (H)  70 - 110 Final    Sodium 11/12/2022 142  136 - 145 mmol/L Final    Potassium 11/12/2022 4.2  3.5 - 5.1 mmol/L Final    Chloride 11/12/2022 116 (H)  95 - 110 mmol/L Final    CO2 11/12/2022 20 (L)  23 - 29 mmol/L Final    Glucose 11/12/2022 94  70 - 110 mg/dL Final    BUN 11/12/2022 32 (H)  8 - 23 mg/dL Final    Creatinine 11/12/2022 1.0  0.5 - 1.4 mg/dL Final    Calcium 11/12/2022 8.6 (L)  8.7 - 10.5 mg/dL Final    Anion Gap 11/12/2022 6 (L)  8 - 16 mmol/L Final    eGFR 11/12/2022 >60.0  >60 mL/min/1.73 m^2 Final    Magnesium 11/12/2022 2.0  1.6 - 2.6 mg/dL Final    WBC 11/12/2022 5.00  3.90 - 12.70 K/uL Final    RBC 11/12/2022 3.58 (L)  4.60 - 6.20 M/uL Final    Hemoglobin 11/12/2022 7.9 (L)   14.0 - 18.0 g/dL Final    Hematocrit 11/12/2022 27.6 (L)  40.0 - 54.0 % Final    MCV 11/12/2022 77 (L)  82 - 98 fL Final    MCH 11/12/2022 22.1 (L)  27.0 - 31.0 pg Final    MCHC 11/12/2022 28.6 (L)  32.0 - 36.0 g/dL Final    RDW 11/12/2022 18.4 (H)  11.5 - 14.5 % Final    Platelets 11/12/2022 202  150 - 450 K/uL Final    MPV 11/12/2022 10.1  9.2 - 12.9 fL Final    Immature Granulocytes 11/12/2022 2.0 (H)  0.0 - 0.5 % Final    Gran # (ANC) 11/12/2022 3.4  1.8 - 7.7 K/uL Final    Immature Grans (Abs) 11/12/2022 0.10 (H)  0.00 - 0.04 K/uL Final    Lymph # 11/12/2022 0.9 (L)  1.0 - 4.8 K/uL Final    Mono # 11/12/2022 0.4  0.3 - 1.0 K/uL Final    Eos # 11/12/2022 0.2  0.0 - 0.5 K/uL Final    Baso # 11/12/2022 0.02  0.00 - 0.20 K/uL Final    nRBC 11/12/2022 1 (A)  0 /100 WBC Final    Gran % 11/12/2022 68.6  38.0 - 73.0 % Final    Lymph % 11/12/2022 17.8 (L)  18.0 - 48.0 % Final    Mono % 11/12/2022 8.2  4.0 - 15.0 % Final    Eosinophil % 11/12/2022 3.0  0.0 - 8.0 % Final    Basophil % 11/12/2022 0.4  0.0 - 1.9 % Final    Differential Method 11/12/2022 Automated   Final    POC Glucose 11/12/2022 155 (H)  70 - 110 Final    POC Glucose 11/12/2022 119 (H)  70 - 110 Final    Cannabinoid Confirmation 11/11/2022 Positive   Final    TETRAHYDROCANNABINOL 11/11/2022 1.0  ng/mL Final    Carboxy-THC 11/11/2022 32.9  ng/mL Final    Hydroxy-THC 11/11/2022 1.1  ng/mL Final    Cannabinol 11/11/2022 Negative  ng/mL Final    Cannabidiol 11/11/2022 Negative  ng/mL Final    Oxycodones Confirmation 11/11/2022 Positive   Final    Oxycodone, Quant, Serum 11/11/2022 8.8  ng/mL Final    Oxymorphone, Quant, Serum 11/11/2022 Negative  ng/mL Final    Opiate Confirmation 11/11/2022 Positive   Final    Codeine, Quant, Serum 11/11/2022 Negative  ng/mL Final    Morphine, Quant, Serum 11/11/2022 4.0  ng/mL Final    6-acetylmorphine, Quant, Serum 11/11/2022 Negative   Final    Hydocodone, Quant, Serum 11/11/2022 Negative  ng/mL Final    Hydromorphone,  Quant, Serum 11/11/2022 Negative  ng/mL Final    Dihydrocodeine by LC MS/MS 11/11/2022 Negative  ng/mL Final       Past Medical History:   Diagnosis Date    JACKLYN (acute kidney injury) 11/11/2022    Arthritis     GERD (gastroesophageal reflux disease)     Hepatitis C     STATES DX 10-20. NO S/S. NO FURTHER TESTS OR TX. WITH INSURANCE IN 2021 CAN GET IT EVALUATED.    History of left hip replacement 05/20/2021    History of MRSA infection     History of right shoulder replacement 01/15/2021    Hypertension     Wears glasses      Past Surgical History:   Procedure Laterality Date    APPENDECTOMY      ARTHROPLASTY OF SHOULDER Right 1/13/2021    Procedure: ARTHROPLASTY, SHOULDER TOTAL;  Surgeon: Emilio Siddiqui MD;  Location: [facility] OR;  Service: Orthopedics;  Laterality: Right;  GENERAL AND BLOCK    COLONOSCOPY N/A 5/8/2023    Procedure: COLONOSCOPY;  Surgeon: Bobby Bourgeois MD;  Location: [facility];  Service: Endoscopy;  Laterality: N/A;    ESOPHAGOGASTRODUODENOSCOPY N/A 5/7/2023    Procedure: EGD (ESOPHAGOGASTRODUODENOSCOPY);  Surgeon: Carloz Christianson Jr., MD;  Location: [facility];  Service: Endoscopy;  Laterality: N/A;    HERNIA REPAIR      HIP REPLACEMENT ARTHROPLASTY Left 3/24/2021    Procedure: ARTHROPLASTY, HIP REPLACEMENT;  Surgeon: Miles Rivas II, MD;  Location: [facility] OR;  Service: Orthopedics;  Laterality: Left;    JOINT REPLACEMENT Right     shoulder    REVISION TOTAL HIP ARTHROPLASTY Left 6/2/2021    Procedure: REVISION, TOTAL ARTHROPLASTY, HIP;  Surgeon: Miles Rivas II, MD;  Location: [facility] OR;  Service: Orthopedics;  Laterality: Left;    SMALL BOWEL ENTEROSCOPY Left 5/8/2023    Procedure: ENTEROSCOPY;  Surgeon: Bobby Bourgeois MD;  Location: [facility];  Service: Endoscopy;  Laterality: Left;     Family History   Problem Relation Age of Onset    Cancer Mother         leukemia       Marital Status:   Alcohol History:  reports no history of alcohol use.  Tobacco History:  reports that he has  been smoking cigarettes. He has a 15.00 pack-year smoking history. He has been exposed to tobacco smoke. He has never used smokeless tobacco.  Drug History:  reports no history of drug use.    Review of patient's allergies indicates:   Allergen Reactions    Pcn [penicillins]      As a child.       Current Outpatient Medications:     ferrous sulfate 325 (65 FE) MG EC tablet, Take 1 tablet (325 mg total) by mouth every other day., Disp: 15 tablet, Rfl: 0    gabapentin (NEURONTIN) 600 MG tablet, Take 600 mg by mouth 4 (four) times daily., Disp: , Rfl:     lisinopriL (PRINIVIL,ZESTRIL) 40 MG tablet, Take 1 tablet (40 mg total) by mouth once daily., Disp: 30 tablet, Rfl: 0    methadone (METHADOSE) 40 mg disintegrating tablet, Take 40 mg by mouth As instructed for Pain. Patient goes to clinic three days a week, Disp: , Rfl:     methocarbamoL (ROBAXIN) 500 MG Tab, TAKE 1 TABLET BY MOUTH NIGHTLY AS NEEDED (Patient taking differently: Take 500 mg by mouth nightly as needed. TAKE 1 TABLET BY MOUTH NIGHTLY AS NEEDED), Disp: 40 tablet, Rfl: 0    oxybutynin (DITROPAN-XL) 5 MG TR24, Take 1 tablet (5 mg total) by mouth once daily., Disp: 30 tablet, Rfl: 0    pantoprazole (PROTONIX) 40 MG tablet, Take 1 tablet (40 mg total) by mouth once daily., Disp: 30 tablet, Rfl: 0    tamsulosin (FLOMAX) 0.4 mg Cap, Take 2 capsules (0.8 mg total) by mouth once daily., Disp: 180 capsule, Rfl: 3    amLODIPine (NORVASC) 5 MG tablet, Take 1 tablet (5 mg total) by mouth once daily. (Patient not taking: Reported on 5/11/2023), Disp: 90 tablet, Rfl: 1    metoprolol succinate (TOPROL-XL) 50 MG 24 hr tablet, Take 1 tablet (50 mg total) by mouth once daily. (Patient not taking: Reported on 5/11/2023), Disp: 30 tablet, Rfl: 3  No current facility-administered medications for this visit.    Facility-Administered Medications Ordered in Other Visits:     electrolyte-S (ISOLYTE), , Intravenous, Continuous, Mabel Infante MD, Stopped at 06/16/21 1300     "HYDROmorphone (PF) injection 0.2 mg, 0.2 mg, Intravenous, Q5 Min PRN, Mabel Infnate MD, 0.2 mg at 06/16/21 1310    LIDOcaine (PF) 10 mg/ml (1%) injection 10 mg, 1 mL, Intradermal, Once, Mabel Infante MD    oxyCODONE immediate release tablet 5 mg, 5 mg, Oral, Q3H PRN, Mabel Infante MD, 5 mg at 06/16/21 1232    Review of Systems   Constitutional:  Negative for appetite change, chills, fatigue (improved) and fever.   HENT:  Negative for trouble swallowing.    Respiratory:  Negative for cough, shortness of breath and wheezing.    Cardiovascular:  Negative for chest pain, palpitations and leg swelling.   Gastrointestinal:  Negative for abdominal pain, blood in stool, constipation, diarrhea, nausea and vomiting.   Genitourinary:  Negative for dysuria, frequency and hematuria.   Musculoskeletal:  Positive for back pain. Negative for gait problem.   Skin:  Negative for rash.   Neurological:  Negative for dizziness, syncope, speech difficulty, numbness and headaches.   Psychiatric/Behavioral:  Positive for dysphoric mood (d/t chronic pain- reports seeing psychiatrist). Negative for self-injury and suicidal ideas. The patient is not nervous/anxious.       Objective:      Vitals:    05/11/23 1015 05/11/23 1024 05/11/23 1104   BP: (!) 170/60 (!) 168/72 (!) 164/74   Pulse: 93     SpO2: 98%     Weight: 85.8 kg (189 lb 3.2 oz)     Height: 5' 11" (1.803 m)       Physical Exam  Constitutional:       General: He is not in acute distress.     Appearance: He is normal weight.   HENT:      Head: Normocephalic and atraumatic.   Neck:      Vascular: No carotid bruit.   Cardiovascular:      Rate and Rhythm: Normal rate and regular rhythm.      Heart sounds: No murmur heard.  Pulmonary:      Effort: Pulmonary effort is normal. No respiratory distress.      Breath sounds: Normal breath sounds.   Musculoskeletal:      Cervical back: Neck supple.      Right lower leg: No edema.      Left lower leg: No edema. " "  Lymphadenopathy:      Cervical: No cervical adenopathy.   Skin:     General: Skin is warm and dry.   Neurological:      Mental Status: He is alert.   Psychiatric:         Mood and Affect: Affect is angry.         Behavior: Behavior is agitated. Behavior is not aggressive.         Thought Content: Thought content normal.         Cognition and Memory: Cognition normal.      Comments: Pt upset, angry and rude during visit after advised I could not clear him for back surgery today       Assessment:       1. Lower GI bleed    2. Anemia due to acute blood loss    3. Primary hypertension    4. DDD (degenerative disc disease), lumbar         Plan:       Lower GI bleed   -reviewed with pt findings on cscope with GI recs to repeat CBC in 2 and 4 weeks to monitor anemia and may need pill endoscopy if anemia persists. Advised given recent severe anemia/bleeding I cannot clear him today for extensive back surgery in 2 weeks. Pt became extremely upset, angry and rude- telling me "all you medical people just want to make him a guinea pig". States there is nothing wrong with him other than his back. I tried to explain to him several times that while I understand he's in a lot of pain and has been waiting for surgery it is just not safe to clear for surgery where he will have further bleeding and may need anticoagulation postop- advised this doesn't mean he can't ever have the surgery but it should be delayed till we know he is stable. Pt launched into a tirade of the "medical profession" and feels we are intentionally delaying his care. Encouraged him to call Dr. Patel's office and discuss postponing surgery 2-4 weeks until we can monitor anemia and ensure no further bleeding but he refuses. Unfortunately I had to end the visit abruptly after pt repeatedly was demeaning, argumentative and angry.     Anemia due to acute blood loss   -pt reports he is scheduled for his preop labs on Monday at [facility] in [place]- advised " him to have labs sent to us but I will not be able to clear him at this time    Primary hypertension   -BP uncontrolled- reports only taking lisinopril, refuses amlodipine and metoprolol- advised elevated BP could be another reason which could delay his surgery so would strongly recommend he resume at least metoprolol for now    DDD (degenerative disc disease), lumbar   -pt is not cleared for surgery at this time- see above    Follow up in about 4 weeks (around 6/8/2023).

## 2023-05-16 NOTE — TELEPHONE ENCOUNTER
"Spoke with patient - he states that "we only want his money"  and we are not interested in his care.   He is going to find another provider.   I was unable to convince him otherwise. He hung up on me.   Shall I print the discharge letter to prevent confusion later?  He has dismissed us.      "

## 2023-05-16 NOTE — TELEPHONE ENCOUNTER
Spoke to pt and let him know per [facility], [facility] could not clear him for surgery because when he left the hospital his Hemoglobin was 8.0 and the labs we received yesterday was 8.2 and a letter has been faxed to Dr. Patel letting him know [facility] could not clear him for surgery. Let pt know [facility] recommendation is the repeat CBC in 2 to 4 weeks and if the amenia does not stabilize or improve he would need to see GI for Endoscopy. Pt then stated he did not like [facility] and how he was treated within the visit and she does not know how he feels. Pt stated he has only saw Dr. London once this entire time and he went through 2 NP and this is ridiculous and he needs to speak to someone else because he was very unhappy with [facility] and does not want to see her anymore. Pt was transferred to [facility].     Labs and Letter denying Surgery Clearance in media faxed to [phone]

## 2023-05-30 DIAGNOSIS — I10 ESSENTIAL HYPERTENSION: ICD-10-CM

## 2023-05-30 RX ORDER — LISINOPRIL 40 MG/1
40 TABLET ORAL DAILY
Qty: 30 TABLET | Refills: 0 | Status: SHIPPED | OUTPATIENT
Start: 2023-05-30 | End: 2023-07-13 | Stop reason: SDUPTHER

## 2023-05-30 NOTE — TELEPHONE ENCOUNTER
----- Message from Svetlana Jensen sent at 5/30/2023 11:49 AM CDT -----  VM 05/30/23 @ 11:30 AM :Connor from [facility] pharmacy called and stated that the patient need a new prescription for his lisinopril also she need to speak to a nurse she has some questions please give her a call at [phone]

## 2023-05-30 NOTE — TELEPHONE ENCOUNTER
Spoke to Connor and let her know pt is not longer our pt and we can send in prescriptions for 45 days but after that we can no longer continue to fill his prescriptions, Connor verbalized understanding and stated she will make a note in his file at the pharmacy

## 2023-06-12 DIAGNOSIS — D50.0 IRON DEFICIENCY ANEMIA DUE TO CHRONIC BLOOD LOSS: ICD-10-CM

## 2023-06-12 PROBLEM — D50.9 IRON DEFICIENCY ANEMIA, UNSPECIFIED: Status: ACTIVE | Noted: 2023-06-12

## 2023-06-14 DIAGNOSIS — D50.0 IRON DEFICIENCY ANEMIA SECONDARY TO BLOOD LOSS (CHRONIC): Primary | ICD-10-CM

## 2023-06-15 ENCOUNTER — HOSPITAL ENCOUNTER (OUTPATIENT)
Dept: RADIOLOGY | Facility: HOSPITAL | Age: 63
Discharge: HOME | End: 2023-06-15
Attending: INTERNAL MEDICINE
Payer: MEDICARE

## 2023-06-15 DIAGNOSIS — D50.0 IRON DEFICIENCY ANEMIA SECONDARY TO BLOOD LOSS (CHRONIC): ICD-10-CM

## 2023-06-15 PROCEDURE — 74018 RADEX ABDOMEN 1 VIEW: CPT | Mod: TC,PO

## 2023-06-16 RX ORDER — PANTOPRAZOLE SODIUM 40 MG/1
40 TABLET, DELAYED RELEASE ORAL DAILY
Qty: 30 TABLET | Refills: 0 | Status: CANCELLED | OUTPATIENT
Start: 2023-06-16 | End: 2023-07-16

## 2023-07-31 ENCOUNTER — TELEPHONE (OUTPATIENT)
Dept: HEMATOLOGY/ONCOLOGY | Facility: CLINIC | Age: 63
End: 2023-07-31

## 2023-09-21 ENCOUNTER — DOCUMENTATION ONLY (OUTPATIENT)
Dept: HEMATOLOGY/ONCOLOGY | Facility: CLINIC | Age: 63
End: 2023-09-21
Payer: COMMERCIAL

## 2023-09-21 NOTE — PROGRESS NOTES
Received hem referral for dx of ELLEN form Dr SHAUN Orozco, [facility].  The pt has [employer] and [facility] is out of net with insurance.  Sent staff msg to Dr Orozco informing unable to inna the pt with [facility] due to out of net with [employer].   To please advise the pt.

## 2023-12-12 ENCOUNTER — HOSPITAL ENCOUNTER (OUTPATIENT)
Facility: HOSPITAL | Age: 63
Discharge: HOME | End: 2023-12-13
Attending: EMERGENCY MEDICINE | Admitting: INTERNAL MEDICINE
Payer: COMMERCIAL

## 2023-12-12 DIAGNOSIS — S27.321A CONTUSION OF LEFT LUNG, INITIAL ENCOUNTER: ICD-10-CM

## 2023-12-12 DIAGNOSIS — S22.39XA RIB FRACTURE: ICD-10-CM

## 2023-12-12 DIAGNOSIS — R07.9 CHEST PAIN: ICD-10-CM

## 2023-12-12 DIAGNOSIS — J44.1 COPD EXACERBATION: ICD-10-CM

## 2023-12-12 DIAGNOSIS — S22.42XA CLOSED FRACTURE OF MULTIPLE RIBS OF LEFT SIDE, INITIAL ENCOUNTER: ICD-10-CM

## 2023-12-12 DIAGNOSIS — R55 SYNCOPE: ICD-10-CM

## 2023-12-12 DIAGNOSIS — R09.02 HYPOXIA: Primary | ICD-10-CM

## 2023-12-12 PROBLEM — S22.49XA FRACTURE OF MULTIPLE RIBS: Status: ACTIVE | Noted: 2023-12-12

## 2023-12-12 LAB
ALBUMIN SERPL BCP-MCNC: 3.6 G/DL (ref 3.5–5.2)
ALP SERPL-CCNC: 107 U/L (ref 55–135)
ALT SERPL W/O P-5'-P-CCNC: 14 U/L (ref 10–44)
ANION GAP SERPL CALC-SCNC: 4 MMOL/L (ref 8–16)
AST SERPL-CCNC: 30 U/L (ref 10–40)
BASOPHILS # BLD AUTO: 0.03 K/UL (ref 0–0.2)
BASOPHILS NFR BLD: 0.5 % (ref 0–1.9)
BILIRUB SERPL-MCNC: 0.5 MG/DL (ref 0.1–1)
BNP SERPL-MCNC: 117 PG/ML (ref 0–99)
BUN SERPL-MCNC: 13 MG/DL (ref 8–23)
CALCIUM SERPL-MCNC: 9.3 MG/DL (ref 8.7–10.5)
CHLORIDE SERPL-SCNC: 104 MMOL/L (ref 95–110)
CK SERPL-CCNC: 223 U/L (ref 20–200)
CO2 SERPL-SCNC: 31 MMOL/L (ref 23–29)
CREAT SERPL-MCNC: 0.8 MG/DL (ref 0.5–1.4)
DIFFERENTIAL METHOD: ABNORMAL
EOSINOPHIL # BLD AUTO: 0.1 K/UL (ref 0–0.5)
EOSINOPHIL NFR BLD: 1.3 % (ref 0–8)
ERYTHROCYTE [DISTWIDTH] IN BLOOD BY AUTOMATED COUNT: 14.6 % (ref 11.5–14.5)
EST. GFR  (NO RACE VARIABLE): >60 ML/MIN/1.73 M^2
GLUCOSE SERPL-MCNC: 89 MG/DL (ref 70–110)
HCT VFR BLD AUTO: 35 % (ref 40–54)
HGB BLD-MCNC: 11.4 G/DL (ref 14–18)
IMM GRANULOCYTES # BLD AUTO: 0.02 K/UL (ref 0–0.04)
IMM GRANULOCYTES NFR BLD AUTO: 0.3 % (ref 0–0.5)
LYMPHOCYTES # BLD AUTO: 1.2 K/UL (ref 1–4.8)
LYMPHOCYTES NFR BLD: 19 % (ref 18–48)
MAGNESIUM SERPL-MCNC: 1.9 MG/DL (ref 1.6–2.6)
MCH RBC QN AUTO: 29.7 PG (ref 27–31)
MCHC RBC AUTO-ENTMCNC: 32.6 G/DL (ref 32–36)
MCV RBC AUTO: 91 FL (ref 82–98)
MONOCYTES # BLD AUTO: 0.3 K/UL (ref 0.3–1)
MONOCYTES NFR BLD: 5.2 % (ref 4–15)
NEUTROPHILS # BLD AUTO: 4.6 K/UL (ref 1.8–7.7)
NEUTROPHILS NFR BLD: 73.7 % (ref 38–73)
NRBC BLD-RTO: 0 /100 WBC
PLATELET # BLD AUTO: 133 K/UL (ref 150–450)
PMV BLD AUTO: 9.9 FL (ref 9.2–12.9)
POTASSIUM SERPL-SCNC: 4.2 MMOL/L (ref 3.5–5.1)
PROT SERPL-MCNC: 7.3 G/DL (ref 6–8.4)
RBC # BLD AUTO: 3.84 M/UL (ref 4.6–6.2)
SARS-COV-2 RDRP RESP QL NAA+PROBE: NEGATIVE
SODIUM SERPL-SCNC: 139 MMOL/L (ref 136–145)
TROPONIN I SERPL HS-MCNC: 2.9 PG/ML (ref 0–14.9)
TROPONIN I SERPL HS-MCNC: 3.2 PG/ML (ref 0–14.9)
WBC # BLD AUTO: 6.2 K/UL (ref 3.9–12.7)

## 2023-12-12 PROCEDURE — 83735 ASSAY OF MAGNESIUM: CPT | Performed by: EMERGENCY MEDICINE

## 2023-12-12 PROCEDURE — 25000242 PHARM REV CODE 250 ALT 637 W/ HCPCS: Performed by: EMERGENCY MEDICINE

## 2023-12-12 PROCEDURE — U0002 COVID-19 LAB TEST NON-CDC: HCPCS | Performed by: EMERGENCY MEDICINE

## 2023-12-12 PROCEDURE — 84484 ASSAY OF TROPONIN QUANT: CPT | Performed by: EMERGENCY MEDICINE

## 2023-12-12 PROCEDURE — 85025 COMPLETE CBC W/AUTO DIFF WBC: CPT | Performed by: EMERGENCY MEDICINE

## 2023-12-12 PROCEDURE — 82550 ASSAY OF CK (CPK): CPT | Performed by: EMERGENCY MEDICINE

## 2023-12-12 PROCEDURE — 93010 EKG 12-LEAD: ICD-10-PCS | Mod: ,,, | Performed by: GENERAL PRACTICE

## 2023-12-12 PROCEDURE — 63600175 PHARM REV CODE 636 W HCPCS: Performed by: EMERGENCY MEDICINE

## 2023-12-12 PROCEDURE — 96374 THER/PROPH/DIAG INJ IV PUSH: CPT | Mod: 59

## 2023-12-12 PROCEDURE — 93005 ELECTROCARDIOGRAM TRACING: CPT | Performed by: GENERAL PRACTICE

## 2023-12-12 PROCEDURE — 25500020 PHARM REV CODE 255: Performed by: EMERGENCY MEDICINE

## 2023-12-12 PROCEDURE — 96361 HYDRATE IV INFUSION ADD-ON: CPT

## 2023-12-12 PROCEDURE — 80053 COMPREHEN METABOLIC PANEL: CPT | Performed by: EMERGENCY MEDICINE

## 2023-12-12 PROCEDURE — 27000221 HC OXYGEN, UP TO 24 HOURS

## 2023-12-12 PROCEDURE — 93010 ELECTROCARDIOGRAM REPORT: CPT | Mod: ,,, | Performed by: GENERAL PRACTICE

## 2023-12-12 PROCEDURE — 94640 AIRWAY INHALATION TREATMENT: CPT

## 2023-12-12 PROCEDURE — 83880 ASSAY OF NATRIURETIC PEPTIDE: CPT | Performed by: EMERGENCY MEDICINE

## 2023-12-12 PROCEDURE — 99900031 HC PATIENT EDUCATION (STAT)

## 2023-12-12 PROCEDURE — 99285 EMERGENCY DEPT VISIT HI MDM: CPT | Mod: 25

## 2023-12-12 PROCEDURE — 94761 N-INVAS EAR/PLS OXIMETRY MLT: CPT

## 2023-12-12 RX ORDER — MORPHINE SULFATE 4 MG/ML
4 INJECTION, SOLUTION INTRAMUSCULAR; INTRAVENOUS
Status: COMPLETED | OUTPATIENT
Start: 2023-12-12 | End: 2023-12-13

## 2023-12-12 RX ORDER — METHYLPREDNISOLONE SOD SUCC 125 MG
125 VIAL (EA) INJECTION
Status: COMPLETED | OUTPATIENT
Start: 2023-12-12 | End: 2023-12-12

## 2023-12-12 RX ORDER — IPRATROPIUM BROMIDE AND ALBUTEROL SULFATE 2.5; .5 MG/3ML; MG/3ML
3 SOLUTION RESPIRATORY (INHALATION)
Status: DISPENSED | OUTPATIENT
Start: 2023-12-12 | End: 2023-12-12

## 2023-12-12 RX ADMIN — IPRATROPIUM BROMIDE AND ALBUTEROL SULFATE 3 ML: 2.5; .5 SOLUTION RESPIRATORY (INHALATION) at 07:12

## 2023-12-12 RX ADMIN — METHYLPREDNISOLONE SODIUM SUCCINATE 125 MG: 125 INJECTION, POWDER, FOR SOLUTION INTRAMUSCULAR; INTRAVENOUS at 08:12

## 2023-12-12 RX ADMIN — IOHEXOL 100 ML: 350 INJECTION, SOLUTION INTRAVENOUS at 10:12

## 2023-12-12 RX ADMIN — SODIUM CHLORIDE, SODIUM LACTATE, POTASSIUM CHLORIDE, AND CALCIUM CHLORIDE 2000 ML: .6; .31; .03; .02 INJECTION, SOLUTION INTRAVENOUS at 08:12

## 2023-12-13 VITALS
SYSTOLIC BLOOD PRESSURE: 140 MMHG | TEMPERATURE: 98 F | WEIGHT: 175 LBS | DIASTOLIC BLOOD PRESSURE: 65 MMHG | OXYGEN SATURATION: 96 % | RESPIRATION RATE: 20 BRPM | HEART RATE: 79 BPM | HEIGHT: 71 IN | BODY MASS INDEX: 24.5 KG/M2

## 2023-12-13 LAB
ALBUMIN SERPL BCP-MCNC: 3.5 G/DL (ref 3.5–5.2)
ALP SERPL-CCNC: 100 U/L (ref 55–135)
ALT SERPL W/O P-5'-P-CCNC: 14 U/L (ref 10–44)
ANION GAP SERPL CALC-SCNC: 6 MMOL/L (ref 8–16)
AST SERPL-CCNC: 29 U/L (ref 10–40)
BILIRUB SERPL-MCNC: 0.4 MG/DL (ref 0.1–1)
BUN SERPL-MCNC: 14 MG/DL (ref 8–23)
CALCIUM SERPL-MCNC: 9 MG/DL (ref 8.7–10.5)
CHLORIDE SERPL-SCNC: 103 MMOL/L (ref 95–110)
CO2 SERPL-SCNC: 28 MMOL/L (ref 23–29)
CREAT SERPL-MCNC: 0.7 MG/DL (ref 0.5–1.4)
EST. GFR  (NO RACE VARIABLE): >60 ML/MIN/1.73 M^2
GLUCOSE SERPL-MCNC: 111 MG/DL (ref 70–110)
POTASSIUM SERPL-SCNC: 4.6 MMOL/L (ref 3.5–5.1)
PROT SERPL-MCNC: 6.9 G/DL (ref 6–8.4)
SODIUM SERPL-SCNC: 137 MMOL/L (ref 136–145)

## 2023-12-13 PROCEDURE — 99900035 HC TECH TIME PER 15 MIN (STAT)

## 2023-12-13 PROCEDURE — 27000221 HC OXYGEN, UP TO 24 HOURS

## 2023-12-13 PROCEDURE — 97161 PT EVAL LOW COMPLEX 20 MIN: CPT

## 2023-12-13 PROCEDURE — G0378 HOSPITAL OBSERVATION PER HR: HCPCS

## 2023-12-13 PROCEDURE — 63600175 PHARM REV CODE 636 W HCPCS: Performed by: EMERGENCY MEDICINE

## 2023-12-13 PROCEDURE — 80053 COMPREHEN METABOLIC PANEL: CPT | Performed by: INTERNAL MEDICINE

## 2023-12-13 PROCEDURE — 94761 N-INVAS EAR/PLS OXIMETRY MLT: CPT

## 2023-12-13 PROCEDURE — 99900031 HC PATIENT EDUCATION (STAT)

## 2023-12-13 PROCEDURE — 25000003 PHARM REV CODE 250: Performed by: INTERNAL MEDICINE

## 2023-12-13 PROCEDURE — 97165 OT EVAL LOW COMPLEX 30 MIN: CPT

## 2023-12-13 PROCEDURE — 96375 TX/PRO/DX INJ NEW DRUG ADDON: CPT

## 2023-12-13 PROCEDURE — 97535 SELF CARE MNGMENT TRAINING: CPT

## 2023-12-13 PROCEDURE — 84145 PROCALCITONIN (PCT): CPT | Performed by: INTERNAL MEDICINE

## 2023-12-13 RX ORDER — POLYETHYLENE GLYCOL 3350 17 G/17G
17 POWDER, FOR SOLUTION ORAL DAILY
Status: DISCONTINUED | OUTPATIENT
Start: 2023-12-13 | End: 2023-12-13 | Stop reason: HOSPADM

## 2023-12-13 RX ORDER — MORPHINE SULFATE 2 MG/ML
2 INJECTION, SOLUTION INTRAMUSCULAR; INTRAVENOUS EVERY 4 HOURS PRN
Status: DISCONTINUED | OUTPATIENT
Start: 2023-12-13 | End: 2023-12-13 | Stop reason: HOSPADM

## 2023-12-13 RX ORDER — GABAPENTIN 300 MG/1
600 CAPSULE ORAL 3 TIMES DAILY
Status: DISCONTINUED | OUTPATIENT
Start: 2023-12-13 | End: 2023-12-13 | Stop reason: HOSPADM

## 2023-12-13 RX ORDER — PANTOPRAZOLE SODIUM 40 MG/1
40 TABLET, DELAYED RELEASE ORAL
Status: DISCONTINUED | OUTPATIENT
Start: 2023-12-13 | End: 2023-12-13 | Stop reason: HOSPADM

## 2023-12-13 RX ORDER — AMOXICILLIN 250 MG
1 CAPSULE ORAL 2 TIMES DAILY
Status: DISCONTINUED | OUTPATIENT
Start: 2023-12-13 | End: 2023-12-13 | Stop reason: HOSPADM

## 2023-12-13 RX ORDER — SODIUM CHLORIDE 0.9 % (FLUSH) 0.9 %
10 SYRINGE (ML) INJECTION
Status: DISCONTINUED | OUTPATIENT
Start: 2023-12-13 | End: 2023-12-13 | Stop reason: HOSPADM

## 2023-12-13 RX ORDER — ACETAMINOPHEN 325 MG/1
650 TABLET ORAL EVERY 8 HOURS PRN
Status: DISCONTINUED | OUTPATIENT
Start: 2023-12-13 | End: 2023-12-13 | Stop reason: HOSPADM

## 2023-12-13 RX ORDER — METHADONE HYDROCHLORIDE 5 MG/1
40 TABLET ORAL DAILY
Status: DISCONTINUED | OUTPATIENT
Start: 2023-12-13 | End: 2023-12-13 | Stop reason: HOSPADM

## 2023-12-13 RX ORDER — TALC
6 POWDER (GRAM) TOPICAL NIGHTLY PRN
Status: DISCONTINUED | OUTPATIENT
Start: 2023-12-13 | End: 2023-12-13

## 2023-12-13 RX ORDER — METOPROLOL SUCCINATE 50 MG/1
50 TABLET, EXTENDED RELEASE ORAL DAILY
Status: DISCONTINUED | OUTPATIENT
Start: 2023-12-13 | End: 2023-12-13 | Stop reason: HOSPADM

## 2023-12-13 RX ORDER — ONDANSETRON 4 MG/1
8 TABLET, ORALLY DISINTEGRATING ORAL EVERY 8 HOURS PRN
Status: DISCONTINUED | OUTPATIENT
Start: 2023-12-13 | End: 2023-12-13 | Stop reason: HOSPADM

## 2023-12-13 RX ORDER — BUSPIRONE HYDROCHLORIDE 5 MG/1
15 TABLET ORAL DAILY
Status: DISCONTINUED | OUTPATIENT
Start: 2023-12-13 | End: 2023-12-13 | Stop reason: HOSPADM

## 2023-12-13 RX ORDER — LANOLIN ALCOHOL/MO/W.PET/CERES
1 CREAM (GRAM) TOPICAL
Status: DISCONTINUED | OUTPATIENT
Start: 2023-12-13 | End: 2023-12-13 | Stop reason: HOSPADM

## 2023-12-13 RX ORDER — HYDROCODONE BITARTRATE AND ACETAMINOPHEN 5; 325 MG/1; MG/1
1 TABLET ORAL EVERY 4 HOURS PRN
Status: DISCONTINUED | OUTPATIENT
Start: 2023-12-13 | End: 2023-12-13 | Stop reason: HOSPADM

## 2023-12-13 RX ORDER — TALC
6 POWDER (GRAM) TOPICAL NIGHTLY PRN
Status: DISCONTINUED | OUTPATIENT
Start: 2023-12-13 | End: 2023-12-13 | Stop reason: HOSPADM

## 2023-12-13 RX ORDER — CYCLOBENZAPRINE HCL 10 MG
10 TABLET ORAL 3 TIMES DAILY PRN
Status: DISCONTINUED | OUTPATIENT
Start: 2023-12-13 | End: 2023-12-13 | Stop reason: HOSPADM

## 2023-12-13 RX ORDER — AMLODIPINE BESYLATE 5 MG/1
5 TABLET ORAL DAILY
Status: DISCONTINUED | OUTPATIENT
Start: 2023-12-13 | End: 2023-12-13 | Stop reason: HOSPADM

## 2023-12-13 RX ORDER — TAMSULOSIN HYDROCHLORIDE 0.4 MG/1
0.8 CAPSULE ORAL DAILY
Status: DISCONTINUED | OUTPATIENT
Start: 2023-12-13 | End: 2023-12-13 | Stop reason: HOSPADM

## 2023-12-13 RX ORDER — LISINOPRIL 20 MG/1
40 TABLET ORAL DAILY
Status: DISCONTINUED | OUTPATIENT
Start: 2023-12-13 | End: 2023-12-13 | Stop reason: HOSPADM

## 2023-12-13 RX ADMIN — METHADONE HYDROCHLORIDE 40 MG: 5 TABLET ORAL at 09:12

## 2023-12-13 RX ADMIN — BUSPIRONE HYDROCHLORIDE 15 MG: 5 TABLET ORAL at 08:12

## 2023-12-13 RX ADMIN — FERROUS SULFATE TAB 325 MG (65 MG ELEMENTAL FE) 1 EACH: 325 (65 FE) TAB at 08:12

## 2023-12-13 RX ADMIN — SENNOSIDES AND DOCUSATE SODIUM 1 TABLET: 50; 8.6 TABLET ORAL at 08:12

## 2023-12-13 RX ADMIN — TAMSULOSIN HYDROCHLORIDE 0.8 MG: 0.4 CAPSULE ORAL at 08:12

## 2023-12-13 RX ADMIN — Medication 6 MG: at 02:12

## 2023-12-13 RX ADMIN — PANTOPRAZOLE SODIUM 40 MG: 40 TABLET, DELAYED RELEASE ORAL at 06:12

## 2023-12-13 RX ADMIN — MORPHINE SULFATE 4 MG: 4 INJECTION, SOLUTION INTRAMUSCULAR; INTRAVENOUS at 12:12

## 2023-12-13 RX ADMIN — CYCLOBENZAPRINE 10 MG: 10 TABLET, FILM COATED ORAL at 02:12

## 2023-12-13 RX ADMIN — LISINOPRIL 40 MG: 20 TABLET ORAL at 08:12

## 2023-12-13 RX ADMIN — GABAPENTIN 600 MG: 300 CAPSULE ORAL at 08:12

## 2023-12-13 RX ADMIN — HYDROCODONE BITARTRATE AND ACETAMINOPHEN 1 TABLET: 5; 325 TABLET ORAL at 02:12

## 2023-12-13 RX ADMIN — AMLODIPINE BESYLATE 5 MG: 5 TABLET ORAL at 08:12

## 2023-12-13 RX ADMIN — METOPROLOL SUCCINATE 50 MG: 50 TABLET, EXTENDED RELEASE ORAL at 08:12

## 2023-12-13 NOTE — NURSING
0901 Pt was moaning stating that he needed his methadone. He was in obvious pain/distress. Stated he usually takes it at 7am.  Message was sent to Pharmacy prior to talking with pt. Pharmacy added  medication in different mg so I could pull for pt.      0906 Pt received his methadone, will hold other am medications until he calms down.        0920 returned to pt's room to administer other am medications, pt started cursing at me accusing me of taking his glasses. Stated he wanted to leave. AMA signed and witnessed by WU Castillo. IV and tele box removed. Box returned to [facility]. Pt waiting for ride.    1045 phoned pt's daughter, informed of the situation when friend did not show up to  pt. Daughter stated that she would arrive in 10 minutes. Stated that the pt did not have his glasses when he came up to our unit.    1100 wheeled pt down with all personal belongings to daughter's vehicle.

## 2023-12-13 NOTE — PT/OT/SLP EVAL
Physical Therapy Evaluation    Patient Name:  Omar Trotter   MRN:  [id number]    Recommendations:     Discharge Recommendations: No Therapy Indicated   Discharge Equipment Recommendations: none   Barriers to discharge:  high fall risk, decrease safety awareness,    Assessment:     Omar Trotter is a 63 y.o. male admitted with a medical diagnosis of Fracture of multiple ribs.  He presents with the following impairments/functional limitations: weakness, impaired endurance, impaired self care skills, impaired functional mobility, gait instability, impaired balance, decreased lower extremity function, decreased safety awareness, pain, impaired cardiopulmonary response to activity.    Pt found sitting on EOB. Pt agreeable to visit. Pt denies pain. Pt with limited communication but agreeable to limited visit. Pt requires CGA for  mobility for optimal stability.     Rehab Prognosis: Fair; patient would benefit from acute skilled PT services to address these deficits and reach maximum level of function.    Recent Surgery: * No surgery found *      Plan:     During this hospitalization, patient to be seen 5 x/week to address the identified rehab impairments via gait training, therapeutic activities, therapeutic exercises, neuromuscular re-education and progress toward the following goals:    Plan of Care Expires:  01/13/24    Subjective     Chief Complaint: none stated  Patient/Family Comments/goals: go home, reports a friend is coming to get him  Pain/Comfort:  Pain Rating 1: 0/10    Patients cultural, spiritual, [employer]al conflicts given the current situation: no    Living Environment:  Pt lives alone in a trailer. Limited additional information due to decrease communication  Prior to admission, patients level of function was MI SPC but also has a RW.  Equipment used at home: walker, rolling, cane, straight.  DME owned (not currently used): none.  Upon discharge, patient will have assistance from daughter and  friends.    Objective:     Communicated with RN prior to session.  Patient found sitting edge of bed with peripheral IV, telemetry  upon PT entry to room.    General Precautions: Standard, fall  Orthopedic Precautions:N/A   Braces: N/A  Respiratory Status: Room air    Exams:  RLE ROM: WFL  RLE Strength: WFL  LLE ROM: WFL  LLE Strength: WFL    Functional Mobility:  Transfers:     Sit to Stand:  contact guard assistance with rolling walker  Gait: 200 ft with RW and CGA no loss of balance or shortness of breath      AM-PAC 6 CLICK MOBILITY  Total Score:21       Treatment & Education:  Pt educated on POC, discharge recommendation, need for assist with mobility, use of call bell to seek assistance as needed and fall prevention      Patient left sitting edge of bed with all lines intact, call button in reach, and OT present.    GOALS:   Multidisciplinary Problems       Physical Therapy Goals          Problem: Physical Therapy    Goal Priority Disciplines Outcome Goal Variances Interventions   Physical Therapy Goal     PT, PT/OT Ongoing, Progressing     Description: Goals to be met by: 24     Patient will increase functional independence with mobility by performin. Supine to sit with Independent  2. Sit to stand transfer with Independent  3. Bed to chair transfer with Modified Independent using SPC  4. Gait  x 150 feet with Modified Independent using SPC  5. Asc/Desc 3 steps with single HR and supervision                             History:     Past Medical History:   Diagnosis Date    JACKLYN (acute kidney injury) 2022    Arthritis     GERD (gastroesophageal reflux disease)     Hepatitis C     STATES DX 10-20. NO S/S. NO FURTHER TESTS OR TX. WITH INSURANCE IN  CAN GET IT EVALUATED.    History of left hip replacement 2021    History of MRSA infection     History of right shoulder replacement 01/15/2021    Hypertension     Wears glasses        Past Surgical History:   Procedure Laterality Date     APPENDECTOMY      ARTHROPLASTY OF SHOULDER Right 1/13/2021    Procedure: ARTHROPLASTY, SHOULDER TOTAL;  Surgeon: Emilio Siddiqui MD;  Location: [facility] OR;  Service: Orthopedics;  Laterality: Right;  GENERAL AND BLOCK    COLONOSCOPY N/A 5/8/2023    Procedure: COLONOSCOPY;  Surgeon: Bobby Bourgeois MD;  Location: [facility] ENDO;  Service: Endoscopy;  Laterality: N/A;    ESOPHAGOGASTRODUODENOSCOPY N/A 5/7/2023    Procedure: EGD (ESOPHAGOGASTRODUODENOSCOPY);  Surgeon: Carloz Christianson Jr., MD;  Location: [facility] ENDO;  Service: Endoscopy;  Laterality: N/A;    HERNIA REPAIR      HIP REPLACEMENT ARTHROPLASTY Left 3/24/2021    Procedure: ARTHROPLASTY, HIP REPLACEMENT;  Surgeon: Miles Rivas II, MD;  Location: [facility] OR;  Service: Orthopedics;  Laterality: Left;    JOINT REPLACEMENT Right     shoulder    REVISION TOTAL HIP ARTHROPLASTY Left 6/2/2021    Procedure: REVISION, TOTAL ARTHROPLASTY, HIP;  Surgeon: Miles Rivas II, MD;  Location: [facility] OR;  Service: Orthopedics;  Laterality: Left;    SMALL BOWEL ENTEROSCOPY Left 5/8/2023    Procedure: ENTEROSCOPY;  Surgeon: Bobby Bourgeois MD;  Location: [facility] ENDO;  Service: Endoscopy;  Laterality: Left;       Time Tracking:     PT Received On: 12/13/23  PT Start Time: 0947     PT Stop Time: 0959  PT Total Time (min): 12 min     Billable Minutes: Evaluation 12      12/13/2023

## 2023-12-13 NOTE — HOSPITAL COURSE
Patient was admitted to the hospital with multiple rib fractures and hypoxia.  Patient was monitored closely throughout his hospital stay.  Syncope workup was in process when patient decided he wanted to leave AMA.  Nursing staff was at bedside and explained the risk of leaving AMA including but not limited to death.  Patient signed out AMA.

## 2023-12-13 NOTE — ED PROVIDER NOTES
Encounter Date: 12/12/2023       History     Chief Complaint   Patient presents with    Shortness of Breath     Pt fell and hit L side of chest while stepping out of his camper yesterday.  EMS reports that pt may have fallen again today     63-year-old male presented emergency department brought in by EMS.  Patient fell out of his trailer yesterday.  Patient was on the floor for a long time as he was feeling ill.  Patient was coughing and also was short of breath and confused.  When EMS arrived patient was not talking much and appear to be confused.  Patient per EMS fell a gain today.  Patient complains of left chest wall pain.  Patient also complains of hitting his head.  Patient complains of cough and shortness of breath and patient was hypoxic with oxygen saturations of 81 % when EMS arrived at the scene.  Patient was placed on supplemental oxygen and by the time he came here his mentation improved and patient is now oriented to place and person and able to answer all questions and able to follow commands.  Patient currently does not appear to be confused.  Denies abdominal pain or weakness.  Patient believes he injured his left side of the chest and having chest pain on that side secondary to fall.  Patient admits to smoking and is coughing and also wheezing and appears to be short of breath.  Denies any focal weakness or numbness.      Review of patient's allergies indicates:   Allergen Reactions    Pcn [penicillins]      As a child.     Past Medical History:   Diagnosis Date    JACKLYN (acute kidney injury) 11/11/2022    Arthritis     GERD (gastroesophageal reflux disease)     Hepatitis C     STATES DX 10-20. NO S/S. NO FURTHER TESTS OR TX. WITH INSURANCE IN 2021 CAN GET IT EVALUATED.    History of left hip replacement 05/20/2021    History of MRSA infection     History of right shoulder replacement 01/15/2021    Hypertension     Wears glasses      Past Surgical History:   Procedure Laterality Date    APPENDECTOMY       ARTHROPLASTY OF SHOULDER Right 1/13/2021    Procedure: ARTHROPLASTY, SHOULDER TOTAL;  Surgeon: Emilio Siddiqui MD;  Location: [facility] OR;  Service: Orthopedics;  Laterality: Right;  GENERAL AND BLOCK    COLONOSCOPY N/A 5/8/2023    Procedure: COLONOSCOPY;  Surgeon: Bobby Bourgeois MD;  Location: [facility] ENDO;  Service: Endoscopy;  Laterality: N/A;    ESOPHAGOGASTRODUODENOSCOPY N/A 5/7/2023    Procedure: EGD (ESOPHAGOGASTRODUODENOSCOPY);  Surgeon: Carloz Christianson Jr., MD;  Location: [facility] ENDO;  Service: Endoscopy;  Laterality: N/A;    HERNIA REPAIR      HIP REPLACEMENT ARTHROPLASTY Left 3/24/2021    Procedure: ARTHROPLASTY, HIP REPLACEMENT;  Surgeon: Miles Rivas II, MD;  Location: [facility] OR;  Service: Orthopedics;  Laterality: Left;    JOINT REPLACEMENT Right     shoulder    REVISION TOTAL HIP ARTHROPLASTY Left 6/2/2021    Procedure: REVISION, TOTAL ARTHROPLASTY, HIP;  Surgeon: Miles Rivas II, MD;  Location: [facility] OR;  Service: Orthopedics;  Laterality: Left;    SMALL BOWEL ENTEROSCOPY Left 5/8/2023    Procedure: ENTEROSCOPY;  Surgeon: Bobby Bourgeois MD;  Location: [facility];  Service: Endoscopy;  Laterality: Left;     Family History   Problem Relation Age of Onset    Cancer Mother         leukemia     Social History     Tobacco Use    Smoking status: Every Day     Current packs/day: 0.50     Average packs/day: 0.5 packs/day for 30.0 years (15.0 ttl pk-yrs)     Types: Cigarettes     Passive exposure: Current    Smokeless tobacco: Never   Substance Use Topics    Alcohol use: Never    Drug use: Never     Review of Systems   Constitutional:  Positive for fatigue.   HENT: Negative.     Eyes: Negative.    Respiratory:  Positive for cough, shortness of breath and wheezing.    Cardiovascular: Negative.    Gastrointestinal: Negative.    Endocrine: Negative.    Genitourinary: Negative.    Musculoskeletal:  Positive for myalgias.   Skin: Negative.    Allergic/Immunologic: Negative.    Neurological:  Positive for  headaches.   Hematological: Negative.    Psychiatric/Behavioral: Negative.     All other systems reviewed and are negative.      Physical Exam     Initial Vitals [12/12/23 1927]   BP Pulse Resp Temp SpO2   (!) 133/96 99 18 97.8 °F (36.6 °C) 99 %      MAP       --         Physical Exam    Nursing note and vitals reviewed.  Constitutional: He appears well-developed and well-nourished.   HENT:   Head: Normocephalic and atraumatic.   Nose: Nose normal.   Eyes: Conjunctivae and EOM are normal.   Neck: Neck supple. No tracheal deviation present.   Normal range of motion.  Cardiovascular:  Normal rate, regular rhythm, normal heart sounds and intact distal pulses.     Exam reveals no friction rub.       No murmur heard.  Pulmonary/Chest: No respiratory distress. He has wheezes. He has no rales.   Abdominal: Abdomen is soft. He exhibits no distension. There is no abdominal tenderness.   Musculoskeletal:         General: No tenderness. Normal range of motion.      Cervical back: Normal range of motion and neck supple.     Neurological: He is alert and oriented to person, place, and time. He has normal strength. GCS score is 15. GCS eye subscore is 4. GCS verbal subscore is 5. GCS motor subscore is 6.   Skin: Skin is warm and dry. Capillary refill takes less than 2 seconds.   Psychiatric: He has a normal mood and affect. Thought content normal.         ED Course   Procedures  Labs Reviewed   CBC W/ AUTO DIFFERENTIAL - Abnormal; Notable for the following components:       Result Value    RBC 3.84 (*)     Hemoglobin 11.4 (*)     Hematocrit 35.0 (*)     RDW 14.6 (*)     Platelets 133 (*)     Gran % 73.7 (*)     All other components within normal limits   COMPREHENSIVE METABOLIC PANEL - Abnormal; Notable for the following components:    CO2 31 (*)     Anion Gap 4 (*)     All other components within normal limits   B-TYPE NATRIURETIC PEPTIDE - Abnormal; Notable for the following components:     (*)     All other components  within normal limits   CK - Abnormal; Notable for the following components:     (*)     All other components within normal limits   MAGNESIUM   TROPONIN I HIGH SENSITIVITY   TROPONIN I HIGH SENSITIVITY   SARS-COV-2 RNA AMPLIFICATION, QUAL   PROCALCITONIN   POCT CREATININE     EKG Readings: (Independently Interpreted)   Initial Reading: No STEMI. Rhythm: Normal Sinus Rhythm. Ectopy: No Ectopy. Conduction: Normal.       Imaging Results              CTA Chest Non-Coronary (PE Studies) (Final result)  Result time 12/12/23 23:13:54      Final result by Bib Espinoza MD (12/12/23 23:13:54)                   Narrative:    EXAMINATION(S):  CT CHEST ANGIOGRAPHY WITH IV CONTRAST    COMPARISON: Portable chest December 12, 2023. CT chest November 10, 2022    INDICATION: Pulmonary embolism (PE) suspected, high prob. 63-year-old male with shortness of breath. History of a fall yesterday with left chest trauma.    TECHNIQUE: CT angiography of the chest was performed with the administration of intravenous contrast media in order to evaluate the pulmonary arteries for pulmonary embolus. A 100 mL Omnipaque 350 bolus injection of water soluble contrast was administered intravenously followed by thin section cuts through the chest. This was followed by imaging post-processing with 3-D reconstruction of the pulmonary arteries in the coronal and sagittal planes with images stored in the patient's permanent medical record. All CT scans at this facility use dose modulation, iterative reconstruction and/or weight based dosing when appropriate to reduce radiation dose to as low as reasonably achievable. .70 mGycm.    FINDING(S):  Pulmonary arteries: The contrast bolus is diagnostic. No pulmonary emboli are identified. No right heart strain is evident.  Thoracic aorta: Mild thoracic aortic atherosclerosis is noted. There is no evidence of thoracic aortic aneurysm, dissection or other acute thoracic aortic findings.  Heart: The  heart size appears normal. Coronary artery atherosclerosis is noted. No pericardial effusion.  Mediastinum and hilum: No mediastinal or hilar mass or pathologic lymph node enlargement is evident.  Pulmonary: Abnormal secretions are noted opacifying the right lower lobe bronchus and subjacent lower lobe bronchi beginning around axial image 140 down to the axial image 2-5. Mild scattered emphysema is again noted. Basilar right middle lobe scarring or atelectasis is noted along with right greater than left posterior lower lobe infiltrate, atelectasis or scarring. No lobar consolidation or pleural effusion is evident. No pneumothorax is evident.  Bones: Artifact from a right shoulder joint prosthesis is again noted. Acute very mildly displaced lateral left sixth seventh and eighth rib fractures are noted.  Subdiaphragmatic: Splenomegaly is again noted with the spleen measuring 15.5 cm AP  Body wall and axilla: No acute body wall pathology.    IMPRESSION:    1. No evidence of acute pulmonary emboli.    2. Opacification of the right lower lobe bronchus and subjacent segmental bronchi is noted from apparent mucous plug formation.    3. Bilateral lower lobe atelectasis, scarring or infiltrate is greater on the right. Basilar right middle lobe scarring or atelectasis is also noted.    4. Acute lateral left sixth, seventh and eighth rib fractures are also noted. No pneumothorax is evident.    5. Splenomegaly is incidentally noted, unchanged.    Electronically signed by:  Bib Espinoza MD  12/12/2023 11:13 PM CST Workstation: [id number]                                     CT Cervical Spine Without Contrast (Final result)  Result time 12/12/23 22:35:42      Final result by Demetrio Hidalgo DO (12/12/23 22:35:42)                   Narrative:    EXAMINATION: CT CERVICAL SPINE WITHOUT IV CONTRAST    INDICATION: Neck trauma (Age >= 65y)    COMPARISON(S): CT head 12/12/2023 and 11/10/2022.    TECHNIQUE: CT acquisition of the  cervical spine with multiplanar reformations.  Contrast: None.    RADIATION DOSE REDUCTION: One or more of the following dose optimizing techniques was utilized for this exam: Automated exposure control, adjustment of the mA and/or kV according to patient size, and/or use of iterative reconstruction technique.    FINDINGS:    SPINE    Vertebrae: 2 mm anterolisthesis at C4-5. Mild cervical dextrocurvature which may be positional. No jumped or perched facets. Vertebral body heights are preserved. Moderate to severe degenerative changes of the atlantodens joint. No acute fracture.    Discs: Diffuse intervertebral disc space loss with degenerative endplate changes, facet arthropathy, and uncovertebral joint hypertrophy. Posterior disc osteophyte complexes at C3-4, C4-5, and C5-6 resulting in moderate spinal canal stenosis. Associated neural foraminal narrowing at C5-6.      OTHER    Soft tissues: Normal appearance of the paraspinal soft tissues. Calcific atherosclerosis of the carotid bulbs extending into the proximal internal carotid arteries.    Head: See report from CT head for detailed intracranial findings. Normal appearance of the imaged posterior fossa on the current exam.    Lung apices: Normal as visualized.      IMPRESSION:  1.  No acute osseous abnormality of the cervical spine.  2.  Moderate cervical spondylosis as detailed above.    Electronically signed by:  Demetrio Hidalgo DO  12/12/2023 10:35 PM CST Workstation: [id number]                                     CT Head Without Contrast (Final result)  Result time 12/12/23 22:33:31      Final result by Bobby Phillips MD (12/12/23 22:33:31)                   Narrative:    Head CT scan without contrast    COMPARISONS: Head CT scan without contrast dated November 10, 2022    ADDITIONAL PERTINENT HISTORY: Minor head trauma    TECHNIQUE:  Multiple axial images were obtained from the skull base to the vertex without IV contrast. One of the following dose  optimization techniques was utilized in the performance of this exam: Automated exposure control; adjustment of the mA and/or kV according to the patient's size; or use of an iterative  reconstruction technique.  Specific details can be referenced in the facility's radiology CT exam operational policy.    FINDINGS:    Midline shift: Negative    Ventricles:  Negative    Brain parenchyma:  Mild patchy hypoattenuation within the periventricular and subcortical white matter, nonspecific but likely representing small vessel ischemic change on a chronic basis. No intraparenchymal hemorrhage or mass effect.    Extra-axial spaces:  Mild cerebral atrophy    Intracranial vasculature:  Cavernous internal carotid artery calcifications.    Osseous structures:  Negative    Paranasal sinuses and mastoid air cells:  Negative    Surrounding soft tissues and orbits:  Negative      IMPRESSION:  1. Age related changes as described above.  2. No acute intracranial pathology.    Electronically signed by:  Bobby Phillips MD  12/12/2023 10:33 PM CST Workstation: [id number]                                     X-Ray Chest AP Portable (Final result)  Result time 12/12/23 22:36:28      Final result by Demetrio Hidalgo DO (12/12/23 22:36:28)                   Narrative:    EXAMINATION: XR CHEST 1 VIEW, 12/12/2023 10:35 PM CST    INDICATION: Chest Pain    COMPARISON(S): Chest radiograph 11/10/2022.    TECHNIQUE: Portable AP view.    FINDINGS:  Support Apparatus: Overlying leads.    Lung Parenchyma: Symmetric lung volumes.  No focal consolidation.  Pleura: No pneumothorax or pleural effusion.  Heart/mediastinum: Cardiomediastinal silhouette has normal size and configuration.    Upper abdomen: Normal as visualized.    Musculoskeletal: Right shoulder arthroplasty. Severe osteoarthritis of the left glenohumeral joint. No acute osseous findings.    IMPRESSION:  No acute cardiopulmonary process.    Electronically signed by:  Demetrio Hidalgo DO  12/12/2023  10:36 PM [facility] Workstation: [id number]                                  X-Rays:   Independently Interpreted Readings:   Other Readings:  Chest x-ray does show mild haziness on the left side of the chest.  No obvious displaced rib fractures or pneumothorax noted on x-ray    Medications   albuterol-ipratropium 2.5 mg-0.5 mg/3 mL nebulizer solution 3 mL (3 mLs Nebulization Given 12/12/23 1944)   amLODIPine tablet 5 mg (has no administration in time range)   busPIRone tablet 15 mg (has no administration in time range)   cyclobenzaprine tablet 10 mg (has no administration in time range)   ferrous sulfate tablet 1 each (has no administration in time range)   gabapentin capsule 600 mg (has no administration in time range)   lisinopriL tablet 40 mg (has no administration in time range)   methadone tablet 40 mg (has no administration in time range)   metoprolol succinate (TOPROL-XL) 24 hr tablet 50 mg (has no administration in time range)   pantoprazole EC tablet 40 mg (has no administration in time range)   tamsulosin 24 hr capsule 0.8 mg (has no administration in time range)   sodium chloride 0.9% flush 10 mL (has no administration in time range)   melatonin tablet 6 mg (has no administration in time range)   acetaminophen tablet 650 mg (has no administration in time range)   HYDROcodone-acetaminophen 5-325 mg per tablet 1 tablet (has no administration in time range)   morphine injection 2 mg (has no administration in time range)   ondansetron disintegrating tablet 8 mg (has no administration in time range)   senna-docusate 8.6-50 mg per tablet 1 tablet (has no administration in time range)   polyethylene glycol packet 17 g (has no administration in time range)   lactated ringers bolus 2,000 mL (0 mLs Intravenous Stopped 12/12/23 2132)   methylPREDNISolone sodium succinate injection 125 mg (125 mg Intravenous Given 12/12/23 2033)   iohexoL (OMNIPAQUE 350) injection 100 mL (100 mLs Intravenous Given 12/12/23 2206)   morphine  injection 4 mg (4 mg Intravenous Given 12/13/23 0057)     Medical Decision Making  63-year-old male with 2 falls presented emergency department.  Patient initially was hypoxic and confused when EMS arrived.  After supplemental oxygen given oxygenation improved and patient is more awake and answering all questions appropriately and mentating well.  Given the fall CT of the head and chest and neck done and head CT unremarkable.  CT of the chest did show multiple rib fractures and evidence of pulmonary contusion.  Patient also coughing and wheezing so this could also be COPD exacerbation complicating patient's pulmonary condition and hypoxia and rib fractures.  Patient started on supplemental oxygen.  Broad-spectrum antibiotics given.  Pain managed.  Hospital Medicine consulted for evaluation for admission and further management.    Amount and/or Complexity of Data Reviewed  Labs: ordered. Decision-making details documented in ED Course.  Radiology: ordered and independent interpretation performed. Decision-making details documented in ED Course.  ECG/medicine tests: ordered and independent interpretation performed. Decision-making details documented in ED Course.    Risk  Prescription drug management.  Decision regarding hospitalization.                                      Clinical Impression:  Final diagnoses:  [R07.9] Chest pain  [R09.02] Hypoxia (Primary)  [S27.321A] Contusion of left lung, initial encounter  [S22.42XA] Closed fracture of multiple ribs of left side, initial encounter  [J44.1] COPD exacerbation  [S22.39XA] Rib fracture          ED Disposition Condition    Observation                 Chante Albarado MD  12/13/23 0229

## 2023-12-13 NOTE — PLAN OF CARE
Patient refused to sign - signed AMA paper     12/13/23 1043   [facility] Message   Medicare Outpatient and Observation Notification regarding financial responsibility Given to patient/caregiver;Explained to patient/caregiver

## 2023-12-13 NOTE — PT/OT/SLP EVAL
Occupational Therapy   Evaluation and Discharge Note    Name: Omar Trotter  MRN: [id number]  Admitting Diagnosis: Fracture of multiple ribs  Recent Surgery: * No surgery found *      Recommendations:     Discharge Recommendations: No Therapy Indicated  Discharge Equipment Recommendations: none  Barriers to discharge:  None    Assessment:     Omar Trotter is a 63 y.o. male with a medical diagnosis of Fracture of multiple ribs. Upon arrival, patient seated in chair with PT present. Patient limited with responses due to pain. Patient's participation limited due to pain as well. Patient was able to dress himself and sit EOB without assistance. Patient left AMA.     Plan:     During this hospitalization, patient does not require further acute OT services.  Please re-consult if situation changes.    Plan of Care Reviewed with: patient, daughter    Subjective     Chief Complaint: back pain  Patient/Family Comments/goals: none    Occupational Profile:  Living Environment: Patient lives alone in a camper. Patient has a friend that lives next door that provides assistance.   Previous level of function: Patient was independent with ADLs. Patient was ambulatory using a cane.   Equipment Used at home: cane, straight  Assistance upon Discharge: Patient will receive assistance from daughter and friend who lives next door to patient.    Pain/Comfort:  Pain Rating 1:  (not rated)  Location - Side 1: Left  Location - Orientation 1: lateral  Location 1: chest  Pain Addressed 1: Reposition, Distraction, Nurse notified  Pain Rating Post-Intervention 1:  (not rated)    Patients cultural, spiritual, Denominational conflicts given the current situation:      Objective:     Communicated with: nurse Ramirez prior to session.  Patient found HOB elevated with peripheral IV, telemetry upon OT entry to room.    General Precautions: Standard, fall  Orthopedic Precautions: N/A  Braces: N/A  Respiratory Status: Room air     Occupational  Performance:    Bed Mobility:    Patient completed Scooting/Bridging with stand by assistance  Patient completed Supine to Sit with stand by assistance  Patient completed Sit to Supine with stand by assistance    Functional Mobility/Transfers:  Patient completed Sit <> Stand Transfer with stand by assistance  with  no assistive device     Activities of Daily Living:  Upper Body Dressing: stand by assistance to don shirt and sweater while seated EOB  Lower Body Dressing: stand by assistance to don/doff socks while seated EOB    Cognitive/Visual Perceptual:  Cognitive/Psychosocial Skills:     -       Oriented to: Person, Place, and Situation   -       Follows Commands/attention:Follows multistep  commands  -       Communication: clear/fluent and but limited with responses  -       Safety awareness/insight to disability: impaired   -       Mood/Affect/Coping skills/emotional control: Appropriate to situation, Anger, Cooperative, and Agitated  Visual/Perceptual:      -Impaired  acuity; pt did not have his eyeglasses in room    Physical Exam:  Postural examination/scapula alignment:    -       Rounded shoulders  -       Forward head  Upper Extremity Range of Motion:     -       Right Upper Extremity: WFL  -       Left Upper Extremity: WFL  Upper Extremity Strength:    -       Right Upper Extremity: WFL  -       Left Upper Extremity: WFL   Strength:    -       Right Upper Extremity: WFL  -       Left Upper Extremity: WFL  Fine Motor Coordination:    -       Intact  Gross motor coordination:   WFL    AMPAC 6 Click ADL:  AMPAC Total Score: 24    Treatment & Education:  OT ed pt on OT role & POC as well as discharge recommendations.      Patient left sitting edge of bed with  nurse and daughter present    GOALS:   Multidisciplinary Problems       Occupational Therapy Goals       Not on file                    History:     Past Medical History:   Diagnosis Date    JACKLYN (acute kidney injury) 11/11/2022    Arthritis     GERD  (gastroesophageal reflux disease)     Hepatitis C     STATES DX 10-20. NO S/S. NO FURTHER TESTS OR TX. WITH INSURANCE IN 2021 CAN GET IT EVALUATED.    History of left hip replacement 05/20/2021    History of MRSA infection     History of right shoulder replacement 01/15/2021    Hypertension     Wears glasses          Past Surgical History:   Procedure Laterality Date    APPENDECTOMY      ARTHROPLASTY OF SHOULDER Right 1/13/2021    Procedure: ARTHROPLASTY, SHOULDER TOTAL;  Surgeon: Emilio Siddiqui MD;  Location: [facility] OR;  Service: Orthopedics;  Laterality: Right;  GENERAL AND BLOCK    COLONOSCOPY N/A 5/8/2023    Procedure: COLONOSCOPY;  Surgeon: Bobby Bourgeois MD;  Location: [facility] ENDO;  Service: Endoscopy;  Laterality: N/A;    ESOPHAGOGASTRODUODENOSCOPY N/A 5/7/2023    Procedure: EGD (ESOPHAGOGASTRODUODENOSCOPY);  Surgeon: Carloz Christianson Jr., MD;  Location: [facility] ENDO;  Service: Endoscopy;  Laterality: N/A;    HERNIA REPAIR      HIP REPLACEMENT ARTHROPLASTY Left 3/24/2021    Procedure: ARTHROPLASTY, HIP REPLACEMENT;  Surgeon: Miles Rivas II, MD;  Location: [facility] OR;  Service: Orthopedics;  Laterality: Left;    JOINT REPLACEMENT Right     shoulder    REVISION TOTAL HIP ARTHROPLASTY Left 6/2/2021    Procedure: REVISION, TOTAL ARTHROPLASTY, HIP;  Surgeon: Miles Rivas II, MD;  Location: [facility] OR;  Service: Orthopedics;  Laterality: Left;    SMALL BOWEL ENTEROSCOPY Left 5/8/2023    Procedure: ENTEROSCOPY;  Surgeon: Bobby Bourgeois MD;  Location: [facility] ENDO;  Service: Endoscopy;  Laterality: Left;       Time Tracking:     OT Date of Treatment: 12/13/23  OT Start Time: 1010  OT Stop Time: 1040  OT Total Time (min): 30 min    Billable Minutes:Evaluation 6  Self Care/Home Management 24    12/13/2023

## 2023-12-13 NOTE — PLAN OF CARE
Patient transfer via  stretcher from ER to room 3020. Alert [id number]. Mild anxiety at this time. Initial assessment completed and documented. PO pain medication administered per order. Safety maintain. Bed  alarm in place. No acute distress noted at this time.

## 2023-12-13 NOTE — CARE UPDATE
12/13/23 0351   PRE-TX-O2   Device (Oxygen Therapy) room air   Pulse Oximetry Type Intermittent   $ Pulse Oximetry - Multiple Charge Pulse Oximetry - Multiple   Pulse 94   Resp 19   Education   $ Education 15 min   Respiratory Evaluation   $ Care Plan Tech Time 15 min

## 2023-12-13 NOTE — PLAN OF CARE
CM attempted bedside assessment - patient verbalized signed out AMA   12/13/23 1043   Discharge Assessment   Assessment Type Discharge Planning Assessment

## 2023-12-13 NOTE — DISCHARGE SUMMARY
[facility] Medicine  Discharge Summary      Patient Name: Omar Trotter  MRN: [id number]  ANGEL: [id number]  Patient Class: OP- Observation  Admission Date: 12/12/2023  Hospital Length of Stay: 0 days  Discharge Date and Time:  12/13/2023 2:00 PM  Attending Physician: No att. providers found   Discharging Provider: Mireille Rogel PA-C  Primary Care Provider: Cam Orozco MD    Primary Care Team: Networked reference to record PCT     HPI:   No notes on file    * No surgery found *      Hospital Course:   Patient was admitted to the hospital with multiple rib fractures and hypoxia.  Patient was monitored closely throughout his hospital stay.  Syncope workup was in process when patient decided he wanted to leave AMA.  Nursing staff was at bedside and explained the risk of leaving AMA including but not limited to death.  Patient signed out AMA.     Goals of Care Treatment Preferences:  Code Status: Full Code      Consults:     No new Assessment & Plan notes have been filed under this hospital service since the last note was generated.  Service: Hospital Medicine    Final Active Diagnoses:    Diagnosis Date Noted POA    PRINCIPAL PROBLEM:  Fracture of multiple ribs [S22.49XA] 12/12/2023 Unknown      Problems Resolved During this Admission:       Discharged Condition: against medical advice    Disposition: Home or Self Care    Follow Up:    Patient Instructions:   No discharge procedures on file.    Significant Diagnostic Studies: Labs: CMP   Recent Labs   Lab 12/12/23 2033 12/13/23  0718    137   K 4.2 4.6    103   CO2 31* 28   GLU 89 111*   BUN 13 14   CREATININE 0.8 0.7   CALCIUM 9.3 9.0   PROT 7.3 6.9   ALBUMIN 3.6 3.5   BILITOT 0.5 0.4   ALKPHOS 107 100   AST 30 29   ALT 14 14   ANIONGAP 4* 6*    and CBC   Recent Labs   Lab 12/12/23 2033   WBC 6.20   HGB 11.4*   HCT 35.0*   *       Pending Diagnostic Studies:       Procedure Component Value Units Date/Time     Procalcitonin [[id number]] Collected: 12/13/23 0718    Order Status: Sent Lab Status: In process Updated: 12/13/23 0824    Specimen: Blood     Narrative:      Collection has been rescheduled by 2MB at 12/13/2023 05:08 Reason:   Patient unavailable due to ultra sound           Medications:  None    Indwelling Lines/Drains at time of discharge:   Lines/Drains/Airways       None                   Time spent on the discharge of patient: 25 minutes         Mireille Rogel PA-C  Department of Hospital Medicine  [facility]

## 2023-12-13 NOTE — H&P
[facility] Medicine   History & Physical   Patient Name: Omar Trotter  MRN: [id number]  Admission Date: 12/12/2023  7:10 PM  Attending Physician: Hilario Pelaez MD   Primary Care Provider: Cam Orozco MD  Face-to-Face encounter date: 12/13/2023    Patient information was obtained from patient, past medical records, ER physician, and ER records.     HISTORY OF PRESENT ILLNESS:     Omar Trotter is a 63 y.o. White male with PMH of GERD, hepatitis C, HTN.    who presents with syncopal episode.     Patient is poor historian. Daughter is at bedside and she is not aware of the reasons for admission. Patient lives in a camper by himself. Says he fell today. He felt lightheaded and passed out. Unclear how long he was down for. Said he fell out of his camper and hit the left side of his chest on a concrete block. Denies head injury. Said cold woke him up. He called EMS. Denies any chest pain prior to falling, but after he had the rib pains. He is still in severe pain. Said he had fell the day before and says he may have passed out. Says he had frequent falls.     In the ED, he was hemodynamically stable except hypoxic, needing 2 L oxygen. He was given morphine for pain. He was found to have acute left 6, 7,8th rib fractures. CTA chest also noted possible mucus plugging in the right lower lobe bronchus. CT head and C spine was negative for acute pathology. Case discussed with the ED provider and patient was placed on observation for further work up and management.       REVIEW OF SYSTEMS:     All systems reviewed and are negative except as noted per above.    PAST MEDICAL HISTORY:     Past Medical History:   Diagnosis Date    JACKLYN (acute kidney injury) 11/11/2022    Arthritis     GERD (gastroesophageal reflux disease)     Hepatitis C     STATES DX 10-20. NO S/S. NO FURTHER TESTS OR TX. WITH INSURANCE IN 2021 CAN GET IT EVALUATED.    History of left hip replacement 05/20/2021    History of  MRSA infection     History of right shoulder replacement 01/15/2021    Hypertension     Wears glasses        PAST SURGICAL HISTORY:     Past Surgical History:   Procedure Laterality Date    APPENDECTOMY      ARTHROPLASTY OF SHOULDER Right 1/13/2021    Procedure: ARTHROPLASTY, SHOULDER TOTAL;  Surgeon: Emilio Siddiqui MD;  Location: [facility] OR;  Service: Orthopedics;  Laterality: Right;  GENERAL AND BLOCK    COLONOSCOPY N/A 5/8/2023    Procedure: COLONOSCOPY;  Surgeon: Bobby Bourgeois MD;  Location: [facility] ENDO;  Service: Endoscopy;  Laterality: N/A;    ESOPHAGOGASTRODUODENOSCOPY N/A 5/7/2023    Procedure: EGD (ESOPHAGOGASTRODUODENOSCOPY);  Surgeon: Carloz Christianson Jr., MD;  Location: [facility] ENDO;  Service: Endoscopy;  Laterality: N/A;    HERNIA REPAIR      HIP REPLACEMENT ARTHROPLASTY Left 3/24/2021    Procedure: ARTHROPLASTY, HIP REPLACEMENT;  Surgeon: Miles Rivas II, MD;  Location: [facility] OR;  Service: Orthopedics;  Laterality: Left;    JOINT REPLACEMENT Right     shoulder    REVISION TOTAL HIP ARTHROPLASTY Left 6/2/2021    Procedure: REVISION, TOTAL ARTHROPLASTY, HIP;  Surgeon: Miles Rivas II, MD;  Location: [facility] OR;  Service: Orthopedics;  Laterality: Left;    SMALL BOWEL ENTEROSCOPY Left 5/8/2023    Procedure: ENTEROSCOPY;  Surgeon: Bobby Bourgeois MD;  Location: [facility];  Service: Endoscopy;  Laterality: Left;       ALLERGIES:   Pcn [penicillins]    FAMILY HISTORY:     Family History   Problem Relation Age of Onset    Cancer Mother         leukemia       SOCIAL HISTORY:     Social History     Tobacco Use    Smoking status: Every Day     Current packs/day: 0.50     Average packs/day: 0.5 packs/day for 30.0 years (15.0 ttl pk-yrs)     Types: Cigarettes     Passive exposure: Current    Smokeless tobacco: Never   Substance Use Topics    Alcohol use: Never        Social History     Substance and Sexual Activity   Sexual Activity Yes    Partners: Female        HOME MEDICATIONS:     Prior to Admission  "medications    Medication Sig Start Date End Date Taking? Authorizing Provider   amLODIPine (NORVASC) 5 MG tablet Take 1 tablet (5 mg total) by mouth once daily. 7/13/23 7/12/24  Cam Orozco MD   busPIRone (BUSPAR) 15 MG tablet buspirone 15 mg tablet   Take 1 tablet every day by oral route. 7/13/23   Cam Orozco MD   cyclobenzaprine (FLEXERIL) 10 MG tablet Take 1 tablet (10 mg total) by mouth 3 (three) times daily as needed for Muscle spasms. 9/14/23   Cam Orozco MD   ferrous sulfate (FEOSOL) Tab tablet Take 1 tablet by mouth daily with breakfast.    Provider, Historical   gabapentin (NEURONTIN) 600 MG tablet Take 1 tablet (600 mg total) by mouth 3 (three) times daily. 7/13/23   Cam Orozco MD   lisinopriL (PRINIVIL,ZESTRIL) 40 MG tablet Take 1 tablet (40 mg total) by mouth once daily. 7/13/23   Cam Orozco MD   methadone (METHADOSE) 40 mg disintegrating tablet Take 40 mg by mouth As instructed for Pain. Patient goes to clinic three days a week    Provider, Historical   methocarbamoL (ROBAXIN) 500 MG Tab TAKE 1 TABLET BY MOUTH NIGHTLY AS NEEDED 7/13/23   Cam Orozco MD   metoprolol succinate (TOPROL-XL) 50 MG 24 hr tablet Take 1 tablet (50 mg total) by mouth once daily. 7/13/23 7/12/24  Cam Orozco MD   oxybutynin (DITROPAN-XL) 5 MG TR24 Take 1 tablet (5 mg total) by mouth once daily. 7/13/23 7/12/24  Cam Orozco MD   pantoprazole (PROTONIX) 40 MG tablet Take 1 tablet (40 mg total) by mouth once daily. 7/13/23   Cam Orozco MD   tamsulosin (FLOMAX) 0.4 mg Cap Take 2 capsules (0.8 mg total) by mouth once daily. 7/13/23 7/12/24  Cam Orozco MD       PHYSICAL EXAM:     BP (!) 144/69   Pulse 94   Temp 97.8 °F (36.6 °C) (Oral)   Resp 20   Ht 5' 11" (1.803 m)   Wt 79.4 kg (175 lb)   SpO2 98%   BMI 24.41 kg/m²     Gen: Awake and alert  HEENT: Eyes with no icterus, not injected.  External ears with no lesions  Nares patent  Mouth moist mucous " membranes  CV: Regular rate and rhythm, no murmurs, no edema.  Capillary refill < 2 seconds.  Lungs:  Clear to auscultation bilaterally, no tachypnea or increased work of breathing.  Abdomen:  Soft with active bowel sounds, no tenderness to palpation, no distention.  Musculoskeletal:  Moves all extremities with good strength.  No clubbing.  Skin:  Warm and dry, no obvious wounds or rashes.    Neuro:  No focal deficits.  No numbness or tingling.  Psych:  Calm and cooperative, awake alert and oriented.    LABS AND IMAGING:     Labs Reviewed   CBC W/ AUTO DIFFERENTIAL - Abnormal; Notable for the following components:       Result Value    RBC 3.84 (*)     Hemoglobin 11.4 (*)     Hematocrit 35.0 (*)     RDW 14.6 (*)     Platelets 133 (*)     Gran % 73.7 (*)     All other components within normal limits   COMPREHENSIVE METABOLIC PANEL - Abnormal; Notable for the following components:    CO2 31 (*)     Anion Gap 4 (*)     All other components within normal limits   B-TYPE NATRIURETIC PEPTIDE - Abnormal; Notable for the following components:     (*)     All other components within normal limits   CK - Abnormal; Notable for the following components:     (*)     All other components within normal limits   MAGNESIUM   TROPONIN I HIGH SENSITIVITY   TROPONIN I HIGH SENSITIVITY   SARS-COV-2 RNA AMPLIFICATION, QUAL   POCT CREATININE       CTA Chest Non-Coronary (PE Studies)   Final Result      CT Cervical Spine Without Contrast   Final Result      CT Head Without Contrast   Final Result      X-Ray Chest AP Portable   Final Result          ASSESSMENT & PLAN:   Omar Trotter is a 63 y.o. male admitted for    Acute Hypoxic respiratory failure   Left 6,7,8 acute rib fractures   CT chest also shows possible mucus plug formation on the right lower lobe bronchus. However Patient does not have any cough or sputum or fever. No leukocytosis  - ordered procalcitonin   - Cont breathing Rx  - Intensive spirometry   - wean  oxygen   - Pain control: tylenol, Norco and Morphine. Patient also on methadone     Recurrent syncope with falls   - Check orthostatic BP  - Carotid US   - echo   - telemetry  - PT, OT    HTN  GERD  BPH   - resume home meds       James Li MD   [facility] Hospitalist  12/13/2023  12:03 AM

## 2023-12-14 NOTE — PLAN OF CARE
12/14/23 1439   Final Note   Assessment Type Final Discharge Note   Anticipated Discharge Disposition Left Against

## 2024-02-29 ENCOUNTER — HOSPITAL ENCOUNTER (INPATIENT)
Facility: HOSPITAL | Age: 64
LOS: 1 days | Discharge: HOME | DRG: 369 | End: 2024-03-02
Attending: STUDENT IN AN ORGANIZED HEALTH CARE EDUCATION/TRAINING PROGRAM | Admitting: HOSPITALIST
Payer: MEDICARE

## 2024-02-29 DIAGNOSIS — K20.90 ESOPHAGITIS: Primary | ICD-10-CM

## 2024-02-29 DIAGNOSIS — R11.2 NAUSEA AND VOMITING, UNSPECIFIED VOMITING TYPE: ICD-10-CM

## 2024-02-29 DIAGNOSIS — K21.9 GASTROESOPHAGEAL REFLUX DISEASE WITHOUT ESOPHAGITIS: ICD-10-CM

## 2024-02-29 DIAGNOSIS — R07.9 CHEST PAIN: ICD-10-CM

## 2024-02-29 DIAGNOSIS — K92.2 GI BLEED: ICD-10-CM

## 2024-02-29 DIAGNOSIS — K92.2 GASTROINTESTINAL HEMORRHAGE, UNSPECIFIED GASTROINTESTINAL HEMORRHAGE TYPE: ICD-10-CM

## 2024-02-29 DIAGNOSIS — R10.13 EPIGASTRIC PAIN: ICD-10-CM

## 2024-02-29 LAB
ALBUMIN SERPL BCP-MCNC: 3.9 G/DL (ref 3.5–5.2)
ALP SERPL-CCNC: 119 U/L (ref 55–135)
ALT SERPL W/O P-5'-P-CCNC: 12 U/L (ref 10–44)
ANION GAP SERPL CALC-SCNC: 11 MMOL/L (ref 8–16)
AST SERPL-CCNC: 18 U/L (ref 10–40)
BASOPHILS # BLD AUTO: 0.03 K/UL (ref 0–0.2)
BASOPHILS NFR BLD: 0.5 % (ref 0–1.9)
BILIRUB SERPL-MCNC: 0.4 MG/DL (ref 0.1–1)
BUN SERPL-MCNC: 16 MG/DL (ref 8–23)
CALCIUM SERPL-MCNC: 9.4 MG/DL (ref 8.7–10.5)
CHLORIDE SERPL-SCNC: 108 MMOL/L (ref 95–110)
CO2 SERPL-SCNC: 18 MMOL/L (ref 23–29)
CREAT SERPL-MCNC: 0.6 MG/DL (ref 0.5–1.4)
CREAT SERPL-MCNC: 0.7 MG/DL (ref 0.5–1.4)
DIFFERENTIAL METHOD BLD: ABNORMAL
EOSINOPHIL # BLD AUTO: 0 K/UL (ref 0–0.5)
EOSINOPHIL NFR BLD: 0.3 % (ref 0–8)
ERYTHROCYTE [DISTWIDTH] IN BLOOD BY AUTOMATED COUNT: 14.9 % (ref 11.5–14.5)
EST. GFR  (NO RACE VARIABLE): >60 ML/MIN/1.73 M^2
GLUCOSE SERPL-MCNC: 98 MG/DL (ref 70–110)
HCT VFR BLD AUTO: 42.8 % (ref 40–54)
HGB BLD-MCNC: 14 G/DL (ref 14–18)
IMM GRANULOCYTES # BLD AUTO: 0.01 K/UL (ref 0–0.04)
IMM GRANULOCYTES NFR BLD AUTO: 0.2 % (ref 0–0.5)
INR PPP: 1.1 (ref 0.8–1.2)
LACTATE SERPL-SCNC: 1.5 MMOL/L (ref 0.5–1.9)
LIPASE SERPL-CCNC: 16 U/L (ref 4–60)
LYMPHOCYTES # BLD AUTO: 1.5 K/UL (ref 1–4.8)
LYMPHOCYTES NFR BLD: 24.3 % (ref 18–48)
MCH RBC QN AUTO: 28.4 PG (ref 27–31)
MCHC RBC AUTO-ENTMCNC: 32.7 G/DL (ref 32–36)
MCV RBC AUTO: 87 FL (ref 82–98)
MONOCYTES # BLD AUTO: 0.3 K/UL (ref 0.3–1)
MONOCYTES NFR BLD: 5.2 % (ref 4–15)
NEUTROPHILS # BLD AUTO: 4.4 K/UL (ref 1.8–7.7)
NEUTROPHILS NFR BLD: 69.5 % (ref 38–73)
NRBC BLD-RTO: 0 /100 WBC
PLATELET # BLD AUTO: 160 K/UL (ref 150–450)
PMV BLD AUTO: 10.1 FL (ref 9.2–12.9)
POTASSIUM SERPL-SCNC: 3.8 MMOL/L (ref 3.5–5.1)
PROT SERPL-MCNC: 7.6 G/DL (ref 6–8.4)
PROTHROMBIN TIME: 11.8 SEC (ref 9–12.5)
RBC # BLD AUTO: 4.93 M/UL (ref 4.6–6.2)
SAMPLE: NORMAL
SODIUM SERPL-SCNC: 137 MMOL/L (ref 136–145)
TROPONIN I SERPL HS-MCNC: 5.2 PG/ML (ref 0–14.9)
WBC # BLD AUTO: 6.33 K/UL (ref 3.9–12.7)

## 2024-02-29 PROCEDURE — 83690 ASSAY OF LIPASE: CPT | Performed by: STUDENT IN AN ORGANIZED HEALTH CARE EDUCATION/TRAINING PROGRAM

## 2024-02-29 PROCEDURE — 25500020 PHARM REV CODE 255: Performed by: STUDENT IN AN ORGANIZED HEALTH CARE EDUCATION/TRAINING PROGRAM

## 2024-02-29 PROCEDURE — 80053 COMPREHEN METABOLIC PANEL: CPT | Performed by: STUDENT IN AN ORGANIZED HEALTH CARE EDUCATION/TRAINING PROGRAM

## 2024-02-29 PROCEDURE — 93010 ELECTROCARDIOGRAM REPORT: CPT | Mod: ,,, | Performed by: GENERAL PRACTICE

## 2024-02-29 PROCEDURE — 93005 ELECTROCARDIOGRAM TRACING: CPT | Performed by: GENERAL PRACTICE

## 2024-02-29 PROCEDURE — 83605 ASSAY OF LACTIC ACID: CPT | Performed by: STUDENT IN AN ORGANIZED HEALTH CARE EDUCATION/TRAINING PROGRAM

## 2024-02-29 PROCEDURE — 85610 PROTHROMBIN TIME: CPT | Performed by: STUDENT IN AN ORGANIZED HEALTH CARE EDUCATION/TRAINING PROGRAM

## 2024-02-29 PROCEDURE — 82565 ASSAY OF CREATININE: CPT

## 2024-02-29 PROCEDURE — 85025 COMPLETE CBC W/AUTO DIFF WBC: CPT | Performed by: STUDENT IN AN ORGANIZED HEALTH CARE EDUCATION/TRAINING PROGRAM

## 2024-02-29 PROCEDURE — 99285 EMERGENCY DEPT VISIT HI MDM: CPT | Mod: 25

## 2024-02-29 PROCEDURE — 84484 ASSAY OF TROPONIN QUANT: CPT | Performed by: STUDENT IN AN ORGANIZED HEALTH CARE EDUCATION/TRAINING PROGRAM

## 2024-02-29 RX ORDER — ONDANSETRON HYDROCHLORIDE 2 MG/ML
4 INJECTION, SOLUTION INTRAVENOUS
Status: COMPLETED | OUTPATIENT
Start: 2024-02-29 | End: 2024-03-01

## 2024-02-29 RX ORDER — MORPHINE SULFATE 4 MG/ML
4 INJECTION, SOLUTION INTRAMUSCULAR; INTRAVENOUS
Status: COMPLETED | OUTPATIENT
Start: 2024-02-29 | End: 2024-03-01

## 2024-02-29 RX ORDER — FAMOTIDINE 10 MG/ML
20 INJECTION INTRAVENOUS
Status: COMPLETED | OUTPATIENT
Start: 2024-02-29 | End: 2024-03-01

## 2024-02-29 RX ADMIN — IOHEXOL 100 ML: 350 INJECTION, SOLUTION INTRAVENOUS at 11:02

## 2024-03-01 ENCOUNTER — ANESTHESIA EVENT (OUTPATIENT)
Dept: SURGERY | Facility: HOSPITAL | Age: 64
DRG: 369 | End: 2024-03-01
Payer: MEDICARE

## 2024-03-01 ENCOUNTER — ANESTHESIA (OUTPATIENT)
Dept: SURGERY | Facility: HOSPITAL | Age: 64
DRG: 369 | End: 2024-03-01
Payer: COMMERCIAL

## 2024-03-01 VITALS
RESPIRATION RATE: 19 BRPM | OXYGEN SATURATION: 100 % | SYSTOLIC BLOOD PRESSURE: 124 MMHG | DIASTOLIC BLOOD PRESSURE: 67 MMHG | HEART RATE: 66 BPM

## 2024-03-01 PROBLEM — T39.395A GI BLEED DUE TO NSAIDS: Status: ACTIVE | Noted: 2024-03-01

## 2024-03-01 PROBLEM — K92.2 GI BLEED DUE TO NSAIDS: Status: ACTIVE | Noted: 2024-03-01

## 2024-03-01 LAB
ANION GAP SERPL CALC-SCNC: 8 MMOL/L (ref 8–16)
BASOPHILS # BLD AUTO: 0.02 K/UL (ref 0–0.2)
BASOPHILS NFR BLD: 0.6 % (ref 0–1.9)
BILIRUB UR QL STRIP: NEGATIVE
BUN SERPL-MCNC: 15 MG/DL (ref 8–23)
CALCIUM SERPL-MCNC: 8.1 MG/DL (ref 8.7–10.5)
CHLORIDE SERPL-SCNC: 110 MMOL/L (ref 95–110)
CLARITY UR: CLEAR
CO2 SERPL-SCNC: 20 MMOL/L (ref 23–29)
COLOR UR: YELLOW
CREAT SERPL-MCNC: 0.6 MG/DL (ref 0.5–1.4)
DIFFERENTIAL METHOD BLD: ABNORMAL
EOSINOPHIL # BLD AUTO: 0 K/UL (ref 0–0.5)
EOSINOPHIL NFR BLD: 0.9 % (ref 0–8)
ERYTHROCYTE [DISTWIDTH] IN BLOOD BY AUTOMATED COUNT: 14.8 % (ref 11.5–14.5)
EST. GFR  (NO RACE VARIABLE): >60 ML/MIN/1.73 M^2
GLUCOSE SERPL-MCNC: 94 MG/DL (ref 70–110)
GLUCOSE UR QL STRIP: NEGATIVE
HCT VFR BLD AUTO: 36.9 % (ref 40–54)
HGB BLD-MCNC: 11.9 G/DL (ref 14–18)
HGB UR QL STRIP: NEGATIVE
IMM GRANULOCYTES # BLD AUTO: 0.01 K/UL (ref 0–0.04)
IMM GRANULOCYTES NFR BLD AUTO: 0.3 % (ref 0–0.5)
KETONES UR QL STRIP: NEGATIVE
LEUKOCYTE ESTERASE UR QL STRIP: NEGATIVE
LYMPHOCYTES # BLD AUTO: 1.2 K/UL (ref 1–4.8)
LYMPHOCYTES NFR BLD: 33 % (ref 18–48)
MAGNESIUM SERPL-MCNC: 1.7 MG/DL (ref 1.6–2.6)
MCH RBC QN AUTO: 27.7 PG (ref 27–31)
MCHC RBC AUTO-ENTMCNC: 32.2 G/DL (ref 32–36)
MCV RBC AUTO: 86 FL (ref 82–98)
MONOCYTES # BLD AUTO: 0.2 K/UL (ref 0.3–1)
MONOCYTES NFR BLD: 5.7 % (ref 4–15)
NEUTROPHILS # BLD AUTO: 2.1 K/UL (ref 1.8–7.7)
NEUTROPHILS NFR BLD: 59.5 % (ref 38–73)
NITRITE UR QL STRIP: NEGATIVE
NRBC BLD-RTO: 0 /100 WBC
OB PNL STL: POSITIVE
PH UR STRIP: 6 [PH] (ref 5–8)
PLATELET # BLD AUTO: 109 K/UL (ref 150–450)
PMV BLD AUTO: 9.9 FL (ref 9.2–12.9)
POTASSIUM SERPL-SCNC: 3.8 MMOL/L (ref 3.5–5.1)
PROT UR QL STRIP: ABNORMAL
RBC # BLD AUTO: 4.29 M/UL (ref 4.6–6.2)
SODIUM SERPL-SCNC: 138 MMOL/L (ref 136–145)
SP GR UR STRIP: >1.03 (ref 1–1.03)
URN SPEC COLLECT METH UR: ABNORMAL
UROBILINOGEN UR STRIP-ACNC: NEGATIVE EU/DL
WBC # BLD AUTO: 3.51 K/UL (ref 3.9–12.7)

## 2024-03-01 PROCEDURE — 25000003 PHARM REV CODE 250

## 2024-03-01 PROCEDURE — 43239 EGD BIOPSY SINGLE/MULTIPLE: CPT | Performed by: INTERNAL MEDICINE

## 2024-03-01 PROCEDURE — 25000003 PHARM REV CODE 250: Performed by: HOSPITALIST

## 2024-03-01 PROCEDURE — 25000003 PHARM REV CODE 250: Performed by: STUDENT IN AN ORGANIZED HEALTH CARE EDUCATION/TRAINING PROGRAM

## 2024-03-01 PROCEDURE — 27200043 HC FORCEPS, BIOPSY: Performed by: INTERNAL MEDICINE

## 2024-03-01 PROCEDURE — C9113 INJ PANTOPRAZOLE SODIUM, VIA: HCPCS | Performed by: STUDENT IN AN ORGANIZED HEALTH CARE EDUCATION/TRAINING PROGRAM

## 2024-03-01 PROCEDURE — 63600175 PHARM REV CODE 636 W HCPCS: Performed by: STUDENT IN AN ORGANIZED HEALTH CARE EDUCATION/TRAINING PROGRAM

## 2024-03-01 PROCEDURE — 63600175 PHARM REV CODE 636 W HCPCS: Performed by: NURSE ANESTHETIST, CERTIFIED REGISTERED

## 2024-03-01 PROCEDURE — 12000002 HC ACUTE/MED SURGE SEMI-PRIVATE ROOM

## 2024-03-01 PROCEDURE — 94761 N-INVAS EAR/PLS OXIMETRY MLT: CPT

## 2024-03-01 PROCEDURE — D9220A PRA ANESTHESIA: Mod: CRNA,,, | Performed by: NURSE ANESTHETIST, CERTIFIED REGISTERED

## 2024-03-01 PROCEDURE — 96361 HYDRATE IV INFUSION ADD-ON: CPT

## 2024-03-01 PROCEDURE — D9220A PRA ANESTHESIA: Mod: ANES,,, | Performed by: ANESTHESIOLOGY

## 2024-03-01 PROCEDURE — 63600175 PHARM REV CODE 636 W HCPCS: Performed by: HOSPITALIST

## 2024-03-01 PROCEDURE — 80048 BASIC METABOLIC PNL TOTAL CA: CPT | Performed by: HOSPITALIST

## 2024-03-01 PROCEDURE — 63600175 PHARM REV CODE 636 W HCPCS

## 2024-03-01 PROCEDURE — 81003 URINALYSIS AUTO W/O SCOPE: CPT | Performed by: STUDENT IN AN ORGANIZED HEALTH CARE EDUCATION/TRAINING PROGRAM

## 2024-03-01 PROCEDURE — 82272 OCCULT BLD FECES 1-3 TESTS: CPT | Performed by: STUDENT IN AN ORGANIZED HEALTH CARE EDUCATION/TRAINING PROGRAM

## 2024-03-01 PROCEDURE — 96376 TX/PRO/DX INJ SAME DRUG ADON: CPT

## 2024-03-01 PROCEDURE — C9113 INJ PANTOPRAZOLE SODIUM, VIA: HCPCS | Performed by: HOSPITALIST

## 2024-03-01 PROCEDURE — 83735 ASSAY OF MAGNESIUM: CPT | Performed by: HOSPITALIST

## 2024-03-01 PROCEDURE — 96375 TX/PRO/DX INJ NEW DRUG ADDON: CPT

## 2024-03-01 PROCEDURE — 96374 THER/PROPH/DIAG INJ IV PUSH: CPT

## 2024-03-01 PROCEDURE — 37000008 HC ANESTHESIA 1ST 15 MINUTES: Performed by: INTERNAL MEDICINE

## 2024-03-01 PROCEDURE — 36415 COLL VENOUS BLD VENIPUNCTURE: CPT | Performed by: HOSPITALIST

## 2024-03-01 PROCEDURE — 85025 COMPLETE CBC W/AUTO DIFF WBC: CPT | Performed by: HOSPITALIST

## 2024-03-01 PROCEDURE — 88305 TISSUE EXAM BY PATHOLOGIST: CPT | Mod: TC | Performed by: PATHOLOGY

## 2024-03-01 PROCEDURE — 0DB58ZX EXCISION OF ESOPHAGUS, VIA NATURAL OR ARTIFICIAL OPENING ENDOSCOPIC, DIAGNOSTIC: ICD-10-PCS | Performed by: INTERNAL MEDICINE

## 2024-03-01 PROCEDURE — C9113 INJ PANTOPRAZOLE SODIUM, VIA: HCPCS

## 2024-03-01 RX ORDER — CYCLOBENZAPRINE HCL 10 MG
10 TABLET ORAL 3 TIMES DAILY PRN
Status: DISCONTINUED | OUTPATIENT
Start: 2024-03-01 | End: 2024-03-02 | Stop reason: HOSPADM

## 2024-03-01 RX ORDER — PANTOPRAZOLE SODIUM 40 MG/10ML
40 INJECTION, POWDER, LYOPHILIZED, FOR SOLUTION INTRAVENOUS DAILY
Status: DISCONTINUED | OUTPATIENT
Start: 2024-03-01 | End: 2024-03-01

## 2024-03-01 RX ORDER — IBUPROFEN 200 MG
16 TABLET ORAL
Status: DISCONTINUED | OUTPATIENT
Start: 2024-03-01 | End: 2024-03-02 | Stop reason: HOSPADM

## 2024-03-01 RX ORDER — LOPERAMIDE HYDROCHLORIDE 2 MG/1
2 CAPSULE ORAL
Status: COMPLETED | OUTPATIENT
Start: 2024-03-01 | End: 2024-03-01

## 2024-03-01 RX ORDER — ALUMINUM HYDROXIDE, MAGNESIUM HYDROXIDE, AND SIMETHICONE 1200; 120; 1200 MG/30ML; MG/30ML; MG/30ML
30 SUSPENSION ORAL 4 TIMES DAILY PRN
Status: DISCONTINUED | OUTPATIENT
Start: 2024-03-01 | End: 2024-03-02 | Stop reason: HOSPADM

## 2024-03-01 RX ORDER — BUSPIRONE HYDROCHLORIDE 5 MG/1
15 TABLET ORAL DAILY
Status: DISCONTINUED | OUTPATIENT
Start: 2024-03-01 | End: 2024-03-02 | Stop reason: HOSPADM

## 2024-03-01 RX ORDER — PANTOPRAZOLE SODIUM 40 MG/10ML
80 INJECTION, POWDER, LYOPHILIZED, FOR SOLUTION INTRAVENOUS
Status: COMPLETED | OUTPATIENT
Start: 2024-03-01 | End: 2024-03-01

## 2024-03-01 RX ORDER — SODIUM,POTASSIUM PHOSPHATES 280-250MG
2 POWDER IN PACKET (EA) ORAL
Status: DISCONTINUED | OUTPATIENT
Start: 2024-03-01 | End: 2024-03-02 | Stop reason: HOSPADM

## 2024-03-01 RX ORDER — OXYBUTYNIN CHLORIDE 5 MG/1
5 TABLET, EXTENDED RELEASE ORAL DAILY
Status: DISCONTINUED | OUTPATIENT
Start: 2024-03-01 | End: 2024-03-02 | Stop reason: HOSPADM

## 2024-03-01 RX ORDER — MORPHINE SULFATE 4 MG/ML
4 INJECTION, SOLUTION INTRAMUSCULAR; INTRAVENOUS EVERY 4 HOURS PRN
Status: DISCONTINUED | OUTPATIENT
Start: 2024-03-01 | End: 2024-03-02 | Stop reason: HOSPADM

## 2024-03-01 RX ORDER — ACETAMINOPHEN 325 MG/1
650 TABLET ORAL EVERY 4 HOURS PRN
Status: DISCONTINUED | OUTPATIENT
Start: 2024-03-01 | End: 2024-03-02 | Stop reason: HOSPADM

## 2024-03-01 RX ORDER — IBUPROFEN 200 MG
24 TABLET ORAL
Status: DISCONTINUED | OUTPATIENT
Start: 2024-03-01 | End: 2024-03-02 | Stop reason: HOSPADM

## 2024-03-01 RX ORDER — SODIUM CHLORIDE 0.9 % (FLUSH) 0.9 %
10 SYRINGE (ML) INJECTION EVERY 12 HOURS PRN
Status: DISCONTINUED | OUTPATIENT
Start: 2024-03-01 | End: 2024-03-02 | Stop reason: HOSPADM

## 2024-03-01 RX ORDER — GLUCAGON 1 MG
1 KIT INJECTION
Status: DISCONTINUED | OUTPATIENT
Start: 2024-03-01 | End: 2024-03-02 | Stop reason: HOSPADM

## 2024-03-01 RX ORDER — METHADONE HYDROCHLORIDE 10 MG/1
40 TABLET ORAL EVERY OTHER DAY
Status: DISCONTINUED | OUTPATIENT
Start: 2024-03-03 | End: 2024-03-02 | Stop reason: HOSPADM

## 2024-03-01 RX ORDER — LOPERAMIDE HYDROCHLORIDE 2 MG/1
2 CAPSULE ORAL 4 TIMES DAILY PRN
Status: DISCONTINUED | OUTPATIENT
Start: 2024-03-01 | End: 2024-03-02 | Stop reason: HOSPADM

## 2024-03-01 RX ORDER — CAPSAICIN 0.75 MG/G
CREAM TOPICAL EVERY 6 HOURS PRN
Status: DISCONTINUED | OUTPATIENT
Start: 2024-03-01 | End: 2024-03-02 | Stop reason: HOSPADM

## 2024-03-01 RX ORDER — AMLODIPINE BESYLATE 5 MG/1
5 TABLET ORAL DAILY
Status: DISCONTINUED | OUTPATIENT
Start: 2024-03-01 | End: 2024-03-02 | Stop reason: HOSPADM

## 2024-03-01 RX ORDER — LOPERAMIDE HYDROCHLORIDE 2 MG/1
2 CAPSULE ORAL 3 TIMES DAILY
Qty: 15 CAPSULE | Refills: 0 | Status: ON HOLD | OUTPATIENT
Start: 2024-03-01 | End: 2024-03-08

## 2024-03-01 RX ORDER — LISINOPRIL 20 MG/1
40 TABLET ORAL DAILY
Status: DISCONTINUED | OUTPATIENT
Start: 2024-03-02 | End: 2024-03-02 | Stop reason: HOSPADM

## 2024-03-01 RX ORDER — MORPHINE SULFATE 4 MG/ML
4 INJECTION, SOLUTION INTRAMUSCULAR; INTRAVENOUS
Status: COMPLETED | OUTPATIENT
Start: 2024-03-01 | End: 2024-03-01

## 2024-03-01 RX ORDER — PANTOPRAZOLE SODIUM 40 MG/10ML
40 INJECTION, POWDER, LYOPHILIZED, FOR SOLUTION INTRAVENOUS 2 TIMES DAILY
Status: DISCONTINUED | OUTPATIENT
Start: 2024-03-01 | End: 2024-03-02

## 2024-03-01 RX ORDER — METHADONE HYDROCHLORIDE 10 MG/1
40 TABLET ORAL DAILY
Status: DISCONTINUED | OUTPATIENT
Start: 2024-03-01 | End: 2024-03-01

## 2024-03-01 RX ORDER — METHADONE HYDROCHLORIDE 10 MG/1
80 TABLET ORAL DAILY
Status: DISCONTINUED | OUTPATIENT
Start: 2024-03-02 | End: 2024-03-01

## 2024-03-01 RX ORDER — TALC
6 POWDER (GRAM) TOPICAL NIGHTLY PRN
Status: DISCONTINUED | OUTPATIENT
Start: 2024-03-01 | End: 2024-03-02 | Stop reason: HOSPADM

## 2024-03-01 RX ORDER — ONDANSETRON 4 MG/1
4 TABLET, ORALLY DISINTEGRATING ORAL EVERY 8 HOURS PRN
Qty: 15 TABLET | Refills: 0 | Status: ON HOLD | OUTPATIENT
Start: 2024-03-01 | End: 2024-03-08

## 2024-03-01 RX ORDER — GABAPENTIN 300 MG/1
600 CAPSULE ORAL 3 TIMES DAILY
Status: DISCONTINUED | OUTPATIENT
Start: 2024-03-01 | End: 2024-03-02 | Stop reason: HOSPADM

## 2024-03-01 RX ORDER — TAMSULOSIN HYDROCHLORIDE 0.4 MG/1
0.8 CAPSULE ORAL DAILY
Status: DISCONTINUED | OUTPATIENT
Start: 2024-03-01 | End: 2024-03-02 | Stop reason: HOSPADM

## 2024-03-01 RX ORDER — HYDROCODONE BITARTRATE AND ACETAMINOPHEN 5; 325 MG/1; MG/1
1 TABLET ORAL EVERY 6 HOURS PRN
Status: DISCONTINUED | OUTPATIENT
Start: 2024-03-01 | End: 2024-03-02 | Stop reason: HOSPADM

## 2024-03-01 RX ORDER — PROCHLORPERAZINE EDISYLATE 5 MG/ML
5 INJECTION INTRAMUSCULAR; INTRAVENOUS EVERY 6 HOURS PRN
Status: DISCONTINUED | OUTPATIENT
Start: 2024-03-01 | End: 2024-03-02 | Stop reason: HOSPADM

## 2024-03-01 RX ORDER — ACETAMINOPHEN 325 MG/1
650 TABLET ORAL EVERY 8 HOURS PRN
Status: DISCONTINUED | OUTPATIENT
Start: 2024-03-01 | End: 2024-03-02 | Stop reason: HOSPADM

## 2024-03-01 RX ORDER — METOPROLOL SUCCINATE 50 MG/1
50 TABLET, EXTENDED RELEASE ORAL DAILY
Status: DISCONTINUED | OUTPATIENT
Start: 2024-03-01 | End: 2024-03-02 | Stop reason: HOSPADM

## 2024-03-01 RX ORDER — PROPOFOL 10 MG/ML
VIAL (ML) INTRAVENOUS
Status: DISCONTINUED | OUTPATIENT
Start: 2024-03-01 | End: 2024-03-01

## 2024-03-01 RX ORDER — ONDANSETRON HYDROCHLORIDE 2 MG/ML
4 INJECTION, SOLUTION INTRAVENOUS EVERY 6 HOURS PRN
Status: DISCONTINUED | OUTPATIENT
Start: 2024-03-01 | End: 2024-03-02 | Stop reason: HOSPADM

## 2024-03-01 RX ORDER — NALOXONE HCL 0.4 MG/ML
0.02 VIAL (ML) INJECTION
Status: DISCONTINUED | OUTPATIENT
Start: 2024-03-01 | End: 2024-03-02 | Stop reason: HOSPADM

## 2024-03-01 RX ORDER — METHADONE HYDROCHLORIDE 10 MG/1
80 TABLET ORAL DAILY
Status: DISCONTINUED | OUTPATIENT
Start: 2024-03-01 | End: 2024-03-01

## 2024-03-01 RX ADMIN — METOPROLOL SUCCINATE 50 MG: 50 TABLET, EXTENDED RELEASE ORAL at 08:03

## 2024-03-01 RX ADMIN — METHADONE HYDROCHLORIDE 80 MG: 10 TABLET ORAL at 03:03

## 2024-03-01 RX ADMIN — PROPOFOL 100 MG: 10 INJECTION, EMULSION INTRAVENOUS at 10:03

## 2024-03-01 RX ADMIN — SODIUM CHLORIDE, SODIUM LACTATE, POTASSIUM CHLORIDE, AND CALCIUM CHLORIDE: .6; .31; .03; .02 INJECTION, SOLUTION INTRAVENOUS at 10:03

## 2024-03-01 RX ADMIN — SODIUM CHLORIDE, POTASSIUM CHLORIDE, SODIUM LACTATE AND CALCIUM CHLORIDE 1000 ML: 600; 310; 30; 20 INJECTION, SOLUTION INTRAVENOUS at 12:03

## 2024-03-01 RX ADMIN — AMLODIPINE BESYLATE 5 MG: 5 TABLET ORAL at 03:03

## 2024-03-01 RX ADMIN — MORPHINE SULFATE 4 MG: 4 INJECTION, SOLUTION INTRAMUSCULAR; INTRAVENOUS at 02:03

## 2024-03-01 RX ADMIN — PANTOPRAZOLE SODIUM 40 MG: 40 INJECTION, POWDER, FOR SOLUTION INTRAVENOUS at 08:03

## 2024-03-01 RX ADMIN — OXYBUTYNIN CHLORIDE 5 MG: 5 TABLET, EXTENDED RELEASE ORAL at 08:03

## 2024-03-01 RX ADMIN — GABAPENTIN 600 MG: 300 CAPSULE ORAL at 03:03

## 2024-03-01 RX ADMIN — FAMOTIDINE 20 MG: 10 INJECTION, SOLUTION INTRAVENOUS at 12:03

## 2024-03-01 RX ADMIN — ONDANSETRON 4 MG: 2 INJECTION INTRAMUSCULAR; INTRAVENOUS at 12:03

## 2024-03-01 RX ADMIN — MORPHINE SULFATE 4 MG: 4 INJECTION, SOLUTION INTRAMUSCULAR; INTRAVENOUS at 12:03

## 2024-03-01 RX ADMIN — TAMSULOSIN HYDROCHLORIDE 0.8 MG: 0.4 CAPSULE ORAL at 08:03

## 2024-03-01 RX ADMIN — LOPERAMIDE HYDROCHLORIDE 2 MG: 2 CAPSULE ORAL at 03:03

## 2024-03-01 RX ADMIN — LOPERAMIDE HYDROCHLORIDE 2 MG: 2 CAPSULE ORAL at 02:03

## 2024-03-01 RX ADMIN — GABAPENTIN 600 MG: 300 CAPSULE ORAL at 08:03

## 2024-03-01 RX ADMIN — PANTOPRAZOLE SODIUM 80 MG: 40 INJECTION, POWDER, FOR SOLUTION INTRAVENOUS at 02:03

## 2024-03-01 RX ADMIN — BUSPIRONE HYDROCHLORIDE 15 MG: 5 TABLET ORAL at 08:03

## 2024-03-01 NOTE — TRANSFER OF CARE
"Anesthesia Transfer of Care Note    Patient: Omar Trotter    Procedure(s) Performed: Procedure(s) (LRB):  EGD (ESOPHAGOGASTRODUODENOSCOPY) (N/A)    Patient location: GI    Anesthesia Type: general    Transport from OR: Transported from OR on room air with adequate spontaneous ventilation    Post pain: adequate analgesia    Post assessment: no apparent anesthetic complications    Post vital signs: stable    Level of consciousness: awake and alert    Nausea/Vomiting: no nausea/vomiting    Complications: none    Transfer of care protocol was followed      Last vitals: Visit Vitals  /69   Pulse 71   Temp 36.4 °C (97.5 °F) (Temporal)   Resp 20   Ht 5' 11" (1.803 m)   Wt 77.1 kg (170 lb)   SpO2 100%   BMI 23.71 kg/m²     "

## 2024-03-01 NOTE — ANESTHESIA PREPROCEDURE EVALUATION
03/01/2024  Omar Trotter is a 63 y.o., male.      Patient Active Problem List   Diagnosis    Osteoarthritis of right shoulder    Unilateral primary osteoarthritis, left hip    Preop testing    GERD (gastroesophageal reflux disease)    Hypertension    Hepatitis C    Adhesive capsulitis of right shoulder    Cirrhosis of liver    Left leg injury    Obesity (BMI 30-39.9)    History of left hip replacement    Dehiscence of incision, initial encounter    OAB (overactive bladder)    BPH associated with nocturia    Anemia    Acute blood loss anemia    Iron deficiency anemia, unspecified    Fracture of multiple ribs       Past Surgical History:   Procedure Laterality Date    APPENDECTOMY      ARTHROPLASTY OF SHOULDER Right 1/13/2021    Procedure: ARTHROPLASTY, SHOULDER TOTAL;  Surgeon: Emilio Siddiqui MD;  Location: [facility] OR;  Service: Orthopedics;  Laterality: Right;  GENERAL AND BLOCK    COLONOSCOPY N/A 5/8/2023    Procedure: COLONOSCOPY;  Surgeon: Bobby Bourgeois MD;  Location: [facility];  Service: Endoscopy;  Laterality: N/A;    ESOPHAGOGASTRODUODENOSCOPY N/A 5/7/2023    Procedure: EGD (ESOPHAGOGASTRODUODENOSCOPY);  Surgeon: Carloz Christianson Jr., MD;  Location: [facility];  Service: Endoscopy;  Laterality: N/A;    HERNIA REPAIR      HIP REPLACEMENT ARTHROPLASTY Left 3/24/2021    Procedure: ARTHROPLASTY, HIP REPLACEMENT;  Surgeon: Miles Rivas II, MD;  Location: [facility] OR;  Service: Orthopedics;  Laterality: Left;    JOINT REPLACEMENT Right     shoulder    REVISION TOTAL HIP ARTHROPLASTY Left 6/2/2021    Procedure: REVISION, TOTAL ARTHROPLASTY, HIP;  Surgeon: Miles Rivas II, MD;  Location: [facility] OR;  Service: Orthopedics;  Laterality: Left;    SMALL BOWEL ENTEROSCOPY Left 5/8/2023    Procedure: ENTEROSCOPY;  Surgeon: Bobby Bourgeois MD;  Location: [facility] ENDO;  Service: Endoscopy;  Laterality: Left;         Tobacco Use:  The patient  reports that he has been smoking cigarettes. He has a 15.0 pack-year smoking history. He has been exposed to tobacco smoke. He has never used smokeless tobacco.     Results for orders placed or performed during the hospital encounter of 02/29/24   EKG 12-lead    Collection Time: 02/29/24 10:51 PM   Result Value Ref Range    QRS Duration 76 ms    OHS QTC Calculation 467 ms    Narrative    Test Reason : R10.13,    Vent. Rate : 089 BPM     Atrial Rate : 089 BPM     P-R Int : 126 ms          QRS Dur : 076 ms      QT Int : 384 ms       P-R-T Axes : 070 057 054 degrees     QTc Int : 467 ms    Normal sinus rhythm  Possible Left atrial enlargement  Borderline Abnormal ECG  When compared with ECG of 12-DEC-2023 20:28,  No significant change was found    Referred By: [email or web address]   SELF           Confirmed By:         Imaging Results              CTA Chest Abdomen Pelvis (Final result)  Result time 02/29/24 23:45:37      Final result by Beck Douglass DO (02/29/24 23:45:37)                   Narrative:    EXAM:  CT Angiography Chest, Abdomen and Pelvis With Intravenous Contrast    CLINICAL HISTORY:  Aortic aneurysm, known or suspected; prior lower GI bleeds    TECHNIQUE:  Axial computed tomographic angiography images of the chest, abdomen and pelvis with intravenous contrast.  Sagittal and coronal reformatted images were created and reviewed.  This CT exam was performed using one or more of the following dose reduction techniques:  automated exposure control, adjustment of the mA and/or kV according to patient size, and/or use of iterative reconstruction technique.  MIP reconstructed images were created and reviewed.    COMPARISON:  December 12, 2023    FINDINGS:    VASCULATURE:  Aorta:  No acute findings.  No aortic aneurysm.  No dissection.  Pulmonary arteries:  Unremarkable as visualized.  No pulmonary embolism is identified.  Great vessels of aortic arch:  No acute findings.  No  dissection.  No arterial occlusion or significant stenosis.  Celiac trunk and mesenteric arteries:  No significant stenosis at the origins of the celiac artery, or superior mesenteric artery. The OLIVIA is well-demonstrated and appears normal.  Renal arteries:  No acute findings.  No occlusion or significant stenosis.  Iliac arteries:  No acute findings.  No occlusion or significant stenosis.    CHEST:  Lungs:  Scattered areas of subsegmental atelectasis or scarring in the lungs is demonstrated, unchanged since prior.  There is mild diffuse bronchial wall thickening which could reflect viral or reactive airways process or other cause of bronchitis.  No mass.  No consolidation.  Pleural space:  Unremarkable.  No significant effusion.  No pneumothorax.  Heart:  Coronary.  No cardiomegaly.  No significant pericardial effusion.    ABDOMEN:  Liver:  Unremarkable.  No mass.  Gallbladder and bile ducts:  Unremarkable.  No calcified stones.  No ductal dilation.  Pancreas:  Unremarkable.  No ductal dilation.  No mass.  Spleen:  There is splenomegaly demonstrated.  Adrenals:  Unremarkable.  No mass.  Kidneys and ureters:  Unremarkable.  No hydronephrosis.  No solid mass.  Stomach and bowel:  Unremarkable.  No obstruction.  No mucosal thickening.    PELVIS:  Appendix:  No findings to suggest acute appendicitis.  Bladder:  Unremarkable.  No mass.  Reproductive:  Unremarkable as visualized.    CHEST, ABDOMEN and PELVIS:  Intraperitoneal space:  Unremarkable.  No significant fluid collection.  No free air.  Bones/joints:  Right shoulder arthroplasty again noted.  There is a left total hip arthroplasty demonstrated.  There are degenerative changes present along the spine. There is a levoconvex scoliosis of the lumbar spine.  Previously noted rib fractures now demonstrate fracture callus.  No dislocation.  Soft tissues:  Unremarkable.  Lymph nodes:  Unremarkable.  No enlarged lymph nodes.    IMPRESSION:  1.  No pulmonary emboli. No  aortic aneurysm or dissection.  2.  There is mild diffuse bronchial wall thickening which could reflect viral or reactive airways process or other cause of bronchitis.  3.  No acute intra-abdominal or pelvic abnormality.  4.  Splenomegaly.      Thank you for allowing us to participate in the care of this patient.    Electronically signed by:  Beck Douglass DO  02/29/2024 11:45 PM CST Workstation: [id number]                                     Lab Results   Component Value Date    WBC 3.51 (L) 03/01/2024    HGB 11.9 (L) 03/01/2024    HCT 36.9 (L) 03/01/2024    MCV 86 03/01/2024     (L) 03/01/2024     BMP  Lab Results   Component Value Date     03/01/2024    K 3.8 03/01/2024     03/01/2024    CO2 20 (L) 03/01/2024    BUN 15 03/01/2024    CREATININE 0.6 03/01/2024    CALCIUM 8.1 (L) 03/01/2024    ANIONGAP 8 03/01/2024    GLU 94 03/01/2024    GLU 98 02/29/2024     (H) 12/13/2023       No results found for this or any previous visit.          Pre-op Assessment    I have reviewed the Patient Summary Reports.     I have reviewed the Nursing Notes. I have reviewed the NPO Status.   I have reviewed the Medications.     Review of Systems  Anesthesia Hx:  No problems with previous Anesthesia             Denies Family Hx of Anesthesia complications.    Denies Personal Hx of Anesthesia complications.                    Social:  No Alcohol Use, Smoker       Hematology/Oncology:    Oncology Normal    -- Anemia:                                  EENT/Dental:  EENT/Dental Normal           Cardiovascular:     Hypertension              ECG has been reviewed.                          Pulmonary:  Pulmonary Normal                       Renal/:    BPH              Hepatic/GI:     GERD  Hepatitis, C           Musculoskeletal:  Arthritis               Neurological:  Neurology Normal                                      Endocrine:  Endocrine Normal            Psych:  Psychiatric Normal                   Patient Active Problem List   Diagnosis    Osteoarthritis of right shoulder    Unilateral primary osteoarthritis, left hip    Preop testing    GERD (gastroesophageal reflux disease)    Hypertension    Hepatitis C    Adhesive capsulitis of right shoulder    Cirrhosis of liver    Left leg injury    Obesity (BMI 30-39.9)    History of left hip replacement    Dehiscence of incision, initial encounter    OAB (overactive bladder)    BPH associated with nocturia    Anemia    Acute blood loss anemia    Iron deficiency anemia, unspecified    Fracture of multiple ribs       Past Surgical History:   Procedure Laterality Date    APPENDECTOMY      ARTHROPLASTY OF SHOULDER Right 1/13/2021    Procedure: ARTHROPLASTY, SHOULDER TOTAL;  Surgeon: Emilio Siddiqui MD;  Location: [facility] OR;  Service: Orthopedics;  Laterality: Right;  GENERAL AND BLOCK    COLONOSCOPY N/A 5/8/2023    Procedure: COLONOSCOPY;  Surgeon: Bobby Bourgeois MD;  Location: [facility] ENDO;  Service: Endoscopy;  Laterality: N/A;    ESOPHAGOGASTRODUODENOSCOPY N/A 5/7/2023    Procedure: EGD (ESOPHAGOGASTRODUODENOSCOPY);  Surgeon: Carloz Christianson Jr., MD;  Location: [facility] ENDO;  Service: Endoscopy;  Laterality: N/A;    HERNIA REPAIR      HIP REPLACEMENT ARTHROPLASTY Left 3/24/2021    Procedure: ARTHROPLASTY, HIP REPLACEMENT;  Surgeon: Miles Rivas II, MD;  Location: [facility] OR;  Service: Orthopedics;  Laterality: Left;    JOINT REPLACEMENT Right     shoulder    REVISION TOTAL HIP ARTHROPLASTY Left 6/2/2021    Procedure: REVISION, TOTAL ARTHROPLASTY, HIP;  Surgeon: Miles Rivas II, MD;  Location: [facility] OR;  Service: Orthopedics;  Laterality: Left;    SMALL BOWEL ENTEROSCOPY Left 5/8/2023    Procedure: ENTEROSCOPY;  Surgeon: Bobby Bourgeois MD;  Location: [facility] ENDO;  Service: Endoscopy;  Laterality: Left;        Tobacco Use:  The patient  reports that he has been smoking cigarettes. He has a 15.0 pack-year smoking history. He has been exposed to tobacco smoke. He has  never used smokeless tobacco.     Results for orders placed or performed during the hospital encounter of 02/29/24   EKG 12-lead    Collection Time: 02/29/24 10:51 PM   Result Value Ref Range    QRS Duration 76 ms    OHS QTC Calculation 467 ms    Narrative    Test Reason : R10.13,    Vent. Rate : 089 BPM     Atrial Rate : 089 BPM     P-R Int : 126 ms          QRS Dur : 076 ms      QT Int : 384 ms       P-R-T Axes : 070 057 054 degrees     QTc Int : 467 ms    Normal sinus rhythm  Possible Left atrial enlargement  Borderline Abnormal ECG  When compared with ECG of 12-DEC-2023 20:28,  No significant change was found    Referred By: [email or web address]   SELF           Confirmed By:         Imaging Results              CTA Chest Abdomen Pelvis (Final result)  Result time 02/29/24 23:45:37      Final result by Beck Douglass DO (02/29/24 23:45:37)                   Narrative:    EXAM:  CT Angiography Chest, Abdomen and Pelvis With Intravenous Contrast    CLINICAL HISTORY:  Aortic aneurysm, known or suspected; prior lower GI bleeds    TECHNIQUE:  Axial computed tomographic angiography images of the chest, abdomen and pelvis with intravenous contrast.  Sagittal and coronal reformatted images were created and reviewed.  This CT exam was performed using one or more of the following dose reduction techniques:  automated exposure control, adjustment of the mA and/or kV according to patient size, and/or use of iterative reconstruction technique.  MIP reconstructed images were created and reviewed.    COMPARISON:  December 12, 2023    FINDINGS:    VASCULATURE:  Aorta:  No acute findings.  No aortic aneurysm.  No dissection.  Pulmonary arteries:  Unremarkable as visualized.  No pulmonary embolism is identified.  Great vessels of aortic arch:  No acute findings.  No dissection.  No arterial occlusion or significant stenosis.  Celiac trunk and mesenteric arteries:  No significant stenosis at the origins of the celiac artery, or  superior mesenteric artery. The OLIVIA is well-demonstrated and appears normal.  Renal arteries:  No acute findings.  No occlusion or significant stenosis.  Iliac arteries:  No acute findings.  No occlusion or significant stenosis.    CHEST:  Lungs:  Scattered areas of subsegmental atelectasis or scarring in the lungs is demonstrated, unchanged since prior.  There is mild diffuse bronchial wall thickening which could reflect viral or reactive airways process or other cause of bronchitis.  No mass.  No consolidation.  Pleural space:  Unremarkable.  No significant effusion.  No pneumothorax.  Heart:  Coronary.  No cardiomegaly.  No significant pericardial effusion.    ABDOMEN:  Liver:  Unremarkable.  No mass.  Gallbladder and bile ducts:  Unremarkable.  No calcified stones.  No ductal dilation.  Pancreas:  Unremarkable.  No ductal dilation.  No mass.  Spleen:  There is splenomegaly demonstrated.  Adrenals:  Unremarkable.  No mass.  Kidneys and ureters:  Unremarkable.  No hydronephrosis.  No solid mass.  Stomach and bowel:  Unremarkable.  No obstruction.  No mucosal thickening.    PELVIS:  Appendix:  No findings to suggest acute appendicitis.  Bladder:  Unremarkable.  No mass.  Reproductive:  Unremarkable as visualized.    CHEST, ABDOMEN and PELVIS:  Intraperitoneal space:  Unremarkable.  No significant fluid collection.  No free air.  Bones/joints:  Right shoulder arthroplasty again noted.  There is a left total hip arthroplasty demonstrated.  There are degenerative changes present along the spine. There is a levoconvex scoliosis of the lumbar spine.  Previously noted rib fractures now demonstrate fracture callus.  No dislocation.  Soft tissues:  Unremarkable.  Lymph nodes:  Unremarkable.  No enlarged lymph nodes.    IMPRESSION:  1.  No pulmonary emboli. No aortic aneurysm or dissection.  2.  There is mild diffuse bronchial wall thickening which could reflect viral or reactive airways process or other cause of  bronchitis.  3.  No acute intra-abdominal or pelvic abnormality.  4.  Splenomegaly.      Thank you for allowing us to participate in the care of this patient.    Electronically signed by:  Beck Douglass DO  02/29/2024 11:45 PM CST Workstation: [id number]                                     Lab Results   Component Value Date    WBC 3.51 (L) 03/01/2024    HGB 11.9 (L) 03/01/2024    HCT 36.9 (L) 03/01/2024    MCV 86 03/01/2024     (L) 03/01/2024     BMP  Lab Results   Component Value Date     03/01/2024    K 3.8 03/01/2024     03/01/2024    CO2 20 (L) 03/01/2024    BUN 15 03/01/2024    CREATININE 0.6 03/01/2024    CALCIUM 8.1 (L) 03/01/2024    ANIONGAP 8 03/01/2024    GLU 94 03/01/2024    GLU 98 02/29/2024     (H) 12/13/2023       No results found for this or any previous visit.           Physical Exam  General: Well nourished, Cooperative, Alert and Oriented    Airway:  Mallampati: II   Mouth Opening: Normal  TM Distance: Normal  Tongue: Normal  Neck ROM: Normal ROM    Dental:  Intact    Chest/Lungs:  Clear to auscultation    Heart:  Rate: Normal  Rhythm: Regular Rhythm  Sounds: Normal        Anesthesia Plan  Type of Anesthesia, risks & benefits discussed:    Anesthesia Type: Gen Natural Airway  Intra-op Monitoring Plan: Standard ASA Monitors  Post Op Pain Control Plan:   (medical reason for not using multimodal pain management)  Informed Consent: Informed consent signed with the Patient and all parties understand the risks and agree with anesthesia plan.  All questions answered.   ASA Score: 3  Anesthesia Plan Notes: POM    Ready For Surgery From Anesthesia Perspective.     .

## 2024-03-01 NOTE — HOSPITAL COURSE
Mr. Trotter was admitted for UGIB, melena, and Gastroenteritis.  While here, his labs and vital signs were monitored.  His hemoglobin was trended and remained stable.  He did not require any blood transfusions.  He was treated with IV Protonix and IV fluid hydration.  GI was consulted and evaluated him.  He underwent an EGD on 03/01/2024 and was found to have esophagitis.  His diet was advanced, which he tolerated without issues.  He has had no gastric symptoms since being in the hospital.  He has been cleared for discharge by GI in his being discharged to home today in stable condition.  His methadone dose was decreased the him having an episode of over-sedation.  He reports feeling a little weak from the vomiting and diarrhea. He has been encouraged to increase his fluid intake and increase activity level as tolerated.  He needs to avoid NSAIDs and follow a bland diet, which he has been instructed on, and his Protonix will be increased to b.i.d. until he was seen by GI.  He will follow up with his PCP and GI.  He did not have any adverse events while here.

## 2024-03-01 NOTE — CARE UPDATE
Patient seen and examined.  Patient in no acute distress.  Patient tolerating clear liquid diet well.  Patient endorses that he has not had any bloody bowel movements since admission to the hospital.  Patient was evaluated by Gastroenterology, we will perform upper gastrointestinal endoscopy to rule out upper gastrointestinal bleeding.  Patient educated on treatment plan.  Patient verbalized understanding.

## 2024-03-01 NOTE — ED PROVIDER NOTES
Encounter Date: 2/29/2024       History     Chief Complaint   Patient presents with    Abdominal Pain     In the center of his abd since last night    Diarrhea     HPI    63-year-old man with a history of hypertension, prior colonic angioectasia, presents for evaluation of epigastric abdominal pain that started yesterday, has been constant without obvious exacerbating or relieving factors described as sharp.  He has not had similar pain in the past.  Denies fever chills chest pain shortness of breath.  Reports nonbilious nonbloody nausea and vomiting.  Reports loose stools with no joleen blood.  Denies urinary complaints.  Has prior history of appendectomy.  He smokes.  He does not currently drink he denies any current drug use.      Review of patient's allergies indicates:   Allergen Reactions    Pcn [penicillins]      As a child.     Past Medical History:   Diagnosis Date    JACKLYN (acute kidney injury) 11/11/2022    Arthritis     GERD (gastroesophageal reflux disease)     Hepatitis C     STATES DX 10-20. NO S/S. NO FURTHER TESTS OR TX. WITH INSURANCE IN 2021 CAN GET IT EVALUATED.    History of left hip replacement 05/20/2021    History of MRSA infection     History of right shoulder replacement 01/15/2021    Hypertension     Wears glasses      Past Surgical History:   Procedure Laterality Date    APPENDECTOMY      ARTHROPLASTY OF SHOULDER Right 1/13/2021    Procedure: ARTHROPLASTY, SHOULDER TOTAL;  Surgeon: Emilio Siddiqui MD;  Location: [facility];  Service: Orthopedics;  Laterality: Right;  GENERAL AND BLOCK    COLONOSCOPY N/A 5/8/2023    Procedure: COLONOSCOPY;  Surgeon: Bobby Bourgeois MD;  Location: [facility];  Service: Endoscopy;  Laterality: N/A;    ESOPHAGOGASTRODUODENOSCOPY N/A 5/7/2023    Procedure: EGD (ESOPHAGOGASTRODUODENOSCOPY);  Surgeon: Carloz Christianson Jr., MD;  Location: [facility];  Service: Endoscopy;  Laterality: N/A;    HERNIA REPAIR      HIP REPLACEMENT ARTHROPLASTY Left 3/24/2021    Procedure:  ARTHROPLASTY, HIP REPLACEMENT;  Surgeon: Miles Rivas II, MD;  Location: [facility] OR;  Service: Orthopedics;  Laterality: Left;    JOINT REPLACEMENT Right     shoulder    REVISION TOTAL HIP ARTHROPLASTY Left 6/2/2021    Procedure: REVISION, TOTAL ARTHROPLASTY, HIP;  Surgeon: Miles Rivas II, MD;  Location: [facility] OR;  Service: Orthopedics;  Laterality: Left;    SMALL BOWEL ENTEROSCOPY Left 5/8/2023    Procedure: ENTEROSCOPY;  Surgeon: Bobby Bourgeois MD;  Location: [facility];  Service: Endoscopy;  Laterality: Left;     Family History   Problem Relation Age of Onset    Cancer Mother         leukemia     Social History     Tobacco Use    Smoking status: Every Day     Current packs/day: 0.50     Average packs/day: 0.5 packs/day for 30.0 years (15.0 ttl pk-yrs)     Types: Cigarettes     Passive exposure: Current    Smokeless tobacco: Never   Substance Use Topics    Alcohol use: Never    Drug use: Never     Review of Systems   All other systems reviewed and are negative.      Physical Exam     Initial Vitals [02/29/24 2209]   BP Pulse Resp Temp SpO2   (!) 146/107 110 (!) 24 97.4 °F (36.3 °C) 100 %      MAP       --         Physical Exam    Nursing note and vitals reviewed.  Constitutional: He appears well-developed and well-nourished.   HENT:   Head: Normocephalic and atraumatic.   Eyes: Right eye exhibits no discharge. Left eye exhibits no discharge.   Neck: No tracheal deviation present.   Cardiovascular:  Normal rate and regular rhythm.           Pulmonary/Chest: Breath sounds normal. No stridor. No respiratory distress.   Abdominal: Abdomen is soft. Bowel sounds are normal. There is abdominal tenderness.   He has diffuse abdominal tenderness to palpation worse in his epigastric region is abdomen is soft There is no rebound and no guarding.   Musculoskeletal:         General: No edema.     Neurological: He is alert and oriented to person, place, and time.   Skin: Skin is warm and dry.         ED Course    Procedures  Labs Reviewed   CBC W/ AUTO DIFFERENTIAL - Abnormal; Notable for the following components:       Result Value    RDW 14.9 (*)     All other components within normal limits   COMPREHENSIVE METABOLIC PANEL - Abnormal; Notable for the following components:    CO2 18 (*)     All other components within normal limits   URINALYSIS, REFLEX TO URINE CULTURE - Abnormal; Notable for the following components:    Specific Gravity, UA >1.030 (*)     Protein, UA Trace (*)     All other components within normal limits    Narrative:     Specimen Source->Urine   OCCULT BLOOD X 1, STOOL - Abnormal; Notable for the following components:    Occult Blood Positive (*)     All other components within normal limits   LIPASE   LACTIC ACID, PLASMA   TROPONIN I HIGH SENSITIVITY   PROTIME-INR   URINALYSIS, REFLEX TO URINE CULTURE   ISTAT CREATININE   POCT CREATININE          Imaging Results              CTA Chest Abdomen Pelvis (Final result)  Result time 02/29/24 23:45:37      Final result by Beck Douglass DO (02/29/24 23:45:37)                   Narrative:    EXAM:  CT Angiography Chest, Abdomen and Pelvis With Intravenous Contrast    CLINICAL HISTORY:  Aortic aneurysm, known or suspected; prior lower GI bleeds    TECHNIQUE:  Axial computed tomographic angiography images of the chest, abdomen and pelvis with intravenous contrast.  Sagittal and coronal reformatted images were created and reviewed.  This CT exam was performed using one or more of the following dose reduction techniques:  automated exposure control, adjustment of the mA and/or kV according to patient size, and/or use of iterative reconstruction technique.  MIP reconstructed images were created and reviewed.    COMPARISON:  December 12, 2023    FINDINGS:    VASCULATURE:  Aorta:  No acute findings.  No aortic aneurysm.  No dissection.  Pulmonary arteries:  Unremarkable as visualized.  No pulmonary embolism is identified.  Great vessels of aortic arch:  No  acute findings.  No dissection.  No arterial occlusion or significant stenosis.  Celiac trunk and mesenteric arteries:  No significant stenosis at the origins of the celiac artery, or superior mesenteric artery. The OLIVIA is well-demonstrated and appears normal.  Renal arteries:  No acute findings.  No occlusion or significant stenosis.  Iliac arteries:  No acute findings.  No occlusion or significant stenosis.    CHEST:  Lungs:  Scattered areas of subsegmental atelectasis or scarring in the lungs is demonstrated, unchanged since prior.  There is mild diffuse bronchial wall thickening which could reflect viral or reactive airways process or other cause of bronchitis.  No mass.  No consolidation.  Pleural space:  Unremarkable.  No significant effusion.  No pneumothorax.  Heart:  Coronary.  No cardiomegaly.  No significant pericardial effusion.    ABDOMEN:  Liver:  Unremarkable.  No mass.  Gallbladder and bile ducts:  Unremarkable.  No calcified stones.  No ductal dilation.  Pancreas:  Unremarkable.  No ductal dilation.  No mass.  Spleen:  There is splenomegaly demonstrated.  Adrenals:  Unremarkable.  No mass.  Kidneys and ureters:  Unremarkable.  No hydronephrosis.  No solid mass.  Stomach and bowel:  Unremarkable.  No obstruction.  No mucosal thickening.    PELVIS:  Appendix:  No findings to suggest acute appendicitis.  Bladder:  Unremarkable.  No mass.  Reproductive:  Unremarkable as visualized.    CHEST, ABDOMEN and PELVIS:  Intraperitoneal space:  Unremarkable.  No significant fluid collection.  No free air.  Bones/joints:  Right shoulder arthroplasty again noted.  There is a left total hip arthroplasty demonstrated.  There are degenerative changes present along the spine. There is a levoconvex scoliosis of the lumbar spine.  Previously noted rib fractures now demonstrate fracture callus.  No dislocation.  Soft tissues:  Unremarkable.  Lymph nodes:  Unremarkable.  No enlarged lymph nodes.    IMPRESSION:  1.  No  pulmonary emboli. No aortic aneurysm or dissection.  2.  There is mild diffuse bronchial wall thickening which could reflect viral or reactive airways process or other cause of bronchitis.  3.  No acute intra-abdominal or pelvic abnormality.  4.  Splenomegaly.      Thank you for allowing us to participate in the care of this patient.    Electronically signed by:  Beck Douglass DO  02/29/2024 11:45 PM [facility] Workstation: [id number]                                     Medications   pantoprazole injection 80 mg (has no administration in time range)   loperamide capsule 2 mg (has no administration in time range)   morphine injection 4 mg (has no administration in time range)   lactated ringers bolus 1,000 mL (0 mLs Intravenous Stopped 3/1/24 0109)   morphine injection 4 mg (4 mg Intravenous Given 3/1/24 0009)   famotidine (PF) injection 20 mg (20 mg Intravenous Given 3/1/24 0009)   ondansetron injection 4 mg (4 mg Intravenous Given 3/1/24 0009)   iohexoL (OMNIPAQUE 350) injection 100 mL (100 mLs Intravenous Given 2/29/24 2316)     Medical Decision Making  Epigastric pain since yesterday, vital signs 140s over 110s, tachycardic to the 110s, afebrile satting well on room air, tachypneic into the mid 20s, he is uncomfortable but nontoxic appearing, his abdomen is diffusely tender but soft to palpation.  With his history of prior lower GI bleeds elected to obtain CTA imaging.  This will also a for aortic pathology.  Obtain abdominal labs as well including lipase and lactic, treat him symptomatically.    On exam with chaperone he has black stool, that is occult positive with a history of prior lower GI bleed requiring transfusion elected to admit him we will give him Protonix.  Discussed with hospital medicine for admission.  I do not think he needs an emergent scope..    Amount and/or Complexity of Data Reviewed  External Data Reviewed: notes.     Details: Prior colonic angioectasias  Labs: ordered.     Details:  Hemoglobin greater than 14, troponin negative  Radiology: ordered.  ECG/medicine tests: ordered.    Risk  Prescription drug management.  Parenteral controlled substances.  Decision regarding hospitalization.      Rm Alvarez, HO4  U-  Chart dictated using voice recognition software, possible dictation errors present            Attending Attestation:   Physician Attestation Statement for Resident:  As the supervising MD   I agree with the above history.  -:   As the supervising MD I agree with the above PE.     As the supervising MD I agree with the above treatment, course, plan, and disposition.                                           Clinical Impression:  Final diagnoses:  [R10.13] Epigastric pain  [R11.2] Nausea and vomiting, unspecified vomiting type (Primary)  [K92.2] Gastrointestinal hemorrhage, unspecified gastrointestinal hemorrhage type          ED Disposition Condition    Observation Stable                Rm Alvarez MD  Resident  03/01/24 1245       Romero Rico MD  03/01/24 5207

## 2024-03-01 NOTE — ASSESSMENT & PLAN NOTE
-GI following   -clear liquid diet advance as tolerated   -PPI   -upper GI endoscopy   -p.r.n. antiemetics

## 2024-03-01 NOTE — ANESTHESIA POSTPROCEDURE EVALUATION
Anesthesia Post Evaluation    Patient: Omar Trotter    Procedure(s) Performed: Procedure(s) (LRB):  EGD (ESOPHAGOGASTRODUODENOSCOPY) (N/A)    Final Anesthesia Type: general      Patient location during evaluation: GI PACU  Patient participation: Yes- Able to Participate  Level of consciousness: awake and alert and oriented  Post-procedure vital signs: reviewed and stable  Pain management: adequate  Airway patency: patent    PONV status at discharge: No PONV  Anesthetic complications: no      Cardiovascular status: blood pressure returned to baseline, hemodynamically stable and stable  Respiratory status: unassisted, spontaneous ventilation and room air  Hydration status: euvolemic  Follow-up not needed.              Vitals Value Taken Time   /81 03/01/24 1125   Temp 37.2 °C (98.9 °F) 03/01/24 1052   Pulse 64 03/01/24 1128   Resp 16 03/01/24 1130   SpO2 99 % 03/01/24 1128   Vitals shown include unvalidated device data.      No case tracking events are documented in the log.      Pain/Hawa Score: Pain Rating Prior to Med Admin: 8 (3/1/2024  2:48 AM)           Normal for race

## 2024-03-01 NOTE — NURSING
Patient refusing bed alarm. Education provided on fall precautions, to call for assistance and does not want bed alarm on.

## 2024-03-01 NOTE — NURSING
Patient to the floor from ER. AAOX4, call light in reach, bed locked and in lowest position. Oriented to room and call light system. MD at beside

## 2024-03-01 NOTE — DISCHARGE INSTRUCTIONS
Review patient instructions. Avoid NSAIDs.  Advance diet as tolerated.  Take medications as prescribed.  Follow with primary care provider in 1 week.  Follow with gastroenterology. If bleeding returns report to ED for evaluation.

## 2024-03-01 NOTE — CONSULTS
GASTROENTEROLOGY INPATIENT CONSULT NOTE  Patient Name: Omar Trotter  Patient MRN: [id number]  Patient : 1960    Admit Date: 2024  Service date: 3/1/2024    Reason for Consult:  Diarrhea, heme-positive stool, epigastric abdominal pain    PCP: Cam Orozco MD    Chief Complaint   Patient presents with    Abdominal Pain     In the center of his abd since last night    Diarrhea       HPI: Patient is a 63 y.o. male with PMHx  with a history of severe refractory diarrhea for the past 36 hours.  He states that the diarrhea is extremely watery, loose, and he has had trouble maintaining control of the stools.  They have been dark, but he can not say if they are black or not.  He has had 1 episode of emesis which she also said was dark.  He was evaluated in the emergency room, and was found to have heme-positive stool.  He has underlying gastroesophageal reflux disease for which she takes pantoprazole, and he states it has been under fairly good control until the past 36 hours.  He denies any travel, denies any recent antibiotic therapy.  He has been taking nonsteroidal anti-inflammatory medications long term for his back pain.    Past Medical History:  Past Medical History:   Diagnosis Date    JACKLYN (acute kidney injury) 2022    Arthritis     GERD (gastroesophageal reflux disease)     Hepatitis C     STATES DX 10-20. NO S/S. NO FURTHER TESTS OR TX. WITH INSURANCE IN  CAN GET IT EVALUATED.    History of left hip replacement 2021    History of MRSA infection     History of right shoulder replacement 01/15/2021    Hypertension     Wears glasses         Past Surgical History:  Past Surgical History:   Procedure Laterality Date    APPENDECTOMY      ARTHROPLASTY OF SHOULDER Right 2021    Procedure: ARTHROPLASTY, SHOULDER TOTAL;  Surgeon: Emilio Siddiqui MD;  Location: [facility];  Service: Orthopedics;  Laterality: Right;  GENERAL AND BLOCK    COLONOSCOPY N/A 2023    Procedure:  COLONOSCOPY;  Surgeon: Bobby Bourgeois MD;  Location: [facility] ENDO;  Service: Endoscopy;  Laterality: N/A;    ESOPHAGOGASTRODUODENOSCOPY N/A 5/7/2023    Procedure: EGD (ESOPHAGOGASTRODUODENOSCOPY);  Surgeon: Carloz Christianson Jr., MD;  Location: [facility] ENDO;  Service: Endoscopy;  Laterality: N/A;    HERNIA REPAIR      HIP REPLACEMENT ARTHROPLASTY Left 3/24/2021    Procedure: ARTHROPLASTY, HIP REPLACEMENT;  Surgeon: Miles Rivas II, MD;  Location: [facility] OR;  Service: Orthopedics;  Laterality: Left;    JOINT REPLACEMENT Right     shoulder    REVISION TOTAL HIP ARTHROPLASTY Left 6/2/2021    Procedure: REVISION, TOTAL ARTHROPLASTY, HIP;  Surgeon: Miles Rivas II, MD;  Location: [facility] OR;  Service: Orthopedics;  Laterality: Left;    SMALL BOWEL ENTEROSCOPY Left 5/8/2023    Procedure: ENTEROSCOPY;  Surgeon: Bobby Bourgeois MD;  Location: [facility] ENDO;  Service: Endoscopy;  Laterality: Left;        Home Medications:  Medications Prior to Admission   Medication Sig Dispense Refill Last Dose    amLODIPine (NORVASC) 5 MG tablet Take 1 tablet by mouth once daily (Patient taking differently: Take 5 mg by mouth once daily.) 90 tablet 0 2/29/2024    busPIRone (BUSPAR) 15 MG tablet Take 1 tablet by mouth once daily (Patient taking differently: Take 15 mg by mouth once daily. Take 1 tablet by mouth once daily) 30 tablet 2 2/29/2024    cyclobenzaprine (FLEXERIL) 10 MG tablet Take 1 tablet by mouth three times daily as needed for muscle spasm (Patient taking differently: Take 10 mg by mouth 3 (three) times daily as needed.) 40 tablet 0 2/29/2024    ferrous sulfate (FEOSOL) Tab tablet Take 1 tablet by mouth daily with breakfast.   2/29/2024    gabapentin (NEURONTIN) 600 MG tablet TAKE 1 TABLET BY MOUTH THREE TIMES DAILY 90 tablet 0 2/29/2024    lisinopriL (PRINIVIL,ZESTRIL) 40 MG tablet Take 1 tablet by mouth once daily (Patient taking differently: Take 40 mg by mouth once daily.) 90 tablet 0 2/29/2024    metoprolol succinate (TOPROL-XL)  50 MG 24 hr tablet Take 1 tablet (50 mg total) by mouth once daily. 30 tablet 5 2/29/2024    oxybutynin (DITROPAN-XL) 5 MG TR24 Take 1 tablet by mouth once daily (Patient taking differently: Take 5 mg by mouth once daily.) 90 tablet 0 2/29/2024    pantoprazole (PROTONIX) 40 MG tablet Take 1 tablet by mouth once daily (Patient taking differently: Take 40 mg by mouth once daily.) 90 tablet 0 2/29/2024    tamsulosin (FLOMAX) 0.4 mg Cap Take 2 capsules (0.8 mg total) by mouth once daily. 60 capsule 5 2/29/2024    methadone (METHADOSE) 40 mg disintegrating tablet Take 40 mg by mouth As instructed for Pain. Patient goes to clinic three days a week   Unknown    methocarbamoL (ROBAXIN) 500 MG Tab TAKE 1 TABLET BY MOUTH NIGHTLY AS NEEDED (Patient taking differently: Take 500 mg by mouth nightly as needed (muscle spasms). TAKE 1 TABLET BY MOUTH NIGHTLY AS NEEDED) 40 tablet 5 Unknown       Inpatient Medications:   busPIRone  15 mg Oral Daily    gabapentin  600 mg Oral TID    methadone  40 mg Oral Daily    metoprolol succinate  50 mg Oral Daily    oxybutynin  5 mg Oral Daily    pantoprazole  40 mg Intravenous BID    tamsulosin  0.8 mg Oral Daily     acetaminophen, acetaminophen, aluminum-magnesium hydroxide-simethicone, capsicum 0.075%, cyclobenzaprine, dextrose 50% in water (D50W), dextrose 50% in water (D50W), glucagon (human recombinant), glucose, glucose, HYDROcodone-acetaminophen, melatonin, morphine, naloxone, ondansetron, potassium bicarbonate, potassium bicarbonate, potassium bicarbonate, potassium, sodium phosphates, potassium, sodium phosphates, potassium, sodium phosphates, prochlorperazine, sodium chloride 0.9%    Review of patient's allergies indicates:   Allergen Reactions    Pcn [penicillins]      As a child.       Social History:   Social History     Occupational History    Not on file   Tobacco Use    Smoking status: Every Day     Current packs/day: 0.50     Average packs/day: 0.5 packs/day for 30.0 years (15.0  "ttl pk-yrs)     Types: Cigarettes     Passive exposure: Current    Smokeless tobacco: Never   Substance and Sexual Activity    Alcohol use: Never    Drug use: Never    Sexual activity: Yes     Partners: Female       Family History:   Family History   Problem Relation Age of Onset    Cancer Mother         leukemia       Review of Systems:  A 10 point review of systems was performed and was normal, except as mentioned in the HPI, including constitutional, HEENT, heme, lymph, cardiovascular, respiratory, gastrointestinal, genitourinary, neurologic, endocrine, psychiatric and musculoskeletal.      OBJECTIVE:    Physical Exam:  24 Hour Vital Sign Ranges: Temp:  [97.4 °F (36.3 °C)-97.5 °F (36.4 °C)] 97.5 °F (36.4 °C)  Pulse:  [] 66  Resp:  [11-24] 19  SpO2:  [99 %-100 %] 100 %  BP: (124-179)/() 124/67  Most recent vitals: /69   Pulse 71   Temp 97.5 °F (36.4 °C) (Temporal)   Resp 20   Ht 5' 11" (1.803 m)   Wt 77.1 kg (170 lb)   SpO2 100% Comment: ra  BMI 23.71 kg/m²    GEN: well-developed, thin, awake and alert, non-toxic appearing adult, but in moderate distress  HEENT: PERRL, sclera anicteric, oral mucosa pink and moist without lesion  NECK: trachea midline; Good ROM  CV: regular rate and rhythm, no murmurs or gallops  RESP: clear to auscultation bilaterally, no wheezes, rhonci or rales  ABD: soft, mild epigastric abdominal tenderness, non-distended, normal bowel sounds  EXT: no swelling or edema, 2+ pulses distally  SKIN: no rashes or jaundice  PSYCH: normal affect    Labs:   Recent Labs     02/29/24 2242 03/01/24  0530   WBC 6.33 3.51*   MCV 87 86    109*     Recent Labs     02/29/24 2242 03/01/24  0429    138   K 3.8 3.8    110   CO2 18* 20*   BUN 16 15   GLU 98 94     No results for input(s): "ALB" in the last 72 hours.    Invalid input(s): "ALKP", "SGOT", "SGPT", "TBIL", "DBIL", "TPRO"  Recent Labs     02/29/24  2242   INR 1.1         Radiology Review:  CTA Chest Abdomen " Pelvis   Final Result            IMPRESSION / RECOMMENDATIONS:  Nausea, vomiting, dehydration with mild acidosis on initial laboratories, heme-positive stool and diarrhea.  He has also been having some epigastric abdominal discomfort.  Would make sure that all of the appropriate stool studies have been ordered, would continue IV hydration, we will proceed with upper gastrointestinal endoscopy to rule out upper gastrointestinal bleeding    Thank you for this consult.    Femi Lemonsor  3/1/2024  10:45 AM

## 2024-03-01 NOTE — H&P
[facility] - Emergency Dept    History & Physical      Patient Name: Omar Trotter  MRN: [id number]  Admission Date: 2/29/2024  Attending Physician: Lester Ochoa MD   Primary Care Provider: Cam Orozco MD         Patient information was obtained from patient and ER records.     Subjective:     Principal Problem: Abdominal pain    Chief Complaint:   Chief Complaint   Patient presents with    Abdominal Pain     In the center of his abd since last night    Diarrhea        HPI: 63M p/w 1 day of abdominal pain located in the mid-epigastric area associated w/ dark, uncontrollable diarrhea, nausea, and occasional vomiting. He denies having had this before, and he denies any prior episodes of infxn diarrhea. He does endorse taking NSAIDs TID for his chronic back pain.    Past Medical History:   Diagnosis Date    JACKLYN (acute kidney injury) 11/11/2022    Arthritis     GERD (gastroesophageal reflux disease)     Hepatitis C     STATES DX 10-20. NO S/S. NO FURTHER TESTS OR TX. WITH INSURANCE IN 2021 CAN GET IT EVALUATED.    History of left hip replacement 05/20/2021    History of MRSA infection     History of right shoulder replacement 01/15/2021    Hypertension     Wears glasses        Past Surgical History:   Procedure Laterality Date    APPENDECTOMY      ARTHROPLASTY OF SHOULDER Right 1/13/2021    Procedure: ARTHROPLASTY, SHOULDER TOTAL;  Surgeon: Emilio Siddiqui MD;  Location: [facility];  Service: Orthopedics;  Laterality: Right;  GENERAL AND BLOCK    COLONOSCOPY N/A 5/8/2023    Procedure: COLONOSCOPY;  Surgeon: Bobby Bourgeois MD;  Location: [facility];  Service: Endoscopy;  Laterality: N/A;    ESOPHAGOGASTRODUODENOSCOPY N/A 5/7/2023    Procedure: EGD (ESOPHAGOGASTRODUODENOSCOPY);  Surgeon: Carloz Christianson Jr., MD;  Location: [facility];  Service: Endoscopy;  Laterality: N/A;    HERNIA REPAIR      HIP REPLACEMENT ARTHROPLASTY Left 3/24/2021    Procedure: ARTHROPLASTY, HIP REPLACEMENT;  Surgeon: Miles  LONNY Rivas II, MD;  Location: [facility] OR;  Service: Orthopedics;  Laterality: Left;    JOINT REPLACEMENT Right     shoulder    REVISION TOTAL HIP ARTHROPLASTY Left 6/2/2021    Procedure: REVISION, TOTAL ARTHROPLASTY, HIP;  Surgeon: Miles Rivas II, MD;  Location: [facility] OR;  Service: Orthopedics;  Laterality: Left;    SMALL BOWEL ENTEROSCOPY Left 5/8/2023    Procedure: ENTEROSCOPY;  Surgeon: Bobby Bourgeois MD;  Location[facility];  Service: Endoscopy;  Laterality: Left;       Review of patient's allergies indicates:   Allergen Reactions    Pcn [penicillins]      As a child.       Current Facility-Administered Medications on File Prior to Encounter   Medication    electrolyte-S (ISOLYTE)    HYDROmorphone (PF) injection 0.2 mg    LIDOcaine (PF) 10 mg/ml (1%) injection 10 mg    oxyCODONE immediate release tablet 5 mg     Current Outpatient Medications on File Prior to Encounter   Medication Sig    amLODIPine (NORVASC) 5 MG tablet Take 1 tablet by mouth once daily    busPIRone (BUSPAR) 15 MG tablet Take 1 tablet by mouth once daily    cyclobenzaprine (FLEXERIL) 10 MG tablet Take 1 tablet by mouth three times daily as needed for muscle spasm    ferrous sulfate (FEOSOL) Tab tablet Take 1 tablet by mouth daily with breakfast.    gabapentin (NEURONTIN) 600 MG tablet TAKE 1 TABLET BY MOUTH THREE TIMES DAILY    lisinopriL (PRINIVIL,ZESTRIL) 40 MG tablet Take 1 tablet by mouth once daily    methadone (METHADOSE) 40 mg disintegrating tablet Take 40 mg by mouth As instructed for Pain. Patient goes to clinic three days a week    methocarbamoL (ROBAXIN) 500 MG Tab TAKE 1 TABLET BY MOUTH NIGHTLY AS NEEDED (Patient taking differently: Take 500 mg by mouth nightly as needed (muscle spasms). TAKE 1 TABLET BY MOUTH NIGHTLY AS NEEDED)    metoprolol succinate (TOPROL-XL) 50 MG 24 hr tablet Take 1 tablet (50 mg total) by mouth once daily.    oxybutynin (DITROPAN-XL) 5 MG TR24 Take 1 tablet by mouth once daily    pantoprazole (PROTONIX) 40  MG tablet Take 1 tablet by mouth once daily    tamsulosin (FLOMAX) 0.4 mg Cap Take 2 capsules (0.8 mg total) by mouth once daily.     Family History       Problem Relation (Age of Onset)    Cancer Mother          Tobacco Use    Smoking status: Every Day     Current packs/day: 0.50     Average packs/day: 0.5 packs/day for 30.0 years (15.0 ttl pk-yrs)     Types: Cigarettes     Passive exposure: Current    Smokeless tobacco: Never   Substance and Sexual Activity    Alcohol use: Never    Drug use: Never    Sexual activity: Yes     Partners: Female     Review of Systems   Constitutional:  Negative for fatigue and fever.   Respiratory:  Negative for cough and shortness of breath.    Cardiovascular:  Negative for chest pain and leg swelling.   Gastrointestinal:  Positive for abdominal pain, diarrhea, nausea and vomiting.   Genitourinary:  Negative for dysuria and hematuria.   Musculoskeletal:  Positive for arthralgias and back pain.   Skin:  Negative for rash and wound.   Neurological:  Negative for syncope and weakness.   Psychiatric/Behavioral:  Negative for confusion and decreased concentration.      Objective:     Vital Signs (Most Recent):  Temp: 97.4 °F (36.3 °C) (02/29/24 2209)  Pulse: 110 (02/29/24 2209)  Resp: 20 (03/01/24 0248)  BP: (!) 146/107 (02/29/24 2209)  SpO2: 100 % (02/29/24 2209) Vital Signs (24h Range):  Temp:  [97.4 °F (36.3 °C)] 97.4 °F (36.3 °C)  Pulse:  [110] 110  Resp:  [20-24] 20  SpO2:  [100 %] 100 %  BP: (146)/(107) 146/107     Weight: 77.1 kg (170 lb)  Body mass index is 23.71 kg/m².    Physical Exam  Vitals and nursing note reviewed.   Constitutional:       General: He is in acute distress.      Appearance: He is ill-appearing. He is not toxic-appearing or diaphoretic.      Comments: Disheveled, chronically ill appearing, visible bony prominences   Cardiovascular:      Rate and Rhythm: Regular rhythm. Tachycardia present.      Heart sounds: No murmur heard.  Pulmonary:      Effort: Pulmonary  effort is normal. No respiratory distress.      Breath sounds: Normal breath sounds. No wheezing or rhonchi.   Abdominal:      General: Bowel sounds are normal. There is distension (mild).      Palpations: Abdomen is soft.      Tenderness: There is abdominal tenderness (mid-epigastric area). There is no guarding or rebound.   Genitourinary:     Rectum: Guaiac result positive (per Dr. Merchant).   Musculoskeletal:         General: No tenderness.      Right lower leg: No edema.      Left lower leg: No edema.   Skin:     Coloration: Skin is not jaundiced.      Findings: No bruising.   Neurological:      General: No focal deficit present.      Mental Status: He is alert and oriented to person, place, and time.   Psychiatric:         Mood and Affect: Mood normal.         Behavior: Behavior normal.            Significant Labs: All pertinent labs within the past 24 hours have been reviewed.  CBC:   Recent Labs   Lab 02/29/24  2242   WBC 6.33   HGB 14.0   HCT 42.8          Significant Imaging: I have reviewed all pertinent imaging results/findings within the past 24 hours.  CT: I have reviewed all pertinent results/findings within the past 24 hours and my personal findings are:  possible atelectasis    Assessment/Plan:     63M p/w acute abdominal pain and suspected upper GI bleed    # Suspected UGIB POA. Hgb stable at 14, but suspect it is hemoconcentrated and will be lower on repeat draw s/p IVF; FOB (+) and his uncontrolled dark dark stool appears melanotic  - suspect this is 2/2 gastritis vs. Ulceration from NSAIDs  - hold NSAIDs  - d/w Dr. Merchant, he reports patient has received IV PPI and pain control, continue both of these  - NPO now, plan for GI consult and EGD tomorrow    # Chronic back pain. Given that PO NSAIDs contraindicated, recommend voltaren/lido patches as outpatient, currently giving capsaicin and heat pad    # HTN. Hold home Rx, given possible bleeding source    Daughter is MDPOA, patient is Full  Code    VTE Risk Mitigation (From admission, onward)           Ordered     IP VTE LOW RISK PATIENT  Once         03/01/24 0250     Place sequential compression device  Until discontinued         03/01/24 0250                      NICOLE TUBBS MD  Department of Hospital Medicine   [facility] - Emergency Dept

## 2024-03-01 NOTE — HPI
63M p/w 1 day of abdominal pain located in the mid-epigastric area associated w/ dark, uncontrollable diarrhea, nausea, and occasional vomiting. He denies having had this before, and he denies any prior episodes of infxn diarrhea. He does endorse taking NSAIDs TID for his chronic back pain.

## 2024-03-01 NOTE — PROVATION PATIENT INSTRUCTIONS
Discharge Summary/Instructions after an Endoscopic Procedure  Patient Name: Omar Trotter  Patient MRN: [id number]  Patient YOB: 1960  Friday, March 1, 2024  Femi Hyde MD  RESTRICTIONS:  During your procedure today, you received medications for sedation.  These   medications may affect your judgment, balance and coordination.  Therefore,   for 24 hours, you have the following restrictions:   - DO NOT drive a car, operate machinery, make legal/financial decisions,   sign important papers or drink alcohol.    ACTIVITY:  Today: no heavy lifting, straining or running due to procedural   sedation/anesthesia.  The following day: return to full activity including work.  DIET:  Eat and drink normally unless instructed otherwise.     TREATMENT FOR COMMON SIDE EFFECTS:  - Mild abdominal pain, nausea, belching, bloating or excessive gas:  rest,   eat lightly and use a heating pad.  - Sore Throat: treat with throat lozenges and/or gargle with warm salt   water.  - Because air was used during the procedure, expelling large amounts of air   from your rectum or belching is normal.  - If a bowel prep was taken, you may not have a bowel movement for 1-3 days.    This is normal.  SYMPTOMS TO WATCH FOR AND REPORT TO YOUR PHYSICIAN:  1. Abdominal pain or bloating, other than gas cramps.  2. Chest pain.  3. Back pain.  4. Signs of infection such as: chills or fever occurring within 24 hours   after the procedure.  5. Rectal bleeding, which would show as bright red, maroon, or black stools.   (A tablespoon of blood from the rectum is not serious, especially if   hemorrhoids are present.)  6. Vomiting.  7. Weakness or dizziness.  GO DIRECTLY TO THE NEAREST EMERGENCY ROOM IF YOU HAVE ANY OF THE FOLLOWING:      Difficulty breathing              Chills and/or fever over 101 F   Persistent vomiting and/or vomiting blood   Severe abdominal pain   Severe chest pain   Black, tarry stools   Bleeding- more than one  tablespoon   Any other symptom or condition that you feel may need urgent attention  Your doctor recommends these additional instructions:  If any biopsies were taken, your doctors clinic will contact you in 1 to 2   weeks with any results.  - Await pathology results.   - Return patient to hospital saba for ongoing care.   - Clear liquid diet.   - Continue present medications.   - Advance diet as tolerated.  For questions, problems or results please call your physician - Femi Hyde MD at Work:  [phone].  [facility], EMERGENCY ROOM PHONE NUMBER: [phone]  IF A COMPLICATION OR EMERGENCY SITUATION ARISES AND YOU ARE UNABLE TO REACH   YOUR PHYSICIAN - GO DIRECTLY TO THE EMERGENCY ROOM.  Femi Hyde MD  3/1/2024 10:53:04 AM  This report has been verified and signed electronically.  Dear patient,  As a result of recent federal legislation (The Federal Cures Act), you may   receive lab or pathology results from your procedure in your [facility]   account before your physician is able to contact you. Your physician or   their representative will relay the results to you with their   recommendations at their soonest availability.  Thank you,  PROVATION

## 2024-03-02 VITALS
TEMPERATURE: 99 F | WEIGHT: 142.19 LBS | HEIGHT: 71 IN | BODY MASS INDEX: 19.91 KG/M2 | SYSTOLIC BLOOD PRESSURE: 146 MMHG | OXYGEN SATURATION: 97 % | HEART RATE: 67 BPM | DIASTOLIC BLOOD PRESSURE: 74 MMHG | RESPIRATION RATE: 17 BRPM

## 2024-03-02 PROBLEM — F11.20 UNCOMPLICATED OPIOID DEPENDENCE: Status: ACTIVE | Noted: 2024-03-02

## 2024-03-02 PROBLEM — K20.90 ESOPHAGITIS: Status: ACTIVE | Noted: 2024-03-02

## 2024-03-02 PROBLEM — K52.9 GASTROENTERITIS: Status: ACTIVE | Noted: 2024-03-02

## 2024-03-02 PROBLEM — K52.9 GASTROENTERITIS: Status: RESOLVED | Noted: 2024-03-02 | Resolved: 2024-03-02

## 2024-03-02 LAB
BASOPHILS # BLD AUTO: 0.01 K/UL (ref 0–0.2)
BASOPHILS NFR BLD: 0.2 % (ref 0–1.9)
DIFFERENTIAL METHOD BLD: ABNORMAL
EOSINOPHIL # BLD AUTO: 0.1 K/UL (ref 0–0.5)
EOSINOPHIL NFR BLD: 1 % (ref 0–8)
ERYTHROCYTE [DISTWIDTH] IN BLOOD BY AUTOMATED COUNT: 15 % (ref 11.5–14.5)
HCT VFR BLD AUTO: 36.3 % (ref 40–54)
HGB BLD-MCNC: 11.5 G/DL (ref 14–18)
IMM GRANULOCYTES # BLD AUTO: 0.02 K/UL (ref 0–0.04)
IMM GRANULOCYTES NFR BLD AUTO: 0.4 % (ref 0–0.5)
LYMPHOCYTES # BLD AUTO: 1.2 K/UL (ref 1–4.8)
LYMPHOCYTES NFR BLD: 22.7 % (ref 18–48)
MAGNESIUM SERPL-MCNC: 1.7 MG/DL (ref 1.6–2.6)
MCH RBC QN AUTO: 27.8 PG (ref 27–31)
MCHC RBC AUTO-ENTMCNC: 31.7 G/DL (ref 32–36)
MCV RBC AUTO: 88 FL (ref 82–98)
MONOCYTES # BLD AUTO: 0.3 K/UL (ref 0.3–1)
MONOCYTES NFR BLD: 5.3 % (ref 4–15)
NEUTROPHILS # BLD AUTO: 3.7 K/UL (ref 1.8–7.7)
NEUTROPHILS NFR BLD: 70.4 % (ref 38–73)
NRBC BLD-RTO: 0 /100 WBC
PLATELET # BLD AUTO: 117 K/UL (ref 150–450)
PMV BLD AUTO: 10.1 FL (ref 9.2–12.9)
RBC # BLD AUTO: 4.13 M/UL (ref 4.6–6.2)
WBC # BLD AUTO: 5.25 K/UL (ref 3.9–12.7)

## 2024-03-02 PROCEDURE — 36415 COLL VENOUS BLD VENIPUNCTURE: CPT | Performed by: HOSPITALIST

## 2024-03-02 PROCEDURE — 85025 COMPLETE CBC W/AUTO DIFF WBC: CPT | Performed by: HOSPITALIST

## 2024-03-02 PROCEDURE — 83735 ASSAY OF MAGNESIUM: CPT | Performed by: HOSPITALIST

## 2024-03-02 PROCEDURE — 99900031 HC PATIENT EDUCATION (STAT)

## 2024-03-02 PROCEDURE — 25000003 PHARM REV CODE 250

## 2024-03-02 PROCEDURE — 94760 N-INVAS EAR/PLS OXIMETRY 1: CPT

## 2024-03-02 PROCEDURE — 25000003 PHARM REV CODE 250: Performed by: HOSPITALIST

## 2024-03-02 PROCEDURE — 25000003 PHARM REV CODE 250: Performed by: NURSE PRACTITIONER

## 2024-03-02 RX ORDER — PANTOPRAZOLE SODIUM 40 MG/1
40 TABLET, DELAYED RELEASE ORAL DAILY
Status: DISCONTINUED | OUTPATIENT
Start: 2024-03-02 | End: 2024-03-02 | Stop reason: HOSPADM

## 2024-03-02 RX ORDER — PANTOPRAZOLE SODIUM 40 MG/1
40 TABLET, DELAYED RELEASE ORAL 2 TIMES DAILY
Qty: 60 TABLET | Refills: 11 | Status: SHIPPED | OUTPATIENT
Start: 2024-03-02 | End: 2025-03-02

## 2024-03-02 RX ADMIN — PANTOPRAZOLE SODIUM 40 MG: 40 TABLET, DELAYED RELEASE ORAL at 09:03

## 2024-03-02 RX ADMIN — METOPROLOL SUCCINATE 50 MG: 50 TABLET, EXTENDED RELEASE ORAL at 09:03

## 2024-03-02 RX ADMIN — TAMSULOSIN HYDROCHLORIDE 0.8 MG: 0.4 CAPSULE ORAL at 09:03

## 2024-03-02 RX ADMIN — OXYBUTYNIN CHLORIDE 5 MG: 5 TABLET, EXTENDED RELEASE ORAL at 09:03

## 2024-03-02 RX ADMIN — LISINOPRIL 40 MG: 20 TABLET ORAL at 09:03

## 2024-03-02 RX ADMIN — AMLODIPINE BESYLATE 5 MG: 5 TABLET ORAL at 09:03

## 2024-03-02 NOTE — DISCHARGE SUMMARY
[facility]  Discharge Summary      Patient Name: Omar Trotter  MRN: [id number]  [facility]: [id number]  Patient Class: IP- Inpatient  Admission Date: 2/29/2024  Hospital Length of Stay: 1 days  Discharge Date and Time:  03/02/2024 1:20 PM  Attending Physician: Junaid Turner MD   Discharging Provider: Emilie Mckee NP  Primary Care Provider: Cam Orozco MD    Primary Care Team: Networked reference to record PCT     HPI:   63M p/w 1 day of abdominal pain located in the mid-epigastric area associated w/ dark, uncontrollable diarrhea, nausea, and occasional vomiting. He denies having had this before, and he denies any prior episodes of infxn diarrhea. He does endorse taking NSAIDs TID for his chronic back pain.    Procedure(s) (LRB):  EGD (ESOPHAGOGASTRODUODENOSCOPY) (N/A)      Hospital Course:   Mr. Trotter was admitted for UGIB, melena, and Gastroenteritis.  While here, his labs and vital signs were monitored.  His hemoglobin was trended and remained stable.  He did not require any blood transfusions.  He was treated with IV Protonix and IV fluid hydration.  GI was consulted and evaluated him.  He underwent an EGD on 03/01/2024 and was found to have esophagitis.  His diet was advanced, which he tolerated without issues.  He has had no gastric symptoms since being in the hospital.  He has been cleared for discharge by GI in his being discharged to home today in stable condition.  His methadone dose was decreased the him having an episode of over-sedation.  He reports feeling a little weak from the vomiting and diarrhea. He has been encouraged to increase his fluid intake and increase activity level as tolerated.  He needs to avoid NSAIDs and follow a bland diet, which he has been instructed on, and his Protonix will be increased to b.i.d. until he was seen by GI.  He will follow up with his PCP and GI.  He did not have any adverse events while here.         Goals of Care  Treatment Preferences:  Code Status: Full Code      Consults:   Consults (From admission, onward)          Status Ordering Provider     Inpatient consult to Gastroenterology  Once        Provider:  Anat Gooden MD    Completed NICOLE TUBBS            No new Assessment & Plan notes have been filed under this hospital service since the last note was generated.  Service: Hospital Medicine    Final Active Diagnoses:    Diagnosis Date Noted POA    PRINCIPAL PROBLEM:  GI bleed due to NSAIDs [K92.2, T39.395A] 03/01/2024 Yes    Esophagitis [K20.90] 03/02/2024 Yes    Uncomplicated opioid dependence [F11.20] 03/02/2024 Yes    Hypertension [I10]  Yes      Problems Resolved During this Admission:    Diagnosis Date Noted Date Resolved POA    Gastroenteritis [K52.9] 03/02/2024 03/02/2024 Yes       Discharged Condition: stable    Disposition: Home or Self Care    Follow Up:   Follow-up Information       Cam Orozco MD Follow up in 1 week(s).    Specialty: Internal Medicine  Contact information:  [address] [facility] [address] [phone]               Mary Ellen, Femi GONZALES MD Follow up in 1 week(s).    Specialty: Gastroenterology  Contact information:  [address] [facility] [address] [phone]                           Patient Instructions:      Diet full liquid   Order Comments: Advance as tolerated     Diet Adult Regular   Order Comments: [place] diet     Notify your health care provider if you experience any of the following:  temperature >100.4     Notify your health care provider if you experience any of the following:  persistent nausea and vomiting or diarrhea     Notify your health care provider if you experience any of the following:  severe uncontrolled pain     Notify your health care provider if you experience any of the following:  difficulty breathing or increased cough     Activity as tolerated       Significant Diagnostic Studies: Labs: CMP   Recent Labs   Lab 02/29/24  9164  03/01/24  0429    138   K 3.8 3.8    110   CO2 18* 20*   GLU 98 94   BUN 16 15   CREATININE 0.7 0.6   CALCIUM 9.4 8.1*   PROT 7.6  --    ALBUMIN 3.9  --    BILITOT 0.4  --    ALKPHOS 119  --    AST 18  --    ALT 12  --    ANIONGAP 11 8   , CBC   Recent Labs   Lab 02/29/24  2242 03/01/24  0530 03/02/24  1049   WBC 6.33 3.51* 5.25   HGB 14.0 11.9* 11.5*   HCT 42.8 36.9* 36.3*    109* 117*   , and All labs within the past 24 hours have been reviewed  Radiology: CTA Chest Abdomen Pelvis  EXAM:  CT Angiography Chest, Abdomen and Pelvis With Intravenous Contrast    CLINICAL HISTORY:  Aortic aneurysm, known or suspected; prior lower GI bleeds    TECHNIQUE:  Axial computed tomographic angiography images of the chest, abdomen and pelvis with intravenous contrast.  Sagittal and coronal reformatted images were created and reviewed.  This CT exam was performed using one or more of the following dose reduction techniques:  automated exposure control, adjustment of the mA and/or kV according to patient size, and/or use of iterative reconstruction technique.  MIP reconstructed images were created and reviewed.    COMPARISON:  December 12, 2023    FINDINGS:    VASCULATURE:  Aorta:  No acute findings.  No aortic aneurysm.  No dissection.  Pulmonary arteries:  Unremarkable as visualized.  No pulmonary embolism is identified.  Great vessels of aortic arch:  No acute findings.  No dissection.  No arterial occlusion or significant stenosis.  Celiac trunk and mesenteric arteries:  No significant stenosis at the origins of the celiac artery, or superior mesenteric artery. The OLIVIA is well-demonstrated and appears normal.  Renal arteries:  No acute findings.  No occlusion or significant stenosis.  Iliac arteries:  No acute findings.  No occlusion or significant stenosis.    CHEST:  Lungs:  Scattered areas of subsegmental atelectasis or scarring in the lungs is demonstrated, unchanged since prior.  There is mild diffuse  bronchial wall thickening which could reflect viral or reactive airways process or other cause of bronchitis.  No mass.  No consolidation.  Pleural space:  Unremarkable.  No significant effusion.  No pneumothorax.  Heart:  Coronary.  No cardiomegaly.  No significant pericardial effusion.    ABDOMEN:  Liver:  Unremarkable.  No mass.  Gallbladder and bile ducts:  Unremarkable.  No calcified stones.  No ductal dilation.  Pancreas:  Unremarkable.  No ductal dilation.  No mass.  Spleen:  There is splenomegaly demonstrated.  Adrenals:  Unremarkable.  No mass.  Kidneys and ureters:  Unremarkable.  No hydronephrosis.  No solid mass.  Stomach and bowel:  Unremarkable.  No obstruction.  No mucosal thickening.    PELVIS:  Appendix:  No findings to suggest acute appendicitis.  Bladder:  Unremarkable.  No mass.  Reproductive:  Unremarkable as visualized.    CHEST, ABDOMEN and PELVIS:  Intraperitoneal space:  Unremarkable.  No significant fluid collection.  No free air.  Bones/joints:  Right shoulder arthroplasty again noted.  There is a left total hip arthroplasty demonstrated.  There are degenerative changes present along the spine. There is a levoconvex scoliosis of the lumbar spine.  Previously noted rib fractures now demonstrate fracture callus.  No dislocation.  Soft tissues:  Unremarkable.  Lymph nodes:  Unremarkable.  No enlarged lymph nodes.    IMPRESSION:  1.  No pulmonary emboli. No aortic aneurysm or dissection.  2.  There is mild diffuse bronchial wall thickening which could reflect viral or reactive airways process or other cause of bronchitis.  3.  No acute intra-abdominal or pelvic abnormality.  4.  Splenomegaly.    Thank you for allowing us to participate in the care of this patient.    Electronically signed by:  Beck Douglass DO  02/29/2024 11:45 PM CST Workstation: [id number]        Pending Diagnostic Studies:       Procedure Component Value Units Date/Time    Specimen to Pathology - Surgery  [[id number]] Collected: 03/01/24 1042    Order Status: Sent Lab Status: No result     Specimen: Tissue            Medications:  Reconciled Home Medications:      Medication List        START taking these medications      loperamide 2 mg capsule  Commonly known as: IMODIUM  Take 1 capsule (2 mg total) by mouth 3 (three) times daily. for 5 days     ondansetron 4 MG Tbdl  Commonly known as: ZOFRAN-ODT  Take 1 tablet (4 mg total) by mouth every 8 (eight) hours as needed (nausea).            CHANGE how you take these medications      busPIRone 15 MG tablet  Commonly known as: BUSPAR  Take 1 tablet by mouth once daily  What changed:   when to take this  additional instructions     methocarbamoL 500 MG Tab  Commonly known as: ROBAXIN  TAKE 1 TABLET BY MOUTH NIGHTLY AS NEEDED  What changed:   how much to take  how to take this  when to take this  reasons to take this     pantoprazole 40 MG tablet  Commonly known as: PROTONIX  Take 1 tablet (40 mg total) by mouth 2 (two) times daily.  What changed: when to take this            CONTINUE taking these medications      amLODIPine 5 MG tablet  Commonly known as: NORVASC  Take 1 tablet by mouth once daily     cyclobenzaprine 10 MG tablet  Commonly known as: FLEXERIL  Take 1 tablet by mouth three times daily as needed for muscle spasm     ferrous sulfate Tab tablet  Commonly known as: FEOSOL  Take 1 tablet by mouth daily with breakfast.     gabapentin 600 MG tablet  Commonly known as: NEURONTIN  TAKE 1 TABLET BY MOUTH THREE TIMES DAILY     lisinopriL 40 MG tablet  Commonly known as: PRINIVIL,ZESTRIL  Take 1 tablet by mouth once daily     methadone 40 mg disintegrating tablet  Commonly known as: METHADOSE  Take 80 mg by mouth As instructed for Pain. Patient goes to clinic three days a week     metoprolol succinate 50 MG 24 hr tablet  Commonly known as: TOPROL-XL  Take 1 tablet (50 mg total) by mouth once daily.     oxybutynin 5 MG Tr24  Commonly known as: DITROPAN-XL  Take 1 tablet  by mouth once daily     tamsulosin 0.4 mg Cap  Commonly known as: FLOMAX  Take 2 capsules (0.8 mg total) by mouth once daily.              Indwelling Lines/Drains at time of discharge:   Lines/Drains/Airways       None                   Time spent on the discharge of patient: 41 minutes         Emilie Mckee NP  Department of Hospital Medicine  [facility]

## 2024-03-02 NOTE — PLAN OF CARE
03/02/24 1331   Final Note   Assessment Type Final Discharge Note   Anticipated Discharge Disposition Home   What phone number can be called within the next 1-3 days to see how you are doing after discharge? [phone]   Post-Acute Status   Discharge Delays None known at this time     Patient cleared for discharge from case management standpoint.    Chart and discharge orders reviewed.  Patient discharged home with no further case management needs.     Statement Selected

## 2024-03-02 NOTE — CARE UPDATE
03/02/24 1456   Patient Assessment/Suction   Level of Consciousness (AVPU) alert   Respiratory Effort Normal;Unlabored   Expansion/Accessory Muscles/Retractions no retractions;no use of accessory muscles;expansion symmetric   All Lung Fields Breath Sounds diminished   Rhythm/Pattern, Respiratory unlabored   PRE-TX-O2   Device (Oxygen Therapy) room air   SpO2 97 %   Pulse Oximetry Type Intermittent   $ Pulse Oximetry - Single Charge Pulse Oximetry - Single   Pulse 67   Resp 17   Education   $ Education 15 min;Other (see comment)  (SATS)

## 2024-03-02 NOTE — PLAN OF CARE
[facility]  Initial Discharge Assessment       Primary Care Provider: Cam Orozco MD    Admission Diagnosis: GI bleed [K92.2]  Nausea and vomiting, unspecified vomiting type [R11.2]    Admission Date: 2/29/2024  Expected Discharge Date: 3/1/2024    Transition of Care Barriers: None    Payor: [employer] MANAGED MEDICARE / Plan: [employer] MEDICARE PLAN HMO / Product Type: Medicare Advantage /     Extended Emergency Contact Information  Primary Emergency Contact: Shellie Rodriguez  Mobile Phone: [phone]  Relation: Daughter  Preferred language: English   needed? No  Secondary Emergency Contact: Jasmina Rodriguez  Mobile Phone: [phone]  Relation: Daughter  Preferred language: English   needed? No    Discharge Plan A: Home  Discharge Plan B: Home      [facility], [place] - [address]  [address]  [facility] [address]  Phone: [phone] Fax: [phone]    [facility]  [address]  [facility] [address]  Phone: [phone] Fax: [phone]      CM completed discharge assessment with patient. Pt AAOx4s. Pt lives alone. Demographics, PCP, and insurance verified. No home health. No dialysis. DME - cane.  No LW or POA. Pt completes ADLs without assistance. Pt to discharge home via family or friend transport. Pt has no other needs to be addressed at this time.    Initial Assessment (most recent)       Adult Discharge Assessment - 03/02/24 1025          Discharge Assessment    Assessment Type Discharge Planning Assessment     Confirmed/corrected address, phone number and insurance Yes     Confirmed Demographics Correct on Facesheet     Source of Information patient     When was your last doctors appointment? --   per pt months ago    Communicated NATALIA with patient/caregiver Date not available/Unable to determine     Reason For Admission Abdominal Pain     People in Home alone     Do you expect to return to your current  living situation? Yes     Do you have help at home or someone to help you manage your care at home? No     Prior to hospitilization cognitive status: Alert/Oriented     Current cognitive status: Alert/Oriented     Walking or Climbing Stairs Difficulty no     Dressing/Bathing Difficulty no     Home Layout Able to live on 1st floor   4 steps to enter home    Equipment Currently Used at Home cane, straight     Patient currently being followed by outpatient case management? No     Do you currently have service(s) that help you manage your care at home? No     Do you take prescription medications? Yes     Do you have prescription coverage? Yes     Coverage [employer] Managed Medicare     Do you have any problems affording any of your prescribed medications? No     Is the patient taking medications as prescribed? yes     Who is going to help you get home at discharge? TBD     How do you get to doctors appointments? health plan transportation     Are you on dialysis? No     Do you take coumadin? No     Discharge Plan A Home     Discharge Plan B Home     DME Needed Upon Discharge  none     Discharge Plan discussed with: Patient     Transition of Care Barriers None

## 2024-03-02 NOTE — NURSING
Patient received 80 mg methadone, 600 mg of gabapentin at 1545 today. Since then patient has been very drowsy. Patient does arouse but is sleeping between care. Notified Dr Ochoa, new orders rec'd for continuous pulse ox, notified respiratory and night shift nurse

## 2024-03-06 ENCOUNTER — HOSPITAL ENCOUNTER (INPATIENT)
Facility: HOSPITAL | Age: 64
LOS: 1 days | Discharge: HOME | DRG: 871 | End: 2024-03-08
Attending: EMERGENCY MEDICINE | Admitting: HOSPITALIST
Payer: COMMERCIAL

## 2024-03-06 DIAGNOSIS — J18.9 PNEUMONIA OF RIGHT LOWER LOBE DUE TO INFECTIOUS ORGANISM: ICD-10-CM

## 2024-03-06 DIAGNOSIS — R07.9 CHEST PAIN: ICD-10-CM

## 2024-03-06 DIAGNOSIS — A41.9 SEPSIS, DUE TO UNSPECIFIED ORGANISM, UNSPECIFIED WHETHER ACUTE ORGAN DYSFUNCTION PRESENT: ICD-10-CM

## 2024-03-06 DIAGNOSIS — U07.1 GASTROENTERITIS DUE TO COVID-19 VIRUS: Primary | ICD-10-CM

## 2024-03-06 DIAGNOSIS — R19.7 DIARRHEA, UNSPECIFIED TYPE: ICD-10-CM

## 2024-03-06 DIAGNOSIS — A08.39 GASTROENTERITIS DUE TO COVID-19 VIRUS: Primary | ICD-10-CM

## 2024-03-06 LAB
ALBUMIN SERPL BCP-MCNC: 3.3 G/DL (ref 3.5–5.2)
ALP SERPL-CCNC: 160 U/L (ref 55–135)
ALT SERPL W/O P-5'-P-CCNC: 20 U/L (ref 10–44)
ANION GAP SERPL CALC-SCNC: 7 MMOL/L (ref 8–16)
AST SERPL-CCNC: 24 U/L (ref 10–40)
BASOPHILS # BLD AUTO: 0.01 K/UL (ref 0–0.2)
BASOPHILS NFR BLD: 0.2 % (ref 0–1.9)
BILIRUB SERPL-MCNC: 0.3 MG/DL (ref 0.1–1)
BILIRUB UR QL STRIP: NEGATIVE
BUN SERPL-MCNC: 11 MG/DL (ref 8–23)
CALCIUM SERPL-MCNC: 8 MG/DL (ref 8.7–10.5)
CHLORIDE SERPL-SCNC: 106 MMOL/L (ref 95–110)
CLARITY UR: CLEAR
CO2 SERPL-SCNC: 23 MMOL/L (ref 23–29)
COLOR UR: YELLOW
CREAT SERPL-MCNC: 0.6 MG/DL (ref 0.5–1.4)
DIFFERENTIAL METHOD BLD: ABNORMAL
EOSINOPHIL # BLD AUTO: 0 K/UL (ref 0–0.5)
EOSINOPHIL NFR BLD: 0.4 % (ref 0–8)
ERYTHROCYTE [DISTWIDTH] IN BLOOD BY AUTOMATED COUNT: 14.8 % (ref 11.5–14.5)
EST. GFR  (NO RACE VARIABLE): >60 ML/MIN/1.73 M^2
GLUCOSE SERPL-MCNC: 99 MG/DL (ref 70–110)
GLUCOSE UR QL STRIP: NEGATIVE
HCT VFR BLD AUTO: 34.4 % (ref 40–54)
HGB BLD-MCNC: 11.1 G/DL (ref 14–18)
HGB UR QL STRIP: NEGATIVE
IMM GRANULOCYTES # BLD AUTO: 0.01 K/UL (ref 0–0.04)
IMM GRANULOCYTES NFR BLD AUTO: 0.2 % (ref 0–0.5)
INFLUENZA A, MOLECULAR: NEGATIVE
INFLUENZA B, MOLECULAR: NEGATIVE
KETONES UR QL STRIP: NEGATIVE
LACTATE SERPL-SCNC: 1.1 MMOL/L (ref 0.5–1.9)
LEUKOCYTE ESTERASE UR QL STRIP: NEGATIVE
LYMPHOCYTES # BLD AUTO: 1 K/UL (ref 1–4.8)
LYMPHOCYTES NFR BLD: 22.8 % (ref 18–48)
MAGNESIUM SERPL-MCNC: 1.5 MG/DL (ref 1.6–2.6)
MCH RBC QN AUTO: 28.4 PG (ref 27–31)
MCHC RBC AUTO-ENTMCNC: 32.3 G/DL (ref 32–36)
MCV RBC AUTO: 88 FL (ref 82–98)
MONOCYTES # BLD AUTO: 0.3 K/UL (ref 0.3–1)
MONOCYTES NFR BLD: 5.8 % (ref 4–15)
NEUTROPHILS # BLD AUTO: 3.2 K/UL (ref 1.8–7.7)
NEUTROPHILS NFR BLD: 70.6 % (ref 38–73)
NITRITE UR QL STRIP: NEGATIVE
NRBC BLD-RTO: 0 /100 WBC
PH UR STRIP: 6 [PH] (ref 5–8)
PLATELET # BLD AUTO: 120 K/UL (ref 150–450)
PMV BLD AUTO: 10.5 FL (ref 9.2–12.9)
POTASSIUM SERPL-SCNC: 3.9 MMOL/L (ref 3.5–5.1)
PROT SERPL-MCNC: 6.4 G/DL (ref 6–8.4)
PROT UR QL STRIP: ABNORMAL
RBC # BLD AUTO: 3.91 M/UL (ref 4.6–6.2)
SARS-COV-2 RDRP RESP QL NAA+PROBE: POSITIVE
SODIUM SERPL-SCNC: 136 MMOL/L (ref 136–145)
SP GR UR STRIP: 1.03 (ref 1–1.03)
SPECIMEN SOURCE: NORMAL
URN SPEC COLLECT METH UR: ABNORMAL
UROBILINOGEN UR STRIP-ACNC: ABNORMAL EU/DL
WBC # BLD AUTO: 4.51 K/UL (ref 3.9–12.7)

## 2024-03-06 PROCEDURE — 25000003 PHARM REV CODE 250: Performed by: NURSE PRACTITIONER

## 2024-03-06 PROCEDURE — 81003 URINALYSIS AUTO W/O SCOPE: CPT | Performed by: NURSE PRACTITIONER

## 2024-03-06 PROCEDURE — 99285 EMERGENCY DEPT VISIT HI MDM: CPT | Mod: 25

## 2024-03-06 PROCEDURE — 96367 TX/PROPH/DG ADDL SEQ IV INF: CPT

## 2024-03-06 PROCEDURE — 83735 ASSAY OF MAGNESIUM: CPT | Performed by: NURSE PRACTITIONER

## 2024-03-06 PROCEDURE — 63600175 PHARM REV CODE 636 W HCPCS: Performed by: STUDENT IN AN ORGANIZED HEALTH CARE EDUCATION/TRAINING PROGRAM

## 2024-03-06 PROCEDURE — 25000003 PHARM REV CODE 250: Performed by: STUDENT IN AN ORGANIZED HEALTH CARE EDUCATION/TRAINING PROGRAM

## 2024-03-06 PROCEDURE — 36415 COLL VENOUS BLD VENIPUNCTURE: CPT | Performed by: NURSE PRACTITIONER

## 2024-03-06 PROCEDURE — 25500020 PHARM REV CODE 255: Performed by: EMERGENCY MEDICINE

## 2024-03-06 PROCEDURE — 8E0ZXY6 ISOLATION: ICD-10-PCS | Performed by: STUDENT IN AN ORGANIZED HEALTH CARE EDUCATION/TRAINING PROGRAM

## 2024-03-06 PROCEDURE — 85025 COMPLETE CBC W/AUTO DIFF WBC: CPT | Performed by: NURSE PRACTITIONER

## 2024-03-06 PROCEDURE — U0002 COVID-19 LAB TEST NON-CDC: HCPCS | Performed by: STUDENT IN AN ORGANIZED HEALTH CARE EDUCATION/TRAINING PROGRAM

## 2024-03-06 PROCEDURE — 87502 INFLUENZA DNA AMP PROBE: CPT | Performed by: STUDENT IN AN ORGANIZED HEALTH CARE EDUCATION/TRAINING PROGRAM

## 2024-03-06 PROCEDURE — 87449 NOS EACH ORGANISM AG IA: CPT | Performed by: HOSPITALIST

## 2024-03-06 PROCEDURE — 96365 THER/PROPH/DIAG IV INF INIT: CPT

## 2024-03-06 PROCEDURE — 83605 ASSAY OF LACTIC ACID: CPT | Performed by: STUDENT IN AN ORGANIZED HEALTH CARE EDUCATION/TRAINING PROGRAM

## 2024-03-06 PROCEDURE — 80053 COMPREHEN METABOLIC PANEL: CPT | Performed by: NURSE PRACTITIONER

## 2024-03-06 RX ORDER — METRONIDAZOLE 500 MG/100ML
500 INJECTION, SOLUTION INTRAVENOUS
Status: COMPLETED | OUTPATIENT
Start: 2024-03-06 | End: 2024-03-07

## 2024-03-06 RX ORDER — ACETAMINOPHEN 500 MG
1000 TABLET ORAL
Status: COMPLETED | OUTPATIENT
Start: 2024-03-06 | End: 2024-03-06

## 2024-03-06 RX ADMIN — ACETAMINOPHEN 1000 MG: 500 TABLET ORAL at 10:03

## 2024-03-06 RX ADMIN — METRONIDAZOLE 500 MG: 5 INJECTION, SOLUTION INTRAVENOUS at 10:03

## 2024-03-06 RX ADMIN — SODIUM CHLORIDE 1000 ML: 9 INJECTION, SOLUTION INTRAVENOUS at 10:03

## 2024-03-06 RX ADMIN — CEFEPIME 2 G: 2 INJECTION, POWDER, FOR SOLUTION INTRAVENOUS at 10:03

## 2024-03-06 RX ADMIN — IOHEXOL 100 ML: 350 INJECTION, SOLUTION INTRAVENOUS at 09:03

## 2024-03-06 NOTE — FIRST PROVIDER EVALUATION
"Medical screening examination initiated.  I have conducted a focused provider triage encounter, findings are as follows:    Brief history of present illness:  63-year-old male presents emergency department with complaint of diarrhea.  Onset of symptoms approximately 5 days ago.  Patient states that the symptoms are constant.  He is having approximately 10-15 episodes of diarrhea per day.  He states the diarrhea is watery in nature.  He denies anything making symptoms better or worse.  Patient was seen here in the emergency department on 03/01/2024 and admitted to the hospital.  He states he was discharged on 03/02/2024 and followed up with his primary care provider today.  His primary doctor told him to come back to the emergency department for further evaluation.  He states that he has been compliant with the Imodium he was directed to take but it has not slowed up the diarrhea.  Patient states he was initially seen blood in his stool but has not seen any in the last couple of days.  Under review of systems complains of intermittent nausea and constant abdominal cramping, moderate intensity.  He has an allergy to penicillin.  Stated medical history of hypertension, prostatic hyperplasia, chronic back pain.    Vitals:    03/06/24 1706   BP: (!) 175/88   BP Location: Left arm   Patient Position: Sitting   Pulse: 92   Resp: 19   Temp: (!) 101 °F (38.3 °C)   TempSrc: Oral   SpO2: 98%   Weight: 68 kg (150 lb)   Height: 5' 11" (1.803 m)       Pertinent physical exam:  Patient is awake, alert, oriented.  Noted to be febrile on triage vital signs.  Blood pressure elevated at 175/88.  Temperature 101° F. he has moderate tenderness to the abdomen on palpation.  Bowel sounds normoactive.  Breath sounds clear to auscultation.  Heart tones normal.    Brief workup plan:  CBC, CMP, urinalysis, magnesium, CT abdomen    Preliminary workup initiated; this workup will be continued and followed by the physician or advanced practice " provider that is assigned to the patient when roomed.

## 2024-03-07 PROBLEM — Z71.89 ADVANCE CARE PLANNING: Status: ACTIVE | Noted: 2024-03-07

## 2024-03-07 PROBLEM — A41.9 SEPSIS WITHOUT ACUTE ORGAN DYSFUNCTION: Status: ACTIVE | Noted: 2024-03-07

## 2024-03-07 PROBLEM — R19.7 DIARRHEA: Status: ACTIVE | Noted: 2024-03-07

## 2024-03-07 PROBLEM — J18.9 PNEUMONIA DUE TO INFECTIOUS ORGANISM: Status: ACTIVE | Noted: 2024-03-07

## 2024-03-07 PROBLEM — R10.84 GENERALIZED ABDOMINAL PAIN: Status: ACTIVE | Noted: 2024-03-07

## 2024-03-07 LAB
LACTATE SERPL-SCNC: 1.1 MMOL/L (ref 0.5–1.9)
MRSA SCREEN BY PCR: POSITIVE
OB PNL STL: NEGATIVE
VANCOMYCIN TROUGH SERPL-MCNC: 4.8 UG/ML

## 2024-03-07 PROCEDURE — 87449 NOS EACH ORGANISM AG IA: CPT | Performed by: STUDENT IN AN ORGANIZED HEALTH CARE EDUCATION/TRAINING PROGRAM

## 2024-03-07 PROCEDURE — 36415 COLL VENOUS BLD VENIPUNCTURE: CPT | Performed by: STUDENT IN AN ORGANIZED HEALTH CARE EDUCATION/TRAINING PROGRAM

## 2024-03-07 PROCEDURE — 99900031 HC PATIENT EDUCATION (STAT)

## 2024-03-07 PROCEDURE — 80202 ASSAY OF VANCOMYCIN: CPT | Performed by: STUDENT IN AN ORGANIZED HEALTH CARE EDUCATION/TRAINING PROGRAM

## 2024-03-07 PROCEDURE — 94761 N-INVAS EAR/PLS OXIMETRY MLT: CPT

## 2024-03-07 PROCEDURE — 87205 SMEAR GRAM STAIN: CPT | Performed by: STUDENT IN AN ORGANIZED HEALTH CARE EDUCATION/TRAINING PROGRAM

## 2024-03-07 PROCEDURE — 63600175 PHARM REV CODE 636 W HCPCS: Performed by: HOSPITALIST

## 2024-03-07 PROCEDURE — 94799 UNLISTED PULMONARY SVC/PX: CPT

## 2024-03-07 PROCEDURE — 87641 MR-STAPH DNA AMP PROBE: CPT | Performed by: HOSPITALIST

## 2024-03-07 PROCEDURE — 63600175 PHARM REV CODE 636 W HCPCS: Performed by: STUDENT IN AN ORGANIZED HEALTH CARE EDUCATION/TRAINING PROGRAM

## 2024-03-07 PROCEDURE — 99900035 HC TECH TIME PER 15 MIN (STAT)

## 2024-03-07 PROCEDURE — 87449 NOS EACH ORGANISM AG IA: CPT | Mod: 91 | Performed by: STUDENT IN AN ORGANIZED HEALTH CARE EDUCATION/TRAINING PROGRAM

## 2024-03-07 PROCEDURE — 89055 LEUKOCYTE ASSESSMENT FECAL: CPT | Performed by: STUDENT IN AN ORGANIZED HEALTH CARE EDUCATION/TRAINING PROGRAM

## 2024-03-07 PROCEDURE — 25000003 PHARM REV CODE 250: Performed by: HOSPITALIST

## 2024-03-07 PROCEDURE — 94799 UNLISTED PULMONARY SVC/PX: CPT | Mod: XB

## 2024-03-07 PROCEDURE — 87045 FECES CULTURE AEROBIC BACT: CPT | Performed by: STUDENT IN AN ORGANIZED HEALTH CARE EDUCATION/TRAINING PROGRAM

## 2024-03-07 PROCEDURE — 83605 ASSAY OF LACTIC ACID: CPT | Performed by: EMERGENCY MEDICINE

## 2024-03-07 PROCEDURE — 87046 STOOL CULTR AEROBIC BACT EA: CPT | Mod: 59 | Performed by: STUDENT IN AN ORGANIZED HEALTH CARE EDUCATION/TRAINING PROGRAM

## 2024-03-07 PROCEDURE — 82272 OCCULT BLD FECES 1-3 TESTS: CPT | Performed by: STUDENT IN AN ORGANIZED HEALTH CARE EDUCATION/TRAINING PROGRAM

## 2024-03-07 PROCEDURE — 12000002 HC ACUTE/MED SURGE SEMI-PRIVATE ROOM

## 2024-03-07 PROCEDURE — 96366 THER/PROPH/DIAG IV INF ADDON: CPT

## 2024-03-07 PROCEDURE — 25000003 PHARM REV CODE 250: Performed by: STUDENT IN AN ORGANIZED HEALTH CARE EDUCATION/TRAINING PROGRAM

## 2024-03-07 PROCEDURE — 87070 CULTURE OTHR SPECIMN AEROBIC: CPT | Performed by: STUDENT IN AN ORGANIZED HEALTH CARE EDUCATION/TRAINING PROGRAM

## 2024-03-07 PROCEDURE — 87040 BLOOD CULTURE FOR BACTERIA: CPT | Performed by: STUDENT IN AN ORGANIZED HEALTH CARE EDUCATION/TRAINING PROGRAM

## 2024-03-07 PROCEDURE — 87209 SMEAR COMPLEX STAIN: CPT | Performed by: STUDENT IN AN ORGANIZED HEALTH CARE EDUCATION/TRAINING PROGRAM

## 2024-03-07 PROCEDURE — 96367 TX/PROPH/DG ADDL SEQ IV INF: CPT

## 2024-03-07 RX ORDER — LISINOPRIL 20 MG/1
40 TABLET ORAL DAILY
Status: DISCONTINUED | OUTPATIENT
Start: 2024-03-07 | End: 2024-03-08 | Stop reason: HOSPADM

## 2024-03-07 RX ORDER — ENOXAPARIN SODIUM 100 MG/ML
40 INJECTION SUBCUTANEOUS EVERY 24 HOURS
Status: DISCONTINUED | OUTPATIENT
Start: 2024-03-07 | End: 2024-03-08 | Stop reason: HOSPADM

## 2024-03-07 RX ORDER — LANOLIN ALCOHOL/MO/W.PET/CERES
800 CREAM (GRAM) TOPICAL
Status: DISCONTINUED | OUTPATIENT
Start: 2024-03-07 | End: 2024-03-08 | Stop reason: HOSPADM

## 2024-03-07 RX ORDER — SODIUM,POTASSIUM PHOSPHATES 280-250MG
2 POWDER IN PACKET (EA) ORAL
Status: DISCONTINUED | OUTPATIENT
Start: 2024-03-07 | End: 2024-03-08 | Stop reason: HOSPADM

## 2024-03-07 RX ORDER — TALC
6 POWDER (GRAM) TOPICAL NIGHTLY PRN
Status: DISCONTINUED | OUTPATIENT
Start: 2024-03-07 | End: 2024-03-08 | Stop reason: HOSPADM

## 2024-03-07 RX ORDER — IBUPROFEN 200 MG
24 TABLET ORAL
Status: DISCONTINUED | OUTPATIENT
Start: 2024-03-07 | End: 2024-03-08 | Stop reason: HOSPADM

## 2024-03-07 RX ORDER — METOPROLOL SUCCINATE 50 MG/1
50 TABLET, EXTENDED RELEASE ORAL DAILY
Status: DISCONTINUED | OUTPATIENT
Start: 2024-03-07 | End: 2024-03-08 | Stop reason: HOSPADM

## 2024-03-07 RX ORDER — GABAPENTIN 300 MG/1
600 CAPSULE ORAL 3 TIMES DAILY
Status: DISCONTINUED | OUTPATIENT
Start: 2024-03-07 | End: 2024-03-08 | Stop reason: HOSPADM

## 2024-03-07 RX ORDER — CYCLOBENZAPRINE HCL 10 MG
10 TABLET ORAL 3 TIMES DAILY PRN
Status: DISCONTINUED | OUTPATIENT
Start: 2024-03-07 | End: 2024-03-08 | Stop reason: HOSPADM

## 2024-03-07 RX ORDER — SUCRALFATE 1 G/1
1 TABLET ORAL
Status: DISCONTINUED | OUTPATIENT
Start: 2024-03-07 | End: 2024-03-08 | Stop reason: HOSPADM

## 2024-03-07 RX ORDER — METHADONE HYDROCHLORIDE 10 MG/1
80 TABLET ORAL DAILY
Status: DISCONTINUED | OUTPATIENT
Start: 2024-03-07 | End: 2024-03-08 | Stop reason: HOSPADM

## 2024-03-07 RX ORDER — ALUMINUM HYDROXIDE, MAGNESIUM HYDROXIDE, AND SIMETHICONE 1200; 120; 1200 MG/30ML; MG/30ML; MG/30ML
30 SUSPENSION ORAL 4 TIMES DAILY PRN
Status: DISCONTINUED | OUTPATIENT
Start: 2024-03-07 | End: 2024-03-08 | Stop reason: HOSPADM

## 2024-03-07 RX ORDER — HYDROCODONE BITARTRATE AND ACETAMINOPHEN 5; 325 MG/1; MG/1
1 TABLET ORAL EVERY 6 HOURS PRN
Status: DISCONTINUED | OUTPATIENT
Start: 2024-03-07 | End: 2024-03-08 | Stop reason: HOSPADM

## 2024-03-07 RX ORDER — PANTOPRAZOLE SODIUM 40 MG/1
40 TABLET, DELAYED RELEASE ORAL 2 TIMES DAILY
Status: DISCONTINUED | OUTPATIENT
Start: 2024-03-07 | End: 2024-03-07

## 2024-03-07 RX ORDER — IBUPROFEN 200 MG
16 TABLET ORAL
Status: DISCONTINUED | OUTPATIENT
Start: 2024-03-07 | End: 2024-03-08 | Stop reason: HOSPADM

## 2024-03-07 RX ORDER — MUPIROCIN 20 MG/G
OINTMENT TOPICAL 2 TIMES DAILY
Status: DISCONTINUED | OUTPATIENT
Start: 2024-03-07 | End: 2024-03-08 | Stop reason: HOSPADM

## 2024-03-07 RX ORDER — DOXYCYCLINE 100 MG/1
100 CAPSULE ORAL EVERY 12 HOURS
Status: DISCONTINUED | OUTPATIENT
Start: 2024-03-07 | End: 2024-03-08 | Stop reason: HOSPADM

## 2024-03-07 RX ORDER — VELPATASVIR AND SOFOSBUVIR 100; 400 MG/1; MG/1
1 TABLET, FILM COATED ORAL DAILY
COMMUNITY
Start: 2023-12-07 | End: 2024-03-15 | Stop reason: ALTCHOICE

## 2024-03-07 RX ORDER — ACETAMINOPHEN 325 MG/1
650 TABLET ORAL EVERY 4 HOURS PRN
Status: DISCONTINUED | OUTPATIENT
Start: 2024-03-07 | End: 2024-03-08 | Stop reason: HOSPADM

## 2024-03-07 RX ORDER — ACETAMINOPHEN 325 MG/1
650 TABLET ORAL EVERY 8 HOURS PRN
Status: DISCONTINUED | OUTPATIENT
Start: 2024-03-07 | End: 2024-03-08 | Stop reason: HOSPADM

## 2024-03-07 RX ORDER — ONDANSETRON HYDROCHLORIDE 2 MG/ML
4 INJECTION, SOLUTION INTRAVENOUS EVERY 6 HOURS PRN
Status: DISCONTINUED | OUTPATIENT
Start: 2024-03-07 | End: 2024-03-08 | Stop reason: HOSPADM

## 2024-03-07 RX ORDER — CLONIDINE HYDROCHLORIDE 0.1 MG/1
0.1 TABLET ORAL EVERY 4 HOURS PRN
Status: DISCONTINUED | OUTPATIENT
Start: 2024-03-07 | End: 2024-03-08 | Stop reason: HOSPADM

## 2024-03-07 RX ORDER — BUSPIRONE HYDROCHLORIDE 5 MG/1
15 TABLET ORAL DAILY
Status: DISCONTINUED | OUTPATIENT
Start: 2024-03-07 | End: 2024-03-08 | Stop reason: HOSPADM

## 2024-03-07 RX ORDER — GLUCAGON 1 MG
1 KIT INJECTION
Status: DISCONTINUED | OUTPATIENT
Start: 2024-03-07 | End: 2024-03-08 | Stop reason: HOSPADM

## 2024-03-07 RX ORDER — TAMSULOSIN HYDROCHLORIDE 0.4 MG/1
0.8 CAPSULE ORAL DAILY
Status: DISCONTINUED | OUTPATIENT
Start: 2024-03-07 | End: 2024-03-08 | Stop reason: HOSPADM

## 2024-03-07 RX ORDER — SODIUM CHLORIDE 0.9 % (FLUSH) 0.9 %
10 SYRINGE (ML) INJECTION EVERY 12 HOURS PRN
Status: DISCONTINUED | OUTPATIENT
Start: 2024-03-07 | End: 2024-03-08 | Stop reason: HOSPADM

## 2024-03-07 RX ORDER — NALOXONE HCL 0.4 MG/ML
0.02 VIAL (ML) INJECTION
Status: DISCONTINUED | OUTPATIENT
Start: 2024-03-07 | End: 2024-03-08 | Stop reason: HOSPADM

## 2024-03-07 RX ORDER — AMLODIPINE BESYLATE 5 MG/1
5 TABLET ORAL DAILY
Status: DISCONTINUED | OUTPATIENT
Start: 2024-03-07 | End: 2024-03-08 | Stop reason: HOSPADM

## 2024-03-07 RX ADMIN — Medication 6 MG: at 08:03

## 2024-03-07 RX ADMIN — ENOXAPARIN SODIUM 40 MG: 100 INJECTION SUBCUTANEOUS at 06:03

## 2024-03-07 RX ADMIN — MUPIROCIN 1 G: 20 OINTMENT TOPICAL at 11:03

## 2024-03-07 RX ADMIN — METOPROLOL SUCCINATE 50 MG: 25 TABLET, EXTENDED RELEASE ORAL at 11:03

## 2024-03-07 RX ADMIN — TAMSULOSIN HYDROCHLORIDE 0.8 MG: 0.4 CAPSULE ORAL at 11:03

## 2024-03-07 RX ADMIN — SUCRALFATE 1 G: 1 TABLET ORAL at 04:03

## 2024-03-07 RX ADMIN — PANTOPRAZOLE SODIUM 40 MG: 40 TABLET, DELAYED RELEASE ORAL at 06:03

## 2024-03-07 RX ADMIN — BUSPIRONE HYDROCHLORIDE 15 MG: 5 TABLET ORAL at 11:03

## 2024-03-07 RX ADMIN — AMLODIPINE BESYLATE 5 MG: 5 TABLET ORAL at 11:03

## 2024-03-07 RX ADMIN — HYDROCODONE BITARTRATE AND ACETAMINOPHEN 1 TABLET: 5; 325 TABLET ORAL at 08:03

## 2024-03-07 RX ADMIN — METHADONE HYDROCHLORIDE 80 MG: 10 TABLET ORAL at 06:03

## 2024-03-07 RX ADMIN — VANCOMYCIN HYDROCHLORIDE 1750 MG: 500 INJECTION, POWDER, LYOPHILIZED, FOR SOLUTION INTRAVENOUS at 12:03

## 2024-03-07 RX ADMIN — SUCRALFATE 1 G: 1 TABLET ORAL at 08:03

## 2024-03-07 RX ADMIN — LISINOPRIL 40 MG: 20 TABLET ORAL at 11:03

## 2024-03-07 RX ADMIN — GABAPENTIN 600 MG: 300 CAPSULE ORAL at 06:03

## 2024-03-07 RX ADMIN — GABAPENTIN 600 MG: 300 CAPSULE ORAL at 11:03

## 2024-03-07 RX ADMIN — CEFTRIAXONE SODIUM 1 G: 1 INJECTION, POWDER, FOR SOLUTION INTRAMUSCULAR; INTRAVENOUS at 11:03

## 2024-03-07 RX ADMIN — DOXYCYCLINE HYCLATE 100 MG: 100 CAPSULE ORAL at 11:03

## 2024-03-07 RX ADMIN — DOXYCYCLINE HYCLATE 100 MG: 100 CAPSULE ORAL at 08:03

## 2024-03-07 NOTE — PLAN OF CARE
[facility] - Emergency Dept  Initial Discharge Assessment       Primary Care Provider: Cam Orozco MD    Admission Diagnosis: Sepsis, due to unspecified organism, unspecified whether acute organ dysfunction present [A41.9]    Admission Date: 3/6/2024  Expected Discharge Date:    complete discharge assessment via chart review Pt was admitted last week. SW unable to reach emergency contact left message. Unable to assess Pt AAO Pt COVID+. Demographics, PCP, and insurance verified. No home health. No dialysis. Pt completes ADLs without assistance. Pt verbalized plan to discharge home via family transport. Pt has no other needs to be addressed at this time.    Transition of Care Barriers: None    Payor: WELL[employer] / Plan: [employer] MEDICARE HMO / Product Type: Medicare Advantage /     Extended Emergency Contact Information  Primary Emergency Contact: Shellie Rodriguez  Mobile Phone: [phone]  Relation: Daughter  Preferred language: English   needed? No  Secondary Emergency Contact: Jasmina Rodriguez  Mobile Phone: [phone]  Relation: Daughter  Preferred language: English   needed? No    Discharge Plan A: Home  Discharge Plan B: Home      [facility] [address] [place], [place] - [address]  [address]  [facility] [address]  Phone: [phone] Fax: [phone]    [facility]  [address]  [facility] [address]  Phone: [phone] Fax: [phone]      Initial Assessment (most recent)       Adult Discharge Assessment - 03/07/24 0908          Discharge Assessment    Assessment Type Discharge Planning Assessment     Confirmed/corrected address, phone number and insurance Yes     Confirmed Demographics Correct on Facesheet     Source of Information health record     Communicated NATALIA with patient/caregiver Date not available/Unable to determine     Reason For Admission Kim Sinclair (Spouse)  [phone]     People in  Home alone     Do you expect to return to your current living situation? Yes     Do you have help at home or someone to help you manage your care at home? No     Who are your caregiver(s) and their phone number(s)? --     Prior to hospitilization cognitive status: Unable to Assess     Current cognitive status: Unable to Assess     Walking or Climbing Stairs Difficulty no     Dressing/Bathing Difficulty no     Home Accessibility wheelchair accessible     Home Layout Able to live on 1st floor     Equipment Currently Used at Home cane, straight     Readmission within 30 days? Yes     Patient currently being followed by outpatient case management? No     Do you currently have service(s) that help you manage your care at home? No     Do you take prescription medications? Yes     Do you have prescription coverage? Yes     Coverage [facility] - WELL[employer] MEDICARE HMO     Do you have any problems affording any of your prescribed medications? No     Is the patient taking medications as prescribed? yes     How do you get to doctors appointments? health plan transportation     Are you on dialysis? No     Do you take coumadin? No     Discharge Plan A Home     Discharge Plan B Home     DME Needed Upon Discharge  none     Transition of Care Barriers None

## 2024-03-07 NOTE — H&P
[facility] - Emergency Dept  Hospital Medicine  History & Physical    Patient Name: Omar Trotter  MRN: [id number]  Patient Class: Emergency  Admission Date: 3/6/2024  Attending Physician: Yissel Lal MD  Primary Care Provider: Cam Orozco MD         Patient information was obtained from patient and ER records.     Subjective:     Principal Problem:<principal problem not specified>    Chief Complaint:   Chief Complaint   Patient presents with    Diarrhea     Since discharge 3/2 not improving         HPI: Please note that pt was uncooperative during the example. He was sleepy and wanted to be left alone. Questions were repeated multiple times for him to answer.     Mr. Trotter is a 62 yo male with PMH of GERD, HTN, and BPH who was recently admitted for UGIB, and melena, and was found to have esophagitis on EGD.  He was discharged on March 2nd on Protonix.  According to the patient, he has been having diffuse abdominal pain, nausea, and diarrhea prior to his last admission, which got worse after his discharge. So he came back to the ER for evaluation.  His abdominal pain is diffuse, but more so left lower quadrant.  Described as sharp, 10/10 on pain scale, radiating to the whole abdomen.  Associated with nausea, but no vomit.  He also complains of perfused diarrhea, about 10 bowel movement every day.  He admits having chills, and subjective fever.    In the ER, vitals were notable for fever of 101, hypertension with blood pressure of 178/88, heart rate of 92, respiratory rate of 19.  Labs CBC with no leukocytosis.  CMP showed mildly elevated alk-phos.  UA was negative.  COVID came back positive.  CT abdomen and pelvis with findings highly concerning for developing pneumonia at the right lung base. Hepatosplenomegaly with findings concerning for developing cirrhosis within the liver.  Patient was started on cefepime/vanc/Flagyl to cover for both pneumonia as well as possible C diff causing his  diarrhea.  He was also given 1 L of normal saline bolus.  He will be admitted to Medicine for further care.    Past Medical History:   Diagnosis Date    JACKLYN (acute kidney injury) 11/11/2022    Arthritis     GERD (gastroesophageal reflux disease)     Hepatitis C     STATES DX 10-20. NO S/S. NO FURTHER TESTS OR TX. WITH INSURANCE IN 2021 CAN GET IT EVALUATED.    History of left hip replacement 05/20/2021    History of MRSA infection     History of right shoulder replacement 01/15/2021    Hypertension     Wears glasses        Past Surgical History:   Procedure Laterality Date    APPENDECTOMY      ARTHROPLASTY OF SHOULDER Right 1/13/2021    Procedure: ARTHROPLASTY, SHOULDER TOTAL;  Surgeon: Emilio Siddiqui MD;  Location: [facility] OR;  Service: Orthopedics;  Laterality: Right;  GENERAL AND BLOCK    COLONOSCOPY N/A 5/8/2023    Procedure: COLONOSCOPY;  Surgeon: Bobby Bourgeois MD;  Location: [facility];  Service: Endoscopy;  Laterality: N/A;    ESOPHAGOGASTRODUODENOSCOPY N/A 5/7/2023    Procedure: EGD (ESOPHAGOGASTRODUODENOSCOPY);  Surgeon: Carloz Christianson Jr., MD;  Location: [facility];  Service: Endoscopy;  Laterality: N/A;    ESOPHAGOGASTRODUODENOSCOPY N/A 3/1/2024    Procedure: EGD (ESOPHAGOGASTRODUODENOSCOPY);  Surgeon: Femi Hyde MD;  Location: [facility];  Service: Endoscopy;  Laterality: N/A;    HERNIA REPAIR      HIP REPLACEMENT ARTHROPLASTY Left 3/24/2021    Procedure: ARTHROPLASTY, HIP REPLACEMENT;  Surgeon: Miles Rivas II, MD;  Location: [facility] OR;  Service: Orthopedics;  Laterality: Left;    JOINT REPLACEMENT Right     shoulder    REVISION TOTAL HIP ARTHROPLASTY Left 6/2/2021    Procedure: REVISION, TOTAL ARTHROPLASTY, HIP;  Surgeon: Miles Rivas II, MD;  Location: [facility] OR;  Service: Orthopedics;  Laterality: Left;    SMALL BOWEL ENTEROSCOPY Left 5/8/2023    Procedure: ENTEROSCOPY;  Surgeon: Bobby Bourgeois MD;  Location: [facility];  Service: Endoscopy;  Laterality: Left;       Review of patient's  allergies indicates:   Allergen Reactions    Pcn [penicillins]      As a child.       Current Facility-Administered Medications on File Prior to Encounter   Medication    electrolyte-S (ISOLYTE)    HYDROmorphone (PF) injection 0.2 mg    LIDOcaine (PF) 10 mg/ml (1%) injection 10 mg    oxyCODONE immediate release tablet 5 mg     Current Outpatient Medications on File Prior to Encounter   Medication Sig    amLODIPine (NORVASC) 5 MG tablet Take 1 tablet by mouth once daily (Patient taking differently: Take 5 mg by mouth once daily.)    busPIRone (BUSPAR) 15 MG tablet Take 1 tablet by mouth once daily (Patient taking differently: Take 15 mg by mouth once daily. Take 1 tablet by mouth once daily)    cyclobenzaprine (FLEXERIL) 10 MG tablet Take 1 tablet by mouth three times daily as needed for muscle spasm (Patient taking differently: Take 10 mg by mouth 3 (three) times daily as needed.)    ferrous sulfate (FEOSOL) Tab tablet Take 1 tablet by mouth daily with breakfast.    gabapentin (NEURONTIN) 600 MG tablet TAKE 1 TABLET BY MOUTH THREE TIMES DAILY    lisinopriL (PRINIVIL,ZESTRIL) 40 MG tablet Take 1 tablet by mouth once daily (Patient taking differently: Take 40 mg by mouth once daily.)    [] loperamide (IMODIUM) 2 mg capsule Take 1 capsule (2 mg total) by mouth 3 (three) times daily. for 5 days    methadone (METHADOSE) 40 mg disintegrating tablet Take 80 mg by mouth As instructed for Pain. Patient goes to clinic three days a week    methocarbamoL (ROBAXIN) 500 MG Tab TAKE 1 TABLET BY MOUTH NIGHTLY AS NEEDED (Patient taking differently: Take 500 mg by mouth nightly as needed (muscle spasms). TAKE 1 TABLET BY MOUTH NIGHTLY AS NEEDED)    metoprolol succinate (TOPROL-XL) 50 MG 24 hr tablet Take 1 tablet (50 mg total) by mouth once daily.    [] ondansetron (ZOFRAN-ODT) 4 MG TbDL Take 1 tablet (4 mg total) by mouth every 8 (eight) hours as needed (nausea).    oxybutynin (DITROPAN-XL) 5 MG TR24 Take 1 tablet by  mouth once daily (Patient taking differently: Take 5 mg by mouth once daily.)    pantoprazole (PROTONIX) 40 MG tablet Take 1 tablet (40 mg total) by mouth 2 (two) times daily.    tamsulosin (FLOMAX) 0.4 mg Cap Take 2 capsules (0.8 mg total) by mouth once daily.     Family History       Problem Relation (Age of Onset)    Cancer Mother          Tobacco Use    Smoking status: Every Day     Current packs/day: 0.50     Average packs/day: 0.5 packs/day for 30.0 years (15.0 ttl pk-yrs)     Types: Cigarettes     Passive exposure: Current    Smokeless tobacco: Never   Substance and Sexual Activity    Alcohol use: Never    Drug use: Never    Sexual activity: Yes     Partners: Female     Review of Systems   Constitutional:  Positive for chills and fever.   HENT:  Negative for sore throat.    Respiratory:  Negative for cough and shortness of breath.    Cardiovascular:  Negative for chest pain and palpitations.   Gastrointestinal:  Positive for abdominal pain, diarrhea and nausea. Negative for vomiting.   Genitourinary:  Negative for dysuria, frequency and urgency.   Musculoskeletal:  Negative for back pain.   Skin:  Negative for rash.   Neurological:  Negative for syncope and headaches.   Psychiatric/Behavioral:  Negative for confusion and hallucinations.      Objective:     Vital Signs (Most Recent):  Temp: 99.9 °F (37.7 °C) (03/06/24 2252)  Pulse: 84 (03/07/24 0300)  Resp: 16 (03/07/24 0300)  BP: (!) 165/79 (03/07/24 0300)  SpO2: 98 % (03/07/24 0300) Vital Signs (24h Range):  Temp:  [98.1 °F (36.7 °C)-101 °F (38.3 °C)] 99.9 °F (37.7 °C)  Pulse:  [] 84  Resp:  [16-19] 16  SpO2:  [97 %-98 %] 98 %  BP: (133-180)/(60-88) 165/79     Weight: 68 kg (150 lb)  Body mass index is 20.92 kg/m².     Physical Exam  Vitals reviewed.   Constitutional:       Appearance: Normal appearance.   HENT:      Head: Normocephalic and atraumatic.      Mouth/Throat:      Mouth: Mucous membranes are moist.   Eyes:      Extraocular Movements:  "Extraocular movements intact.      Conjunctiva/sclera: Conjunctivae normal.      Pupils: Pupils are equal, round, and reactive to light.   Cardiovascular:      Rate and Rhythm: Normal rate and regular rhythm.   Pulmonary:      Effort: Pulmonary effort is normal. No respiratory distress.      Breath sounds: Normal breath sounds. No wheezing.   Abdominal:      General: Abdomen is flat. Bowel sounds are normal. There is no distension.      Palpations: Abdomen is soft.      Comments: Tenderness to palpation, more so left lower quadrant.   Musculoskeletal:         General: No swelling. Normal range of motion.      Cervical back: Normal range of motion and neck supple.   Skin:     General: Skin is warm.      Coloration: Skin is not jaundiced.   Neurological:      General: No focal deficit present.      Mental Status: He is alert and oriented to person, place, and time.   Psychiatric:         Mood and Affect: Mood normal.         Behavior: Behavior normal.              CRANIAL NERVES     CN III, IV, VI   Pupils are equal, round, and reactive to light.       Significant Labs: All pertinent labs within the past 24 hours have been reviewed.  Bilirubin:   Recent Labs   Lab 02/29/24 2242 03/06/24 2003   BILITOT 0.4 0.3     CBC:   Recent Labs   Lab 03/06/24 2040   WBC 4.51   HGB 11.1*   HCT 34.4*   *     CMP:   Recent Labs   Lab 03/06/24 2003      K 3.9      CO2 23   GLU 99   BUN 11   CREATININE 0.6   CALCIUM 8.0*   PROT 6.4   ALBUMIN 3.3*   BILITOT 0.3   ALKPHOS 160*   AST 24   ALT 20   ANIONGAP 7*     Cardiac Markers: No results for input(s): "CKMB", "MYOGLOBIN", "BNP", "TROPISTAT" in the last 48 hours.  Lactic Acid:   Recent Labs   Lab 03/06/24 2042 03/07/24  0019   LACTATE 1.1 1.1     Lipase: No results for input(s): "LIPASE" in the last 48 hours.  Pathology Results  (Last 10 years)      None          Urine Culture: No results for input(s): "LABURIN" in the last 48 hours.  Urine Studies:   Recent Labs "   Lab 03/06/24  1759   COLORU Yellow   APPEARANCEUA Clear   PHUR 6.0   SPECGRAV 1.030   PROTEINUA Trace*   GLUCUA Negative   KETONESU Negative   BILIRUBINUA Negative   OCCULTUA Negative   NITRITE Negative   UROBILINOGEN 2.0-3.0*   LEUKOCYTESUR Negative       Significant Imaging: I have reviewed all pertinent imaging results/findings within the past 24 hours.  Assessment/Plan:     * Sepsis without acute organ dysfunction  - fever and HR >90 on presentation.  - Suspect 2/2 covid pneumonia, but one can't rule out infectious diarrhea as well.  - Patient stated that his diarrhea started 2 days prior to his last presentation.  Unfortunately COVID was not checked last admission.  So unclear whether patient had COVID or not during his last admission that might have caused his diarrhea. Pt is out of the window for remdesivir as he had the symptoms for more than 5 days.   - I will also cover for superimposed bacterial pneumonia infection with ceftriaxone and doxycycline.  Will order MRSA swab.  We will order strep pneumo antigen in urine, Legionella antigen in urine.  Also sputum culture.  - C diff, stool culture has been ordered.  IV fluids for hydration.  Zofran p.r.n., and morphine p.r.n..    Pneumonia due to infectious organism  Covid pneumonia vs bacterial pneumonia.  For now,  I will also cover for superimposed bacterial pneumonia infection with ceftriaxone and doxycycline.  Will order MRSA swab.    We will order strep pneumo antigen in urine, Legionella antigen in urine.  Also sputum culture.      Generalized abdominal pain  Generalized abdominal pain, but worse left lower quadrant.  We will rule out C diff. Will also order stool cx and Shiga toxin.    CT abdomen pelvis was reviewed.      Diarrhea  Stool cx and c.diffi will be ordered as stated above.       Esophagitis  Resume Protonix.      BPH associated with nocturia  Resume flomax      Hypertension   resume Norvasc 5 mg, lisinopril 40 mg. Also metoprolol.       VTE  Risk Mitigation (From admission, onward)      None                            Yissel Lal MD  Department of Hospital Medicine  [facility] - Emergency Dept

## 2024-03-07 NOTE — ASSESSMENT & PLAN NOTE
Pt w/o cough, SOB, fever, normal RA sats  Has right basilar infiltrate?  D/c Rocephin, continue oral doxy  MRSA, Legionella/Strep pneumo, sputum/BCX all pending

## 2024-03-07 NOTE — SUBJECTIVE & OBJECTIVE
"Interval History:     3/7/24  Assumed care of pt admitted after MN by nocturnist, seen & examined in ED, chart reviewed.  He's a bit more communicative for me.  Feels poorly but nothing specific.  Continues w/ diarrheal stools.      Review of Systems    Limited as pt is still a bit hesitant to talk but he does follow commands, is tired of lying on gurney  Denies f/c/n/v/cp/sob, says "I just pooped my pants again"    Objective:     Vital Signs (Most Recent):  Temp: 98.7 °F (37.1 °C) (03/07/24 0630)  Pulse: 76 (03/07/24 0804)  Resp: 16 (03/07/24 0300)  BP: (!) 175/77 (03/07/24 0804)  SpO2: 97 % (03/07/24 0804) Vital Signs (24h Range):  Temp:  [98.1 °F (36.7 °C)-101 °F (38.3 °C)] 98.7 °F (37.1 °C)  Pulse:  [] 76  Resp:  [16-19] 16  SpO2:  [96 %-99 %] 97 %  BP: (133-180)/(60-88) 175/77     Weight: 68 kg (150 lb)  Body mass index is 20.92 kg/m².    Intake/Output Summary (Last 24 hours) at 3/7/2024 1139  Last data filed at 3/7/2024 0246  Gross per 24 hour   Intake 1700 ml   Output --   Net 1700 ml         Physical Exam      General:  awake, NAD, a bit somnolent but does follow all commands, looks uncomfortable but not so much toxic  HEENT: neck supple, PERRL/EOMI, EAC clear bilaterally, normal bilateral carotid upstroke w/o bruits, inf turbs clear bilaterally, OC/OP clear  Lungs: CTAB  CV: RRR w/o M/G/R  Abdomen: soft, NTND, no HSM, no bruits/masses/pulsations  Ext: no edema  : def  Rectal: def  Neuro: NF    Significant Labs: All pertinent labs within the past 24 hours have been reviewed.    Significant Imaging: I have reviewed all pertinent imaging results/findings within the past 24 hours.  "

## 2024-03-07 NOTE — ASSESSMENT & PLAN NOTE
CT reviewed  Likely d/t acute gastroenteritis, COVID 19 may be contributing  Abdomen is benign this AM  Stool studies ordered  IVF  Supportive care

## 2024-03-07 NOTE — ED PROVIDER NOTES
Encounter Date: 3/6/2024       History     Chief Complaint   Patient presents with    Diarrhea     Since discharge 3/2 not improving      HPI    63-year-old man with a history of hypertension, recent admission for upper GI bleed presents for evaluation of continued diarrhea since he was discharged, he has had upwards of 10 episodes a day, he denies black stool or bright red blood in his stool.  He reports fever and chills but he has not taken his temperature.  He reports cough productive.  He denies chest pain or shortness of breath.  He denies change in urination.    Review of patient's allergies indicates:   Allergen Reactions    Pcn [penicillins]      As a child.     Past Medical History:   Diagnosis Date    JACKLYN (acute kidney injury) 11/11/2022    Arthritis     GERD (gastroesophageal reflux disease)     Hepatitis C     STATES DX 10-20. NO S/S. NO FURTHER TESTS OR TX. WITH INSURANCE IN 2021 CAN GET IT EVALUATED.    History of left hip replacement 05/20/2021    History of MRSA infection     History of right shoulder replacement 01/15/2021    Hypertension     Wears glasses      Past Surgical History:   Procedure Laterality Date    APPENDECTOMY      ARTHROPLASTY OF SHOULDER Right 1/13/2021    Procedure: ARTHROPLASTY, SHOULDER TOTAL;  Surgeon: Emilio Siddiqui MD;  Location: [facility];  Service: Orthopedics;  Laterality: Right;  GENERAL AND BLOCK    COLONOSCOPY N/A 5/8/2023    Procedure: COLONOSCOPY;  Surgeon: Bobby Bourgeois MD;  Location: [facility];  Service: Endoscopy;  Laterality: N/A;    ESOPHAGOGASTRODUODENOSCOPY N/A 5/7/2023    Procedure: EGD (ESOPHAGOGASTRODUODENOSCOPY);  Surgeon: Carloz Christianson Jr., MD;  Location: [facility];  Service: Endoscopy;  Laterality: N/A;    ESOPHAGOGASTRODUODENOSCOPY N/A 3/1/2024    Procedure: EGD (ESOPHAGOGASTRODUODENOSCOPY);  Surgeon: Femi Hyde MD;  Location: [facility];  Service: Endoscopy;  Laterality: N/A;    HERNIA REPAIR      HIP REPLACEMENT ARTHROPLASTY Left  3/24/2021    Procedure: ARTHROPLASTY, HIP REPLACEMENT;  Surgeon: Miles Rivas II, MD;  Location: [facility];  Service: Orthopedics;  Laterality: Left;    JOINT REPLACEMENT Right     shoulder    REVISION TOTAL HIP ARTHROPLASTY Left 6/2/2021    Procedure: REVISION, TOTAL ARTHROPLASTY, HIP;  Surgeon: Miles Rivas II, MD;  Location: [facility] OR;  Service: Orthopedics;  Laterality: Left;    SMALL BOWEL ENTEROSCOPY Left 5/8/2023    Procedure: ENTEROSCOPY;  Surgeon: Bobby Bourgeois MD;  Location: [facility] ENDO;  Service: Endoscopy;  Laterality: Left;     Family History   Problem Relation Age of Onset    Cancer Mother         leukemia     Social History     Tobacco Use    Smoking status: Every Day     Current packs/day: 0.50     Average packs/day: 0.5 packs/day for 30.0 years (15.0 ttl pk-yrs)     Types: Cigarettes     Passive exposure: Current    Smokeless tobacco: Never   Substance Use Topics    Alcohol use: Never    Drug use: Never     Review of Systems   All other systems reviewed and are negative.      Physical Exam     Initial Vitals [03/06/24 1706]   BP Pulse Resp Temp SpO2   (!) 175/88 92 19 (!) 101 °F (38.3 °C) 98 %      MAP       --         Physical Exam    Constitutional: He appears well-developed and well-nourished.   HENT:   Head: Normocephalic and atraumatic.   Eyes: Right eye exhibits no discharge. Left eye exhibits no discharge.   Neck: No tracheal deviation present.   Cardiovascular:  Normal rate and regular rhythm.           Pulmonary/Chest: Breath sounds normal. No stridor. No respiratory distress.   Abdominal: Abdomen is soft. Bowel sounds are normal. There is abdominal tenderness.   Mild lower abdominal primarily left lower quadrant tenderness to palpation without rebound or guarding There is no rebound and no guarding.   Musculoskeletal:         General: No edema.     Neurological: He is alert and oriented to person, place, and time.   Skin: Skin is warm and dry.         ED Course   Procedures  Labs  Reviewed   MAGNESIUM - Abnormal; Notable for the following components:       Result Value    Magnesium 1.5 (*)     All other components within normal limits   COMPREHENSIVE METABOLIC PANEL - Abnormal; Notable for the following components:    Calcium 8.0 (*)     Albumin 3.3 (*)     Alkaline Phosphatase 160 (*)     Anion Gap 7 (*)     All other components within normal limits   URINALYSIS, REFLEX TO URINE CULTURE - Abnormal; Notable for the following components:    Protein, UA Trace (*)     Urobilinogen, UA 2.0-3.0 (*)     All other components within normal limits    Narrative:     Specimen Source->Urine   CBC W/ AUTO DIFFERENTIAL - Abnormal; Notable for the following components:    RBC 3.91 (*)     Hemoglobin 11.1 (*)     Hematocrit 34.4 (*)     RDW 14.8 (*)     Platelets 120 (*)     All other components within normal limits   SARS-COV-2 RNA AMPLIFICATION, QUAL - Abnormal; Notable for the following components:    SARS-CoV-2 RNA, Amplification, Qual Positive (*)     All other components within normal limits   CULTURE, BLOOD   CULTURE, BLOOD   CULTURE, STOOL   CLOSTRIDIUM DIFFICILE   LACTIC ACID, PLASMA   INFLUENZA A AND B ANTIGEN    Narrative:     Specimen Source->Nasopharyngeal Swab   LACTIC ACID, PLASMA   STOOL EXAM-OVA,CYSTS,PARASITES   WBC, STOOL   OCCULT BLOOD X 1, STOOL          Imaging Results              CT Abdomen Pelvis With IV Contrast NO Oral Contrast (Final result)  Result time 03/06/24 21:57:06      Final result by Bobby Phillips MD (03/06/24 21:57:06)                   Narrative:    CT ABDOMEN PELVIS WITH IV CONTRAST    COMPARISONS:  February 29, 2024.    ADDITIONAL PERTINENT HISTORY:   Abdominal pain with diarrhea.    TECHNIQUE:   Multiple axial images were obtained from the lung bases through the lesser trochanters after the adminstration of intravenous contrast.  One of the following dose optimization techniques was utilized in the performance of this exam: Automated exposure control; adjustment of  the mA and/or kV according to the patient's size; or use of an iterative  reconstruction technique.  Specific details can be referenced in the facility's radiology CT exam operational policy.    CONTRAST:   100 mL of IV Omnipaque 350.    FINDINGS:  Lung bases: Dense regions of infiltrate at the right lung base posteriorly.    Free air/free fluid: none  Liver:  Hepatomegaly with fatty infiltration of the liver. Mild nodularity to the margin of the liver concerning for developing cirrhosis.  Spleen: Moderate splenomegaly.  Adrenal glands: negative  Kidneys /ureters/urinary bladder: negative  Pancreas: negative  Gall Bladder:   negative    Bowel / mesentery: negative    Lymph node assessment: negative    Pelvic contents: negative  Abdominal vasculature: Atherosclerotic disease of the abdominal aorta and its major branches.    Surrounding soft tissues: negative  Osseous structures: Previous left hip arthroplasty. Sclerotic changes within the left acetabulum which could represent focal Paget's disease. Scoliotic curvature convex to the left centered at L4. Spondylitic change involving the thoracolumbar spine. No acute appearing bony abnormalities.      IMPRESSION:    1. Findings highly concerning for developing pneumonia at the right lung base.  2. Hepatosplenomegaly with findings concerning for developing cirrhosis within the liver.  3. Other chronic findings within the abdomen and pelvis as discussed above.    Electronically signed by:  Bobby Phillips MD  03/06/2024 09:57 PM CST Workstation: [id number]                                     X-Ray Chest AP Portable (Final result)  Result time 03/06/24 19:33:21      Final result by Homero Smith Jr., MD (03/06/24 19:33:21)                   Narrative:    CLINICAL HISTORY:  63 years (1960) Male Sepsis Diarrhea (Since discharge 3/2 not improving )    TECHNIQUE:  XR CHEST 1 VIEW. 1 images obtained.    COMPARISON:  December 12, 2023    FINDINGS:  Normal hyperinflation  of lungs is noted. Mild tortuosity thoracic aorta. Minimal arch atherosclerosis. Linear bandlike opacity right lower lobe is attributed towards atelectasis with confluent interstitial markings in the medial aspect of linear density less likely infiltrate. Minimal bibasilar atelectasis. There is no evidence of pulmonic infiltrate. Patient has undergone previous right humeral arthroplasty.    IMPRESSION: Lovelady opacity right lower lobe that is likely consequence of an area of atelectasis less likely infiltrate.    Electronically signed by:  Homero Smith MD  03/06/2024 07:33 PM CST Workstation: [id number]                                     Medications   metronidazole IVPB 500 mg (500 mg Intravenous New Bag 3/6/24 2250)   vancomycin (VANCOCIN) 1,750 mg in dextrose 5 % (D5W) 500 mL IVPB (has no administration in time range)   sodium chloride 0.9% bolus 1,000 mL 1,000 mL (1,000 mLs Intravenous New Bag 3/6/24 2211)   cefepime 2 g in dextrose 5% 100 mL IVPB (ready to mix) (0 g Intravenous Stopped 3/6/24 2245)   iohexoL (OMNIPAQUE 350) injection 100 mL (100 mLs Intravenous Given 3/6/24 2134)   acetaminophen tablet 1,000 mg (1,000 mg Oral Given 3/6/24 2252)     Medical Decision Making  Continued diarrhea and abdominal pain as well as fever chills cough since he was discharged from the hospital, no chest pain or shortness of breath, febrile, tachycardic, hypertensive, satting well on room air, mild abdominal tenderness to palpation, nontoxic appearing, labs including stool studies, CT abdomen and pelvis, there was concern that he may have C diff based on the volume in his recent hospitalization, also concern for other sources of sepsis such as UTI and pneumonia.  I covered him broadly with antibiotics including metronidazole for possible C diff.    Chest x-ray on my independent interpretation has a right basilar opacity noted, on the CT this looks like a consolidative process.    Amount and/or Complexity of Data  Reviewed  External Data Reviewed: notes.     Details: Discharged after being diagnosed with esophagitis  Labs: ordered.  Radiology: ordered and independent interpretation performed.    Risk  OTC drugs.  Prescription drug management.  Decision regarding hospitalization.  Diagnosis or treatment significantly limited by social determinants of health.      Rmmadeline Alvarez, HO4  [facility]-  Chart dictated using voice recognition software, possible dictation errors present            Attending Attestation:   Physician Attestation Statement for Resident:  As the supervising MD   Physician Attestation Statement: I have personally seen and examined this patient.   I agree with the above history.  -: Pt w/ PMHx HTN, recent admit for upper GIB p/w history large volume diarrhea since discharge.  Denies melena or blood in stool.  He reports subjective fever and chills with a productive cough.         As the supervising MD I agree with the above PE.   - with the following exceptions: Ill-appearing, nontoxic, no acute distress.  Abdomen with mild generalized tenderness, no   Rebound or guarding  , GCS 15   As the supervising MD I agree with the above treatment, course, plan, and disposition.   - with the following exceptions: Patient presents to ED as above.  He is febrile with SIRS criteria.  Workup significant for COVID positive, stable hemoglobin, CT showing concern for pneumonia, no acute intra-abdominal process liat.  Will admit to hospitalist for further management   I have reviewed and agree with the residents interpretation of the following: lab data, x-rays, CT scans and EKG.  I have reviewed the following: old records at this facility.        Attending Critical Care:   Critical Care Times:   Direct Patient Care (initial evaluation, reassessments, and time considering the case)................................................................35 minutes.   ==============================================================  Total  Critical Care Time - exclusive of procedural time: 35 minutes.  ==============================================================                                 Clinical Impression:  Final diagnoses:  [A41.9] Sepsis, due to unspecified organism, unspecified whether acute organ dysfunction present (Primary)  [J18.9] Pneumonia of right lower lobe due to infectious organism  [R19.7] Diarrhea, unspecified type                 Rm Alvarez MD  Resident  03/06/24 3205       Donna Wynn MD  03/08/24 [id number]

## 2024-03-07 NOTE — ASSESSMENT & PLAN NOTE
Probably d/t acute gastroenteritis and COVID 19  Will d/c Rocephin as I don't suspect a gram neg PNA, continue oral doxy  F/u on cultures and other studies  MRSA + but do not suspect acute MRSA infection  Check PCT  Symptomatic and supportive care  Contact and droplet precautions  .

## 2024-03-07 NOTE — ASSESSMENT & PLAN NOTE
- fever and HR >90 on presentation.  - Suspect 2/2 covid pneumonia, but one can't rule out infectious diarrhea as well.  - Patient stated that his diarrhea started 2 days prior to his last presentation.  Unfortunately COVID was not checked last admission.  So unclear whether patient had COVID or not during his last admission that might have caused his diarrhea. Pt is out of the window for remdesivir as he had the symptoms for more than 5 days.   - I will also cover for superimposed bacterial pneumonia infection with ceftriaxone and doxycycline.  Will order MRSA swab.  We will order strep pneumo antigen in urine, Legionella antigen in urine.  Also sputum culture.  - C diff, stool culture has been ordered.  IV fluids for hydration.  Zofran p.r.n., and morphine p.r.n..

## 2024-03-07 NOTE — ASSESSMENT & PLAN NOTE
In setting of chronic liver disease  Mild, stable, trend  Holding PPI in setting of possible c diff enteritis

## 2024-03-07 NOTE — PROGRESS NOTES
[facility] - Emergency Dept  Hospital Medicine  Progress Note    Patient Name: Omar Trotter  MRN: [id number]  Patient Class: IP- Inpatient   Admission Date: 3/6/2024  Length of Stay: 0 days  Attending Physician: Bart Cortez MD  Primary Care Provider: Cam Orozco MD        Subjective:     Principal Problem:Sepsis without acute organ dysfunction        HPI:  Please note that pt was uncooperative during the example. He was sleepy and wanted to be left alone. Questions were repeated multiple times for him to answer.     Mr. Trotter is a 64 yo male with PMH of GERD, HTN, and BPH who was recently admitted for UGIB, and melena, and was found to have esophagitis on EGD.  He was discharged on March 2nd on Protonix.  According to the patient, he has been having diffuse abdominal pain, nausea, and diarrhea prior to his last admission, which got worse after his discharge. So he came back to the ER for evaluation.  His abdominal pain is diffuse, but more so left lower quadrant.  Described as sharp, 10/10 on pain scale, radiating to the whole abdomen.  Associated with nausea, but no vomit.  He also complains of perfused diarrhea, about 10 bowel movement every day.  He admits having chills, and subjective fever.    In the ER, vitals were notable for fever of 101, hypertension with blood pressure of 178/88, heart rate of 92, respiratory rate of 19.  Labs CBC with no leukocytosis.  CMP showed mildly elevated alk-phos.  UA was negative.  COVID came back positive.  CT abdomen and pelvis with findings highly concerning for developing pneumonia at the right lung base. Hepatosplenomegaly with findings concerning for developing cirrhosis within the liver.  Patient was started on cefepime/vanc/Flagyl to cover for both pneumonia as well as possible C diff causing his diarrhea.  He was also given 1 L of normal saline bolus.  He will be admitted to Medicine for further care.    Overview/Hospital Course:  No notes  "on file    Interval History:     3/7/24  Assumed care of pt admitted after MN by noctjob, seen & examined in ED, chart reviewed.  He's a bit more communicative for me.  Feels poorly but nothing specific.  Continues w/ diarrheal stools.      Review of Systems    Limited as pt is still a bit hesitant to talk but he does follow commands, is tired of lying on gurney  Denies f/c/n/v/cp/sob, says "I just pooped my pants again"    Objective:     Vital Signs (Most Recent):  Temp: 98.7 °F (37.1 °C) (03/07/24 0630)  Pulse: 76 (03/07/24 0804)  Resp: 16 (03/07/24 0300)  BP: (!) 175/77 (03/07/24 0804)  SpO2: 97 % (03/07/24 0804) Vital Signs (24h Range):  Temp:  [98.1 °F (36.7 °C)-101 °F (38.3 °C)] 98.7 °F (37.1 °C)  Pulse:  [] 76  Resp:  [16-19] 16  SpO2:  [96 %-99 %] 97 %  BP: (133-180)/(60-88) 175/77     Weight: 68 kg (150 lb)  Body mass index is 20.92 kg/m².    Intake/Output Summary (Last 24 hours) at 3/7/2024 1139  Last data filed at 3/7/2024 0246  Gross per 24 hour   Intake 1700 ml   Output --   Net 1700 ml         Physical Exam      General:  awake, NAD, a bit somnolent but does follow all commands, looks uncomfortable but not so much toxic  HEENT: neck supple, PERRL/EOMI, EAC clear bilaterally, normal bilateral carotid upstroke w/o bruits, inf turbs clear bilaterally, OC/OP clear  Lungs: CTAB  CV: RRR w/o M/G/R  Abdomen: soft, NTND, no HSM, no bruits/masses/pulsations  Ext: no edema  : def  Rectal: def  Neuro: NF    Significant Labs: All pertinent labs within the past 24 hours have been reviewed.    Significant Imaging: I have reviewed all pertinent imaging results/findings within the past 24 hours.    Assessment/Plan:      * Sepsis without acute organ dysfunction  Probably d/t acute gastroenteritis and COVID 19  Will d/c Rocephin as I don't suspect a gram neg PNA, continue oral doxy  F/u on cultures and other studies  MRSA + but do not suspect acute MRSA infection  Check PCT  Symptomatic and supportive " care  Contact and droplet precautions  .    Advance care planning  Full Code      Diarrhea  Stool studies are pending        Generalized abdominal pain  CT reviewed  Likely d/t acute gastroenteritis, COVID 19 may be contributing  Abdomen is benign this AM  Stool studies ordered  IVF  Supportive care      Pneumonia due to infectious organism  Pt w/o cough, SOB, fever, normal RA sats  Has right basilar infiltrate?  D/c Rocephin, continue oral doxy  MRSA, Legionella/Strep pneumo, sputum/BCX all pending        Uncomplicated opioid dependence  On OPT methadone maintenance  Continue  No pain c/o        Esophagitis  Hold PPI in setting of possible c diff  Sucralfate, H2 block as appropriate      Anemia  In setting of chronic liver disease  Mild, stable, trend  Holding PPI in setting of possible c diff enteritis    BPH associated with nocturia  Cont Flomax      Cirrhosis of liver  No acute issues     Hepatitis C  OPT f/u w/ GI/ID  No acute issues       Hypertension  He is hypertensive  Continue home meds as appropriate      VTE Risk Mitigation (From admission, onward)           Ordered     enoxaparin injection 40 mg  Daily         03/07/24 0503     IP VTE HIGH RISK PATIENT  Once         03/07/24 0503     Place sequential compression device  Until discontinued         03/07/24 0503                    Discharge Planning   NATALIA:      Code Status: Full Code   Is the patient medically ready for discharge?:     Reason for patient still in hospital (select all that apply): Patient trending condition  Discharge Plan A: Home          Time spent in direct patient care, review of data, conversation w/ patient and/or family, and moderately complex MDM required an additional 35 minutes above and beyond what was done earlier by admitting MD Bart Cortez MD  Department of Hospital Medicine   [facility] - Emergency Dept

## 2024-03-07 NOTE — HPI
Please note that pt was uncooperative during the example. He was sleepy and wanted to be left alone. Questions were repeated multiple times for him to answer.     Mr. Trotter is a 64 yo male with PMH of GERD, HTN, and BPH who was recently admitted for UGIB, and melena, and was found to have esophagitis on EGD.  He was discharged on March 2nd on Protonix.  According to the patient, he has been having diffuse abdominal pain, nausea, and diarrhea prior to his last admission, which got worse after his discharge. So he came back to the ER for evaluation.  His abdominal pain is diffuse, but more so left lower quadrant.  Described as sharp, 10/10 on pain scale, radiating to the whole abdomen.  Associated with nausea, but no vomit.  He also complains of perfused diarrhea, about 10 bowel movement every day.  He admits having chills, and subjective fever.    In the ER, vitals were notable for fever of 101, hypertension with blood pressure of 178/88, heart rate of 92, respiratory rate of 19.  Labs CBC with no leukocytosis.  CMP showed mildly elevated alk-phos.  UA was negative.  COVID came back positive.  CT abdomen and pelvis with findings highly concerning for developing pneumonia at the right lung base. Hepatosplenomegaly with findings concerning for developing cirrhosis within the liver.  Patient was started on cefepime/vanc/Flagyl to cover for both pneumonia as well as possible C diff causing his diarrhea.  He was also given 1 L of normal saline bolus.  He will be admitted to Medicine for further care.

## 2024-03-07 NOTE — ASSESSMENT & PLAN NOTE
Generalized abdominal pain, but worse left lower quadrant.  We will rule out C diff. Will also order stool cx and Shiga toxin.    CT abdomen pelvis was reviewed.

## 2024-03-07 NOTE — ASSESSMENT & PLAN NOTE
Covid pneumonia vs bacterial pneumonia.  For now,  I will also cover for superimposed bacterial pneumonia infection with ceftriaxone and doxycycline.  Will order MRSA swab.    We will order strep pneumo antigen in urine, Legionella antigen in urine.  Also sputum culture.

## 2024-03-07 NOTE — SUBJECTIVE & OBJECTIVE
Past Medical History:   Diagnosis Date    JACKLYN (acute kidney injury) 11/11/2022    Arthritis     GERD (gastroesophageal reflux disease)     Hepatitis C     STATES DX 10-20. NO S/S. NO FURTHER TESTS OR TX. WITH INSURANCE IN 2021 CAN GET IT EVALUATED.    History of left hip replacement 05/20/2021    History of MRSA infection     History of right shoulder replacement 01/15/2021    Hypertension     Wears glasses        Past Surgical History:   Procedure Laterality Date    APPENDECTOMY      ARTHROPLASTY OF SHOULDER Right 1/13/2021    Procedure: ARTHROPLASTY, SHOULDER TOTAL;  Surgeon: Emilio Siddiqui MD;  Location: [facility] OR;  Service: Orthopedics;  Laterality: Right;  GENERAL AND BLOCK    COLONOSCOPY N/A 5/8/2023    Procedure: COLONOSCOPY;  Surgeon: Bobby Bourgeois MD;  Location: [facility];  Service: Endoscopy;  Laterality: N/A;    ESOPHAGOGASTRODUODENOSCOPY N/A 5/7/2023    Procedure: EGD (ESOPHAGOGASTRODUODENOSCOPY);  Surgeon: Carloz Christianson Jr., MD;  Location: [facility];  Service: Endoscopy;  Laterality: N/A;    ESOPHAGOGASTRODUODENOSCOPY N/A 3/1/2024    Procedure: EGD (ESOPHAGOGASTRODUODENOSCOPY);  Surgeon: Femi Hyde MD;  Location: [facility];  Service: Endoscopy;  Laterality: N/A;    HERNIA REPAIR      HIP REPLACEMENT ARTHROPLASTY Left 3/24/2021    Procedure: ARTHROPLASTY, HIP REPLACEMENT;  Surgeon: Miles Rivas II, MD;  Location: [facility] OR;  Service: Orthopedics;  Laterality: Left;    JOINT REPLACEMENT Right     shoulder    REVISION TOTAL HIP ARTHROPLASTY Left 6/2/2021    Procedure: REVISION, TOTAL ARTHROPLASTY, HIP;  Surgeon: Miles Rivas II, MD;  Location: [facility] OR;  Service: Orthopedics;  Laterality: Left;    SMALL BOWEL ENTEROSCOPY Left 5/8/2023    Procedure: ENTEROSCOPY;  Surgeon: Bobby Bourgeois MD;  Location: [facility];  Service: Endoscopy;  Laterality: Left;       Review of patient's allergies indicates:   Allergen Reactions    Pcn [penicillins]      As a child.       Current  Facility-Administered Medications on File Prior to Encounter   Medication    electrolyte-S (ISOLYTE)    HYDROmorphone (PF) injection 0.2 mg    LIDOcaine (PF) 10 mg/ml (1%) injection 10 mg    oxyCODONE immediate release tablet 5 mg     Current Outpatient Medications on File Prior to Encounter   Medication Sig    amLODIPine (NORVASC) 5 MG tablet Take 1 tablet by mouth once daily (Patient taking differently: Take 5 mg by mouth once daily.)    busPIRone (BUSPAR) 15 MG tablet Take 1 tablet by mouth once daily (Patient taking differently: Take 15 mg by mouth once daily. Take 1 tablet by mouth once daily)    cyclobenzaprine (FLEXERIL) 10 MG tablet Take 1 tablet by mouth three times daily as needed for muscle spasm (Patient taking differently: Take 10 mg by mouth 3 (three) times daily as needed.)    ferrous sulfate (FEOSOL) Tab tablet Take 1 tablet by mouth daily with breakfast.    gabapentin (NEURONTIN) 600 MG tablet TAKE 1 TABLET BY MOUTH THREE TIMES DAILY    lisinopriL (PRINIVIL,ZESTRIL) 40 MG tablet Take 1 tablet by mouth once daily (Patient taking differently: Take 40 mg by mouth once daily.)    [] loperamide (IMODIUM) 2 mg capsule Take 1 capsule (2 mg total) by mouth 3 (three) times daily. for 5 days    methadone (METHADOSE) 40 mg disintegrating tablet Take 80 mg by mouth As instructed for Pain. Patient goes to clinic three days a week    methocarbamoL (ROBAXIN) 500 MG Tab TAKE 1 TABLET BY MOUTH NIGHTLY AS NEEDED (Patient taking differently: Take 500 mg by mouth nightly as needed (muscle spasms). TAKE 1 TABLET BY MOUTH NIGHTLY AS NEEDED)    metoprolol succinate (TOPROL-XL) 50 MG 24 hr tablet Take 1 tablet (50 mg total) by mouth once daily.    [] ondansetron (ZOFRAN-ODT) 4 MG TbDL Take 1 tablet (4 mg total) by mouth every 8 (eight) hours as needed (nausea).    oxybutynin (DITROPAN-XL) 5 MG TR24 Take 1 tablet by mouth once daily (Patient taking differently: Take 5 mg by mouth once daily.)    pantoprazole  (PROTONIX) 40 MG tablet Take 1 tablet (40 mg total) by mouth 2 (two) times daily.    tamsulosin (FLOMAX) 0.4 mg Cap Take 2 capsules (0.8 mg total) by mouth once daily.     Family History       Problem Relation (Age of Onset)    Cancer Mother          Tobacco Use    Smoking status: Every Day     Current packs/day: 0.50     Average packs/day: 0.5 packs/day for 30.0 years (15.0 ttl pk-yrs)     Types: Cigarettes     Passive exposure: Current    Smokeless tobacco: Never   Substance and Sexual Activity    Alcohol use: Never    Drug use: Never    Sexual activity: Yes     Partners: Female     Review of Systems   Constitutional:  Positive for chills and fever.   HENT:  Negative for sore throat.    Respiratory:  Negative for cough and shortness of breath.    Cardiovascular:  Negative for chest pain and palpitations.   Gastrointestinal:  Positive for abdominal pain, diarrhea and nausea. Negative for vomiting.   Genitourinary:  Negative for dysuria, frequency and urgency.   Musculoskeletal:  Negative for back pain.   Skin:  Negative for rash.   Neurological:  Negative for syncope and headaches.   Psychiatric/Behavioral:  Negative for confusion and hallucinations.      Objective:     Vital Signs (Most Recent):  Temp: 99.9 °F (37.7 °C) (03/06/24 2252)  Pulse: 84 (03/07/24 0300)  Resp: 16 (03/07/24 0300)  BP: (!) 165/79 (03/07/24 0300)  SpO2: 98 % (03/07/24 0300) Vital Signs (24h Range):  Temp:  [98.1 °F (36.7 °C)-101 °F (38.3 °C)] 99.9 °F (37.7 °C)  Pulse:  [] 84  Resp:  [16-19] 16  SpO2:  [97 %-98 %] 98 %  BP: (133-180)/(60-88) 165/79     Weight: 68 kg (150 lb)  Body mass index is 20.92 kg/m².     Physical Exam  Vitals reviewed.   Constitutional:       Appearance: Normal appearance.   HENT:      Head: Normocephalic and atraumatic.      Mouth/Throat:      Mouth: Mucous membranes are moist.   Eyes:      Extraocular Movements: Extraocular movements intact.      Conjunctiva/sclera: Conjunctivae normal.      Pupils: Pupils are  "equal, round, and reactive to light.   Cardiovascular:      Rate and Rhythm: Normal rate and regular rhythm.   Pulmonary:      Effort: Pulmonary effort is normal. No respiratory distress.      Breath sounds: Normal breath sounds. No wheezing.   Abdominal:      General: Abdomen is flat. Bowel sounds are normal. There is no distension.      Palpations: Abdomen is soft.      Comments: Tenderness to palpation, more so left lower quadrant.   Musculoskeletal:         General: No swelling. Normal range of motion.      Cervical back: Normal range of motion and neck supple.   Skin:     General: Skin is warm.      Coloration: Skin is not jaundiced.   Neurological:      General: No focal deficit present.      Mental Status: He is alert and oriented to person, place, and time.   Psychiatric:         Mood and Affect: Mood normal.         Behavior: Behavior normal.              CRANIAL NERVES     CN III, IV, VI   Pupils are equal, round, and reactive to light.       Significant Labs: All pertinent labs within the past 24 hours have been reviewed.  Bilirubin:   Recent Labs   Lab 02/29/24 2242 03/06/24 2003   BILITOT 0.4 0.3     CBC:   Recent Labs   Lab 03/06/24 2040   WBC 4.51   HGB 11.1*   HCT 34.4*   *     CMP:   Recent Labs   Lab 03/06/24 2003      K 3.9      CO2 23   GLU 99   BUN 11   CREATININE 0.6   CALCIUM 8.0*   PROT 6.4   ALBUMIN 3.3*   BILITOT 0.3   ALKPHOS 160*   AST 24   ALT 20   ANIONGAP 7*     Cardiac Markers: No results for input(s): "CKMB", "MYOGLOBIN", "BNP", "TROPISTAT" in the last 48 hours.  Lactic Acid:   Recent Labs   Lab 03/06/24 2042 03/07/24  0019   LACTATE 1.1 1.1     Lipase: No results for input(s): "LIPASE" in the last 48 hours.  Pathology Results  (Last 10 years)      None          Urine Culture: No results for input(s): "LABURIN" in the last 48 hours.  Urine Studies:   Recent Labs   Lab 03/06/24  1759   COLORU Yellow   APPEARANCEUA Clear   PHUR 6.0   SPECGRAV 1.030   PROTEINUA " Trace*   GLUCUA Negative   KETONESU Negative   BILIRUBINUA Negative   OCCULTUA Negative   NITRITE Negative   UROBILINOGEN 2.0-3.0*   LEUKOCYTESUR Negative       Significant Imaging: I have reviewed all pertinent imaging results/findings within the past 24 hours.

## 2024-03-08 ENCOUNTER — PATIENT OUTREACH (OUTPATIENT)
Dept: ADMINISTRATIVE | Facility: CLINIC | Age: 64
End: 2024-03-08
Payer: COMMERCIAL

## 2024-03-08 VITALS
SYSTOLIC BLOOD PRESSURE: 125 MMHG | DIASTOLIC BLOOD PRESSURE: 69 MMHG | HEIGHT: 71 IN | HEART RATE: 61 BPM | RESPIRATION RATE: 17 BRPM | OXYGEN SATURATION: 98 % | BODY MASS INDEX: 21.76 KG/M2 | TEMPERATURE: 98 F | WEIGHT: 155.44 LBS

## 2024-03-08 DIAGNOSIS — U07.1 COVID-19 VIRUS DETECTED: ICD-10-CM

## 2024-03-08 PROBLEM — A08.39 GASTROENTERITIS DUE TO COVID-19 VIRUS: Status: ACTIVE | Noted: 2024-03-08

## 2024-03-08 LAB
ALBUMIN SERPL BCP-MCNC: 3.1 G/DL (ref 3.5–5.2)
ALP SERPL-CCNC: 140 U/L (ref 55–135)
ALT SERPL W/O P-5'-P-CCNC: 15 U/L (ref 10–44)
ANION GAP SERPL CALC-SCNC: 6 MMOL/L (ref 8–16)
AST SERPL-CCNC: 20 U/L (ref 10–40)
BASOPHILS # BLD AUTO: 0.01 K/UL (ref 0–0.2)
BASOPHILS NFR BLD: 0.3 % (ref 0–1.9)
BILIRUB SERPL-MCNC: 0.3 MG/DL (ref 0.1–1)
BUN SERPL-MCNC: 13 MG/DL (ref 8–23)
C DIFF GDH STL QL: NEGATIVE
C DIFF TOX A+B STL QL IA: NEGATIVE
CALCIUM SERPL-MCNC: 8.2 MG/DL (ref 8.7–10.5)
CHLORIDE SERPL-SCNC: 106 MMOL/L (ref 95–110)
CO2 SERPL-SCNC: 25 MMOL/L (ref 23–29)
CREAT SERPL-MCNC: 0.6 MG/DL (ref 0.5–1.4)
DIFFERENTIAL METHOD BLD: ABNORMAL
EOSINOPHIL # BLD AUTO: 0 K/UL (ref 0–0.5)
EOSINOPHIL NFR BLD: 1.1 % (ref 0–8)
ERYTHROCYTE [DISTWIDTH] IN BLOOD BY AUTOMATED COUNT: 14.6 % (ref 11.5–14.5)
EST. GFR  (NO RACE VARIABLE): >60 ML/MIN/1.73 M^2
GLUCOSE SERPL-MCNC: 92 MG/DL (ref 70–110)
HCT VFR BLD AUTO: 38.3 % (ref 40–54)
HGB BLD-MCNC: 12 G/DL (ref 14–18)
IMM GRANULOCYTES # BLD AUTO: 0.01 K/UL (ref 0–0.04)
IMM GRANULOCYTES NFR BLD AUTO: 0.3 % (ref 0–0.5)
LYMPHOCYTES # BLD AUTO: 1.2 K/UL (ref 1–4.8)
LYMPHOCYTES NFR BLD: 32 % (ref 18–48)
MAGNESIUM SERPL-MCNC: 1.6 MG/DL (ref 1.6–2.6)
MCH RBC QN AUTO: 27.6 PG (ref 27–31)
MCHC RBC AUTO-ENTMCNC: 31.3 G/DL (ref 32–36)
MCV RBC AUTO: 88 FL (ref 82–98)
MONOCYTES # BLD AUTO: 0.2 K/UL (ref 0.3–1)
MONOCYTES NFR BLD: 5.9 % (ref 4–15)
NEUTROPHILS # BLD AUTO: 2.3 K/UL (ref 1.8–7.7)
NEUTROPHILS NFR BLD: 60.4 % (ref 38–73)
NRBC BLD-RTO: 0 /100 WBC
PLATELET # BLD AUTO: 114 K/UL (ref 150–450)
PLATELET BLD QL SMEAR: ABNORMAL
PMV BLD AUTO: 10.3 FL (ref 9.2–12.9)
POTASSIUM SERPL-SCNC: 4.1 MMOL/L (ref 3.5–5.1)
PROCALCITONIN SERPL IA-MCNC: 0.08 NG/ML (ref 0–0.5)
PROT SERPL-MCNC: 6.2 G/DL (ref 6–8.4)
RBC # BLD AUTO: 4.34 M/UL (ref 4.6–6.2)
SODIUM SERPL-SCNC: 137 MMOL/L (ref 136–145)
WBC # BLD AUTO: 3.75 K/UL (ref 3.9–12.7)
WBC #/AREA STL HPF: ABNORMAL /[HPF]

## 2024-03-08 PROCEDURE — 36415 COLL VENOUS BLD VENIPUNCTURE: CPT | Performed by: HOSPITALIST

## 2024-03-08 PROCEDURE — 25000003 PHARM REV CODE 250: Performed by: HOSPITALIST

## 2024-03-08 PROCEDURE — 83735 ASSAY OF MAGNESIUM: CPT | Performed by: HOSPITALIST

## 2024-03-08 PROCEDURE — 84145 PROCALCITONIN (PCT): CPT | Performed by: HOSPITALIST

## 2024-03-08 PROCEDURE — 80053 COMPREHEN METABOLIC PANEL: CPT | Performed by: HOSPITALIST

## 2024-03-08 PROCEDURE — 85025 COMPLETE CBC W/AUTO DIFF WBC: CPT | Performed by: HOSPITALIST

## 2024-03-08 RX ORDER — ONDANSETRON 4 MG/1
4 TABLET, ORALLY DISINTEGRATING ORAL EVERY 6 HOURS PRN
Qty: 15 TABLET | Refills: 0 | Status: SHIPPED | OUTPATIENT
Start: 2024-03-08

## 2024-03-08 RX ORDER — DOXYCYCLINE 100 MG/1
100 CAPSULE ORAL EVERY 12 HOURS
Qty: 10 CAPSULE | Refills: 0 | Status: SHIPPED | OUTPATIENT
Start: 2024-03-08 | End: 2024-03-15 | Stop reason: ALTCHOICE

## 2024-03-08 RX ORDER — LOPERAMIDE HYDROCHLORIDE 2 MG/1
2 CAPSULE ORAL 4 TIMES DAILY PRN
Status: DISCONTINUED | OUTPATIENT
Start: 2024-03-08 | End: 2024-03-08 | Stop reason: HOSPADM

## 2024-03-08 RX ORDER — LOPERAMIDE HYDROCHLORIDE 2 MG/1
2 CAPSULE ORAL EVERY 6 HOURS PRN
Qty: 15 CAPSULE | Refills: 0 | Status: SHIPPED | OUTPATIENT
Start: 2024-03-08 | End: 2024-03-15

## 2024-03-08 RX ADMIN — GABAPENTIN 600 MG: 300 CAPSULE ORAL at 08:03

## 2024-03-08 RX ADMIN — SUCRALFATE 1 G: 1 TABLET ORAL at 12:03

## 2024-03-08 RX ADMIN — MUPIROCIN 1 G: 20 OINTMENT TOPICAL at 08:03

## 2024-03-08 RX ADMIN — LISINOPRIL 40 MG: 20 TABLET ORAL at 08:03

## 2024-03-08 RX ADMIN — BUSPIRONE HYDROCHLORIDE 15 MG: 5 TABLET ORAL at 08:03

## 2024-03-08 RX ADMIN — SUCRALFATE 1 G: 1 TABLET ORAL at 05:03

## 2024-03-08 RX ADMIN — DOXYCYCLINE HYCLATE 100 MG: 100 CAPSULE ORAL at 08:03

## 2024-03-08 RX ADMIN — TAMSULOSIN HYDROCHLORIDE 0.8 MG: 0.4 CAPSULE ORAL at 08:03

## 2024-03-08 RX ADMIN — METHADONE HYDROCHLORIDE 80 MG: 10 TABLET ORAL at 09:03

## 2024-03-08 RX ADMIN — METOPROLOL SUCCINATE 50 MG: 25 TABLET, EXTENDED RELEASE ORAL at 08:03

## 2024-03-08 RX ADMIN — AMLODIPINE BESYLATE 5 MG: 5 TABLET ORAL at 08:03

## 2024-03-08 NOTE — RESPIRATORY THERAPY
03/07/24 2100   Patient Assessment/Suction   Level of Consciousness (AVPU) alert   Respiratory Effort Normal;Unlabored   Expansion/Accessory Muscles/Retractions no retractions;no use of accessory muscles   All Lung Fields Breath Sounds Anterior:;Posterior:;Lateral:;equal bilaterally;clear   Rhythm/Pattern, Respiratory depth regular;pattern regular;unlabored   Cough Frequency no cough   PRE-TX-O2   Device (Oxygen Therapy) room air   SpO2 100 %   Pulse Oximetry Type Intermittent   $ Pulse Oximetry - Multiple Charge Pulse Oximetry - Multiple   Pulse 72   Resp 19   Incentive Spirometer   $ Incentive Spirometer Charges done with encouragement   Incentive Spirometer Predicted Level (mL) 2500   Administration (IS) instruction provided, follow-up;proper technique demonstrated;self-administered   Number of Repetitions (IS) 8   Level Incentive Spirometer (mL) 2000   Patient Tolerance (IS) good;no adverse signs/symptoms present   Tobacco Cessation Intervention   Do you use any type of tobacco product? Yes   Are you interested in quitting use of tobacco products? Not interested   COPD Questionnaire   How often do you cough? 1   How often do you have phlegm (mucus) in your chest? 1   How often does your chest feel tight? 1   When you walk up a hill or one flight of stairs, how often are you breathless? 4   How often are you limited doing any activities at home? 2  (not from breathing)   How often are you confident leaving the house despite your lung condition? 1   How often do you sleep soundly? 4   How often do you have energy? 2   Total score 16   Respiratory Evaluation   $ Care Plan Tech Time 15 min   $ Eval/Re-eval Charges Evaluation   Evaluation For New Orders   Admitting Diagnosis epsis without acute organ dysfunction   Cardiac Diagnosis ye   Pulmonary Diagnosis no   Current Surgeries none   Home Oxygen   Has Home Oxygen? No   Home Aerosol, MDI, DPI, and Other Treatments/Therapies   Home Respiratory Therapy Per  Patient/Review of Chart No   Oxygen Care Plan   Rationale No rational found   Bronchodilator Care Plan   Rationale No Rationale found   Atelectasis Care Plan   Atelectasis Care Plan Incentive Spiromentry   Frequency TID   I.S. Goal (ml) 2500 ml   I.S. Minimum (ml) 2000 ml   Rationale Other  (covid)   Airway Clearance Care Plan   Rationale Other

## 2024-03-08 NOTE — DISCHARGE SUMMARY
[facility] Medicine  Discharge Summary      Patient Name: Omar Trotter  MRN: [id number]  [facility]: [id number]  Patient Class: IP- Inpatient  Admission Date: 3/6/2024  Hospital Length of Stay: 1 days  Discharge Date and Time: 3/8/2024  3:38 PM  Attending Physician: Ben Cardenas MD   Discharging Provider: Ben Cardenas MD  Primary Care Provider: Cam Orozco MD    Primary Care Team: Networked reference to record PCT     HPI:   Please note that pt was uncooperative during the example. He was sleepy and wanted to be left alone. Questions were repeated multiple times for him to answer.     Mr. Trotter is a 62 yo male with PMH of GERD, HTN, and BPH who was recently admitted for UGIB, and melena, and was found to have esophagitis on EGD.  He was discharged on March 2nd on Protonix.  According to the patient, he has been having diffuse abdominal pain, nausea, and diarrhea prior to his last admission, which got worse after his discharge. So he came back to the ER for evaluation.  His abdominal pain is diffuse, but more so left lower quadrant.  Described as sharp, 10/10 on pain scale, radiating to the whole abdomen.  Associated with nausea, but no vomit.  He also complains of perfused diarrhea, about 10 bowel movement every day.  He admits having chills, and subjective fever.    In the ER, vitals were notable for fever of 101, hypertension with blood pressure of 178/88, heart rate of 92, respiratory rate of 19.  Labs CBC with no leukocytosis.  CMP showed mildly elevated alk-phos.  UA was negative.  COVID came back positive.  CT abdomen and pelvis with findings highly concerning for developing pneumonia at the right lung base. Hepatosplenomegaly with findings concerning for developing cirrhosis within the liver.  Patient was started on cefepime/vanc/Flagyl to cover for both pneumonia as well as possible C diff causing his diarrhea.  He was also given 1 L of normal saline bolus.  He will be  admitted to Medicine for further care.    * No surgery found *      Hospital Course:   62yo male admitted due to abdominal pains, nausea, vomiting, and diarrhea. Febrile on admission found to be COVID+. Symptoms ongoing over 5 days so not given remdesivir. Not hypoxic. CT had high concern for pneumonia in the right lung base. MRSA nasal swab was positive but his condition did not fit a MRSA pneumonia picture. He was given IV abx. He was also given supportive care. He tolerated a normal diet. His diarrhea slowed down greatly. Cleared to discharge. Still given a course of doxycycline to cover his possible pneumonia. To f/u with PCP. By time of discharge the patient was tolerating a regular diet with improved admission symptoms. Patient seen and examined on day of discharge.    Physical exam on day of discharge:  General - Patient alert and oriented x3 in NAD  CV - Regular rate and rhythm  Resp - Lungs CTA Bilaterally, No increased WOB  GI - BS normoactive, soft, non-tender, non-distended, no HSM  Extrem-  No cyanosis, clubbing, edema.   Skin -  No masses, rashes or lesions noted on cursory skin exam.       Goals of Care Treatment Preferences:  Code Status: Full Code      Consults:     No new Assessment & Plan notes have been filed under this hospital service since the last note was generated.  Service: Hospital Medicine    Final Active Diagnoses:    Diagnosis Date Noted POA    PRINCIPAL PROBLEM:  Gastroenteritis due to COVID-19 virus [U07.1, A08.39] 03/08/2024 Unknown    Sepsis without acute organ dysfunction [A41.9] 03/07/2024 Yes    Pneumonia due to infectious organism [J18.9] 03/07/2024 Yes    Generalized abdominal pain [R10.84] 03/07/2024 Yes    Diarrhea [R19.7] 03/07/2024 Yes    Advance care planning [Z71.89] 03/07/2024 Not Applicable    Esophagitis [K20.90] 03/02/2024 Yes    Uncomplicated opioid dependence [F11.20] 03/02/2024 Yes    Anemia [D64.9] 11/11/2022 Yes    BPH associated with nocturia [N40.1, R35.1]  12/21/2021 Yes    Hypertension [I10]  Yes    Hepatitis C [B19.20]  Yes    Cirrhosis of liver [K74.60] 10/05/2020 Yes      Problems Resolved During this Admission:       Discharged Condition: good    Disposition: Home or Self Care    Follow Up:   Follow-up Information       Brigette Treviño NP Follow up on 3/15/2024.    Specialty: Internal Medicine  Why: @10:30am  Contact information:  [address]  [place] [address]  [phone]                           Patient Instructions:      Diet Cardiac     Notify your health care provider if you experience any of the following:  temperature >100.4     Notify your health care provider if you experience any of the following:  persistent nausea and vomiting or diarrhea     Notify your health care provider if you experience any of the following:  severe uncontrolled pain     Notify your health care provider if you experience any of the following:  redness, tenderness, or signs of infection (pain, swelling, redness, odor or green/yellow discharge around incision site)     Notify your health care provider if you experience any of the following:  difficulty breathing or increased cough     Notify your health care provider if you experience any of the following:  severe persistent headache     Notify your health care provider if you experience any of the following:  worsening rash     Notify your health care provider if you experience any of the following:  persistent dizziness, light-headedness, or visual disturbances     Notify your health care provider if you experience any of the following:  increased confusion or weakness     Activity as tolerated       Significant Diagnostic Studies:     Lab Results   Component Value Date    WBC 3.75 (L) 03/08/2024    HGB 12.0 (L) 03/08/2024    HCT 38.3 (L) 03/08/2024    MCV 88 03/08/2024     (L) 03/08/2024       BMP  Lab Results   Component Value Date     03/08/2024    K 4.1 03/08/2024     03/08/2024     CO2 25 03/08/2024    BUN 13 03/08/2024    CREATININE 0.6 03/08/2024    CALCIUM 8.2 (L) 03/08/2024    ANIONGAP 6 (L) 03/08/2024    EGFRNORACEVR >60.0 03/08/2024     Imaging Results              CT Abdomen Pelvis With IV Contrast NO Oral Contrast (Final result)  Result time 03/06/24 21:57:06      Final result by Bobby Phillips MD (03/06/24 21:57:06)                   Narrative:    CT ABDOMEN PELVIS WITH IV CONTRAST    COMPARISONS:  February 29, 2024.    ADDITIONAL PERTINENT HISTORY:   Abdominal pain with diarrhea.    TECHNIQUE:   Multiple axial images were obtained from the lung bases through the lesser trochanters after the adminstration of intravenous contrast.  One of the following dose optimization techniques was utilized in the performance of this exam: Automated exposure control; adjustment of the mA and/or kV according to the patient's size; or use of an iterative  reconstruction technique.  Specific details can be referenced in the facility's radiology CT exam operational policy.    CONTRAST:   100 mL of IV Omnipaque 350.    FINDINGS:  Lung bases: Dense regions of infiltrate at the right lung base posteriorly.    Free air/free fluid: none  Liver:  Hepatomegaly with fatty infiltration of the liver. Mild nodularity to the margin of the liver concerning for developing cirrhosis.  Spleen: Moderate splenomegaly.  Adrenal glands: negative  Kidneys /ureters/urinary bladder: negative  Pancreas: negative  Gall Bladder:   negative    Bowel / mesentery: negative    Lymph node assessment: negative    Pelvic contents: negative  Abdominal vasculature: Atherosclerotic disease of the abdominal aorta and its major branches.    Surrounding soft tissues: negative  Osseous structures: Previous left hip arthroplasty. Sclerotic changes within the left acetabulum which could represent focal Paget's disease. Scoliotic curvature convex to the left centered at L4. Spondylitic change involving the thoracolumbar spine. No acute  appearing bony abnormalities.      IMPRESSION:    1. Findings highly concerning for developing pneumonia at the right lung base.  2. Hepatosplenomegaly with findings concerning for developing cirrhosis within the liver.  3. Other chronic findings within the abdomen and pelvis as discussed above.    Electronically signed by:  Bobby Phillips MD  03/06/2024 09:57 PM CST Workstation: [id number]                                     X-Ray Chest AP Portable (Final result)  Result time 03/06/24 19:33:21      Final result by Homero Smith Jr., MD (03/06/24 19:33:21)                   Narrative:    CLINICAL HISTORY:  63 years (1960) Male Sepsis Diarrhea (Since discharge 3/2 not improving )    TECHNIQUE:  XR CHEST 1 VIEW. 1 images obtained.    COMPARISON:  December 12, 2023    FINDINGS:  Normal hyperinflation of lungs is noted. Mild tortuosity thoracic aorta. Minimal arch atherosclerosis. Linear bandlike opacity right lower lobe is attributed towards atelectasis with confluent interstitial markings in the medial aspect of linear density less likely infiltrate. Minimal bibasilar atelectasis. There is no evidence of pulmonic infiltrate. Patient has undergone previous right humeral arthroplasty.    IMPRESSION: [place] opacity right lower lobe that is likely consequence of an area of atelectasis less likely infiltrate.    Electronically signed by:  Homero Smith MD  03/06/2024 07:33 PM CST Workstation: [id number]                                      Microbiology Results (last 7 days)       Procedure Component Value Units Date/Time    Blood culture x two cultures. Draw prior to antibiotics. [[id number]] Collected: 03/07/24 1046    Order Status: Completed Specimen: Blood Updated: 03/08/24 1232     Blood Culture, Routine No Growth to date      No Growth to date    Narrative:      Aerobic and anaerobic  Collection has been rescheduled by [facility] at 03/06/2024 18:33 Reason:   Going to X-ray   Collection has been rescheduled by  MIRELA at 03/06/2024 18:45 Reason:   Unable to collect  Collection has been rescheduled by JOHN at 03/07/2024 04:48 Reason:   Unable to collect  Collection has been rescheduled by AZALIA at 03/07/2024 09:18 Reason:   Unable to collect dw  Collection has been rescheduled by [email or web address] at 03/06/2024 18:33 Reason:   Going to X-ray   Collection has been rescheduled by MIRELA at 03/06/2024 18:45 Reason:   Unable to collect  Collection has been rescheduled by JOHN at 03/07/2024 04:48 Reason:   Unable to collect  Collection has been rescheduled by AZALIA at 03/07/2024 09:18 Reason:   Unable to collect dw    Stool culture **cannot be ordered stat** [[id number]] Collected: 03/07/24 1853    Order Status: Completed Specimen: Stool Updated: 03/08/24 1112     Stool Culture Nothing significant to date    Culture, Respiratory with Gram Stain [[id number]] Collected: 03/07/24 1304    Order Status: Completed Specimen: Sputum Updated: 03/08/24 0904     Respiratory Culture Normal respiratory rachael     Gram Stain (Respiratory) <10 epithelial cells per low power field.     Gram Stain (Respiratory) Few WBC's     Gram Stain (Respiratory) Rare Gram positive cocci    Clostridium difficile EIA [[id number]] Collected: 03/07/24 1853    Order Status: Completed Specimen: Stool Updated: 03/08/24 0145     C. diff Antigen Negative     C difficile Toxins A+B, EIA Negative     Comment: Testing not recommended for children <24 months old.       MRSA Screen by PCR [[id number]]  (Abnormal) Collected: 03/07/24 0506    Order Status: Completed Specimen: Nasopharyngeal Swab from Nasal Updated: 03/07/24 0846     MRSA SCREEN BY PCR Positive     Comment: critical result(s) called and verbal readback obtained from Mala Colbert RN-ED by CD3 03/07/2024 08:45         Narrative:         critical result(s) called and verbal readback obtained from Mala Colbert RN-ED by CD3 03/07/2024 08:45    Culture, Respiratory with Gram Stain [[id number]]     Order Status: Canceled Specimen:  Respiratory     Blood culture x two cultures. Draw prior to antibiotics. [[id number]]     Order Status: Canceled Specimen: Blood               Pending Diagnostic Studies:       Procedure Component Value Units Date/Time    Stool Exam-Ova,Cysts,Parasites [[id number]] Collected: 03/07/24 1854    Order Status: Sent Lab Status: In process Updated: 03/07/24 1936    Specimen: Stool            Medications:  Reconciled Home Medications:      Medication List        START taking these medications      doxycycline 100 MG Cap  Commonly known as: VIBRAMYCIN  Take 1 capsule (100 mg total) by mouth every 12 (twelve) hours. for 5 days     Lactobacillus acidophilus 1 billion cell Cap  Take 1 capsule by mouth 2 (two) times a day. for 5 days            CHANGE how you take these medications      busPIRone 15 MG tablet  Commonly known as: BUSPAR  Take 1 tablet by mouth once daily  What changed:   when to take this  additional instructions     loperamide 2 mg capsule  Commonly known as: IMODIUM  Take 1 capsule (2 mg total) by mouth every 6 (six) hours as needed for Diarrhea.  What changed:   when to take this  reasons to take this     methocarbamoL 500 MG Tab  Commonly known as: ROBAXIN  TAKE 1 TABLET BY MOUTH NIGHTLY AS NEEDED  What changed:   how much to take  how to take this  when to take this  reasons to take this     ondansetron 4 MG Tbdl  Commonly known as: ZOFRAN-ODT  Take 1 tablet (4 mg total) by mouth every 6 (six) hours as needed (nausea).  What changed: when to take this            CONTINUE taking these medications      amLODIPine 5 MG tablet  Commonly known as: NORVASC  Take 1 tablet by mouth once daily     cyclobenzaprine 10 MG tablet  Commonly known as: FLEXERIL  Take 1 tablet by mouth three times daily as needed for muscle spasm     EPCLUSA 400-100 mg Tab  Generic drug: sofosbuvir-velpatasvir  Take 1 tablet by mouth once daily.     ferrous sulfate Tab tablet  Commonly known as: FEOSOL  Take 1 tablet by mouth daily with  breakfast.     gabapentin 600 MG tablet  Commonly known as: NEURONTIN  TAKE 1 TABLET BY MOUTH THREE TIMES DAILY     lisinopriL 40 MG tablet  Commonly known as: PRINIVIL,ZESTRIL  Take 1 tablet by mouth once daily     methadone 40 mg disintegrating tablet  Commonly known as: METHADOSE  Take 80 mg by mouth As instructed for Pain. Patient goes to clinic three days a week     metoprolol succinate 50 MG 24 hr tablet  Commonly known as: TOPROL-XL  Take 1 tablet (50 mg total) by mouth once daily.     oxybutynin 5 MG Tr24  Commonly known as: DITROPAN-XL  Take 1 tablet by mouth once daily     pantoprazole 40 MG tablet  Commonly known as: PROTONIX  Take 1 tablet (40 mg total) by mouth 2 (two) times daily.     tamsulosin 0.4 mg Cap  Commonly known as: FLOMAX  Take 2 capsules (0.8 mg total) by mouth once daily.              Indwelling Lines/Drains at time of discharge:   Lines/Drains/Airways       None                   Time spent on the discharge of patient: 38 minutes         Ben Cardenas MD  Department of Hospital Medicine  [facility]

## 2024-03-08 NOTE — PLAN OF CARE
Hospital follow up appt scheduled with Brigette Treviño on 3/15/24 @10:30am; information added to pt's AVS.    03/08/24 1152   Post-Acute Status   Hospital Resources/Appts/Education Provided Appointments scheduled and added to AVS

## 2024-03-08 NOTE — HOSPITAL COURSE
64yo male admitted due to abdominal pains, nausea, vomiting, and diarrhea. Febrile on admission found to be COVID+. Symptoms ongoing over 5 days so not given remdesivir. Not hypoxic. CT had high concern for pneumonia in the right lung base. MRSA nasal swab was positive but his condition did not fit a MRSA pneumonia picture. He was given IV abx. He was also given supportive care. He tolerated a normal diet. His diarrhea slowed down greatly. Cleared to discharge. Still given a course of doxycycline to cover his possible pneumonia. To f/u with PCP. By time of discharge the patient was tolerating a regular diet with improved admission symptoms. Patient seen and examined on day of discharge.    Physical exam on day of discharge:  General - Patient alert and oriented x3 in NAD  CV - Regular rate and rhythm  Resp - Lungs CTA Bilaterally, No increased WOB  GI - BS normoactive, soft, non-tender, non-distended, no HSM  Extrem-  No cyanosis, clubbing, edema.   Skin -  No masses, rashes or lesions noted on cursory skin exam.

## 2024-03-08 NOTE — PLAN OF CARE
Pt cleared for DC by CM to home with no needs.    03/08/24 1245   Final Note   Assessment Type Final Discharge Note   Anticipated Discharge Disposition Home

## 2024-03-08 NOTE — NURSING
Nurses Note -- 4 Eyes      3/7/2024   8:12 PM      Skin assessed during: Admit      [x] No Altered Skin Integrity Present    []Prevention Measures Documented      [] Yes- Altered Skin Integrity Present or Discovered   [] LDA Added if Not in Epic (Describe Wound)   [] New Altered Skin Integrity was Present on Admit and Documented in LDA   [] Wound Image Taken    Wound Care Consulted? No    Attending Nurse:  Estuardo Carbajal RN/Staff Member:   [id number]

## 2024-03-09 LAB
BACTERIA SPEC AEROBE CULT: NORMAL
GRAM STN SPEC: NORMAL

## 2024-03-10 LAB — BACTERIA STL CULT: NORMAL

## 2024-03-11 LAB
L PNEUMO1 AG UR QL IA: NEGATIVE
LEGIONELLA SPECIMEN SOURCE: NORMAL

## 2024-03-12 LAB — BACTERIA BLD CULT: NORMAL

## 2024-03-13 LAB
O+P SPEC MICRO: NORMAL
O+P STL CONC: NORMAL

## 2024-03-15 PROBLEM — D69.6 THROMBOCYTOPENIA: Status: ACTIVE | Noted: 2024-03-15

## 2024-03-22 LAB
OHS QRS DURATION: 76 MS
OHS QTC CALCULATION: 467 MS

## 2024-06-03 PROBLEM — T39.395A GI BLEED DUE TO NSAIDS: Status: RESOLVED | Noted: 2024-03-01 | Resolved: 2024-06-03

## 2024-06-03 PROBLEM — K92.2 GI BLEED DUE TO NSAIDS: Status: RESOLVED | Noted: 2024-03-01 | Resolved: 2024-06-03

## 2024-06-10 PROBLEM — J18.9 PNEUMONIA DUE TO INFECTIOUS ORGANISM: Status: RESOLVED | Noted: 2024-03-07 | Resolved: 2024-06-10

## 2024-06-10 PROBLEM — A41.9 SEPSIS WITHOUT ACUTE ORGAN DYSFUNCTION: Status: RESOLVED | Noted: 2024-03-07 | Resolved: 2024-06-10

## 2024-06-21 ENCOUNTER — HOSPITAL ENCOUNTER (EMERGENCY)
Facility: HOSPITAL | Age: 64
Discharge: HOME | End: 2024-06-21
Attending: EMERGENCY MEDICINE
Payer: COMMERCIAL

## 2024-06-21 VITALS
WEIGHT: 175 LBS | BODY MASS INDEX: 24.5 KG/M2 | TEMPERATURE: 98 F | RESPIRATION RATE: 18 BRPM | DIASTOLIC BLOOD PRESSURE: 73 MMHG | HEIGHT: 71 IN | SYSTOLIC BLOOD PRESSURE: 162 MMHG | OXYGEN SATURATION: 98 % | HEART RATE: 88 BPM

## 2024-06-21 DIAGNOSIS — R41.82 ALTERED MENTAL STATUS: ICD-10-CM

## 2024-06-21 DIAGNOSIS — J18.9 PNEUMONIA OF RIGHT MIDDLE LOBE DUE TO INFECTIOUS ORGANISM: Primary | ICD-10-CM

## 2024-06-21 LAB
ALBUMIN SERPL BCP-MCNC: 3.8 G/DL (ref 3.5–5.2)
ALP SERPL-CCNC: 155 U/L (ref 55–135)
ALT SERPL W/O P-5'-P-CCNC: 18 U/L (ref 10–44)
AMMONIA PLAS-SCNC: 31 UMOL/L (ref 10–50)
AMPHET+METHAMPHET UR QL: ABNORMAL
ANION GAP SERPL CALC-SCNC: 9 MMOL/L (ref 8–16)
AST SERPL-CCNC: 31 U/L (ref 10–40)
BARBITURATES UR QL SCN>200 NG/ML: NEGATIVE
BASOPHILS # BLD AUTO: 0.03 K/UL (ref 0–0.2)
BASOPHILS NFR BLD: 0.5 % (ref 0–1.9)
BENZODIAZ UR QL SCN>200 NG/ML: ABNORMAL
BILIRUB SERPL-MCNC: 0.4 MG/DL (ref 0.1–1)
BILIRUB UR QL STRIP: NEGATIVE
BUN SERPL-MCNC: 18 MG/DL (ref 8–23)
BZE UR QL SCN: NEGATIVE
CALCIUM SERPL-MCNC: 9.1 MG/DL (ref 8.7–10.5)
CANNABINOIDS UR QL SCN: NEGATIVE
CHLORIDE SERPL-SCNC: 102 MMOL/L (ref 95–110)
CLARITY UR: CLEAR
CO2 SERPL-SCNC: 26 MMOL/L (ref 23–29)
COLOR UR: YELLOW
CREAT SERPL-MCNC: 0.9 MG/DL (ref 0.5–1.4)
CREAT SERPL-MCNC: 0.9 MG/DL (ref 0.5–1.4)
CREAT UR-MCNC: 77.3 MG/DL (ref 23–375)
DIFFERENTIAL METHOD BLD: ABNORMAL
EOSINOPHIL # BLD AUTO: 0.2 K/UL (ref 0–0.5)
EOSINOPHIL NFR BLD: 3.1 % (ref 0–8)
ERYTHROCYTE [DISTWIDTH] IN BLOOD BY AUTOMATED COUNT: 13.8 % (ref 11.5–14.5)
EST. GFR  (NO RACE VARIABLE): >60 ML/MIN/1.73 M^2
ETHANOL SERPL-MCNC: <10 MG/DL
GLUCOSE SERPL-MCNC: 80 MG/DL (ref 70–110)
GLUCOSE SERPL-MCNC: 82 MG/DL (ref 70–110)
GLUCOSE SERPL-MCNC: 89 MG/DL (ref 70–110)
GLUCOSE UR QL STRIP: NEGATIVE
HCT VFR BLD AUTO: 35.9 % (ref 40–54)
HGB BLD-MCNC: 11.5 G/DL (ref 14–18)
HGB UR QL STRIP: NEGATIVE
IMM GRANULOCYTES # BLD AUTO: 0.03 K/UL (ref 0–0.04)
IMM GRANULOCYTES NFR BLD AUTO: 0.5 % (ref 0–0.5)
KETONES UR QL STRIP: NEGATIVE
LEUKOCYTE ESTERASE UR QL STRIP: NEGATIVE
LYMPHOCYTES # BLD AUTO: 1 K/UL (ref 1–4.8)
LYMPHOCYTES NFR BLD: 18.1 % (ref 18–48)
MCH RBC QN AUTO: 27.5 PG (ref 27–31)
MCHC RBC AUTO-ENTMCNC: 32 G/DL (ref 32–36)
MCV RBC AUTO: 86 FL (ref 82–98)
MONOCYTES # BLD AUTO: 0.3 K/UL (ref 0.3–1)
MONOCYTES NFR BLD: 5.4 % (ref 4–15)
NEUTROPHILS # BLD AUTO: 4 K/UL (ref 1.8–7.7)
NEUTROPHILS NFR BLD: 72.4 % (ref 38–73)
NITRITE UR QL STRIP: NEGATIVE
NRBC BLD-RTO: 0 /100 WBC
OPIATES UR QL SCN: NEGATIVE
PCP UR QL SCN>25 NG/ML: NEGATIVE
PH UR STRIP: 7 [PH] (ref 5–8)
PLATELET # BLD AUTO: 184 K/UL (ref 150–450)
PMV BLD AUTO: 9.7 FL (ref 9.2–12.9)
POTASSIUM SERPL-SCNC: 4.4 MMOL/L (ref 3.5–5.1)
PROT SERPL-MCNC: 7.7 G/DL (ref 6–8.4)
PROT UR QL STRIP: NEGATIVE
RBC # BLD AUTO: 4.18 M/UL (ref 4.6–6.2)
SAMPLE: NORMAL
SODIUM SERPL-SCNC: 137 MMOL/L (ref 136–145)
SP GR UR STRIP: 1.02 (ref 1–1.03)
TOXICOLOGY INFORMATION: ABNORMAL
URN SPEC COLLECT METH UR: NORMAL
UROBILINOGEN UR STRIP-ACNC: NEGATIVE EU/DL
WBC # BLD AUTO: 5.53 K/UL (ref 3.9–12.7)

## 2024-06-21 PROCEDURE — 82077 ASSAY SPEC XCP UR&BREATH IA: CPT | Performed by: EMERGENCY MEDICINE

## 2024-06-21 PROCEDURE — 93010 ELECTROCARDIOGRAM REPORT: CPT | Mod: ,,, | Performed by: INTERNAL MEDICINE

## 2024-06-21 PROCEDURE — 80053 COMPREHEN METABOLIC PANEL: CPT | Performed by: EMERGENCY MEDICINE

## 2024-06-21 PROCEDURE — 81003 URINALYSIS AUTO W/O SCOPE: CPT | Mod: 59 | Performed by: EMERGENCY MEDICINE

## 2024-06-21 PROCEDURE — 82962 GLUCOSE BLOOD TEST: CPT

## 2024-06-21 PROCEDURE — 25000003 PHARM REV CODE 250: Performed by: EMERGENCY MEDICINE

## 2024-06-21 PROCEDURE — 93005 ELECTROCARDIOGRAM TRACING: CPT | Performed by: INTERNAL MEDICINE

## 2024-06-21 PROCEDURE — 99285 EMERGENCY DEPT VISIT HI MDM: CPT | Mod: 25

## 2024-06-21 PROCEDURE — 36415 COLL VENOUS BLD VENIPUNCTURE: CPT | Performed by: EMERGENCY MEDICINE

## 2024-06-21 PROCEDURE — 85025 COMPLETE CBC W/AUTO DIFF WBC: CPT | Performed by: EMERGENCY MEDICINE

## 2024-06-21 PROCEDURE — 82565 ASSAY OF CREATININE: CPT

## 2024-06-21 PROCEDURE — 82140 ASSAY OF AMMONIA: CPT | Performed by: EMERGENCY MEDICINE

## 2024-06-21 PROCEDURE — 80307 DRUG TEST PRSMV CHEM ANLYZR: CPT | Performed by: EMERGENCY MEDICINE

## 2024-06-21 RX ORDER — DOXYCYCLINE 100 MG/1
100 CAPSULE ORAL
Status: COMPLETED | OUTPATIENT
Start: 2024-06-21 | End: 2024-06-21

## 2024-06-21 RX ORDER — DOXYCYCLINE 100 MG/1
100 CAPSULE ORAL 2 TIMES DAILY
Qty: 20 CAPSULE | Refills: 0 | Status: SHIPPED | OUTPATIENT
Start: 2024-06-21 | End: 2024-07-01

## 2024-06-21 RX ADMIN — DOXYCYCLINE HYCLATE 100 MG: 100 CAPSULE ORAL at 06:06

## 2024-06-21 NOTE — ED NOTES
Patient dressed in blue paper scrubs and socks - patient able to stand up on his own, but states he is disabled and unable to walk without his cane.  As patient does not have his cane with him and he cannot give us a name/number to call for pickup, transport requested from transfer center in the form of a wheelchair van.

## 2024-06-21 NOTE — ED NOTES
Patient is becoming arousable and can answer some but not all questions appropriately. Patient doesn't not want his daughter called.  When asked if there was someone else to call, he grunted and moaned.  Checked his CBG (89), gave patient few sips of orange juice.  Wiped his face with a warm rag.  Will continue to monitor.

## 2024-06-21 NOTE — ED NOTES
Patient tearful complaining of head and neck pain - cannot remember events from yesterday - understands that he is at the hospital and is oriented to self.  Keeps asking what is today and what was yesterday. Will continue to ask patient for a number to call to pick him up.

## 2024-06-21 NOTE — ED PROVIDER NOTES
Encounter Date: 6/21/2024       History     Chief Complaint   Patient presents with    Altered Mental Status     Pt presents to ED from via EMS/FD, found on the road. Arrives without any clothing and with grass and debris stuck in hair. CBG 82 on arrival. Pt denies any complaints. Denies ETOH or drug abuse.      Emergent eval a 63-year-old male with history of hypertension, hep C, JACKLYN, arthritis, and GERD who presents to the ER after EMS and fire department found him on the road patient was reportedly not dressed and covered in grass and debris.  Glucose has been 82.  Patient denied drinking alcohol or using drugs.  Was answering questions appropriately but then would fall back asleep.  Blood pressure was 199/97 denied headache was moving all 4 extremities equally and denied any complaints.  But answered minimal questions        Review of patient's allergies indicates:   Allergen Reactions    Pcn [penicillins]      As a child.     Past Medical History:   Diagnosis Date    JACKLYN (acute kidney injury) 11/11/2022    Arthritis     GERD (gastroesophageal reflux disease)     Hepatitis C     STATES DX 10-20. NO S/S. NO FURTHER TESTS OR TX. WITH INSURANCE IN 2021 CAN GET IT EVALUATED.    History of left hip replacement 05/20/2021    History of MRSA infection     History of right shoulder replacement 01/15/2021    Hypertension     Wears glasses      Past Surgical History:   Procedure Laterality Date    APPENDECTOMY      ARTHROPLASTY OF SHOULDER Right 1/13/2021    Procedure: ARTHROPLASTY, SHOULDER TOTAL;  Surgeon: Emilio Siddiqui MD;  Location: [facility] OR;  Service: Orthopedics;  Laterality: Right;  GENERAL AND BLOCK    COLONOSCOPY N/A 5/8/2023    Procedure: COLONOSCOPY;  Surgeon: Bobby Bourgeois MD;  Location: [facility];  Service: Endoscopy;  Laterality: N/A;    ESOPHAGOGASTRODUODENOSCOPY N/A 5/7/2023    Procedure: EGD (ESOPHAGOGASTRODUODENOSCOPY);  Surgeon: Carloz Christianson Jr., MD;  Location: [facility];  Service: Endoscopy;   Laterality: N/A;    ESOPHAGOGASTRODUODENOSCOPY N/A 3/1/2024    Procedure: EGD (ESOPHAGOGASTRODUODENOSCOPY);  Surgeon: Femi Hyde MD;  Location: [facility] ENDO;  Service: Endoscopy;  Laterality: N/A;    HERNIA REPAIR      HIP REPLACEMENT ARTHROPLASTY Left 3/24/2021    Procedure: ARTHROPLASTY, HIP REPLACEMENT;  Surgeon: Miles Rivas II, MD;  Location: [facility] OR;  Service: Orthopedics;  Laterality: Left;    JOINT REPLACEMENT Right     shoulder    REVISION TOTAL HIP ARTHROPLASTY Left 6/2/2021    Procedure: REVISION, TOTAL ARTHROPLASTY, HIP;  Surgeon: Miles Rivas II, MD;  Location: [facility] OR;  Service: Orthopedics;  Laterality: Left;    SMALL BOWEL ENTEROSCOPY Left 5/8/2023    Procedure: ENTEROSCOPY;  Surgeon: Bobby Bourgeois MD;  Location: [facility] ENDO;  Service: Endoscopy;  Laterality: Left;     Family History   Problem Relation Name Age of Onset    Cancer Mother          leukemia     Social History     Tobacco Use    Smoking status: Every Day     Current packs/day: 0.50     Average packs/day: 0.5 packs/day for 30.0 years (15.0 ttl pk-yrs)     Types: Cigarettes     Passive exposure: Current    Smokeless tobacco: Never   Substance Use Topics    Alcohol use: Never    Drug use: Never     Review of Systems   Unable to perform ROS: Mental status change       Physical Exam     Initial Vitals [06/21/24 0128]   BP Pulse Resp Temp SpO2   (!) 199/97 92 18 98.2 °F (36.8 °C) 100 %      MAP       --         Physical Exam    Nursing note and vitals reviewed.  Constitutional: He appears well-developed and well-nourished. He is not diaphoretic. No distress.   Patient was thin and disheveled covering grass and debris is wearing a gown but he was otherwise naked   HENT:   Head: Normocephalic and atraumatic. Head is without raccoon's eyes, without Cartagena's sign, without abrasion, without contusion, without laceration, without right periorbital erythema and without left periorbital erythema. Hair is normal.   Right Ear: Tympanic  membrane, external ear and ear canal normal. No mastoid tenderness. No hemotympanum.   Left Ear: Tympanic membrane, external ear and ear canal normal. No mastoid tenderness. No hemotympanum.   Nose: Nose normal. No nose lacerations, sinus tenderness, nasal deformity, septal deviation or nasal septal hematoma. No epistaxis.  No foreign bodies.   Mouth/Throat: Oropharynx is clear and moist. Normal dentition.   Eyes: Conjunctivae and EOM are normal. Pupils are equal, round, and reactive to light.   Neck: Neck supple. No tracheal deviation present.   Of the neck no step-off or deformity   Normal range of motion.  Cardiovascular:  Normal rate, regular rhythm, normal heart sounds and intact distal pulses.     Exam reveals no gallop and no friction rub.       No murmur heard.  Pulmonary/Chest: Breath sounds normal. No stridor. No respiratory distress. He has no wheezes. He has no rhonchi. He has no rales. He exhibits no tenderness.   Abdominal: Abdomen is soft. Bowel sounds are normal. He exhibits no distension and no mass. There is no abdominal tenderness. There is no rebound and no guarding.   Genitourinary:    Penis normal.     Musculoskeletal:         General: No tenderness or edema. Normal range of motion.      Cervical back: Normal range of motion and neck supple.      Comments: No tenderness to palpation of thoracic lumbar sacral spine     Neurological: He is alert and oriented to person, place, and time. He has normal strength. No cranial nerve deficit or sensory deficit.   Answers questions appropriately when woken up from sleep.  Will move all 4 extremities with 5/5 strength   Skin: Skin is warm and dry. No rash noted. No erythema. No pallor.   Patient was completely undressed for exam I do not see any signs of trauma to his body abrasions or signs of cellulitis         ED Course   Procedures  Labs Reviewed   CBC W/ AUTO DIFFERENTIAL - Abnormal; Notable for the following components:       Result Value    RBC 4.18  (*)     Hemoglobin 11.5 (*)     Hematocrit 35.9 (*)     All other components within normal limits   COMPREHENSIVE METABOLIC PANEL - Abnormal; Notable for the following components:    Alkaline Phosphatase 155 (*)     All other components within normal limits   DRUG SCREEN PANEL, URINE EMERGENCY - Abnormal; Notable for the following components:    Benzodiazepines Presumptive Positive (*)     Amphetamine Screen, Ur Presumptive Positive (*)     All other components within normal limits    Narrative:     Specimen Source->Urine   URINALYSIS, REFLEX TO URINE CULTURE    Narrative:     Specimen Source->Urine   AMMONIA   ALCOHOL,MEDICAL (ETHANOL)   ALCOHOL,MEDICAL (ETHANOL)   POCT GLUCOSE   ISTAT CREATININE   POCT GLUCOSE MONITORING CONTINUOUS   POCT CREATININE     EKG Readings: (Independently Interpreted)   Sinus tach 103 no ischemic changes no ectopy or conduction delay       Imaging Results              CT Cervical Spine Without Contrast (Edited Result - FINAL)  Result time 06/21/24 06:25:19      Addendum (preliminary) 1 of 1 by Beck Atkins MD (06/21/24 06:25:19)      Reference made to 12/12/2023 prior CT examination demonstrates no interval change.  Disc space narrowing identified C3-C4 C5-C6 and C6-C7 primarily.  Posterior marginal osteophytic disc spur complex C3-C4 C4-C5 results in spinal canal dimension marrow narrowing likely moderate, and presumably worst based on the examination at C3-C4.  No evidence for critical neural foraminal encroachment, greatest degree of neural foraminal narrowing C5-C6, likely mild in extent.  Atherosclerosis at the carotid bulbs bilaterally.  No fracture deformity.      Electronically signed by: Beck Atkins MD  Date:    06/21/2024  Time:    06:25                 Final result by Beck Atkins MD (06/21/24 06:16:03)                   Impression:      Detailed report under same day CT head.  No evidence for cervical spine fracture.  Cervical  spondylosis.      Electronically signed by: Beck Atkins MD  Date:    06/21/2024  Time:    06:16               Narrative:    EXAMINATION:  CT CERVICAL SPINE WITHOUT CONTRAST    CLINICAL HISTORY:  altered;    TECHNIQUE:  Low dose axial images, sagittal and coronal reformations were performed though the cervical spine.  Contrast was not administered.    COMPARISON:  None    FINDINGS:  See report same day under CT head.  No evidence for fracture.  Diffuse cervical spondylosis with suspected central canal narrowing C3-C4 C4-C5 and C5-C6 for which MRI could further evaluate if indicated..    Please note that routine CT of the spine is limited in its evaluation of extradural/epidural disease.                                       CT Head Without Contrast (Final result)  Result time 06/21/24 06:10:52      Final result by Beck Atkins MD (06/21/24 06:10:52)                   Impression:      No evidence of acute intracranial or cervical spine pathology.  Diffuse cervical spondylosis.      Electronically signed by: Beck Atkins MD  Date:    06/21/2024  Time:    06:10               Narrative:    EXAMINATION:  CT HEAD WITHOUT CONTRAST    CLINICAL HISTORY:  Mental status change, unknown cause;    TECHNIQUE:  Low dose axial CT images obtained throughout the head without the use of intravenous contrast.  Axial, sagittal and coronal reconstructions were performed.    Low dose axial images, sagittal and coronal reformations were performed though the cervical spine.  Contrast was not administered.    COMPARISON:  12/23/2023    FINDINGS:  INTRACRANIAL COMPARTMENT:    Ventricles and sulci are normal in size for age without evidence of hydrocephalus.    The brain parenchyma appears within normal limits.  No parenchymal mass, hemorrhage, edema or major vascular distribution infarct.    No extra-axial blood or fluid collections.    SKULL/EXTRACRANIAL CONTENTS (limited evaluation):    No fracture. Mastoid air cells and  paranasal sinuses are essentially clear.    Skull Base and Craniocervical Junction (partially imaged): Normal.    CERVICAL SPINE:    Spinal Alignment: Normal.    Vertebrae: No fracture.    Discs: Normal height.    Degenerative findings:    *Disc space narrowing present C3-C4 C5-C6 and C6-C7.  There is no evidence for fracture.  Alignment is preserved.  Odontoid is intact.  There is mild scoliosis convex RIGHT.  Note that CT examination as low sensitivity for intradural changes and MRI could further evaluate if indicated yet on the basis of the exit examination, posterior marginal osteophytic disc spur complex most evident C3-C4 C4-C5 C5-C6 C6-C7 likely worst at C3-C4 with mild-moderate central canal narrowing.  Uncovertebral hypertrophy identified multiple levels yet no evidence for critical neural foraminal encroachment..  Paraspinal muscles & soft tissues: Normal.                                       X-Ray Chest 1 View (Final result)  Result time 06/21/24 03:37:24      Final result by Akila Trotter MD (06/21/24 03:37:24)                   Impression:      Please see above.      Electronically signed by: Akila Trotter MD  Date:    06/21/2024  Time:    03:37               Narrative:    EXAMINATION:  XR CHEST 1 VIEW    CLINICAL HISTORY:  altered mental status;    TECHNIQUE:  Single frontal view of the chest was performed.    COMPARISON:  None    FINDINGS:  Cardiac monitoring leads overlie the chest.  The cardiomediastinal silhouette is within normal limits of size and configuration.  Lungs are symmetrically expanded with diffuse coarse interstitial attenuation.  Ill-defined opacity in the peripheral right mid lung zone, possibly developing infiltrate in the appropriate clinical setting.  No evidence of pneumothorax or large volume of pleural fluid.  There is postoperative change of right shoulder arthroplasty with apparent degenerative change of the glenoid.  Additional degenerative change of the left glenohumeral  joint.                                    X-Rays:   Independently Interpreted Readings:   Other Readings:  Right middle lobe infiltrate    Medications   doxycycline capsule 100 mg (100 mg Oral Given 6/21/24 0622)     Medical Decision Making  Emergent eval a 63-year-old male with history of hypertension, hep C, JACKLYN, arthritis, and GERD who presents to the ER after EMS and fire department found him on the road patient was reportedly not dressed and covered in grass and debris.  Glucose has been 82.  Patient denied drinking alcohol or using drugs.  Was answering questions appropriately but then would fall back asleep.  Blood pressure was 199/97 denied headache was moving all 4 extremities equally and denied any complaints.  But answered minimal questions    On physical exam patient was disheveled dirty and only wearing a hospital gown wakes to answer minimal questions no visible signs of trauma to his body.  Normal HEENT exam.  Pupils equal round reactive 4 mm.  Normal extraocular movements.  No neck or back tenderness.  Clear breath sounds bilaterally normal cardiac exam soft nontender abdomen  MDM    Patient presents for emergent evaluation of acute patient found down on road naked no history no complaints that poses a threat to life and/or bodily function.   Differential diagnosis includes but was not limited to trauma, drug abuse or drug withdrawal, intracranial bleeding, stroke, ACS, cardiac dysrhythmia, electrolyte abnormalities, renal failure.   In the ED patient found to have acute altered mental status with right middle lobe pneumonia, positive for benzos and amphetamines and drug screen   I ordered labs and personally reviewed them.  Labs significant for see below  I ordered X-rays and personally reviewed them and reviewed the radiologist interpretation.  Xray significant for see above.    I ordered EKG and personally reviewed it.  EKG significant for see above.    I ordered CT head, cervical spine scan and  personally reviewed it and reviewed the radiologist interpretation.  CT significant for INTRACRANIAL COMPARTMENT:     Ventricles and sulci are normal in size for age without evidence of hydrocephalus.     The brain parenchyma appears within normal limits.  No parenchymal mass, hemorrhage, edema or major vascular distribution infarct.     No extra-axial blood or fluid collections.     SKULL/EXTRACRANIAL CONTENTS (limited evaluation):     No fracture. Mastoid air cells and paranasal sinuses are essentially clear.     Skull Base and Craniocervical Junction (partially imaged): Normal.     CERVICAL SPINE:     Spinal Alignment: Normal.     Vertebrae: No fracture.     Discs: Normal height.     Degenerative findings:     *Disc space narrowing present C3-C4 C5-C6 and C6-C7.  There is no evidence for fracture.  Alignment is preserved.  Odontoid is intact.  There is mild scoliosis convex RIGHT.  Note that CT examination as low sensitivity for intradural changes and MRI could further evaluate if indicated yet on the basis of the exit examination, posterior marginal osteophytic disc spur complex most evident C3-C4 C4-C5 C5-C6 C6-C7 likely worst at C3-C4 with mild-moderate central canal narrowing.  Uncovertebral hypertrophy identified multiple levels yet no evidence for critical neural foraminal encroachment..  Paraspinal muscles & soft tissues: Normal.     Impression:     No evidence of acute intracranial or cervical spine pathology.  Diffuse cervical spondylosis.      Discharge MDM     Patient was managed in the ED with oral doxycycline 100 mg here will discharge home with doxycycline for pneumonia.  Patient had tox screen positive for benzodiazepines as well as amphetamines it was possible the patient had sedation due to benzo use, was crashing after amphetamine use.  Once patient is more awake alert and able to ambulate out of the emergency department he will be discharged home  The response to treatment was good.    Patient  was discharged in stable condition.  Detailed return precautions discussed.  Patient was told to follow up with primary care physician or specialist based on their diagnosis  Roix Guidry MD      Amount and/or Complexity of Data Reviewed  Labs: ordered. Decision-making details documented in ED Course.     Details: Normal UA    Tox positive for benzos and amphetamines  Otherwise normal labs  Radiology: ordered and independent interpretation performed.  ECG/medicine tests: ordered and independent interpretation performed.    Risk  Prescription drug management.               ED Course as of 06/21/24 0642   Fri Jun 21, 2024   0304 Glucose 82  Point of care creatinine 0.9    H&H 11.5 and 35.9  Ammonia 31  Alcohol negative  Alk-phos 150 otherwise normal CMP [RM]      ED Course User Index  [RM] Roxi Guidry MD                           Clinical Impression:  Final diagnoses:  [R41.82] Altered mental status  [J18.9] Pneumonia of right middle lobe due to infectious organism (Primary)          ED Disposition Condition    Discharge Stable          ED Prescriptions       Medication Sig Dispense Start Date End Date Auth. Provider    doxycycline (VIBRAMYCIN) 100 MG Cap Take 1 capsule (100 mg total) by mouth 2 (two) times daily. for 10 days 20 capsule 6/21/2024 7/1/2024 Roxi Guidry MD          Follow-up Information       Follow up With Specialties Details Why Contact Info Additional Information    Cam Orozco MD Internal Medicine Schedule an appointment as soon as possible for a visit  If your symptoms do not improve [address] [facility] [address]  [phone]       [facility] - Emergency Dept Emergency Medicine Go to  If symptoms worsen [address] [facility] [id number] [phone] 1st floor             Roxi Guidry MD  06/21/24 0610

## 2024-06-25 LAB
OHS QRS DURATION: 84 MS
OHS QTC CALCULATION: 455 MS

## 2024-10-28 ENCOUNTER — HOSPITAL ENCOUNTER (INPATIENT)
Facility: HOSPITAL | Age: 64
LOS: 3 days | Discharge: HOME HEALTH | DRG: 536 | End: 2024-11-03
Attending: EMERGENCY MEDICINE | Admitting: INTERNAL MEDICINE
Payer: COMMERCIAL

## 2024-10-28 DIAGNOSIS — R07.9 CHEST PAIN: ICD-10-CM

## 2024-10-28 DIAGNOSIS — Z96.642 HISTORY OF LEFT HIP REPLACEMENT: Primary | ICD-10-CM

## 2024-10-28 DIAGNOSIS — R55 SYNCOPE: ICD-10-CM

## 2024-10-28 DIAGNOSIS — M16.12 UNILATERAL PRIMARY OSTEOARTHRITIS, LEFT HIP: ICD-10-CM

## 2024-10-28 LAB
ALBUMIN SERPL BCP-MCNC: 3.4 G/DL (ref 3.5–5.2)
ALP SERPL-CCNC: 125 U/L (ref 55–135)
ALT SERPL W/O P-5'-P-CCNC: 11 U/L (ref 10–44)
AMPHET+METHAMPHET UR QL: ABNORMAL
ANION GAP SERPL CALC-SCNC: 5 MMOL/L (ref 8–16)
AST SERPL-CCNC: 15 U/L (ref 10–40)
BARBITURATES UR QL SCN>200 NG/ML: NEGATIVE
BASOPHILS # BLD AUTO: 0.05 K/UL (ref 0–0.2)
BASOPHILS NFR BLD: 0.8 % (ref 0–1.9)
BENZODIAZ UR QL SCN>200 NG/ML: NEGATIVE
BILIRUB SERPL-MCNC: 0.5 MG/DL (ref 0.1–1)
BNP SERPL-MCNC: 378 PG/ML (ref 0–99)
BUN SERPL-MCNC: 12 MG/DL (ref 8–23)
BZE UR QL SCN: NEGATIVE
CALCIUM SERPL-MCNC: 8.6 MG/DL (ref 8.7–10.5)
CANNABINOIDS UR QL SCN: NEGATIVE
CHLORIDE SERPL-SCNC: 105 MMOL/L (ref 95–110)
CO2 SERPL-SCNC: 25 MMOL/L (ref 23–29)
CREAT SERPL-MCNC: 0.6 MG/DL (ref 0.5–1.4)
CREAT UR-MCNC: 94.3 MG/DL (ref 23–375)
DIFFERENTIAL METHOD BLD: ABNORMAL
EOSINOPHIL # BLD AUTO: 0.2 K/UL (ref 0–0.5)
EOSINOPHIL NFR BLD: 3.5 % (ref 0–8)
ERYTHROCYTE [DISTWIDTH] IN BLOOD BY AUTOMATED COUNT: 15.5 % (ref 11.5–14.5)
EST. GFR  (NO RACE VARIABLE): >60 ML/MIN/1.73 M^2
GLUCOSE SERPL-MCNC: 87 MG/DL (ref 70–110)
HCT VFR BLD AUTO: 30.3 % (ref 40–54)
HGB BLD-MCNC: 9 G/DL (ref 14–18)
IMM GRANULOCYTES # BLD AUTO: 0.02 K/UL (ref 0–0.04)
IMM GRANULOCYTES NFR BLD AUTO: 0.3 % (ref 0–0.5)
LYMPHOCYTES # BLD AUTO: 1.2 K/UL (ref 1–4.8)
LYMPHOCYTES NFR BLD: 19.3 % (ref 18–48)
MAGNESIUM SERPL-MCNC: 1.9 MG/DL (ref 1.6–2.6)
MCH RBC QN AUTO: 24.5 PG (ref 27–31)
MCHC RBC AUTO-ENTMCNC: 29.7 G/DL (ref 32–36)
MCV RBC AUTO: 82 FL (ref 82–98)
MONOCYTES # BLD AUTO: 0.5 K/UL (ref 0.3–1)
MONOCYTES NFR BLD: 7.4 % (ref 4–15)
NEUTROPHILS # BLD AUTO: 4.3 K/UL (ref 1.8–7.7)
NEUTROPHILS NFR BLD: 68.7 % (ref 38–73)
NRBC BLD-RTO: 0 /100 WBC
OPIATES UR QL SCN: NEGATIVE
PCP UR QL SCN>25 NG/ML: NEGATIVE
PLATELET # BLD AUTO: 180 K/UL (ref 150–450)
PMV BLD AUTO: 9.2 FL (ref 9.2–12.9)
POTASSIUM SERPL-SCNC: 3.8 MMOL/L (ref 3.5–5.1)
PROT SERPL-MCNC: 6.9 G/DL (ref 6–8.4)
RBC # BLD AUTO: 3.68 M/UL (ref 4.6–6.2)
SODIUM SERPL-SCNC: 135 MMOL/L (ref 136–145)
TOXICOLOGY INFORMATION: ABNORMAL
TROPONIN I SERPL HS-MCNC: 13.1 PG/ML (ref 0–14.9)
TROPONIN I SERPL HS-MCNC: 6.9 PG/ML (ref 0–14.9)
WBC # BLD AUTO: 6.21 K/UL (ref 3.9–12.7)

## 2024-10-28 PROCEDURE — 25000003 PHARM REV CODE 250: Performed by: EMERGENCY MEDICINE

## 2024-10-28 PROCEDURE — 85025 COMPLETE CBC W/AUTO DIFF WBC: CPT | Performed by: EMERGENCY MEDICINE

## 2024-10-28 PROCEDURE — 83735 ASSAY OF MAGNESIUM: CPT | Performed by: EMERGENCY MEDICINE

## 2024-10-28 PROCEDURE — 83880 ASSAY OF NATRIURETIC PEPTIDE: CPT | Performed by: EMERGENCY MEDICINE

## 2024-10-28 PROCEDURE — 99285 EMERGENCY DEPT VISIT HI MDM: CPT | Mod: 25

## 2024-10-28 PROCEDURE — 84484 ASSAY OF TROPONIN QUANT: CPT | Mod: 91 | Performed by: EMERGENCY MEDICINE

## 2024-10-28 PROCEDURE — 80307 DRUG TEST PRSMV CHEM ANLYZR: CPT | Performed by: EMERGENCY MEDICINE

## 2024-10-28 PROCEDURE — 80053 COMPREHEN METABOLIC PANEL: CPT | Performed by: EMERGENCY MEDICINE

## 2024-10-28 RX ORDER — LISINOPRIL 20 MG/1
40 TABLET ORAL DAILY
Status: DISCONTINUED | OUTPATIENT
Start: 2024-10-28 | End: 2024-10-29

## 2024-10-28 RX ADMIN — LISINOPRIL 40 MG: 20 TABLET ORAL at 07:10

## 2024-10-29 ENCOUNTER — CLINICAL SUPPORT (OUTPATIENT)
Dept: CARDIOLOGY | Facility: HOSPITAL | Age: 64
End: 2024-10-29
Attending: INTERNAL MEDICINE
Payer: COMMERCIAL

## 2024-10-29 VITALS — BODY MASS INDEX: 23.52 KG/M2 | WEIGHT: 168 LBS | HEIGHT: 71 IN

## 2024-10-29 PROBLEM — R55 SYNCOPE: Status: ACTIVE | Noted: 2024-10-29

## 2024-10-29 LAB
AORTIC ROOT ANNULUS: 3.2 CM
AORTIC VALVE CUSP SEPERATION: 1.9 CM
APICAL FOUR CHAMBER EJECTION FRACTION: 46 %
APICAL TWO CHAMBER EJECTION FRACTION: 45 %
AV INDEX (PROSTH): 0.94
AV MEAN GRADIENT: 4 MMHG
AV PEAK GRADIENT: 7.8 MMHG
AV VALVE AREA BY VELOCITY RATIO: 2.9 CM²
AV VALVE AREA: 3 CM²
AV VELOCITY RATIO: 0.93
BSA FOR ECHO PROCEDURE: 1.95 M2
CV ECHO LV RWT: 0.4 CM
DOP CALC AO PEAK VEL: 1.4 M/S
DOP CALC AO VTI: 27.2 CM
DOP CALC LVOT AREA: 3.1 CM2
DOP CALC LVOT DIAMETER: 2 CM
DOP CALC LVOT PEAK VEL: 1.3 M/S
DOP CALC LVOT STROKE VOLUME: 80.4 CM3
DOP CALC MV VTI: 41.1 CM
DOP CALCLVOT PEAK VEL VTI: 25.6 CM
E WAVE DECELERATION TIME: 391 MSEC
E/A RATIO: 0.81
E/E' RATIO: 13.16 M/S
ECHO LV POSTERIOR WALL: 1 CM (ref 0.6–1.1)
ERYTHROCYTE [DISTWIDTH] IN BLOOD BY AUTOMATED COUNT: 15.8 % (ref 11.5–14.5)
FERRITIN SERPL-MCNC: 30.2 NG/ML (ref 20–300)
FRACTIONAL SHORTENING: 24 % (ref 28–44)
GLOBAL LONGITUIDAL STRAIN: -15 %
HCT VFR BLD AUTO: 31.8 % (ref 40–54)
HGB BLD-MCNC: 9.6 G/DL (ref 14–18)
INTERVENTRICULAR SEPTUM: 0.9 CM (ref 0.6–1.1)
IRON SERPL-MCNC: 26 UG/DL (ref 45–160)
IVC DIAMETER: 1.06 CM
LDH SERPL L TO P-CCNC: 128 U/L (ref 110–260)
LEFT ATRIUM AREA SYSTOLIC (APICAL 4 CHAMBER): 23 CM2
LEFT ATRIUM SIZE: 3.6 CM
LEFT INTERNAL DIMENSION IN SYSTOLE: 3.8 CM (ref 2.1–4)
LEFT VENTRICLE DIASTOLIC VOLUME INDEX: 60.89 ML/M2
LEFT VENTRICLE DIASTOLIC VOLUME: 119.35 ML
LEFT VENTRICLE END DIASTOLIC VOLUME APICAL 2 CHAMBER: 92.7 ML
LEFT VENTRICLE END DIASTOLIC VOLUME APICAL 4 CHAMBER: 91.2 ML
LEFT VENTRICLE END SYSTOLIC VOLUME APICAL 4 CHAMBER: 65.8 ML
LEFT VENTRICLE MASS INDEX: 86.7 G/M2
LEFT VENTRICLE SYSTOLIC VOLUME INDEX: 32.4 ML/M2
LEFT VENTRICLE SYSTOLIC VOLUME: 63.52 ML
LEFT VENTRICULAR INTERNAL DIMENSION IN DIASTOLE: 5 CM (ref 3.5–6)
LEFT VENTRICULAR MASS: 169.9 G
LV LATERAL E/E' RATIO: 12.5 M/S
LV SEPTAL E/E' RATIO: 13.89 M/S
LVED V (TEICH): 119.35 ML
LVES V (TEICH): 63.52 ML
LVOT MG: 4 MMHG
LVOT MV: 0.88 CM/S
MCH RBC QN AUTO: 24.9 PG (ref 27–31)
MCHC RBC AUTO-ENTMCNC: 30.2 G/DL (ref 32–36)
MCV RBC AUTO: 82 FL (ref 82–98)
MRSA SCREEN BY PCR: POSITIVE
MV MEAN GRADIENT: 5 MMHG
MV PEAK A VEL: 1.55 M/S
MV PEAK E VEL: 1.25 M/S
MV PEAK GRADIENT: 9 MMHG
MV STENOSIS PRESSURE HALF TIME: 116 MS
MV VALVE AREA BY CONTINUITY EQUATION: 1.96 CM2
MV VALVE AREA P 1/2 METHOD: 1.9 CM2
OHS LV EJECTION FRACTION SIMPSONS BIPLANE MOD: 46 %
OHS QRS DURATION: 92 MS
OHS QTC CALCULATION: 465 MS
PISA MRMAX VEL: 5.02 M/S
PLATELET # BLD AUTO: 178 K/UL (ref 150–450)
PMV BLD AUTO: 9.7 FL (ref 9.2–12.9)
RA PRESSURE ESTIMATED: 3 MMHG
RBC # BLD AUTO: 3.86 M/UL (ref 4.6–6.2)
RIGHT ATRIUM VOLUME AREA LENGTH APICAL 4 CHAMBER: 28.6 ML
RIGHT VENTRICULAR END-DIASTOLIC DIMENSION: 1.99 CM
RV TISSUE DOPPLER FREE WALL SYSTOLIC VELOCITY 1 (APICAL 4 CHAMBER VIEW): 15.1 CM/S
SATURATED IRON: 7 % (ref 20–50)
TDI LATERAL: 0.1 M/S
TDI SEPTAL: 0.09 M/S
TDI: 0.1 M/S
TOTAL IRON BINDING CAPACITY: 351 UG/DL (ref 250–450)
TRANSFERRIN SERPL-MCNC: 251 MG/DL (ref 200–375)
TROPONIN I SERPL HS-MCNC: 5.7 PG/ML (ref 0–14.9)
WBC # BLD AUTO: 5.3 K/UL (ref 3.9–12.7)
Z-SCORE OF LEFT VENTRICULAR DIMENSION IN END DIASTOLE: -1.15
Z-SCORE OF LEFT VENTRICULAR DIMENSION IN END SYSTOLE: 0.77

## 2024-10-29 PROCEDURE — 99221 1ST HOSP IP/OBS SF/LOW 40: CPT | Mod: ,,, | Performed by: FAMILY MEDICINE

## 2024-10-29 PROCEDURE — 87641 MR-STAPH DNA AMP PROBE: CPT | Performed by: INTERNAL MEDICINE

## 2024-10-29 PROCEDURE — 85027 COMPLETE CBC AUTOMATED: CPT | Performed by: INTERNAL MEDICINE

## 2024-10-29 PROCEDURE — G0378 HOSPITAL OBSERVATION PER HR: HCPCS

## 2024-10-29 PROCEDURE — 93306 TTE W/DOPPLER COMPLETE: CPT

## 2024-10-29 PROCEDURE — 25000003 PHARM REV CODE 250: Performed by: INTERNAL MEDICINE

## 2024-10-29 PROCEDURE — 93356 MYOCRD STRAIN IMG SPCKL TRCK: CPT

## 2024-10-29 PROCEDURE — 94761 N-INVAS EAR/PLS OXIMETRY MLT: CPT

## 2024-10-29 PROCEDURE — 97162 PT EVAL MOD COMPLEX 30 MIN: CPT

## 2024-10-29 PROCEDURE — 84484 ASSAY OF TROPONIN QUANT: CPT | Performed by: INTERNAL MEDICINE

## 2024-10-29 PROCEDURE — 84466 ASSAY OF TRANSFERRIN: CPT | Performed by: INTERNAL MEDICINE

## 2024-10-29 PROCEDURE — 36415 COLL VENOUS BLD VENIPUNCTURE: CPT | Performed by: INTERNAL MEDICINE

## 2024-10-29 PROCEDURE — 82728 ASSAY OF FERRITIN: CPT | Performed by: INTERNAL MEDICINE

## 2024-10-29 PROCEDURE — 93356 MYOCRD STRAIN IMG SPCKL TRCK: CPT | Mod: ,,, | Performed by: INTERNAL MEDICINE

## 2024-10-29 PROCEDURE — 93306 TTE W/DOPPLER COMPLETE: CPT | Mod: 26,,, | Performed by: INTERNAL MEDICINE

## 2024-10-29 PROCEDURE — 83615 LACTATE (LD) (LDH) ENZYME: CPT | Performed by: INTERNAL MEDICINE

## 2024-10-29 RX ORDER — IBUPROFEN 400 MG/1
400 TABLET ORAL 3 TIMES DAILY PRN
Status: DISCONTINUED | OUTPATIENT
Start: 2024-10-29 | End: 2024-10-30

## 2024-10-29 RX ORDER — LANOLIN ALCOHOL/MO/W.PET/CERES
1 CREAM (GRAM) TOPICAL
Status: DISCONTINUED | OUTPATIENT
Start: 2024-10-29 | End: 2024-11-01

## 2024-10-29 RX ORDER — ACETAMINOPHEN 325 MG/1
650 TABLET ORAL EVERY 6 HOURS PRN
Status: DISCONTINUED | OUTPATIENT
Start: 2024-10-29 | End: 2024-10-29

## 2024-10-29 RX ORDER — IBUPROFEN 200 MG
24 TABLET ORAL
Status: DISCONTINUED | OUTPATIENT
Start: 2024-10-29 | End: 2024-11-03 | Stop reason: HOSPADM

## 2024-10-29 RX ORDER — GLUCAGON 1 MG
1 KIT INJECTION
Status: DISCONTINUED | OUTPATIENT
Start: 2024-10-29 | End: 2024-11-03 | Stop reason: HOSPADM

## 2024-10-29 RX ORDER — DOXYCYCLINE 100 MG/1
100 CAPSULE ORAL EVERY 12 HOURS
Status: DISCONTINUED | OUTPATIENT
Start: 2024-10-29 | End: 2024-11-03 | Stop reason: HOSPADM

## 2024-10-29 RX ORDER — CYCLOBENZAPRINE HCL 10 MG
10 TABLET ORAL 3 TIMES DAILY
Status: DISCONTINUED | OUTPATIENT
Start: 2024-10-29 | End: 2024-11-03 | Stop reason: HOSPADM

## 2024-10-29 RX ORDER — NALOXONE HCL 0.4 MG/ML
0.02 VIAL (ML) INJECTION
Status: DISCONTINUED | OUTPATIENT
Start: 2024-10-29 | End: 2024-11-03 | Stop reason: HOSPADM

## 2024-10-29 RX ORDER — IBUPROFEN 200 MG
16 TABLET ORAL
Status: DISCONTINUED | OUTPATIENT
Start: 2024-10-29 | End: 2024-11-03 | Stop reason: HOSPADM

## 2024-10-29 RX ORDER — OXYBUTYNIN CHLORIDE 5 MG/1
5 TABLET, EXTENDED RELEASE ORAL DAILY
Status: DISCONTINUED | OUTPATIENT
Start: 2024-10-29 | End: 2024-11-03 | Stop reason: HOSPADM

## 2024-10-29 RX ORDER — AMLODIPINE BESYLATE 5 MG/1
5 TABLET ORAL DAILY
Status: DISCONTINUED | OUTPATIENT
Start: 2024-10-29 | End: 2024-10-31

## 2024-10-29 RX ORDER — LISINOPRIL 20 MG/1
40 TABLET ORAL DAILY
Status: DISCONTINUED | OUTPATIENT
Start: 2024-10-29 | End: 2024-11-01

## 2024-10-29 RX ORDER — SODIUM CHLORIDE 0.9 % (FLUSH) 0.9 %
10 SYRINGE (ML) INJECTION EVERY 12 HOURS PRN
Status: DISCONTINUED | OUTPATIENT
Start: 2024-10-29 | End: 2024-11-03 | Stop reason: HOSPADM

## 2024-10-29 RX ORDER — ACETAMINOPHEN 500 MG
1000 TABLET ORAL 4 TIMES DAILY
Status: DISCONTINUED | OUTPATIENT
Start: 2024-10-29 | End: 2024-11-03 | Stop reason: HOSPADM

## 2024-10-29 RX ORDER — GABAPENTIN 300 MG/1
300 CAPSULE ORAL 3 TIMES DAILY
Status: DISCONTINUED | OUTPATIENT
Start: 2024-10-29 | End: 2024-10-30

## 2024-10-29 RX ORDER — PANTOPRAZOLE SODIUM 40 MG/1
40 TABLET, DELAYED RELEASE ORAL DAILY
Status: DISCONTINUED | OUTPATIENT
Start: 2024-10-30 | End: 2024-10-30

## 2024-10-29 RX ORDER — TAMSULOSIN HYDROCHLORIDE 0.4 MG/1
0.8 CAPSULE ORAL DAILY
Status: DISCONTINUED | OUTPATIENT
Start: 2024-10-29 | End: 2024-11-03 | Stop reason: HOSPADM

## 2024-10-29 RX ADMIN — IBUPROFEN 600 MG: 200 TABLET, FILM COATED ORAL at 02:10

## 2024-10-29 RX ADMIN — FERROUS SULFATE TAB 325 MG (65 MG ELEMENTAL FE) 1 EACH: 325 (65 FE) TAB at 09:10

## 2024-10-29 RX ADMIN — IBUPROFEN 600 MG: 200 TABLET, FILM COATED ORAL at 09:10

## 2024-10-29 RX ADMIN — OXYBUTYNIN CHLORIDE 5 MG: 5 TABLET, EXTENDED RELEASE ORAL at 09:10

## 2024-10-29 RX ADMIN — CYCLOBENZAPRINE 10 MG: 10 TABLET, FILM COATED ORAL at 08:10

## 2024-10-29 RX ADMIN — GABAPENTIN 300 MG: 300 CAPSULE ORAL at 08:10

## 2024-10-29 RX ADMIN — ACETAMINOPHEN 1000 MG: 500 TABLET, FILM COATED ORAL at 08:10

## 2024-10-29 RX ADMIN — ACETAMINOPHEN 1000 MG: 500 TABLET, FILM COATED ORAL at 05:10

## 2024-10-29 RX ADMIN — DOXYCYCLINE HYCLATE 100 MG: 100 CAPSULE ORAL at 09:10

## 2024-10-29 RX ADMIN — DOXYCYCLINE HYCLATE 100 MG: 100 CAPSULE ORAL at 08:10

## 2024-10-29 RX ADMIN — GABAPENTIN 300 MG: 300 CAPSULE ORAL at 02:10

## 2024-10-29 RX ADMIN — TAMSULOSIN HYDROCHLORIDE 0.8 MG: 0.4 CAPSULE ORAL at 09:10

## 2024-10-29 RX ADMIN — LISINOPRIL 40 MG: 20 TABLET ORAL at 09:10

## 2024-10-29 RX ADMIN — AMLODIPINE BESYLATE 5 MG: 5 TABLET ORAL at 09:10

## 2024-10-29 RX ADMIN — GABAPENTIN 300 MG: 300 CAPSULE ORAL at 09:10

## 2024-10-29 NOTE — PT/OT/SLP EVAL
Physical Therapy Evaluation    Patient Name:  Omar Trotter   MRN:  [id number]    Recommendations:     Discharge Recommendations: LIT (HHPT).  PT eval limited due to L hip pain.    Discharge Equipment Recommendations: none   Barriers to discharge:  impaired functional mobility , pain    Assessment:     Omar Trotter is a 64 y.o. male admitted with a medical diagnosis of <principal problem not specified>.  He presents with the following impairments/functional limitations: weakness, impaired endurance, impaired self care skills, impaired functional mobility, decreased lower extremity function, pain, gait instability .    Pt presented in supine. He was alert and oriented x4 but appeared restless. He  was reluctant for PT eval but after much encouragement he consented  to t/f to EOB. He had received pain medication 40 minutes prior for L hip pain. Pt manually advanced L LE closer to EOB  and minimally turned to the R side than back to supine. Pt did this for 10 minutes and requested that therapist not rush him. He did not complete t/f to sit at EOB.     Rehab Prognosis:  questionable ; patient would benefit from acute skilled PT services to address these deficits and reach maximum level of function.    Recent Surgery: * No surgery found *      Plan:     During this hospitalization, patient to be seen 5 x/week to address the identified rehab impairments via gait training, therapeutic activities, therapeutic exercises and progress toward the following goals:    Plan of Care Expires:  11/29/24    Subjective     Chief Complaint: L hip pain  Patient/Family Comments/goals: Return home alone and await another L hip surgery  Pain/Comfort:  Pain Rating 1: 6/10  Location - Side 1: Left  Pain Addressed 1: Pre-medicate for activity, Reposition    Patients cultural, spiritual, [employer]ism conflicts given the current situation:      Living Environment:  Pt live alone in an apartment  Prior to admission, patients level of function  was ambulatory with SPC..  Equipment used at home: cane, straight.  DME owned (not currently used): rolling walker.      Objective:     Communicated with nurse prior to session.  Patient found supine with bed alarm, telemetry  upon PT entry to room.    General Precautions: Standard, contact, fall  Orthopedic Precautions:    Braces:    Respiratory Status: Room air    Exams:  Cognitive Exam:  Patient is oriented to Person, Place, Time, and Situation  RLE ROM: WFL  RLE Strength: WFL  LLE ROM: WFL except L hip ROM  LLE Strength: 3+/5    Functional Mobility:  Pt did not complete t/f to sit due to pain      AM-PAC 6 CLICK MOBILITY  Total Score:11       Treatment & Education:  Pt was educated on safety, use of  call light, PT POC.     Patient left supine with all lines intact, call button in reach, and bed alarm on.    GOALS:   Multidisciplinary Problems       Physical Therapy Goals          Problem: Physical Therapy    Goal Priority Disciplines Outcome Interventions   Physical Therapy Goal     PT, PT/OT     Description: Goals to be met by: 2024     Patient will increase functional independence with mobility by performin. Supine to sit with Stand-by Assistance  2. Sit to stand transfer with Stand-by Assistance  3. Gait  x 50  feet with Stand-by Assistance using Rolling Walker.                          History:     Past Medical History:   Diagnosis Date    JACKLYN (acute kidney injury) 2022    Arthritis     GERD (gastroesophageal reflux disease)     Hepatitis C     STATES DX 10-20. NO S/S. NO FURTHER TESTS OR TX. WITH INSURANCE IN  CAN GET IT EVALUATED.    History of left hip replacement 2021    History of MRSA infection     History of right shoulder replacement 01/15/2021    Hypertension     Wears glasses        Past Surgical History:   Procedure Laterality Date    APPENDECTOMY      ARTHROPLASTY OF SHOULDER Right 2021    Procedure: ARTHROPLASTY, SHOULDER TOTAL;  Surgeon: Emilio Siddiqui MD;   Location: [facility] OR;  Service: Orthopedics;  Laterality: Right;  GENERAL AND BLOCK    COLONOSCOPY N/A 5/8/2023    Procedure: COLONOSCOPY;  Surgeon: Bobby Bourgeois MD;  Location: [facility];  Service: Endoscopy;  Laterality: N/A;    ESOPHAGOGASTRODUODENOSCOPY N/A 5/7/2023    Procedure: EGD (ESOPHAGOGASTRODUODENOSCOPY);  Surgeon: Carloz Christianson Jr., MD;  Location: [facility] ENDO;  Service: Endoscopy;  Laterality: N/A;    ESOPHAGOGASTRODUODENOSCOPY N/A 3/1/2024    Procedure: EGD (ESOPHAGOGASTRODUODENOSCOPY);  Surgeon: Femi Hyde MD;  Location: [facility];  Service: Endoscopy;  Laterality: N/A;    HERNIA REPAIR      HIP REPLACEMENT ARTHROPLASTY Left 3/24/2021    Procedure: ARTHROPLASTY, HIP REPLACEMENT;  Surgeon: Miles Rivas II, MD;  Location: [facility] OR;  Service: Orthopedics;  Laterality: Left;    JOINT REPLACEMENT Right     shoulder    REVISION TOTAL HIP ARTHROPLASTY Left 6/2/2021    Procedure: REVISION, TOTAL ARTHROPLASTY, HIP;  Surgeon: Miles Rivas II, MD;  Location: [facility] OR;  Service: Orthopedics;  Laterality: Left;    SMALL BOWEL ENTEROSCOPY Left 5/8/2023    Procedure: ENTEROSCOPY;  Surgeon: Bobby Bourgeois MD;  Location: [facility];  Service: Endoscopy;  Laterality: Left;       Time Tracking:     PT Received On: 10/29/24  PT Start Time: 0945     PT Stop Time: 1001  PT Total Time (min): 16 min     Billable Minutes: Evaluation 16 minutes      10/29/2024

## 2024-10-29 NOTE — PROGRESS NOTES
"[facility] hospital  Adult Nutrition   Progress Note (Initial Assessment)     SUMMARY     Recommendations  Recommendation/Intervention:   1. Consider Cardiac CCHO diet (Hb A1c 6.5%).   2. Recommend Glucerna with  for decreased appetite prior to admit and wound healing.    Goals: 1. Intake will be >/= 75% 'EEN / EPN and labs will trend to target range by follow up.    Nutrition Goal Status: new    Nutrition Diagnosis PES Statement: Increased nutrition needs related to wound healing as evidenced by open wound followed by wound care MD in patient with hyperglycemia (Hb A1c 6.5%).    Dietitian Rounds Brief  RD screen for risk due to MST 3. Patient unsure of weight loss but reported to RN he had decreased appetite recently. Leg wound noted on chart and MD following.  RD recommends Glucerna with meals.     Nutrition Related Social Determinants of Health:   Food Insecurity: No Food Insecurity (10/29/2024)    Hunger Vital Sign     Worried About Running Out of Food in the Last Year: Never true     Ran Out of Food in the Last Year: Never true         Malnutrition Assessment   No visual signs per limited visual; patient was sleeping at visit.   MST 3. Weight loss 6% in 1 year per Epic, not significant per AAIM criteria.         Diet order:   Current Diet Order: Cardiac diet                 Evaluation of Received Nutrient/Fluid Intake  Energy Calories Required: not meeting needs  Protein Required: not meeting needs  Fluid Required: not meeting needs  Tolerance: other (see comments) (limited data)     % Intake of Estimated Energy Needs: 0 - 25 %  % Meal Intake: 0 - 25 %      Intake/Output Summary (Last 24 hours) at 10/29/2024 1431  Last data filed at 10/29/2024 1419  Gross per 24 hour   Intake 120 ml   Output 950 ml   Net -830 ml        Anthropometrics  Temp: 97.5 °F (36.4 °C)  Height Method: Stated  Height: 5' 11" (180.3 cm)  Height (inches): 71 in  Weight Method: Bed Scale  Weight: 76.2 kg (167 lb 15.9 oz)  Weight (lb): " 167.99 lb  Ideal Body Weight (IBW), Male: 172 lb  % Ideal Body Weight, Male (lb): 97.67 %  BMI (Calculated): 23.4       Estimated/Assessed Needs  Weight Used For Calorie Calculations: 76.2 kg (167 lb 15.9 oz)  Energy Calorie Requirements (kcal): 4933-2858 kcal / day (25-30 kcal/kg)  Energy Need Method: Kcal/kg  Protein Requirements:  gm/day (1.2-1.6 gm/kg)  Weight Used For Protein Calculations: 76.2 kg (167 lb 15.9 oz)     Estimated Fluid Requirement Method: RDA Method  RDA Method (mL): 1905  CHO Requirement: 238-285 gm/day (15-19 cho servings / day)    Reason for Assessment  Reason For Assessment: identified at risk by screening criteria (MST 3)  Diagnosis: other (see comments) (chest pain, syncope)  General Information Comments: Patient with history of Hep C, cirrhosis, GERD, HTN, and BPH, previous hip surgery ,UGIB, and esophagitis admit with chest pain and reported syncope prior to admit.  Nutrition Discharge Planning: Cardiac diet as tolerated.    Nutrition/Diet History  Spiritual, Cultural Beliefs, Mandaeism Practices, Values that Affect Care: no  Food Allergies: NKFA  Factors Affecting Nutritional Intake: decreased appetite    Nutrition Risk Screen  Nutrition Risk Screen: reduced oral intake over the last month       Wound 10/29/24 0123 Abscess Right anterior;lower Leg-Wound Image: Images linked  MST Score: 3  Have you recently lost weight without trying?: Unsure  Weight loss score: 2  Have you been eating poorly because of a decreased appetite?: Yes  Appetite score: 1       Weight History:  Wt Readings from Last 10 Encounters:   10/29/24 76.2 kg (167 lb 15.9 oz)   10/29/24 76.2 kg (167 lb 15.9 oz)   07/25/24 72.6 kg (160 lb)   06/21/24 79.4 kg (175 lb)   03/15/24 71.7 kg (158 lb)   03/07/24 70.5 kg (155 lb 6.8 oz)   03/06/24 69 kg (152 lb 3.2 oz)   03/01/24 64.5 kg (142 lb 3.2 oz)   12/12/23 79.4 kg (175 lb)   09/14/23 80.3 kg (177 lb)        Lab/Procedures/Meds: Pertinent Labs/Meds  "Reviewed    Medications:Pertinent Medications Reviewed  Scheduled Meds:   amLODIPine  5 mg Oral Daily    doxycycline  100 mg Oral Q12H    ferrous sulfate  1 tablet Oral Daily with breakfast    gabapentin  300 mg Oral TID    ibuprofen  600 mg Oral TID    lisinopriL  40 mg Oral Daily    oxybutynin  5 mg Oral Daily    tamsulosin  0.8 mg Oral Daily     Continuous Infusions:  PRN Meds:.  Current Facility-Administered Medications:     acetaminophen, 650 mg, Oral, Q6H PRN    dextrose 50%, 12.5 g, Intravenous, PRN    dextrose 50%, 25 g, Intravenous, PRN    glucagon (human recombinant), 1 mg, Intramuscular, PRN    glucose, 16 g, Oral, PRN    glucose, 24 g, Oral, PRN    naloxone, 0.02 mg, Intravenous, PRN    sodium chloride 0.9%, 10 mL, Intravenous, Q12H PRN    Labs: Pertinent Labs Reviewed  Clinical Chemistry:  Recent Labs   Lab 10/28/24  2008   *   K 3.8      CO2 25   GLU 87   BUN 12   CREATININE 0.6   CALCIUM 8.6*   PROT 6.9   ALBUMIN 3.4*   BILITOT 0.5   ALKPHOS 125   AST 15   ALT 11   ANIONGAP 5*   MG 1.9     CBC:   Recent Labs   Lab 10/29/24  1011   WBC 5.30   RBC 3.86*   HGB 9.6*   HCT 31.8*      MCV 82   MCH 24.9*   MCHC 30.2*     Lipid Panel:  No results for input(s): "CHOL", "HDL", "LDLCALC", "TRIG", "CHOLHDL" in the last 168 hours.  Cardiac Profile:  Recent Labs   Lab 10/28/24  2008   *     Inflammatory Labs:  No results for input(s): "CRP" in the last 168 hours.  Diabetes:  No results for input(s): "HGBA1C", "POCTGLUCOSE" in the last 168 hours.  Thyroid & Parathyroid:  No results for input(s): "TSH", "FREET4", "R6ZOPRW", "M4XZEMO", "THYROIDAB" in the last 168 hours.    Monitor and Evaluation  Food and Nutrient Intake: energy intake, food and beverage intake  Food and Nutrient Adminstration: diet order  Knowledge/Beliefs/Attitudes: food and nutrition knowledge/skill  Physical Activity and Function: nutrition-related ADLs and IADLs  Anthropometric Measurements: weight change, weight, body " mass index  Biochemical Data, Medical Tests and Procedures: gastrointestinal profile, electrolyte and renal panel, inflammatory profile, lipid profile, glucose/endocrine profile  Nutrition-Focused Physical Findings: overall appearance     Nutrition Risk  Level of Risk/Frequency of Follow-up:  (2 x / week)     Nutrition Follow-Up  RD Follow-up?: Yes

## 2024-10-29 NOTE — ASSESSMENT & PLAN NOTE
Fall/syncope/drug toxicity    Plan   Previous recent carotid ultrasound negative  Ordered echo  Trend cardiac enzyme  Neuro observation and if patient neurologically improved, possible DC tomorrow   PT and OT

## 2024-10-29 NOTE — H&P
[facility]  History & Physical    Patient Name: Omar Trotter  MRN: [id number]  Patient Class: OP- Observation  Admission Date: 10/28/2024  Attending Physician: Андрей Wilcox MD   Primary Care Provider: Cam Orozco MD         Patient information was obtained from patient and ER records.     Subjective:     Principal Problem:<principal problem not specified>    Chief Complaint:   Chief Complaint   Patient presents with    Loss of Consciousness     Pt arrived by ems, pt states he had a syncopal episode falling to floor, pt is lethargic noted by ems, c collar placed by ems        HPI:  Please note that pt was uncooperative during the example. He was sleepy and wanted to be left alone. Questions were repeated multiple times for him to answer.    ( This is a note from the physician from previous admission.  Placed here because of I encountered the same saturation)     63 yo male with PMH of GERD, HTN, and BPH, previous hip surgery ,UGIB, / esophagitis was admitted from ER with possible syncopal episode and drowsiness.   His previous re toxicology study positive for benzo and others but today positive for amphetamine   Patient can not tell what time he fell from the bed but he asked me to look at EMS note.   He states to ER MD  he woke up on his living room floor. He is uncertain how he got on the floor.  He does not remember falling.  As per  ER MD , deny  headache or neck pain, no incontinence, seizures..no abdominal pain. Reported someone  came to check on him and he appeared drowsy so he activated EMS.   On examination he denied having chest pain, shortness for breath but he appeared very drowsy.  Vitals stable and labs are stable with chronic anemia.  CT head negative and chest x-ray did not show any acute problem.  He denied using substances but RN reported he was on Suboxone in the past and  opiate dependence noted in the past history.      Past Medical History:    Diagnosis Date    JACKLYN (acute kidney injury) 11/11/2022    Arthritis     GERD (gastroesophageal reflux disease)     Hepatitis C     STATES DX 10-20. NO S/S. NO FURTHER TESTS OR TX. WITH INSURANCE IN 2021 CAN GET IT EVALUATED.    History of left hip replacement 05/20/2021    History of MRSA infection     History of right shoulder replacement 01/15/2021    Hypertension     Wears glasses        Past Surgical History:   Procedure Laterality Date    APPENDECTOMY      ARTHROPLASTY OF SHOULDER Right 1/13/2021    Procedure: ARTHROPLASTY, SHOULDER TOTAL;  Surgeon: Emilio Siddiqui MD;  Location: [facility] OR;  Service: Orthopedics;  Laterality: Right;  GENERAL AND BLOCK    COLONOSCOPY N/A 5/8/2023    Procedure: COLONOSCOPY;  Surgeon: Bobby Bourgeois MD;  Location: [facility];  Service: Endoscopy;  Laterality: N/A;    ESOPHAGOGASTRODUODENOSCOPY N/A 5/7/2023    Procedure: EGD (ESOPHAGOGASTRODUODENOSCOPY);  Surgeon: Carloz Christianson Jr., MD;  Location: [facility];  Service: Endoscopy;  Laterality: N/A;    ESOPHAGOGASTRODUODENOSCOPY N/A 3/1/2024    Procedure: EGD (ESOPHAGOGASTRODUODENOSCOPY);  Surgeon: Femi Hyde MD;  Location: [facility];  Service: Endoscopy;  Laterality: N/A;    HERNIA REPAIR      HIP REPLACEMENT ARTHROPLASTY Left 3/24/2021    Procedure: ARTHROPLASTY, HIP REPLACEMENT;  Surgeon: Miles Rivas II, MD;  Location: [facility] OR;  Service: Orthopedics;  Laterality: Left;    JOINT REPLACEMENT Right     shoulder    REVISION TOTAL HIP ARTHROPLASTY Left 6/2/2021    Procedure: REVISION, TOTAL ARTHROPLASTY, HIP;  Surgeon: Miles Rivas II, MD;  Location: [facility] OR;  Service: Orthopedics;  Laterality: Left;    SMALL BOWEL ENTEROSCOPY Left 5/8/2023    Procedure: ENTEROSCOPY;  Surgeon: Bobby Bourgeois MD;  Location: [facility];  Service: Endoscopy;  Laterality: Left;       Review of patient's allergies indicates:   Allergen Reactions    Pcn [penicillins]      As a child.       No current facility-administered medications on  file prior to encounter.     Current Outpatient Medications on File Prior to Encounter   Medication Sig    amLODIPine (NORVASC) 5 MG tablet Take 1 tablet by mouth once daily (Patient taking differently: Take 5 mg by mouth once daily.)    busPIRone (BUSPAR) 15 MG tablet Take 1 tablet by mouth once daily    cyclobenzaprine (FLEXERIL) 10 MG tablet Take 1 tablet by mouth three times daily as needed for muscle spasm    ferrous sulfate (FEOSOL) Tab tablet Take 1 tablet by mouth daily with breakfast.    gabapentin (NEURONTIN) 600 MG tablet TAKE 1 TABLET BY MOUTH THREE TIMES DAILY    lisinopriL (PRINIVIL,ZESTRIL) 40 MG tablet Take 1 tablet (40 mg total) by mouth once daily.    oxybutynin (DITROPAN-XL) 5 MG TR24 Take 1 tablet (5 mg total) by mouth once daily.    pantoprazole (PROTONIX) 40 MG tablet Take 1 tablet (40 mg total) by mouth 2 (two) times daily.    tamsulosin (FLOMAX) 0.4 mg Cap Take 2 capsules (0.8 mg total) by mouth once daily.     Family History       Problem Relation (Age of Onset)    Cancer Mother          Tobacco Use    Smoking status: Every Day     Current packs/day: 0.50     Average packs/day: 0.5 packs/day for 30.0 years (15.0 ttl pk-yrs)     Types: Cigarettes     Passive exposure: Current    Smokeless tobacco: Never   Substance and Sexual Activity    Alcohol use: Never    Drug use: Never    Sexual activity: Yes     Partners: Female     Review of Systems   Constitutional:  Negative for activity change, appetite change and diaphoresis.   HENT:  Negative for congestion, facial swelling and sinus pressure.    Respiratory:  Negative for shortness of breath and wheezing.    Cardiovascular:  Negative for chest pain and palpitations.   Gastrointestinal:  Negative for abdominal distention and abdominal pain.   Genitourinary:  Negative for dysuria.   Skin:  Negative for color change and pallor.   Neurological:  Negative for dizziness, facial asymmetry, speech difficulty and headaches.   Psychiatric/Behavioral:   Negative for agitation and confusion.      Objective:     Vital Signs (Most Recent):  Temp: 98.5 °F (36.9 °C) (10/28/24 1835)  Pulse: 94 (10/29/24 0030)  Resp: 17 (10/29/24 0030)  BP: (!) 147/67 (10/29/24 0030)  SpO2: 97 % (10/29/24 0030) Vital Signs (24h Range):  Temp:  [98.5 °F (36.9 °C)] 98.5 °F (36.9 °C)  Pulse:  [82-94] 94  Resp:  [12-20] 17  SpO2:  [94 %-100 %] 97 %  BP: (142-189)/(64-89) 147/67     Weight: 73 kg (160 lb 15 oz)  Body mass index is 22.45 kg/m².     Physical Exam  Constitutional:       General: He is not in acute distress.     Appearance: He is well-developed.   HENT:      Head: Normocephalic.      Nose: Nose normal.   Eyes:      Pupils: Pupils are equal, round, and reactive to light.   Cardiovascular:      Rate and Rhythm: Normal rate and regular rhythm.   Pulmonary:      Effort: Pulmonary effort is normal. No respiratory distress.   Abdominal:      General: Abdomen is flat. There is no distension.      Tenderness: There is no abdominal tenderness.   Musculoskeletal:         General: Normal range of motion.      Cervical back: Normal range of motion and neck supple.   Skin:     General: Skin is warm.      Findings: No rash.   Neurological:      Mental Status: He is alert. He is disoriented.      Comments: Drowsy              CRANIAL NERVES     CN III, IV, VI   Pupils are equal, round, and reactive to light.       Significant Labs: All pertinent labs within the past 24 hours have been reviewed.  CBC:   Recent Labs   Lab 10/28/24  2008   WBC 6.21   HGB 9.0*   HCT 30.3*        CMP:   Recent Labs   Lab 10/28/24  2008   *   K 3.8      CO2 25   GLU 87   BUN 12   CREATININE 0.6   CALCIUM 8.6*   PROT 6.9   ALBUMIN 3.4*   BILITOT 0.5   ALKPHOS 125   AST 15   ALT 11   ANIONGAP 5*     Cardiac Markers:   Recent Labs   Lab 10/28/24  2008   *       Significant Imaging: I have reviewed all pertinent imaging results/findings within the past 24 hours.  Assessment/Plan:      Syncope  Fall/syncope/drug toxicity    Plan   Previous recent carotid ultrasound negative  Ordered echo  Trend cardiac enzyme  Neuro observation and if patient neurologically improved, possible DC tomorrow   PT and OT   CT head neg        Uncomplicated opioid dependence  History aware  Was told patient was on Suboxone in the past      Anemia  Appear chronic  No signs of bleeding  Stool occult blood just in case. Previous EGD noted with esophagitis   Anemia workup  CT abdomen in the past with splenomegaly most likely from portal hypertension    History of left hip replacement  Aware.  Possible cause of the fall/syncope      Cirrhosis of liver  Previous diagnosis noted  MELD-Na score calculated; MELD 3.0: 7 at 3/1/2024  4:29 AM  MELD-Na: 7 at 3/1/2024  4:29 AM  Calculated from:  Serum Creatinine: 0.6 mg/dL (Using min of 1 mg/dL) at 3/1/2024  4:29 AM  Serum Sodium: 138 mmol/L (Using max of 137 mmol/L) at 3/1/2024  4:29 AM  Total Bilirubin: 0.4 mg/dL (Using min of 1 mg/dL) at 2/29/2024 10:42 PM  Serum Albumin: 3.9 g/dL (Using max of 3.5 g/dL) at 2/29/2024 10:42 PM  INR(ratio): 1.1 at 2/29/2024 10:42 PM  Age at listing (hypothetical): 63 years  Sex: Male at 3/1/2024  4:29 AM      Anemia possible from cirrhosis.  LFT are stable    Hepatitis C  History noted    Small wound at leg with small necrotic tissue   Wound care   doxy    VTE Risk Mitigation (From admission, onward)           Ordered     IP VTE HIGH RISK PATIENT  Once         10/29/24 0008     Place sequential compression device  Until discontinued         10/29/24 0008                       On 10/29/2024, patient should be placed in hospital observation services under my care.             Андрей Wilcox MD  Department of Hospital Medicine  [facility]

## 2024-10-29 NOTE — PROGRESS NOTES
Patient continues to have left hip pain.  Complains of intermittent heartburn of late.  He does not recall falling prior to admission.  Denies recent alcohol use.    RRR, CTAB, soft nontender nondistended, bowel sounds present   Pain present with passive range of motion assessment of left hip    CT left hip requested.  Scheduling Tylenol, Flexeril and gabapentin.  PT and OT consulted.      Echocardiogram and telemetry requested given syncope.    Billed by another provider.

## 2024-10-29 NOTE — RESPIRATORY THERAPY
10/29/24 0939   Patient Assessment/Suction   Level of Consciousness (AVPU) alert   Respiratory Effort Normal;Unlabored   Rhythm/Pattern, Respiratory unlabored;pattern regular;depth regular;no shortness of breath reported   PRE-TX-O2   Device (Oxygen Therapy) room air   SpO2 95 %   Pulse Oximetry Type Intermittent   $ Pulse Oximetry - Multiple Charge Pulse Oximetry - Multiple   Pulse 90   Resp 18

## 2024-10-29 NOTE — HPI
Please note that pt was uncooperative during the example. He was sleepy and wanted to be left alone. Questions were repeated multiple times for him to answer.    ( This is a note from the physician from previous admission.  Placed here because of I encountered the same saturation)     63 yo male with PMH of GERD, HTN, and BPH, previous hip surgery ,UGIB, / esophagitis was admitted from ER with possible syncopal episode and drowsiness.   His previous re toxicology study positive for benzo and others but today positive for amphetamine   Patient can not tell what time he fell from the bed but he asked me to look at EMS note.   He states to ER MD  he woke up on his living room floor. He is uncertain how he got on the floor.  He does not remember falling.  As per  ER MD , deny  headache or neck pain, no incontinence, seizures..no abdominal pain. Reported someone  came to check on him and he appeared drowsy so he activated EMS.   On examination he denied having chest pain, shortness for breath but he appeared very drowsy.  Vitals stable and labs are stable with chronic anemia.  CT head negative and chest x-ray did not show any acute problem.  He denied using substances but RN reported he was on Suboxone in the past and  opiate dependence noted in the past history.

## 2024-10-29 NOTE — PLAN OF CARE
DOVE explained to pt. Pt verbalized understanding and signed form.     10/29/24 1109   DOVE Message   Medicare Outpatient and Observation Notification regarding financial responsibility Given to patient/caregiver;Explained to patient/caregiver;Signed/date by patient/caregiver   Date DOVE was signed 10/29/24   Time DOVE was signed 1100

## 2024-10-29 NOTE — ED PROVIDER NOTES
Chief complaint:  Loss of Consciousness (Pt arrived by ems, pt states he had a syncopal episode falling to floor, pt is lethargic noted by ems, c collar placed by ems)       History of Present Illness:  Time seen: 7:11 PM  Source:  Patient     This is a 64 y.o. male presenting with EMS.  Reportedly patient may have a loss of consciousness.  He states he woke up on his living room floor.  He states that he has been feeling tired lately.  He is uncertain how he got on the floor.  He does not remember falling.  He has no headache or neck pain.  He has no unilateral muscle weakness.  He had no incontinence.  Denies history of seizures..  He has chronic back pain.  He states he is supposed to have surgery on his lower back.  He denies any new bladder or bowel incontinence.  He had no abdominal pain.  He denies any visual disturbances.  He has no speech difficulties.  Reportedly has been came to check on him and he appeared drowsy so he activated EMS.     Review of Systems   Constitutional:  Negative for chills and fever.   HENT:  Negative for congestion.    Eyes:  Negative for blurred vision and photophobia.   Respiratory:  Negative for cough, shortness of breath and wheezing.    Cardiovascular:  Negative for chest pain.   Gastrointestinal:  Negative for abdominal pain and vomiting.   Genitourinary:  Negative for dysuria and frequency.   Musculoskeletal:  Positive for back pain. Negative for neck pain.   Skin:  Negative for rash.   Neurological:  Negative for dizziness, focal weakness and seizures.   Psychiatric/Behavioral:  Negative for depression and hallucinations.        Review of patient's allergies indicates:   Allergen Reactions    Pcn [penicillins]      As a child.        No current facility-administered medications on file prior to encounter.     Current Outpatient Medications on File Prior to Encounter   Medication Sig Dispense Refill    amLODIPine (NORVASC) 5 MG tablet Take 1 tablet by mouth once daily  (Patient taking differently: Take 5 mg by mouth once daily.) 90 tablet 0    busPIRone (BUSPAR) 15 MG tablet Take 1 tablet by mouth once daily 90 tablet 3    cyclobenzaprine (FLEXERIL) 10 MG tablet Take 1 tablet by mouth three times daily as needed for muscle spasm 40 tablet 0    ferrous sulfate (FEOSOL) Tab tablet Take 1 tablet by mouth daily with breakfast.      gabapentin (NEURONTIN) 600 MG tablet TAKE 1 TABLET BY MOUTH THREE TIMES DAILY 90 tablet 1    lisinopriL (PRINIVIL,ZESTRIL) 40 MG tablet Take 1 tablet (40 mg total) by mouth once daily. 90 tablet 3    oxybutynin (DITROPAN-XL) 5 MG TR24 Take 1 tablet (5 mg total) by mouth once daily. 90 tablet 3    pantoprazole (PROTONIX) 40 MG tablet Take 1 tablet (40 mg total) by mouth 2 (two) times daily. 60 tablet 11    tamsulosin (FLOMAX) 0.4 mg Cap Take 2 capsules (0.8 mg total) by mouth once daily. 60 capsule 5        Past Medical History:  As per HPI and below:  Past Medical History:   Diagnosis Date    JACKLYN (acute kidney injury) 11/11/2022    Arthritis     GERD (gastroesophageal reflux disease)     Hepatitis C     STATES DX 10-20. NO S/S. NO FURTHER TESTS OR TX. WITH INSURANCE IN 2021 CAN GET IT EVALUATED.    History of left hip replacement 05/20/2021    History of MRSA infection     History of right shoulder replacement 01/15/2021    Hypertension     Wears glasses      Past Surgical History:   Procedure Laterality Date    APPENDECTOMY      ARTHROPLASTY OF SHOULDER Right 1/13/2021    Procedure: ARTHROPLASTY, SHOULDER TOTAL;  Surgeon: Emilio Siddiqui MD;  Location: [facility];  Service: Orthopedics;  Laterality: Right;  GENERAL AND BLOCK    COLONOSCOPY N/A 5/8/2023    Procedure: COLONOSCOPY;  Surgeon: Bobby Bourgeois MD;  Location: [facility];  Service: Endoscopy;  Laterality: N/A;    ESOPHAGOGASTRODUODENOSCOPY N/A 5/7/2023    Procedure: EGD (ESOPHAGOGASTRODUODENOSCOPY);  Surgeon: Carloz Christianson Jr., MD;  Location: [facility] ENDO;  Service: Endoscopy;  Laterality: N/A;     "ESOPHAGOGASTRODUODENOSCOPY N/A 3/1/2024    Procedure: EGD (ESOPHAGOGASTRODUODENOSCOPY);  Surgeon: Femi Hyde MD;  Location: [facility] ENDO;  Service: Endoscopy;  Laterality: N/A;    HERNIA REPAIR      HIP REPLACEMENT ARTHROPLASTY Left 3/24/2021    Procedure: ARTHROPLASTY, HIP REPLACEMENT;  Surgeon: Miles Rivas II, MD;  Location: [facility] OR;  Service: Orthopedics;  Laterality: Left;    JOINT REPLACEMENT Right     shoulder    REVISION TOTAL HIP ARTHROPLASTY Left 6/2/2021    Procedure: REVISION, TOTAL ARTHROPLASTY, HIP;  Surgeon: Miles Rivas II, MD;  Location: [facility] OR;  Service: Orthopedics;  Laterality: Left;    SMALL BOWEL ENTEROSCOPY Left 5/8/2023    Procedure: ENTEROSCOPY;  Surgeon: Bobby Bourgeois MD;  Location: [facility];  Service: Endoscopy;  Laterality: Left;        Tobacco Use: High Risk (10/29/2024)    Patient History     Smoking Tobacco Use: Every Day     Smokeless Tobacco Use: Never     Passive Exposure: Current      reports that he has been smoking cigarettes. He has a 15 pack-year smoking history. He has been exposed to tobacco smoke. He has never used smokeless tobacco. He reports that he does not drink alcohol and does not use drugs.  Family History   Problem Relation Name Age of Onset    Cancer Mother          leukemia       Physical Exam:    BP: (!) 171/89  Pulse: 82  Temp: 98.5 °F (36.9 °C)  Resp: 16  Height: 5' 11" (180.3 cm)  Weight: 70.3 kg (155 lb)  BMI (Calculated): 21.6  SpO2: 100 %  pulmonary embolism     Physical Exam          Physical Exam  Constitutional:       General:  Patient is in no acute distress.     Appearance:  well-developed. not toxic-appearing.   HENT:      Head: Normocephalic and atraumatic.      Mouth: Mucous membranes are moist.      Pharynx: Oropharynx is clear, no erythema     Neck: Supple  Eyes:      Extraocular Movements: Extraocular movements intact.      Pupils: Pupils are equal, round, and reactive to light.   Cardiovascular:      Rate and Rhythm: " Normal rate and regular rhythm.      Heart sounds: Normal heart sounds.   Pulmonary:      Effort: Pulmonary effort is normal. No respiratory distress.      Breath sounds: Normal breath sounds. No wheezing.   Abdominal:      General: Abdomen is flat. Bowel sounds are normal. There is no distension.      Palpations: Abdomen is soft. There is no mass.      Tenderness: There is no abdominal tenderness. There is no guarding or rebound. Negative McBurney's point tenderness.   Musculoskeletal:         General: No swelling, tenderness or deformity. Normal range of motion.      Back:  Pain with range of motion of the lower back.  There is some tenderness to palpation of bilateral paravertebral muscles of the lumbar spine.  There is no crepitus or deformity   Skin:     General: Skin is warm and dry.   Neurological:      General: No focal deficit present.      Mental Status: alert and oriented to person, place, and time.      Cranial Nerves: No cranial nerve deficit.      Sensory: No sensory deficit.      Motor: No weakness.      Coordination: Coordination normal.      Gait: Gait normal.      Deep Tendon Reflexes: Reflexes normal.   Psychiatric:         Mood and Affect: Mood normal.       Medications   amLODIPine tablet 5 mg (has no administration in time range)   ferrous sulfate tablet 1 each (has no administration in time range)   gabapentin capsule 300 mg (has no administration in time range)   lisinopriL tablet 40 mg (has no administration in time range)   oxybutynin 24 hr tablet 5 mg (has no administration in time range)   tamsulosin 24 hr capsule 0.8 mg (has no administration in time range)   sodium chloride 0.9% flush 10 mL (has no administration in time range)   naloxone 0.4 mg/mL injection 0.02 mg (has no administration in time range)   glucose chewable tablet 16 g (has no administration in time range)   glucose chewable tablet 24 g (has no administration in time range)   dextrose 50% injection 12.5 g (has no  administration in time range)   dextrose 50% injection 25 g (has no administration in time range)   glucagon (human recombinant) injection 1 mg (has no administration in time range)   ibuprofen tablet 600 mg (600 mg Oral Given 10/29/24 0207)   acetaminophen tablet 650 mg (has no administration in time range)   doxycycline capsule 100 mg (has no administration in time range)       Labs:  Lab results were reviewed and independently interpreted by me, the emergency care provider.  Recent Results (from the past 24 hours)   Magnesium    Collection Time: 10/28/24  8:08 PM   Result Value Ref Range    Magnesium 1.9 1.6 - 2.6 mg/dL   CBC auto differential    Collection Time: 10/28/24  8:08 PM   Result Value Ref Range    WBC 6.21 3.90 - 12.70 K/uL    RBC 3.68 (L) 4.60 - 6.20 M/uL    Hemoglobin 9.0 (L) 14.0 - 18.0 g/dL    Hematocrit 30.3 (L) 40.0 - 54.0 %    MCV 82 82 - 98 fL    MCH 24.5 (L) 27.0 - 31.0 pg    MCHC 29.7 (L) 32.0 - 36.0 g/dL    RDW 15.5 (H) 11.5 - 14.5 %    Platelets 180 150 - 450 K/uL    MPV 9.2 9.2 - 12.9 fL    Immature Granulocytes 0.3 0.0 - 0.5 %    Gran # (ANC) 4.3 1.8 - 7.7 K/uL    Immature Grans (Abs) 0.02 0.00 - 0.04 K/uL    Lymph # 1.2 1.0 - 4.8 K/uL    Mono # 0.5 0.3 - 1.0 K/uL    Eos # 0.2 0.0 - 0.5 K/uL    Baso # 0.05 0.00 - 0.20 K/uL    nRBC 0 0 /100 WBC    Gran % 68.7 38.0 - 73.0 %    Lymph % 19.3 18.0 - 48.0 %    Mono % 7.4 4.0 - 15.0 %    Eosinophil % 3.5 0.0 - 8.0 %    Basophil % 0.8 0.0 - 1.9 %    Differential Method Automated    Comprehensive metabolic panel    Collection Time: 10/28/24  8:08 PM   Result Value Ref Range    Sodium 135 (L) 136 - 145 mmol/L    Potassium 3.8 3.5 - 5.1 mmol/L    Chloride 105 95 - 110 mmol/L    CO2 25 23 - 29 mmol/L    Glucose 87 70 - 110 mg/dL    BUN 12 8 - 23 mg/dL    Creatinine 0.6 0.5 - 1.4 mg/dL    Calcium 8.6 (L) 8.7 - 10.5 mg/dL    Total Protein 6.9 6.0 - 8.4 g/dL    Albumin 3.4 (L) 3.5 - 5.2 g/dL    Total Bilirubin 0.5 0.1 - 1.0 mg/dL    Alkaline Phosphatase  125 55 - 135 U/L    AST 15 10 - 40 U/L    ALT 11 10 - 44 U/L    eGFR >60.0 >60 mL/min/1.73 m^2    Anion Gap 5 (L) 8 - 16 mmol/L   Troponin I High Sensitivity #1    Collection Time: 10/28/24  8:08 PM   Result Value Ref Range    Troponin I High Sensitivity 13.1 0.0 - 14.9 pg/mL   Troponin I High Sensitivity #2    Collection Time: 10/28/24  8:08 PM   Result Value Ref Range    Troponin I High Sensitivity 6.9 0.0 - 14.9 pg/mL   B-Type natriuretic peptide (BNP)    Collection Time: 10/28/24  8:08 PM   Result Value Ref Range     (H) 0 - 99 pg/mL   Drug screen panel, emergency    Collection Time: 10/28/24  9:44 PM   Result Value Ref Range    Benzodiazepines Negative Negative    Cocaine (Metab.) Negative Negative    Opiate Scrn, Ur Negative Negative    Barbiturate Screen, Ur Negative Negative    Amphetamine Screen, Ur Presumptive Positive (A) Negative    THC Negative Negative    Phencyclidine Negative Negative    Creatinine, Urine 94.3 23.0 - 375.0 mg/dL    Toxicology Information SEE COMMENT            Medical Decision Making:   This is a 64 y.o. male     Orders included   Orders Placed This Encounter   Procedures    MRSA Screen by PCR    X-Ray Chest AP Portable    CT Head Without Contrast    Magnesium    CBC auto differential    Comprehensive metabolic panel    Troponin I High Sensitivity #1    Troponin I High Sensitivity #2    B-Type natriuretic peptide (BNP)    Drug screen panel, emergency    Ethanol    Troponin I High Sensitivity    CBC Without Differential    Occult blood x 1, stool    Iron and TIBC    Ferritin    Lactate dehydrogenase    Diet Cardiac    Vital signs    Cardiac Monitoring - Adult    Orthostatic vital signs    Vital signs    Bladder scan    Notify Provider    Place sequential compression device    Recheck Blood Glucose:    Skin assessment    Moisture Management    Sacral Protection    Elevate heels off of bed    Check Medical Devices for Pressure    Full code    IP Wound Care consult Nurse    IP  Wound Care consult Nurse    PT evaluate and treat    Pulse Oximetry Continuous    EKG 12-lead    EKG 12-lead    Echo    Saline lock IV    Saline lock IV    Place in Observation       Labs were reviewed and were significant for CBC has a normal white count.  H and H is 9 and 30.  CMP was grossly unremarkable.  Urine toxicology is positive for amphetamines.    Imaging:  I reviewed the chest x-ray images and my independent interpretation is no acute cardiopulmonary process. I confirmed this with radiologist on-call who reported:  CT Head Without Contrast   Final Result      No acute abnormality.         Electronically signed by: Jason Burgos   Date:    10/28/2024   Time:    20:03      X-Ray Chest AP Portable    (Results Pending)       Differential diagnosis includes dehydration, cardiac dysrhythmia, stroke, TIA, seizure, overdose, infectious etiologies   Patient presents with syncopal event.  He was somewhat slow to respond.  He had gradual improvement in his mental status throughout the ER stay.  He has no focality to his neuro exam.  I think acute stroke or TIA.  He has no chest pain and troponins had a remained flat.  He may require further observation considering his age and his lethargy.  His certainly could be related to intoxicants as well.  He was given his home dose of lisinopril. He has otherwise remained hemodynamically stable.      Re-evaluation:  Patient was re-evaluated by me.  He remains arousable to verbal stimuli but sleepy appearing.  He has a nonfocal neurologic exam.  I discussed the workup results and the plan of care.  Patient verbalized understanding.  Any concerns and/or questions were addressed.          Comorbidity impacting this encounter includes:  None    Exacerbation and/or Progression of Chronic illness:  Hypertension    External notes from previous hospital and ER visits were reviewed.    Social determinants of health that impact care:   History of substance use    Admission  considered  patient will be admitted to the medicine service    Impression:    Syncope  Disposition:    Admit To medicine service    Current Discharge Medication List            Please note that this document was completed using voice recognition software and may contain syntax and/or typographical errors       Leo Rossi MD  10/29/24 1047

## 2024-10-29 NOTE — PLAN OF CARE
[facility]  Initial Discharge Assessment       Primary Care Provider: Cam Orozco MD    Admission Diagnosis: Chest pain [R07.9]    Admission Date: 10/28/2024  Expected Discharge Date: 10/30/2024    Transition of Care Barriers: Substance Abuse    Payor: [employer] / Plan: [employer] MEDICARE HMO / Product Type: Medicare Advantage /     Extended Emergency Contact Information  Primary Emergency Contact: Shellie Rodriguez  Home Phone: [phone]  Mobile Phone: [phone]  Relation: Daughter  Preferred language: English   needed? No    Discharge Plan A: Home with family  Discharge Plan B: Home      [id number] - [place] - [address]  [address]  [place] [address]  Phone: [phone] Fax: [phone]    [facility]  [address]  [place] [address]  Phone: [phone] Fax: [phone]      Initial Assessment (most recent)       Adult Discharge Assessment - 10/29/24 1325          Discharge Assessment    Assessment Type Discharge Planning Assessment     Confirmed/corrected address, phone number and insurance Yes     Confirmed Demographics Correct on Facesheet     Source of Information patient     When was your last doctors appointment? 10/09/24     Reason For Admission altered mental status     People in Home alone     Facility Arrived From: home     Do you expect to return to your current living situation? Yes     Do you have help at home or someone to help you manage your care at home? No     Prior to hospitilization cognitive status: Alert/Oriented     Current cognitive status: Alert/Oriented     Walking or Climbing Stairs Difficulty no     Dressing/Bathing Difficulty no     Home Accessibility wheelchair accessible     Equipment Currently Used at Home cane, straight     Readmission within 30 days? No     Patient currently being followed by outpatient case management? No     Do you currently have service(s) that help you  manage your care at home? No     Do you take prescription medications? Yes     Do you have prescription coverage? Yes     Do you have any problems affording any of your prescribed medications? No     Is the patient taking medications as prescribed? yes     Who is going to help you get home at discharge? daughter bobby     Are you on dialysis? No     Do you take coumadin? No     Discharge Plan A Home with family     Discharge Plan B Home     DME Needed Upon Discharge  none     Discharge Plan discussed with: Patient     Transition of Care Barriers Substance Abuse        Physical Activity    On average, how many days per week do you engage in moderate to strenuous exercise (like a brisk walk)? 7 days     On average, how many minutes do you engage in exercise at this level? 20 min        Financial Resource Strain    How hard is it for you to pay for the very basics like food, housing, medical care, and heating? Not very hard        Housing Stability    In the last 12 months, was there a time when you were not able to pay the mortgage or rent on time? No     At any time in the past 12 months, were you homeless or living in a shelter (including now)? No        Transportation Needs    Has the lack of transportation kept you from medical appointments, meetings, work or from getting things needed for daily living? No        Food Insecurity    Within the past 12 months, you worried that your food would run out before you got the money to buy more. Never true     Within the past 12 months, the food you bought just didn't last and you didn't have money to get more. Never true        Stress    Do you feel stress - tense, restless, nervous, or anxious, or unable to sleep at night because your mind is troubled all the time - these days? To some extent        Social Isolation    How often do you feel lonely or isolated from those around you?  Never        Alcohol Use    Q1: How often do you have a drink containing alcohol? Never      Q2: How many drinks containing alcohol do you have on a typical day when you are drinking? Patient does not drink     Q3: How often do you have six or more drinks on one occasion? Never        Utilities    In the past 12 months has the electric, gas, oil, or water company threatened to shut off services in your home? No        Health Literacy    How often do you need to have someone help you when you read instructions, pamphlets, or other written material from your doctor or pharmacy? Sometimes

## 2024-10-29 NOTE — ASSESSMENT & PLAN NOTE
Appear chronic  No signs of bleeding  Stool occult blood just in case  Anemia workup  CT abdomen in the past with splenomegaly most likely from portal hypertension

## 2024-10-29 NOTE — CARE UPDATE
10/29/24 0100   Patient Assessment/Suction   Level of Consciousness (AVPU) alert   Respiratory Effort Normal;Unlabored   Rhythm/Pattern, Respiratory unlabored;pattern regular;no shortness of breath reported   Cough Frequency no cough   PRE-TX-O2   Device (Oxygen Therapy) room air   SpO2 97 %

## 2024-10-29 NOTE — DISCHARGE INSTRUCTIONS
Wound Care Instructions for home.   Clean and dry with wound cleanser right lower leg wound, apply nickel thickness Medihoney and then cover with aquacel ag, and foam dressing Daily.

## 2024-10-29 NOTE — SUBJECTIVE & OBJECTIVE
Past Medical History:   Diagnosis Date    JACKLYN (acute kidney injury) 11/11/2022    Arthritis     GERD (gastroesophageal reflux disease)     Hepatitis C     STATES DX 10-20. NO S/S. NO FURTHER TESTS OR TX. WITH INSURANCE IN 2021 CAN GET IT EVALUATED.    History of left hip replacement 05/20/2021    History of MRSA infection     History of right shoulder replacement 01/15/2021    Hypertension     Wears glasses        Past Surgical History:   Procedure Laterality Date    APPENDECTOMY      ARTHROPLASTY OF SHOULDER Right 1/13/2021    Procedure: ARTHROPLASTY, SHOULDER TOTAL;  Surgeon: Emilio Siddiqui MD;  Location: [facility] OR;  Service: Orthopedics;  Laterality: Right;  GENERAL AND BLOCK    COLONOSCOPY N/A 5/8/2023    Procedure: COLONOSCOPY;  Surgeon: Bobby Bourgeois MD;  Location: [facility];  Service: Endoscopy;  Laterality: N/A;    ESOPHAGOGASTRODUODENOSCOPY N/A 5/7/2023    Procedure: EGD (ESOPHAGOGASTRODUODENOSCOPY);  Surgeon: Carloz Christianson Jr., MD;  Location: [facility];  Service: Endoscopy;  Laterality: N/A;    ESOPHAGOGASTRODUODENOSCOPY N/A 3/1/2024    Procedure: EGD (ESOPHAGOGASTRODUODENOSCOPY);  Surgeon: Femi Hyde MD;  Location: [facility];  Service: Endoscopy;  Laterality: N/A;    HERNIA REPAIR      HIP REPLACEMENT ARTHROPLASTY Left 3/24/2021    Procedure: ARTHROPLASTY, HIP REPLACEMENT;  Surgeon: Miles Rivas II, MD;  Location: [facility] OR;  Service: Orthopedics;  Laterality: Left;    JOINT REPLACEMENT Right     shoulder    REVISION TOTAL HIP ARTHROPLASTY Left 6/2/2021    Procedure: REVISION, TOTAL ARTHROPLASTY, HIP;  Surgeon: Miles Rivas II, MD;  Location: [facility] OR;  Service: Orthopedics;  Laterality: Left;    SMALL BOWEL ENTEROSCOPY Left 5/8/2023    Procedure: ENTEROSCOPY;  Surgeon: Bobby Bourgeois MD;  Location: [facility];  Service: Endoscopy;  Laterality: Left;       Review of patient's allergies indicates:   Allergen Reactions    Pcn [penicillins]      As a child.       No current  facility-administered medications on file prior to encounter.     Current Outpatient Medications on File Prior to Encounter   Medication Sig    amLODIPine (NORVASC) 5 MG tablet Take 1 tablet by mouth once daily (Patient taking differently: Take 5 mg by mouth once daily.)    busPIRone (BUSPAR) 15 MG tablet Take 1 tablet by mouth once daily    cyclobenzaprine (FLEXERIL) 10 MG tablet Take 1 tablet by mouth three times daily as needed for muscle spasm    ferrous sulfate (FEOSOL) Tab tablet Take 1 tablet by mouth daily with breakfast.    gabapentin (NEURONTIN) 600 MG tablet TAKE 1 TABLET BY MOUTH THREE TIMES DAILY    lisinopriL (PRINIVIL,ZESTRIL) 40 MG tablet Take 1 tablet (40 mg total) by mouth once daily.    oxybutynin (DITROPAN-XL) 5 MG TR24 Take 1 tablet (5 mg total) by mouth once daily.    pantoprazole (PROTONIX) 40 MG tablet Take 1 tablet (40 mg total) by mouth 2 (two) times daily.    tamsulosin (FLOMAX) 0.4 mg Cap Take 2 capsules (0.8 mg total) by mouth once daily.     Family History       Problem Relation (Age of Onset)    Cancer Mother          Tobacco Use    Smoking status: Every Day     Current packs/day: 0.50     Average packs/day: 0.5 packs/day for 30.0 years (15.0 ttl pk-yrs)     Types: Cigarettes     Passive exposure: Current    Smokeless tobacco: Never   Substance and Sexual Activity    Alcohol use: Never    Drug use: Never    Sexual activity: Yes     Partners: Female     Review of Systems   Constitutional:  Negative for activity change, appetite change and diaphoresis.   HENT:  Negative for congestion, facial swelling and sinus pressure.    Respiratory:  Negative for shortness of breath and wheezing.    Cardiovascular:  Negative for chest pain and palpitations.   Gastrointestinal:  Negative for abdominal distention and abdominal pain.   Genitourinary:  Negative for dysuria.   Skin:  Negative for color change and pallor.   Neurological:  Negative for dizziness, facial asymmetry, speech difficulty and  headaches.   Psychiatric/Behavioral:  Negative for agitation and confusion.      Objective:     Vital Signs (Most Recent):  Temp: 98.5 °F (36.9 °C) (10/28/24 1835)  Pulse: 94 (10/29/24 0030)  Resp: 17 (10/29/24 0030)  BP: (!) 147/67 (10/29/24 0030)  SpO2: 97 % (10/29/24 0030) Vital Signs (24h Range):  Temp:  [98.5 °F (36.9 °C)] 98.5 °F (36.9 °C)  Pulse:  [82-94] 94  Resp:  [12-20] 17  SpO2:  [94 %-100 %] 97 %  BP: (142-189)/(64-89) 147/67     Weight: 73 kg (160 lb 15 oz)  Body mass index is 22.45 kg/m².     Physical Exam  Constitutional:       General: He is not in acute distress.     Appearance: He is well-developed.   HENT:      Head: Normocephalic.      Nose: Nose normal.   Eyes:      Pupils: Pupils are equal, round, and reactive to light.   Cardiovascular:      Rate and Rhythm: Normal rate and regular rhythm.   Pulmonary:      Effort: Pulmonary effort is normal. No respiratory distress.   Abdominal:      General: Abdomen is flat. There is no distension.      Tenderness: There is no abdominal tenderness.   Musculoskeletal:         General: Normal range of motion.      Cervical back: Normal range of motion and neck supple.   Skin:     General: Skin is warm.      Findings: No rash.   Neurological:      Mental Status: He is alert. He is disoriented.      Comments: Drowsy              CRANIAL NERVES     CN III, IV, VI   Pupils are equal, round, and reactive to light.       Significant Labs: All pertinent labs within the past 24 hours have been reviewed.  CBC:   Recent Labs   Lab 10/28/24  2008   WBC 6.21   HGB 9.0*   HCT 30.3*        CMP:   Recent Labs   Lab 10/28/24  2008   *   K 3.8      CO2 25   GLU 87   BUN 12   CREATININE 0.6   CALCIUM 8.6*   PROT 6.9   ALBUMIN 3.4*   BILITOT 0.5   ALKPHOS 125   AST 15   ALT 11   ANIONGAP 5*     Cardiac Markers:   Recent Labs   Lab 10/28/24  2008   *       Significant Imaging: I have reviewed all pertinent imaging results/findings within the past 24  hours.

## 2024-10-29 NOTE — CONSULTS
[facility]  Department of Wound Care  Consult Note      PATIENT NAME: Omar Trotter    MRN: [id number]  TODAY'S DATE: 10/29/2024  ADMIT DATE: 10/28/2024                          CONSULT REQUESTED BY: Marco A Torres MD    SUBJECTIVE     PRINCIPAL PROBLEM: <principal problem not specified>      REASON FOR CONSULT:  Wounds    HPI:  Omar Trotter is a 64 y.o. male presenting with Right leg open wound  Left shin and left 4th toe eschar all POA. Pt stated he is in significant pain, and has had the wounds for years. No other complaints today.    Review of patient's allergies indicates:   Allergen Reactions    Pcn [penicillins]      As a child.       Past Medical History:   Diagnosis Date    JACKLYN (acute kidney injury) 11/11/2022    Arthritis     GERD (gastroesophageal reflux disease)     Hepatitis C     STATES DX 10-20. NO S/S. NO FURTHER TESTS OR TX. WITH INSURANCE IN 2021 CAN GET IT EVALUATED.    History of left hip replacement 05/20/2021    History of MRSA infection     History of right shoulder replacement 01/15/2021    Hypertension     Wears glasses      Past Surgical History:   Procedure Laterality Date    APPENDECTOMY      ARTHROPLASTY OF SHOULDER Right 1/13/2021    Procedure: ARTHROPLASTY, SHOULDER TOTAL;  Surgeon: Emilio Siddiqui MD;  Location: [facility];  Service: Orthopedics;  Laterality: Right;  GENERAL AND BLOCK    COLONOSCOPY N/A 5/8/2023    Procedure: COLONOSCOPY;  Surgeon: Bobby Bourgeois MD;  Location: [facility];  Service: Endoscopy;  Laterality: N/A;    ESOPHAGOGASTRODUODENOSCOPY N/A 5/7/2023    Procedure: EGD (ESOPHAGOGASTRODUODENOSCOPY);  Surgeon: Carloz Christianson Jr., MD;  Location: [facility];  Service: Endoscopy;  Laterality: N/A;    ESOPHAGOGASTRODUODENOSCOPY N/A 3/1/2024    Procedure: EGD (ESOPHAGOGASTRODUODENOSCOPY);  Surgeon: Femi Hyde MD;  Location: [facility];  Service: Endoscopy;  Laterality: N/A;    HERNIA REPAIR      HIP REPLACEMENT ARTHROPLASTY Left 3/24/2021     Procedure: ARTHROPLASTY, HIP REPLACEMENT;  Surgeon: Miles Rivas II, MD;  Location: [facility] OR;  Service: Orthopedics;  Laterality: Left;    JOINT REPLACEMENT Right     shoulder    REVISION TOTAL HIP ARTHROPLASTY Left 6/2/2021    Procedure: REVISION, TOTAL ARTHROPLASTY, HIP;  Surgeon: Miles Rivas II, MD;  Location: [facility] OR;  Service: Orthopedics;  Laterality: Left;    SMALL BOWEL ENTEROSCOPY Left 5/8/2023    Procedure: ENTEROSCOPY;  Surgeon: Bobby Bourgeois MD;  Location: [facility];  Service: Endoscopy;  Laterality: Left;     Social History     Tobacco Use    Smoking status: Every Day     Current packs/day: 0.50     Average packs/day: 0.5 packs/day for 30.0 years (15.0 ttl pk-yrs)     Types: Cigarettes     Passive exposure: Current    Smokeless tobacco: Never   Substance Use Topics    Alcohol use: Never    Drug use: Never        REVIEW OF SYSTEMS  CONSTITUTIONAL: Negative for chills, fatigue and fever.   EYES: No double vision, No blurred vision  NEURO: No headaches, No dizziness  RESPIRATORY: Negative for cough, shortness of breath and wheezing.    CARDIAC: Negative for chest pain. Negative for palpitations and leg swelling.   GI: Negative for abdominal pain, No melena, diarrhea, nausea and vomiting.   : Negative for dysuria and frequency, Negative for hematuria  ENDOCRINE: Negative for polyphagia, Negative for heat intolerance, Negative for cold intolerance  PSYCHIATRIC: Negative for depression, Negative for anxiety, Negative for memory loss  MUSCULOSKELETAL: Negative for neck pain, Negative for muscle weakness, Negative for back pain   SKIN:  Right leg open wound  Left shin and left 4th toe eschar    OBJECTIVE     VITAL SIGNS (Most Recent)  Temp: 97.5 °F (36.4 °C) (10/29/24 1120)  Pulse: 85 (10/29/24 1120)  Resp: 18 (10/29/24 1120)  BP: 135/63 (10/29/24 1120)  SpO2: 99 % (10/29/24 1120)    I & O (Last 24H):  Intake/Output Summary (Last 24 hours) at 10/29/2024 1321  Last data filed at 10/29/2024  1211  Gross per 24 hour   Intake 120 ml   Output 700 ml   Net -580 ml       WEIGHTS  Wt Readings from Last 3 Encounters:   10/29/24 0130 76.2 kg (167 lb 15.9 oz)   10/28/24 2150 73 kg (160 lb 15 oz)   10/28/24 1835 70.3 kg (155 lb)   10/29/24 0845 76.2 kg (167 lb 15.9 oz)   07/25/24 1130 72.6 kg (160 lb)     Body mass index is 23.43 kg/m².    PHYSICAL EXAM  GENERAL: well built, well nourished, well-developed in no apparent distress alert and oriented.   HEENT: Normocephalic. Pupils normal and conjunctivae normal.  NCAT, no JVD, mucous membranes moist, EOM intact  CARDIAC: Regular rate and rhythm. S1 is normal.S2 is normal.No gallops, clicks or murmurs noted at this time.  RESPIRATORY: Normal breath sounds, no wheezes, no rales, no rhonchi  ABDOMEN: Soft no masses or organomegaly.  No abdomen pulsations or bruits.  Normal bowel sounds. No pulsations and no masses felt, No guarding or rebound.   EXTREMITIES: No clubbing, no cyanosis, no edema.   PERIPHERAL VASCULAR SYSTEM: Good palpable distal pulses.   NEUROLOGICAL: No focal deficits, AAO X 3  SKIN: Right leg open wound  Left shin and left 4th toe eschar                        HOME MEDICATIONS:  No current facility-administered medications on file prior to encounter.     Current Outpatient Medications on File Prior to Encounter   Medication Sig Dispense Refill    busPIRone (BUSPAR) 15 MG tablet Take 1 tablet by mouth once daily (Patient taking differently: Take 15 mg by mouth once daily. Take 1 tablet by mouth once daily) 90 tablet 3    cyclobenzaprine (FLEXERIL) 10 MG tablet Take 1 tablet by mouth three times daily as needed for muscle spasm 40 tablet 0    ferrous sulfate (FEOSOL) Tab tablet Take 1 tablet by mouth daily with breakfast.      gabapentin (NEURONTIN) 600 MG tablet TAKE 1 TABLET BY MOUTH THREE TIMES DAILY 90 tablet 1    lisinopriL (PRINIVIL,ZESTRIL) 40 MG tablet Take 1 tablet (40 mg total) by mouth once daily. 90 tablet 3    oxybutynin (DITROPAN-XL) 5 MG  "TR24 Take 1 tablet (5 mg total) by mouth once daily. 90 tablet 3    pantoprazole (PROTONIX) 40 MG tablet Take 1 tablet (40 mg total) by mouth 2 (two) times daily. 60 tablet 11    tamsulosin (FLOMAX) 0.4 mg Cap Take 2 capsules (0.8 mg total) by mouth once daily. 60 capsule 5    amLODIPine (NORVASC) 5 MG tablet Take 1 tablet by mouth once daily (Patient not taking: Reported on 10/29/2024) 90 tablet 0       SCHEDULED MEDS:   amLODIPine  5 mg Oral Daily    doxycycline  100 mg Oral Q12H    ferrous sulfate  1 tablet Oral Daily with breakfast    gabapentin  300 mg Oral TID    ibuprofen  600 mg Oral TID    lisinopriL  40 mg Oral Daily    oxybutynin  5 mg Oral Daily    tamsulosin  0.8 mg Oral Daily       CONTINUOUS INFUSIONS:    PRN MEDS:  Current Facility-Administered Medications:     acetaminophen, 650 mg, Oral, Q6H PRN    dextrose 50%, 12.5 g, Intravenous, PRN    dextrose 50%, 25 g, Intravenous, PRN    glucagon (human recombinant), 1 mg, Intramuscular, PRN    glucose, 16 g, Oral, PRN    glucose, 24 g, Oral, PRN    naloxone, 0.02 mg, Intravenous, PRN    sodium chloride 0.9%, 10 mL, Intravenous, Q12H PRN    LABS AND DIAGNOSTICS     CBC LAST 3 DAYS  Recent Labs   Lab 10/28/24  2008 10/29/24  1011   WBC 6.21 5.30   RBC 3.68* 3.86*   HGB 9.0* 9.6*   HCT 30.3* 31.8*   MCV 82 82   MCH 24.5* 24.9*   MCHC 29.7* 30.2*   RDW 15.5* 15.8*    178   MPV 9.2 9.7   GRAN 68.7  4.3  --    LYMPH 19.3  1.2  --    MONO 7.4  0.5  --    BASO 0.05  --    NRBC 0  --        COAGULATION LAST 3 DAYS  No results for input(s): "LABPT", "INR", "APTT" in the last 168 hours.    CHEMISTRY LAST 3 DAYS  Recent Labs   Lab 10/28/24  2008   *   K 3.8      CO2 25   ANIONGAP 5*   BUN 12   CREATININE 0.6   GLU 87   CALCIUM 8.6*   MG 1.9   ALBUMIN 3.4*   PROT 6.9   ALKPHOS 125   ALT 11   AST 15   BILITOT 0.5       ENDOCRINE LAST 3 DAYS  No results for input(s): "TSH", "PROCAL" in the last 168 hours.    LAST ARTERIAL BLOOD GAS  ABG  No results " "for input(s): "PH", "PO2", "PCO2", "HCO3", "BE" in the last 168 hours.    LAST 7 DAYS MICROBIOLOGY   Microbiology Results (last 7 days)       Procedure Component Value Units Date/Time    MRSA Screen by PCR [[id number]]  (Abnormal) Collected: 10/29/24 0212    Order Status: Completed Specimen: Nasal Swab Updated: 10/29/24 0412     MRSA SCREEN BY PCR Positive     Comment: MRSA critical result(s) called and verbal readback obtained from   Kika Naranjo RN on 1200 wing by KS3 10/29/2024 04:12         Narrative:      MRSA critical result(s) called and verbal readback obtained from   Kika Naranjo RN on 1200 wing by KS3 10/29/2024 04:12            MOST RECENT IMAGING  X-Ray Chest AP Portable  Narrative: EXAMINATION:  XR CHEST AP PORTABLE    CLINICAL HISTORY:  Chest Pain;    TECHNIQUE:  Single frontal view of the chest was performed.    COMPARISON:  06/21/2024    FINDINGS:  Lungs are clear. No focal consolidation. No pleural effusion. No pneumothorax. Normal heart size.  Impression: No acute findings.    Electronically signed by: Jason Burgos  Date:    10/29/2024  Time:    05:34      CURRENT/PREVIOUS VISIT EKG  Results for orders placed or performed during the hospital encounter of 10/28/24   EKG 12-lead    Collection Time: 10/28/24  6:56 PM   Result Value Ref Range    QRS Duration 92 ms    [facility] QTC Calculation 465 ms    Narrative    Test Reason : R07.9,    Vent. Rate : 094 BPM     Atrial Rate : 094 BPM     P-R Int : 140 ms          QRS Dur : 092 ms      QT Int : 372 ms       P-R-T Axes : 072 055 064 degrees     QTc Int : 465 ms    Normal sinus rhythm  Normal ECG  When compared with ECG of 21-JUN-2024 02:33,  No significant change was found    Referred By: [email or web address]   SELF           Confirmed By:        ARNOLDO  No results found for this or any previous visit.      ASSESSMENT/PLAN:     Active Hospital Problems    Diagnosis    Syncope    Uncomplicated opioid dependence    Anemia    History of left hip replacement    " Hepatitis C     STATES DX 10-20. NO S/S. NO FURTHER TESTS OR TX. WITH INSURANCE IN 2021 CAN GET IT EVALUATED.      Cirrhosis of liver       ASSESSMENT & PLAN:   Right leg open wound  Left shin and left 4th toe eschar      RECOMMENDATIONS:  Medihoney + AqAg to the right leg  Left shin and left 4th toe betadine daily    COMPLEXITY:    moderate      Gm Snyder DO  Department of Wound Care  Date of Service: 10/29/2024  1:21 PM

## 2024-10-29 NOTE — PLAN OF CARE
Problem: Skin Injury Risk Increased  Goal: Skin Health and Integrity  Intervention: Promote and Optimize Oral Intake  Flowsheets (Taken 10/29/2024 1434)  Nutrition Interventions: supplemental drinks provided     Problem: Oral Intake Inadequate  Goal: Improved Oral Intake  Intervention: Promote and Optimize Oral Intake  Flowsheets (Taken 10/29/2024 1434)  Nutrition Interventions: supplemental drinks provided

## 2024-10-29 NOTE — ASSESSMENT & PLAN NOTE
Previous diagnosis noted  MELD-Na score calculated; MELD 3.0: 7 at 3/1/2024  4:29 AM  MELD-Na: 7 at 3/1/2024  4:29 AM  Calculated from:  Serum Creatinine: 0.6 mg/dL (Using min of 1 mg/dL) at 3/1/2024  4:29 AM  Serum Sodium: 138 mmol/L (Using max of 137 mmol/L) at 3/1/2024  4:29 AM  Total Bilirubin: 0.4 mg/dL (Using min of 1 mg/dL) at 2/29/2024 10:42 PM  Serum Albumin: 3.9 g/dL (Using max of 3.5 g/dL) at 2/29/2024 10:42 PM  INR(ratio): 1.1 at 2/29/2024 10:42 PM  Age at listing (hypothetical): 63 years  Sex: Male at 3/1/2024  4:29 AM      Anemia possible from cirrhosis.  LFT are stable

## 2024-10-30 LAB
ALBUMIN SERPL BCP-MCNC: 3.5 G/DL (ref 3.5–5.2)
ALP SERPL-CCNC: 129 U/L (ref 55–135)
ALT SERPL W/O P-5'-P-CCNC: 9 U/L (ref 10–44)
ANION GAP SERPL CALC-SCNC: 8 MMOL/L (ref 8–16)
AST SERPL-CCNC: 14 U/L (ref 10–40)
BASOPHILS # BLD AUTO: 0.03 K/UL (ref 0–0.2)
BASOPHILS NFR BLD: 0.6 % (ref 0–1.9)
BILIRUB SERPL-MCNC: 0.4 MG/DL (ref 0.1–1)
BUN SERPL-MCNC: 15 MG/DL (ref 8–23)
CALCIUM SERPL-MCNC: 8.7 MG/DL (ref 8.7–10.5)
CHLORIDE SERPL-SCNC: 104 MMOL/L (ref 95–110)
CO2 SERPL-SCNC: 23 MMOL/L (ref 23–29)
CREAT SERPL-MCNC: 0.6 MG/DL (ref 0.5–1.4)
DIFFERENTIAL METHOD BLD: ABNORMAL
EOSINOPHIL # BLD AUTO: 0.2 K/UL (ref 0–0.5)
EOSINOPHIL NFR BLD: 3.4 % (ref 0–8)
ERYTHROCYTE [DISTWIDTH] IN BLOOD BY AUTOMATED COUNT: 15.7 % (ref 11.5–14.5)
EST. GFR  (NO RACE VARIABLE): >60 ML/MIN/1.73 M^2
GLUCOSE SERPL-MCNC: 84 MG/DL (ref 70–110)
HCT VFR BLD AUTO: 33.1 % (ref 40–54)
HGB BLD-MCNC: 10.1 G/DL (ref 14–18)
IMM GRANULOCYTES # BLD AUTO: 0.01 K/UL (ref 0–0.04)
IMM GRANULOCYTES NFR BLD AUTO: 0.2 % (ref 0–0.5)
LYMPHOCYTES # BLD AUTO: 1 K/UL (ref 1–4.8)
LYMPHOCYTES NFR BLD: 21 % (ref 18–48)
MAGNESIUM SERPL-MCNC: 1.9 MG/DL (ref 1.6–2.6)
MCH RBC QN AUTO: 24.3 PG (ref 27–31)
MCHC RBC AUTO-ENTMCNC: 30.5 G/DL (ref 32–36)
MCV RBC AUTO: 80 FL (ref 82–98)
MONOCYTES # BLD AUTO: 0.3 K/UL (ref 0.3–1)
MONOCYTES NFR BLD: 5.9 % (ref 4–15)
NEUTROPHILS # BLD AUTO: 3.3 K/UL (ref 1.8–7.7)
NEUTROPHILS NFR BLD: 68.9 % (ref 38–73)
NRBC BLD-RTO: 0 /100 WBC
PLATELET # BLD AUTO: 161 K/UL (ref 150–450)
PMV BLD AUTO: 9.6 FL (ref 9.2–12.9)
POTASSIUM SERPL-SCNC: 4.1 MMOL/L (ref 3.5–5.1)
PROT SERPL-MCNC: 7 G/DL (ref 6–8.4)
RBC # BLD AUTO: 4.15 M/UL (ref 4.6–6.2)
SODIUM SERPL-SCNC: 135 MMOL/L (ref 136–145)
WBC # BLD AUTO: 4.76 K/UL (ref 3.9–12.7)

## 2024-10-30 PROCEDURE — G0378 HOSPITAL OBSERVATION PER HR: HCPCS

## 2024-10-30 PROCEDURE — 94761 N-INVAS EAR/PLS OXIMETRY MLT: CPT

## 2024-10-30 PROCEDURE — 96374 THER/PROPH/DIAG INJ IV PUSH: CPT

## 2024-10-30 PROCEDURE — 99223 1ST HOSP IP/OBS HIGH 75: CPT | Mod: ,,, | Performed by: ORTHOPAEDIC SURGERY

## 2024-10-30 PROCEDURE — 25000003 PHARM REV CODE 250: Performed by: INTERNAL MEDICINE

## 2024-10-30 PROCEDURE — 36415 COLL VENOUS BLD VENIPUNCTURE: CPT | Performed by: INTERNAL MEDICINE

## 2024-10-30 PROCEDURE — 63600175 PHARM REV CODE 636 W HCPCS: Performed by: INTERNAL MEDICINE

## 2024-10-30 PROCEDURE — 96375 TX/PRO/DX INJ NEW DRUG ADDON: CPT

## 2024-10-30 PROCEDURE — 85025 COMPLETE CBC W/AUTO DIFF WBC: CPT | Performed by: INTERNAL MEDICINE

## 2024-10-30 PROCEDURE — 80053 COMPREHEN METABOLIC PANEL: CPT | Performed by: INTERNAL MEDICINE

## 2024-10-30 PROCEDURE — 83735 ASSAY OF MAGNESIUM: CPT | Performed by: INTERNAL MEDICINE

## 2024-10-30 PROCEDURE — 96376 TX/PRO/DX INJ SAME DRUG ADON: CPT

## 2024-10-30 RX ORDER — HYDRALAZINE HYDROCHLORIDE 20 MG/ML
5 INJECTION INTRAMUSCULAR; INTRAVENOUS EVERY 6 HOURS PRN
Status: DISCONTINUED | OUTPATIENT
Start: 2024-10-30 | End: 2024-11-01

## 2024-10-30 RX ORDER — GABAPENTIN 300 MG/1
600 CAPSULE ORAL 3 TIMES DAILY
Status: DISCONTINUED | OUTPATIENT
Start: 2024-10-30 | End: 2024-11-03 | Stop reason: HOSPADM

## 2024-10-30 RX ORDER — KETOROLAC TROMETHAMINE 30 MG/ML
15 INJECTION, SOLUTION INTRAMUSCULAR; INTRAVENOUS EVERY 6 HOURS
Status: DISCONTINUED | OUTPATIENT
Start: 2024-10-30 | End: 2024-11-01

## 2024-10-30 RX ORDER — PANTOPRAZOLE SODIUM 40 MG/1
40 TABLET, DELAYED RELEASE ORAL
Status: DISCONTINUED | OUTPATIENT
Start: 2024-10-30 | End: 2024-11-03 | Stop reason: HOSPADM

## 2024-10-30 RX ADMIN — CYCLOBENZAPRINE 10 MG: 10 TABLET, FILM COATED ORAL at 08:10

## 2024-10-30 RX ADMIN — DOXYCYCLINE HYCLATE 100 MG: 100 CAPSULE ORAL at 08:10

## 2024-10-30 RX ADMIN — GABAPENTIN 600 MG: 300 CAPSULE ORAL at 08:10

## 2024-10-30 RX ADMIN — CYCLOBENZAPRINE 10 MG: 10 TABLET, FILM COATED ORAL at 02:10

## 2024-10-30 RX ADMIN — KETOROLAC TROMETHAMINE 15 MG: 30 INJECTION, SOLUTION INTRAMUSCULAR at 05:10

## 2024-10-30 RX ADMIN — AMLODIPINE BESYLATE 5 MG: 5 TABLET ORAL at 09:10

## 2024-10-30 RX ADMIN — LISINOPRIL 40 MG: 20 TABLET ORAL at 09:10

## 2024-10-30 RX ADMIN — DOXYCYCLINE HYCLATE 100 MG: 100 CAPSULE ORAL at 09:10

## 2024-10-30 RX ADMIN — OXYBUTYNIN CHLORIDE 5 MG: 5 TABLET, EXTENDED RELEASE ORAL at 09:10

## 2024-10-30 RX ADMIN — GABAPENTIN 300 MG: 300 CAPSULE ORAL at 09:10

## 2024-10-30 RX ADMIN — IBUPROFEN 400 MG: 400 TABLET, FILM COATED ORAL at 05:10

## 2024-10-30 RX ADMIN — GABAPENTIN 600 MG: 300 CAPSULE ORAL at 02:10

## 2024-10-30 RX ADMIN — TAMSULOSIN HYDROCHLORIDE 0.8 MG: 0.4 CAPSULE ORAL at 09:10

## 2024-10-30 RX ADMIN — CYCLOBENZAPRINE 10 MG: 10 TABLET, FILM COATED ORAL at 09:10

## 2024-10-30 RX ADMIN — PANTOPRAZOLE SODIUM 40 MG: 40 TABLET, DELAYED RELEASE ORAL at 04:10

## 2024-10-30 RX ADMIN — FERROUS SULFATE TAB 325 MG (65 MG ELEMENTAL FE) 1 EACH: 325 (65 FE) TAB at 09:10

## 2024-10-30 RX ADMIN — HYDRALAZINE HYDROCHLORIDE 5 MG: 20 INJECTION INTRAMUSCULAR; INTRAVENOUS at 12:10

## 2024-10-30 RX ADMIN — ACETAMINOPHEN 1000 MG: 500 TABLET, FILM COATED ORAL at 12:10

## 2024-10-30 RX ADMIN — KETOROLAC TROMETHAMINE 15 MG: 30 INJECTION, SOLUTION INTRAMUSCULAR at 11:10

## 2024-10-30 RX ADMIN — ACETAMINOPHEN 1000 MG: 500 TABLET, FILM COATED ORAL at 09:10

## 2024-10-30 RX ADMIN — ACETAMINOPHEN 1000 MG: 500 TABLET, FILM COATED ORAL at 08:10

## 2024-10-30 RX ADMIN — PANTOPRAZOLE SODIUM 40 MG: 40 TABLET, DELAYED RELEASE ORAL at 05:10

## 2024-10-30 NOTE — ASSESSMENT & PLAN NOTE
Fall/syncope/drug toxicity    Plan   Previous recent carotid ultrasound negative  Echocardiogram without significant valvular disease   Telemetry without critical arrhythmia  PT and OT

## 2024-10-30 NOTE — CONSULTS
[facility]  Orthopedics  Consult Note    Patient Name: Omar Trotter  MRN: [id number]  Admission Date: 10/28/2024  Hospital Length of Stay: 0 days  Attending Provider: Marco A Torres MD  Primary Care Provider: Cam Orozco MD    Patient information was obtained from patient, past medical records, and ER records.     Inpatient consult to Orthopedic Surgery  Consult performed by: Roni Su MD  Consult ordered by: Marco A Torres MD  Reason for consult: L hip fx      Subjective:     Principal Problem:<principal problem not specified>    Chief Complaint:   Chief Complaint   Patient presents with    Loss of Consciousness     Pt arrived by ems, pt states he had a syncopal episode falling to floor, pt is lethargic noted by ems, c collar placed by ems        HPI:    65 yo male with PMH of GERD, HTN, and BPH, previous left hip surgery x3 by Dr Miles Rivas ,UGIB, / esophagitis was admitted from ER with possible syncopal episode and drowsiness.   His previous re toxicology study positive for benzo and others but today positive for amphetamine   Patient can not tell what time he fell from the bed but he asked me to look at EMS note.   He states to ER MD  he woke up on his living room floor. He is uncertain how he got on the floor.  He does not remember falling.  As per  ER MD , deny  headache or neck pain, no incontinence, seizures..no abdominal pain. Reported someone  came to check on him and he appeared drowsy so he activated EMS.   On examination he denied having chest pain, shortness for breath but he appeared very drowsy.  Vitals stable and labs are stable with chronic anemia.  CT head negative and chest x-ray did not show any acute problem.  He denied using substances but RN reported he was on Suboxone in the past and  opiate dependence noted in the past history.    Past Medical History:   Diagnosis Date    JACKLYN (acute kidney injury) 11/11/2022    Arthritis     GERD  (gastroesophageal reflux disease)     Hepatitis C     STATES DX 10-20. NO S/S. NO FURTHER TESTS OR TX. WITH INSURANCE IN 2021 CAN GET IT EVALUATED.    History of left hip replacement 05/20/2021    History of MRSA infection     History of right shoulder replacement 01/15/2021    Hypertension     Wears glasses        Past Surgical History:   Procedure Laterality Date    APPENDECTOMY      ARTHROPLASTY OF SHOULDER Right 1/13/2021    Procedure: ARTHROPLASTY, SHOULDER TOTAL;  Surgeon: Emilio Siddiqui MD;  Location: [facility] OR;  Service: Orthopedics;  Laterality: Right;  GENERAL AND BLOCK    COLONOSCOPY N/A 5/8/2023    Procedure: COLONOSCOPY;  Surgeon: Bobby Bourgeois MD;  Location: [facility];  Service: Endoscopy;  Laterality: N/A;    ESOPHAGOGASTRODUODENOSCOPY N/A 5/7/2023    Procedure: EGD (ESOPHAGOGASTRODUODENOSCOPY);  Surgeon: Carloz Christianson Jr., MD;  Location: [facility];  Service: Endoscopy;  Laterality: N/A;    ESOPHAGOGASTRODUODENOSCOPY N/A 3/1/2024    Procedure: EGD (ESOPHAGOGASTRODUODENOSCOPY);  Surgeon: Femi Hyde MD;  Location: [facility];  Service: Endoscopy;  Laterality: N/A;    HERNIA REPAIR      HIP REPLACEMENT ARTHROPLASTY Left 3/24/2021    Procedure: ARTHROPLASTY, HIP REPLACEMENT;  Surgeon: Miles Rivas II, MD;  Location: [facility] OR;  Service: Orthopedics;  Laterality: Left;    JOINT REPLACEMENT Right     shoulder    REVISION TOTAL HIP ARTHROPLASTY Left 6/2/2021    Procedure: REVISION, TOTAL ARTHROPLASTY, HIP;  Surgeon: Miles Rivas II, MD;  Location: [facility] OR;  Service: Orthopedics;  Laterality: Left;    SMALL BOWEL ENTEROSCOPY Left 5/8/2023    Procedure: ENTEROSCOPY;  Surgeon: Bobby Bourgeois MD;  Location: [facility];  Service: Endoscopy;  Laterality: Left;       Review of patient's allergies indicates:   Allergen Reactions    Pcn [penicillins]      As a child.       Current Facility-Administered Medications   Medication    acetaminophen tablet 1,000 mg    amLODIPine  tablet 5 mg    cyclobenzaprine tablet 10 mg    dextrose 50% injection 12.5 g    dextrose 50% injection 25 g    doxycycline capsule 100 mg    ferrous sulfate tablet 1 each    gabapentin capsule 300 mg    glucagon (human recombinant) injection 1 mg    glucose chewable tablet 16 g    glucose chewable tablet 24 g    hydrALAZINE injection 5 mg    ibuprofen tablet 400 mg    lisinopriL tablet 40 mg    naloxone 0.4 mg/mL injection 0.02 mg    oxybutynin 24 hr tablet 5 mg    pantoprazole EC tablet 40 mg    sodium chloride 0.9% flush 10 mL    tamsulosin 24 hr capsule 0.8 mg     Family History       Problem Relation (Age of Onset)    Cancer Mother          Tobacco Use    Smoking status: Every Day     Current packs/day: 0.50     Average packs/day: 0.5 packs/day for 30.0 years (15.0 ttl pk-yrs)     Types: Cigarettes     Passive exposure: Current    Smokeless tobacco: Never   Substance and Sexual Activity    Alcohol use: Never    Drug use: Never    Sexual activity: Yes     Partners: Female     Review of Systems   Constitutional: Negative for chills and fever.   HENT:  Negative for congestion.    Eyes:  Negative for blurred vision.   Cardiovascular:  Negative for chest pain and syncope.   Respiratory:  Negative for cough and shortness of breath.    Endocrine: Negative for polyuria.   Hematologic/Lymphatic: Negative for bleeding problem. Does not bruise/bleed easily.   Skin:  Negative for rash.   Musculoskeletal:  Positive for falls, joint pain and joint swelling. Negative for muscle cramps, muscle weakness and myalgias.   Gastrointestinal:  Negative for abdominal pain, nausea and vomiting.   Genitourinary:  Negative for flank pain.   Neurological:  Negative for numbness and seizures.   Psychiatric/Behavioral:  Negative for altered mental status.    Allergic/Immunologic: Negative for persistent infections.     Objective:     Vital Signs (Most Recent):  Temp: 97.8 °F (36.6 °C) (10/30/24 0718)  Pulse: 98  "(10/30/24 0718)  Resp: 18 (10/30/24 0718)  BP: (!) 150/72 (10/30/24 0718)  SpO2: 98 % (10/30/24 0718) Vital Signs (24h Range):  Temp:  [97.5 °F (36.4 °C)-98.4 °F (36.9 °C)] 97.8 °F (36.6 °C)  Pulse:  [78-98] 98  Resp:  [18-19] 18  SpO2:  [97 %-99 %] 98 %  BP: (131-184)/(63-82) 150/72     Weight: 76.2 kg (167 lb 15.9 oz)  Height: 5' 11" (180.3 cm)  Body mass index is 23.43 kg/m².      Intake/Output Summary (Last 24 hours) at 10/30/2024 1040  Last data filed at 10/30/2024 1039  Gross per 24 hour   Intake 720 ml   Output 2350 ml   Net -1630 ml        General    Nursing note and vitals reviewed.  Constitutional: He is oriented to person, place, and time. He appears well-developed and well-nourished. No distress.   HENT:   Head: Atraumatic.   Eyes: EOM are normal.   Cardiovascular:  Normal rate.            Pulmonary/Chest: Effort normal.   Abdominal: Soft.   Neurological: He is alert and oriented to person, place, and time.   Psychiatric: His behavior is normal.             Right Hip Exam   Right hip exam is normal.   Left Hip Exam     Inspection   Scars: present  Swelling: present  Erythema: absent    Tenderness   The patient tender to palpation of the trochanteric bursa.    Range of Motion   Abduction:  abnormal   Adduction:  abnormal   Extension:  abnormal   Flexion:  abnormal   External rotation:  abnormal   Internal rotation: abnormal     Tests   Log Roll: positive    Other   Sensation: normal          Vascular Exam       Left Pulses  Dorsalis Pedis:      2+             Significant Labs: BMP:   Recent Labs   Lab 10/30/24  0400   GLU 84   *   K 4.1      CO2 23   BUN 15   CREATININE 0.6   CALCIUM 8.7   MG 1.9     CBC:   Recent Labs   Lab 10/28/24  2008 10/29/24  1011 10/30/24  0400   WBC 6.21 5.30 4.76   HGB 9.0* 9.6* 10.1*   HCT 30.3* 31.8* 33.1*    178 161     CMP:   Recent Labs   Lab 10/28/24  2008 10/30/24  0400   * 135*   K 3.8 4.1    104   CO2 25 23   GLU 87 84   BUN 12 15 "   CREATININE 0.6 0.6   CALCIUM 8.6* 8.7   PROT 6.9 7.0   ALBUMIN 3.4* 3.5   BILITOT 0.5 0.4   ALKPHOS 125 129   AST 15 14   ALT 11 9*   ANIONGAP 5* 8     All pertinent labs within the past 24 hours have been reviewed.    Significant Imaging: CT: I have reviewed all pertinent results/findings and my personal findings are:  CT scan of the left hip shows at least a subacute fracture of the acetabulum with some vertical inclination of the cup and some protrusio  X-Ray: I have reviewed all pertinent results/findings and my personal findings are:  Possible left periprosthetic left acetabular hip fracture  Assessment/Plan:     History of left hip replacement  I reviewed the fracture with Dr. Hunter.  We both agree that fracture is non operative at this time.  Recommended toe-touch weight-bearing on the left lower extremity to let the pelvic bone heal.  Patient might need to be revised if he has episodes of dislocation or if the cup position continues to change.        Thank you for your consult. I will follow-up with patient. Please contact us if you have any additional questions.    Roni Su MD  Orthopedics  [facility]

## 2024-10-30 NOTE — ASSESSMENT & PLAN NOTE
I reviewed the fracture with Dr. Hunter.  We both agree that fracture is non operative at this time.  Recommended toe-touch weight-bearing on the left lower extremity to let the pelvic bone heal.  Patient might need to be revised if he has episodes of dislocation or if the cup position continues to change.

## 2024-10-30 NOTE — PT/OT/SLP PROGRESS
Physical Therapy      Patient Name:  Omar Trotter   MRN:  [id number]    Patient not seen today secondary to  (L hip pain. Has ortho consult). Refused PT in AM and PM.  Will follow up on 10/31/2024.

## 2024-10-30 NOTE — ASSESSMENT & PLAN NOTE
CT abdomen in the past with splenomegaly most likely from portal hypertension    He has a history of GI bleed.  No evidence of an active bleed.  Follow hemoglobin and clinically as ketorolac has been scheduled.    PPI increased to twice daily

## 2024-10-30 NOTE — CARE UPDATE
10/29/24 2668   Patient Assessment/Suction   Level of Consciousness (AVPU) alert   Respiratory Effort Normal;Unlabored   Rhythm/Pattern, Respiratory unlabored;pattern regular;no shortness of breath reported   PRE-TX-O2   Device (Oxygen Therapy) room air   SpO2 99 %   Pulse Oximetry Type Intermittent   $ Pulse Oximetry - Multiple Charge Pulse Oximetry - Multiple

## 2024-10-30 NOTE — HPI
65 yo male with PMH of GERD, HTN, and BPH, previous left hip surgery x3 by Dr Miles Rivas ,UGIB, / esophagitis was admitted from ER with possible syncopal episode and drowsiness.   His previous re toxicology study positive for benzo and others but today positive for amphetamine   Patient can not tell what time he fell from the bed but he asked me to look at EMS note.   He states to ER MD  he woke up on his living room floor. He is uncertain how he got on the floor.  He does not remember falling.  As per  ER MD , deny  headache or neck pain, no incontinence, seizures..no abdominal pain. Reported someone  came to check on him and he appeared drowsy so he activated EMS.   On examination he denied having chest pain, shortness for breath but he appeared very drowsy.  Vitals stable and labs are stable with chronic anemia.  CT head negative and chest x-ray did not show any acute problem.  He denied using substances but RN reported he was on Suboxone in the past and  opiate dependence noted in the past history.

## 2024-10-30 NOTE — SUBJECTIVE & OBJECTIVE
Interval History:  Patient continues to have left hip pain.  Unable to work with physical therapy.  This point due to severity of pain.  He does not want narcotics at this point given his history of addiction to narcotics.  No chest pain, shortness of breath or lightheadedness.  Tolerating oral intake    Review of Systems  Objective:     Vital Signs (Most Recent):  Temp: 98 °F (36.7 °C) (10/30/24 1616)  Pulse: 77 (10/30/24 1616)  Resp: 18 (10/30/24 1616)  BP: 115/65 (10/30/24 1616)  SpO2: 98 % (10/30/24 1616) Vital Signs (24h Range):  Temp:  [97.5 °F (36.4 °C)-98.4 °F (36.9 °C)] 98 °F (36.7 °C)  Pulse:  [77-98] 77  Resp:  [17-18] 18  SpO2:  [95 %-99 %] 98 %  BP: (115-184)/(65-82) 115/65     Weight: 76.2 kg (167 lb 15.9 oz)  Body mass index is 23.43 kg/m².    Intake/Output Summary (Last 24 hours) at 10/30/2024 1652  Last data filed at 10/30/2024 1617  Gross per 24 hour   Intake 480 ml   Output 2325 ml   Net -1845 ml         Physical Exam  Constitutional:       Appearance: Normal appearance.   HENT:      Head: Normocephalic.   Eyes:      Extraocular Movements: Extraocular movements intact.   Cardiovascular:      Rate and Rhythm: Normal rate and regular rhythm.   Pulmonary:      Effort: Pulmonary effort is normal. No respiratory distress.      Breath sounds: No wheezing or rales.   Abdominal:      General: Bowel sounds are normal. There is no distension.      Palpations: Abdomen is soft.      Tenderness: There is no abdominal tenderness.   Musculoskeletal:         General: No swelling, tenderness or deformity.      Cervical back: Normal range of motion.   Neurological:      Mental Status: He is alert.           10/29 exam: Pain present with passive range of motion of left hip   Significant Labs: All pertinent labs within the past 24 hours have been reviewed.  CBC:   Recent Labs   Lab 10/28/24  2008 10/29/24  1011 10/30/24  0400   WBC 6.21 5.30 4.76   HGB 9.0* 9.6* 10.1*   HCT 30.3* 31.8* 33.1*    178 161      CMP:   Recent Labs   Lab 10/28/24  2008 10/30/24  0400   * 135*   K 3.8 4.1    104   CO2 25 23   GLU 87 84   BUN 12 15   CREATININE 0.6 0.6   CALCIUM 8.6* 8.7   PROT 6.9 7.0   ALBUMIN 3.4* 3.5   BILITOT 0.5 0.4   ALKPHOS 125 129   AST 15 14   ALT 11 9*   ANIONGAP 5* 8     Magnesium:   Recent Labs   Lab 10/28/24  2008 10/30/24  0400   MG 1.9 1.9       Significant Imaging: I have reviewed all pertinent imaging results/findings within the past 24 hours.

## 2024-10-30 NOTE — ASSESSMENT & PLAN NOTE
Fracture identified on CT.  Orthopedic surgery consulted.  Toe-touch weight-bearing status recommended.  Scheduling Tylenol, gabapentin and ketorolac.  Given history of GI bleed, increasing PPI to twice daily.  Follow for evidence of GI bleed while on ketorolac.   PNEUMONIA    MEDICATIONS:  Â· See Medication List (bring to your doctor appointments). Â· Be sure to take the entire course of any prescribed medications, even if you do not feel sick any longer. VACCINES:  Â·   Â· There is no immunization history on file for this patient. ACTIVITY:  Â· Get plenty of rest.  Â· Keep active and rest in between. Â· Coughing should not be suppressed, since this is an important reflex to clear your lungs. Remember to cover your mouth when coughing to prevent spreading the pneumonia. Â· Wash your hands with soap and water to protect others from infection. Â· Weigh yourself daily (first thing in the morning, with same amount of clothes on). SMOKING:  Â· Avoid all tobacco products and secondhand smoke. Â· Smoking damages your lungs natural defenses against lung infections. Â· Smoking Cessation Counseling offered. Â· [place] Toll Free Quit Line: [phone]    DIET:  Â· Drink lots of fluids, especially water, unless otherwise instructed by your doctor. Liquids keep you from becoming dehydrated and help loosen mucus in your lungs. Â· Eat a well-balanced diet that includes fruits, vegetables and whole grains and limit salt, fat/cholesterol. Â· If you have trouble breathing, are short of breath or have dark or bluish fingernails - CALL 911. CONTACT YOUR DOCTOR IF YOU:  Â· Are having chest pain that is not relieved by non-prescription pain relievers. Â· Have bloody sputum, nausea, vomiting or diarrhea. Â· Have problems that may be related to your medicine. Â· Continue to have a fever and chills or feel worse after two days. Â· Gain more than 3 pounds in 2 days.

## 2024-10-30 NOTE — ASSESSMENT & PLAN NOTE
History aware  Was told patient was on Suboxone in the past  Currently he does not want narcotics for his pain.

## 2024-10-30 NOTE — SUBJECTIVE & OBJECTIVE
Past Medical History:   Diagnosis Date    JACKLYN (acute kidney injury) 11/11/2022    Arthritis     GERD (gastroesophageal reflux disease)     Hepatitis C     STATES DX 10-20. NO S/S. NO FURTHER TESTS OR TX. WITH INSURANCE IN 2021 CAN GET IT EVALUATED.    History of left hip replacement 05/20/2021    History of MRSA infection     History of right shoulder replacement 01/15/2021    Hypertension     Wears glasses        Past Surgical History:   Procedure Laterality Date    APPENDECTOMY      ARTHROPLASTY OF SHOULDER Right 1/13/2021    Procedure: ARTHROPLASTY, SHOULDER TOTAL;  Surgeon: Emilio Siddiqui MD;  Location: [facility] OR;  Service: Orthopedics;  Laterality: Right;  GENERAL AND BLOCK    COLONOSCOPY N/A 5/8/2023    Procedure: COLONOSCOPY;  Surgeon: Bobby Bourgeois MD;  Location: [facility];  Service: Endoscopy;  Laterality: N/A;    ESOPHAGOGASTRODUODENOSCOPY N/A 5/7/2023    Procedure: EGD (ESOPHAGOGASTRODUODENOSCOPY);  Surgeon: Carloz Christianson Jr., MD;  Location: [facility];  Service: Endoscopy;  Laterality: N/A;    ESOPHAGOGASTRODUODENOSCOPY N/A 3/1/2024    Procedure: EGD (ESOPHAGOGASTRODUODENOSCOPY);  Surgeon: Femi Hyde MD;  Location: [facility];  Service: Endoscopy;  Laterality: N/A;    HERNIA REPAIR      HIP REPLACEMENT ARTHROPLASTY Left 3/24/2021    Procedure: ARTHROPLASTY, HIP REPLACEMENT;  Surgeon: Miles Rivas II, MD;  Location: [facility] OR;  Service: Orthopedics;  Laterality: Left;    JOINT REPLACEMENT Right     shoulder    REVISION TOTAL HIP ARTHROPLASTY Left 6/2/2021    Procedure: REVISION, TOTAL ARTHROPLASTY, HIP;  Surgeon: Miles Rivas II, MD;  Location: [facility] OR;  Service: Orthopedics;  Laterality: Left;    SMALL BOWEL ENTEROSCOPY Left 5/8/2023    Procedure: ENTEROSCOPY;  Surgeon: Bobby Bourgeois MD;  Location: [facility];  Service: Endoscopy;  Laterality: Left;       Review of patient's allergies indicates:   Allergen Reactions    Pcn [penicillins]      As a child.       Current  Facility-Administered Medications   Medication    acetaminophen tablet 1,000 mg    amLODIPine tablet 5 mg    cyclobenzaprine tablet 10 mg    dextrose 50% injection 12.5 g    dextrose 50% injection 25 g    doxycycline capsule 100 mg    ferrous sulfate tablet 1 each    gabapentin capsule 300 mg    glucagon (human recombinant) injection 1 mg    glucose chewable tablet 16 g    glucose chewable tablet 24 g    hydrALAZINE injection 5 mg    ibuprofen tablet 400 mg    lisinopriL tablet 40 mg    naloxone 0.4 mg/mL injection 0.02 mg    oxybutynin 24 hr tablet 5 mg    pantoprazole EC tablet 40 mg    sodium chloride 0.9% flush 10 mL    tamsulosin 24 hr capsule 0.8 mg     Family History       Problem Relation (Age of Onset)    Cancer Mother          Tobacco Use    Smoking status: Every Day     Current packs/day: 0.50     Average packs/day: 0.5 packs/day for 30.0 years (15.0 ttl pk-yrs)     Types: Cigarettes     Passive exposure: Current    Smokeless tobacco: Never   Substance and Sexual Activity    Alcohol use: Never    Drug use: Never    Sexual activity: Yes     Partners: Female     Review of Systems   Constitutional: Negative for chills and fever.   HENT:  Negative for congestion.    Eyes:  Negative for blurred vision.   Cardiovascular:  Negative for chest pain and syncope.   Respiratory:  Negative for cough and shortness of breath.    Endocrine: Negative for polyuria.   Hematologic/Lymphatic: Negative for bleeding problem. Does not bruise/bleed easily.   Skin:  Negative for rash.   Musculoskeletal:  Positive for falls, joint pain and joint swelling. Negative for muscle cramps, muscle weakness and myalgias.   Gastrointestinal:  Negative for abdominal pain, nausea and vomiting.   Genitourinary:  Negative for flank pain.   Neurological:  Negative for numbness and seizures.   Psychiatric/Behavioral:  Negative for altered mental status.    Allergic/Immunologic: Negative for persistent infections.     Objective:     Vital Signs  "(Most Recent):  Temp: 97.8 °F (36.6 °C) (10/30/24 0718)  Pulse: 98 (10/30/24 0718)  Resp: 18 (10/30/24 0718)  BP: (!) 150/72 (10/30/24 0718)  SpO2: 98 % (10/30/24 0718) Vital Signs (24h Range):  Temp:  [97.5 °F (36.4 °C)-98.4 °F (36.9 °C)] 97.8 °F (36.6 °C)  Pulse:  [78-98] 98  Resp:  [18-19] 18  SpO2:  [97 %-99 %] 98 %  BP: (131-184)/(63-82) 150/72     Weight: 76.2 kg (167 lb 15.9 oz)  Height: 5' 11" (180.3 cm)  Body mass index is 23.43 kg/m².      Intake/Output Summary (Last 24 hours) at 10/30/2024 1040  Last data filed at 10/30/2024 1039  Gross per 24 hour   Intake 720 ml   Output 2350 ml   Net -1630 ml        General    Nursing note and vitals reviewed.  Constitutional: He is oriented to person, place, and time. He appears well-developed and well-nourished. No distress.   HENT:   Head: Atraumatic.   Eyes: EOM are normal.   Cardiovascular:  Normal rate.            Pulmonary/Chest: Effort normal.   Abdominal: Soft.   Neurological: He is alert and oriented to person, place, and time.   Psychiatric: His behavior is normal.             Right Hip Exam   Right hip exam is normal.   Left Hip Exam     Inspection   Scars: present  Swelling: present  Erythema: absent    Tenderness   The patient tender to palpation of the trochanteric bursa.    Range of Motion   Abduction:  abnormal   Adduction:  abnormal   Extension:  abnormal   Flexion:  abnormal   External rotation:  abnormal   Internal rotation: abnormal     Tests   Log Roll: positive    Other   Sensation: normal          Vascular Exam       Left Pulses  Dorsalis Pedis:      2+             Significant Labs: BMP:   Recent Labs   Lab 10/30/24  0400   GLU 84   *   K 4.1      CO2 23   BUN 15   CREATININE 0.6   CALCIUM 8.7   MG 1.9     CBC:   Recent Labs   Lab 10/28/24  2008 10/29/24  1011 10/30/24  0400   WBC 6.21 5.30 4.76   HGB 9.0* 9.6* 10.1*   HCT 30.3* 31.8* 33.1*    178 161     CMP:   Recent Labs   Lab 10/28/24  2008 10/30/24  0400   * 135* "   K 3.8 4.1    104   CO2 25 23   GLU 87 84   BUN 12 15   CREATININE 0.6 0.6   CALCIUM 8.6* 8.7   PROT 6.9 7.0   ALBUMIN 3.4* 3.5   BILITOT 0.5 0.4   ALKPHOS 125 129   AST 15 14   ALT 11 9*   ANIONGAP 5* 8     All pertinent labs within the past 24 hours have been reviewed.    Significant Imaging: CT: I have reviewed all pertinent results/findings and my personal findings are:  CT scan of the left hip shows at least a subacute fracture of the acetabulum with some vertical inclination of the cup and some protrusio  X-Ray: I have reviewed all pertinent results/findings and my personal findings are:  Possible left periprosthetic left acetabular hip fracture

## 2024-10-30 NOTE — PROGRESS NOTES
[facility]  Progress Note    Patient Name: Omar Trotter  MRN: [id number]  Patient Class: OP- Observation   Admission Date: 10/28/2024  Length of Stay: 0 days  Attending Physician: Marco A Torres MD  Primary Care Provider: Cam Orozco MD        Subjective:     Principal Problem:<principal problem not specified>        HPI:   Please note that pt was uncooperative during the example. He was sleepy and wanted to be left alone. Questions were repeated multiple times for him to answer.    ( This is a note from the physician from previous admission.  Placed here because of I encountered the same saturation)     65 yo male with PMH of GERD, HTN, and BPH, previous hip surgery ,UGIB, / esophagitis was admitted from ER with possible syncopal episode and drowsiness.   His previous re toxicology study positive for benzo and others but today positive for amphetamine   Patient can not tell what time he fell from the bed but he asked me to look at EMS note.   He states to ER MD  he woke up on his living room floor. He is uncertain how he got on the floor.  He does not remember falling.  As per  ER MD , deny  headache or neck pain, no incontinence, seizures..no abdominal pain. Reported someone  came to check on him and he appeared drowsy so he activated EMS.   On examination he denied having chest pain, shortness for breath but he appeared very drowsy.  Vitals stable and labs are stable with chronic anemia.  CT head negative and chest x-ray did not show any acute problem.  He denied using substances but RN reported he was on Suboxone in the past and  opiate dependence noted in the past history.      Overview/Hospital Course:  No notes on file    Interval History:  Patient continues to have left hip pain.  Unable to work with physical therapy.  This point due to severity of pain.  He does not want narcotics at this point given his history of addiction to narcotics.  No chest pain,  shortness of breath or lightheadedness.  Tolerating oral intake    Review of Systems  Objective:     Vital Signs (Most Recent):  Temp: 98 °F (36.7 °C) (10/30/24 1616)  Pulse: 77 (10/30/24 1616)  Resp: 18 (10/30/24 1616)  BP: 115/65 (10/30/24 1616)  SpO2: 98 % (10/30/24 1616) Vital Signs (24h Range):  Temp:  [97.5 °F (36.4 °C)-98.4 °F (36.9 °C)] 98 °F (36.7 °C)  Pulse:  [77-98] 77  Resp:  [17-18] 18  SpO2:  [95 %-99 %] 98 %  BP: (115-184)/(65-82) 115/65     Weight: 76.2 kg (167 lb 15.9 oz)  Body mass index is 23.43 kg/m².    Intake/Output Summary (Last 24 hours) at 10/30/2024 1652  Last data filed at 10/30/2024 1617  Gross per 24 hour   Intake 480 ml   Output 2325 ml   Net -1845 ml         Physical Exam  Constitutional:       Appearance: Normal appearance.   HENT:      Head: Normocephalic.   Eyes:      Extraocular Movements: Extraocular movements intact.   Cardiovascular:      Rate and Rhythm: Normal rate and regular rhythm.   Pulmonary:      Effort: Pulmonary effort is normal. No respiratory distress.      Breath sounds: No wheezing or rales.   Abdominal:      General: Bowel sounds are normal. There is no distension.      Palpations: Abdomen is soft.      Tenderness: There is no abdominal tenderness.   Musculoskeletal:         General: No swelling, tenderness or deformity.      Cervical back: Normal range of motion.   Neurological:      Mental Status: He is alert.           10/29 exam: Pain present with passive range of motion of left hip   Significant Labs: All pertinent labs within the past 24 hours have been reviewed.  CBC:   Recent Labs   Lab 10/28/24  2008 10/29/24  1011 10/30/24  0400   WBC 6.21 5.30 4.76   HGB 9.0* 9.6* 10.1*   HCT 30.3* 31.8* 33.1*    178 161     CMP:   Recent Labs   Lab 10/28/24  2008 10/30/24  0400   * 135*   K 3.8 4.1    104   CO2 25 23   GLU 87 84   BUN 12 15   CREATININE 0.6 0.6   CALCIUM 8.6* 8.7   PROT 6.9 7.0   ALBUMIN 3.4* 3.5   BILITOT 0.5 0.4   ALKPHOS 125 129    AST 15 14   ALT 11 9*   ANIONGAP 5* 8     Magnesium:   Recent Labs   Lab 10/28/24  2008 10/30/24  0400   MG 1.9 1.9       Significant Imaging: I have reviewed all pertinent imaging results/findings within the past 24 hours.    Assessment/Plan:      Syncope  Fall/syncope/drug toxicity    Plan   Previous recent carotid ultrasound negative  Echocardiogram without significant valvular disease   Telemetry without critical arrhythmia  PT and OT         Uncomplicated opioid dependence  History aware  Was told patient was on Suboxone in the past  Currently he does not want narcotics for his pain.      Anemia    CT abdomen in the past with splenomegaly most likely from portal hypertension    He has a history of GI bleed.  No evidence of an active bleed.  Follow hemoglobin and clinically as ketorolac has been scheduled.    PPI increased to twice daily    History of left hip replacement    Fracture identified on CT.  Orthopedic surgery consulted.  Toe-touch weight-bearing status recommended.  Scheduling Tylenol, gabapentin and ketorolac.  Given history of GI bleed, increasing PPI to twice daily.  Follow for evidence of GI bleed while on ketorolac.    Cirrhosis of liver  Previous diagnosis noted  MELD-Na score calculated; MELD 3.0: 7 at 3/1/2024  4:29 AM  MELD-Na: 7 at 3/1/2024  4:29 AM  Calculated from:  Serum Creatinine: 0.6 mg/dL (Using min of 1 mg/dL) at 3/1/2024  4:29 AM  Serum Sodium: 138 mmol/L (Using max of 137 mmol/L) at 3/1/2024  4:29 AM  Total Bilirubin: 0.4 mg/dL (Using min of 1 mg/dL) at 2/29/2024 10:42 PM  Serum Albumin: 3.9 g/dL (Using max of 3.5 g/dL) at 2/29/2024 10:42 PM  INR(ratio): 1.1 at 2/29/2024 10:42 PM  Age at listing (hypothetical): 63 years  Sex: Male at 3/1/2024  4:29 AM      Anemia possible from cirrhosis.  LFT are stable    Hepatitis C  History noted        VTE Risk Mitigation (From admission, onward)           Ordered     IP VTE HIGH RISK PATIENT  Once         10/29/24 0008     Place sequential  compression device  Until discontinued         10/29/24 0008                    Discharge Planning   NATALIA: 10/31/2024     Code Status: Full Code   Is the patient medically ready for discharge?:     Reason for patient still in hospital (select all that apply): Patient trending condition  Discharge Plan A: Home with family                  Marco A Torres MD  Department of Hospital Medicine   [facility]

## 2024-10-30 NOTE — HOSPITAL COURSE
New onset left hip pain with possible subacute acetabulum fracture around his total hip arthroplasty.  Fracture is non operative.  We will make the patient toe-touch weight-bearing and let the pelvis heal.  Might need to be revised if patient has instability or continued pain.

## 2024-10-30 NOTE — RESPIRATORY THERAPY
10/30/24 1022   Patient Assessment/Suction   Level of Consciousness (AVPU) alert   Respiratory Effort Normal;Unlabored   PRE-TX-O2   Device (Oxygen Therapy) room air   SpO2 95 %   Pulse Oximetry Type Intermittent   $ Pulse Oximetry - Multiple Charge Pulse Oximetry - Multiple   Pulse 97   Resp 17

## 2024-10-31 LAB
ANION GAP SERPL CALC-SCNC: 4 MMOL/L (ref 8–16)
BASOPHILS # BLD AUTO: 0.03 K/UL (ref 0–0.2)
BASOPHILS NFR BLD: 0.8 % (ref 0–1.9)
BUN SERPL-MCNC: 17 MG/DL (ref 8–23)
CALCIUM SERPL-MCNC: 8.8 MG/DL (ref 8.7–10.5)
CHLORIDE SERPL-SCNC: 105 MMOL/L (ref 95–110)
CO2 SERPL-SCNC: 28 MMOL/L (ref 23–29)
CREAT SERPL-MCNC: 0.7 MG/DL (ref 0.5–1.4)
DIFFERENTIAL METHOD BLD: ABNORMAL
EOSINOPHIL # BLD AUTO: 0.2 K/UL (ref 0–0.5)
EOSINOPHIL NFR BLD: 4.2 % (ref 0–8)
ERYTHROCYTE [DISTWIDTH] IN BLOOD BY AUTOMATED COUNT: 15.7 % (ref 11.5–14.5)
EST. GFR  (NO RACE VARIABLE): >60 ML/MIN/1.73 M^2
GLUCOSE SERPL-MCNC: 84 MG/DL (ref 70–110)
HCT VFR BLD AUTO: 33.8 % (ref 40–54)
HGB BLD-MCNC: 10 G/DL (ref 14–18)
IMM GRANULOCYTES # BLD AUTO: 0 K/UL (ref 0–0.04)
IMM GRANULOCYTES NFR BLD AUTO: 0 % (ref 0–0.5)
LYMPHOCYTES # BLD AUTO: 1.2 K/UL (ref 1–4.8)
LYMPHOCYTES NFR BLD: 33.3 % (ref 18–48)
MAGNESIUM SERPL-MCNC: 2 MG/DL (ref 1.6–2.6)
MCH RBC QN AUTO: 24 PG (ref 27–31)
MCHC RBC AUTO-ENTMCNC: 29.6 G/DL (ref 32–36)
MCV RBC AUTO: 81 FL (ref 82–98)
MONOCYTES # BLD AUTO: 0.3 K/UL (ref 0.3–1)
MONOCYTES NFR BLD: 7.6 % (ref 4–15)
NEUTROPHILS # BLD AUTO: 1.9 K/UL (ref 1.8–7.7)
NEUTROPHILS NFR BLD: 54.1 % (ref 38–73)
NRBC BLD-RTO: 0 /100 WBC
PLATELET # BLD AUTO: 152 K/UL (ref 150–450)
PMV BLD AUTO: 9.3 FL (ref 9.2–12.9)
POTASSIUM SERPL-SCNC: 4.6 MMOL/L (ref 3.5–5.1)
RBC # BLD AUTO: 4.16 M/UL (ref 4.6–6.2)
SODIUM SERPL-SCNC: 137 MMOL/L (ref 136–145)
WBC # BLD AUTO: 3.57 K/UL (ref 3.9–12.7)

## 2024-10-31 PROCEDURE — 63600175 PHARM REV CODE 636 W HCPCS: Performed by: INTERNAL MEDICINE

## 2024-10-31 PROCEDURE — 80048 BASIC METABOLIC PNL TOTAL CA: CPT | Performed by: INTERNAL MEDICINE

## 2024-10-31 PROCEDURE — 85025 COMPLETE CBC W/AUTO DIFF WBC: CPT | Performed by: INTERNAL MEDICINE

## 2024-10-31 PROCEDURE — 27000207 HC ISOLATION

## 2024-10-31 PROCEDURE — 25000003 PHARM REV CODE 250: Performed by: INTERNAL MEDICINE

## 2024-10-31 PROCEDURE — 36415 COLL VENOUS BLD VENIPUNCTURE: CPT | Performed by: INTERNAL MEDICINE

## 2024-10-31 PROCEDURE — 99232 SBSQ HOSP IP/OBS MODERATE 35: CPT | Mod: ,,, | Performed by: ORTHOPAEDIC SURGERY

## 2024-10-31 PROCEDURE — 12000002 HC ACUTE/MED SURGE SEMI-PRIVATE ROOM

## 2024-10-31 PROCEDURE — 97116 GAIT TRAINING THERAPY: CPT

## 2024-10-31 PROCEDURE — 96376 TX/PRO/DX INJ SAME DRUG ADON: CPT

## 2024-10-31 PROCEDURE — 83735 ASSAY OF MAGNESIUM: CPT | Performed by: INTERNAL MEDICINE

## 2024-10-31 RX ORDER — AMOXICILLIN 250 MG
1 CAPSULE ORAL 2 TIMES DAILY PRN
Status: DISCONTINUED | OUTPATIENT
Start: 2024-10-31 | End: 2024-11-03 | Stop reason: HOSPADM

## 2024-10-31 RX ORDER — AMLODIPINE BESYLATE 5 MG/1
5 TABLET ORAL NIGHTLY
Status: DISCONTINUED | OUTPATIENT
Start: 2024-11-01 | End: 2024-11-03 | Stop reason: HOSPADM

## 2024-10-31 RX ADMIN — PANTOPRAZOLE SODIUM 40 MG: 40 TABLET, DELAYED RELEASE ORAL at 06:10

## 2024-10-31 RX ADMIN — PANTOPRAZOLE SODIUM 40 MG: 40 TABLET, DELAYED RELEASE ORAL at 03:10

## 2024-10-31 RX ADMIN — CYCLOBENZAPRINE 10 MG: 10 TABLET, FILM COATED ORAL at 09:10

## 2024-10-31 RX ADMIN — GABAPENTIN 600 MG: 300 CAPSULE ORAL at 09:10

## 2024-10-31 RX ADMIN — FERROUS SULFATE TAB 325 MG (65 MG ELEMENTAL FE) 1 EACH: 325 (65 FE) TAB at 08:10

## 2024-10-31 RX ADMIN — CYCLOBENZAPRINE 10 MG: 10 TABLET, FILM COATED ORAL at 03:10

## 2024-10-31 RX ADMIN — GABAPENTIN 600 MG: 300 CAPSULE ORAL at 08:10

## 2024-10-31 RX ADMIN — CYCLOBENZAPRINE 10 MG: 10 TABLET, FILM COATED ORAL at 08:10

## 2024-10-31 RX ADMIN — KETOROLAC TROMETHAMINE 15 MG: 30 INJECTION, SOLUTION INTRAMUSCULAR at 05:10

## 2024-10-31 RX ADMIN — ACETAMINOPHEN 1000 MG: 500 TABLET, FILM COATED ORAL at 08:10

## 2024-10-31 RX ADMIN — DOXYCYCLINE HYCLATE 100 MG: 100 CAPSULE ORAL at 09:10

## 2024-10-31 RX ADMIN — ACETAMINOPHEN 1000 MG: 500 TABLET, FILM COATED ORAL at 11:10

## 2024-10-31 RX ADMIN — TAMSULOSIN HYDROCHLORIDE 0.8 MG: 0.4 CAPSULE ORAL at 08:10

## 2024-10-31 RX ADMIN — KETOROLAC TROMETHAMINE 15 MG: 30 INJECTION, SOLUTION INTRAMUSCULAR at 11:10

## 2024-10-31 RX ADMIN — OXYBUTYNIN CHLORIDE 5 MG: 5 TABLET, EXTENDED RELEASE ORAL at 08:10

## 2024-10-31 RX ADMIN — LISINOPRIL 40 MG: 20 TABLET ORAL at 08:10

## 2024-10-31 RX ADMIN — GABAPENTIN 600 MG: 300 CAPSULE ORAL at 03:10

## 2024-10-31 RX ADMIN — AMLODIPINE BESYLATE 5 MG: 5 TABLET ORAL at 08:10

## 2024-10-31 RX ADMIN — ACETAMINOPHEN 1000 MG: 500 TABLET, FILM COATED ORAL at 09:10

## 2024-10-31 RX ADMIN — DOXYCYCLINE HYCLATE 100 MG: 100 CAPSULE ORAL at 08:10

## 2024-10-31 NOTE — CONSULTS
Wound care consult completed by Daphne Lima and Dr. Snyder. Wound care team to follow up throughout hospital stay.

## 2024-10-31 NOTE — PROGRESS NOTES
"[facility]  Orthopedics  Progress Note    Patient Name: Omar Trotter  MRN: [id number]  Admission Date: 10/28/2024  Hospital Length of Stay: 0 days  Attending Provider: Marco A Torres MD  Primary Care Provider: Cam Orozco MD    Subjective:     Principal Problem:<principal problem not specified>    Principal Orthopedic Problem:  Left hip pain    Interval History:  Patient's left hip is continued to hurt.  Did have pain prior to this most recent incident.  Fracture did appear subacute.    Review of patient's allergies indicates:   Allergen Reactions    Pcn [penicillins]      As a child.       Current Facility-Administered Medications   Medication    acetaminophen tablet 1,000 mg    amLODIPine tablet 5 mg    cyclobenzaprine tablet 10 mg    dextrose 50% injection 12.5 g    dextrose 50% injection 25 g    doxycycline capsule 100 mg    ferrous sulfate tablet 1 each    gabapentin capsule 600 mg    glucagon (human recombinant) injection 1 mg    glucose chewable tablet 16 g    glucose chewable tablet 24 g    hydrALAZINE injection 5 mg    ketorolac injection 15 mg    lisinopriL tablet 40 mg    naloxone 0.4 mg/mL injection 0.02 mg    oxybutynin 24 hr tablet 5 mg    pantoprazole EC tablet 40 mg    sodium chloride 0.9% flush 10 mL    tamsulosin 24 hr capsule 0.8 mg     Objective:     Vital Signs (Most Recent):  Temp: 97.6 °F (36.4 °C) (10/31/24 0748)  Pulse: 83 (10/31/24 0748)  Resp: 18 (10/31/24 0748)  BP: (!) 182/85 (10/31/24 0748)  SpO2: 96 % (10/31/24 0748) Vital Signs (24h Range):  Temp:  [97.6 °F (36.4 °C)-98 °F (36.7 °C)] 97.6 °F (36.4 °C)  Pulse:  [77-97] 83  Resp:  [17-18] 18  SpO2:  [95 %-98 %] 96 %  BP: (115-182)/(65-85) 182/85     Weight: 76.2 kg (167 lb 15.9 oz)  Height: 5' 11" (180.3 cm)  Body mass index is 23.43 kg/m².      Intake/Output Summary (Last 24 hours) at 10/31/2024 8980  Last data filed at 10/31/2024 0532  Gross per 24 hour   Intake 720 ml   Output 1675 ml   Net -955 ml      " "  General    Nursing note and vitals reviewed.  Constitutional: He is oriented to person, place, and time. He appears well-developed and well-nourished. No distress.   HENT:   Head: Atraumatic.   Eyes: EOM are normal.   Cardiovascular:  Normal rate.            Pulmonary/Chest: Effort normal.   Abdominal: Soft.   Neurological: He is alert and oriented to person, place, and time.   Psychiatric: His behavior is normal.             Right Hip Exam   Right hip exam is normal.   Left Hip Exam     Inspection   Scars: present  Swelling: absent  Erythema: absent    Tenderness   The patient tender to palpation of the trochanteric bursa.    Range of Motion   Extension:  abnormal   Flexion:  abnormal     Tests   Log Roll: positive    Other   Sensation: normal          Vascular Exam       Left Pulses  Dorsalis Pedis:      2+             Significant Labs: CBC:   Recent Labs   Lab 10/29/24  1011 10/30/24  0400 10/31/24  0524   WBC 5.30 4.76 3.57*   HGB 9.6* 10.1* 10.0*   HCT 31.8* 33.1* 33.8*    161 152     CMP:   Recent Labs   Lab 10/30/24  0400 10/31/24  0524   * 137   K 4.1 4.6    105   CO2 23 28   GLU 84 84   BUN 15 17   CREATININE 0.6 0.7   CALCIUM 8.7 8.8   PROT 7.0  --    ALBUMIN 3.5  --    BILITOT 0.4  --    ALKPHOS 129  --    AST 14  --    ALT 9*  --    ANIONGAP 8 4*     Coagulation: No results for input(s): "LABPROT", "INR", "APTT" in the last 48 hours.  All pertinent labs within the past 24 hours have been reviewed.    Significant Imaging: None  Assessment/Plan:     History of left hip replacement  Continue PT.   toe-touch weight-bearing on the left lower extremity to let the pelvic bone heal.    Patient might need to be revised if he has episodes of dislocation or if the cup position continues to change.  We will sign off.          Roni Su MD  Orthopedics  [facility]    "

## 2024-10-31 NOTE — HOSPITAL COURSE
64-year-old male with a history of prior of GI bleed/esophagitis, chronic wound infection on doxycycline who presents after being found on the ground and drowsy in the setting recent methamphetamine.  While hospitalized he noted severe left hip pain and CT demonstrated abnormal inclination of the acetabular cup component of left hip arthroplasty, with advanced acetabuli protrusio and subacute fracture along the central and cranial aspect of thinned left acetabulum.      Orthopedic surgery was consulted and they have recommended medical management with toe-touch weight-bearing status.  He is on scheduled gabapentin, Flexeril and ketorolac.  Pain is improving such that he is currently able to work with physical therapy.  Inpatient rehab has been recommended. Patient declines inpatient rerhab. The patient was discharged home in stable condition with home health for PT/OT.

## 2024-10-31 NOTE — SUBJECTIVE & OBJECTIVE
"Principal Problem:<principal problem not specified>    Principal Orthopedic Problem:  Left hip pain    Interval History:  Patient's left hip is continued to hurt.  Did have pain prior to this most recent incident.  Fracture did appear subacute.    Review of patient's allergies indicates:   Allergen Reactions    Pcn [penicillins]      As a child.       Current Facility-Administered Medications   Medication    acetaminophen tablet 1,000 mg    amLODIPine tablet 5 mg    cyclobenzaprine tablet 10 mg    dextrose 50% injection 12.5 g    dextrose 50% injection 25 g    doxycycline capsule 100 mg    ferrous sulfate tablet 1 each    gabapentin capsule 600 mg    glucagon (human recombinant) injection 1 mg    glucose chewable tablet 16 g    glucose chewable tablet 24 g    hydrALAZINE injection 5 mg    ketorolac injection 15 mg    lisinopriL tablet 40 mg    naloxone 0.4 mg/mL injection 0.02 mg    oxybutynin 24 hr tablet 5 mg    pantoprazole EC tablet 40 mg    sodium chloride 0.9% flush 10 mL    tamsulosin 24 hr capsule 0.8 mg     Objective:     Vital Signs (Most Recent):  Temp: 97.6 °F (36.4 °C) (10/31/24 0748)  Pulse: 83 (10/31/24 0748)  Resp: 18 (10/31/24 0748)  BP: (!) 182/85 (10/31/24 0748)  SpO2: 96 % (10/31/24 0748) Vital Signs (24h Range):  Temp:  [97.6 °F (36.4 °C)-98 °F (36.7 °C)] 97.6 °F (36.4 °C)  Pulse:  [77-97] 83  Resp:  [17-18] 18  SpO2:  [95 %-98 %] 96 %  BP: (115-182)/(65-85) 182/85     Weight: 76.2 kg (167 lb 15.9 oz)  Height: 5' 11" (180.3 cm)  Body mass index is 23.43 kg/m².      Intake/Output Summary (Last 24 hours) at 10/31/2024 0752  Last data filed at 10/31/2024 0532  Gross per 24 hour   Intake 720 ml   Output 1675 ml   Net -955 ml        General    Nursing note and vitals reviewed.  Constitutional: He is oriented to person, place, and time. He appears well-developed and well-nourished. No distress.   HENT:   Head: Atraumatic.   Eyes: EOM are normal.   Cardiovascular:  Normal rate.          " "  Pulmonary/Chest: Effort normal.   Abdominal: Soft.   Neurological: He is alert and oriented to person, place, and time.   Psychiatric: His behavior is normal.             Right Hip Exam   Right hip exam is normal.   Left Hip Exam     Inspection   Scars: present  Swelling: absent  Erythema: absent    Tenderness   The patient tender to palpation of the trochanteric bursa.    Range of Motion   Extension:  abnormal   Flexion:  abnormal     Tests   Log Roll: positive    Other   Sensation: normal          Vascular Exam       Left Pulses  Dorsalis Pedis:      2+             Significant Labs: CBC:   Recent Labs   Lab 10/29/24  1011 10/30/24  0400 10/31/24  0524   WBC 5.30 4.76 3.57*   HGB 9.6* 10.1* 10.0*   HCT 31.8* 33.1* 33.8*    161 152     CMP:   Recent Labs   Lab 10/30/24  0400 10/31/24  0524   * 137   K 4.1 4.6    105   CO2 23 28   GLU 84 84   BUN 15 17   CREATININE 0.6 0.7   CALCIUM 8.7 8.8   PROT 7.0  --    ALBUMIN 3.5  --    BILITOT 0.4  --    ALKPHOS 129  --    AST 14  --    ALT 9*  --    ANIONGAP 8 4*     Coagulation: No results for input(s): "LABPROT", "INR", "APTT" in the last 48 hours.  All pertinent labs within the past 24 hours have been reviewed.    Significant Imaging: None  "

## 2024-10-31 NOTE — ASSESSMENT & PLAN NOTE
Fracture identified on CT.  Orthopedic surgery consulted.  Toe-touch weight-bearing status recommended.  Scheduling Tylenol, gabapentin and ketorolac.  Given history of GI bleed, increasing PPI to twice daily.  Follow for evidence of GI bleed while on ketorolac.  Discharge to inpatient rehab recommended when placed.

## 2024-10-31 NOTE — PT/OT/SLP PROGRESS
Physical Therapy Treatment    Patient Name:  Omar Trotter   MRN:  [id number]    Recommendations:     Discharge Recommendations: High Intensity Therapy  Discharge Equipment Recommendations: walker, rolling. Omar's mobility limitation cannot be sufficiently resolved by the use of a cane. His functional mobility deficit can be sufficiently resolved with the use of a Rolling Walker. Patient's mobility limitation significantly impairs their ability to participate in one of more activities of daily living.  The use of a RW will significantly improve the patient's ability to participate in MRADLS and the patient will use it on regular basis in the home.   Barriers to discharge: None    Assessment:     Omar Trotter is a 64 y.o. male admitted with a medical diagnosis of <principal problem not specified>.  He presents with the following impairments/functional limitations: weakness, impaired endurance, impaired functional mobility, gait instability, impaired cardiopulmonary response to activity .    Rehab Prognosis: Good; patient would benefit from acute skilled PT services to address these deficits and reach maximum level of function.    Recent Surgery: * No surgery found *      Plan:     During this hospitalization, patient to be seen 5 x/week to address the identified rehab impairments via gait training, therapeutic activities, therapeutic exercises and progress toward the following goals:    Plan of Care Expires:  11/29/24    Subjective     Chief Complaint: none  Patient/Family Comments/goals: Return home with his girlfriend  Pain/Comfort:  Pain Rating 1: 0/10      Objective:     Communicated with nurse prior to session.  Patient found supine with bed alarm, telemetry upon PT entry to room.     General Precautions: Standard, contact, fall  Orthopedic Precautions: LLE toe touch weight bearing  Braces:    Respiratory Status: Room air     Functional Mobility:  Bed Mobility:     Supine to Sit: stand by  assistance  Transfers:     Sit to Stand:  stand by assistance with rolling walker  Gait: 40 ft RW and  CGA  .Cueing for safety with turns      AM-PAC 6 CLICK MOBILITY          Treatment & Education:  Gait training with emphasis on importance of TTWB on L LE.    Patient left sitting edge of bed with all lines intact, call button in reach, and his  girlfriend  present..    GOALS:   Multidisciplinary Problems       Physical Therapy Goals          Problem: Physical Therapy    Goal Priority Disciplines Outcome Interventions   Physical Therapy Goal     PT, PT/OT     Description: Goals to be met by: 2024     Patient will increase functional independence with mobility by performin. Supine to sit with Stand-by Assistance  2. Sit to stand transfer with Stand-by Assistance  3. Gait  x 50  feet with Stand-by Assistance using Rolling Walker.                          Time Tracking:     PT Received On: 10/31/24  PT Start Time: 1010     PT Stop Time: 1026  PT Total Time (min): 16 min     Billable Minutes: Gait Training 16 minutes    Treatment Type: Treatment  PT/PTA: PT           10/31/2024

## 2024-10-31 NOTE — SUBJECTIVE & OBJECTIVE
Interval History:     Patient reports his pain is improving.  Tolerating oral intake.  Able to work with physical therapy.  No melena.  Bowel movement was brown and formed.  No epigastric pain.  No chest pain, shortness a breath or lightheadedness.      Review of Systems  Objective:     Vital Signs (Most Recent):  Temp: 97.7 °F (36.5 °C) (10/31/24 1103)  Pulse: 90 (10/31/24 1103)  Resp: 18 (10/31/24 1103)  BP: 129/72 (10/31/24 1103)  SpO2: 100 % (10/31/24 1103) Vital Signs (24h Range):  Temp:  [97.6 °F (36.4 °C)-98 °F (36.7 °C)] 97.7 °F (36.5 °C)  Pulse:  [77-90] 90  Resp:  [18] 18  SpO2:  [96 %-100 %] 100 %  BP: (115-182)/(65-85) 129/72     Weight: 76.2 kg (167 lb 15.9 oz)  Body mass index is 23.43 kg/m².    Intake/Output Summary (Last 24 hours) at 10/31/2024 1406  Last data filed at 10/31/2024 1322  Gross per 24 hour   Intake 720 ml   Output 1475 ml   Net -755 ml         Physical Exam  Constitutional:       Appearance: Normal appearance.   HENT:      Head: Normocephalic.   Eyes:      Extraocular Movements: Extraocular movements intact.   Cardiovascular:      Rate and Rhythm: Normal rate and regular rhythm.   Pulmonary:      Effort: Pulmonary effort is normal. No respiratory distress.      Breath sounds: No wheezing or rales.   Abdominal:      General: Bowel sounds are normal. There is no distension.      Palpations: Abdomen is soft.      Tenderness: There is no abdominal tenderness.   Musculoskeletal:         General: No swelling, tenderness or deformity.      Cervical back: Normal range of motion.   Neurological:      Mental Status: He is alert.     range of motion of left hip less limited by pain now.         Significant Labs: All pertinent labs within the past 24 hours have been reviewed.  CBC:   Recent Labs   Lab 10/30/24  0400 10/31/24  0524   WBC 4.76 3.57*   HGB 10.1* 10.0*   HCT 33.1* 33.8*    152     CMP:   Recent Labs   Lab 10/30/24  0400 10/31/24  0524   * 137   K 4.1 4.6    105   CO2  23 28   GLU 84 84   BUN 15 17   CREATININE 0.6 0.7   CALCIUM 8.7 8.8   PROT 7.0  --    ALBUMIN 3.5  --    BILITOT 0.4  --    ALKPHOS 129  --    AST 14  --    ALT 9*  --    ANIONGAP 8 4*     Magnesium:   Recent Labs   Lab 10/30/24  0400 10/31/24  0524   MG 1.9 2.0       Significant Imaging: I have reviewed all pertinent imaging results/findings within the past 24 hours.

## 2024-10-31 NOTE — PROGRESS NOTES
[facility] Medicine  Progress Note    Patient Name: Omar Trotter  MRN: [id number]  Patient Class: OP- Observation   Admission Date: 10/28/2024  Length of Stay: 0 days  Attending Physician: Marco A Torres MD  Primary Care Provider: aCm Orozco MD        Subjective:     Principal Problem:<principal problem not specified>        HPI:   Please note that pt was uncooperative during the example. He was sleepy and wanted to be left alone. Questions were repeated multiple times for him to answer.    ( This is a note from the physician from previous admission.  Placed here because of I encountered the same saturation)     65 yo male with PMH of GERD, HTN, and BPH, previous hip surgery ,UGIB, / esophagitis was admitted from ER with possible syncopal episode and drowsiness.   His previous re toxicology study positive for benzo and others but today positive for amphetamine   Patient can not tell what time he fell from the bed but he asked me to look at EMS note.   He states to ER MD  he woke up on his living room floor. He is uncertain how he got on the floor.  He does not remember falling.  As per  ER MD , deny  headache or neck pain, no incontinence, seizures..no abdominal pain. Reported someone  came to check on him and he appeared drowsy so he activated EMS.   On examination he denied having chest pain, shortness for breath but he appeared very drowsy.  Vitals stable and labs are stable with chronic anemia.  CT head negative and chest x-ray did not show any acute problem.  He denied using substances but RN reported he was on Suboxone in the past and  opiate dependence noted in the past history.      Overview/Hospital Course:  64-year-old male with a history of prior of GI bleed/esophagitis, chronic wound infection on doxycycline who presents after being found on the ground and drowsy in the setting recent methamphetamine.  While hospitalized he noted severe left hip pain and CT  demonstrated abnormal inclination of the acetabular cup component of left hip arthroplasty, with advanced acetabuli protrusio and subacute fracture along the central and cranial aspect of thinned left acetabulum.      Orthopedic surgery was consulted and they have recommended medical management with toe-touch weight-bearing status.  He is on scheduled gabapentin, Flexeril and ketorolac.  Pain is improving such that he is currently able to work with physical therapy.  Inpatient rehab has been recommended.    His home PPI has been increased to twice a day as prophylaxis in the setting of his receiving ketorolac with a prior history of a GI bleed.  Clinically no evidence of bleed at present and hemoglobin stable.    Interval History:     Patient reports his pain is improving.  Tolerating oral intake.  Able to work with physical therapy.  No melena.  Bowel movement was brown and formed.  No epigastric pain.  No chest pain, shortness a breath or lightheadedness.      Review of Systems  Objective:     Vital Signs (Most Recent):  Temp: 97.7 °F (36.5 °C) (10/31/24 1103)  Pulse: 90 (10/31/24 1103)  Resp: 18 (10/31/24 1103)  BP: 129/72 (10/31/24 1103)  SpO2: 100 % (10/31/24 1103) Vital Signs (24h Range):  Temp:  [97.6 °F (36.4 °C)-98 °F (36.7 °C)] 97.7 °F (36.5 °C)  Pulse:  [77-90] 90  Resp:  [18] 18  SpO2:  [96 %-100 %] 100 %  BP: (115-182)/(65-85) 129/72     Weight: 76.2 kg (167 lb 15.9 oz)  Body mass index is 23.43 kg/m².    Intake/Output Summary (Last 24 hours) at 10/31/2024 1406  Last data filed at 10/31/2024 1322  Gross per 24 hour   Intake 720 ml   Output 1475 ml   Net -755 ml         Physical Exam  Constitutional:       Appearance: Normal appearance.   HENT:      Head: Normocephalic.   Eyes:      Extraocular Movements: Extraocular movements intact.   Cardiovascular:      Rate and Rhythm: Normal rate and regular rhythm.   Pulmonary:      Effort: Pulmonary effort is normal. No respiratory distress.      Breath sounds:  No wheezing or rales.   Abdominal:      General: Bowel sounds are normal. There is no distension.      Palpations: Abdomen is soft.      Tenderness: There is no abdominal tenderness.   Musculoskeletal:         General: No swelling, tenderness or deformity.      Cervical back: Normal range of motion.   Neurological:      Mental Status: He is alert.     range of motion of left hip less limited by pain now.         Significant Labs: All pertinent labs within the past 24 hours have been reviewed.  CBC:   Recent Labs   Lab 10/30/24  0400 10/31/24  0524   WBC 4.76 3.57*   HGB 10.1* 10.0*   HCT 33.1* 33.8*    152     CMP:   Recent Labs   Lab 10/30/24  0400 10/31/24  0524   * 137   K 4.1 4.6    105   CO2 23 28   GLU 84 84   BUN 15 17   CREATININE 0.6 0.7   CALCIUM 8.7 8.8   PROT 7.0  --    ALBUMIN 3.5  --    BILITOT 0.4  --    ALKPHOS 129  --    AST 14  --    ALT 9*  --    ANIONGAP 8 4*     Magnesium:   Recent Labs   Lab 10/30/24  0400 10/31/24  0524   MG 1.9 2.0       Significant Imaging: I have reviewed all pertinent imaging results/findings within the past 24 hours.    Assessment/Plan:      Syncope  Fall/syncope/drug toxicity    Plan   Previous recent carotid ultrasound negative  Echocardiogram without significant valvular disease   Telemetry without critical arrhythmia  PT and OT         Uncomplicated opioid dependence  History aware  Was told patient was on Suboxone in the past  Currently he does not want narcotics for his pain.      Anemia    CT abdomen in the past with splenomegaly most likely from portal hypertension    He has a history of GI bleed.  No evidence of an active bleed.  Follow hemoglobin and clinically as ketorolac has been scheduled.    PPI increased to twice daily    History of left hip replacement    Fracture identified on CT.  Orthopedic surgery consulted.  Toe-touch weight-bearing status recommended.  Scheduling Tylenol, gabapentin and ketorolac.  Given history of GI bleed,  increasing PPI to twice daily.  Follow for evidence of GI bleed while on ketorolac.  Discharge to inpatient rehab recommended when placed.    Cirrhosis of liver  Previous diagnosis noted  MELD-Na score calculated; MELD 3.0: 7 at 3/1/2024  4:29 AM  MELD-Na: 7 at 3/1/2024  4:29 AM  Calculated from:  Serum Creatinine: 0.6 mg/dL (Using min of 1 mg/dL) at 3/1/2024  4:29 AM  Serum Sodium: 138 mmol/L (Using max of 137 mmol/L) at 3/1/2024  4:29 AM  Total Bilirubin: 0.4 mg/dL (Using min of 1 mg/dL) at 2/29/2024 10:42 PM  Serum Albumin: 3.9 g/dL (Using max of 3.5 g/dL) at 2/29/2024 10:42 PM  INR(ratio): 1.1 at 2/29/2024 10:42 PM  Age at listing (hypothetical): 63 years  Sex: Male at 3/1/2024  4:29 AM      Anemia possible from cirrhosis.  LFT are stable    Hepatitis C  History noted        VTE Risk Mitigation (From admission, onward)           Ordered     IP VTE HIGH RISK PATIENT  Once         10/29/24 0008     Place sequential compression device  Until discontinued         10/29/24 0008                    Discharge Planning   NATALIA: 11/1/2024     Code Status: Full Code   Is the patient medically ready for discharge?:     Reason for patient still in hospital (select all that apply): Patient trending condition  Discharge Plan A: Home with family                  Marco A Torres MD  Department of Hospital Medicine   Replaced by [facility]

## 2024-10-31 NOTE — PLAN OF CARE
"10/30 Pt received consult to CM for rehab; FLORENCIA direct msg M[facility] with [facility]; FLORENCIA spoke w pt at bedside regarding rehab placement and scanned signed pt choice form into media; Addis direct msg DON after meeting w pt; per Addis "He has not worked with therapy just yet. He is unsure if he can do therapy 3/day due to his lumbar condition. He states he is planning on having lumbar surgery soon and his spinal cord is impaired. I told him we would reevaluate jaycee [10/31] once therapy seen pt." FLORENCIA will continue to follow  "

## 2024-11-01 PROBLEM — E87.5 HYPERKALEMIA: Status: ACTIVE | Noted: 2024-11-01

## 2024-11-01 LAB
ANION GAP SERPL CALC-SCNC: 4 MMOL/L (ref 8–16)
BASOPHILS # BLD AUTO: 0.03 K/UL (ref 0–0.2)
BASOPHILS NFR BLD: 0.8 % (ref 0–1.9)
BUN SERPL-MCNC: 23 MG/DL (ref 8–23)
CALCIUM SERPL-MCNC: 8.6 MG/DL (ref 8.7–10.5)
CHLORIDE SERPL-SCNC: 104 MMOL/L (ref 95–110)
CO2 SERPL-SCNC: 28 MMOL/L (ref 23–29)
CREAT SERPL-MCNC: 0.9 MG/DL (ref 0.5–1.4)
CRP SERPL-MCNC: 1.7 MG/DL
DIFFERENTIAL METHOD BLD: ABNORMAL
EOSINOPHIL # BLD AUTO: 0.2 K/UL (ref 0–0.5)
EOSINOPHIL NFR BLD: 4.7 % (ref 0–8)
ERYTHROCYTE [DISTWIDTH] IN BLOOD BY AUTOMATED COUNT: 16.1 % (ref 11.5–14.5)
ERYTHROCYTE [SEDIMENTATION RATE] IN BLOOD BY WESTERGREN METHOD: 73 MM/HR (ref 0–10)
EST. GFR  (NO RACE VARIABLE): >60 ML/MIN/1.73 M^2
GLUCOSE SERPL-MCNC: 89 MG/DL (ref 70–110)
HCT VFR BLD AUTO: 30.8 % (ref 40–54)
HGB BLD-MCNC: 9.4 G/DL (ref 14–18)
IMM GRANULOCYTES # BLD AUTO: 0.01 K/UL (ref 0–0.04)
IMM GRANULOCYTES NFR BLD AUTO: 0.3 % (ref 0–0.5)
LYMPHOCYTES # BLD AUTO: 1.2 K/UL (ref 1–4.8)
LYMPHOCYTES NFR BLD: 31.8 % (ref 18–48)
MAGNESIUM SERPL-MCNC: 2 MG/DL (ref 1.6–2.6)
MCH RBC QN AUTO: 25.1 PG (ref 27–31)
MCHC RBC AUTO-ENTMCNC: 30.5 G/DL (ref 32–36)
MCV RBC AUTO: 82 FL (ref 82–98)
MONOCYTES # BLD AUTO: 0.4 K/UL (ref 0.3–1)
MONOCYTES NFR BLD: 9 % (ref 4–15)
NEUTROPHILS # BLD AUTO: 2.1 K/UL (ref 1.8–7.7)
NEUTROPHILS NFR BLD: 53.4 % (ref 38–73)
NRBC BLD-RTO: 0 /100 WBC
PLATELET # BLD AUTO: 173 K/UL (ref 150–450)
PMV BLD AUTO: 9.4 FL (ref 9.2–12.9)
POTASSIUM SERPL-SCNC: 5.2 MMOL/L (ref 3.5–5.1)
RBC # BLD AUTO: 3.75 M/UL (ref 4.6–6.2)
SODIUM SERPL-SCNC: 136 MMOL/L (ref 136–145)
WBC # BLD AUTO: 3.87 K/UL (ref 3.9–12.7)

## 2024-11-01 PROCEDURE — 85651 RBC SED RATE NONAUTOMATED: CPT | Performed by: ORTHOPAEDIC SURGERY

## 2024-11-01 PROCEDURE — 97535 SELF CARE MNGMENT TRAINING: CPT

## 2024-11-01 PROCEDURE — 25000003 PHARM REV CODE 250: Performed by: INTERNAL MEDICINE

## 2024-11-01 PROCEDURE — 63600175 PHARM REV CODE 636 W HCPCS: Performed by: INTERNAL MEDICINE

## 2024-11-01 PROCEDURE — 25000003 PHARM REV CODE 250: Performed by: HOSPITALIST

## 2024-11-01 PROCEDURE — 83735 ASSAY OF MAGNESIUM: CPT | Performed by: INTERNAL MEDICINE

## 2024-11-01 PROCEDURE — 36415 COLL VENOUS BLD VENIPUNCTURE: CPT | Performed by: ORTHOPAEDIC SURGERY

## 2024-11-01 PROCEDURE — 97116 GAIT TRAINING THERAPY: CPT | Mod: CQ

## 2024-11-01 PROCEDURE — 85025 COMPLETE CBC W/AUTO DIFF WBC: CPT | Performed by: INTERNAL MEDICINE

## 2024-11-01 PROCEDURE — 86140 C-REACTIVE PROTEIN: CPT | Performed by: ORTHOPAEDIC SURGERY

## 2024-11-01 PROCEDURE — 63600175 PHARM REV CODE 636 W HCPCS: Performed by: HOSPITALIST

## 2024-11-01 PROCEDURE — 97165 OT EVAL LOW COMPLEX 30 MIN: CPT

## 2024-11-01 PROCEDURE — 80048 BASIC METABOLIC PNL TOTAL CA: CPT | Performed by: INTERNAL MEDICINE

## 2024-11-01 PROCEDURE — 27000207 HC ISOLATION

## 2024-11-01 PROCEDURE — 12000002 HC ACUTE/MED SURGE SEMI-PRIVATE ROOM

## 2024-11-01 RX ORDER — ENOXAPARIN SODIUM 100 MG/ML
40 INJECTION SUBCUTANEOUS EVERY 24 HOURS
Status: DISCONTINUED | OUTPATIENT
Start: 2024-11-01 | End: 2024-11-03 | Stop reason: HOSPADM

## 2024-11-01 RX ORDER — MUPIROCIN 20 MG/G
OINTMENT TOPICAL 2 TIMES DAILY
Status: DISCONTINUED | OUTPATIENT
Start: 2024-11-01 | End: 2024-11-03 | Stop reason: HOSPADM

## 2024-11-01 RX ADMIN — SODIUM CHLORIDE 125 MG: 9 INJECTION, SOLUTION INTRAVENOUS at 11:11

## 2024-11-01 RX ADMIN — PANTOPRAZOLE SODIUM 40 MG: 40 TABLET, DELAYED RELEASE ORAL at 06:11

## 2024-11-01 RX ADMIN — GABAPENTIN 600 MG: 300 CAPSULE ORAL at 02:11

## 2024-11-01 RX ADMIN — ACETAMINOPHEN 1000 MG: 500 TABLET, FILM COATED ORAL at 09:11

## 2024-11-01 RX ADMIN — CYCLOBENZAPRINE 10 MG: 10 TABLET, FILM COATED ORAL at 02:11

## 2024-11-01 RX ADMIN — KETOROLAC TROMETHAMINE 15 MG: 30 INJECTION, SOLUTION INTRAMUSCULAR at 12:11

## 2024-11-01 RX ADMIN — SODIUM ZIRCONIUM CYCLOSILICATE 10 G: 10 POWDER, FOR SUSPENSION ORAL at 08:11

## 2024-11-01 RX ADMIN — MUPIROCIN 1 G: 20 OINTMENT TOPICAL at 10:11

## 2024-11-01 RX ADMIN — ACETAMINOPHEN 1000 MG: 500 TABLET, FILM COATED ORAL at 01:11

## 2024-11-01 RX ADMIN — SODIUM ZIRCONIUM CYCLOSILICATE 10 G: 10 POWDER, FOR SUSPENSION ORAL at 11:11

## 2024-11-01 RX ADMIN — ENOXAPARIN SODIUM 40 MG: 40 INJECTION SUBCUTANEOUS at 04:11

## 2024-11-01 RX ADMIN — GABAPENTIN 600 MG: 300 CAPSULE ORAL at 10:11

## 2024-11-01 RX ADMIN — CYCLOBENZAPRINE 10 MG: 10 TABLET, FILM COATED ORAL at 08:11

## 2024-11-01 RX ADMIN — DOXYCYCLINE HYCLATE 100 MG: 100 CAPSULE ORAL at 10:11

## 2024-11-01 RX ADMIN — OXYBUTYNIN CHLORIDE 5 MG: 5 TABLET, EXTENDED RELEASE ORAL at 10:11

## 2024-11-01 RX ADMIN — MUPIROCIN 1 G: 20 OINTMENT TOPICAL at 08:11

## 2024-11-01 RX ADMIN — AMLODIPINE BESYLATE 5 MG: 5 TABLET ORAL at 08:11

## 2024-11-01 RX ADMIN — DOXYCYCLINE HYCLATE 100 MG: 100 CAPSULE ORAL at 08:11

## 2024-11-01 RX ADMIN — CYCLOBENZAPRINE 10 MG: 10 TABLET, FILM COATED ORAL at 10:11

## 2024-11-01 RX ADMIN — LISINOPRIL 40 MG: 20 TABLET ORAL at 10:11

## 2024-11-01 RX ADMIN — KETOROLAC TROMETHAMINE 15 MG: 30 INJECTION, SOLUTION INTRAMUSCULAR at 06:11

## 2024-11-01 RX ADMIN — TAMSULOSIN HYDROCHLORIDE 0.8 MG: 0.4 CAPSULE ORAL at 10:11

## 2024-11-01 RX ADMIN — GABAPENTIN 600 MG: 300 CAPSULE ORAL at 08:11

## 2024-11-01 RX ADMIN — PANTOPRAZOLE SODIUM 40 MG: 40 TABLET, DELAYED RELEASE ORAL at 04:11

## 2024-11-01 NOTE — PLAN OF CARE
[facility]  Discharge Reassessment    Updated PT notes suggest high intensity thearpy; FLORENCIA informed by [facility] that auth submitted; per Addis, OT notes may be helpful for getting auth approved; FLORENCIA communicated w MELIA to request OT consult    Primary Care Provider: Cam Orozco MD    Expected Discharge Date: 11/1/2024    Reassessment (most recent)       Discharge Reassessment - 11/01/24 0905          Discharge Reassessment    Assessment Type Discharge Planning Reassessment     Did the patient's condition or plan change since previous assessment? Yes     Discharge Plan discussed with: Patient     Discharge Plan A Rehab     Discharge Plan B Home Health     DME Needed Upon Discharge  walker, rolling        Post-Acute Status    Post-Acute Authorization Placement     Post-Acute Placement Status Pending payor review/awaiting authorization (if required)

## 2024-11-01 NOTE — PLAN OF CARE
Met with pt at bedside during rounds, pt expressed that he would like to discharge home with HH instead of rehab. Updated FLORENCIA Eva.      11/01/24 1040   Discharge Reassessment   Assessment Type Discharge Planning Reassessment   Did the patient's condition or plan change since previous assessment? Yes   Discharge Plan discussed with: Patient   Communicated NATALIA with patient/caregiver Yes   Discharge Plan A Home Health   DME Needed Upon Discharge  walker, rolling

## 2024-11-01 NOTE — PLAN OF CARE
Problem: Wound  Goal: Optimal Coping  Outcome: Progressing  Goal: Optimal Functional Ability  Outcome: Progressing  Goal: Absence of Infection Signs and Symptoms  Outcome: Progressing  Goal: Improved Oral Intake  Outcome: Progressing  Intervention: Promote and Optimize Oral Intake  Flowsheets (Taken 11/1/2024 1519)  Nutrition Interventions: supplemental drinks provided  Goal: Optimal Pain Control and Function  Outcome: Progressing  Goal: Skin Health and Integrity  Outcome: Progressing  Goal: Optimal Wound Healing  Outcome: Progressing

## 2024-11-01 NOTE — CONSULTS
2.8x2.8x0.3cm right medial leg ulcer 10% yellow slough 80% red granulation tissue, minimal periwound redness, small amount of drainage moist on dressing.  Cleaned and dried and applied Medihoney, covered with aquacel ag and border foam dressing, did not want to have skin assessment done. States I am good.

## 2024-11-01 NOTE — ASSESSMENT & PLAN NOTE
CT abdomen in the past with splenomegaly most likely from portal hypertension    He has a history of GI bleed.  No evidence of an active bleed.  Follow hemoglobin and clinically as ketorolac has been scheduled.    PPI increased to twice daily    We will give IV iron

## 2024-11-01 NOTE — PLAN OF CARE
Problem: Adult Inpatient Plan of Care  Goal: Plan of Care Review  Outcome: Progressing  Goal: Patient-Specific Goal (Individualized)  Outcome: Progressing  Goal: Absence of Hospital-Acquired Illness or Injury  Outcome: Progressing  Goal: Optimal Comfort and Wellbeing  Outcome: Progressing  Goal: Readiness for Transition of Care  Outcome: Progressing     Problem: Wound  Goal: Optimal Coping  Outcome: Progressing  Goal: Optimal Functional Ability  Outcome: Progressing  Goal: Absence of Infection Signs and Symptoms  Outcome: Progressing  Goal: Improved Oral Intake  Outcome: Progressing  Goal: Optimal Pain Control and Function  Outcome: Progressing  Goal: Skin Health and Integrity  Outcome: Progressing  Goal: Optimal Wound Healing  Outcome: Progressing     Problem: Skin Injury Risk Increased  Goal: Skin Health and Integrity  Outcome: Progressing     Problem: Infection  Goal: Absence of Infection Signs and Symptoms  Outcome: Progressing     Problem: Oral Intake Inadequate  Goal: Improved Oral Intake  Outcome: Progressing     Problem: Fall Injury Risk  Goal: Absence of Fall and Fall-Related Injury  Outcome: Progressing

## 2024-11-01 NOTE — PLAN OF CARE
SW informed by [facility] that SOC date to be jaycee 11/2 11/01/24 1128   Post-Acute Status   Post-Acute Authorization Home Health   Post-Acute Placement Status Set-up Complete/Auth obtained

## 2024-11-01 NOTE — ASSESSMENT & PLAN NOTE
Hyperkalemia is likely due to Medication induced.The patients most recent potassium results are listed below.  Recent Labs     10/30/24  0400 10/31/24  0524 11/01/24  0520   K 4.1 4.6 5.2*     Plan  - Monitor for arrhythmias with EKG and/or continuous telemetry.   - Treat the hyperkalemia with Potassium Binders.   - Monitor potassium: Daily  - The patient's hyperkalemia is stable      Discontinue lisinopril, discontinue Toradol.  Start Lokelma

## 2024-11-01 NOTE — SUBJECTIVE & OBJECTIVE
Interval History:     Seen and examined at multidisciplinary rounds, want go home, left hip pain improved.  Able to stand up and move around with the assistance for around 30 ft.  Potassium 5.2 this morning.  Lisinopril has been discontinued and started on Lokelma.     Review of Systems  Objective:     Vital Signs (Most Recent):  Temp: 97.7 °F (36.5 °C) (11/01/24 1100)  Pulse: 97 (11/01/24 1100)  Resp: 18 (11/01/24 1100)  BP: (!) 155/84 (notified nurse) (11/01/24 1100)  SpO2: 97 % (11/01/24 1100) Vital Signs (24h Range):  Temp:  [97.5 °F (36.4 °C)-98.2 °F (36.8 °C)] 97.7 °F (36.5 °C)  Pulse:  [74-97] 97  Resp:  [17-18] 18  SpO2:  [96 %-99 %] 97 %  BP: (119-155)/(65-84) 155/84     Weight: 76.2 kg (167 lb 15.9 oz)  Body mass index is 23.43 kg/m².    Intake/Output Summary (Last 24 hours) at 11/1/2024 1335  Last data filed at 11/1/2024 1242  Gross per 24 hour   Intake 600 ml   Output 3150 ml   Net -2550 ml         Physical Exam  Constitutional:       Appearance: Normal appearance.   HENT:      Head: Normocephalic.   Eyes:      Extraocular Movements: Extraocular movements intact.   Cardiovascular:      Rate and Rhythm: Normal rate and regular rhythm.   Pulmonary:      Effort: Pulmonary effort is normal. No respiratory distress.      Breath sounds: No wheezing or rales.   Abdominal:      General: Bowel sounds are normal. There is no distension.      Palpations: Abdomen is soft.      Tenderness: There is no abdominal tenderness.   Musculoskeletal:         General: No swelling, tenderness or deformity.      Cervical back: Normal range of motion.   Neurological:      Mental Status: He is alert.     range of motion of left hip less limited by pain now.         Significant Labs: All pertinent labs within the past 24 hours have been reviewed.  CBC:   Recent Labs   Lab 10/31/24  0524 11/01/24  0520   WBC 3.57* 3.87*   HGB 10.0* 9.4*   HCT 33.8* 30.8*    173     CMP:   Recent Labs   Lab 10/31/24  0524 11/01/24  0520   NA  137 136   K 4.6 5.2*    104   CO2 28 28   GLU 84 89   BUN 17 23   CREATININE 0.7 0.9   CALCIUM 8.8 8.6*   ANIONGAP 4* 4*     Magnesium:   Recent Labs   Lab 10/31/24  0524 11/01/24  0520   MG 2.0 2.0       Significant Imaging: I have reviewed all pertinent imaging results/findings within the past 24 hours.    Scheduled Meds:   acetaminophen  1,000 mg Oral QID    amLODIPine  5 mg Oral QHS    cyclobenzaprine  10 mg Oral TID    doxycycline  100 mg Oral Q12H    ferric gluconate (FERRLECIT) 125 mg in 0.9% NaCl 100 mL IVPB  125 mg Intravenous Daily    gabapentin  600 mg Oral TID    mupirocin   Nasal BID    oxybutynin  5 mg Oral Daily    pantoprazole  40 mg Oral BID AC    sodium zirconium cyclosilicate  10 g Oral BID    tamsulosin  0.8 mg Oral Daily     Continuous Infusions:  PRN Meds:.  Current Facility-Administered Medications:     dextrose 50%, 12.5 g, Intravenous, PRN    dextrose 50%, 25 g, Intravenous, PRN    glucagon (human recombinant), 1 mg, Intramuscular, PRN    glucose, 16 g, Oral, PRN    glucose, 24 g, Oral, PRN    naloxone, 0.02 mg, Intravenous, PRN    senna-docusate 8.6-50 mg, 1 tablet, Oral, BID PRN    sodium chloride 0.9%, 10 mL, Intravenous, Q12H PRN    X-Ray Hip 2 or 3 views Left with Pelvis when performed    Result Date: 10/30/2024  EXAMINATION: XR HIP WITH PELVIS WHEN PERFORMED 2 OR 3 VIEWS LEFT CLINICAL HISTORY: L hip fx?; Presence of left artificial hip joint FINDINGS: Single AP pelvis and two views of the left hip are compared to prior exams, including CT of the prior day.  Total left hip arthroplasty components have unchanged alignment, with abnormal steep inclination of the left acetabular cup, and subsidence with acetabular protrusio.  The known left acetabular fracture is not well visualized. There is advanced degenerative narrowing of the right hip joint space with evidence of full-thickness chondral loss, osteophytes and subcortical cystic changes and sclerosis.     Please see above and  recent CT report. Electronically signed by: Damaso Brower Date:    10/30/2024 Time:    10:38    CT Hip Without Contrast Left    Result Date: 10/30/2024  EXAMINATION: CT HIP WITHOUT CONTRAST LEFT CLINICAL HISTORY: Pain after fall; TECHNIQUE: Axial noncontrast imaging was performed, with sagittal and coronal reformatted images reviewed. COMPARISON: Prior exams including CT of 03/06/2024. FINDINGS: There is a left total hip arthroplasty, with femoral component well seated and unchanged in alignment, without evidence of loosening. Acetabular cup inclination is abnormally steep, with no findings of asymmetric polyethylene wear. There is subsidence along the acetabular component with acetabuli protrusio, and marked osseous thinning.  A subacute healing fracture occurs along the central and cranial aspect of the acetabulum with some immature periosteal reaction, coronal images 38-51 series 6. There are no other fractures or destructive osseous lesions.  There is advanced intervertebral disc space narrowing and facet arthropathy in the visualized lower lumbar spine.  Bone mineralization is within normal limits. The unenhanced musculature and soft tissues are unremarkable.  There is no pelvic free fluid, with several pelvic calcifications, and scattered aortoiliac vascular calcifications.     Abnormal inclination of the acetabular cup component of left hip arthroplasty, with advanced acetabuli protrusio and subacute fracture along the central and cranial aspect of thinned left acetabulum.  Please see above. Electronically signed by: Damaso Brower Date:    10/30/2024 Time:    07:17    Echo    Result Date: 10/29/2024    Left Ventricle: The left ventricle is normal in size. Normal wall thickness. There is low normal systolic function with a visually estimated ejection fraction of 50 - 55%. Global longitudinal strain is --15.0%. There is normal diastolic function.   Right Ventricle: Normal right ventricular cavity size. Wall  thickness is normal. Systolic function is normal.   Aortic Valve: There is mild aortic valve sclerosis. There is mild aortic regurgitation with a centrally directed jet.   IVC/SVC: Normal venous pressure at 3 mmHg.     X-Ray Chest AP Portable    Result Date: 10/29/2024  EXAMINATION: XR CHEST AP PORTABLE CLINICAL HISTORY: Chest Pain; TECHNIQUE: Single frontal view of the chest was performed. COMPARISON: 06/21/2024 FINDINGS: Lungs are clear. No focal consolidation. No pleural effusion. No pneumothorax. Normal heart size.     No acute findings. Electronically signed by: Jason Burgos Date:    10/29/2024 Time:    05:34    CT Head Without Contrast    Result Date: 10/28/2024  EXAMINATION: CT HEAD WITHOUT CONTRAST CLINICAL HISTORY: Head trauma, minor (Age >= 65y); TECHNIQUE: Low dose axial CT images obtained throughout the head without intravenous contrast. Sagittal and coronal reconstructions were performed. COMPARISON: 06/21/2024 FINDINGS: Motion artifact. Intracranial compartment: Ventricles and sulci are normal in size for age without evidence of hydrocephalus. No extra-axial blood or fluid collections. The brain parenchyma appears normal. No parenchymal mass, hemorrhage, edema or major vascular distribution infarct. Skull/extracranial contents (limited evaluation): No fracture. Mastoid air cells and paranasal sinuses are essentially clear.     No acute abnormality. Electronically signed by: Jason Burgos Date:    10/28/2024 Time:    20:03  - pulls last radiology orders

## 2024-11-01 NOTE — ASSESSMENT & PLAN NOTE
Fracture identified on CT.  Orthopedic surgery consulted.  Toe-touch weight-bearing status recommended.  Scheduling Tylenol, gabapentin.  Given history of GI bleed, increasing PPI to twice daily.      Discontinue Toradol.   Evaluated by orthopedic surgeon- no surgical intervention.   PT recommended inpatient rehab placement.   However patient just want to go home with home health.  We will let the patient go home with home health in the a.m. once potassium improved.

## 2024-11-01 NOTE — PROGRESS NOTES
[facility] Medicine  Progress Note    Patient Name: Omar Trotter  MRN: [id number]  Patient Class: IP- Inpatient   Admission Date: 10/28/2024  Length of Stay: 1 days  Attending Physician: Flynn Matthews MD  Primary Care Provider: Cam Orozco MD        Subjective:     Principal Problem:<principal problem not specified>        HPI:   Please note that pt was uncooperative during the example. He was sleepy and wanted to be left alone. Questions were repeated multiple times for him to answer.    ( This is a note from the physician from previous admission.  Placed here because of I encountered the same saturation)     65 yo male with PMH of GERD, HTN, and BPH, previous hip surgery ,UGIB, / esophagitis was admitted from ER with possible syncopal episode and drowsiness.   His previous re toxicology study positive for benzo and others but today positive for amphetamine   Patient can not tell what time he fell from the bed but he asked me to look at EMS note.   He states to ER MD  he woke up on his living room floor. He is uncertain how he got on the floor.  He does not remember falling.  As per  ER MD , deny  headache or neck pain, no incontinence, seizures..no abdominal pain. Reported someone  came to check on him and he appeared drowsy so he activated EMS.   On examination he denied having chest pain, shortness for breath but he appeared very drowsy.  Vitals stable and labs are stable with chronic anemia.  CT head negative and chest x-ray did not show any acute problem.  He denied using substances but RN reported he was on Suboxone in the past and  opiate dependence noted in the past history.      Overview/Hospital Course:  64-year-old male with a history of prior of GI bleed/esophagitis, chronic wound infection on doxycycline who presents after being found on the ground and drowsy in the setting recent methamphetamine.  While hospitalized he noted severe left hip pain and CT demonstrated  abnormal inclination of the acetabular cup component of left hip arthroplasty, with advanced acetabuli protrusio and subacute fracture along the central and cranial aspect of thinned left acetabulum.      Orthopedic surgery was consulted and they have recommended medical management with toe-touch weight-bearing status.  He is on scheduled gabapentin, Flexeril and ketorolac.  Pain is improving such that he is currently able to work with physical therapy.  Inpatient rehab has been recommended.    His home PPI has been increased to twice a day as prophylaxis in the setting of his receiving ketorolac with a prior history of a GI bleed.  Clinically no evidence of bleed at present and hemoglobin stable.    Interval History:     Seen and examined at multidisciplinary rounds, want go home, left hip pain improved.  Able to stand up and move around with the assistance for around 30 ft.  Potassium 5.2 this morning.  Lisinopril has been discontinued and started on Lokelma.     Review of Systems  Objective:     Vital Signs (Most Recent):  Temp: 97.7 °F (36.5 °C) (11/01/24 1100)  Pulse: 97 (11/01/24 1100)  Resp: 18 (11/01/24 1100)  BP: (!) 155/84 (notified nurse) (11/01/24 1100)  SpO2: 97 % (11/01/24 1100) Vital Signs (24h Range):  Temp:  [97.5 °F (36.4 °C)-98.2 °F (36.8 °C)] 97.7 °F (36.5 °C)  Pulse:  [74-97] 97  Resp:  [17-18] 18  SpO2:  [96 %-99 %] 97 %  BP: (119-155)/(65-84) 155/84     Weight: 76.2 kg (167 lb 15.9 oz)  Body mass index is 23.43 kg/m².    Intake/Output Summary (Last 24 hours) at 11/1/2024 1335  Last data filed at 11/1/2024 1242  Gross per 24 hour   Intake 600 ml   Output 3150 ml   Net -2550 ml         Physical Exam  Constitutional:       Appearance: Normal appearance.   HENT:      Head: Normocephalic.   Eyes:      Extraocular Movements: Extraocular movements intact.   Cardiovascular:      Rate and Rhythm: Normal rate and regular rhythm.   Pulmonary:      Effort: Pulmonary effort is normal. No respiratory  distress.      Breath sounds: No wheezing or rales.   Abdominal:      General: Bowel sounds are normal. There is no distension.      Palpations: Abdomen is soft.      Tenderness: There is no abdominal tenderness.   Musculoskeletal:         General: No swelling, tenderness or deformity.      Cervical back: Normal range of motion.   Neurological:      Mental Status: He is alert.     range of motion of left hip less limited by pain now.         Significant Labs: All pertinent labs within the past 24 hours have been reviewed.  CBC:   Recent Labs   Lab 10/31/24  0524 11/01/24  0520   WBC 3.57* 3.87*   HGB 10.0* 9.4*   HCT 33.8* 30.8*    173     CMP:   Recent Labs   Lab 10/31/24  0524 11/01/24  0520    136   K 4.6 5.2*    104   CO2 28 28   GLU 84 89   BUN 17 23   CREATININE 0.7 0.9   CALCIUM 8.8 8.6*   ANIONGAP 4* 4*     Magnesium:   Recent Labs   Lab 10/31/24  0524 11/01/24  0520   MG 2.0 2.0       Significant Imaging: I have reviewed all pertinent imaging results/findings within the past 24 hours.    Scheduled Meds:   acetaminophen  1,000 mg Oral QID    amLODIPine  5 mg Oral QHS    cyclobenzaprine  10 mg Oral TID    doxycycline  100 mg Oral Q12H    ferric gluconate (FERRLECIT) 125 mg in 0.9% NaCl 100 mL IVPB  125 mg Intravenous Daily    gabapentin  600 mg Oral TID    mupirocin   Nasal BID    oxybutynin  5 mg Oral Daily    pantoprazole  40 mg Oral BID AC    sodium zirconium cyclosilicate  10 g Oral BID    tamsulosin  0.8 mg Oral Daily     Continuous Infusions:  PRN Meds:.  Current Facility-Administered Medications:     dextrose 50%, 12.5 g, Intravenous, PRN    dextrose 50%, 25 g, Intravenous, PRN    glucagon (human recombinant), 1 mg, Intramuscular, PRN    glucose, 16 g, Oral, PRN    glucose, 24 g, Oral, PRN    naloxone, 0.02 mg, Intravenous, PRN    senna-docusate 8.6-50 mg, 1 tablet, Oral, BID PRN    sodium chloride 0.9%, 10 mL, Intravenous, Q12H PRN    X-Ray Hip 2 or 3 views Left with Pelvis when  performed    Result Date: 10/30/2024  EXAMINATION: XR HIP WITH PELVIS WHEN PERFORMED 2 OR 3 VIEWS LEFT CLINICAL HISTORY: L hip fx?; Presence of left artificial hip joint FINDINGS: Single AP pelvis and two views of the left hip are compared to prior exams, including CT of the prior day.  Total left hip arthroplasty components have unchanged alignment, with abnormal steep inclination of the left acetabular cup, and subsidence with acetabular protrusio.  The known left acetabular fracture is not well visualized. There is advanced degenerative narrowing of the right hip joint space with evidence of full-thickness chondral loss, osteophytes and subcortical cystic changes and sclerosis.     Please see above and recent CT report. Electronically signed by: Damaso Brower Date:    10/30/2024 Time:    10:38    CT Hip Without Contrast Left    Result Date: 10/30/2024  EXAMINATION: CT HIP WITHOUT CONTRAST LEFT CLINICAL HISTORY: Pain after fall; TECHNIQUE: Axial noncontrast imaging was performed, with sagittal and coronal reformatted images reviewed. COMPARISON: Prior exams including CT of 03/06/2024. FINDINGS: There is a left total hip arthroplasty, with femoral component well seated and unchanged in alignment, without evidence of loosening. Acetabular cup inclination is abnormally steep, with no findings of asymmetric polyethylene wear. There is subsidence along the acetabular component with acetabuli protrusio, and marked osseous thinning.  A subacute healing fracture occurs along the central and cranial aspect of the acetabulum with some immature periosteal reaction, coronal images 38-51 series 6. There are no other fractures or destructive osseous lesions.  There is advanced intervertebral disc space narrowing and facet arthropathy in the visualized lower lumbar spine.  Bone mineralization is within normal limits. The unenhanced musculature and soft tissues are unremarkable.  There is no pelvic free fluid, with several pelvic  calcifications, and scattered aortoiliac vascular calcifications.     Abnormal inclination of the acetabular cup component of left hip arthroplasty, with advanced acetabuli protrusio and subacute fracture along the central and cranial aspect of thinned left acetabulum.  Please see above. Electronically signed by: Damaso Brower Date:    10/30/2024 Time:    07:17    Echo    Result Date: 10/29/2024    Left Ventricle: The left ventricle is normal in size. Normal wall thickness. There is low normal systolic function with a visually estimated ejection fraction of 50 - 55%. Global longitudinal strain is --15.0%. There is normal diastolic function.   Right Ventricle: Normal right ventricular cavity size. Wall thickness is normal. Systolic function is normal.   Aortic Valve: There is mild aortic valve sclerosis. There is mild aortic regurgitation with a centrally directed jet.   IVC/SVC: Normal venous pressure at 3 mmHg.     X-Ray Chest AP Portable    Result Date: 10/29/2024  EXAMINATION: XR CHEST AP PORTABLE CLINICAL HISTORY: Chest Pain; TECHNIQUE: Single frontal view of the chest was performed. COMPARISON: 06/21/2024 FINDINGS: Lungs are clear. No focal consolidation. No pleural effusion. No pneumothorax. Normal heart size.     No acute findings. Electronically signed by: Jason Burgos Date:    10/29/2024 Time:    05:34    CT Head Without Contrast    Result Date: 10/28/2024  EXAMINATION: CT HEAD WITHOUT CONTRAST CLINICAL HISTORY: Head trauma, minor (Age >= 65y); TECHNIQUE: Low dose axial CT images obtained throughout the head without intravenous contrast. Sagittal and coronal reconstructions were performed. COMPARISON: 06/21/2024 FINDINGS: Motion artifact. Intracranial compartment: Ventricles and sulci are normal in size for age without evidence of hydrocephalus. No extra-axial blood or fluid collections. The brain parenchyma appears normal. No parenchymal mass, hemorrhage, edema or major vascular distribution infarct.  Skull/extracranial contents (limited evaluation): No fracture. Mastoid air cells and paranasal sinuses are essentially clear.     No acute abnormality. Electronically signed by: Jason Burgos Date:    10/28/2024 Time:    20:03  - pulls last radiology orders      Assessment/Plan:      History of left hip replacement    Fracture identified on CT.  Orthopedic surgery consulted.  Toe-touch weight-bearing status recommended.  Scheduling Tylenol, gabapentin.  Given history of GI bleed, increasing PPI to twice daily.      Discontinue Toradol.   Evaluated by orthopedic surgeon- no surgical intervention.   PT recommended inpatient rehab placement.   However patient just want to go home with home health.  We will let the patient go home with home health in the a.m. once potassium improved.     Cirrhosis of liver  Previous diagnosis noted  MELD-Na score calculated; MELD 3.0: 7 at 3/1/2024  4:29 AM  MELD-Na: 7 at 3/1/2024  4:29 AM  Calculated from:  Serum Creatinine: 0.6 mg/dL (Using min of 1 mg/dL) at 3/1/2024  4:29 AM  Serum Sodium: 138 mmol/L (Using max of 137 mmol/L) at 3/1/2024  4:29 AM  Total Bilirubin: 0.4 mg/dL (Using min of 1 mg/dL) at 2/29/2024 10:42 PM  Serum Albumin: 3.9 g/dL (Using max of 3.5 g/dL) at 2/29/2024 10:42 PM  INR(ratio): 1.1 at 2/29/2024 10:42 PM  Age at listing (hypothetical): 63 years  Sex: Male at 3/1/2024  4:29 AM      Anemia possible from cirrhosis.  LFT are stable    Hyperkalemia  Hyperkalemia is likely due to Medication induced.The patients most recent potassium results are listed below.  Recent Labs     10/30/24  0400 10/31/24  0524 11/01/24  0520   K 4.1 4.6 5.2*     Plan  - Monitor for arrhythmias with EKG and/or continuous telemetry.   - Treat the hyperkalemia with Potassium Binders.   - Monitor potassium: Daily  - The patient's hyperkalemia is stable      Discontinue lisinopril, discontinue Toradol.  Start Lokelma            Syncope  Fall/syncope/drug toxicity    Plan   Previous recent  carotid ultrasound negative  Echocardiogram without significant valvular disease   Telemetry without critical arrhythmia  PT and OT         Uncomplicated opioid dependence  History aware  Was told patient was on Suboxone in the past  Currently he does not want narcotics for his pain.      Anemia    CT abdomen in the past with splenomegaly most likely from portal hypertension    He has a history of GI bleed.  No evidence of an active bleed.  Follow hemoglobin and clinically as ketorolac has been scheduled.    PPI increased to twice daily    We will give IV iron    Hepatitis C  History noted        VTE Risk Mitigation (From admission, onward)           Ordered     IP VTE HIGH RISK PATIENT  Once         10/29/24 0008     Place sequential compression device  Until discontinued         10/29/24 0008                    Discharge Planning   NATALIA: 11/2/2024     Code Status: Full Code   Is the patient medically ready for discharge?:     Reason for patient still in hospital (select all that apply): Patient trending condition and Treatment  Discharge Plan A: Home Health   Discharge Delays: (!) Procedure Scheduling (IR, OR, Labs, Echo, Cath, Echo, EEG)              Flynn Matthews MD  Department of Hospital Medicine   [facility]

## 2024-11-01 NOTE — PROGRESS NOTES
"Pending sale to [facility]  Adult Nutrition   Progress Note (Follow-Up)    SUMMARY     Recommendations  Recommendation/Intervention: 1. Continue Cardiac diet as tolerated.Monitor glucose labs. 2. Continue Glucerna with meals for additional kca/protein.  Goals: 1. Intake will be >/= 75% 'EEN / EPN and labs will trend to target range by follow up.  Nutrition Goal Status: progressing towards goal    Nutrition Diagnosis PES Statement:   Increased nutrition needs related to wound healing as evidenced by open wound followed by wound care MD in patient with hyperglycemia (Hb A1c 6.5%).  Glucose labs within defined limits.    Dietitian Rounds Brief  Patient has good intake of meals and drinks Glucerna. Pt at all of his breakfast. Plan is to dc soon. Last BM was today. RD to monitor for intake, labs, and status change PRN.    Nutrition Related Social Determinants of Health:   Food Insecurity: No Food Insecurity (10/29/2024)    Hunger Vital Sign     Worried About Running Out of Food in the Last Year: Never true     Ran Out of Food in the Last Year: Never true         Malnutrition Assessment   None at this time              Diet order:   Current Diet Order: Cardiac diet   Oral Nutrition Supplement: Glucerna with meals             Evaluation of Received Nutrient/Fluid Intake  Energy Calories Required: meeting needs  Protein Required: meeting needs  Fluid Required: meeting needs  Tolerance: tolerating     % Intake of Estimated Energy Needs: 75 - 100 %  % Meal Intake: 75 - 100 %      Intake/Output Summary (Last 24 hours) at 11/1/2024 1515  Last data filed at 11/1/2024 1242  Gross per 24 hour   Intake 600 ml   Output 3150 ml   Net -2550 ml        Anthropometrics  Temp: 97.7 °F (36.5 °C)  Height Method: Stated  Height: 5' 11" (180.3 cm)  Height (inches): 71 in  Weight Method: Bed Scale  Weight: 76.2 kg (167 lb 15.9 oz)  Weight (lb): 167.99 lb  Ideal Body Weight (IBW), Male: 172 lb  % Ideal Body Weight, Male (lb): 97.67 %  BMI " (Calculated): 23.4       Estimated/Assessed Needs  Weight Used For Calorie Calculations: 76.2 kg (167 lb 15.9 oz)  Energy Calorie Requirements (kcal): 1417-3297 kcal / day (25-30 kcal/kg)  Energy Need Method: Kcal/kg  Protein Requirements:  gm/day (1.2-1.6 gm/kg)  Weight Used For Protein Calculations: 76.2 kg (167 lb 15.9 oz)     Estimated Fluid Requirement Method: RDA Method  RDA Method (mL): 1905  CHO Requirement: 238-285 gm/day (15-19 cho servings / day)    Reason for Assessment  Reason For Assessment: RD follow-up  Diagnosis: other (see comments) (chest pain, syncope)  General Information Comments: Plan to dc soon  Nutrition Discharge Planning: Cardiac diet. Glucerna with meals    Nutrition/Diet History  Spiritual, Cultural Beliefs, Synagogue Practices, Values that Affect Care: no  Food Allergies: NKFA  Factors Affecting Nutritional Intake: decreased appetite    Nutrition Risk Screen  Nutrition Risk Screen: no indicators present       Wound 10/29/24 0123 Abscess Right anterior;lower Leg-Wound Image: Images linked  MST Score: 3  Have you recently lost weight without trying?: Unsure  Weight loss score: 2  Have you been eating poorly because of a decreased appetite?: Yes  Appetite score: 1       Weight History:  Wt Readings from Last 10 Encounters:   10/29/24 76.2 kg (167 lb 15.9 oz)   10/29/24 76.2 kg (167 lb 15.9 oz)   07/25/24 72.6 kg (160 lb)   06/21/24 79.4 kg (175 lb)   03/15/24 71.7 kg (158 lb)   03/07/24 70.5 kg (155 lb 6.8 oz)   03/06/24 69 kg (152 lb 3.2 oz)   03/01/24 64.5 kg (142 lb 3.2 oz)   12/12/23 79.4 kg (175 lb)   09/14/23 80.3 kg (177 lb)        Lab/Procedures/Meds: Pertinent Labs/Meds Reviewed    Medications:Pertinent Medications Reviewed  Scheduled Meds:   acetaminophen  1,000 mg Oral QID    amLODIPine  5 mg Oral QHS    cyclobenzaprine  10 mg Oral TID    doxycycline  100 mg Oral Q12H    enoxparin  40 mg Subcutaneous Q24H (prophylaxis, 1700)    ferric gluconate (FERRLECIT) 125 mg in 0.9%  "NaCl 100 mL IVPB  125 mg Intravenous Daily    gabapentin  600 mg Oral TID    mupirocin   Nasal BID    oxybutynin  5 mg Oral Daily    pantoprazole  40 mg Oral BID AC    sodium zirconium cyclosilicate  10 g Oral BID    tamsulosin  0.8 mg Oral Daily     Continuous Infusions:  PRN Meds:.  Current Facility-Administered Medications:     dextrose 50%, 12.5 g, Intravenous, PRN    dextrose 50%, 25 g, Intravenous, PRN    glucagon (human recombinant), 1 mg, Intramuscular, PRN    glucose, 16 g, Oral, PRN    glucose, 24 g, Oral, PRN    naloxone, 0.02 mg, Intravenous, PRN    senna-docusate 8.6-50 mg, 1 tablet, Oral, BID PRN    sodium chloride 0.9%, 10 mL, Intravenous, Q12H PRN    Labs: Pertinent Labs Reviewed  Clinical Chemistry:  Recent Labs   Lab 10/28/24  2008 10/30/24  0400 10/31/24  0524 11/01/24  0520   * 135*   < > 136   K 3.8 4.1   < > 5.2*    104   < > 104   CO2 25 23   < > 28   GLU 87 84   < > 89   BUN 12 15   < > 23   CREATININE 0.6 0.6   < > 0.9   CALCIUM 8.6* 8.7   < > 8.6*   PROT 6.9 7.0  --   --    ALBUMIN 3.4* 3.5  --   --    BILITOT 0.5 0.4  --   --    ALKPHOS 125 129  --   --    AST 15 14  --   --    ALT 11 9*  --   --    ANIONGAP 5* 8   < > 4*   MG 1.9 1.9   < > 2.0    < > = values in this interval not displayed.     CBC:   Recent Labs   Lab 11/01/24  0520   WBC 3.87*   RBC 3.75*   HGB 9.4*   HCT 30.8*      MCV 82   MCH 25.1*   MCHC 30.5*     Lipid Panel:  No results for input(s): "CHOL", "HDL", "LDLCALC", "TRIG", "CHOLHDL" in the last 168 hours.  Cardiac Profile:  Recent Labs   Lab 10/28/24  2008   *     Inflammatory Labs:  Recent Labs   Lab 11/01/24  0520   CRP 1.70*     Diabetes:  No results for input(s): "HGBA1C", "POCTGLUCOSE" in the last 168 hours.  Thyroid & Parathyroid:  No results for input(s): "TSH", "FREET4", "A8BECLB", "Y9LCBUE", "THYROIDAB" in the last 168 hours.    Monitor and Evaluation  Food and Nutrient Intake: energy intake, food and beverage intake  Food and Nutrient " Adminstration: diet order  Knowledge/Beliefs/Attitudes: food and nutrition knowledge/skill  Physical Activity and Function: nutrition-related ADLs and IADLs  Anthropometric Measurements: weight change, weight, body mass index  Biochemical Data, Medical Tests and Procedures: gastrointestinal profile, electrolyte and renal panel, inflammatory profile, lipid profile, glucose/endocrine profile  Nutrition-Focused Physical Findings: overall appearance     Nutrition Risk  Level of Risk/Frequency of Follow-up:  (2 x / week)     Nutrition Follow-Up  RD Follow-up?: Yes

## 2024-11-01 NOTE — PLAN OF CARE
SW met pt at bedside to discuss HH agency options and patient choice; SW scanned signed pt choice form into media for HH; pt only preference was insurance coverage; FLORENCIA sent referral via [employer]; Marycruz with [facility] informed SW via direct msg that pt accepted and being processed; FLORENCIA requested SOC date and updated Marycruz about pt garth     11/01/24 1122   Post-Acute Status   Post-Acute Authorization Home Health   Home Health Status Referrals Sent   Discharge Delays (!) Procedure Scheduling (IR, OR, Labs, Echo, Cath, Echo, EEG)   Discharge Plan   Discharge Plan A Home Health   Discharge Plan B [facility]

## 2024-11-01 NOTE — PT/OT/SLP EVAL
Occupational Therapy   Evaluation    Name: Omar Trotter  MRN: [id number]  Admitting Diagnosis: Anemia  Recent Surgery: * No surgery found *      Recommendations:     Discharge Recommendations: Low Intensity Therapy  Discharge Equipment Recommendations:  walker, rolling, hip kit  Barriers to discharge:   (increased assist with ADLs and mobility)    Assessment:     Omar Trotter is a 64 y.o. male with a medical diagnosis of Anemia.  Pt agreeable to OT evaluation this AM. Performance deficits affecting function: weakness, impaired endurance, impaired self care skills, impaired functional mobility, gait instability, impaired cardiopulmonary response to activity.      Rehab Prognosis: Good; patient would benefit from acute skilled OT services to address these deficits and reach maximum level of function.       Plan:     Patient to be seen 5 x/week to address the above listed problems via self-care/home management, therapeutic activities, therapeutic exercises  Plan of Care Expires:    Plan of Care Reviewed with: patient    Subjective     Chief Complaint: none stated; ready to go home  Patient/Family Comments/goals: to return home    Occupational Profile:  Living Environment: Pt lives alone but states he has a friend moving in with him today. Pt has a tub/shower combo. Pt lives in a 1 story home with no XIOMARA.  Previous level of function: Mod I with ADLs, IADLs, and mobility; Relies either on a transportation service or friends for transportation  Roles and Routines: primary homemaker  Equipment Used at Home: cane, straight  Assistance upon Discharge: yes, from friend    Pain/Comfort:  Pain Rating 1: 0/10    Patients cultural, spiritual, Jain conflicts given the current situation:      Objective:     Communicated with: nursing prior to session.  Patient found HOB elevated with telemetry, peripheral IV upon OT entry to room.    General Precautions: Standard, fall, contact  Orthopedic Precautions: LLE toe touch  weight bearing  Braces: N/A  Respiratory Status: Room air    Occupational Performance:    Bed Mobility:    Patient completed Supine to Sit with stand by assistance    Functional Mobility/Transfers:  Patient completed Sit <> Stand Transfer with stand by assistance  with  rolling walker   Functional Mobility: pt amb in room with SBA/CGA with RW with no LOB or SOB while adhering to WB precautions with no cues needed    Activities of Daily Living:  Grooming: stand by assistance standing at sink to wash hands  Lower Body Dressing: stand by assistance seated EOB to don/doff socks with AD (after initial demo from OT)  Toileting: stand by assistance standing at toilet to void    Cognitive/Visual Perceptual:  Cognitive/Psychosocial Skills:     -       Follows Commands/attention:Follows one-step commands  -       Communication: clear/fluent  -       Memory: No Deficits noted  -       Safety awareness/insight to disability: impaired   -       Mood/Affect/Coping skills/emotional control: Appropriate to situation, Cooperative, and Pleasant    Physical Exam:  Balance:    -       SBA seated balance; SBA/CGA standing balance  Upper Extremity Range of Motion:     -       Right Upper Extremity: WFL  -       Left Upper Extremity: WFL  Upper Extremity Strength:    -       Right Upper Extremity: WFL  -       Left Upper Extremity: WFL   Strength:    -       Right Upper Extremity: WFL  -       Left Upper Extremity: WFL   Fine Motor Coordination:    -       Intact  Gross motor coordination:   WFL    AMPAC 6 Click ADL:  AMPAC Total Score: 18    Treatment & Education:  Pt educated on role of OT/POC, importance of OOB/EOB activity, use of call bell, and safety during ADLs, transfers, and functional mobility.    Patient left sitting edge of bed with all lines intact, call button in reach, and nurse present and direct handoff to PT    GOALS:   Multidisciplinary Problems       Occupational Therapy Goals          Problem: Occupational Therapy     Goal Priority Disciplines Outcome Interventions   Occupational Therapy Goal     OT, PT/OT     Description: Goals to be met by: 12/1/24     Patient will increase functional independence with ADLs by performing:    UE Dressing with Supervision.  LE Dressing with Supervision.  Grooming while standing at sink with Supervision.  Toileting from toilet with Supervision for hygiene and clothing management.   Toilet transfer to toilet with Supervision.                         History:     Past Medical History:   Diagnosis Date    JACKLYN (acute kidney injury) 11/11/2022    Arthritis     GERD (gastroesophageal reflux disease)     Hepatitis C     STATES DX 10-20. NO S/S. NO FURTHER TESTS OR TX. WITH INSURANCE IN 2021 CAN GET IT EVALUATED.    History of left hip replacement 05/20/2021    History of MRSA infection     History of right shoulder replacement 01/15/2021    Hypertension     Wears glasses          Past Surgical History:   Procedure Laterality Date    APPENDECTOMY      ARTHROPLASTY OF SHOULDER Right 1/13/2021    Procedure: ARTHROPLASTY, SHOULDER TOTAL;  Surgeon: Emilio Siddiqui MD;  Location: [facility];  Service: Orthopedics;  Laterality: Right;  GENERAL AND BLOCK    COLONOSCOPY N/A 5/8/2023    Procedure: COLONOSCOPY;  Surgeon: Bobby Bourgeois MD;  Location: [facility];  Service: Endoscopy;  Laterality: N/A;    ESOPHAGOGASTRODUODENOSCOPY N/A 5/7/2023    Procedure: EGD (ESOPHAGOGASTRODUODENOSCOPY);  Surgeon: Carloz Christianson Jr., MD;  Location: [facility];  Service: Endoscopy;  Laterality: N/A;    ESOPHAGOGASTRODUODENOSCOPY N/A 3/1/2024    Procedure: EGD (ESOPHAGOGASTRODUODENOSCOPY);  Surgeon: Femi Hyde MD;  Location: [facility];  Service: Endoscopy;  Laterality: N/A;    HERNIA REPAIR      HIP REPLACEMENT ARTHROPLASTY Left 3/24/2021    Procedure: ARTHROPLASTY, HIP REPLACEMENT;  Surgeon: Miles Rivas II, MD;  Location: [facility];  Service: Orthopedics;  Laterality: Left;    JOINT REPLACEMENT Right     shoulder     REVISION TOTAL HIP ARTHROPLASTY Left 6/2/2021    Procedure: REVISION, TOTAL ARTHROPLASTY, HIP;  Surgeon: Miles Rivas II, MD;  [facility];  Service: Orthopedics;  Laterality: Left;    SMALL BOWEL ENTEROSCOPY Left 5/8/2023    Procedure: ENTEROSCOPY;  Surgeon: Bobby Bourgeois MD;  Location: [facility];  Service: Endoscopy;  Laterality: Left;       Time Tracking:     OT Date of Treatment: 11/01/24  OT Start Time: 1110  OT Stop Time: 1140  OT Total Time (min): 30 min    Billable Minutes:Evaluation 7  Self Care/Home Management 23 11/1/2024

## 2024-11-01 NOTE — PT/OT/SLP PROGRESS
Physical Therapy Treatment    Patient Name:  Omar Trotter   MRN:  [id number]    Recommendations:     Discharge Recommendations: Low Intensity Therapy  Discharge Equipment Recommendations: walker, rolling  Barriers to discharge: None    Assessment:     Omar Trotter is a 64 y.o. male admitted with a medical diagnosis of <principal problem not specified>.  He presents with the following impairments/functional limitations: weakness, impaired endurance, impaired functional mobility, gait instability, impaired cardiopulmonary response to activity, orthopedic precautions . Direct hand off of pt from OT with pt sitting eob. Single gait trial completed in the room secondary to isolation precautions, with pt displaying TTWB throughout ambulating 60 feet CGA with a rolling walker.     Rehab Prognosis: Fair; patient would benefit from acute skilled PT services to address these deficits and reach maximum level of function.    Recent Surgery: * No surgery found *      Plan:     During this hospitalization, patient to be seen 5 x/week to address the identified rehab impairments via gait training, therapeutic activities, therapeutic exercises and progress toward the following goals:    Plan of Care Expires:  11/29/24    Subjective     Chief Complaint: None stated  Patient/Family Comments/goals: I'm ready to go home.   Pain/Comfort:  Pain Rating 1: 0/10  Pain Rating Post-Intervention 1: 0/10      Objective:     Communicated with RN prior to session.  Patient found sitting edge of bed with telemetry upon PT entry to room.     General Precautions: Standard, contact, fall  Orthopedic Precautions: LLE toe touch weight bearing  Braces:    Respiratory Status: Room air     Functional Mobility:  Transfers:     Sit to Stand:  stand by assistance with rolling walker  Gait: 60' CGA with rw      AM-PAC 6 CLICK MOBILITY          Treatment & Education:  Pt was educated on the following: call light use, importance of OOB activity and  functional mobility to negate the negative effects of prolonged bed rest during this hospitalization, safe transfers/ambulation and discharge planning recommendations/options.     Patient left sitting edge of bed with all lines intact and call button in reach..    GOALS:   Multidisciplinary Problems       Physical Therapy Goals          Problem: Physical Therapy    Goal Priority Disciplines Outcome Interventions   Physical Therapy Goal     PT, PT/OT     Description: Goals to be met by: 2024     Patient will increase functional independence with mobility by performin. Supine to sit with Stand-by Assistance  2. Sit to stand transfer with Stand-by Assistance  3. Gait  x 50  feet with Stand-by Assistance using Rolling Walker.                          Time Tracking:     PT Received On: 24  PT Start Time: 1135     PT Stop Time: 1146  PT Total Time (min): 11 min     Billable Minutes: Gait Training 11    Treatment Type: Treatment  PT/PTA: PTA     Number of PTA visits since last PT visit: 1     2024

## 2024-11-02 LAB
ANION GAP SERPL CALC-SCNC: 4 MMOL/L (ref 8–16)
BUN SERPL-MCNC: 22 MG/DL (ref 8–23)
CALCIUM SERPL-MCNC: 8.9 MG/DL (ref 8.7–10.5)
CHLORIDE SERPL-SCNC: 103 MMOL/L (ref 95–110)
CO2 SERPL-SCNC: 30 MMOL/L (ref 23–29)
CREAT SERPL-MCNC: 0.8 MG/DL (ref 0.5–1.4)
EST. GFR  (NO RACE VARIABLE): >60 ML/MIN/1.73 M^2
GLUCOSE SERPL-MCNC: 141 MG/DL (ref 70–110)
POTASSIUM SERPL-SCNC: 4.5 MMOL/L (ref 3.5–5.1)
SODIUM SERPL-SCNC: 137 MMOL/L (ref 136–145)

## 2024-11-02 PROCEDURE — 25000003 PHARM REV CODE 250: Performed by: INTERNAL MEDICINE

## 2024-11-02 PROCEDURE — 63600175 PHARM REV CODE 636 W HCPCS: Performed by: HOSPITALIST

## 2024-11-02 PROCEDURE — 36415 COLL VENOUS BLD VENIPUNCTURE: CPT | Performed by: HOSPITALIST

## 2024-11-02 PROCEDURE — 12000002 HC ACUTE/MED SURGE SEMI-PRIVATE ROOM

## 2024-11-02 PROCEDURE — 27000207 HC ISOLATION

## 2024-11-02 PROCEDURE — 80048 BASIC METABOLIC PNL TOTAL CA: CPT | Performed by: HOSPITALIST

## 2024-11-02 PROCEDURE — 25000003 PHARM REV CODE 250: Performed by: HOSPITALIST

## 2024-11-02 RX ADMIN — ACETAMINOPHEN 1000 MG: 500 TABLET, FILM COATED ORAL at 12:11

## 2024-11-02 RX ADMIN — DOXYCYCLINE HYCLATE 100 MG: 100 CAPSULE ORAL at 08:11

## 2024-11-02 RX ADMIN — ACETAMINOPHEN 1000 MG: 500 TABLET, FILM COATED ORAL at 08:11

## 2024-11-02 RX ADMIN — PANTOPRAZOLE SODIUM 40 MG: 40 TABLET, DELAYED RELEASE ORAL at 04:11

## 2024-11-02 RX ADMIN — TAMSULOSIN HYDROCHLORIDE 0.8 MG: 0.4 CAPSULE ORAL at 09:11

## 2024-11-02 RX ADMIN — AMLODIPINE BESYLATE 5 MG: 5 TABLET ORAL at 08:11

## 2024-11-02 RX ADMIN — CYCLOBENZAPRINE 10 MG: 10 TABLET, FILM COATED ORAL at 09:11

## 2024-11-02 RX ADMIN — ENOXAPARIN SODIUM 40 MG: 40 INJECTION SUBCUTANEOUS at 05:11

## 2024-11-02 RX ADMIN — SODIUM CHLORIDE 125 MG: 9 INJECTION, SOLUTION INTRAVENOUS at 09:11

## 2024-11-02 RX ADMIN — GABAPENTIN 600 MG: 300 CAPSULE ORAL at 08:11

## 2024-11-02 RX ADMIN — GABAPENTIN 600 MG: 300 CAPSULE ORAL at 02:11

## 2024-11-02 RX ADMIN — MUPIROCIN 1 G: 20 OINTMENT TOPICAL at 09:11

## 2024-11-02 RX ADMIN — ACETAMINOPHEN 1000 MG: 500 TABLET, FILM COATED ORAL at 05:11

## 2024-11-02 RX ADMIN — MUPIROCIN 1 G: 20 OINTMENT TOPICAL at 08:11

## 2024-11-02 RX ADMIN — SODIUM ZIRCONIUM CYCLOSILICATE 10 G: 10 POWDER, FOR SUSPENSION ORAL at 08:11

## 2024-11-02 RX ADMIN — ACETAMINOPHEN 1000 MG: 500 TABLET, FILM COATED ORAL at 09:11

## 2024-11-02 RX ADMIN — DOXYCYCLINE HYCLATE 100 MG: 100 CAPSULE ORAL at 09:11

## 2024-11-02 RX ADMIN — OXYBUTYNIN CHLORIDE 5 MG: 5 TABLET, EXTENDED RELEASE ORAL at 09:11

## 2024-11-02 RX ADMIN — GABAPENTIN 600 MG: 300 CAPSULE ORAL at 09:11

## 2024-11-02 RX ADMIN — SODIUM ZIRCONIUM CYCLOSILICATE 10 G: 10 POWDER, FOR SUSPENSION ORAL at 09:11

## 2024-11-02 RX ADMIN — CYCLOBENZAPRINE 10 MG: 10 TABLET, FILM COATED ORAL at 02:11

## 2024-11-02 RX ADMIN — CYCLOBENZAPRINE 10 MG: 10 TABLET, FILM COATED ORAL at 08:11

## 2024-11-02 RX ADMIN — PANTOPRAZOLE SODIUM 40 MG: 40 TABLET, DELAYED RELEASE ORAL at 05:11

## 2024-11-02 NOTE — SUBJECTIVE & OBJECTIVE
Interval History:     Left hip pain improved.  Able to stand up and move around with  assistance for around 30 ft.    Review of Systems   Constitutional:  Negative for activity change, appetite change, chills and fever.   HENT:  Negative for congestion, ear pain, nosebleeds and sinus pain.    Eyes:  Negative for discharge and itching.   Respiratory:  Negative for apnea, cough, chest tightness and shortness of breath.    Cardiovascular:  Negative for chest pain, palpitations and leg swelling.   Gastrointestinal:  Negative for abdominal distention, abdominal pain and vomiting.   Genitourinary:  Negative for difficulty urinating, dysuria, flank pain and frequency.   Musculoskeletal:  Positive for arthralgias. Negative for back pain, joint swelling and myalgias.   Skin:  Negative for color change, pallor and rash.   Neurological:  Negative for dizziness, weakness, light-headedness and headaches.   Psychiatric/Behavioral:  Negative for agitation, behavioral problems, confusion and suicidal ideas.      Objective:     Vital Signs (Most Recent):  Temp: 98.2 °F (36.8 °C) (11/02/24 1100)  Pulse: 102 (11/02/24 1100)  Resp: 18 (11/02/24 1100)  BP: 128/64 (11/02/24 1100)  SpO2: (!) 93 % (11/02/24 1100) Vital Signs (24h Range):  Temp:  [97.2 °F (36.2 °C)-98.2 °F (36.8 °C)] 98.2 °F (36.8 °C)  Pulse:  [] 102  Resp:  [14-18] 18  SpO2:  [93 %-99 %] 93 %  BP: (102-170)/(52-80) 128/64     Weight: 76.2 kg (167 lb 15.9 oz)  Body mass index is 23.43 kg/m².    Intake/Output Summary (Last 24 hours) at 11/2/2024 1237  Last data filed at 11/2/2024 1211  Gross per 24 hour   Intake 960 ml   Output 1600 ml   Net -640 ml         Physical Exam  Constitutional:       Appearance: Normal appearance.   HENT:      Head: Normocephalic.   Eyes:      Extraocular Movements: Extraocular movements intact.   Cardiovascular:      Rate and Rhythm: Normal rate and regular rhythm.   Pulmonary:      Effort: Pulmonary effort is normal. No respiratory  distress.      Breath sounds: No wheezing or rales.   Abdominal:      General: Bowel sounds are normal. There is no distension.      Palpations: Abdomen is soft.      Tenderness: There is no abdominal tenderness.   Musculoskeletal:         General: No swelling, tenderness or deformity.      Cervical back: Normal range of motion.   Neurological:      Mental Status: He is alert.     range of motion of left hip less limited by pain now.         Significant Labs: All pertinent labs within the past 24 hours have been reviewed.  CBC:   Recent Labs   Lab 11/01/24  0520   WBC 3.87*   HGB 9.4*   HCT 30.8*        CMP:   Recent Labs   Lab 11/01/24  0520 11/02/24  0628    137   K 5.2* 4.5    103   CO2 28 30*   GLU 89 141*   BUN 23 22   CREATININE 0.9 0.8   CALCIUM 8.6* 8.9   ANIONGAP 4* 4*     Magnesium:   Recent Labs   Lab 11/01/24  0520   MG 2.0       Significant Imaging: I have reviewed all pertinent imaging results/findings within the past 24 hours.    Scheduled Meds:   acetaminophen  1,000 mg Oral QID    amLODIPine  5 mg Oral QHS    cyclobenzaprine  10 mg Oral TID    doxycycline  100 mg Oral Q12H    enoxparin  40 mg Subcutaneous Q24H (prophylaxis, 1700)    ferric gluconate (FERRLECIT) 125 mg in 0.9% NaCl 100 mL IVPB  125 mg Intravenous Daily    gabapentin  600 mg Oral TID    mupirocin   Nasal BID    oxybutynin  5 mg Oral Daily    pantoprazole  40 mg Oral BID AC    sodium zirconium cyclosilicate  10 g Oral BID    tamsulosin  0.8 mg Oral Daily     Continuous Infusions:  PRN Meds:.  Current Facility-Administered Medications:     dextrose 50%, 12.5 g, Intravenous, PRN    dextrose 50%, 25 g, Intravenous, PRN    glucagon (human recombinant), 1 mg, Intramuscular, PRN    glucose, 16 g, Oral, PRN    glucose, 24 g, Oral, PRN    naloxone, 0.02 mg, Intravenous, PRN    senna-docusate 8.6-50 mg, 1 tablet, Oral, BID PRN    sodium chloride 0.9%, 10 mL, Intravenous, Q12H PRN    X-Ray Hip 2 or 3 views Left with Pelvis  when performed    Result Date: 10/30/2024  EXAMINATION: XR HIP WITH PELVIS WHEN PERFORMED 2 OR 3 VIEWS LEFT CLINICAL HISTORY: L hip fx?; Presence of left artificial hip joint FINDINGS: Single AP pelvis and two views of the left hip are compared to prior exams, including CT of the prior day.  Total left hip arthroplasty components have unchanged alignment, with abnormal steep inclination of the left acetabular cup, and subsidence with acetabular protrusio.  The known left acetabular fracture is not well visualized. There is advanced degenerative narrowing of the right hip joint space with evidence of full-thickness chondral loss, osteophytes and subcortical cystic changes and sclerosis.     Please see above and recent CT report. Electronically signed by: Damaso Brower Date:    10/30/2024 Time:    10:38    CT Hip Without Contrast Left    Result Date: 10/30/2024  EXAMINATION: CT HIP WITHOUT CONTRAST LEFT CLINICAL HISTORY: Pain after fall; TECHNIQUE: Axial noncontrast imaging was performed, with sagittal and coronal reformatted images reviewed. COMPARISON: Prior exams including CT of 03/06/2024. FINDINGS: There is a left total hip arthroplasty, with femoral component well seated and unchanged in alignment, without evidence of loosening. Acetabular cup inclination is abnormally steep, with no findings of asymmetric polyethylene wear. There is subsidence along the acetabular component with acetabuli protrusio, and marked osseous thinning.  A subacute healing fracture occurs along the central and cranial aspect of the acetabulum with some immature periosteal reaction, coronal images 38-51 series 6. There are no other fractures or destructive osseous lesions.  There is advanced intervertebral disc space narrowing and facet arthropathy in the visualized lower lumbar spine.  Bone mineralization is within normal limits. The unenhanced musculature and soft tissues are unremarkable.  There is no pelvic free fluid, with several  pelvic calcifications, and scattered aortoiliac vascular calcifications.     Abnormal inclination of the acetabular cup component of left hip arthroplasty, with advanced acetabuli protrusio and subacute fracture along the central and cranial aspect of thinned left acetabulum.  Please see above. Electronically signed by: Damaso Brower Date:    10/30/2024 Time:    07:17    Echo    Result Date: 10/29/2024    Left Ventricle: The left ventricle is normal in size. Normal wall thickness. There is low normal systolic function with a visually estimated ejection fraction of 50 - 55%. Global longitudinal strain is --15.0%. There is normal diastolic function.   Right Ventricle: Normal right ventricular cavity size. Wall thickness is normal. Systolic function is normal.   Aortic Valve: There is mild aortic valve sclerosis. There is mild aortic regurgitation with a centrally directed jet.   IVC/SVC: Normal venous pressure at 3 mmHg.     X-Ray Chest AP Portable    Result Date: 10/29/2024  EXAMINATION: XR CHEST AP PORTABLE CLINICAL HISTORY: Chest Pain; TECHNIQUE: Single frontal view of the chest was performed. COMPARISON: 06/21/2024 FINDINGS: Lungs are clear. No focal consolidation. No pleural effusion. No pneumothorax. Normal heart size.     No acute findings. Electronically signed by: Jason Burgos Date:    10/29/2024 Time:    05:34    CT Head Without Contrast    Result Date: 10/28/2024  EXAMINATION: CT HEAD WITHOUT CONTRAST CLINICAL HISTORY: Head trauma, minor (Age >= 65y); TECHNIQUE: Low dose axial CT images obtained throughout the head without intravenous contrast. Sagittal and coronal reconstructions were performed. COMPARISON: 06/21/2024 FINDINGS: Motion artifact. Intracranial compartment: Ventricles and sulci are normal in size for age without evidence of hydrocephalus. No extra-axial blood or fluid collections. The brain parenchyma appears normal. No parenchymal mass, hemorrhage, edema or major vascular distribution  infarct. Skull/extracranial contents (limited evaluation): No fracture. Mastoid air cells and paranasal sinuses are essentially clear.     No acute abnormality. Electronically signed by: Jason Burgos Date:    10/28/2024 Time:    20:03  - pulls last radiology orders

## 2024-11-02 NOTE — ASSESSMENT & PLAN NOTE
Fracture identified on CT.  Orthopedic surgery consulted.  Toe-touch weight-bearing status recommended.  Scheduling Tylenol, gabapentin.  Given history of GI bleed, increasing PPI to twice daily.      Discontinue Toradol.   Evaluated by orthopedic surgeon- no surgical intervention.   PT recommended inpatient rehab placement.   However patient just want to go home with home health.

## 2024-11-02 NOTE — ASSESSMENT & PLAN NOTE
Hyperkalemia is likely due to Medication induced.The patients most recent potassium results are listed below.  Recent Labs     10/31/24  0524 11/01/24  0520 11/02/24  0628   K 4.6 5.2* 4.5       Plan  - Monitor for arrhythmias with EKG and/or continuous telemetry.   - Treat the hyperkalemia with Potassium Binders.   - Monitor potassium: Daily  - The patient's hyperkalemia is stable      Resolved

## 2024-11-02 NOTE — PROGRESS NOTES
[facility] Medicine  Progress Note    Patient Name: Omar Trotter  MRN: [id number]  Patient Class: IP- Inpatient   Admission Date: 10/28/2024  Length of Stay: 2 days  Attending Physician: Elin Lama MD  Primary Care Provider: Cam Orozco MD        Subjective:     Principal Problem:Anemia        HPI:   Please note that pt was uncooperative during the example. He was sleepy and wanted to be left alone. Questions were repeated multiple times for him to answer.    ( This is a note from the physician from previous admission.  Placed here because of I encountered the same saturation)     63 yo male with PMH of GERD, HTN, and BPH, previous hip surgery ,UGIB, / esophagitis was admitted from ER with possible syncopal episode and drowsiness.   His previous re toxicology study positive for benzo and others but today positive for amphetamine   Patient can not tell what time he fell from the bed but he asked me to look at EMS note.   He states to ER MD  he woke up on his living room floor. He is uncertain how he got on the floor.  He does not remember falling.  As per  ER MD , deny  headache or neck pain, no incontinence, seizures..no abdominal pain. Reported someone  came to check on him and he appeared drowsy so he activated EMS.   On examination he denied having chest pain, shortness for breath but he appeared very drowsy.  Vitals stable and labs are stable with chronic anemia.  CT head negative and chest x-ray did not show any acute problem.  He denied using substances but RN reported he was on Suboxone in the past and  opiate dependence noted in the past history.      Overview/Hospital Course:  64-year-old male with a history of prior of GI bleed/esophagitis, chronic wound infection on doxycycline who presents after being found on the ground and drowsy in the setting recent methamphetamine.  While hospitalized he noted severe left hip pain and CT demonstrated abnormal inclination of  the acetabular cup component of left hip arthroplasty, with advanced acetabuli protrusio and subacute fracture along the central and cranial aspect of thinned left acetabulum.      Orthopedic surgery was consulted and they have recommended medical management with toe-touch weight-bearing status.  He is on scheduled gabapentin, Flexeril and ketorolac.  Pain is improving such that he is currently able to work with physical therapy.  Inpatient rehab has been recommended. Patient declines inpatient rehab.    His home PPI has been increased to twice a day as prophylaxis in the setting of his receiving ketorolac with a prior history of a GI bleed.  Clinically no evidence of bleed at present and hemoglobin stable.    Interval History:     Left hip pain improved.  Able to stand up and move around with  assistance for around 30 ft.    Review of Systems   Constitutional:  Negative for activity change, appetite change, chills and fever.   HENT:  Negative for congestion, ear pain, nosebleeds and sinus pain.    Eyes:  Negative for discharge and itching.   Respiratory:  Negative for apnea, cough, chest tightness and shortness of breath.    Cardiovascular:  Negative for chest pain, palpitations and leg swelling.   Gastrointestinal:  Negative for abdominal distention, abdominal pain and vomiting.   Genitourinary:  Negative for difficulty urinating, dysuria, flank pain and frequency.   Musculoskeletal:  Positive for arthralgias. Negative for back pain, joint swelling and myalgias.   Skin:  Negative for color change, pallor and rash.   Neurological:  Negative for dizziness, weakness, light-headedness and headaches.   Psychiatric/Behavioral:  Negative for agitation, behavioral problems, confusion and suicidal ideas.      Objective:     Vital Signs (Most Recent):  Temp: 98.2 °F (36.8 °C) (11/02/24 1100)  Pulse: 102 (11/02/24 1100)  Resp: 18 (11/02/24 1100)  BP: 128/64 (11/02/24 1100)  SpO2: (!) 93 % (11/02/24 1100) Vital Signs (24h  Range):  Temp:  [97.2 °F (36.2 °C)-98.2 °F (36.8 °C)] 98.2 °F (36.8 °C)  Pulse:  [] 102  Resp:  [14-18] 18  SpO2:  [93 %-99 %] 93 %  BP: (102-170)/(52-80) 128/64     Weight: 76.2 kg (167 lb 15.9 oz)  Body mass index is 23.43 kg/m².    Intake/Output Summary (Last 24 hours) at 11/2/2024 1237  Last data filed at 11/2/2024 1211  Gross per 24 hour   Intake 960 ml   Output 1600 ml   Net -640 ml         Physical Exam  Constitutional:       Appearance: Normal appearance.   HENT:      Head: Normocephalic.   Eyes:      Extraocular Movements: Extraocular movements intact.   Cardiovascular:      Rate and Rhythm: Normal rate and regular rhythm.   Pulmonary:      Effort: Pulmonary effort is normal. No respiratory distress.      Breath sounds: No wheezing or rales.   Abdominal:      General: Bowel sounds are normal. There is no distension.      Palpations: Abdomen is soft.      Tenderness: There is no abdominal tenderness.   Musculoskeletal:         General: No swelling, tenderness or deformity.      Cervical back: Normal range of motion.   Neurological:      Mental Status: He is alert.     range of motion of left hip less limited by pain now.         Significant Labs: All pertinent labs within the past 24 hours have been reviewed.  CBC:   Recent Labs   Lab 11/01/24  0520   WBC 3.87*   HGB 9.4*   HCT 30.8*        CMP:   Recent Labs   Lab 11/01/24  0520 11/02/24  0628    137   K 5.2* 4.5    103   CO2 28 30*   GLU 89 141*   BUN 23 22   CREATININE 0.9 0.8   CALCIUM 8.6* 8.9   ANIONGAP 4* 4*     Magnesium:   Recent Labs   Lab 11/01/24  0520   MG 2.0       Significant Imaging: I have reviewed all pertinent imaging results/findings within the past 24 hours.    Scheduled Meds:   acetaminophen  1,000 mg Oral QID    amLODIPine  5 mg Oral QHS    cyclobenzaprine  10 mg Oral TID    doxycycline  100 mg Oral Q12H    enoxparin  40 mg Subcutaneous Q24H (prophylaxis, 1700)    ferric gluconate (FERRLECIT) 125 mg in 0.9%  NaCl 100 mL IVPB  125 mg Intravenous Daily    gabapentin  600 mg Oral TID    mupirocin   Nasal BID    oxybutynin  5 mg Oral Daily    pantoprazole  40 mg Oral BID AC    sodium zirconium cyclosilicate  10 g Oral BID    tamsulosin  0.8 mg Oral Daily     Continuous Infusions:  PRN Meds:.  Current Facility-Administered Medications:     dextrose 50%, 12.5 g, Intravenous, PRN    dextrose 50%, 25 g, Intravenous, PRN    glucagon (human recombinant), 1 mg, Intramuscular, PRN    glucose, 16 g, Oral, PRN    glucose, 24 g, Oral, PRN    naloxone, 0.02 mg, Intravenous, PRN    senna-docusate 8.6-50 mg, 1 tablet, Oral, BID PRN    sodium chloride 0.9%, 10 mL, Intravenous, Q12H PRN    X-Ray Hip 2 or 3 views Left with Pelvis when performed    Result Date: 10/30/2024  EXAMINATION: XR HIP WITH PELVIS WHEN PERFORMED 2 OR 3 VIEWS LEFT CLINICAL HISTORY: L hip fx?; Presence of left artificial hip joint FINDINGS: Single AP pelvis and two views of the left hip are compared to prior exams, including CT of the prior day.  Total left hip arthroplasty components have unchanged alignment, with abnormal steep inclination of the left acetabular cup, and subsidence with acetabular protrusio.  The known left acetabular fracture is not well visualized. There is advanced degenerative narrowing of the right hip joint space with evidence of full-thickness chondral loss, osteophytes and subcortical cystic changes and sclerosis.     Please see above and recent CT report. Electronically signed by: Damaso Brower Date:    10/30/2024 Time:    10:38    CT Hip Without Contrast Left    Result Date: 10/30/2024  EXAMINATION: CT HIP WITHOUT CONTRAST LEFT CLINICAL HISTORY: Pain after fall; TECHNIQUE: Axial noncontrast imaging was performed, with sagittal and coronal reformatted images reviewed. COMPARISON: Prior exams including CT of 03/06/2024. FINDINGS: There is a left total hip arthroplasty, with femoral component well seated and unchanged in alignment, without  evidence of loosening. Acetabular cup inclination is abnormally steep, with no findings of asymmetric polyethylene wear. There is subsidence along the acetabular component with acetabuli protrusio, and marked osseous thinning.  A subacute healing fracture occurs along the central and cranial aspect of the acetabulum with some immature periosteal reaction, coronal images 38-51 series 6. There are no other fractures or destructive osseous lesions.  There is advanced intervertebral disc space narrowing and facet arthropathy in the visualized lower lumbar spine.  Bone mineralization is within normal limits. The unenhanced musculature and soft tissues are unremarkable.  There is no pelvic free fluid, with several pelvic calcifications, and scattered aortoiliac vascular calcifications.     Abnormal inclination of the acetabular cup component of left hip arthroplasty, with advanced acetabuli protrusio and subacute fracture along the central and cranial aspect of thinned left acetabulum.  Please see above. Electronically signed by: Damaso Brower Date:    10/30/2024 Time:    07:17    Echo    Result Date: 10/29/2024    Left Ventricle: The left ventricle is normal in size. Normal wall thickness. There is low normal systolic function with a visually estimated ejection fraction of 50 - 55%. Global longitudinal strain is --15.0%. There is normal diastolic function.   Right Ventricle: Normal right ventricular cavity size. Wall thickness is normal. Systolic function is normal.   Aortic Valve: There is mild aortic valve sclerosis. There is mild aortic regurgitation with a centrally directed jet.   IVC/SVC: Normal venous pressure at 3 mmHg.     X-Ray Chest AP Portable    Result Date: 10/29/2024  EXAMINATION: XR CHEST AP PORTABLE CLINICAL HISTORY: Chest Pain; TECHNIQUE: Single frontal view of the chest was performed. COMPARISON: 06/21/2024 FINDINGS: Lungs are clear. No focal consolidation. No pleural effusion. No pneumothorax. Normal  heart size.     No acute findings. Electronically signed by: Jason Burgos Date:    10/29/2024 Time:    05:34    CT Head Without Contrast    Result Date: 10/28/2024  EXAMINATION: CT HEAD WITHOUT CONTRAST CLINICAL HISTORY: Head trauma, minor (Age >= 65y); TECHNIQUE: Low dose axial CT images obtained throughout the head without intravenous contrast. Sagittal and coronal reconstructions were performed. COMPARISON: 06/21/2024 FINDINGS: Motion artifact. Intracranial compartment: Ventricles and sulci are normal in size for age without evidence of hydrocephalus. No extra-axial blood or fluid collections. The brain parenchyma appears normal. No parenchymal mass, hemorrhage, edema or major vascular distribution infarct. Skull/extracranial contents (limited evaluation): No fracture. Mastoid air cells and paranasal sinuses are essentially clear.     No acute abnormality. Electronically signed by: Jason Burgos Date:    10/28/2024 Time:    20:03  - pulls last radiology orders      Assessment/Plan:      * Anemia    CT abdomen in the past with splenomegaly most likely from portal hypertension    He has a history of GI bleed.  No evidence of an active bleed.  Follow hemoglobin and clinically as ketorolac has been scheduled.    PPI increased to twice daily    We will give IV iron    Hyperkalemia  Hyperkalemia is likely due to Medication induced.The patients most recent potassium results are listed below.  Recent Labs     10/31/24  0524 11/01/24  0520 11/02/24  0628   K 4.6 5.2* 4.5       Plan  - Monitor for arrhythmias with EKG and/or continuous telemetry.   - Treat the hyperkalemia with Potassium Binders.   - Monitor potassium: Daily  - The patient's hyperkalemia is stable      Resolved          Syncope  Fall/syncope/drug toxicity    Plan   Previous recent carotid ultrasound negative  Echocardiogram without significant valvular disease   Telemetry without critical arrhythmia  PT and OT         Uncomplicated opioid  dependence  History aware  Was told patient was on Suboxone in the past  Currently he does not want narcotics for his pain.      History of left hip replacement    Fracture identified on CT.  Orthopedic surgery consulted.  Toe-touch weight-bearing status recommended.  Scheduling Tylenol, gabapentin.  Given history of GI bleed, increasing PPI to twice daily.      Discontinue Toradol.   Evaluated by orthopedic surgeon- no surgical intervention.   PT recommended inpatient rehab placement.   However patient just want to go home with home health.      Cirrhosis of liver  Previous diagnosis noted  MELD-Na score calculated; MELD 3.0: 7 at 3/1/2024  4:29 AM  MELD-Na: 7 at 3/1/2024  4:29 AM  Calculated from:  Serum Creatinine: 0.6 mg/dL (Using min of 1 mg/dL) at 3/1/2024  4:29 AM  Serum Sodium: 138 mmol/L (Using max of 137 mmol/L) at 3/1/2024  4:29 AM  Total Bilirubin: 0.4 mg/dL (Using min of 1 mg/dL) at 2/29/2024 10:42 PM  Serum Albumin: 3.9 g/dL (Using max of 3.5 g/dL) at 2/29/2024 10:42 PM  INR(ratio): 1.1 at 2/29/2024 10:42 PM  Age at listing (hypothetical): 63 years  Sex: Male at 3/1/2024  4:29 AM      Anemia possible from cirrhosis.  LFT are stable    Hepatitis C  History noted        VTE Risk Mitigation (From admission, onward)           Ordered     enoxaparin injection 40 mg  Every 24 hours         11/01/24 1338     IP VTE HIGH RISK PATIENT  Once         10/29/24 0008     Place sequential compression device  Until discontinued         10/29/24 0008                    Discharge Planning   NATALIA: 11/2/2024     Code Status: Full Code   Is the patient medically ready for discharge?:     Reason for patient still in hospital (select all that apply): Patient trending condition  Discharge Plan A: Home Health   Discharge Delays: (!) Procedure Scheduling (IR, OR, Labs, Echo, Cath, Echo, EEG)              Elin Lama MD, MD  Department of Hospital Medicine   [facility]

## 2024-11-03 VITALS
RESPIRATION RATE: 18 BRPM | HEIGHT: 71 IN | OXYGEN SATURATION: 94 % | DIASTOLIC BLOOD PRESSURE: 72 MMHG | HEART RATE: 109 BPM | TEMPERATURE: 97 F | BODY MASS INDEX: 23.52 KG/M2 | WEIGHT: 168 LBS | SYSTOLIC BLOOD PRESSURE: 154 MMHG

## 2024-11-03 LAB
ANION GAP SERPL CALC-SCNC: 6 MMOL/L (ref 8–16)
BUN SERPL-MCNC: 15 MG/DL (ref 8–23)
CALCIUM SERPL-MCNC: 9.3 MG/DL (ref 8.7–10.5)
CHLORIDE SERPL-SCNC: 102 MMOL/L (ref 95–110)
CO2 SERPL-SCNC: 30 MMOL/L (ref 23–29)
CREAT SERPL-MCNC: 0.7 MG/DL (ref 0.5–1.4)
EST. GFR  (NO RACE VARIABLE): >60 ML/MIN/1.73 M^2
GLUCOSE SERPL-MCNC: 89 MG/DL (ref 70–110)
POTASSIUM SERPL-SCNC: 4.5 MMOL/L (ref 3.5–5.1)
SODIUM SERPL-SCNC: 138 MMOL/L (ref 136–145)

## 2024-11-03 PROCEDURE — 25000003 PHARM REV CODE 250: Performed by: INTERNAL MEDICINE

## 2024-11-03 PROCEDURE — 36415 COLL VENOUS BLD VENIPUNCTURE: CPT | Performed by: HOSPITALIST

## 2024-11-03 PROCEDURE — 80048 BASIC METABOLIC PNL TOTAL CA: CPT | Performed by: HOSPITALIST

## 2024-11-03 PROCEDURE — 25000003 PHARM REV CODE 250: Performed by: HOSPITALIST

## 2024-11-03 RX ORDER — HYDROCODONE BITARTRATE AND ACETAMINOPHEN 5; 325 MG/1; MG/1
1 TABLET ORAL EVERY 6 HOURS PRN
Qty: 20 TABLET | Refills: 0 | Status: SHIPPED | OUTPATIENT
Start: 2024-11-03 | End: 2024-11-03 | Stop reason: HOSPADM

## 2024-11-03 RX ORDER — MELOXICAM 7.5 MG/1
15 TABLET ORAL DAILY
Qty: 10 TABLET | Refills: 0 | Status: SHIPPED | OUTPATIENT
Start: 2024-11-03 | End: 2024-11-08

## 2024-11-03 RX ADMIN — PANTOPRAZOLE SODIUM 40 MG: 40 TABLET, DELAYED RELEASE ORAL at 05:11

## 2024-11-03 RX ADMIN — DOXYCYCLINE HYCLATE 100 MG: 100 CAPSULE ORAL at 08:11

## 2024-11-03 RX ADMIN — MUPIROCIN 1 G: 20 OINTMENT TOPICAL at 08:11

## 2024-11-03 RX ADMIN — TAMSULOSIN HYDROCHLORIDE 0.8 MG: 0.4 CAPSULE ORAL at 08:11

## 2024-11-03 RX ADMIN — OXYBUTYNIN CHLORIDE 5 MG: 5 TABLET, EXTENDED RELEASE ORAL at 08:11

## 2024-11-03 RX ADMIN — GABAPENTIN 600 MG: 300 CAPSULE ORAL at 08:11

## 2024-11-03 RX ADMIN — CYCLOBENZAPRINE 10 MG: 10 TABLET, FILM COATED ORAL at 08:11

## 2024-11-03 NOTE — PLAN OF CARE
MELIA talked to  Sammie Ross to see if [facility] would take [employer] and they don't. MELIA called [facility] and they do take [employer] insurance , however, they are unable to complete the rolling walker order until Monday.

## 2024-11-03 NOTE — PLAN OF CARE
Patient cleared for discharge from case management standpoint.    CM completed the referral to [facility] for his walker as of 11/3/24. They will accept his [employer] Insurance and will contact him Monday 11/4/24.    Chart and discharge orders reviewed.  Patient discharged home with no further case management needs.          11/03/24 1345   Final Note   Assessment Type Final Discharge Note   Anticipated Discharge Disposition Home-Health   What phone number can be called within the next 1-3 days to see how you are doing after discharge? [phone]   Hospital Resources/Appts/Education Provided Provided patient/caregiver with written discharge plan information   Post-Acute Status   Home Health Status Set-up Complete/Auth obtained   Discharge Delays None known at this time

## 2024-11-03 NOTE — ASSESSMENT & PLAN NOTE
Fracture identified on CT.  Orthopedic surgery consulted.  Toe-touch weight-bearing status recommended.     Discontinue Toradol.   Evaluated by orthopedic surgeon- no surgical intervention.   PT recommended inpatient rehab placement.   However patient just wants to go home with home health.

## 2024-11-03 NOTE — DISCHARGE SUMMARY
[facility] Medicine  Discharge Summary      Patient Name: Omar Trotter  MRN: [id number]  [facility]: [id number]  Patient Class: IP- Inpatient  Admission Date: 10/28/2024  Hospital Length of Stay: 3 days  Discharge Date and Time:  11/03/2024 9:46 AM  Attending Physician: Elin Lama MD   Discharging Provider: Elin Lama MD, MD  Primary Care Provider: Cam Orozco MD    Primary Care Team: Networked reference to record PCT     HPI:    Please note that pt was uncooperative during the example. He was sleepy and wanted to be left alone. Questions were repeated multiple times for him to answer.    ( This is a note from the physician from previous admission.  Placed here because of I encountered the same saturation)     63 yo male with PMH of GERD, HTN, and BPH, previous hip surgery ,UGIB, / esophagitis was admitted from ER with possible syncopal episode and drowsiness.   His previous re toxicology study positive for benzo and others but today positive for amphetamine   Patient can not tell what time he fell from the bed but he asked me to look at EMS note.   He states to ER MD  he woke up on his living room floor. He is uncertain how he got on the floor.  He does not remember falling.  As per  ER MD , deny  headache or neck pain, no incontinence, seizures..no abdominal pain. Reported someone  came to check on him and he appeared drowsy so he activated EMS.   On examination he denied having chest pain, shortness for breath but he appeared very drowsy.  Vitals stable and labs are stable with chronic anemia.  CT head negative and chest x-ray did not show any acute problem.  He denied using substances but RN reported he was on Suboxone in the past and  opiate dependence noted in the past history.      * No surgery found *      Hospital Course:   64-year-old male with a history of prior of GI bleed/esophagitis, chronic wound infection on doxycycline who presents after being found on the  ground and drowsy in the setting recent methamphetamine.  While hospitalized he noted severe left hip pain and CT demonstrated abnormal inclination of the acetabular cup component of left hip arthroplasty, with advanced acetabuli protrusio and subacute fracture along the central and cranial aspect of thinned left acetabulum.      Orthopedic surgery was consulted and they have recommended medical management with toe-touch weight-bearing status.  He is on scheduled gabapentin, Flexeril and ketorolac.  Pain is improving such that he is currently able to work with physical therapy.  Inpatient rehab has been recommended. Patient declines inpatient rerhab. The patient was discharged home in stable condition with home health for PT/OT.     Goals of Care Treatment Preferences:  Code Status: Full Code      SDOH Screening:  The patient was screened for utility difficulties, food insecurity, transport difficulties, housing insecurity, and interpersonal safety and there were no concerns identified this admission.     Consults:   Consults (From admission, onward)          Status Ordering Provider     Inpatient consult to   Once        Provider:  (Not yet assigned)    Acknowledged CHAYA NGUYEN     Inpatient consult to   Once        Provider:  (Not yet assigned)    Completed EVE DUTTA     Inpatient consult to Orthopedic Surgery  Once        Provider:  Roni Su MD    Completed EVE DUTTA            Renal/  Hyperkalemia      Resolved          Oncology  * Anemia  Received IV iron.  F/U outpatient    GI  Cirrhosis of liver  F/U outpatient    Orthopedic  History of left hip replacement    Fracture identified on CT.  Orthopedic surgery consulted.  Toe-touch weight-bearing status recommended.     Discontinue Toradol.   Evaluated by orthopedic surgeon- no surgical intervention.   PT recommended inpatient rehab placement.   However patient just wants to go home with home health.  "       Final Active Diagnoses:    Diagnosis Date Noted POA    PRINCIPAL PROBLEM:  Anemia [D64.9] 11/11/2022 Yes    Hyperkalemia [E87.5] 11/01/2024 No    Syncope [R55] 10/29/2024 Yes    Uncomplicated opioid dependence [F11.20] 03/02/2024 Yes    History of left hip replacement [Z96.642] 06/03/2021 Not Applicable    Hepatitis C [B19.20]  Yes    Cirrhosis of liver [K74.60] 10/05/2020 Yes      Problems Resolved During this Admission:       Discharged Condition: stable    Disposition: Home-Health Care [facility]    Follow Up:   Follow-up Information       Cam Orozco MD Follow up in 1 week(s).    Specialty: Internal Medicine  Contact information:  [address] [facility] [address] [phone]                           Patient Instructions:      WALKER FOR HOME USE     Order Specific Question Answer Comments   Type of Walker: Adult (5'4"-6'6")    With wheels? Yes    Height: 5' 11" (1.803 m)    Weight: 76.2 kg (167 lb 15.9 oz)    Length of need (1-99 months): 12    Does patient have medical equipment at home? cane, straight    Please check all that apply: Walker will be used for gait training.    Please check all that apply: Patient is unable to safely ambulate without equipment.    Please check all that apply: Patient's condition impairs ambulation.      Ambulatory referral/consult to Pain Clinic   Standing Status: Future   Referral Priority: Urgent Referral Type: Consultation   Referral Reason: Specialty Services Required   Requested Specialty: Pain Medicine   Number of Visits Requested: 1     Diet Cardiac     Activity as tolerated       Significant Diagnostic Studies: N/A    Pending Diagnostic Studies:       None           Medications:  Reconciled Home Medications:      Medication List        START taking these medications      meloxicam 7.5 MG tablet  Commonly known as: MOBIC  Take 2 tablets (15 mg total) by mouth once daily. for 5 days            CHANGE how you take these medications      busPIRone 15 MG " tablet  Commonly known as: BUSPAR  Take 1 tablet by mouth once daily  What changed:   how much to take  how to take this  when to take this            CONTINUE taking these medications      cyclobenzaprine 10 MG tablet  Commonly known as: FLEXERIL  Take 1 tablet by mouth three times daily as needed for muscle spasm     ferrous sulfate Tab tablet  Commonly known as: FEOSOL  Take 1 tablet by mouth daily with breakfast.     gabapentin 600 MG tablet  Commonly known as: NEURONTIN  TAKE 1 TABLET BY MOUTH THREE TIMES DAILY     lisinopriL 40 MG tablet  Commonly known as: PRINIVIL,ZESTRIL  Take 1 tablet (40 mg total) by mouth once daily.     oxybutynin 5 MG Tr24  Commonly known as: DITROPAN-XL  Take 1 tablet (5 mg total) by mouth once daily.     pantoprazole 40 MG tablet  Commonly known as: PROTONIX  Take 1 tablet (40 mg total) by mouth 2 (two) times daily.     tamsulosin 0.4 mg Cap  Commonly known as: FLOMAX  Take 2 capsules (0.8 mg total) by mouth once daily.            ASK your doctor about these medications      amLODIPine 5 MG tablet  Commonly known as: NORVASC  Take 1 tablet by mouth once daily              Indwelling Lines/Drains at time of discharge:   Lines/Drains/Airways       None                   Time spent on the discharge of patient: 35 minutes         Elin Lama MD, MD  Department of Hospital Medicine  [facility]

## 2024-11-18 PROBLEM — E87.5 HYPERKALEMIA: Status: RESOLVED | Noted: 2024-11-01 | Resolved: 2024-11-18

## 2024-11-18 PROBLEM — S22.49XA FRACTURE OF MULTIPLE RIBS: Status: RESOLVED | Noted: 2023-12-12 | Resolved: 2024-11-18

## 2024-11-18 PROBLEM — T81.31XA DEHISCENCE OF INCISION, INITIAL ENCOUNTER: Status: RESOLVED | Noted: 2021-06-14 | Resolved: 2024-11-18

## 2024-11-18 PROBLEM — K20.90 ESOPHAGITIS: Status: RESOLVED | Noted: 2024-03-02 | Resolved: 2024-11-18

## 2024-11-18 PROBLEM — U07.1 GASTROENTERITIS DUE TO COVID-19 VIRUS: Status: RESOLVED | Noted: 2024-03-08 | Resolved: 2024-11-18

## 2024-11-18 PROBLEM — E66.9 OBESITY (BMI 30-39.9): Status: RESOLVED | Noted: 2021-06-01 | Resolved: 2024-11-18

## 2024-11-18 PROBLEM — S72.002A CLOSED FRACTURE OF LEFT HIP: Status: ACTIVE | Noted: 2024-11-18

## 2024-11-18 PROBLEM — F15.10 METHAMPHETAMINE USE: Status: ACTIVE | Noted: 2024-11-18

## 2024-11-18 PROBLEM — M48.061 SPINAL STENOSIS OF LUMBAR REGION: Status: ACTIVE | Noted: 2024-11-18

## 2024-11-18 PROBLEM — R10.84 GENERALIZED ABDOMINAL PAIN: Status: RESOLVED | Noted: 2024-03-07 | Resolved: 2024-11-18

## 2024-11-18 PROBLEM — R19.7 DIARRHEA: Status: RESOLVED | Noted: 2024-03-07 | Resolved: 2024-11-18

## 2024-11-18 PROBLEM — A08.39 GASTROENTERITIS DUE TO COVID-19 VIRUS: Status: RESOLVED | Noted: 2024-03-08 | Resolved: 2024-11-18

## 2024-11-18 PROBLEM — Z01.818 PREOP TESTING: Status: RESOLVED | Noted: 2021-03-24 | Resolved: 2024-11-18

## 2024-12-02 ENCOUNTER — TELEPHONE (OUTPATIENT)
Dept: ORTHOPEDICS | Facility: CLINIC | Age: 64
End: 2024-12-02
Payer: COMMERCIAL

## 2024-12-02 NOTE — TELEPHONE ENCOUNTER
Spoke with patient informed him that Dr. Su does not treat hip pain. I scheduled patient with Dr. Jane when making the apt a block came up saying we are out of network for the insurance. I explained this to the patient he insist that he can still be seen.

## 2024-12-02 NOTE — TELEPHONE ENCOUNTER
----- Message from Betzaida sent at 12/2/2024  9:31 AM CST -----  Type:  Appointment Request        Name of Caller:pt  When is the first available appointment?n/a  Symptoms:hip pain right  Best Call Back Number: [phone]  Additional Information:

## 2025-01-09 NOTE — CONSULTS
PEDIATRIC SPEECH/LANGUAGE EVALUATION:     Diagnosis:   Difficulty with speech (R47.9)       Referring Provider: Colleen M Weiler  Date of Evaluation:    1/8/2025    Precautions:  None Next MD visit:   none scheduled  Date of Surgery: n/a     PATIENT HISTORY/CURRENT CONCERN   George Durbin is a 8 year old male who presents to therapy today with c/o articulation. Patient was accompanied to the evaluation session by his mother and father. Parents report concerns with patient's articulation specifically in relation to patient's lisp.     Birth History: Full term  Medical/Developmental History: Parents report patient is generally healthy without any past/pertinent medical hx or dx.  Therapy History: ST: None                           PT: None                           OT: None  Vision History: Parents report no concerns.  Hearing History: Parents report no concerns and patient passed hearing exam at school.    Family/Home Environment: Patient lives at home with his mother, father, older brother (14 yo), and older sister (21 yo). Parents report oldest and middle children both previously had lisps when young. Patient's brother previously received speech therapy to reduce with success.   Languages spoken at home: English and Other: Some [place]i  Medications: None  Pain: 0/10 per FLACC scale  Other: Patient attends [employer] through [employer]. Patient does not receive speech services through school.  Parent/Guardian Goals: Parents would like to see their son be able to \"reduce if not fully eliminate his lisp\".        ASSESSMENT:   George presents to speech therapy evaluation with primary c/o articulation. The results of the objective tests and measures show an articulation delay characterized by a mild lisp. Patient parent/caregiver, voiced understanding and of plan and recommendations/HEP. Skilled Speech Therapy is medically necessary to address the above impairments and reach functional goals.     OBJECTIVE:   Receptive    Right lower leg ulcer 2.8x2.8x0.3cm yellow slough in wound bed periwound redness with small areas of yellow pustules, cleaned and dried and applied Aquacel Ag. Covered with foam and tegaderm.  Bilateral feet and heels without redness, few dry scabs and small scars, knots in arms,  patient refused further assessment, said he hurt too bad and would not turn at this time.  Encouraged to turn and move.    Language  Findings from the evaluation revealed that George's ability to understand language is WFL. Parents report no concerns. Formal assessment not conducted as not an area of parental concern at this time.     Expressive Language  Findings from the evaluation revealed that George's ability to use language expressively is WFL. Parents report no concerns. Formal assessment not conducted as not an area of parental concern at this time.     Pragmatic Language  Findings from the evaluation revealed that George's ability to use language socially WFL.    Oral Motor Examination  George's oral motor skills were assessed informally throughout the evaluation. Based upon assessments completed, oral motor structures were determined to be WFL to support speech production.    Facial and Oral Structure/Appearance: WFL  Symmetry: WFL  Strength: WFL  Tone: WFL  Range of Motion:WFL  Rate of Motion:WFL  Other:No feeding/swallowing issues, no report of snoring, no report of ear infections, no report of strep.   Voice: WFL  Resonance: WFL  Respiration: WFL    Articulation  George's articulation abilities were informally assessed throughout the evaluation. Patient demonstrates mild frontal lisp for /s/, /z/, and /s-blends/. Patient demonstrates no other speech errors at this time. Parents report patient's lisp can come and go with increased emphasis during play activities and in the final positions of words. Patient engaged throughout the evaluation with report willing to participate in therapy at this time.    Fluency  WFL    Voice/Resonance  WFL    Today's Treatment:  Patient parent/caregiver education was provided on exam findings, treatment diagnosis, treatment plan, expectations, and prognosis.     Charges: Eval x1, SP/L      Total Timed Treatment: 40 min     Total Treatment Time: 40 min     PLAN OF CARE:    Goals: (to be met in 12 visits)   Patient will reduce lisp and produce /s/ in the initial and final position of words with 70%  accuracy given 2-3 cues.   Patient will reduce lisp and produce /z/ in the initial and final position of words with 70% accuracy given 2-3 cues.   Patient will reduce lisp and produce /s-blends/ in the initial position of words with 70% accuracy given 2-3 cues.     Frequency / Duration: Patient will be seen for 1 x/week or a total of 12 visits over a 90 day period. Treatment will include: speech therapy to focus on reducing patient's lisp and improving articulation for clarity of speech.     Education or treatment limitation: None  Rehab Potential:good    Patient/Family/Caregiver was advised of these findings, precautions, and treatment options and has agreed to actively participate in planning and for this course of care.    Thank you for your referral. Please co-sign or sign and return this letter via fax as soon as possible to [phone]. If you have any questions, please contact me at Dept: [phone]    Sincerely,  Electronically signed by therapist: Darlene Duarte, JUAN  Physician's certification required: Yes  I certify the need for these services furnished under this plan of treatment and while under my care.    X___________________________________________________ Date____________________    Certification From: 1/9/2025  To:4/9/2025

## 2025-05-19 ENCOUNTER — HOSPITAL ENCOUNTER (INPATIENT)
Facility: HOSPITAL | Age: 65
LOS: 8 days | Discharge: NURSING, REHAB OR LONG-TERM CARE | DRG: 378 | End: 2025-05-29
Attending: STUDENT IN AN ORGANIZED HEALTH CARE EDUCATION/TRAINING PROGRAM | Admitting: FAMILY MEDICINE
Payer: COMMERCIAL

## 2025-05-19 DIAGNOSIS — D50.9 IRON DEFICIENCY ANEMIA, UNSPECIFIED IRON DEFICIENCY ANEMIA TYPE: ICD-10-CM

## 2025-05-19 DIAGNOSIS — R07.9 CHEST PAIN: ICD-10-CM

## 2025-05-19 DIAGNOSIS — M25.551 RIGHT HIP PAIN: Primary | ICD-10-CM

## 2025-05-19 DIAGNOSIS — G89.29 CHRONIC MIDLINE LOW BACK PAIN WITHOUT SCIATICA: ICD-10-CM

## 2025-05-19 DIAGNOSIS — D64.9 SYMPTOMATIC ANEMIA: ICD-10-CM

## 2025-05-19 DIAGNOSIS — M54.50 CHRONIC MIDLINE LOW BACK PAIN WITHOUT SCIATICA: ICD-10-CM

## 2025-05-19 DIAGNOSIS — N40.1 BENIGN PROSTATIC HYPERPLASIA WITH URINARY FREQUENCY: ICD-10-CM

## 2025-05-19 DIAGNOSIS — R42 LIGHTHEADED: ICD-10-CM

## 2025-05-19 DIAGNOSIS — R35.0 BENIGN PROSTATIC HYPERPLASIA WITH URINARY FREQUENCY: ICD-10-CM

## 2025-05-19 PROCEDURE — 83735 ASSAY OF MAGNESIUM: CPT | Performed by: STUDENT IN AN ORGANIZED HEALTH CARE EDUCATION/TRAINING PROGRAM

## 2025-05-19 PROCEDURE — 36415 COLL VENOUS BLD VENIPUNCTURE: CPT | Performed by: STUDENT IN AN ORGANIZED HEALTH CARE EDUCATION/TRAINING PROGRAM

## 2025-05-19 PROCEDURE — 80053 COMPREHEN METABOLIC PANEL: CPT | Performed by: STUDENT IN AN ORGANIZED HEALTH CARE EDUCATION/TRAINING PROGRAM

## 2025-05-19 PROCEDURE — 83880 ASSAY OF NATRIURETIC PEPTIDE: CPT | Performed by: STUDENT IN AN ORGANIZED HEALTH CARE EDUCATION/TRAINING PROGRAM

## 2025-05-19 PROCEDURE — 85025 COMPLETE CBC W/AUTO DIFF WBC: CPT | Performed by: STUDENT IN AN ORGANIZED HEALTH CARE EDUCATION/TRAINING PROGRAM

## 2025-05-19 PROCEDURE — 84484 ASSAY OF TROPONIN QUANT: CPT | Performed by: STUDENT IN AN ORGANIZED HEALTH CARE EDUCATION/TRAINING PROGRAM

## 2025-05-19 RX ORDER — HYDROCODONE BITARTRATE AND ACETAMINOPHEN 5; 325 MG/1; MG/1
1 TABLET ORAL
Refills: 0 | Status: COMPLETED | OUTPATIENT
Start: 2025-05-19 | End: 2025-05-20

## 2025-05-20 PROBLEM — G89.4 CHRONIC PAIN SYNDROME: Status: ACTIVE | Noted: 2021-10-04

## 2025-05-20 LAB
ABO + RH BLD: NORMAL
ABSOLUTE EOSINOPHIL (SMH): 0.18 K/UL
ABSOLUTE EOSINOPHIL (SMH): 0.23 K/UL
ABSOLUTE MONOCYTE (SMH): 0.29 K/UL (ref 0.3–1)
ABSOLUTE MONOCYTE (SMH): 0.4 K/UL (ref 0.3–1)
ABSOLUTE NEUTROPHIL COUNT (SMH): 2.3 K/UL (ref 1.8–7.7)
ABSOLUTE NEUTROPHIL COUNT (SMH): 2.4 K/UL (ref 1.8–7.7)
ALBUMIN SERPL-MCNC: 3.2 G/DL (ref 3.5–5.2)
ALBUMIN SERPL-MCNC: 3.3 G/DL (ref 3.5–5.2)
ALP SERPL-CCNC: 133 UNIT/L (ref 40–150)
ALP SERPL-CCNC: 141 UNIT/L (ref 40–150)
ALT SERPL-CCNC: 9 UNIT/L (ref 10–44)
ALT SERPL-CCNC: <8 UNIT/L (ref 10–44)
ANION GAP (SMH): 9 MMOL/L (ref 8–16)
ANION GAP (SMH): 9 MMOL/L (ref 8–16)
AST SERPL-CCNC: 18 UNIT/L (ref 11–45)
AST SERPL-CCNC: 20 UNIT/L (ref 11–45)
BASOPHILS # BLD AUTO: 0.03 K/UL
BASOPHILS # BLD AUTO: 0.03 K/UL
BASOPHILS NFR BLD AUTO: 0.7 %
BASOPHILS NFR BLD AUTO: 0.8 %
BILIRUB SERPL-MCNC: <0.2 MG/DL (ref 0.1–1)
BILIRUB SERPL-MCNC: <0.2 MG/DL (ref 0.1–1)
BLD PROD TYP BPU: NORMAL
BLOOD UNIT EXPIRATION DATE: NORMAL
BLOOD UNIT TYPE CODE: 5100
BUN SERPL-MCNC: 22 MG/DL (ref 8–23)
BUN SERPL-MCNC: 24 MG/DL (ref 8–23)
CALCIUM SERPL-MCNC: 8.4 MG/DL (ref 8.7–10.5)
CALCIUM SERPL-MCNC: 8.4 MG/DL (ref 8.7–10.5)
CHLORIDE SERPL-SCNC: 110 MMOL/L (ref 95–110)
CHLORIDE SERPL-SCNC: 110 MMOL/L (ref 95–110)
CO2 SERPL-SCNC: 21 MMOL/L (ref 23–29)
CO2 SERPL-SCNC: 22 MMOL/L (ref 23–29)
CREAT SERPL-MCNC: 0.9 MG/DL (ref 0.5–1.4)
CREAT SERPL-MCNC: 0.9 MG/DL (ref 0.5–1.4)
CROSSMATCH INTERPRETATION: NORMAL
DISPENSE STATUS: NORMAL
ERYTHROCYTE [DISTWIDTH] IN BLOOD BY AUTOMATED COUNT: 17.1 % (ref 11.5–14.5)
ERYTHROCYTE [DISTWIDTH] IN BLOOD BY AUTOMATED COUNT: 17.2 % (ref 11.5–14.5)
FERRITIN SERPL-MCNC: 14.9 NG/ML (ref 20–300)
FOLATE SERPL-MCNC: 14.7 NG/ML (ref 4–24)
GFR SERPLBLD CREATININE-BSD FMLA CKD-EPI: >60 ML/MIN/1.73/M2
GFR SERPLBLD CREATININE-BSD FMLA CKD-EPI: >60 ML/MIN/1.73/M2
GLUCOSE SERPL-MCNC: 85 MG/DL (ref 70–110)
GLUCOSE SERPL-MCNC: 96 MG/DL (ref 70–110)
HCT VFR BLD AUTO: 24.8 % (ref 40–54)
HCT VFR BLD AUTO: 26.7 % (ref 40–54)
HCT VFR BLD AUTO: 29.9 % (ref 40–54)
HGB BLD-MCNC: 7.3 GM/DL (ref 14–18)
HGB BLD-MCNC: 7.6 GM/DL (ref 14–18)
HGB BLD-MCNC: 8.9 GM/DL (ref 14–18)
IMM GRANULOCYTES # BLD AUTO: 0.01 K/UL (ref 0–0.04)
IMM GRANULOCYTES # BLD AUTO: 0.01 K/UL (ref 0–0.04)
IMM GRANULOCYTES NFR BLD AUTO: 0.2 % (ref 0–0.5)
IMM GRANULOCYTES NFR BLD AUTO: 0.3 % (ref 0–0.5)
INDIRECT COOMBS: NORMAL
IRON SATN MFR SERPL: 3 % (ref 20–50)
IRON SERPL-MCNC: 15 UG/DL (ref 45–160)
LYMPHOCYTES # BLD AUTO: 0.93 K/UL (ref 1–4.8)
LYMPHOCYTES # BLD AUTO: 1.02 K/UL (ref 1–4.8)
MAGNESIUM SERPL-MCNC: 2 MG/DL (ref 1.6–2.6)
MAGNESIUM SERPL-MCNC: 2.1 MG/DL (ref 1.6–2.6)
MCH RBC QN AUTO: 22.5 PG (ref 27–31)
MCH RBC QN AUTO: 23.5 PG (ref 27–31)
MCHC RBC AUTO-ENTMCNC: 28.5 G/DL (ref 32–36)
MCHC RBC AUTO-ENTMCNC: 29.4 G/DL (ref 32–36)
MCV RBC AUTO: 79 FL (ref 82–98)
MCV RBC AUTO: 80 FL (ref 82–98)
NT-PROBNP SERPL-MCNC: 189 PG/ML
NUCLEATED RBC (/100WBC) (SMH): 0 /100 WBC
NUCLEATED RBC (/100WBC) (SMH): 0 /100 WBC
OB PNL STL: NEGATIVE
PHOSPHATE SERPL-MCNC: 3.7 MG/DL (ref 2.7–4.5)
PLATELET # BLD AUTO: 171 K/UL (ref 150–450)
PLATELET # BLD AUTO: 174 K/UL (ref 150–450)
PMV BLD AUTO: 9.6 FL (ref 9.2–12.9)
PMV BLD AUTO: 9.8 FL (ref 9.2–12.9)
POTASSIUM SERPL-SCNC: 4.3 MMOL/L (ref 3.5–5.1)
POTASSIUM SERPL-SCNC: 4.4 MMOL/L (ref 3.5–5.1)
PROT SERPL-MCNC: 6.7 GM/DL (ref 6–8.4)
PROT SERPL-MCNC: 6.8 GM/DL (ref 6–8.4)
RBC # BLD AUTO: 3.1 M/UL (ref 4.6–6.2)
RBC # BLD AUTO: 3.38 M/UL (ref 4.6–6.2)
RELATIVE EOSINOPHIL (SMH): 4.8 % (ref 0–8)
RELATIVE EOSINOPHIL (SMH): 5.6 % (ref 0–8)
RELATIVE LYMPHOCYTE (SMH): 24.6 % (ref 18–48)
RELATIVE LYMPHOCYTE (SMH): 24.7 % (ref 18–48)
RELATIVE MONOCYTE (SMH): 7.7 % (ref 4–15)
RELATIVE MONOCYTE (SMH): 9.7 % (ref 4–15)
RELATIVE NEUTROPHIL (SMH): 59.1 % (ref 38–73)
RELATIVE NEUTROPHIL (SMH): 61.8 % (ref 38–73)
RH BLD: NORMAL
SODIUM SERPL-SCNC: 140 MMOL/L (ref 136–145)
SODIUM SERPL-SCNC: 141 MMOL/L (ref 136–145)
SPECIMEN OUTDATE: NORMAL
TIBC SERPL-MCNC: 463 UG/DL (ref 250–450)
TRANSFERRIN SERPL-MCNC: 313 MG/DL (ref 200–375)
TROPONIN I SERPL HS-MCNC: 4 NG/L
TROPONIN I SERPL HS-MCNC: 4 NG/L
TROPONIN I SERPL HS-MCNC: 5 NG/L
UNIT NUMBER: NORMAL
VIT B12 SERPL-MCNC: 220 PG/ML (ref 210–950)
WBC # BLD AUTO: 3.78 K/UL (ref 3.9–12.7)
WBC # BLD AUTO: 4.13 K/UL (ref 3.9–12.7)

## 2025-05-20 PROCEDURE — 94760 N-INVAS EAR/PLS OXIMETRY 1: CPT

## 2025-05-20 PROCEDURE — 25000003 PHARM REV CODE 250: Performed by: STUDENT IN AN ORGANIZED HEALTH CARE EDUCATION/TRAINING PROGRAM

## 2025-05-20 PROCEDURE — 84100 ASSAY OF PHOSPHORUS: CPT | Performed by: NURSE PRACTITIONER

## 2025-05-20 PROCEDURE — 94761 N-INVAS EAR/PLS OXIMETRY MLT: CPT

## 2025-05-20 PROCEDURE — 63600175 PHARM REV CODE 636 W HCPCS: Performed by: STUDENT IN AN ORGANIZED HEALTH CARE EDUCATION/TRAINING PROGRAM

## 2025-05-20 PROCEDURE — 30233N1 TRANSFUSION OF NONAUTOLOGOUS RED BLOOD CELLS INTO PERIPHERAL VEIN, PERCUTANEOUS APPROACH: ICD-10-PCS | Performed by: STUDENT IN AN ORGANIZED HEALTH CARE EDUCATION/TRAINING PROGRAM

## 2025-05-20 PROCEDURE — 93005 ELECTROCARDIOGRAM TRACING: CPT

## 2025-05-20 PROCEDURE — 85018 HEMOGLOBIN: CPT | Performed by: FAMILY MEDICINE

## 2025-05-20 PROCEDURE — 83735 ASSAY OF MAGNESIUM: CPT | Performed by: NURSE PRACTITIONER

## 2025-05-20 PROCEDURE — G0378 HOSPITAL OBSERVATION PER HR: HCPCS

## 2025-05-20 PROCEDURE — 36415 COLL VENOUS BLD VENIPUNCTURE: CPT | Performed by: STUDENT IN AN ORGANIZED HEALTH CARE EDUCATION/TRAINING PROGRAM

## 2025-05-20 PROCEDURE — 36415 COLL VENOUS BLD VENIPUNCTURE: CPT | Performed by: NURSE PRACTITIONER

## 2025-05-20 PROCEDURE — 82270 OCCULT BLOOD FECES: CPT | Performed by: FAMILY MEDICINE

## 2025-05-20 PROCEDURE — 25000003 PHARM REV CODE 250: Performed by: NURSE PRACTITIONER

## 2025-05-20 PROCEDURE — 82746 ASSAY OF FOLIC ACID SERUM: CPT | Performed by: NURSE PRACTITIONER

## 2025-05-20 PROCEDURE — 63600175 PHARM REV CODE 636 W HCPCS: Performed by: FAMILY MEDICINE

## 2025-05-20 PROCEDURE — 99285 EMERGENCY DEPT VISIT HI MDM: CPT | Mod: 25

## 2025-05-20 PROCEDURE — 93010 ELECTROCARDIOGRAM REPORT: CPT | Mod: ,,, | Performed by: INTERNAL MEDICINE

## 2025-05-20 PROCEDURE — 83540 ASSAY OF IRON: CPT | Performed by: NURSE PRACTITIONER

## 2025-05-20 PROCEDURE — 25000003 PHARM REV CODE 250: Performed by: INTERNAL MEDICINE

## 2025-05-20 PROCEDURE — 84484 ASSAY OF TROPONIN QUANT: CPT | Performed by: STUDENT IN AN ORGANIZED HEALTH CARE EDUCATION/TRAINING PROGRAM

## 2025-05-20 PROCEDURE — 86920 COMPATIBILITY TEST SPIN: CPT | Performed by: STUDENT IN AN ORGANIZED HEALTH CARE EDUCATION/TRAINING PROGRAM

## 2025-05-20 PROCEDURE — 82728 ASSAY OF FERRITIN: CPT | Performed by: NURSE PRACTITIONER

## 2025-05-20 PROCEDURE — 85014 HEMATOCRIT: CPT | Performed by: FAMILY MEDICINE

## 2025-05-20 PROCEDURE — 86900 BLOOD TYPING SEROLOGIC ABO: CPT | Performed by: STUDENT IN AN ORGANIZED HEALTH CARE EDUCATION/TRAINING PROGRAM

## 2025-05-20 PROCEDURE — 84484 ASSAY OF TROPONIN QUANT: CPT | Mod: 91 | Performed by: NURSE PRACTITIONER

## 2025-05-20 PROCEDURE — P9016 RBC LEUKOCYTES REDUCED: HCPCS | Performed by: STUDENT IN AN ORGANIZED HEALTH CARE EDUCATION/TRAINING PROGRAM

## 2025-05-20 PROCEDURE — 82607 VITAMIN B-12: CPT | Performed by: NURSE PRACTITIONER

## 2025-05-20 PROCEDURE — 85025 COMPLETE CBC W/AUTO DIFF WBC: CPT | Performed by: NURSE PRACTITIONER

## 2025-05-20 PROCEDURE — 36430 TRANSFUSION BLD/BLD COMPNT: CPT

## 2025-05-20 PROCEDURE — 80053 COMPREHEN METABOLIC PANEL: CPT | Performed by: NURSE PRACTITIONER

## 2025-05-20 RX ORDER — TAMSULOSIN HYDROCHLORIDE 0.4 MG/1
2 CAPSULE ORAL DAILY
Status: DISCONTINUED | OUTPATIENT
Start: 2025-05-20 | End: 2025-05-29 | Stop reason: HOSPADM

## 2025-05-20 RX ORDER — POLYETHYLENE GLYCOL 3350 17 G/17G
340 POWDER, FOR SOLUTION ORAL ONCE
Status: COMPLETED | OUTPATIENT
Start: 2025-05-20 | End: 2025-05-20

## 2025-05-20 RX ORDER — HYDROCODONE BITARTRATE AND ACETAMINOPHEN 5; 325 MG/1; MG/1
1 TABLET ORAL EVERY 6 HOURS PRN
Refills: 0 | Status: DISCONTINUED | OUTPATIENT
Start: 2025-05-20 | End: 2025-05-29 | Stop reason: HOSPADM

## 2025-05-20 RX ORDER — LISINOPRIL 20 MG/1
40 TABLET ORAL DAILY
Status: DISCONTINUED | OUTPATIENT
Start: 2025-05-20 | End: 2025-05-29 | Stop reason: HOSPADM

## 2025-05-20 RX ORDER — IBUPROFEN 200 MG
16 TABLET ORAL
Status: DISCONTINUED | OUTPATIENT
Start: 2025-05-20 | End: 2025-05-20

## 2025-05-20 RX ORDER — GLUCAGON 1 MG
1 KIT INJECTION
Status: DISCONTINUED | OUTPATIENT
Start: 2025-05-20 | End: 2025-05-20

## 2025-05-20 RX ORDER — ONDANSETRON HYDROCHLORIDE 2 MG/ML
8 INJECTION, SOLUTION INTRAVENOUS EVERY 6 HOURS PRN
Status: DISCONTINUED | OUTPATIENT
Start: 2025-05-20 | End: 2025-05-29 | Stop reason: HOSPADM

## 2025-05-20 RX ORDER — SIMETHICONE 80 MG
1 TABLET,CHEWABLE ORAL 4 TIMES DAILY PRN
Status: DISCONTINUED | OUTPATIENT
Start: 2025-05-20 | End: 2025-05-20

## 2025-05-20 RX ORDER — PANTOPRAZOLE SODIUM 40 MG/1
40 TABLET, DELAYED RELEASE ORAL 2 TIMES DAILY
Status: DISCONTINUED | OUTPATIENT
Start: 2025-05-20 | End: 2025-05-20

## 2025-05-20 RX ORDER — ALUMINUM HYDROXIDE, MAGNESIUM HYDROXIDE, AND SIMETHICONE 1200; 120; 1200 MG/30ML; MG/30ML; MG/30ML
30 SUSPENSION ORAL 4 TIMES DAILY PRN
Status: DISCONTINUED | OUTPATIENT
Start: 2025-05-20 | End: 2025-05-29 | Stop reason: HOSPADM

## 2025-05-20 RX ORDER — ACETAMINOPHEN 325 MG/1
650 TABLET ORAL EVERY 4 HOURS PRN
Status: DISCONTINUED | OUTPATIENT
Start: 2025-05-20 | End: 2025-05-29 | Stop reason: HOSPADM

## 2025-05-20 RX ORDER — SODIUM,POTASSIUM PHOSPHATES 280-250MG
2 POWDER IN PACKET (EA) ORAL
Status: DISCONTINUED | OUTPATIENT
Start: 2025-05-20 | End: 2025-05-20

## 2025-05-20 RX ORDER — MORPHINE SULFATE 2 MG/ML
2 INJECTION, SOLUTION INTRAMUSCULAR; INTRAVENOUS
Refills: 0 | Status: DISCONTINUED | OUTPATIENT
Start: 2025-05-20 | End: 2025-05-29 | Stop reason: HOSPADM

## 2025-05-20 RX ORDER — CYCLOBENZAPRINE HCL 10 MG
10 TABLET ORAL 3 TIMES DAILY PRN
Status: DISCONTINUED | OUTPATIENT
Start: 2025-05-20 | End: 2025-05-29 | Stop reason: HOSPADM

## 2025-05-20 RX ORDER — SODIUM CHLORIDE 0.9 % (FLUSH) 0.9 %
3 SYRINGE (ML) INJECTION EVERY 12 HOURS PRN
Status: DISCONTINUED | OUTPATIENT
Start: 2025-05-20 | End: 2025-05-29 | Stop reason: HOSPADM

## 2025-05-20 RX ORDER — AMOXICILLIN 250 MG
1 CAPSULE ORAL 2 TIMES DAILY
Status: DISCONTINUED | OUTPATIENT
Start: 2025-05-20 | End: 2025-05-29 | Stop reason: HOSPADM

## 2025-05-20 RX ORDER — NALOXONE HCL 0.4 MG/ML
0.02 VIAL (ML) INJECTION
Status: DISCONTINUED | OUTPATIENT
Start: 2025-05-20 | End: 2025-05-29 | Stop reason: HOSPADM

## 2025-05-20 RX ORDER — PANTOPRAZOLE SODIUM 40 MG/10ML
40 INJECTION, POWDER, LYOPHILIZED, FOR SOLUTION INTRAVENOUS 2 TIMES DAILY
Status: DISCONTINUED | OUTPATIENT
Start: 2025-05-20 | End: 2025-05-26

## 2025-05-20 RX ORDER — LANOLIN ALCOHOL/MO/W.PET/CERES
800 CREAM (GRAM) TOPICAL
Status: DISCONTINUED | OUTPATIENT
Start: 2025-05-20 | End: 2025-05-20

## 2025-05-20 RX ORDER — BUSPIRONE HYDROCHLORIDE 5 MG/1
15 TABLET ORAL DAILY
Status: DISCONTINUED | OUTPATIENT
Start: 2025-05-20 | End: 2025-05-29 | Stop reason: HOSPADM

## 2025-05-20 RX ORDER — GABAPENTIN 800 MG/1
800 TABLET ORAL 3 TIMES DAILY
Status: DISCONTINUED | OUTPATIENT
Start: 2025-05-21 | End: 2025-05-20

## 2025-05-20 RX ORDER — SODIUM,POTASSIUM PHOSPHATES 280-250MG
2 POWDER IN PACKET (EA) ORAL
Status: DISCONTINUED | OUTPATIENT
Start: 2025-05-20 | End: 2025-05-29 | Stop reason: HOSPADM

## 2025-05-20 RX ORDER — SODIUM FERRIC GLUCONATE COMPLEX IN SUCROSE 12.5 MG/ML
125 INJECTION INTRAVENOUS DAILY
Status: COMPLETED | OUTPATIENT
Start: 2025-05-20 | End: 2025-05-24

## 2025-05-20 RX ORDER — HYDROCODONE BITARTRATE AND ACETAMINOPHEN 500; 5 MG/1; MG/1
TABLET ORAL
Status: DISCONTINUED | OUTPATIENT
Start: 2025-05-20 | End: 2025-05-29 | Stop reason: HOSPADM

## 2025-05-20 RX ORDER — TALC
9 POWDER (GRAM) TOPICAL NIGHTLY PRN
Status: DISCONTINUED | OUTPATIENT
Start: 2025-05-20 | End: 2025-05-29 | Stop reason: HOSPADM

## 2025-05-20 RX ORDER — ACETAMINOPHEN 325 MG/1
650 TABLET ORAL EVERY 8 HOURS PRN
Status: DISCONTINUED | OUTPATIENT
Start: 2025-05-20 | End: 2025-05-20

## 2025-05-20 RX ORDER — SYRING-NEEDL,DISP,INSUL,0.3 ML 29 G X1/2"
296 SYRINGE, EMPTY DISPOSABLE MISCELLANEOUS ONCE
Status: COMPLETED | OUTPATIENT
Start: 2025-05-20 | End: 2025-05-20

## 2025-05-20 RX ORDER — IBUPROFEN 200 MG
24 TABLET ORAL
Status: DISCONTINUED | OUTPATIENT
Start: 2025-05-20 | End: 2025-05-20

## 2025-05-20 RX ADMIN — HYDROCODONE BITARTRATE AND ACETAMINOPHEN 1 TABLET: 5; 325 TABLET ORAL at 12:05

## 2025-05-20 RX ADMIN — BUSPIRONE HYDROCHLORIDE 15 MG: 10 TABLET ORAL at 09:05

## 2025-05-20 RX ADMIN — MAGNESIUM CITRATE 296 ML: 1.75 LIQUID ORAL at 06:05

## 2025-05-20 RX ADMIN — SODIUM FERRIC GLUCONATE COMPLEX IN SUCROSE 125 MG: 12.5 INJECTION INTRAVENOUS at 11:05

## 2025-05-20 RX ADMIN — PANTOPRAZOLE SODIUM 40 MG: 40 INJECTION, POWDER, FOR SOLUTION INTRAVENOUS at 09:05

## 2025-05-20 RX ADMIN — CYCLOBENZAPRINE 10 MG: 10 TABLET, FILM COATED ORAL at 11:05

## 2025-05-20 RX ADMIN — LISINOPRIL 40 MG: 20 TABLET ORAL at 09:05

## 2025-05-20 RX ADMIN — POLYETHYLENE GLYCOL 3350 340 G: 17 POWDER, FOR SOLUTION ORAL at 08:05

## 2025-05-20 RX ADMIN — TAMSULOSIN HYDROCHLORIDE 0.8 MG: 0.4 CAPSULE ORAL at 09:05

## 2025-05-20 RX ADMIN — SENNOSIDES AND DOCUSATE SODIUM 1 TABLET: 50; 8.6 TABLET ORAL at 09:05

## 2025-05-20 RX ADMIN — HYDROCODONE BITARTRATE AND ACETAMINOPHEN 1 TABLET: 5; 325 TABLET ORAL at 08:05

## 2025-05-20 NOTE — SUBJECTIVE & OBJECTIVE
Past Medical History:   Diagnosis Date    JACKLYN (acute kidney injury) 11/11/2022    Arthritis     GERD (gastroesophageal reflux disease)     Hepatitis C     STATES DX 10-20. NO S/S. NO FURTHER TESTS OR TX. WITH INSURANCE IN 2021 CAN GET IT EVALUATED.    History of left hip replacement 05/20/2021    History of MRSA infection     History of right shoulder replacement 01/15/2021    Hypertension     Wears glasses        Past Surgical History:   Procedure Laterality Date    APPENDECTOMY      ARTHROPLASTY OF SHOULDER Right 1/13/2021    Procedure: ARTHROPLASTY, SHOULDER TOTAL;  Surgeon: Emilio Siddiqui MD;  Location: [facility] OR;  Service: Orthopedics;  Laterality: Right;  GENERAL AND BLOCK    COLONOSCOPY N/A 5/8/2023    Procedure: COLONOSCOPY;  Surgeon: Bobby Bourgeois MD;  Location: [facility];  Service: Endoscopy;  Laterality: N/A;    ESOPHAGOGASTRODUODENOSCOPY N/A 5/7/2023    Procedure: EGD (ESOPHAGOGASTRODUODENOSCOPY);  Surgeon: Carloz Christianson Jr., MD;  Location: [facility];  Service: Endoscopy;  Laterality: N/A;    ESOPHAGOGASTRODUODENOSCOPY N/A 3/1/2024    Procedure: EGD (ESOPHAGOGASTRODUODENOSCOPY);  Surgeon: Femi Hyde MD;  Location: [facility];  Service: Endoscopy;  Laterality: N/A;    HERNIA REPAIR      HIP REPLACEMENT ARTHROPLASTY Left 3/24/2021    Procedure: ARTHROPLASTY, HIP REPLACEMENT;  Surgeon: Miles Rivas II, MD;  Location: [facility] OR;  Service: Orthopedics;  Laterality: Left;    JOINT REPLACEMENT Right     shoulder    REVISION TOTAL HIP ARTHROPLASTY Left 6/2/2021    Procedure: REVISION, TOTAL ARTHROPLASTY, HIP;  Surgeon: Miles Rivas II, MD;  Location: [facility] OR;  Service: Orthopedics;  Laterality: Left;    SMALL BOWEL ENTEROSCOPY Left 5/8/2023    Procedure: ENTEROSCOPY;  Surgeon: Bobby Bourgeois MD;  Location: [facility];  Service: Endoscopy;  Laterality: Left;       Review of patient's allergies indicates:   Allergen Reactions    Pcn [penicillins]      As a child.       No current  facility-administered medications on file prior to encounter.     Current Outpatient Medications on File Prior to Encounter   Medication Sig    busPIRone (BUSPAR) 15 MG tablet Take 1 tablet by mouth once daily    cyclobenzaprine (FLEXERIL) 10 MG tablet Take 1 tablet by mouth three times daily as needed for muscle spasm    ferrous sulfate (FEOSOL) Tab tablet Take 1 tablet by mouth daily with breakfast.    gabapentin (NEURONTIN) 800 MG tablet Take 1 tablet (800 mg total) by mouth 3 (three) times daily.    lisinopriL (PRINIVIL,ZESTRIL) 40 MG tablet Take 1 tablet (40 mg total) by mouth once daily.    oxybutynin (DITROPAN-XL) 5 MG TR24 Take 1 tablet (5 mg total) by mouth once daily.    pantoprazole (PROTONIX) 40 MG tablet Take 1 tablet (40 mg total) by mouth 2 (two) times daily.    tamsulosin (FLOMAX) 0.4 mg Cap Take 2 capsules by mouth once daily     Family History       Problem Relation (Age of Onset)    Cancer Mother          Tobacco Use    Smoking status: Every Day     Current packs/day: 0.50     Average packs/day: 0.5 packs/day for 30.0 years (15.0 ttl pk-yrs)     Types: Cigarettes     Passive exposure: Current    Smokeless tobacco: Never   Substance and Sexual Activity    Alcohol use: Never    Drug use: Never    Sexual activity: Yes     Partners: Female     Review of Systems   Constitutional:  Negative for chills and fever.   HENT:  Negative for congestion and sore throat.    Eyes:  Negative for visual disturbance.   Respiratory:  Negative for cough and shortness of breath.    Cardiovascular:  Negative for chest pain and palpitations.   Gastrointestinal:  Negative for abdominal pain, constipation, diarrhea, nausea and vomiting.   Endocrine: Negative for cold intolerance and heat intolerance.   Genitourinary:  Negative for dysuria and hematuria.   Musculoskeletal:  Positive for arthralgias and gait problem. Negative for myalgias.   Skin:  Negative for rash.   Neurological:  Positive for syncope (near syncope),  weakness and light-headedness. Negative for tremors and seizures.   Hematological:  Negative for adenopathy. Does not bruise/bleed easily.   All other systems reviewed and are negative.    Objective:     Vital Signs (Most Recent):  Temp: 97.9 °F (36.6 °C) (05/20/25 0405)  Pulse: 89 (05/20/25 0405)  Resp: (!) 22 (05/20/25 0405)  BP: (!) 159/70 (05/20/25 0405)  SpO2: 97 % (05/20/25 0405) Vital Signs (24h Range):  Temp:  [97.9 °F (36.6 °C)-98.6 °F (37 °C)] 97.9 °F (36.6 °C)  Pulse:  [86-98] 89  Resp:  [14-24] 22  SpO2:  [96 %-99 %] 97 %  BP: (139-171)/(64-72) 159/70     Weight: 83.9 kg (185 lb)  Body mass index is 25.8 kg/m².     Physical Exam  Vitals and nursing note reviewed.   Constitutional:       General: He is awake. He is not in acute distress.     Appearance: Normal appearance. He is well-developed. He is not ill-appearing.   HENT:      Head: Normocephalic and atraumatic.      Nose: Nose normal. No septal deviation.   Eyes:      Conjunctiva/sclera: Conjunctivae normal.      Pupils: Pupils are equal, round, and reactive to light.   Neck:      Thyroid: No thyroid mass.      Vascular: No JVD.      Trachea: No tracheal tenderness or tracheal deviation.   Cardiovascular:      Rate and Rhythm: Normal rate and regular rhythm.      Heart sounds: S1 normal and S2 normal. No murmur heard.     No friction rub. No gallop.   Pulmonary:      Effort: Pulmonary effort is normal.      Breath sounds: Normal breath sounds. No decreased breath sounds, wheezing, rhonchi or rales.   Abdominal:      General: Bowel sounds are normal. There is no distension.      Palpations: Abdomen is soft. There is no hepatomegaly, splenomegaly or mass.      Tenderness: There is no abdominal tenderness.   Musculoskeletal:      Cervical back: Full passive range of motion without pain, normal range of motion and neck supple.      Right hip: Tenderness present. No deformity. Normal range of motion. Normal strength.   Skin:     General: Skin is warm.       Findings: No rash.   Neurological:      General: No focal deficit present.      Mental Status: He is alert and oriented to person, place, and time.      Cranial Nerves: No cranial nerve deficit.      Sensory: No sensory deficit.   Psychiatric:         Mood and Affect: Mood normal.         Behavior: Behavior normal. Behavior is cooperative.              CRANIAL NERVES     CN III, IV, VI   Pupils are equal, round, and reactive to light.       Significant Labs: All pertinent labs within the past 24 hours have been reviewed.  CBC:   Recent Labs   Lab 05/19/25 2359 05/20/25  0401   WBC 4.13 3.78*   HGB 7.3* 7.6*   HCT 24.8* 26.7*    171     CMP:   Recent Labs   Lab 05/19/25  2359      K 4.4      CO2 22*   GLU 85   BUN 24*   CREATININE 0.9   CALCIUM 8.4*   PROT 6.8   ALBUMIN 3.2*   BILITOT <0.2   ALKPHOS 141   AST 18   ALT 9*   ANIONGAP 9     Cardiac Markers:   Recent Labs   Lab 05/19/25  2359   *     Magnesium:   Recent Labs   Lab 05/19/25  2359   MG 2.1     Troponin:   Recent Labs   Lab 05/19/25  2359 05/20/25  0143   TROPONINIHS 5 4       Significant Imaging: I have reviewed all pertinent imaging results/findings within the past 24 hours.  Imaging Results              X-Ray Chest AP Portable (Final result)  Result time 05/20/25 01:16:46      Final result by Lopez Cervantes MD (05/20/25 01:16:46)                   Impression:      No evidence of acute cardiopulmonary process.      Electronically signed by: Lopez Cervantes  Date:    05/20/2025  Time:    01:16               Narrative:    EXAMINATION:  XR CHEST AP PORTABLE    CLINICAL HISTORY:  Chest Pain;    TECHNIQUE:  Single frontal view of the chest was performed.    COMPARISON:  Radiographs of the chest from 10/28/2024.    FINDINGS:  Heart size and pulmonary vasculature are within normal limits.  No evidence of focal consolidation, pneumothorax, or pleural effusion.  A proximal right humeral prosthesis is redemonstrated.  Moderate  chronic degenerative changes are present involving left glenohumeral joint.                                       CT Cervical Spine Without Contrast (Final result)  Result time 05/20/25 01:04:25      Final result by Lopez Cervantes MD (05/20/25 01:04:25)                   Impression:      And angulated lucency is present involving the left pedicle C4 which does not appear to have been present on prior CT of the C-spine from June 2024, however it is unclear if this is an acute process or associated with prominent multilevel chronic degenerative changes involving the cervical spine.  Note, if this were acute, it would not represent an unstable fracture.  No additional evidence of acute fracture or subluxation involving the cervical spine.      Electronically signed by: Lopez Cervantes  Date:    05/20/2025  Time:    01:04               Narrative:    EXAMINATION:  CT CERVICAL SPINE WITHOUT CONTRAST    CLINICAL HISTORY:  Neck trauma (Age >= 65y);    TECHNIQUE:  Low dose axial images, sagittal and coronal reformations were performed though the cervical spine.  Contrast was not administered.    COMPARISON:  None available.    FINDINGS:  There is an angulated lucency involving the left pedicle of C4 which does not appear to have been present on prior CT of the C-spine from June 2024, however it is unclear if this is a acute process.  It may be associated with prominent multilevel chronic degenerative changes, and it does not represent an unstable fracture if it were acute.  The paraspinal soft tissues are unremarkable.                                       CT Head Without Contrast (Final result)  Result time 05/20/25 00:49:42      Final result by Lopez Cervantes MD (05/20/25 00:49:42)                   Impression:      No evidence of acute intracranial abnormality.      Electronically signed by: Lopez Cervantes  Date:    05/20/2025  Time:    00:49               Narrative:    EXAMINATION:  CT HEAD WITHOUT  CONTRAST    CLINICAL HISTORY:  Head trauma, minor (Age >= 65y);    TECHNIQUE:  Low dose axial CT images obtained throughout the head without intravenous contrast. Sagittal and coronal reconstructions were performed.    COMPARISON:  None available.    FINDINGS:  Intracranial compartment:    Ventricles and sulci are normal in size for age without evidence of hydrocephalus. No extra-axial blood or fluid collections.    The brain parenchyma appears normal. No parenchymal mass, hemorrhage, edema or major vascular distribution infarct.    Skull/extracranial contents (limited evaluation):    No fracture. Mastoid air cells and paranasal sinuses are essentially clear.  The orbits are unremarkable.                                       CT Hip Without Contrast Right (Final result)  Result time 05/20/25 01:13:11      Final result by Lopez Cervantes MD (05/20/25 01:13:11)                   Impression:      Severe chronic degenerative changes of the right hip, progressed since prior imaging from 2024, without evidence of acute osseous process.      Electronically signed by: Lopez Cervantes  Date:    05/20/2025  Time:    01:13               Narrative:    EXAMINATION:  CT HIP WITHOUT CONTRAST RIGHT    CLINICAL HISTORY:  Hip trauma, fracture suspected, xray done;    TECHNIQUE:  Axial CT images were obtained of the to right hip without the use of intravenous contrast.  Sagittal and coronal reformats were performed.  3D series were also provided.    COMPARISON:  CT abdomen pelvis from 03/06/2024.    FINDINGS:  No evidence of acute fracture or dislocation involving the right hip.  Severe chronic degenerative changes are present involving the right hip with subchondral cyst formation involving the head of the right femur and the right acetabulum.  These findings are progressed when compared to prior imaging from March 2024.  No evidence of acute process involving the partially visualized pelvic viscera.  No evidence of  subcutaneous emphysema, drainable fluid collection, or radiopaque foreign body.  No evidence of soft tissue or osseous mass.                                       X-Ray Hip 2 or 3 views Right with Pelvis when performed (Final result)  Result time 05/19/25 23:27:20      Final result by Lopez Cervantes MD (05/19/25 23:27:20)                   Impression:      No evidence of acute fracture or dislocation involving the pelvis or right hip, and no evidence of periprosthetic fracture or hardware complication involving the proximal left femoral prosthesis.      Electronically signed by: Lopez Cervantes  Date:    05/19/2025  Time:    23:27               Narrative:    EXAMINATION:  XR HIP WITH PELVIS WHEN PERFORMED 2 OR 3 VIEWS RIGHT    CLINICAL HISTORY:  fall;    TECHNIQUE:  Three views of the pelvis and right hip were obtained..    COMPARISON:  None    FINDINGS:  No evidence of acute fracture or dislocation involving the pelvis or right hip.  A left proximal femoral prosthesis is present without evidence of periprosthetic fracture or hardware complication.  Moderate to severe degenerative changes are present involving the right hip joint with subchondral cyst formation involving the acetabulum and head of the right femur.  No evidence of subcutaneous emphysema or nonsurgical radiopaque foreign body.  No evidence of soft tissue or osseous mass.

## 2025-05-20 NOTE — NURSING
"5.20.25 1028:dr. Lomas and dr. Bloom sent message re:"pt admitted from [facility]. he is in room [facility]".  5\.20.25 1156:rufina garza voiced that she called gi consult this am   5.20.25 1656:dietary called for powerade for bowel prep solution.   "

## 2025-05-20 NOTE — CONSULTS
GASTROENTEROLOGY INPATIENT CONSULT NOTE  Patient Name: Omar Trotter  Patient MRN: [id number]  Patient : 1960    Admit Date: 2025  Service date: 2025    Reason for Consult: acute / chronic aneima    PCP: Cam Orozco MD    Chief Complaint   Patient presents with    Fall     R hip pain with rotation and shortening of RLE       HPI: Patient is a 64 y.o. male with PMHx HTN, HCV s/p Epclusa, appy, ventral hernia repair, diverticulosis, ICV AVM, chronic anemia, questionable cirrhosis (by imaging in ) presents for anemia. No signs of GIB, NSAID use or other concerns.     CHART REVIEW:   Hg 7.3 w/ MCV 79; Nml CMP  CT Abd 3/'24 - mild liver nodularity; mild vasc dz; Nml GB / panc / biliary / bowels   EGD  - esophagitis  Colon  - ICV AVM s/p APC; tics; small roids  Push  - mild gastritis; Nml jejunum  Colon  - tics / roids / polyps      Past Medical History:  Past Medical History:   Diagnosis Date    JACKLYN (acute kidney injury) 2022    Arthritis     GERD (gastroesophageal reflux disease)     Hepatitis C     STATES DX 10-. NO S/S. NO FURTHER TESTS OR TX. WITH INSURANCE IN  CAN GET IT EVALUATED.    History of left hip replacement 2021    History of MRSA infection     History of right shoulder replacement 01/15/2021    Hypertension     Wears glasses         Past Surgical History:  Past Surgical History:   Procedure Laterality Date    APPENDECTOMY      ARTHROPLASTY OF SHOULDER Right 2021    Procedure: ARTHROPLASTY, SHOULDER TOTAL;  Surgeon: Emilio Siddiqui MD;  Location: [facility];  Service: Orthopedics;  Laterality: Right;  GENERAL AND BLOCK    COLONOSCOPY N/A 2023    Procedure: COLONOSCOPY;  Surgeon: Bobby Bourgeois MD;  Location: [facility];  Service: Endoscopy;  Laterality: N/A;    ESOPHAGOGASTRODUODENOSCOPY N/A 2023    Procedure: EGD (ESOPHAGOGASTRODUODENOSCOPY);  Surgeon: Carloz Christianson Jr., MD;  Location: [facility];  Service: Endoscopy;  Laterality: N/A;     ESOPHAGOGASTRODUODENOSCOPY N/A 3/1/2024    Procedure: EGD (ESOPHAGOGASTRODUODENOSCOPY);  Surgeon: Femi Hyde MD;  Location: [facility];  Service: Endoscopy;  Laterality: N/A;    HERNIA REPAIR      HIP REPLACEMENT ARTHROPLASTY Left 3/24/2021    Procedure: ARTHROPLASTY, HIP REPLACEMENT;  Surgeon: Miles Rivas II, MD;  Location: [facility] OR;  Service: Orthopedics;  Laterality: Left;    JOINT REPLACEMENT Right     shoulder    REVISION TOTAL HIP ARTHROPLASTY Left 6/2/2021    Procedure: REVISION, TOTAL ARTHROPLASTY, HIP;  Surgeon: Miles Rivas II, MD;  Location: [facility] OR;  Service: Orthopedics;  Laterality: Left;    SMALL BOWEL ENTEROSCOPY Left 5/8/2023    Procedure: ENTEROSCOPY;  Surgeon: Bobby Bourgeois MD;  Location: [facility];  Service: Endoscopy;  Laterality: Left;        Home Medications:  Prescriptions Prior to Admission[1]    Inpatient Medications:   busPIRone  15 mg Oral Daily    ferric gluconate  125 mg Intravenous Daily    lisinopriL  40 mg Oral Daily    pantoprazole  40 mg Intravenous BID    senna-docusate  1 tablet Oral BID    tamsulosin  2 capsule Oral Daily       Current Facility-Administered Medications:     0.9%  NaCl infusion (for blood administration), , Intravenous, Q24H PRN    acetaminophen, 650 mg, Oral, Q4H PRN    aluminum-magnesium hydroxide-simethicone, 30 mL, Oral, QID PRN    melatonin, 9 mg, Oral, Nightly PRN    naloxone, 0.02 mg, Intravenous, PRN    ondansetron, 8 mg, Intravenous, Q6H PRN    potassium bicarbonate, 35 mEq, Oral, PRN    potassium bicarbonate, 50 mEq, Oral, PRN    potassium bicarbonate, 60 mEq, Oral, PRN    potassium, sodium phosphates, 2 packet, Oral, PRN    sodium chloride 0.9%, 3 mL, Intravenous, Q12H PRN    Review of patient's allergies indicates:   Allergen Reactions    Pcn [penicillins]      As a child.       Social History:   Social History     Occupational History    Not on file   Tobacco Use    Smoking status: Every Day     Current packs/day: 0.50      "Average packs/day: 0.5 packs/day for 30.0 years (15.0 ttl pk-yrs)     Types: Cigarettes     Passive exposure: Current    Smokeless tobacco: Never   Substance and Sexual Activity    Alcohol use: Never    Drug use: Never    Sexual activity: Yes     Partners: Female       Family History:   Family History   Problem Relation Name Age of Onset    Cancer Mother          leukemia       Review of Systems:  A 10 point review of systems was performed and was normal, except as mentioned in the HPI, including constitutional, HEENT, heme, lymph, cardiovascular, respiratory, gastrointestinal, genitourinary, neurologic, endocrine, psychiatric and musculoskeletal.      OBJECTIVE:    Physical Exam:  24 Hour Vital Sign Ranges: Temp:  [97.6 °F (36.4 °C)-98.6 °F (37 °C)] 98.1 °F (36.7 °C)  Pulse:  [68-98] 89  Resp:  [14-24] 16  SpO2:  [93 %-99 %] 98 %  BP: (139-174)/(64-79) 160/78  Most recent vitals: BP (!) 160/78 (BP Location: Left arm, Patient Position: Lying)   Pulse 89   Temp 98.1 °F (36.7 °C) (Oral)   Resp 16   Ht 5' 11" (1.803 m)   Wt 94.3 kg (207 lb 14.3 oz)   SpO2 98%   BMI 29.00 kg/m²    GEN: well-developed, well-nourished, awake and alert, non-toxic appearing adult  HEENT: PERRL, sclera anicteric, oral mucosa pink and moist without lesion  NECK: trachea midline; Good ROM  CV: regular rate and rhythm, no murmurs or gallops  RESP: clear to auscultation bilaterally, no wheezes, rhonci or rales  ABD: soft, non-tender, non-distended, normal bowel sounds  EXT: no swelling or edema, 2+ pulses distally  SKIN: no rashes or jaundice  PSYCH: normal affect    Labs:   Recent Labs     05/19/25  2359 05/20/25  0401   WBC 4.13 3.78*   MCV 80* 79*    171     Recent Labs     05/19/25 2359 05/20/25  0401    140   K 4.4 4.3    110   CO2 22* 21*   BUN 24* 22   GLU 85 96     No results for input(s): "ALB" in the last 72 hours.    Invalid input(s): "ALKP", "SGOT", "SGPT", "TBIL", "DBIL", "TPRO"  No results for input(s): " ""PT", "INR", "PTT" in the last 72 hours.      Radiology Review:  X-Ray Chest AP Portable   Final Result      No evidence of acute cardiopulmonary process.         Electronically signed by: Lopez Cervantes   Date:    05/20/2025   Time:    01:16      CT Cervical Spine Without Contrast   Final Result      And angulated lucency is present involving the left pedicle C4 which does not appear to have been present on prior CT of the C-spine from June 2024, however it is unclear if this is an acute process or associated with prominent multilevel chronic degenerative changes involving the cervical spine.  Note, if this were acute, it would not represent an unstable fracture.  No additional evidence of acute fracture or subluxation involving the cervical spine.         Electronically signed by: Lopez Cervantes   Date:    05/20/2025   Time:    01:04      CT Head Without Contrast   Final Result      No evidence of acute intracranial abnormality.         Electronically signed by: Lopez Cervantes   Date:    05/20/2025   Time:    00:49      CT Hip Without Contrast Right   Final Result      Severe chronic degenerative changes of the right hip, progressed since prior imaging from 2024, without evidence of acute osseous process.         Electronically signed by: Lopez Cervantes   Date:    05/20/2025   Time:    01:13      X-Ray Hip 2 or 3 views Right with Pelvis when performed   Final Result      No evidence of acute fracture or dislocation involving the pelvis or right hip, and no evidence of periprosthetic fracture or hardware complication involving the proximal left femoral prosthesis.         Electronically signed by: Lopez Cervantes   Date:    05/19/2025   Time:    23:27            IMPRESSION / RECOMMENDATIONS:  64 y.o. male with PMHx HTN, HCV s/p Epclusa, appy, ventral hernia repair, diverticulosis, ICV AVM, chronic anemia, questionable cirrhosis (by imaging in '24) presents for anemia. No signs of GIB. RIsks, benefits, " alternatives discussed in detail regarding upcoming procedures and sedation and possible complications. Some of the more common endoscopic complications include but not limited to immediate or delayed perforation, bleeding, infections, pain, inadvertent injury to surrounding tissue / organs and possible need for surgical evaluation. All questions answered and will proceed with procedure as planned.     -EGD/COlon tomorrow; consider VCE if neg    Thank you for this consult.    Familia Romero III  5/20/2025  4:28 PM             [1]   Medications Prior to Admission   Medication Sig Dispense Refill Last Dose/Taking    busPIRone (BUSPAR) 15 MG tablet Take 1 tablet by mouth once daily (Patient taking differently: Take 15 mg by mouth nightly. Take 1 tablet by mouth once daily) 90 tablet 3 5/19/2025 Evening    cyclobenzaprine (FLEXERIL) 10 MG tablet Take 1 tablet by mouth three times daily as needed for muscle spasm (Patient taking differently: Take 10 mg by mouth 3 (three) times daily as needed for Muscle spasms.) 40 tablet 0 5/19/2025 Evening    gabapentin (NEURONTIN) 800 MG tablet Take 1 tablet (800 mg total) by mouth 3 (three) times daily. 90 tablet 11 5/19/2025 Evening    lisinopriL (PRINIVIL,ZESTRIL) 40 MG tablet Take 1 tablet (40 mg total) by mouth once daily. (Patient taking differently: Take 40 mg by mouth nightly.) 90 tablet 3 5/19/2025 Evening    oxybutynin (DITROPAN-XL) 5 MG TR24 Take 1 tablet (5 mg total) by mouth once daily. (Patient taking differently: Take 5 mg by mouth nightly.) 90 tablet 3 5/19/2025 Evening    pantoprazole (PROTONIX) 40 MG tablet Take 1 tablet (40 mg total) by mouth 2 (two) times daily. 60 tablet 11 5/19/2025 Evening    tamsulosin (FLOMAX) 0.4 mg Cap Take 2 capsules by mouth once daily (Patient taking differently: Take 2 capsules by mouth nightly.) 60 capsule 2 5/19/2025 Evening

## 2025-05-20 NOTE — ASSESSMENT & PLAN NOTE
Anemia is likely due to Iron deficiency. Most recent hemoglobin and hematocrit are listed below.  Recent Labs     05/19/25  2359 05/20/25  0401   HGB 7.3* 7.6*   HCT 24.8* 26.7*     Plan  - Monitor serial CBC: Daily  - Transfuse PRBC if patient becomes hemodynamically unstable, symptomatic or H/H drops below 7/21.  - Patient is pending a blood transfusion that was ordered by the ED on 5/20/25  - Patient's anemia is currently stable  - last EGD showed esophagitis

## 2025-05-20 NOTE — ED PROVIDER NOTES
Encounter Date: 5/19/2025       History     Chief Complaint   Patient presents with    Fall     R hip pain with rotation and shortening of RLE     HPI  64-year-old man with a history of hypertension presents for evaluation of fall and right hip pain.  He said he is walking down his stairs when he slipped.  He slipped because he was feeling weak all over.  Denies hitting his head LOC or any other pain.  Denies chest pain palpitations shortness of breath belly pain nausea or vomiting change in bowel or bladder habits.  Does not take aspirin Plavix or blood thinners.  Review of patient's allergies indicates:   Allergen Reactions    Pcn [penicillins]      As a child.     Past Medical History:   Diagnosis Date    JACKLYN (acute kidney injury) 11/11/2022    Arthritis     GERD (gastroesophageal reflux disease)     Hepatitis C     STATES DX 10-20. NO S/S. NO FURTHER TESTS OR TX. WITH INSURANCE IN 2021 CAN GET IT EVALUATED.    History of left hip replacement 05/20/2021    History of MRSA infection     History of right shoulder replacement 01/15/2021    Hypertension     Wears glasses      Past Surgical History:   Procedure Laterality Date    APPENDECTOMY      ARTHROPLASTY OF SHOULDER Right 1/13/2021    Procedure: ARTHROPLASTY, SHOULDER TOTAL;  Surgeon: Emilio Siddiqui MD;  Location: [facility];  Service: Orthopedics;  Laterality: Right;  GENERAL AND BLOCK    COLONOSCOPY N/A 5/8/2023    Procedure: COLONOSCOPY;  Surgeon: Bobby Bourgeois MD;  Location: [facility];  Service: Endoscopy;  Laterality: N/A;    ESOPHAGOGASTRODUODENOSCOPY N/A 5/7/2023    Procedure: EGD (ESOPHAGOGASTRODUODENOSCOPY);  Surgeon: Carloz Christianson Jr., MD;  Location: [facility];  Service: Endoscopy;  Laterality: N/A;    ESOPHAGOGASTRODUODENOSCOPY N/A 3/1/2024    Procedure: EGD (ESOPHAGOGASTRODUODENOSCOPY);  Surgeon: Femi Hyde MD;  Location: [facility];  Service: Endoscopy;  Laterality: N/A;    HERNIA REPAIR      HIP REPLACEMENT ARTHROPLASTY Left 3/24/2021     Procedure: ARTHROPLASTY, HIP REPLACEMENT;  Surgeon: Miles Rivas II, MD;  Location: [facility] OR;  Service: Orthopedics;  Laterality: Left;    JOINT REPLACEMENT Right     shoulder    REVISION TOTAL HIP ARTHROPLASTY Left 6/2/2021    Procedure: REVISION, TOTAL ARTHROPLASTY, HIP;  Surgeon: Miles Rivas II, MD;  Location: [facility] OR;  Service: Orthopedics;  Laterality: Left;    SMALL BOWEL ENTEROSCOPY Left 5/8/2023    Procedure: ENTEROSCOPY;  Surgeon: Bobby Bourgeois MD;  Location: [facility];  Service: Endoscopy;  Laterality: Left;     Family History   Problem Relation Name Age of Onset    Cancer Mother          leukemia     Social History[1]  Review of Systems   All other systems reviewed and are negative.      Physical Exam     Initial Vitals [05/19/25 2055]   BP Pulse Resp Temp SpO2   139/67 98 14 98.6 °F (37 °C) 98 %      MAP       --         Physical Exam    Nursing note and vitals reviewed.  Constitutional: He appears well-developed and well-nourished.   HENT:   Head: Normocephalic and atraumatic.   Eyes: Right eye exhibits no discharge. Left eye exhibits no discharge.   Neck: No tracheal deviation present.   No spinal midline tenderness to palpation step-off or deformity   Cardiovascular:  Normal rate, regular rhythm and intact distal pulses.           Pulmonary/Chest: Breath sounds normal. No stridor. No respiratory distress.   Abdominal: Abdomen is soft. Bowel sounds are normal. There is no abdominal tenderness.   Musculoskeletal:         General: No edema.      Comments: Tenderness to palpation in right hip, no obvious deformity, no other extremity thoracoabdominal bony tenderness to palpation     Neurological: He is alert and oriented to person, place, and time.   Cranial nerves 2-12 intact 5/5  strength bilaterally 5 5 dorsi and plantar flexion bilaterally   Skin: Skin is warm and dry.         ED Course   Critical Care    Date/Time: 5/19/2025 8:42 PM    Performed by: Rm Alvarez,  MD  Authorized by: Rm Alvarez MD  Direct patient critical care time: 15 minutes  Ordering / reviewing critical care time: 8 minutes  Documentation critical care time: 5 minutes  Consulting other physicians critical care time: 5 minutes  Total critical care time (exclusive of procedural time) : 33 minutes  Critical care time was exclusive of separately billable procedures and treating other patients and teaching time.  Critical care was necessary to treat or prevent imminent or life-threatening deterioration of the following conditions: circulatory failure.  Critical care was time spent personally by me on the following activities: development of treatment plan with patient or surrogate, discussions with consultants, interpretation of cardiac output measurements, evaluation of patient's response to treatment, examination of patient, obtaining history from patient or surrogate, ordering and performing treatments and interventions, ordering and review of laboratory studies, ordering and review of radiographic studies, pulse oximetry, re-evaluation of patient's condition and review of old charts.  Comments: Transfusion for symptomatic anemia        Labs Reviewed   COMPREHENSIVE METABOLIC PANEL - Abnormal       Result Value    Sodium 141      Potassium 4.4      Chloride 110      CO2 22 (*)     Glucose 85      BUN 24 (*)     Creatinine 0.9      Calcium 8.4 (*)     Protein Total 6.8      Albumin 3.2 (*)     Bilirubin Total <0.2            AST 18      ALT 9 (*)     Anion Gap 9      eGFR >60     NT-PRO NATRIURETIC PEPTIDE - Abnormal    NT-proBNP 189 (*)    CBC WITH DIFFERENTIAL - Abnormal    WBC 4.13      RBC 3.10 (*)     Hgb 7.3 (*)     Hct 24.8 (*)     MCV 80 (*)     MCH 23.5 (*)     MCHC 29.4 (*)     RDW 17.1 (*)     Platelet Count 174      MPV 9.8      Nucleated RBC 0      Neut % 59.1      Lymph % 24.7      Mono % 9.7      Eos % 5.6      Basophil % 0.7      Imm Grans % 0.2      Neut # 2.4      Lymph #  1.02      Mono # 0.40      Eos # 0.23      Baso # 0.03      Imm Grans # 0.01     MAGNESIUM - Normal    Magnesium 2.1     TROPONIN I HIGH SENSITIVITY - Normal    Troponin High Sensitive 5     TROPONIN I HIGH SENSITIVITY - Normal    Troponin High Sensitive 4     CBC W/ AUTO DIFFERENTIAL    Narrative:     The following orders were created for panel order CBC auto differential.  Procedure                               Abnormality         Status                     ---------                               -----------         ------                     CBC with Differential[[id number]]       Abnormal            Final result                 Please view results for these tests on the individual orders.   MAGNESIUM   PHOSPHORUS   CBC W/ AUTO DIFFERENTIAL    Narrative:     The following orders were created for panel order CBC with Automated Differential.  Procedure                               Abnormality         Status                     ---------                               -----------         ------                     CBC with Differential[[id number]]                                                        Please view results for these tests on the individual orders.   COMPREHENSIVE METABOLIC PANEL   CBC WITH DIFFERENTIAL   TYPE & SCREEN    Specimen Outdate 05/23/2025 23:59      Group & Rh O POS      Indirect Manav NEG     PREPARE RBC SOFT    UNIT NUMBER [id number]      UNIT ABO/RH O POS      DISPENSE STATUS Selected      Unit Expiration [id number]      Product Code [id number]      Unit Blood Type Code 5100      CROSSMATCH INTERPRETATION Compatible            Imaging Results              X-Ray Chest AP Portable (Final result)  Result time 05/20/25 01:16:46      Final result by Lopez Cervantes MD (05/20/25 01:16:46)                   Impression:      No evidence of acute cardiopulmonary process.      Electronically signed by: Lopez Cervantes  Date:    05/20/2025  Time:    01:16               Narrative:     EXAMINATION:  XR CHEST AP PORTABLE    CLINICAL HISTORY:  Chest Pain;    TECHNIQUE:  Single frontal view of the chest was performed.    COMPARISON:  Radiographs of the chest from 10/28/2024.    FINDINGS:  Heart size and pulmonary vasculature are within normal limits.  No evidence of focal consolidation, pneumothorax, or pleural effusion.  A proximal right humeral prosthesis is redemonstrated.  Moderate chronic degenerative changes are present involving left glenohumeral joint.                                       CT Cervical Spine Without Contrast (Final result)  Result time 05/20/25 01:04:25      Final result by Lopez Cervantes MD (05/20/25 01:04:25)                   Impression:      And angulated lucency is present involving the left pedicle C4 which does not appear to have been present on prior CT of the C-spine from June 2024, however it is unclear if this is an acute process or associated with prominent multilevel chronic degenerative changes involving the cervical spine.  Note, if this were acute, it would not represent an unstable fracture.  No additional evidence of acute fracture or subluxation involving the cervical spine.      Electronically signed by: Lopez Cervantes  Date:    05/20/2025  Time:    01:04               Narrative:    EXAMINATION:  CT CERVICAL SPINE WITHOUT CONTRAST    CLINICAL HISTORY:  Neck trauma (Age >= 65y);    TECHNIQUE:  Low dose axial images, sagittal and coronal reformations were performed though the cervical spine.  Contrast was not administered.    COMPARISON:  None available.    FINDINGS:  There is an angulated lucency involving the left pedicle of C4 which does not appear to have been present on prior CT of the C-spine from June 2024, however it is unclear if this is a acute process.  It may be associated with prominent multilevel chronic degenerative changes, and it does not represent an unstable fracture if it were acute.  The paraspinal soft tissues are  unremarkable.                                       CT Head Without Contrast (Final result)  Result time 05/20/25 00:49:42      Final result by Lopez Cervantes MD (05/20/25 00:49:42)                   Impression:      No evidence of acute intracranial abnormality.      Electronically signed by: Lopez Cervantes  Date:    05/20/2025  Time:    00:49               Narrative:    EXAMINATION:  CT HEAD WITHOUT CONTRAST    CLINICAL HISTORY:  Head trauma, minor (Age >= 65y);    TECHNIQUE:  Low dose axial CT images obtained throughout the head without intravenous contrast. Sagittal and coronal reconstructions were performed.    COMPARISON:  None available.    FINDINGS:  Intracranial compartment:    Ventricles and sulci are normal in size for age without evidence of hydrocephalus. No extra-axial blood or fluid collections.    The brain parenchyma appears normal. No parenchymal mass, hemorrhage, edema or major vascular distribution infarct.    Skull/extracranial contents (limited evaluation):    No fracture. Mastoid air cells and paranasal sinuses are essentially clear.  The orbits are unremarkable.                                       CT Hip Without Contrast Right (Final result)  Result time 05/20/25 01:13:11      Final result by Lopez Cervantes MD (05/20/25 01:13:11)                   Impression:      Severe chronic degenerative changes of the right hip, progressed since prior imaging from 2024, without evidence of acute osseous process.      Electronically signed by: Lopez Cervantes  Date:    05/20/2025  Time:    01:13               Narrative:    EXAMINATION:  CT HIP WITHOUT CONTRAST RIGHT    CLINICAL HISTORY:  Hip trauma, fracture suspected, xray done;    TECHNIQUE:  Axial CT images were obtained of the to right hip without the use of intravenous contrast.  Sagittal and coronal reformats were performed.  3D series were also provided.    COMPARISON:  CT abdomen pelvis from 03/06/2024.    FINDINGS:  No  evidence of acute fracture or dislocation involving the right hip.  Severe chronic degenerative changes are present involving the right hip with subchondral cyst formation involving the head of the right femur and the right acetabulum.  These findings are progressed when compared to prior imaging from March 2024.  No evidence of acute process involving the partially visualized pelvic viscera.  No evidence of subcutaneous emphysema, drainable fluid collection, or radiopaque foreign body.  No evidence of soft tissue or osseous mass.                                       X-Ray Hip 2 or 3 views Right with Pelvis when performed (Final result)  Result time 05/19/25 23:27:20      Final result by Lopez Cervantes MD (05/19/25 23:27:20)                   Impression:      No evidence of acute fracture or dislocation involving the pelvis or right hip, and no evidence of periprosthetic fracture or hardware complication involving the proximal left femoral prosthesis.      Electronically signed by: Lopez Cervantes  Date:    05/19/2025  Time:    23:27               Narrative:    EXAMINATION:  XR HIP WITH PELVIS WHEN PERFORMED 2 OR 3 VIEWS RIGHT    CLINICAL HISTORY:  fall;    TECHNIQUE:  Three views of the pelvis and right hip were obtained..    COMPARISON:  None    FINDINGS:  No evidence of acute fracture or dislocation involving the pelvis or right hip.  A left proximal femoral prosthesis is present without evidence of periprosthetic fracture or hardware complication.  Moderate to severe degenerative changes are present involving the right hip joint with subchondral cyst formation involving the acetabulum and head of the right femur.  No evidence of subcutaneous emphysema or nonsurgical radiopaque foreign body.  No evidence of soft tissue or osseous mass.                                       Medications   0.9%  NaCl infusion (for blood administration) (has no administration in time range)   sodium chloride 0.9% flush 3 mL  (has no administration in time range)   melatonin tablet 9 mg (has no administration in time range)   ondansetron injection 8 mg (has no administration in time range)   acetaminophen tablet 650 mg (has no administration in time range)   simethicone chewable tablet 80 mg (has no administration in time range)   aluminum-magnesium hydroxide-simethicone 200-200-20 mg/5 mL suspension 30 mL (has no administration in time range)   acetaminophen tablet 650 mg (has no administration in time range)   naloxone 0.4 mg/mL injection 0.02 mg (has no administration in time range)   potassium bicarbonate disintegrating tablet 50 mEq (has no administration in time range)   potassium bicarbonate disintegrating tablet 35 mEq (has no administration in time range)   potassium bicarbonate disintegrating tablet 60 mEq (has no administration in time range)   magnesium oxide tablet 800 mg (has no administration in time range)   magnesium oxide tablet 800 mg (has no administration in time range)   potassium, sodium phosphates 280-160-250 mg packet 2 packet (has no administration in time range)   potassium, sodium phosphates 280-160-250 mg packet 2 packet (has no administration in time range)   potassium, sodium phosphates 280-160-250 mg packet 2 packet (has no administration in time range)   glucose chewable tablet 16 g (has no administration in time range)   glucose chewable tablet 24 g (has no administration in time range)   dextrose 50% injection 12.5 g (has no administration in time range)   dextrose 50% injection 25 g (has no administration in time range)   glucagon (human recombinant) injection 1 mg (has no administration in time range)   senna-docusate 8.6-50 mg per tablet 1 tablet (has no administration in time range)   HYDROcodone-acetaminophen 5-325 mg per tablet 1 tablet (1 tablet Oral Given 5/20/25 0050)     Medical Decision Making  64-year-old man presents for right hip pain after a fall, he said he felt sort of lightheaded and  weak.  Vitals are reassuring.  He is well-appearing.  He is neurologically intact.  He has tenderness in his right hip.  He has pain when he tries to range the hip.  He has no other bony tenderness to palpation.  His right hip x-ray as independently interpreted by me does not demonstrate an acute fracture or dislocation.  I will scan his head in his neck with his age.  I will also scan his hip looking for occult it fracture.  With his symptoms I will get a presyncope workup.  I do not think he needs a D-dimer for evaluation pulmonary embolism based on history and physical.    His hemoglobin is down trending he is now 7.3, he has symptoms of anemia, I think he is reasonable to transfuse at this time I had a shared decision-making discussion with the patient regarding this he agrees and consents for blood.  I discussed with hospital medicine.  I CT his hip because he continued to have pain.  He does not have a fracture but does have extensive osteoarthritis.    Amount and/or Complexity of Data Reviewed  Labs: ordered.  Radiology: ordered and independent interpretation performed.  ECG/medicine tests: ordered and independent interpretation performed. Decision-making details documented in ED Course.    Risk  Prescription drug management.               ED Course as of 05/20/25 0332   Tue May 20, 2025   0044 I do not appreciate hip dislocation on my independent interpretation of ct hip [IC]   0201 EKG on my independent interpretation 90 beats per minute normal axis no STEMI QTC less than 500 [IC]      ED Course User Index  [IC] Rm Alvarez MD                           Clinical Impression:  Final diagnoses:  [M25.551] Right hip pain (Primary)  [R42] Lightheaded  [D64.9] Symptomatic anemia          ED Disposition Condition    Observation                       [1]   Social History  Tobacco Use    Smoking status: Every Day     Current packs/day: 0.50     Average packs/day: 0.5 packs/day for 30.0 years (15.0 ttl  pk-yrs)     Types: Cigarettes     Passive exposure: Current    Smokeless tobacco: Never   Substance Use Topics    Alcohol use: Never    Drug use: Never        Rm Alvarez MD  05/20/25 0332

## 2025-05-20 NOTE — ASSESSMENT & PLAN NOTE
Patient's blood pressure range in the last 24 hours was: BP  Min: 139/67  Max: 171/72.The patient's inpatient anti-hypertensive regimen is listed below:  Current Antihypertensives  lisinopriL tablet 40 mg, Daily, Oral    Plan  - BP is controlled, no changes needed to their regimen  -

## 2025-05-20 NOTE — ASSESSMENT & PLAN NOTE
Admit to tele obs  Previous recent carotid ultrasound negative from 12/2023  Ordered echo-last done 10/2024  Trend cardiac enzyme  Neuro observation and if patient neurologically improved, possible DC tomorrow   PT and OT   CT head neg

## 2025-05-20 NOTE — CARE UPDATE
05/20/25 0802   Patient Assessment/Suction   Level of Consciousness (AVPU) alert   Respiratory Effort Unlabored   Expansion/Accessory Muscles/Retractions no use of accessory muscles;no retractions;expansion symmetric   Rhythm/Pattern, Respiratory unlabored   PRE-TX-O2   Device (Oxygen Therapy) room air   SpO2 96 %   Pulse Oximetry Type Continuous   $ Pulse Oximetry - Multiple Charge Pulse Oximetry - Multiple   Pulse 85   Resp 20

## 2025-05-20 NOTE — H&P
[facility] Medicine  History & Physical    Patient Name: Omar Trotter  MRN: [id number]  Patient Class: OP- Observation  Admission Date: 5/19/2025  Attending Physician: Jesus Fowler MD   Primary Care Provider: Cam Orozco MD         Patient information was obtained from patient, past medical records, and ER records.     Subjective:     Principal Problem:Symptomatic anemia    Chief Complaint:   Chief Complaint   Patient presents with    Fall     R hip pain with rotation and shortening of RLE        HPI:   Sukh Trotter is a 64 year old male with a past medical history of GERD, HTN, and BPH, previous hip surgery ,UGIB, / esophagitis was admitted from ER with possible syncopal episode and concern for right hip fracture. He states that he had a fall at home onto the right hip, and that he felt a little weak and lightheaded prior to the fall. He was walking down stairs when he slipped due to feeling weak. He denies LOC or hitting his head. He denies other precipitating factors.  He denies complaint.    ED work up included a CBC, which showed anemia that is worsening from prior studies. He has a history of iron deficiency anemia, but has not been taking his supplements or followed up with hematology in some time. CMP was unremarkable. ProBNP 189, HS troponin 5 and 4. Magnesium 2.1. XR of the hip showed no acute fracture. CT head showed no acute intracranial abnormality. CT cervical spine showed no acute process. CT hip showed chronic findings but no acute findings. CXR showed no acute cardiopulmonary abnormalities. He was given hydrocodone for pain in the ED. Hospital Medicine consulted for admission and further management.     Past Medical History:   Diagnosis Date    JACKLYN (acute kidney injury) 11/11/2022    Arthritis     GERD (gastroesophageal reflux disease)     Hepatitis C     STATES DX 10-20. NO S/S. NO FURTHER TESTS OR TX. WITH INSURANCE IN 2021 CAN GET IT EVALUATED.    History  of left hip replacement 05/20/2021    History of MRSA infection     History of right shoulder replacement 01/15/2021    Hypertension     Wears glasses        Past Surgical History:   Procedure Laterality Date    APPENDECTOMY      ARTHROPLASTY OF SHOULDER Right 1/13/2021    Procedure: ARTHROPLASTY, SHOULDER TOTAL;  Surgeon: Emilio Siddiqui MD;  Location: [facility] OR;  Service: Orthopedics;  Laterality: Right;  GENERAL AND BLOCK    COLONOSCOPY N/A 5/8/2023    Procedure: COLONOSCOPY;  Surgeon: Bobby Bourgeois MD;  Location: [facility];  Service: Endoscopy;  Laterality: N/A;    ESOPHAGOGASTRODUODENOSCOPY N/A 5/7/2023    Procedure: EGD (ESOPHAGOGASTRODUODENOSCOPY);  Surgeon: Carloz Christianson Jr., MD;  Location: [facility];  Service: Endoscopy;  Laterality: N/A;    ESOPHAGOGASTRODUODENOSCOPY N/A 3/1/2024    Procedure: EGD (ESOPHAGOGASTRODUODENOSCOPY);  Surgeon: Femi Hyde MD;  Location: [facility];  Service: Endoscopy;  Laterality: N/A;    HERNIA REPAIR      HIP REPLACEMENT ARTHROPLASTY Left 3/24/2021    Procedure: ARTHROPLASTY, HIP REPLACEMENT;  Surgeon: Miles Rivas II, MD;  Location: [facility] OR;  Service: Orthopedics;  Laterality: Left;    JOINT REPLACEMENT Right     shoulder    REVISION TOTAL HIP ARTHROPLASTY Left 6/2/2021    Procedure: REVISION, TOTAL ARTHROPLASTY, HIP;  Surgeon: Miles Rivas II, MD;  Location: [facility] OR;  Service: Orthopedics;  Laterality: Left;    SMALL BOWEL ENTEROSCOPY Left 5/8/2023    Procedure: ENTEROSCOPY;  Surgeon: Bobby Bourgeois MD;  Location: [facility];  Service: Endoscopy;  Laterality: Left;       Review of patient's allergies indicates:   Allergen Reactions    Pcn [penicillins]      As a child.       No current facility-administered medications on file prior to encounter.     Current Outpatient Medications on File Prior to Encounter   Medication Sig    busPIRone (BUSPAR) 15 MG tablet Take 1 tablet by mouth once daily    cyclobenzaprine (FLEXERIL) 10 MG tablet Take 1 tablet by  mouth three times daily as needed for muscle spasm    ferrous sulfate (FEOSOL) Tab tablet Take 1 tablet by mouth daily with breakfast.    gabapentin (NEURONTIN) 800 MG tablet Take 1 tablet (800 mg total) by mouth 3 (three) times daily.    lisinopriL (PRINIVIL,ZESTRIL) 40 MG tablet Take 1 tablet (40 mg total) by mouth once daily.    oxybutynin (DITROPAN-XL) 5 MG TR24 Take 1 tablet (5 mg total) by mouth once daily.    pantoprazole (PROTONIX) 40 MG tablet Take 1 tablet (40 mg total) by mouth 2 (two) times daily.    tamsulosin (FLOMAX) 0.4 mg Cap Take 2 capsules by mouth once daily     Family History       Problem Relation (Age of Onset)    Cancer Mother          Tobacco Use    Smoking status: Every Day     Current packs/day: 0.50     Average packs/day: 0.5 packs/day for 30.0 years (15.0 ttl pk-yrs)     Types: Cigarettes     Passive exposure: Current    Smokeless tobacco: Never   Substance and Sexual Activity    Alcohol use: Never    Drug use: Never    Sexual activity: Yes     Partners: Female     Review of Systems   Constitutional:  Negative for chills and fever.   HENT:  Negative for congestion and sore throat.    Eyes:  Negative for visual disturbance.   Respiratory:  Negative for cough and shortness of breath.    Cardiovascular:  Negative for chest pain and palpitations.   Gastrointestinal:  Negative for abdominal pain, constipation, diarrhea, nausea and vomiting.   Endocrine: Negative for cold intolerance and heat intolerance.   Genitourinary:  Negative for dysuria and hematuria.   Musculoskeletal:  Positive for arthralgias and gait problem. Negative for myalgias.   Skin:  Negative for rash.   Neurological:  Positive for syncope (near syncope), weakness and light-headedness. Negative for tremors and seizures.   Hematological:  Negative for adenopathy. Does not bruise/bleed easily.   All other systems reviewed and are negative.    Objective:     Vital Signs (Most Recent):  Temp: 97.9 °F (36.6 °C) (05/20/25  0405)  Pulse: 89 (05/20/25 0405)  Resp: (!) 22 (05/20/25 0405)  BP: (!) 159/70 (05/20/25 0405)  SpO2: 97 % (05/20/25 0405) Vital Signs (24h Range):  Temp:  [97.9 °F (36.6 °C)-98.6 °F (37 °C)] 97.9 °F (36.6 °C)  Pulse:  [86-98] 89  Resp:  [14-24] 22  SpO2:  [96 %-99 %] 97 %  BP: (139-171)/(64-72) 159/70     Weight: 83.9 kg (185 lb)  Body mass index is 25.8 kg/m².     Physical Exam  Vitals and nursing note reviewed.   Constitutional:       General: He is awake. He is not in acute distress.     Appearance: Normal appearance. He is well-developed. He is not ill-appearing.   HENT:      Head: Normocephalic and atraumatic.      Nose: Nose normal. No septal deviation.   Eyes:      Conjunctiva/sclera: Conjunctivae normal.      Pupils: Pupils are equal, round, and reactive to light.   Neck:      Thyroid: No thyroid mass.      Vascular: No JVD.      Trachea: No tracheal tenderness or tracheal deviation.   Cardiovascular:      Rate and Rhythm: Normal rate and regular rhythm.      Heart sounds: S1 normal and S2 normal. No murmur heard.     No friction rub. No gallop.   Pulmonary:      Effort: Pulmonary effort is normal.      Breath sounds: Normal breath sounds. No decreased breath sounds, wheezing, rhonchi or rales.   Abdominal:      General: Bowel sounds are normal. There is no distension.      Palpations: Abdomen is soft. There is no hepatomegaly, splenomegaly or mass.      Tenderness: There is no abdominal tenderness.   Musculoskeletal:      Cervical back: Full passive range of motion without pain, normal range of motion and neck supple.      Right hip: Tenderness present. No deformity. Normal range of motion. Normal strength.   Skin:     General: Skin is warm.      Findings: No rash.   Neurological:      General: No focal deficit present.      Mental Status: He is alert and oriented to person, place, and time.      Cranial Nerves: No cranial nerve deficit.      Sensory: No sensory deficit.   Psychiatric:         Mood and  Affect: Mood normal.         Behavior: Behavior normal. Behavior is cooperative.              CRANIAL NERVES     CN III, IV, VI   Pupils are equal, round, and reactive to light.       Significant Labs: All pertinent labs within the past 24 hours have been reviewed.  CBC:   Recent Labs   Lab 05/19/25 2359 05/20/25  0401   WBC 4.13 3.78*   HGB 7.3* 7.6*   HCT 24.8* 26.7*    171     CMP:   Recent Labs   Lab 05/19/25 2359      K 4.4      CO2 22*   GLU 85   BUN 24*   CREATININE 0.9   CALCIUM 8.4*   PROT 6.8   ALBUMIN 3.2*   BILITOT <0.2   ALKPHOS 141   AST 18   ALT 9*   ANIONGAP 9     Cardiac Markers:   Recent Labs   Lab 05/19/25 2359   *     Magnesium:   Recent Labs   Lab 05/19/25 2359   MG 2.1     Troponin:   Recent Labs   Lab 05/19/25 2359 05/20/25  0143   TROPONINIHS 5 4       Significant Imaging: I have reviewed all pertinent imaging results/findings within the past 24 hours.  Imaging Results              X-Ray Chest AP Portable (Final result)  Result time 05/20/25 01:16:46      Final result by Lopez Cervantes MD (05/20/25 01:16:46)                   Impression:      No evidence of acute cardiopulmonary process.      Electronically signed by: Lopez Cervantes  Date:    05/20/2025  Time:    01:16               Narrative:    EXAMINATION:  XR CHEST AP PORTABLE    CLINICAL HISTORY:  Chest Pain;    TECHNIQUE:  Single frontal view of the chest was performed.    COMPARISON:  Radiographs of the chest from 10/28/2024.    FINDINGS:  Heart size and pulmonary vasculature are within normal limits.  No evidence of focal consolidation, pneumothorax, or pleural effusion.  A proximal right humeral prosthesis is redemonstrated.  Moderate chronic degenerative changes are present involving left glenohumeral joint.                                       CT Cervical Spine Without Contrast (Final result)  Result time 05/20/25 01:04:25      Final result by Lopez Cervantes MD (05/20/25 01:04:25)                    Impression:      And angulated lucency is present involving the left pedicle C4 which does not appear to have been present on prior CT of the C-spine from June 2024, however it is unclear if this is an acute process or associated with prominent multilevel chronic degenerative changes involving the cervical spine.  Note, if this were acute, it would not represent an unstable fracture.  No additional evidence of acute fracture or subluxation involving the cervical spine.      Electronically signed by: Lopez Cervantes  Date:    05/20/2025  Time:    01:04               Narrative:    EXAMINATION:  CT CERVICAL SPINE WITHOUT CONTRAST    CLINICAL HISTORY:  Neck trauma (Age >= 65y);    TECHNIQUE:  Low dose axial images, sagittal and coronal reformations were performed though the cervical spine.  Contrast was not administered.    COMPARISON:  None available.    FINDINGS:  There is an angulated lucency involving the left pedicle of C4 which does not appear to have been present on prior CT of the C-spine from June 2024, however it is unclear if this is a acute process.  It may be associated with prominent multilevel chronic degenerative changes, and it does not represent an unstable fracture if it were acute.  The paraspinal soft tissues are unremarkable.                                       CT Head Without Contrast (Final result)  Result time 05/20/25 00:49:42      Final result by Lopez Cervantes MD (05/20/25 00:49:42)                   Impression:      No evidence of acute intracranial abnormality.      Electronically signed by: Lopez Cervantes  Date:    05/20/2025  Time:    00:49               Narrative:    EXAMINATION:  CT HEAD WITHOUT CONTRAST    CLINICAL HISTORY:  Head trauma, minor (Age >= 65y);    TECHNIQUE:  Low dose axial CT images obtained throughout the head without intravenous contrast. Sagittal and coronal reconstructions were performed.    COMPARISON:  None  available.    FINDINGS:  Intracranial compartment:    Ventricles and sulci are normal in size for age without evidence of hydrocephalus. No extra-axial blood or fluid collections.    The brain parenchyma appears normal. No parenchymal mass, hemorrhage, edema or major vascular distribution infarct.    Skull/extracranial contents (limited evaluation):    No fracture. Mastoid air cells and paranasal sinuses are essentially clear.  The orbits are unremarkable.                                       CT Hip Without Contrast Right (Final result)  Result time 05/20/25 01:13:11      Final result by Lopez Cervantes MD (05/20/25 01:13:11)                   Impression:      Severe chronic degenerative changes of the right hip, progressed since prior imaging from 2024, without evidence of acute osseous process.      Electronically signed by: Lopez Cervantes  Date:    05/20/2025  Time:    01:13               Narrative:    EXAMINATION:  CT HIP WITHOUT CONTRAST RIGHT    CLINICAL HISTORY:  Hip trauma, fracture suspected, xray done;    TECHNIQUE:  Axial CT images were obtained of the to right hip without the use of intravenous contrast.  Sagittal and coronal reformats were performed.  3D series were also provided.    COMPARISON:  CT abdomen pelvis from 03/06/2024.    FINDINGS:  No evidence of acute fracture or dislocation involving the right hip.  Severe chronic degenerative changes are present involving the right hip with subchondral cyst formation involving the head of the right femur and the right acetabulum.  These findings are progressed when compared to prior imaging from March 2024.  No evidence of acute process involving the partially visualized pelvic viscera.  No evidence of subcutaneous emphysema, drainable fluid collection, or radiopaque foreign body.  No evidence of soft tissue or osseous mass.                                       X-Ray Hip 2 or 3 views Right with Pelvis when performed (Final result)  Result time  05/19/25 23:27:20      Final result by Lopez Cervantes MD (05/19/25 23:27:20)                   Impression:      No evidence of acute fracture or dislocation involving the pelvis or right hip, and no evidence of periprosthetic fracture or hardware complication involving the proximal left femoral prosthesis.      Electronically signed by: Lopez Cervantes  Date:    05/19/2025  Time:    23:27               Narrative:    EXAMINATION:  XR HIP WITH PELVIS WHEN PERFORMED 2 OR 3 VIEWS RIGHT    CLINICAL HISTORY:  fall;    TECHNIQUE:  Three views of the pelvis and right hip were obtained..    COMPARISON:  None    FINDINGS:  No evidence of acute fracture or dislocation involving the pelvis or right hip.  A left proximal femoral prosthesis is present without evidence of periprosthetic fracture or hardware complication.  Moderate to severe degenerative changes are present involving the right hip joint with subchondral cyst formation involving the acetabulum and head of the right femur.  No evidence of subcutaneous emphysema or nonsurgical radiopaque foreign body.  No evidence of soft tissue or osseous mass.                                      Assessment/Plan:     Assessment & Plan  Symptomatic anemia  Anemia is likely due to Iron deficiency. Most recent hemoglobin and hematocrit are listed below.  Recent Labs     05/19/25  2359 05/20/25  0401   HGB 7.3* 7.6*   HCT 24.8* 26.7*     Plan  - Monitor serial CBC: Daily  - Transfuse PRBC if patient becomes hemodynamically unstable, symptomatic or H/H drops below 7/21.  - Patient is pending a blood transfusion that was ordered by the ED on 5/20/25  - Patient's anemia is currently stable  - last EGD showed esophagitis  GERD (gastroesophageal reflux disease)  Noted, protonix    Hypertension  Patient's blood pressure range in the last 24 hours was: BP  Min: 139/67  Max: 171/72.The patient's inpatient anti-hypertensive regimen is listed below:  Current Antihypertensives  lisinopriL  tablet 40 mg, Daily, Oral    Plan  - BP is controlled, no changes needed to their regimen  -   Syncope  Admit to tele obs  Previous recent carotid ultrasound negative from 12/2023  Ordered echo-last done 10/2024  Trend cardiac enzyme  Neuro observation and if patient neurologically improved, possible DC tomorrow   PT and OT   CT head neg     VTE Risk Mitigation (From admission, onward)           Ordered     Reason for No Pharmacological VTE Prophylaxis  Once        Question:  Reasons:  Answer:  Risk of Bleeding    05/20/25 0225     IP VTE LOW RISK PATIENT  Once         05/20/25 0225     Place sequential compression device  Until discontinued         05/20/25 0225                             JG Major  Department of Hospital Medicine  [facility]

## 2025-05-20 NOTE — HPI
Sukh Trotter is a 64 year old male with a past medical history of GERD, HTN, and BPH, previous hip surgery ,UGIB, / esophagitis was admitted from ER with possible syncopal episode and concern for right hip fracture. He states that he had a fall at home onto the right hip, and that he felt a little weak and lightheaded prior to the fall. He was walking down stairs when he slipped due to feeling weak. He denies LOC or hitting his head. He denies other precipitating factors.  He denies complaint.    ED work up included a CBC, which showed anemia that is worsening from prior studies. He has a history of iron deficiency anemia, but has not been taking his supplements or followed up with hematology in some time. CMP was unremarkable. ProBNP 189, HS troponin 5 and 4. Magnesium 2.1. XR of the hip showed no acute fracture. CT head showed no acute intracranial abnormality. CT cervical spine showed no acute process. CT hip showed chronic findings but no acute findings. CXR showed no acute cardiopulmonary abnormalities. He was given hydrocodone for pain in the ED. Hospital Medicine consulted for admission and further management.

## 2025-05-20 NOTE — HOSPITAL COURSE
Omar Trotter is a 64 year old male with a past medical history of GERD, HTN, BPH and tobacco dependence who presented with a fall in the setting of symptomatic anemia. Imaging did not reveal acute fracture (possible C4 pedicle disease on CT). He has received one unit of PRBCs for a Hgb of 7.3 on admission. He is on an IV PPI and s/p endoscopy and colonoscopy. EGD showed esophageal plaques were found, consistent with candidiasis and tx with nystatin for 7 days. Biopsied.  For thrombocytopenia, d/c fluconazole.  Colonoscopy showed single bleeding colonic angioectasia. Treated  with argon plasma coagulation.  Patient was placed on  iron supplement for iron-deficiency. Patient was discharged to SNF.  
0 = understands/communicates without difficulty
100.4

## 2025-05-20 NOTE — PLAN OF CARE
Unable to obtain discharge assessment or moon as EMS at bedside to transport pt to [facility].     05/20/25 8346   Discharge Assessment   Assessment Type Discharge Planning Assessment

## 2025-05-20 NOTE — CARE UPDATE
Went and evaluated patient by bedside.  Patient was placed on IV iron supplement for iron-deficiency it anemia.  Repeat H&H.  FOBT ordered.

## 2025-05-21 ENCOUNTER — ANESTHESIA (OUTPATIENT)
Dept: SURGERY | Facility: HOSPITAL | Age: 65
End: 2025-05-21
Payer: COMMERCIAL

## 2025-05-21 ENCOUNTER — ANESTHESIA EVENT (OUTPATIENT)
Dept: SURGERY | Facility: HOSPITAL | Age: 65
End: 2025-05-21
Payer: COMMERCIAL

## 2025-05-21 VITALS
RESPIRATION RATE: 18 BRPM | SYSTOLIC BLOOD PRESSURE: 119 MMHG | DIASTOLIC BLOOD PRESSURE: 61 MMHG | OXYGEN SATURATION: 99 % | HEART RATE: 82 BPM

## 2025-05-21 LAB
ABSOLUTE EOSINOPHIL (SMH): 0.24 K/UL
ABSOLUTE MONOCYTE (SMH): 0.32 K/UL (ref 0.3–1)
ABSOLUTE NEUTROPHIL COUNT (SMH): 2.9 K/UL (ref 1.8–7.7)
ANION GAP (SMH): 6 MMOL/L (ref 8–16)
BASOPHILS # BLD AUTO: 0.03 K/UL
BASOPHILS NFR BLD AUTO: 0.7 %
BUN SERPL-MCNC: 14 MG/DL (ref 8–23)
CALCIUM SERPL-MCNC: 9.1 MG/DL (ref 8.7–10.5)
CHLORIDE SERPL-SCNC: 105 MMOL/L (ref 95–110)
CO2 SERPL-SCNC: 25 MMOL/L (ref 23–29)
CREAT SERPL-MCNC: 0.7 MG/DL (ref 0.5–1.4)
EAG (SMH): 100 MG/DL (ref 68–131)
ERYTHROCYTE [DISTWIDTH] IN BLOOD BY AUTOMATED COUNT: 17.1 % (ref 11.5–14.5)
GFR SERPLBLD CREATININE-BSD FMLA CKD-EPI: >60 ML/MIN/1.73/M2
GLUCOSE SERPL-MCNC: 98 MG/DL (ref 70–110)
HBA1C MFR BLD: 5.1 % (ref 4.5–6.2)
HCT VFR BLD AUTO: 28.8 % (ref 40–54)
HGB BLD-MCNC: 8.7 GM/DL (ref 14–18)
IMM GRANULOCYTES # BLD AUTO: 0.03 K/UL (ref 0–0.04)
IMM GRANULOCYTES NFR BLD AUTO: 0.7 % (ref 0–0.5)
LYMPHOCYTES # BLD AUTO: 0.94 K/UL (ref 1–4.8)
MCH RBC QN AUTO: 23.6 PG (ref 27–31)
MCHC RBC AUTO-ENTMCNC: 30.2 G/DL (ref 32–36)
MCV RBC AUTO: 78 FL (ref 82–98)
NUCLEATED RBC (/100WBC) (SMH): 0 /100 WBC
PLATELET # BLD AUTO: 164 K/UL (ref 150–450)
PMV BLD AUTO: 9.5 FL (ref 9.2–12.9)
POTASSIUM SERPL-SCNC: 4 MMOL/L (ref 3.5–5.1)
RBC # BLD AUTO: 3.68 M/UL (ref 4.6–6.2)
RELATIVE EOSINOPHIL (SMH): 5.4 % (ref 0–8)
RELATIVE LYMPHOCYTE (SMH): 21 % (ref 18–48)
RELATIVE MONOCYTE (SMH): 7.1 % (ref 4–15)
RELATIVE NEUTROPHIL (SMH): 65.1 % (ref 38–73)
SODIUM SERPL-SCNC: 136 MMOL/L (ref 136–145)
TSH SERPL-ACNC: 3.35 UIU/ML (ref 0.34–5.6)
WBC # BLD AUTO: 4.48 K/UL (ref 3.9–12.7)

## 2025-05-21 PROCEDURE — 11000001 HC ACUTE MED/SURG PRIVATE ROOM

## 2025-05-21 PROCEDURE — 63600175 PHARM REV CODE 636 W HCPCS: Performed by: NURSE ANESTHETIST, CERTIFIED REGISTERED

## 2025-05-21 PROCEDURE — 25000003 PHARM REV CODE 250: Performed by: INTERNAL MEDICINE

## 2025-05-21 PROCEDURE — 25000003 PHARM REV CODE 250: Performed by: NURSE PRACTITIONER

## 2025-05-21 PROCEDURE — 85025 COMPLETE CBC W/AUTO DIFF WBC: CPT | Performed by: NURSE PRACTITIONER

## 2025-05-21 PROCEDURE — 0W3P8ZZ CONTROL BLEEDING IN GASTROINTESTINAL TRACT, VIA NATURAL OR ARTIFICIAL OPENING ENDOSCOPIC: ICD-10-PCS | Performed by: INTERNAL MEDICINE

## 2025-05-21 PROCEDURE — 37000009 HC ANESTHESIA EA ADD 15 MINS: Performed by: INTERNAL MEDICINE

## 2025-05-21 PROCEDURE — 43239 EGD BIOPSY SINGLE/MULTIPLE: CPT | Performed by: INTERNAL MEDICINE

## 2025-05-21 PROCEDURE — 80048 BASIC METABOLIC PNL TOTAL CA: CPT | Performed by: STUDENT IN AN ORGANIZED HEALTH CARE EDUCATION/TRAINING PROGRAM

## 2025-05-21 PROCEDURE — 36415 COLL VENOUS BLD VENIPUNCTURE: CPT | Performed by: STUDENT IN AN ORGANIZED HEALTH CARE EDUCATION/TRAINING PROGRAM

## 2025-05-21 PROCEDURE — 45382 COLONOSCOPY W/CONTROL BLEED: CPT | Performed by: INTERNAL MEDICINE

## 2025-05-21 PROCEDURE — 88312 SPECIAL STAINS GROUP 1: CPT | Mod: TC | Performed by: PATHOLOGY

## 2025-05-21 PROCEDURE — 63600175 PHARM REV CODE 636 W HCPCS: Performed by: STUDENT IN AN ORGANIZED HEALTH CARE EDUCATION/TRAINING PROGRAM

## 2025-05-21 PROCEDURE — 27202049 HC PROBE, APC ERBE: Performed by: INTERNAL MEDICINE

## 2025-05-21 PROCEDURE — 63700000 PHARM REV CODE 250 ALT 637 W/O HCPCS: Performed by: FAMILY MEDICINE

## 2025-05-21 PROCEDURE — 63600175 PHARM REV CODE 636 W HCPCS: Performed by: FAMILY MEDICINE

## 2025-05-21 PROCEDURE — 37000008 HC ANESTHESIA 1ST 15 MINUTES: Performed by: INTERNAL MEDICINE

## 2025-05-21 PROCEDURE — 25000003 PHARM REV CODE 250: Performed by: STUDENT IN AN ORGANIZED HEALTH CARE EDUCATION/TRAINING PROGRAM

## 2025-05-21 PROCEDURE — 27200043 HC FORCEPS, BIOPSY: Performed by: INTERNAL MEDICINE

## 2025-05-21 PROCEDURE — 83036 HEMOGLOBIN GLYCOSYLATED A1C: CPT | Performed by: FAMILY MEDICINE

## 2025-05-21 PROCEDURE — 25000003 PHARM REV CODE 250: Performed by: NURSE ANESTHETIST, CERTIFIED REGISTERED

## 2025-05-21 PROCEDURE — 0DB68ZX EXCISION OF STOMACH, VIA NATURAL OR ARTIFICIAL OPENING ENDOSCOPIC, DIAGNOSTIC: ICD-10-PCS | Performed by: INTERNAL MEDICINE

## 2025-05-21 PROCEDURE — 84443 ASSAY THYROID STIM HORMONE: CPT | Performed by: FAMILY MEDICINE

## 2025-05-21 PROCEDURE — 0DB28ZX EXCISION OF MIDDLE ESOPHAGUS, VIA NATURAL OR ARTIFICIAL OPENING ENDOSCOPIC, DIAGNOSTIC: ICD-10-PCS | Performed by: INTERNAL MEDICINE

## 2025-05-21 RX ORDER — FLUCONAZOLE 100 MG/1
400 TABLET ORAL DAILY
Status: DISCONTINUED | OUTPATIENT
Start: 2025-05-21 | End: 2025-05-23

## 2025-05-21 RX ORDER — PROPOFOL 10 MG/ML
VIAL (ML) INTRAVENOUS
Status: DISCONTINUED | OUTPATIENT
Start: 2025-05-21 | End: 2025-05-21

## 2025-05-21 RX ORDER — SODIUM CHLORIDE 9 MG/ML
INJECTION, SOLUTION INTRAVENOUS CONTINUOUS PRN
Status: DISCONTINUED | OUTPATIENT
Start: 2025-05-21 | End: 2025-05-21

## 2025-05-21 RX ADMIN — FLUCONAZOLE 400 MG: 100 TABLET ORAL at 04:05

## 2025-05-21 RX ADMIN — PROPOFOL 20 MG: 10 INJECTION, EMULSION INTRAVENOUS at 01:05

## 2025-05-21 RX ADMIN — SODIUM CHLORIDE: 0.9 INJECTION, SOLUTION INTRAVENOUS at 01:05

## 2025-05-21 RX ADMIN — PROPOFOL 30 MG: 10 INJECTION, EMULSION INTRAVENOUS at 01:05

## 2025-05-21 RX ADMIN — GABAPENTIN 800 MG: 300 CAPSULE ORAL at 08:05

## 2025-05-21 RX ADMIN — PROPOFOL 40 MG: 10 INJECTION, EMULSION INTRAVENOUS at 01:05

## 2025-05-21 RX ADMIN — SENNOSIDES AND DOCUSATE SODIUM 1 TABLET: 50; 8.6 TABLET ORAL at 08:05

## 2025-05-21 RX ADMIN — PANTOPRAZOLE SODIUM 40 MG: 40 INJECTION, POWDER, FOR SOLUTION INTRAVENOUS at 08:05

## 2025-05-21 RX ADMIN — SODIUM FERRIC GLUCONATE COMPLEX IN SUCROSE 125 MG: 12.5 INJECTION INTRAVENOUS at 08:05

## 2025-05-21 RX ADMIN — HYDROCODONE BITARTRATE AND ACETAMINOPHEN 1 TABLET: 5; 325 TABLET ORAL at 05:05

## 2025-05-21 RX ADMIN — TAMSULOSIN HYDROCHLORIDE 0.8 MG: 0.4 CAPSULE ORAL at 08:05

## 2025-05-21 RX ADMIN — GABAPENTIN 800 MG: 300 CAPSULE ORAL at 02:05

## 2025-05-21 RX ADMIN — CYCLOBENZAPRINE 10 MG: 10 TABLET, FILM COATED ORAL at 11:05

## 2025-05-21 RX ADMIN — CYCLOBENZAPRINE 10 MG: 10 TABLET, FILM COATED ORAL at 04:05

## 2025-05-21 RX ADMIN — HYDROCODONE BITARTRATE AND ACETAMINOPHEN 1 TABLET: 5; 325 TABLET ORAL at 02:05

## 2025-05-21 RX ADMIN — LISINOPRIL 40 MG: 20 TABLET ORAL at 08:05

## 2025-05-21 RX ADMIN — BUSPIRONE HYDROCHLORIDE 15 MG: 10 TABLET ORAL at 08:05

## 2025-05-21 RX ADMIN — HYDROCODONE BITARTRATE AND ACETAMINOPHEN 1 TABLET: 5; 325 TABLET ORAL at 11:05

## 2025-05-21 RX ADMIN — PROPOFOL 80 MG: 10 INJECTION, EMULSION INTRAVENOUS at 01:05

## 2025-05-21 NOTE — TRANSFER OF CARE
"Anesthesia Transfer of Care Note    Patient: Omar Trotter    Procedure(s) Performed: Procedure(s) (LRB):  COLONOSCOPY (N/A)  EGD (ESOPHAGOGASTRODUODENOSCOPY) (N/A)    Patient location: PACU    Anesthesia Type: general    Transport from OR: Transported from OR on 6-10 L/min O2 by face mask with adequate spontaneous ventilation    Post pain: adequate analgesia    Post assessment: no apparent anesthetic complications    Post vital signs: stable    Level of consciousness: awake and alert    Nausea/Vomiting: no nausea/vomiting    Complications: none    Transfer of care protocol was followed      Last vitals: Visit Vitals  BP (!) 165/76   Pulse 85   Temp 36.9 °C (98.4 °F)   Resp 16   Ht 5' 11" (1.803 m)   Wt 94.3 kg (207 lb 14.3 oz)   SpO2 96%   BMI 29.00 kg/m²     "

## 2025-05-21 NOTE — PROGRESS NOTES
[facility] Medicine  Progress Note    Patient Name: Omar Trotter  MRN: [id number]  Patient Class: IP- Inpatient   Admission Date: 5/19/2025  Length of Stay: 0 days  Attending Physician: Darian Lomas DO  Primary Care Provider: Cam Orozco MD        Subjective     Principal Problem:Symptomatic anemia        HPI:    Sukh Trotter is a 64 year old male with a past medical history of GERD, HTN, and BPH, previous hip surgery ,UGIB, / esophagitis was admitted from ER with possible syncopal episode and concern for right hip fracture. He states that he had a fall at home onto the right hip, and that he felt a little weak and lightheaded prior to the fall. He was walking down stairs when he slipped due to feeling weak. He denies LOC or hitting his head. He denies other precipitating factors.  He denies complaint.    ED work up included a CBC, which showed anemia that is worsening from prior studies. He has a history of iron deficiency anemia, but has not been taking his supplements or followed up with hematology in some time. CMP was unremarkable. ProBNP 189, HS troponin 5 and 4. Magnesium 2.1. XR of the hip showed no acute fracture. CT head showed no acute intracranial abnormality. CT cervical spine showed no acute process. CT hip showed chronic findings but no acute findings. CXR showed no acute cardiopulmonary abnormalities. He was given hydrocodone for pain in the ED. Hospital Medicine consulted for admission and further management.     Overview/Hospital Course:  Omar Trotter is a 64 year old male with a past medical history of GERD, HTN, BPH and tobacco dependence who presented with a fall in the setting of symptomatic anemia. Imaging did not reveal acute fracture (possible C4 pedicle disease on CT). He has received one unit of PRBCs for a Hgb of 7.3 on admission. He is on an IV PPI and s/p endoscopy and colonoscopy. EGD showed esophageal plaques were found, consistent with  candidiasis and treatment for fluconazole (14 days). Biopsied. Colonoscopy showed single bleeding colonic angioectasia. Treated  with argon plasma coagulation.There has been on gross bleeding observed during the patient's course.  Patient was placed on IV iron supplement for iron-deficiency.    Interval History:  Patient said he is very tired otherwise he is okay.  For daily updates refer to hospital course    Review of Systems   All other systems reviewed and are negative.    Objective:     Vital Signs (Most Recent):  Temp: 98.2 °F (36.8 °C) (05/21/25 1410)  Pulse: 84 (05/21/25 1420)  Resp: 18 (05/21/25 1420)  BP: (!) 127/58 (05/21/25 1420)  SpO2: 95 % (05/21/25 1420) Vital Signs (24h Range):  Temp:  [97.6 °F (36.4 °C)-98.4 °F (36.9 °C)] 98.2 °F (36.8 °C)  Pulse:  [80-99] 84  Resp:  [15-20] 18  SpO2:  [94 %-99 %] 95 %  BP: (119-187)/(58-88) 127/58     Weight: 94.3 kg (207 lb 14.3 oz)  Body mass index is 29 kg/m².    Intake/Output Summary (Last 24 hours) at 5/21/2025 1540  Last data filed at 5/21/2025 1353  Gross per 24 hour   Intake 610 ml   Output 1015 ml   Net -405 ml         Physical Exam  Cardiovascular:      Rate and Rhythm: Normal rate.      Pulses: Normal pulses.   Pulmonary:      Effort: Pulmonary effort is normal.   Abdominal:      General: There is no distension.      Tenderness: There is no abdominal tenderness.   Neurological:      Mental Status: He is alert and oriented to person, place, and time.               Significant Labs: All pertinent labs within the past 24 hours have been reviewed.    Significant Imaging: I have reviewed all pertinent imaging results/findings within the past 24 hours.      Assessment & Plan  Symptomatic anemia  Anemia is likely due to Iron deficiency. Most recent hemoglobin and hematocrit are listed below.  Recent Labs     05/20/25  0401 05/20/25  1850 05/21/25  0602   HGB 7.6* 8.9* 8.7*   HCT 26.7* 29.9* 28.8*     Plan  - Monitor serial CBC: Daily  - Transfuse PRBC if  patient becomes hemodynamically unstable, symptomatic or H/H drops below 7/21.  - S/P Transfusion  - Patient's anemia is currently stable  - On IV iron  - s/p endoscopy and colonoscopy.   - EGD showed esophageal plaques were found, consistent with candidiasis and treatment for fluconazole (14 days). Biopsied.  - Colonoscopy showed single bleeding colonic angioectasia. Treated  with argon plasma coagulation.There has been on gross bleeding observed during the patient's course.    GERD (gastroesophageal reflux disease)  Noted, protonix    Hypertension  Patient's blood pressure range in the last 24 hours was: BP  Min: 119/61  Max: 187/88.The patient's inpatient anti-hypertensive regimen is listed below:  Current Antihypertensives  lisinopriL tablet 40 mg, Daily, Oral    Plan  - BP is controlled, no changes needed to their regimen  -   Syncope  Syncope resolved  Previous recent carotid ultrasound negative from 12/2023  Ordered echo EF 50-55%  CT head neg     VTE Risk Mitigation (From admission, onward)           Ordered     Reason for No Pharmacological VTE Prophylaxis  Once        Question:  Reasons:  Answer:  Risk of Bleeding    05/20/25 0225     IP VTE LOW RISK PATIENT  Once         05/20/25 0225     Place sequential compression device  Until discontinued         05/20/25 0225                    Discharge Planning   NATALIA: 5/22/2025     Code Status: Full Code   Medical Readiness for Discharge Date:                            Darian Lomas DO  Department of Hospital Medicine   [facility]

## 2025-05-21 NOTE — PROVATION PATIENT INSTRUCTIONS
Discharge Summary/Instructions after an Endoscopic Procedure  Patient Name: Omar Trotter  Patient MRN: [id number]  Patient YOB: 1960  Wednesday, May 21, 2025  Anat Gooden MD  RESTRICTIONS:  During your procedure today, you received medications for sedation.  These   medications may affect your judgment, balance and coordination.  Therefore,   for 24 hours, you have the following restrictions:   - DO NOT drive a car, operate machinery, make legal/financial decisions,   sign important papers or drink alcohol.    ACTIVITY:  Today: no heavy lifting, straining or running due to procedural   sedation/anesthesia.  The following day: return to full activity including work.  DIET:  Eat and drink normally unless instructed otherwise.     TREATMENT FOR COMMON SIDE EFFECTS:  - Mild abdominal pain, nausea, belching, bloating or excessive gas:  rest,   eat lightly and use a heating pad.  - Sore Throat: treat with throat lozenges and/or gargle with warm salt   water.  - Because air was used during the procedure, expelling large amounts of air   from your rectum or belching is normal.  - If a bowel prep was taken, you may not have a bowel movement for 1-3 days.    This is normal.  SYMPTOMS TO WATCH FOR AND REPORT TO YOUR PHYSICIAN:  1. Abdominal pain or bloating, other than gas cramps.  2. Chest pain.  3. Back pain.  4. Signs of infection such as: chills or fever occurring within 24 hours   after the procedure.  5. Rectal bleeding, which would show as bright red, maroon, or black stools.   (A tablespoon of blood from the rectum is not serious, especially if   hemorrhoids are present.)  6. Vomiting.  7. Weakness or dizziness.  GO DIRECTLY TO THE NEAREST EMERGENCY ROOM IF YOU HAVE ANY OF THE FOLLOWING:      Difficulty breathing              Chills and/or fever over 101 F   Persistent vomiting and/or vomiting blood   Severe abdominal pain   Severe chest pain   Black, tarry stools   Bleeding- more than one  tablespoon   Any other symptom or condition that you feel may need urgent attention  Your doctor recommends these additional instructions:  If any biopsies were taken, your doctors clinic will contact you in 1 to 2   weeks with any results.  - Repeat colonoscopy in 5 years for surveillance.   - Return patient to hospital saba for ongoing care.   - Resume regular diet today.   Antiviral treatment for hep c is approved and was waiting at pharmacy 2023.   f/u in clinic to start  For questions, problems or results please call your physician - Anat Gooden MD at Work:  [phone].  [facility], EMERGENCY ROOM PHONE NUMBER: [phone]  IF A COMPLICATION OR EMERGENCY SITUATION ARISES AND YOU ARE UNABLE TO REACH   YOUR PHYSICIAN - GO DIRECTLY TO THE EMERGENCY ROOM.  Anat Gooden MD  5/21/2025 2:13:09 PM  This report has been verified and signed electronically.  Dear patient,  As a result of recent federal legislation (The Federal Cures Act), you may   receive lab or pathology results from your procedure in your [facility]   account before your physician is able to contact you. Your physician or   their representative will relay the results to you with their   recommendations at their soonest availability.  Thank you,  PROVATION

## 2025-05-21 NOTE — ASSESSMENT & PLAN NOTE
Syncope resolved  Previous recent carotid ultrasound negative from 12/2023  Ordered echo EF 50-55%  CT head neg

## 2025-05-21 NOTE — ASSESSMENT & PLAN NOTE
Patient's blood pressure range in the last 24 hours was: BP  Min: 119/61  Max: 187/88.The patient's inpatient anti-hypertensive regimen is listed below:  Current Antihypertensives  lisinopriL tablet 40 mg, Daily, Oral    Plan  - BP is controlled, no changes needed to their regimen  -

## 2025-05-21 NOTE — ANESTHESIA POSTPROCEDURE EVALUATION
Anesthesia Post Evaluation    Patient: Omar Trotter    Procedure(s) Performed: Procedure(s) (LRB):  COLONOSCOPY (N/A)  EGD (ESOPHAGOGASTRODUODENOSCOPY) (N/A)    Final Anesthesia Type: general      Patient location during evaluation: GI PACU  Patient participation: Yes- Able to Participate  Level of consciousness: awake and alert, oriented and awake  Post-procedure vital signs: reviewed and stable  Pain management: adequate  Airway patency: patent    PONV status at discharge: No PONV  Anesthetic complications: no      Cardiovascular status: blood pressure returned to baseline, hemodynamically stable and stable  Respiratory status: unassisted, spontaneous ventilation and room air  Hydration status: euvolemic  Follow-up not needed.              Vitals Value Taken Time   /58 05/21/25 14:20   Temp 36.8 °C (98.2 °F) 05/21/25 14:10   Pulse 87 05/21/25 14:22   Resp 18 05/21/25 14:20   SpO2 96 % 05/21/25 14:22   Vitals shown include unfiled device data.      Event Time   Out of Recovery 05/21/2025 14:22:16         Pain/Hawa Score: Pain Rating Prior to Med Admin: 6 (5/21/2025  2:50 AM)  Pain Rating Post Med Admin: 1 (5/21/2025  3:50 AM)

## 2025-05-21 NOTE — ASSESSMENT & PLAN NOTE
Anemia is likely due to Iron deficiency. Most recent hemoglobin and hematocrit are listed below.  Recent Labs     05/20/25  0401 05/20/25  1850 05/21/25  0602   HGB 7.6* 8.9* 8.7*   HCT 26.7* 29.9* 28.8*     Plan  - Monitor serial CBC: Daily  - Transfuse PRBC if patient becomes hemodynamically unstable, symptomatic or H/H drops below 7/21.  - S/P Transfusion  - Patient's anemia is currently stable  - On IV iron  - s/p endoscopy and colonoscopy.   - EGD showed esophageal plaques were found, consistent with candidiasis and treatment for fluconazole (14 days). Biopsied.  - Colonoscopy showed single bleeding colonic angioectasia. Treated  with argon plasma coagulation.There has been on gross bleeding observed during the patient's course.

## 2025-05-21 NOTE — PROVATION PATIENT INSTRUCTIONS
Discharge Summary/Instructions after an Endoscopic Procedure  Patient Name: Omar Trotter  Patient MRN: [id number]  Patient YOB: 1960  Wednesday, May 21, 2025  Anat Gooden MD  RESTRICTIONS:  During your procedure today, you received medications for sedation.  These   medications may affect your judgment, balance and coordination.  Therefore,   for 24 hours, you have the following restrictions:   - DO NOT drive a car, operate machinery, make legal/financial decisions,   sign important papers or drink alcohol.    ACTIVITY:  Today: no heavy lifting, straining or running due to procedural   sedation/anesthesia.  The following day: return to full activity including work.  DIET:  Eat and drink normally unless instructed otherwise.     TREATMENT FOR COMMON SIDE EFFECTS:  - Mild abdominal pain, nausea, belching, bloating or excessive gas:  rest,   eat lightly and use a heating pad.  - Sore Throat: treat with throat lozenges and/or gargle with warm salt   water.  - Because air was used during the procedure, expelling large amounts of air   from your rectum or belching is normal.  - If a bowel prep was taken, you may not have a bowel movement for 1-3 days.    This is normal.  SYMPTOMS TO WATCH FOR AND REPORT TO YOUR PHYSICIAN:  1. Abdominal pain or bloating, other than gas cramps.  2. Chest pain.  3. Back pain.  4. Signs of infection such as: chills or fever occurring within 24 hours   after the procedure.  5. Rectal bleeding, which would show as bright red, maroon, or black stools.   (A tablespoon of blood from the rectum is not serious, especially if   hemorrhoids are present.)  6. Vomiting.  7. Weakness or dizziness.  GO DIRECTLY TO THE NEAREST EMERGENCY ROOM IF YOU HAVE ANY OF THE FOLLOWING:      Difficulty breathing              Chills and/or fever over 101 F   Persistent vomiting and/or vomiting blood   Severe abdominal pain   Severe chest pain   Black, tarry stools   Bleeding- more than one  tablespoon   Any other symptom or condition that you feel may need urgent attention  Your doctor recommends these additional instructions:  If any biopsies were taken, your doctors clinic will contact you in 1 to 2   weeks with any results.  - Perform a colonoscopy today.   - Return patient to hospital saba for ongoing care.   - Resume regular diet today.   - Nystatin suspension 100,000 units PO QID for 5 days.   - Await pathology results.  For questions, problems or results please call your physician - Anat Gooden MD at Work:  [phone].  [facility], EMERGENCY ROOM PHONE NUMBER: [phone]  IF A COMPLICATION OR EMERGENCY SITUATION ARISES AND YOU ARE UNABLE TO REACH   YOUR PHYSICIAN - GO DIRECTLY TO THE EMERGENCY ROOM.  Anat Gooden MD  5/21/2025 2:07:44 PM  This report has been verified and signed electronically.  Dear patient,  As a result of recent federal legislation (The Federal Cures Act), you may   receive lab or pathology results from your procedure in your [facility]   account before your physician is able to contact you. Your physician or   their representative will relay the results to you with their   recommendations at their soonest availability.  Thank you,  PROVATION

## 2025-05-21 NOTE — PLAN OF CARE
05/21/25 1436   DOVE Message   Medicare Outpatient and Observation Notification regarding financial responsibility Explained to patient/caregiver;Signed/date by patient/caregiver   Date DOVE was signed 05/20/25   Time DOVE was signed 7040        [employer] [place] [employer] enrollment complete. Saliva sample collected onsite.

## 2025-05-21 NOTE — PLAN OF CARE
Inpatient Upgrade Note    Omar Trotter has warranted treatment spanning two or more midnights of hospital level care for the management of symptomatic anemia. He continues to require daily labs, monitoring of vital signs, further evaluation by consultants, and IV Ferric Gluconate, IV Pantoprazole, S/P EGD/Colonoscopy, EGD showed esophageal plaques were found, consistent with candidiasis and treatment for fluconazole (14 days). Biopsied. Colonoscopy showed single bleeding colonic angioectasia. Treated  with argon plasma coagulation.There has been on gross bleeding observed during the patient's course.  Patient was placed on IV iron supplement for iron-deficiency. His condition is also complicated by the following comorbidities: GERD, HTN, BPH.

## 2025-05-21 NOTE — PLAN OF CARE
Assessment attempted- patient in endo.        05/21/25 1432   Discharge Assessment   Assessment Type Discharge Planning Assessment   Source of Information family;patient

## 2025-05-21 NOTE — SUBJECTIVE & OBJECTIVE
Interval History:  Patient said he is very tired otherwise he is okay.  For daily updates refer to hospital course    Review of Systems   All other systems reviewed and are negative.    Objective:     Vital Signs (Most Recent):  Temp: 98.2 °F (36.8 °C) (05/21/25 1410)  Pulse: 84 (05/21/25 1420)  Resp: 18 (05/21/25 1420)  BP: (!) 127/58 (05/21/25 1420)  SpO2: 95 % (05/21/25 1420) Vital Signs (24h Range):  Temp:  [97.6 °F (36.4 °C)-98.4 °F (36.9 °C)] 98.2 °F (36.8 °C)  Pulse:  [80-99] 84  Resp:  [15-20] 18  SpO2:  [94 %-99 %] 95 %  BP: (119-187)/(58-88) 127/58     Weight: 94.3 kg (207 lb 14.3 oz)  Body mass index is 29 kg/m².    Intake/Output Summary (Last 24 hours) at 5/21/2025 1540  Last data filed at 5/21/2025 1353  Gross per 24 hour   Intake 610 ml   Output 1015 ml   Net -405 ml         Physical Exam  Cardiovascular:      Rate and Rhythm: Normal rate.      Pulses: Normal pulses.   Pulmonary:      Effort: Pulmonary effort is normal.   Abdominal:      General: There is no distension.      Tenderness: There is no abdominal tenderness.   Neurological:      Mental Status: He is alert and oriented to person, place, and time.               Significant Labs: All pertinent labs within the past 24 hours have been reviewed.    Significant Imaging: I have reviewed all pertinent imaging results/findings within the past 24 hours.

## 2025-05-21 NOTE — ANESTHESIA PREPROCEDURE EVALUATION
05/21/2025  Omar Trotter is a 64 y.o., male.      Results for orders placed or performed during the hospital encounter of 05/19/25   EKG 12-lead    Collection Time: 05/19/25 12:00 AM    Narrative    Ordered by an unspecified provider.        Imaging Results              X-Ray Chest AP Portable (Final result)  Result time 05/20/25 01:16:46      Final result by Lopez Cervantes MD (05/20/25 01:16:46)                   Impression:      No evidence of acute cardiopulmonary process.      Electronically signed by: Lopez Cervantes  Date:    05/20/2025  Time:    01:16               Narrative:    EXAMINATION:  XR CHEST AP PORTABLE    CLINICAL HISTORY:  Chest Pain;    TECHNIQUE:  Single frontal view of the chest was performed.    COMPARISON:  Radiographs of the chest from 10/28/2024.    FINDINGS:  Heart size and pulmonary vasculature are within normal limits.  No evidence of focal consolidation, pneumothorax, or pleural effusion.  A proximal right humeral prosthesis is redemonstrated.  Moderate chronic degenerative changes are present involving left glenohumeral joint.                                       CT Cervical Spine Without Contrast (Final result)  Result time 05/20/25 01:04:25      Final result by Lopez Cervantes MD (05/20/25 01:04:25)                   Impression:      And angulated lucency is present involving the left pedicle C4 which does not appear to have been present on prior CT of the C-spine from June 2024, however it is unclear if this is an acute process or associated with prominent multilevel chronic degenerative changes involving the cervical spine.  Note, if this were acute, it would not represent an unstable fracture.  No additional evidence of acute fracture or subluxation involving the cervical spine.      Electronically signed by: Lopez  Helen  Date:    05/20/2025  Time:    01:04               Narrative:    EXAMINATION:  CT CERVICAL SPINE WITHOUT CONTRAST    CLINICAL HISTORY:  Neck trauma (Age >= 65y);    TECHNIQUE:  Low dose axial images, sagittal and coronal reformations were performed though the cervical spine.  Contrast was not administered.    COMPARISON:  None available.    FINDINGS:  There is an angulated lucency involving the left pedicle of C4 which does not appear to have been present on prior CT of the C-spine from June 2024, however it is unclear if this is a acute process.  It may be associated with prominent multilevel chronic degenerative changes, and it does not represent an unstable fracture if it were acute.  The paraspinal soft tissues are unremarkable.                                       CT Head Without Contrast (Final result)  Result time 05/20/25 00:49:42      Final result by Lopez Cervantes MD (05/20/25 00:49:42)                   Impression:      No evidence of acute intracranial abnormality.      Electronically signed by: Lopez Cervantes  Date:    05/20/2025  Time:    00:49               Narrative:    EXAMINATION:  CT HEAD WITHOUT CONTRAST    CLINICAL HISTORY:  Head trauma, minor (Age >= 65y);    TECHNIQUE:  Low dose axial CT images obtained throughout the head without intravenous contrast. Sagittal and coronal reconstructions were performed.    COMPARISON:  None available.    FINDINGS:  Intracranial compartment:    Ventricles and sulci are normal in size for age without evidence of hydrocephalus. No extra-axial blood or fluid collections.    The brain parenchyma appears normal. No parenchymal mass, hemorrhage, edema or major vascular distribution infarct.    Skull/extracranial contents (limited evaluation):    No fracture. Mastoid air cells and paranasal sinuses are essentially clear.  The orbits are unremarkable.                                       CT Hip Without Contrast Right (Final result)  Result time  05/20/25 01:13:11      Final result by Lopez Cervantes MD (05/20/25 01:13:11)                   Impression:      Severe chronic degenerative changes of the right hip, progressed since prior imaging from 2024, without evidence of acute osseous process.      Electronically signed by: Lopez Cervantes  Date:    05/20/2025  Time:    01:13               Narrative:    EXAMINATION:  CT HIP WITHOUT CONTRAST RIGHT    CLINICAL HISTORY:  Hip trauma, fracture suspected, xray done;    TECHNIQUE:  Axial CT images were obtained of the to right hip without the use of intravenous contrast.  Sagittal and coronal reformats were performed.  3D series were also provided.    COMPARISON:  CT abdomen pelvis from 03/06/2024.    FINDINGS:  No evidence of acute fracture or dislocation involving the right hip.  Severe chronic degenerative changes are present involving the right hip with subchondral cyst formation involving the head of the right femur and the right acetabulum.  These findings are progressed when compared to prior imaging from March 2024.  No evidence of acute process involving the partially visualized pelvic viscera.  No evidence of subcutaneous emphysema, drainable fluid collection, or radiopaque foreign body.  No evidence of soft tissue or osseous mass.                                       X-Ray Hip 2 or 3 views Right with Pelvis when performed (Final result)  Result time 05/19/25 23:27:20      Final result by Lopez Cervantes MD (05/19/25 23:27:20)                   Impression:      No evidence of acute fracture or dislocation involving the pelvis or right hip, and no evidence of periprosthetic fracture or hardware complication involving the proximal left femoral prosthesis.      Electronically signed by: Lopez Cervantes  Date:    05/19/2025  Time:    23:27               Narrative:    EXAMINATION:  XR HIP WITH PELVIS WHEN PERFORMED 2 OR 3 VIEWS RIGHT    CLINICAL HISTORY:  fall;    TECHNIQUE:  Three views of  the pelvis and right hip were obtained..    COMPARISON:  None    FINDINGS:  No evidence of acute fracture or dislocation involving the pelvis or right hip.  A left proximal femoral prosthesis is present without evidence of periprosthetic fracture or hardware complication.  Moderate to severe degenerative changes are present involving the right hip joint with subchondral cyst formation involving the acetabulum and head of the right femur.  No evidence of subcutaneous emphysema or nonsurgical radiopaque foreign body.  No evidence of soft tissue or osseous mass.                                       Lab Results   Component Value Date    WBC 4.48 05/21/2025    HGB 8.7 (L) 05/21/2025    HCT 28.8 (L) 05/21/2025    MCV 78 (L) 05/21/2025     05/21/2025     BMP  Lab Results   Component Value Date     05/21/2025    K 4.0 05/21/2025     05/21/2025    CO2 25 05/21/2025    BUN 14 05/21/2025    CREATININE 0.7 05/21/2025    CALCIUM 9.1 05/21/2025    ANIONGAP 6 (L) 05/21/2025    GLU 98 05/21/2025    GLU 96 05/20/2025    GLU 85 05/19/2025       No results found for this or any previous visit.          Pre-op Assessment    I have reviewed the Patient Summary Reports.     I have reviewed the Nursing Notes. I have reviewed the NPO Status.   I have reviewed the Medications.     Review of Systems  Anesthesia Hx:  No problems with previous Anesthesia             Denies Family Hx of Anesthesia complications.    Denies Personal Hx of Anesthesia complications.                    Social:  No Alcohol Use, Smoker, Recreational Drugs Methamphetamine use  Uncomplicated opioid dependence      Hematology/Oncology:    Oncology Normal    -- Anemia:               Hematology Comments: Symptomatic anemia                    EENT/Dental:  EENT/Dental Normal           Cardiovascular:     Hypertension, poorly controlled              ECG has been reviewed.                            Pulmonary:  Pulmonary Normal                        Renal/:    BPH History of JACKLYN             Hepatic/GI:  Bowel Prep.   GERD Liver Disease, Hepatitis, C Patient denies nausea at this time           Liver Disease, Hepatitis  , Cirrhosis      Musculoskeletal:  Arthritis   Spinal stenosis of lumbar region   Joint Disease:  Arthritis, Osteoarthritis     Spine Disorders: lumbar Chronic Pain           Neurological:        Chronic Pain Syndrome Osteoarthritis                           Endocrine:  Endocrine Normal            Psych:  Psychiatric History                     Physical Exam  General: Well nourished, Cooperative, Alert and Oriented    Airway:  Mallampati: III   Mouth Opening: Normal  TM Distance: > 6 cm  Tongue: Normal  Neck ROM: Normal ROM    Dental:  Intact    Chest/Lungs:  Clear to auscultation    Heart:  Rate: Normal  Rhythm: Regular Rhythm  Sounds: Normal        Anesthesia Plan  Type of Anesthesia, risks & benefits discussed:    Anesthesia Type: Gen Natural Airway  Intra-op Monitoring Plan: Standard ASA Monitors  Post Op Pain Control Plan:   (medical reason for not using multimodal pain management)  Informed Consent: Informed consent signed with the Patient and all parties understand the risks and agree with anesthesia plan.  All questions answered.   ASA Score: 3  Anesthesia Plan Notes:       GNA  POM  Propofol     Ready For Surgery From Anesthesia Perspective.     .

## 2025-05-22 ENCOUNTER — CLINICAL SUPPORT (OUTPATIENT)
Dept: SMOKING CESSATION | Facility: CLINIC | Age: 65
End: 2025-05-22
Payer: COMMERCIAL

## 2025-05-22 DIAGNOSIS — F17.200 NICOTINE DEPENDENCE: Primary | ICD-10-CM

## 2025-05-22 LAB
ABSOLUTE EOSINOPHIL (SMH): 0.22 K/UL
ABSOLUTE MONOCYTE (SMH): 0.38 K/UL (ref 0.3–1)
ABSOLUTE NEUTROPHIL COUNT (SMH): 2.8 K/UL (ref 1.8–7.7)
ANION GAP (SMH): 8 MMOL/L (ref 8–16)
BASOPHILS # BLD AUTO: 0.04 K/UL
BASOPHILS NFR BLD AUTO: 0.9 %
BUN SERPL-MCNC: 13 MG/DL (ref 8–23)
CALCIUM SERPL-MCNC: 9 MG/DL (ref 8.7–10.5)
CHLORIDE SERPL-SCNC: 104 MMOL/L (ref 95–110)
CO2 SERPL-SCNC: 25 MMOL/L (ref 23–29)
CREAT SERPL-MCNC: 0.8 MG/DL (ref 0.5–1.4)
ERYTHROCYTE [DISTWIDTH] IN BLOOD BY AUTOMATED COUNT: 17.7 % (ref 11.5–14.5)
GFR SERPLBLD CREATININE-BSD FMLA CKD-EPI: >60 ML/MIN/1.73/M2
GLUCOSE SERPL-MCNC: 88 MG/DL (ref 70–110)
HCT VFR BLD AUTO: 30.3 % (ref 40–54)
HGB BLD-MCNC: 8.8 GM/DL (ref 14–18)
IMM GRANULOCYTES # BLD AUTO: 0.03 K/UL (ref 0–0.04)
IMM GRANULOCYTES NFR BLD AUTO: 0.7 % (ref 0–0.5)
LYMPHOCYTES # BLD AUTO: 1.13 K/UL (ref 1–4.8)
MCH RBC QN AUTO: 23.1 PG (ref 27–31)
MCHC RBC AUTO-ENTMCNC: 29 G/DL (ref 32–36)
MCV RBC AUTO: 80 FL (ref 82–98)
NUCLEATED RBC (/100WBC) (SMH): 0 /100 WBC
PLATELET # BLD AUTO: 173 K/UL (ref 150–450)
PMV BLD AUTO: 9.5 FL (ref 9.2–12.9)
POTASSIUM SERPL-SCNC: 4.1 MMOL/L (ref 3.5–5.1)
RBC # BLD AUTO: 3.81 M/UL (ref 4.6–6.2)
RELATIVE EOSINOPHIL (SMH): 4.8 % (ref 0–8)
RELATIVE LYMPHOCYTE (SMH): 24.7 % (ref 18–48)
RELATIVE MONOCYTE (SMH): 8.3 % (ref 4–15)
RELATIVE NEUTROPHIL (SMH): 60.6 % (ref 38–73)
SODIUM SERPL-SCNC: 137 MMOL/L (ref 136–145)
WBC # BLD AUTO: 4.58 K/UL (ref 3.9–12.7)

## 2025-05-22 PROCEDURE — 36415 COLL VENOUS BLD VENIPUNCTURE: CPT | Performed by: STUDENT IN AN ORGANIZED HEALTH CARE EDUCATION/TRAINING PROGRAM

## 2025-05-22 PROCEDURE — 25000003 PHARM REV CODE 250: Performed by: INTERNAL MEDICINE

## 2025-05-22 PROCEDURE — 80048 BASIC METABOLIC PNL TOTAL CA: CPT | Performed by: STUDENT IN AN ORGANIZED HEALTH CARE EDUCATION/TRAINING PROGRAM

## 2025-05-22 PROCEDURE — 25000003 PHARM REV CODE 250: Performed by: STUDENT IN AN ORGANIZED HEALTH CARE EDUCATION/TRAINING PROGRAM

## 2025-05-22 PROCEDURE — 11000001 HC ACUTE MED/SURG PRIVATE ROOM

## 2025-05-22 PROCEDURE — 85025 COMPLETE CBC W/AUTO DIFF WBC: CPT | Performed by: NURSE PRACTITIONER

## 2025-05-22 PROCEDURE — 63600175 PHARM REV CODE 636 W HCPCS: Performed by: FAMILY MEDICINE

## 2025-05-22 PROCEDURE — 63600175 PHARM REV CODE 636 W HCPCS: Performed by: STUDENT IN AN ORGANIZED HEALTH CARE EDUCATION/TRAINING PROGRAM

## 2025-05-22 PROCEDURE — 99406 BEHAV CHNG SMOKING 3-10 MIN: CPT | Performed by: CLINIC/CENTER

## 2025-05-22 PROCEDURE — 63700000 PHARM REV CODE 250 ALT 637 W/O HCPCS: Performed by: FAMILY MEDICINE

## 2025-05-22 PROCEDURE — 97161 PT EVAL LOW COMPLEX 20 MIN: CPT

## 2025-05-22 PROCEDURE — 25000003 PHARM REV CODE 250: Performed by: NURSE PRACTITIONER

## 2025-05-22 RX ADMIN — CYCLOBENZAPRINE 10 MG: 10 TABLET, FILM COATED ORAL at 02:05

## 2025-05-22 RX ADMIN — HYDROCODONE BITARTRATE AND ACETAMINOPHEN 1 TABLET: 5; 325 TABLET ORAL at 10:05

## 2025-05-22 RX ADMIN — FLUCONAZOLE 400 MG: 100 TABLET ORAL at 08:05

## 2025-05-22 RX ADMIN — CYCLOBENZAPRINE 10 MG: 10 TABLET, FILM COATED ORAL at 08:05

## 2025-05-22 RX ADMIN — SENNOSIDES AND DOCUSATE SODIUM 1 TABLET: 50; 8.6 TABLET ORAL at 08:05

## 2025-05-22 RX ADMIN — BUSPIRONE HYDROCHLORIDE 15 MG: 10 TABLET ORAL at 08:05

## 2025-05-22 RX ADMIN — TAMSULOSIN HYDROCHLORIDE 0.8 MG: 0.4 CAPSULE ORAL at 08:05

## 2025-05-22 RX ADMIN — HYDROCODONE BITARTRATE AND ACETAMINOPHEN 1 TABLET: 5; 325 TABLET ORAL at 04:05

## 2025-05-22 RX ADMIN — PANTOPRAZOLE SODIUM 40 MG: 40 INJECTION, POWDER, FOR SOLUTION INTRAVENOUS at 08:05

## 2025-05-22 RX ADMIN — GABAPENTIN 800 MG: 300 CAPSULE ORAL at 08:05

## 2025-05-22 RX ADMIN — SODIUM FERRIC GLUCONATE COMPLEX IN SUCROSE 125 MG: 12.5 INJECTION INTRAVENOUS at 08:05

## 2025-05-22 RX ADMIN — LISINOPRIL 40 MG: 20 TABLET ORAL at 08:05

## 2025-05-22 RX ADMIN — GABAPENTIN 800 MG: 300 CAPSULE ORAL at 02:05

## 2025-05-22 NOTE — PLAN OF CARE
[facility]  Initial Discharge Assessment       Primary Care Provider: Cam Orozco MD    Admission Diagnosis: Symptomatic anemia [D64.9]  Right hip pain [M25.551]    Admission Date: 5/19/2025  Expected Discharge Date: 5/22/2025    Transition of Care Barriers: None    Assessment completed at bedside with patient.  Patient lives with roommates, is independent with ADL's. Denies  HH/HD/Coumadin.   DME includes rolling walker and straight cane.  PCP Dr Orozco.   Pharmacy [facility].       Payor: [employer] / Plan: [place] MEDICARE HMO / Product Type: Medicare Advantage /     Extended Emergency Contact Information  Primary Emergency Contact: Shellie Rodriguez  Home Phone: [phone]  Mobile Phone: [phone]  Relation: Daughter  Preferred language: English   needed? No    Discharge Plan A: Skilled Nursing Facility  Discharge Plan B: Home Health      [facility] - [place], [place] - [address]  [address]  [facility] [facility] [address]  Phone: [phone] Fax: [phone]    [facility]  [address]  [place] [address]  Phone: [phone] Fax: [phone]      Initial Assessment (most recent)       Adult Discharge Assessment - 05/22/25 1149          Discharge Assessment    Assessment Type Discharge Planning Assessment     Confirmed/corrected address, phone number and insurance Yes     Source of Information patient     Communicated NATALIA with patient/caregiver Date not available/Unable to determine     People in Home friend(s)     Name(s) of People in Home roommates     Do you expect to return to your current living situation? Yes     Do you have help at home or someone to help you manage your care at home? No     Prior to hospitilization cognitive status: Alert/Oriented     Current cognitive status: Alert/Oriented     Walking or Climbing Stairs Difficulty yes     Walking or Climbing Stairs ambulation difficulty, requires equipment      Dressing/Bathing Difficulty yes     Dressing/Bathing bathing difficulty, requires equipment     Home Accessibility stairs to enter home     Number of Stairs, Main Entrance five     Equipment Currently Used at Home walker, rolling;cane, straight     Readmission within 30 days? No     Patient currently being followed by outpatient case management? No     Do you currently have service(s) that help you manage your care at home? No     Do you take prescription medications? Yes     Do you have prescription coverage? Yes     Do you have any problems affording any of your prescribed medications? No     Is the patient taking medications as prescribed? yes     Who is going to help you get home at discharge? daughter     How do you get to doctors appointments? car, drives self;family or friend will provide     Are you on dialysis? No     Do you take coumadin? No     Discharge Plan A Skilled Nursing Facility     Discharge Plan B Home Health     DME Needed Upon Discharge  none     Discharge Plan discussed with: Patient     Transition of Care Barriers None

## 2025-05-22 NOTE — SUBJECTIVE & OBJECTIVE
Interval History:  Patient said he slightly feel better today.  For daily updates refer to hospital course    Review of Systems   All other systems reviewed and are negative.    Objective:     Vital Signs (Most Recent):  Temp: 97.7 °F (36.5 °C) (05/22/25 1552)  Pulse: 90 (05/22/25 1553)  Resp: 17 (05/22/25 1553)  BP: (!) 142/77 (05/22/25 1553)  SpO2: 95 % (05/22/25 1553) Vital Signs (24h Range):  Temp:  [97.5 °F (36.4 °C)-98 °F (36.7 °C)] 97.7 °F (36.5 °C)  Pulse:  [82-95] 90  Resp:  [16-18] 17  SpO2:  [92 %-97 %] 95 %  BP: (127-158)/(69-77) 142/77     Weight: 94.3 kg (207 lb 14.3 oz)  Body mass index is 29 kg/m².    Intake/Output Summary (Last 24 hours) at 5/22/2025 1609  Last data filed at 5/22/2025 0922  Gross per 24 hour   Intake 360 ml   Output 1850 ml   Net -1490 ml         Physical Exam  Cardiovascular:      Rate and Rhythm: Normal rate.      Pulses: Normal pulses.   Pulmonary:      Effort: Pulmonary effort is normal.   Abdominal:      General: There is no distension.      Tenderness: There is no abdominal tenderness.   Neurological:      Mental Status: He is alert and oriented to person, place, and time.               Significant Labs: All pertinent labs within the past 24 hours have been reviewed.    Significant Imaging: I have reviewed all pertinent imaging results/findings within the past 24 hours.

## 2025-05-22 NOTE — PT/OT/SLP EVAL
Physical Therapy Evaluation    Patient Name:  Omar Trotter   MRN:  [id number]    Recommendations:     Discharge Recommendations: Moderate Intensity Therapy   Discharge Equipment Recommendations: none   Barriers to discharge: pain, increase assist with mobility, high fall risk, decrease caregiver support     Assessment:     Omar Trotter is a 64 y.o. male admitted with a medical diagnosis of Symptomatic anemia.  He presents with the following impairments/functional limitations: weakness, impaired endurance, impaired self care skills, impaired functional mobility, gait instability, impaired balance, decreased lower extremity function, decreased safety awareness, pain, impaired cardiopulmonary response to activity.    Pt found in bed with HOB elevated. Pt agreeable to visit. Pt requires supervision with bed mobility. Good sitting balance at EOB. Sit to stand with min A due to pain. Pt ambulated 25 ft x 2 with RW and min A due to antalgic gait pattern that increased with increase ambulation.     Rehab Prognosis: Fair; patient would benefit from acute skilled PT services to address these deficits and reach maximum level of function.    Recent Surgery: Procedure(s) (LRB):  COLONOSCOPY (N/A)  EGD (ESOPHAGOGASTRODUODENOSCOPY) (N/A) 1 Day Post-Op    Plan:     During this hospitalization, patient to be seen 6 x/week to address the identified rehab impairments via gait training, therapeutic activities, therapeutic exercises, neuromuscular re-education and progress toward the following goals:    Plan of Care Expires:  06/22/25    Subjective     Chief Complaint: pain  Patient/Family Comments/goals: get better  Pain/Comfort:  Pain Rating 1: other (see comments) (did not quantify)  Location - Side 1: Right  Location 1: hip  Pain Addressed 1: Reposition, Distraction, Cessation of Activity    Patients cultural, spiritual, Latter-day conflicts given the current situation: no    Living Environment:  Pt lives with a roommate who is  unable to assist him in a trailer with 5 XIOMARA with single HR  Prior to admission, patients level of function was mi spc/rw.  Equipment used at home: walker, rolling, cane, straight.  DME owned (not currently used): none.  Upon discharge, patient will have assistance from self.    Objective:     Communicated with RN prior to session.  Patient found HOB elevated with peripheral IV, telemetry  upon PT entry to room.    General Precautions: Standard, fall  Orthopedic Precautions:N/A   Braces: N/A  Respiratory Status: Room air    Exams:  RLE ROM: WFL  RLE Strength: WFL  LLE ROM: WFL  LLE Strength: WFL    Functional Mobility:  Bed Mobility:     Supine to Sit: supervision  Sit to Supine: supervision  Transfers:     Sit to Stand:  minimum assistance with rolling walker  Gait: 25 ft x 2 with RW and min A antalgic gait pattern      AM-PAC 6 CLICK MOBILITY  Total Score:18       Treatment & Education:  Pt educated on POC, discharge recommendation, importance of time OOB, benefit of RW at this time, need for assist with mobility, use of call bell to seek assistance as needed and fall prevention      Patient left HOB elevated with all lines intact, call button in reach, bed alarm on, and MD and  notified.    GOALS:   Multidisciplinary Problems       Physical Therapy Goals          Problem: Physical Therapy    Goal Priority Disciplines Outcome Interventions   Physical Therapy Goal     PT, PT/OT Progressing    Description: Goals to be met by: 25     Patient will increase functional independence with mobility by performin. Supine to sit with Supervision  2. Sit to stand transfer with Supervision  3. Bed to chair transfer with Supervision using Rolling Walker  4. Gait  x 150 feet with Supervision using Rolling Walker  5. Asc/Desc 5 steps with single HR and supervision                             DME Justifications:  No DME recommended requiring DME justifications    History:     Past Medical History:    Diagnosis Date    JACKLYN (acute kidney injury) 11/11/2022    Arthritis     GERD (gastroesophageal reflux disease)     Hepatitis C     STATES DX 10-20. NO S/S. NO FURTHER TESTS OR TX. WITH INSURANCE IN 2021 CAN GET IT EVALUATED.    History of left hip replacement 05/20/2021    History of MRSA infection     History of right shoulder replacement 01/15/2021    Hypertension     Wears glasses        Past Surgical History:   Procedure Laterality Date    APPENDECTOMY      ARTHROPLASTY OF SHOULDER Right 1/13/2021    Procedure: ARTHROPLASTY, SHOULDER TOTAL;  Surgeon: Emilio Siddiqui MD;  Location: [facility] OR;  Service: Orthopedics;  Laterality: Right;  GENERAL AND BLOCK    COLONOSCOPY N/A 5/8/2023    Procedure: COLONOSCOPY;  Surgeon: Bobby Bourgeois MD;  Location: [facility];  Service: Endoscopy;  Laterality: N/A;    COLONOSCOPY N/A 5/21/2025    Procedure: COLONOSCOPY;  Surgeon: Anat Gooden MD;  Location: [facility];  Service: Endoscopy;  Laterality: N/A;    ESOPHAGOGASTRODUODENOSCOPY N/A 5/7/2023    Procedure: EGD (ESOPHAGOGASTRODUODENOSCOPY);  Surgeon: Carloz Christianson Jr., MD;  Location: [facility];  Service: Endoscopy;  Laterality: N/A;    ESOPHAGOGASTRODUODENOSCOPY N/A 3/1/2024    Procedure: EGD (ESOPHAGOGASTRODUODENOSCOPY);  Surgeon: Femi Hyde MD;  Location: [facility];  Service: Endoscopy;  Laterality: N/A;    ESOPHAGOGASTRODUODENOSCOPY N/A 5/21/2025    Procedure: EGD (ESOPHAGOGASTRODUODENOSCOPY);  Surgeon: Anat Gooden MD;  Location: [facility];  Service: Endoscopy;  Laterality: N/A;    HERNIA REPAIR      HIP REPLACEMENT ARTHROPLASTY Left 3/24/2021    Procedure: ARTHROPLASTY, HIP REPLACEMENT;  Surgeon: Miles Rivas II, MD;  Location: [facility] OR;  Service: Orthopedics;  Laterality: Left;    JOINT REPLACEMENT Right     shoulder    REVISION TOTAL HIP ARTHROPLASTY Left 6/2/2021    Procedure: REVISION, TOTAL ARTHROPLASTY, HIP;  Surgeon: Miles Rivas II, MD;  Location: [facility] OR;  Service: Orthopedics;   Laterality: Left;    SMALL BOWEL ENTEROSCOPY Left 5/8/2023    Procedure: ENTEROSCOPY;  Surgeon: Bobby Bourgeois MD;  Location: [facility];  Service: Endoscopy;  Laterality: Left;       Time Tracking:     PT Received On: 05/22/25  PT Start Time: 1126     PT Stop Time: 1137  PT Total Time (min): 11 min     Billable Minutes: Evaluation 11      05/22/2025

## 2025-05-22 NOTE — PROGRESS NOTES
05/22/25 1100   Tobacco Cessation Intervention   Do you use any type of tobacco product? Yes   Are you interested in quitting use of tobacco products? Thinking about quitting   Are you interested in Nicotine Replacement for symptom relief? Yes   $ Smoking Cessation Charges Smoking Cessation - Intermediate (CTTS)

## 2025-05-22 NOTE — PLAN OF CARE
Met with patient to review discharge recommendation of SNF and they are agreeable to plan.    Patient/family provided with a list of facilities in-network with patient's payor plan. Providers that are owned, operated, or affiliated with [facility] are included on the list.     Notified that referrals will be sent to the below listed facilities from in-network list based on proximity to home/family support:   [facility]  2.   Patient requests any other facilities in network that are in close proximity to his home  3.    4.  5. (can send more than 5)    Patient/family instructed to identify preference.    Preferred Facility: (if more than 1, listed in order of descending preference)   [facility]    If an additional preferred facility not listed above is identified, additional referral to be sent. If above facilities unable to accept, will send additional referrals to in-network providers.

## 2025-05-22 NOTE — PLAN OF CARE
Nurse met with Patient Porfirio to review discharge recommendation of SNF and they are agreeable to plan.    Patient/family provided with a list of facilities in-network with patient's payor plan. Providers that are owned, operated, or affiliated with [facility] are included on the list.     Notified that referrals will be sent to the below listed facilities from in-network list based on proximity to home/family support:   1.[facility]  2.[facility]  3.[facility]   4.[facility]   5.[facility]     Patient/family instructed to identify preference.    Preferred Facility: (if more than 1, listed in order of descending preference)  1.[facility]     If an additional preferred facility not listed above is identified, additional referral to be sent. If above facilities unable to accept, will send additional referrals to in-network providers.    05/22/25 5437   Post-Acute Status   Post-Acute Authorization Placement   Post-Acute Placement Status Referrals Sent   Discharge Plan   Discharge Plan A Skilled Nursing Facility   Discharge Plan B Skilled Nursing Facility

## 2025-05-22 NOTE — ASSESSMENT & PLAN NOTE
Patient's blood pressure range in the last 24 hours was: BP  Min: 127/69  Max: 158/77.The patient's inpatient anti-hypertensive regimen is listed below:  Current Antihypertensives  lisinopriL tablet 40 mg, Daily, Oral    Plan  - BP is controlled, no changes needed to their regimen  -

## 2025-05-22 NOTE — PROGRESS NOTES
[facility] Medicine  Progress Note    Patient Name: Omar Trotter  MRN: [id number]  Patient Class: IP- Inpatient   Admission Date: 5/19/2025  Length of Stay: 1 days  Attending Physician: Darian Lomas DO  Primary Care Provider: Cam Orozco MD        Subjective     Principal Problem:Symptomatic anemia        HPI:    Sukh Trotter is a 64 year old male with a past medical history of GERD, HTN, and BPH, previous hip surgery ,UGIB, / esophagitis was admitted from ER with possible syncopal episode and concern for right hip fracture. He states that he had a fall at home onto the right hip, and that he felt a little weak and lightheaded prior to the fall. He was walking down stairs when he slipped due to feeling weak. He denies LOC or hitting his head. He denies other precipitating factors.  He denies complaint.    ED work up included a CBC, which showed anemia that is worsening from prior studies. He has a history of iron deficiency anemia, but has not been taking his supplements or followed up with hematology in some time. CMP was unremarkable. ProBNP 189, HS troponin 5 and 4. Magnesium 2.1. XR of the hip showed no acute fracture. CT head showed no acute intracranial abnormality. CT cervical spine showed no acute process. CT hip showed chronic findings but no acute findings. CXR showed no acute cardiopulmonary abnormalities. He was given hydrocodone for pain in the ED. Hospital Medicine consulted for admission and further management.     Overview/Hospital Course:  Omar Trotter is a 64 year old male with a past medical history of GERD, HTN, BPH and tobacco dependence who presented with a fall in the setting of symptomatic anemia. Imaging did not reveal acute fracture (possible C4 pedicle disease on CT). He has received one unit of PRBCs for a Hgb of 7.3 on admission. He is on an IV PPI and s/p endoscopy and colonoscopy. EGD showed esophageal plaques were found, consistent with  candidiasis and treatment for fluconazole (14 days). Biopsied. Colonoscopy showed single bleeding colonic angioectasia. Treated  with argon plasma coagulation.  Patient was placed on IV iron supplement for iron-deficiency.Pending placement for SNF.     Interval History:  Patient said he slightly feel better today.  For daily updates refer to hospital course    Review of Systems   All other systems reviewed and are negative.    Objective:     Vital Signs (Most Recent):  Temp: 97.7 °F (36.5 °C) (05/22/25 1552)  Pulse: 90 (05/22/25 1553)  Resp: 17 (05/22/25 1553)  BP: (!) 142/77 (05/22/25 1553)  SpO2: 95 % (05/22/25 1553) Vital Signs (24h Range):  Temp:  [97.5 °F (36.4 °C)-98 °F (36.7 °C)] 97.7 °F (36.5 °C)  Pulse:  [82-95] 90  Resp:  [16-18] 17  SpO2:  [92 %-97 %] 95 %  BP: (127-158)/(69-77) 142/77     Weight: 94.3 kg (207 lb 14.3 oz)  Body mass index is 29 kg/m².    Intake/Output Summary (Last 24 hours) at 5/22/2025 1609  Last data filed at 5/22/2025 0922  Gross per 24 hour   Intake 360 ml   Output 1850 ml   Net -1490 ml         Physical Exam  Cardiovascular:      Rate and Rhythm: Normal rate.      Pulses: Normal pulses.   Pulmonary:      Effort: Pulmonary effort is normal.   Abdominal:      General: There is no distension.      Tenderness: There is no abdominal tenderness.   Neurological:      Mental Status: He is alert and oriented to person, place, and time.               Significant Labs: All pertinent labs within the past 24 hours have been reviewed.    Significant Imaging: I have reviewed all pertinent imaging results/findings within the past 24 hours.      Assessment & Plan  Symptomatic anemia  Anemia is likely due to Iron deficiency. Most recent hemoglobin and hematocrit are listed below.  Recent Labs     05/20/25  1850 05/21/25  0602 05/22/25  0422   HGB 8.9* 8.7* 8.8*   HCT 29.9* 28.8* 30.3*     Plan  - Monitor serial CBC: Daily  - Transfuse PRBC if patient becomes hemodynamically unstable, symptomatic or  H/H drops below 7/21.  - S/P Transfusion  - Patient's anemia is currently stable  - On IV iron  - s/p endoscopy and colonoscopy.   - EGD showed esophageal plaques were found, consistent with candidiasis and treatment for fluconazole (14 days). Biopsied.  - Colonoscopy showed single bleeding colonic angioectasia. Treated  with argon plasma coagulation.    GERD (gastroesophageal reflux disease)  Noted, protonix    Hypertension  Patient's blood pressure range in the last 24 hours was: BP  Min: 127/69  Max: 158/77.The patient's inpatient anti-hypertensive regimen is listed below:  Current Antihypertensives  lisinopriL tablet 40 mg, Daily, Oral    Plan  - BP is controlled, no changes needed to their regimen  -   Syncope  Syncope resolved  Previous recent carotid ultrasound negative from 12/2023  Ordered echo EF 50-55%  CT head neg     VTE Risk Mitigation (From admission, onward)           Ordered     Reason for No Pharmacological VTE Prophylaxis  Once        Question:  Reasons:  Answer:  Risk of Bleeding    05/20/25 0225     IP VTE LOW RISK PATIENT  Once         05/20/25 0225     Place sequential compression device  Until discontinued         05/20/25 0225                    Discharge Planning   NATALIA: 5/23/2025     Code Status: Full Code   Medical Readiness for Discharge Date:   Discharge Plan A: Skilled Nursing Facility                Please place Justification for DME        Darian Lomas DO  Department of Hospital Medicine   [facility]

## 2025-05-22 NOTE — ASSESSMENT & PLAN NOTE
Anemia is likely due to Iron deficiency. Most recent hemoglobin and hematocrit are listed below.  Recent Labs     05/20/25  1850 05/21/25  0602 05/22/25  0422   HGB 8.9* 8.7* 8.8*   HCT 29.9* 28.8* 30.3*     Plan  - Monitor serial CBC: Daily  - Transfuse PRBC if patient becomes hemodynamically unstable, symptomatic or H/H drops below 7/21.  - S/P Transfusion  - Patient's anemia is currently stable  - On IV iron  - s/p endoscopy and colonoscopy.   - EGD showed esophageal plaques were found, consistent with candidiasis and treatment for fluconazole (14 days). Biopsied.  - Colonoscopy showed single bleeding colonic angioectasia. Treated  with argon plasma coagulation.

## 2025-05-23 PROBLEM — D69.1 THROMBOCYTOPATHIA: Status: ACTIVE | Noted: 2025-05-23

## 2025-05-23 LAB
ABSOLUTE EOSINOPHIL (SMH): 0.19 K/UL
ABSOLUTE MONOCYTE (SMH): 0.35 K/UL (ref 0.3–1)
ABSOLUTE NEUTROPHIL COUNT (SMH): 2.4 K/UL (ref 1.8–7.7)
ANION GAP (SMH): 9 MMOL/L (ref 8–16)
BASOPHILS # BLD AUTO: 0.03 K/UL
BASOPHILS NFR BLD AUTO: 0.8 %
BUN SERPL-MCNC: 19 MG/DL (ref 8–23)
CALCIUM SERPL-MCNC: 9.2 MG/DL (ref 8.7–10.5)
CHLORIDE SERPL-SCNC: 104 MMOL/L (ref 95–110)
CO2 SERPL-SCNC: 23 MMOL/L (ref 23–29)
CREAT SERPL-MCNC: 0.8 MG/DL (ref 0.5–1.4)
ERYTHROCYTE [DISTWIDTH] IN BLOOD BY AUTOMATED COUNT: 18 % (ref 11.5–14.5)
GFR SERPLBLD CREATININE-BSD FMLA CKD-EPI: >60 ML/MIN/1.73/M2
GLUCOSE SERPL-MCNC: 100 MG/DL (ref 70–110)
HCT VFR BLD AUTO: 31 % (ref 40–54)
HGB BLD-MCNC: 8.6 GM/DL (ref 14–18)
IMM GRANULOCYTES # BLD AUTO: 0.03 K/UL (ref 0–0.04)
IMM GRANULOCYTES NFR BLD AUTO: 0.8 % (ref 0–0.5)
LYMPHOCYTES # BLD AUTO: 0.96 K/UL (ref 1–4.8)
MCH RBC QN AUTO: 23.6 PG (ref 27–31)
MCHC RBC AUTO-ENTMCNC: 27.7 G/DL (ref 32–36)
MCV RBC AUTO: 85 FL (ref 82–98)
NUCLEATED RBC (/100WBC) (SMH): 0 /100 WBC
OHS QRS DURATION: 78 MS
OHS QTC CALCULATION: 455 MS
PLATELET # BLD AUTO: 75 K/UL (ref 150–450)
PLATELET BLD QL SMEAR: ABNORMAL
PMV BLD AUTO: 10.5 FL (ref 9.2–12.9)
POTASSIUM SERPL-SCNC: 4.6 MMOL/L (ref 3.5–5.1)
RBC # BLD AUTO: 3.65 M/UL (ref 4.6–6.2)
RELATIVE EOSINOPHIL (SMH): 4.8 % (ref 0–8)
RELATIVE LYMPHOCYTE (SMH): 24.1 % (ref 18–48)
RELATIVE MONOCYTE (SMH): 8.8 % (ref 4–15)
RELATIVE NEUTROPHIL (SMH): 60.7 % (ref 38–73)
SODIUM SERPL-SCNC: 136 MMOL/L (ref 136–145)
WBC # BLD AUTO: 3.99 K/UL (ref 3.9–12.7)

## 2025-05-23 PROCEDURE — 11000001 HC ACUTE MED/SURG PRIVATE ROOM

## 2025-05-23 PROCEDURE — 97116 GAIT TRAINING THERAPY: CPT

## 2025-05-23 PROCEDURE — 25000003 PHARM REV CODE 250: Performed by: STUDENT IN AN ORGANIZED HEALTH CARE EDUCATION/TRAINING PROGRAM

## 2025-05-23 PROCEDURE — 97535 SELF CARE MNGMENT TRAINING: CPT

## 2025-05-23 PROCEDURE — 85025 COMPLETE CBC W/AUTO DIFF WBC: CPT | Performed by: NURSE PRACTITIONER

## 2025-05-23 PROCEDURE — 63600175 PHARM REV CODE 636 W HCPCS: Performed by: FAMILY MEDICINE

## 2025-05-23 PROCEDURE — 63700000 PHARM REV CODE 250 ALT 637 W/O HCPCS: Performed by: FAMILY MEDICINE

## 2025-05-23 PROCEDURE — 97530 THERAPEUTIC ACTIVITIES: CPT

## 2025-05-23 PROCEDURE — 25000003 PHARM REV CODE 250: Performed by: INTERNAL MEDICINE

## 2025-05-23 PROCEDURE — 80048 BASIC METABOLIC PNL TOTAL CA: CPT | Performed by: STUDENT IN AN ORGANIZED HEALTH CARE EDUCATION/TRAINING PROGRAM

## 2025-05-23 PROCEDURE — 97166 OT EVAL MOD COMPLEX 45 MIN: CPT

## 2025-05-23 PROCEDURE — 25000003 PHARM REV CODE 250: Performed by: FAMILY MEDICINE

## 2025-05-23 PROCEDURE — 25000003 PHARM REV CODE 250: Performed by: NURSE PRACTITIONER

## 2025-05-23 PROCEDURE — 63600175 PHARM REV CODE 636 W HCPCS: Performed by: STUDENT IN AN ORGANIZED HEALTH CARE EDUCATION/TRAINING PROGRAM

## 2025-05-23 PROCEDURE — 36415 COLL VENOUS BLD VENIPUNCTURE: CPT | Performed by: STUDENT IN AN ORGANIZED HEALTH CARE EDUCATION/TRAINING PROGRAM

## 2025-05-23 RX ORDER — NYSTATIN 100000 [USP'U]/ML
4 SUSPENSION ORAL 4 TIMES DAILY
Status: DISCONTINUED | OUTPATIENT
Start: 2025-05-23 | End: 2025-05-23

## 2025-05-23 RX ORDER — MUPIROCIN 20 MG/G
OINTMENT TOPICAL 2 TIMES DAILY
Status: COMPLETED | OUTPATIENT
Start: 2025-05-23 | End: 2025-05-27

## 2025-05-23 RX ORDER — NYSTATIN 100000 [USP'U]/ML
4 SUSPENSION ORAL 4 TIMES DAILY
Status: DISCONTINUED | OUTPATIENT
Start: 2025-05-23 | End: 2025-05-29 | Stop reason: HOSPADM

## 2025-05-23 RX ADMIN — NYSTATIN 400000 UNITS: 100000 SUSPENSION ORAL at 04:05

## 2025-05-23 RX ADMIN — BUSPIRONE HYDROCHLORIDE 15 MG: 10 TABLET ORAL at 08:05

## 2025-05-23 RX ADMIN — GABAPENTIN 800 MG: 300 CAPSULE ORAL at 08:05

## 2025-05-23 RX ADMIN — LISINOPRIL 40 MG: 20 TABLET ORAL at 08:05

## 2025-05-23 RX ADMIN — GABAPENTIN 800 MG: 300 CAPSULE ORAL at 03:05

## 2025-05-23 RX ADMIN — SODIUM FERRIC GLUCONATE COMPLEX IN SUCROSE 125 MG: 12.5 INJECTION INTRAVENOUS at 08:05

## 2025-05-23 RX ADMIN — MUPIROCIN 1 G: 20 OINTMENT TOPICAL at 11:05

## 2025-05-23 RX ADMIN — CYCLOBENZAPRINE 10 MG: 10 TABLET, FILM COATED ORAL at 09:05

## 2025-05-23 RX ADMIN — NYSTATIN 400000 UNITS: 100000 SUSPENSION ORAL at 08:05

## 2025-05-23 RX ADMIN — HYDROCODONE BITARTRATE AND ACETAMINOPHEN 1 TABLET: 5; 325 TABLET ORAL at 10:05

## 2025-05-23 RX ADMIN — HYDROCODONE BITARTRATE AND ACETAMINOPHEN 1 TABLET: 5; 325 TABLET ORAL at 09:05

## 2025-05-23 RX ADMIN — SENNOSIDES AND DOCUSATE SODIUM 1 TABLET: 50; 8.6 TABLET ORAL at 08:05

## 2025-05-23 RX ADMIN — PANTOPRAZOLE SODIUM 40 MG: 40 INJECTION, POWDER, FOR SOLUTION INTRAVENOUS at 08:05

## 2025-05-23 RX ADMIN — NYSTATIN 400000 UNITS: 100000 SUSPENSION ORAL at 01:05

## 2025-05-23 RX ADMIN — FLUCONAZOLE 400 MG: 100 TABLET ORAL at 08:05

## 2025-05-23 RX ADMIN — TAMSULOSIN HYDROCHLORIDE 0.8 MG: 0.4 CAPSULE ORAL at 08:05

## 2025-05-23 RX ADMIN — MUPIROCIN 1 G: 20 OINTMENT TOPICAL at 08:05

## 2025-05-23 NOTE — ASSESSMENT & PLAN NOTE
Patient's blood pressure range in the last 24 hours was: BP  Min: 130/75  Max: 176/91.The patient's inpatient anti-hypertensive regimen is listed below:  Current Antihypertensives  lisinopriL tablet 40 mg, Daily, Oral    Plan  - BP is controlled, no changes needed to their regimen  -

## 2025-05-23 NOTE — PROGRESS NOTES
SW followed up with [facility] Childress, per Kadi have not reviewed patient clinical will review Monday. SW blasted SNF referral, will continue to follow up for accepting facilities.

## 2025-05-23 NOTE — PLAN OF CARE
[facility] accepted. FLORENCIA called Elissa carmen Villanueva to follow up on auth, per Elissa will submit today and if can't get in touch with patient daughter Shellie, will have Bessy Villanueva come out today to do paperwork. FLORENCIA called patient daughter Shellie [phone] and left a VM. SW to continue to follow up today.     Received a call from [facility], stated she can not take patient until mid-week, next week, had to give the room she had available to an isolation patient.       05/23/25 0919   Post-Acute Status   Post-Acute Authorization Placement   Post-Acute Placement Status Pending payor review/awaiting authorization (if required)   Discharge Plan   Discharge Plan A Skilled Nursing Facility   Discharge Plan B Skilled Nursing Facility

## 2025-05-23 NOTE — PT/OT/SLP EVAL
Occupational Therapy   Evaluation    Name: Omar Trotter  MRN: [id number]  Admitting Diagnosis: Symptomatic anemia  Recent Surgery: Procedure(s) (LRB):  COLONOSCOPY (N/A)  EGD (ESOPHAGOGASTRODUODENOSCOPY) (N/A) 2 Days Post-Op    Recommendations:     Discharge Recommendations: Moderate Intensity Therapy  Discharge Equipment Recommendations:  to be determined by next level of care  Barriers to discharge:   (increased assist with ADLs and mobility)    Assessment:     Omar Trotter is a 64 y.o. male with a medical diagnosis of Symptomatic anemia.  Pt agreeable to OT evaluation this AM. Performance deficits affecting function: weakness, impaired endurance, impaired self care skills, impaired functional mobility, gait instability, impaired balance, decreased lower extremity function, decreased upper extremity function, decreased safety awareness, pain, impaired cardiopulmonary response to activity.      Rehab Prognosis: Good; patient would benefit from acute skilled OT services to address these deficits and reach maximum level of function.       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  Patient found HOB elevated with peripheral IV, telemetry upon OT entry to room.    General Precautions: Standard, fall  Orthopedic Precautions: N/A  Braces: N/A  Respiratory Status: Room air    Occupational Performance:    Bed Mobility:    Patient completed Supine to Sit with supervision    Functional Mobility/Transfers:  Patient completed Sit <> Stand Transfer with minimum assistance  with  rolling walker   Functional Mobility: pt amb in room with minimum assistance with RW with no LOB or SOB    Activities of Daily Living:  Grooming: stand by assistance seated EOB to wash hands and wash face  Toileting: supervision seated EOB to use urinal    Cognitive/Visual Perceptual:  Cognitive/Psychosocial Skills:     -       Oriented to: Person, Place, Time, and Situation   -       Follows Commands/attention:Follows two-step commands  -       Communication: clear/fluent  -       Memory: No Deficits noted  -       Safety awareness/insight to disability: impaired   -       Mood/Affect/Coping skills/emotional control: Appropriate to situation, Cooperative, and Pleasant    Physical Exam:  Balance:    -       Sup/SBA seated balance; Min A standing balance  Upper Extremity Range of Motion:  -       Right Upper Extremity: WFL  -       Left Upper Extremity: WFL  Upper Extremity Strength:    -       Right Upper Extremity: WFL  -       Left Upper Extremity: WFL   Strength:    -       Right Upper Extremity: fair   -       Left Upper Extremity: fair   Gross motor coordination:   WFL    AMPAC 6 Click ADL:  AMPAC Total Score: 19    Treatment & Education:  Pt educated on role of OT/POC, importance of OOB/EOB activity, use of call bell, and safety during ADLs, transfers, and functional mobility.    Patient left sitting edge of bed with all lines intact, call button in reach, bed alarm on, nursing notified approved pt sitting on EOB with bed alarm  on    GOALS:   Multidisciplinary Problems       Occupational Therapy Goals          Problem: Occupational Therapy    Goal Priority Disciplines Outcome Interventions   Occupational Therapy Goal     OT, PT/OT     Description: Goals to be met by: 6/23/25     Patient will increase functional independence with ADLs by performing:    UE Dressing with Supervision.  LE Dressing with Supervision.  Grooming while standing at sink with Supervision.  Toileting from toilet with Supervision for hygiene and clothing management.   Toilet transfer to toilet with Supervision.                           History:     Past Medical History:   Diagnosis Date    JACKLYN (acute kidney injury) 11/11/2022    Arthritis     GERD (gastroesophageal reflux disease)     Hepatitis C     STATES DX 10-20. NO S/S. NO FURTHER TESTS OR TX. WITH INSURANCE IN 2021 CAN GET IT EVALUATED.    History of left hip replacement 05/20/2021    History of MRSA infection     History of right shoulder replacement 01/15/2021    Hypertension     Wears glasses          Past Surgical History:   Procedure Laterality Date    APPENDECTOMY      ARTHROPLASTY OF SHOULDER Right 1/13/2021    Procedure: ARTHROPLASTY, SHOULDER TOTAL;  Surgeon: Emilio Siddiqui MD;  Location: [facility];  Service: Orthopedics;  Laterality: Right;  GENERAL AND BLOCK    COLONOSCOPY N/A 5/8/2023    Procedure: COLONOSCOPY;  Surgeon: Bobby Bourgeois MD;  Location: [facility];  Service: Endoscopy;  Laterality: N/A;    COLONOSCOPY N/A 5/21/2025    Procedure: COLONOSCOPY;  Surgeon: Anat Gooden MD;  Location: [facility];  Service: Endoscopy;  Laterality: N/A;    ESOPHAGOGASTRODUODENOSCOPY N/A 5/7/2023    Procedure: EGD (ESOPHAGOGASTRODUODENOSCOPY);  Surgeon: Carloz Christianson Jr., MD;  Location: [facility];  Service: Endoscopy;  Laterality: N/A;    ESOPHAGOGASTRODUODENOSCOPY N/A 3/1/2024    Procedure: EGD (ESOPHAGOGASTRODUODENOSCOPY);  Surgeon: Femi Hyde MD;  Location: [facility];  Service: Endoscopy;   Laterality: N/A;    ESOPHAGOGASTRODUODENOSCOPY N/A 5/21/2025    Procedure: EGD (ESOPHAGOGASTRODUODENOSCOPY);  Surgeon: Anat Gooden MD;  Location: [facility];  Service: Endoscopy;  Laterality: N/A;    HERNIA REPAIR      HIP REPLACEMENT ARTHROPLASTY Left 3/24/2021    Procedure: ARTHROPLASTY, HIP REPLACEMENT;  Surgeon: Miles Rivas II, MD;  Location: [facility];  Service: Orthopedics;  Laterality: Left;    JOINT REPLACEMENT Right     shoulder    REVISION TOTAL HIP ARTHROPLASTY Left 6/2/2021    Procedure: REVISION, TOTAL ARTHROPLASTY, HIP;  Surgeon: Miles Rivas II, MD;  Location: [facility] OR;  Service: Orthopedics;  Laterality: Left;    SMALL BOWEL ENTEROSCOPY Left 5/8/2023    Procedure: ENTEROSCOPY;  Surgeon: Bobby Bourgeois MD;  Location: [facility];  Service: Endoscopy;  Laterality: Left;       Time Tracking:     OT Date of Treatment: 05/23/25  OT Start Time: 0930  OT Stop Time: 0943  OT Total Time (min): 13 min    Billable Minutes:Evaluation 5  Self Care/Home Management 8    5/23/2025

## 2025-05-23 NOTE — SUBJECTIVE & OBJECTIVE
Interval History:  Patient said he still feel weak.  For daily updates refer to hospital course    Review of Systems   All other systems reviewed and are negative.    Objective:     Vital Signs (Most Recent):  Temp: 97.7 °F (36.5 °C) (05/23/25 1135)  Pulse: 88 (05/23/25 1135)  Resp: 18 (05/23/25 1135)  BP: (!) 142/66 (05/23/25 1135)  SpO2: (!) 93 % (05/23/25 1135) Vital Signs (24h Range):  Temp:  [97.7 °F (36.5 °C)-98.2 °F (36.8 °C)] 97.7 °F (36.5 °C)  Pulse:  [87-96] 88  Resp:  [16-19] 18  SpO2:  [93 %-97 %] 93 %  BP: (130-176)/(66-91) 142/66     Weight: 94.3 kg (207 lb 14.3 oz)  Body mass index is 29 kg/m².    Intake/Output Summary (Last 24 hours) at 5/23/2025 1451  Last data filed at 5/23/2025 1411  Gross per 24 hour   Intake 1154 ml   Output 2850 ml   Net -1696 ml         Physical Exam  Cardiovascular:      Rate and Rhythm: Normal rate.      Pulses: Normal pulses.   Pulmonary:      Effort: Pulmonary effort is normal.   Abdominal:      General: There is no distension.      Tenderness: There is no abdominal tenderness.   Neurological:      Mental Status: He is alert and oriented to person, place, and time.               Significant Labs: All pertinent labs within the past 24 hours have been reviewed.    Significant Imaging: I have reviewed all pertinent imaging results/findings within the past 24 hours.

## 2025-05-23 NOTE — PT/OT/SLP PROGRESS
Physical Therapy Treatment    Patient Name:  Omar Trotter   MRN:  [id number]    Recommendations:     Discharge Recommendations: Moderate Intensity Therapy  Discharge Equipment Recommendations: to be determined by next level of care  Barriers to discharge: medical status    Assessment:     Omar Trotter is a 64 y.o. male admitted with a medical diagnosis of Symptomatic anemia.  He presents with the following impairments/functional limitations: weakness, impaired endurance, impaired functional mobility, gait instability, impaired balance, decreased lower extremity function, decreased safety awareness, pain, impaired cardiopulmonary response to activity Patient agreed to PT session today. Patient required supervision for supine to sit at EOB. Patient sat unsupported for several minutes and used the urinal while seated. Patient transfer sit to stand up to RW with CGA. Patient ambulated 50ft with Gabe. Patient given repeated cues for upright posture with little carryover. Patient maintained a flexed forward posture leaning onto walker. Decreased foot clearance and increased JUAN. Patient returned to sit EOB with CGA. He resumed supine position with supervision. All needs met and lines intact.     Rehab Prognosis: Good; patient would benefit from acute skilled PT services to address these deficits and reach maximum level of function.    Recent Surgery: Procedure(s) (LRB):  COLONOSCOPY (N/A)  EGD (ESOPHAGOGASTRODUODENOSCOPY) (N/A) 2 Days Post-Op    Plan:     During this hospitalization, patient to be seen 6 x/week to address the identified rehab impairments via gait training, therapeutic activities, therapeutic exercises, neuromuscular re-education and progress toward the following goals:    Plan of Care Expires:  06/22/25    Subjective     Chief Complaint: I am tired  Patient/Family Comments/goals: to go home  Pain/Comfort:         Objective:     Communicated with nurse prior to session.  Patient found HOB elevated with  peripheral IV, telemetry upon PT entry to room.     General Precautions: Standard, fall  Orthopedic Precautions: N/A  Braces: N/A  Respiratory Status: Room air     Functional Mobility:  Bed Mobility:     Supine to Sit: supervision  Sit to Supine: supervision  Transfers:     Sit to Stand:  contact guard assistance with rolling walker  Gait: 50ft with RW Gabe      AM-PAC 6 CLICK MOBILITY  Turning over in bed (including adjusting bedclothes, sheets and blankets)?: 4  Sitting down on and standing up from a chair with arms (e.g., wheelchair, bedside commode, etc.): 3  Moving from lying on back to sitting on the side of the bed?: 3  Moving to and from a bed to a chair (including a wheelchair)?: 3  Need to walk in hospital room?: 3  Climbing 3-5 steps with a railing?: 3  Basic Mobility Total Score: 19       Treatment & Education:  Mirna Allred, PT      Patient left HOB elevated with all lines intact, call button in reach, and bed alarm on..    GOALS:   Multidisciplinary Problems       Physical Therapy Goals          Problem: Physical Therapy    Goal Priority Disciplines Outcome Interventions   Physical Therapy Goal     PT, PT/OT Progressing    Description: Goals to be met by: 25     Patient will increase functional independence with mobility by performin. Supine to sit with Supervision  2. Sit to stand transfer with Supervision  3. Bed to chair transfer with Supervision using Rolling Walker  4. Gait  x 150 feet with Supervision using Rolling Walker  5. Asc/Desc 5 steps with single HR and supervision                             DME Justifications:  No DME recommended requiring DME justifications    Time Tracking:     PT Received On: 25  PT Start Time: 1136     PT Stop Time: 1159  PT Total Time (min): 23 min     Billable Minutes: Gait Training 15 and Therapeutic Activity 8    Treatment Type: Treatment  PT/PTA: PT     Number of PTA visits since last PT visit: 0     2025

## 2025-05-23 NOTE — PROGRESS NOTES
[facility] Medicine  Progress Note    Patient Name: Omar Trotter  MRN: [id number]  Patient Class: IP- Inpatient   Admission Date: 5/19/2025  Length of Stay: 2 days  Attending Physician: Dairan Lomas DO  Primary Care Provider: Cam Orozco MD        Subjective     Principal Problem:Symptomatic anemia        HPI:    Sukh Trotter is a 64 year old male with a past medical history of GERD, HTN, and BPH, previous hip surgery ,UGIB, / esophagitis was admitted from ER with possible syncopal episode and concern for right hip fracture. He states that he had a fall at home onto the right hip, and that he felt a little weak and lightheaded prior to the fall. He was walking down stairs when he slipped due to feeling weak. He denies LOC or hitting his head. He denies other precipitating factors.  He denies complaint.    ED work up included a CBC, which showed anemia that is worsening from prior studies. He has a history of iron deficiency anemia, but has not been taking his supplements or followed up with hematology in some time. CMP was unremarkable. ProBNP 189, HS troponin 5 and 4. Magnesium 2.1. XR of the hip showed no acute fracture. CT head showed no acute intracranial abnormality. CT cervical spine showed no acute process. CT hip showed chronic findings but no acute findings. CXR showed no acute cardiopulmonary abnormalities. He was given hydrocodone for pain in the ED. Hospital Medicine consulted for admission and further management.     Overview/Hospital Course:  Omar Trotter is a 64 year old male with a past medical history of GERD, HTN, BPH and tobacco dependence who presented with a fall in the setting of symptomatic anemia. Imaging did not reveal acute fracture (possible C4 pedicle disease on CT). He has received one unit of PRBCs for a Hgb of 7.3 on admission. He is on an IV PPI and s/p endoscopy and colonoscopy. EGD showed esophageal plaques were found, consistent with  candidiasis and tx with nystatin. Biopsied.  For thrombocytopenia, d/c fluconazole.  Colonoscopy showed single bleeding colonic angioectasia. Treated  with argon plasma coagulation.  Patient was placed on IV iron supplement for iron-deficiency.Pending placement for SNF.     Interval History:  Patient said he still feel weak.  For daily updates refer to hospital course    Review of Systems   All other systems reviewed and are negative.    Objective:     Vital Signs (Most Recent):  Temp: 97.7 °F (36.5 °C) (05/23/25 1135)  Pulse: 88 (05/23/25 1135)  Resp: 18 (05/23/25 1135)  BP: (!) 142/66 (05/23/25 1135)  SpO2: (!) 93 % (05/23/25 1135) Vital Signs (24h Range):  Temp:  [97.7 °F (36.5 °C)-98.2 °F (36.8 °C)] 97.7 °F (36.5 °C)  Pulse:  [87-96] 88  Resp:  [16-19] 18  SpO2:  [93 %-97 %] 93 %  BP: (130-176)/(66-91) 142/66     Weight: 94.3 kg (207 lb 14.3 oz)  Body mass index is 29 kg/m².    Intake/Output Summary (Last 24 hours) at 5/23/2025 1451  Last data filed at 5/23/2025 1411  Gross per 24 hour   Intake 1154 ml   Output 2850 ml   Net -1696 ml         Physical Exam  Cardiovascular:      Rate and Rhythm: Normal rate.      Pulses: Normal pulses.   Pulmonary:      Effort: Pulmonary effort is normal.   Abdominal:      General: There is no distension.      Tenderness: There is no abdominal tenderness.   Neurological:      Mental Status: He is alert and oriented to person, place, and time.               Significant Labs: All pertinent labs within the past 24 hours have been reviewed.    Significant Imaging: I have reviewed all pertinent imaging results/findings within the past 24 hours.      Assessment & Plan  Symptomatic anemia  Anemia is likely due to Iron deficiency. Most recent hemoglobin and hematocrit are listed below.  Recent Labs     05/21/25  0602 05/22/25  0422 05/23/25  0346   HGB 8.7* 8.8* 8.6*   HCT 28.8* 30.3* 31.0*     Plan  - Monitor serial CBC: Daily  - Transfuse PRBC if patient becomes hemodynamically  unstable, symptomatic or H/H drops below 7/21.  - S/P Transfusion  - Patient's anemia is currently stable  - On IV iron  - s/p endoscopy and colonoscopy.   - EGD showed esophageal plaques were found, consistent with candidiasis andand tx with nystatin. Biopsied.    - Colonoscopy showed single bleeding colonic angioectasia. Treated  with argon plasma coagulation.    GERD (gastroesophageal reflux disease)  Noted, protonix    Hypertension  Patient's blood pressure range in the last 24 hours was: BP  Min: 130/75  Max: 176/91.The patient's inpatient anti-hypertensive regimen is listed below:  Current Antihypertensives  lisinopriL tablet 40 mg, Daily, Oral    Plan  - BP is controlled, no changes needed to their regimen  -   Syncope  Syncope resolved  Previous recent carotid ultrasound negative from 12/2023  Ordered echo EF 50-55%  CT head neg     Thrombocytopathia  D/C fluconazole  Continue to monitor    VTE Risk Mitigation (From admission, onward)           Ordered     Reason for No Pharmacological VTE Prophylaxis  Once        Question:  Reasons:  Answer:  Risk of Bleeding    05/20/25 0225     IP VTE LOW RISK PATIENT  Once         05/20/25 0225     Place sequential compression device  Until discontinued         05/20/25 0225                    Discharge Planning   NATALIA: 5/23/2025     Code Status: Full Code   Medical Readiness for Discharge Date:   Discharge Plan A: Skilled Nursing Facility                Please place Justification for DME        Darian Lomas DO  Department of Hospital Medicine   [facility]

## 2025-05-23 NOTE — NURSING
Platelets 75. No active bleeding or excessive bruising noted. Dr Gutierrez notified. New order received for CBC redraw.

## 2025-05-23 NOTE — ASSESSMENT & PLAN NOTE
Anemia is likely due to Iron deficiency. Most recent hemoglobin and hematocrit are listed below.  Recent Labs     05/21/25  0602 05/22/25  0422 05/23/25  0346   HGB 8.7* 8.8* 8.6*   HCT 28.8* 30.3* 31.0*     Plan  - Monitor serial CBC: Daily  - Transfuse PRBC if patient becomes hemodynamically unstable, symptomatic or H/H drops below 7/21.  - S/P Transfusion  - Patient's anemia is currently stable  - On IV iron  - s/p endoscopy and colonoscopy.   - EGD showed esophageal plaques were found, consistent with candidiasis andand tx with nystatin. Biopsied.    - Colonoscopy showed single bleeding colonic angioectasia. Treated  with argon plasma coagulation.

## 2025-05-24 LAB
ABSOLUTE EOSINOPHIL (SMH): 0.25 K/UL
ABSOLUTE MONOCYTE (SMH): 0.55 K/UL (ref 0.3–1)
ABSOLUTE NEUTROPHIL COUNT (SMH): 4.3 K/UL (ref 1.8–7.7)
ANION GAP (SMH): 8 MMOL/L (ref 8–16)
ANION GAP (SMH): 8 MMOL/L (ref 8–16)
BASOPHILS # BLD AUTO: 0.04 K/UL
BASOPHILS NFR BLD AUTO: 0.6 %
BUN SERPL-MCNC: 24 MG/DL (ref 8–23)
BUN SERPL-MCNC: 25 MG/DL (ref 8–23)
CALCIUM SERPL-MCNC: 9.6 MG/DL (ref 8.7–10.5)
CALCIUM SERPL-MCNC: 9.6 MG/DL (ref 8.7–10.5)
CHLORIDE SERPL-SCNC: 101 MMOL/L (ref 95–110)
CHLORIDE SERPL-SCNC: 102 MMOL/L (ref 95–110)
CO2 SERPL-SCNC: 27 MMOL/L (ref 23–29)
CO2 SERPL-SCNC: 27 MMOL/L (ref 23–29)
CREAT SERPL-MCNC: 1 MG/DL (ref 0.5–1.4)
CREAT SERPL-MCNC: 1 MG/DL (ref 0.5–1.4)
ERYTHROCYTE [DISTWIDTH] IN BLOOD BY AUTOMATED COUNT: 19.5 % (ref 11.5–14.5)
GFR SERPLBLD CREATININE-BSD FMLA CKD-EPI: >60 ML/MIN/1.73/M2
GFR SERPLBLD CREATININE-BSD FMLA CKD-EPI: >60 ML/MIN/1.73/M2
GLUCOSE SERPL-MCNC: 90 MG/DL (ref 70–110)
GLUCOSE SERPL-MCNC: 91 MG/DL (ref 70–110)
HCT VFR BLD AUTO: 34.7 % (ref 40–54)
HGB BLD-MCNC: 10.1 GM/DL (ref 14–18)
IMM GRANULOCYTES # BLD AUTO: 0.04 K/UL (ref 0–0.04)
IMM GRANULOCYTES NFR BLD AUTO: 0.6 % (ref 0–0.5)
LYMPHOCYTES # BLD AUTO: 1.36 K/UL (ref 1–4.8)
MCH RBC QN AUTO: 23.3 PG (ref 27–31)
MCHC RBC AUTO-ENTMCNC: 29.1 G/DL (ref 32–36)
MCV RBC AUTO: 80 FL (ref 82–98)
NUCLEATED RBC (/100WBC) (SMH): 0 /100 WBC
PLATELET # BLD AUTO: 207 K/UL (ref 150–450)
PLATELET BLD QL SMEAR: NORMAL
PMV BLD AUTO: 9.6 FL (ref 9.2–12.9)
POTASSIUM SERPL-SCNC: 4.5 MMOL/L (ref 3.5–5.1)
POTASSIUM SERPL-SCNC: 4.7 MMOL/L (ref 3.5–5.1)
RBC # BLD AUTO: 4.34 M/UL (ref 4.6–6.2)
RELATIVE EOSINOPHIL (SMH): 3.8 % (ref 0–8)
RELATIVE LYMPHOCYTE (SMH): 20.9 % (ref 18–48)
RELATIVE MONOCYTE (SMH): 8.4 % (ref 4–15)
RELATIVE NEUTROPHIL (SMH): 65.7 % (ref 38–73)
SODIUM SERPL-SCNC: 136 MMOL/L (ref 136–145)
SODIUM SERPL-SCNC: 137 MMOL/L (ref 136–145)
WBC # BLD AUTO: 6.52 K/UL (ref 3.9–12.7)

## 2025-05-24 PROCEDURE — 25000003 PHARM REV CODE 250: Performed by: FAMILY MEDICINE

## 2025-05-24 PROCEDURE — 85025 COMPLETE CBC W/AUTO DIFF WBC: CPT | Performed by: NURSE PRACTITIONER

## 2025-05-24 PROCEDURE — 11000001 HC ACUTE MED/SURG PRIVATE ROOM

## 2025-05-24 PROCEDURE — 63600175 PHARM REV CODE 636 W HCPCS: Performed by: STUDENT IN AN ORGANIZED HEALTH CARE EDUCATION/TRAINING PROGRAM

## 2025-05-24 PROCEDURE — 25000003 PHARM REV CODE 250: Performed by: STUDENT IN AN ORGANIZED HEALTH CARE EDUCATION/TRAINING PROGRAM

## 2025-05-24 PROCEDURE — 36415 COLL VENOUS BLD VENIPUNCTURE: CPT | Performed by: NURSE PRACTITIONER

## 2025-05-24 PROCEDURE — 25000003 PHARM REV CODE 250: Performed by: NURSE PRACTITIONER

## 2025-05-24 PROCEDURE — 63600175 PHARM REV CODE 636 W HCPCS: Performed by: FAMILY MEDICINE

## 2025-05-24 PROCEDURE — 80048 BASIC METABOLIC PNL TOTAL CA: CPT | Performed by: STUDENT IN AN ORGANIZED HEALTH CARE EDUCATION/TRAINING PROGRAM

## 2025-05-24 PROCEDURE — 97116 GAIT TRAINING THERAPY: CPT

## 2025-05-24 PROCEDURE — 25000003 PHARM REV CODE 250: Performed by: INTERNAL MEDICINE

## 2025-05-24 PROCEDURE — 80048 BASIC METABOLIC PNL TOTAL CA: CPT | Performed by: FAMILY MEDICINE

## 2025-05-24 RX ADMIN — PANTOPRAZOLE SODIUM 40 MG: 40 INJECTION, POWDER, FOR SOLUTION INTRAVENOUS at 08:05

## 2025-05-24 RX ADMIN — SODIUM FERRIC GLUCONATE COMPLEX IN SUCROSE 125 MG: 12.5 INJECTION INTRAVENOUS at 08:05

## 2025-05-24 RX ADMIN — SENNOSIDES AND DOCUSATE SODIUM 1 TABLET: 50; 8.6 TABLET ORAL at 08:05

## 2025-05-24 RX ADMIN — GABAPENTIN 800 MG: 300 CAPSULE ORAL at 08:05

## 2025-05-24 RX ADMIN — TAMSULOSIN HYDROCHLORIDE 0.8 MG: 0.4 CAPSULE ORAL at 08:05

## 2025-05-24 RX ADMIN — NYSTATIN 400000 UNITS: 100000 SUSPENSION ORAL at 08:05

## 2025-05-24 RX ADMIN — LISINOPRIL 40 MG: 20 TABLET ORAL at 08:05

## 2025-05-24 RX ADMIN — MUPIROCIN 1 G: 20 OINTMENT TOPICAL at 08:05

## 2025-05-24 RX ADMIN — HYDROCODONE BITARTRATE AND ACETAMINOPHEN 1 TABLET: 5; 325 TABLET ORAL at 09:05

## 2025-05-24 RX ADMIN — NYSTATIN 400000 UNITS: 100000 SUSPENSION ORAL at 12:05

## 2025-05-24 RX ADMIN — NYSTATIN 400000 UNITS: 100000 SUSPENSION ORAL at 04:05

## 2025-05-24 RX ADMIN — BUSPIRONE HYDROCHLORIDE 15 MG: 10 TABLET ORAL at 08:05

## 2025-05-24 RX ADMIN — HYDROCODONE BITARTRATE AND ACETAMINOPHEN 1 TABLET: 5; 325 TABLET ORAL at 08:05

## 2025-05-24 NOTE — ASSESSMENT & PLAN NOTE
Anemia is likely due to Iron deficiency. Most recent hemoglobin and hematocrit are listed below.  Recent Labs     05/22/25  0422 05/23/25  0346 05/24/25  1202   HGB 8.8* 8.6* 10.1*   HCT 30.3* 31.0* 34.7*     Plan  - Monitor serial CBC: Daily  - Transfuse PRBC if patient becomes hemodynamically unstable, symptomatic or H/H drops below 7/21.  - S/P Transfusion  - Patient's anemia is currently stable  - On IV iron  - s/p endoscopy and colonoscopy.   - EGD showed esophageal plaques were found, consistent with candidiasis andand tx with nystatin. Biopsied.    - Colonoscopy showed single bleeding colonic angioectasia. Treated  with argon plasma coagulation.

## 2025-05-24 NOTE — ASSESSMENT & PLAN NOTE
Patient's blood pressure range in the last 24 hours was: BP  Min: 87/44  Max: 168/61.The patient's inpatient anti-hypertensive regimen is listed below:  Current Antihypertensives  lisinopriL tablet 40 mg, Daily, Oral    Plan  - BP is controlled, no changes needed to their regimen  -

## 2025-05-24 NOTE — PROGRESS NOTES
[facility] Medicine  Progress Note    Patient Name: Omar Trotter  MRN: [id number]  Patient Class: IP- Inpatient   Admission Date: 5/19/2025  Length of Stay: 3 days  Attending Physician: Darian Lomas DO  Primary Care Provider: Cam Orozco MD        Subjective     Principal Problem:Symptomatic anemia        HPI:    Sukh Trotter is a 64 year old male with a past medical history of GERD, HTN, and BPH, previous hip surgery ,UGIB, / esophagitis was admitted from ER with possible syncopal episode and concern for right hip fracture. He states that he had a fall at home onto the right hip, and that he felt a little weak and lightheaded prior to the fall. He was walking down stairs when he slipped due to feeling weak. He denies LOC or hitting his head. He denies other precipitating factors.  He denies complaint.    ED work up included a CBC, which showed anemia that is worsening from prior studies. He has a history of iron deficiency anemia, but has not been taking his supplements or followed up with hematology in some time. CMP was unremarkable. ProBNP 189, HS troponin 5 and 4. Magnesium 2.1. XR of the hip showed no acute fracture. CT head showed no acute intracranial abnormality. CT cervical spine showed no acute process. CT hip showed chronic findings but no acute findings. CXR showed no acute cardiopulmonary abnormalities. He was given hydrocodone for pain in the ED. Hospital Medicine consulted for admission and further management.     Overview/Hospital Course:  Omar Trotter is a 64 year old male with a past medical history of GERD, HTN, BPH and tobacco dependence who presented with a fall in the setting of symptomatic anemia. Imaging did not reveal acute fracture (possible C4 pedicle disease on CT). He has received one unit of PRBCs for a Hgb of 7.3 on admission. He is on an IV PPI and s/p endoscopy and colonoscopy. EGD showed esophageal plaques were found, consistent with  candidiasis and tx with nystatin. Biopsied.  For thrombocytopenia, d/c fluconazole.  Colonoscopy showed single bleeding colonic angioectasia. Treated  with argon plasma coagulation.  Patient was placed on IV iron supplement for iron-deficiency.Pending placement for SNF.     Interval History:  Patient said he feels slightly better.  For daily updates refer to hospital course    Review of Systems   All other systems reviewed and are negative.    Objective:     Vital Signs (Most Recent):  Temp: 97.8 °F (36.6 °C) (05/24/25 1128)  Pulse: 85 (05/24/25 1128)  Resp: 18 (05/24/25 1128)  BP: (!) 112/55 (05/24/25 1200)  SpO2: 95 % (05/24/25 1128) Vital Signs (24h Range):  Temp:  [97.6 °F (36.4 °C)-98.1 °F (36.7 °C)] 97.8 °F (36.6 °C)  Pulse:  [84-97] 85  Resp:  [14-18] 18  SpO2:  [93 %-95 %] 95 %  BP: ()/(44-76) 112/55     Weight: 94.3 kg (207 lb 14.3 oz)  Body mass index is 29 kg/m².    Intake/Output Summary (Last 24 hours) at 5/24/2025 1454  Last data filed at 5/24/2025 0915  Gross per 24 hour   Intake 474 ml   Output 1600 ml   Net -1126 ml         Physical Exam  Cardiovascular:      Rate and Rhythm: Normal rate.      Pulses: Normal pulses.   Pulmonary:      Effort: Pulmonary effort is normal.   Abdominal:      General: There is no distension.      Tenderness: There is no abdominal tenderness.   Neurological:      Mental Status: He is alert and oriented to person, place, and time.               Significant Labs: All pertinent labs within the past 24 hours have been reviewed.    Significant Imaging: I have reviewed all pertinent imaging results/findings within the past 24 hours.      Assessment & Plan  Symptomatic anemia  Anemia is likely due to Iron deficiency. Most recent hemoglobin and hematocrit are listed below.  Recent Labs     05/22/25  0422 05/23/25  0346 05/24/25  1202   HGB 8.8* 8.6* 10.1*   HCT 30.3* 31.0* 34.7*     Plan  - Monitor serial CBC: Daily  - Transfuse PRBC if patient becomes hemodynamically  unstable, symptomatic or H/H drops below 7/21.  - S/P Transfusion  - Patient's anemia is currently stable  - On IV iron  - s/p endoscopy and colonoscopy.   - EGD showed esophageal plaques were found, consistent with candidiasis andand tx with nystatin. Biopsied.    - Colonoscopy showed single bleeding colonic angioectasia. Treated  with argon plasma coagulation.    GERD (gastroesophageal reflux disease)  Noted, protonix    Hypertension  Patient's blood pressure range in the last 24 hours was: BP  Min: 87/44  Max: 168/61.The patient's inpatient anti-hypertensive regimen is listed below:  Current Antihypertensives  lisinopriL tablet 40 mg, Daily, Oral    Plan  - BP is controlled, no changes needed to their regimen  -   Syncope  Syncope resolved  Previous recent carotid ultrasound negative from 12/2023  Ordered echo EF 50-55%  CT head neg     Thrombocytopathia  D/C fluconazole  Continue to monitor    VTE Risk Mitigation (From admission, onward)           Ordered     Reason for No Pharmacological VTE Prophylaxis  Once        Question:  Reasons:  Answer:  Risk of Bleeding    05/20/25 0225     IP VTE LOW RISK PATIENT  Once         05/20/25 0225     Place sequential compression device  Until discontinued         05/20/25 0225                    Discharge Planning   NATALIA: 5/27/2025     Code Status: Full Code   Medical Readiness for Discharge Date:   Discharge Plan A: Skilled Nursing Facility                Please place Justification for DME        Darian Lomas DO  Department of Hospital Medicine   [facility]

## 2025-05-24 NOTE — PT/OT/SLP PROGRESS
Physical Therapy Treatment    Patient Name:  Omar Trotter   MRN:  [id number]    Recommendations:     Discharge Recommendations: Moderate Intensity Therapy  Discharge Equipment Recommendations: to be determined by next level of care  Barriers to discharge: Decreased caregiver support    Assessment:     Omar Trotter is a 64 y.o. male admitted with a medical diagnosis of Symptomatic anemia.  He presents with the following impairments/functional limitations: weakness, impaired endurance, impaired functional mobility, gait instability, decreased lower extremity function, impaired cardiopulmonary response to activity.    Rehab Prognosis: Good and Fair; patient would benefit from acute skilled PT services to address these deficits and reach maximum level of function.    Recent Surgery: Procedure(s) (LRB):  COLONOSCOPY (N/A)  EGD (ESOPHAGOGASTRODUODENOSCOPY) (N/A) 3 Days Post-Op    Plan:     During this hospitalization, patient to be seen 6 x/week to address the identified rehab impairments via gait training, therapeutic activities, therapeutic exercises and progress toward the following goals:    Plan of Care Expires:  06/22/25    Subjective     Chief Complaint: none  Patient/Family Comments/goals: Did not state  Pain/Comfort:  Pain Rating 1: 0/10      Objective:     Communicated with nurse prior to session.  Patient found supine with telemetry upon PT entry to room.     General Precautions: Standard, fall  Orthopedic Precautions: N/A  Braces: N/A  Respiratory Status: Room air     Functional Mobility:  Bed Mobility:     Supine to Sit: stand by assistance  Sit to Supine: stand by assistance  Transfers:     Sit to Stand:  contact guard assistance with rolling walker  Gait: 50 ft RW and CGA      AM-PAC 6 CLICK MOBILITY          Treatment & Education:  Gait as indicated above    Patient left supine with all lines intact and call button in reach..    GOALS:   Multidisciplinary Problems       Physical Therapy Goals           Problem: Physical Therapy    Goal Priority Disciplines Outcome Interventions   Physical Therapy Goal     PT, PT/OT Progressing    Description: Goals to be met by: 25     Patient will increase functional independence with mobility by performin. Supine to sit with Supervision  2. Sit to stand transfer with Supervision  3. Bed to chair transfer with Supervision using Rolling Walker  4. Gait  x 150 feet with Supervision using Rolling Walker  5. Asc/Desc 5 steps with single HR and supervision                             DME Justifications:  No DME recommended requiring DME justifications    Time Tracking:     PT Received On: 25  PT Start Time: 1107     PT Stop Time: 1117  PT Total Time (min): 10 min     Billable Minutes: Gait Training 10 minutes    Treatment Type: Treatment  PT/PTA: PT     Number of PTA visits since last PT visit: 0     2025

## 2025-05-25 LAB
ANION GAP (SMH): 10 MMOL/L (ref 8–16)
BUN SERPL-MCNC: 24 MG/DL (ref 8–23)
CALCIUM SERPL-MCNC: 9.4 MG/DL (ref 8.7–10.5)
CHLORIDE SERPL-SCNC: 104 MMOL/L (ref 95–110)
CO2 SERPL-SCNC: 21 MMOL/L (ref 23–29)
CREAT SERPL-MCNC: 0.9 MG/DL (ref 0.5–1.4)
GFR SERPLBLD CREATININE-BSD FMLA CKD-EPI: >60 ML/MIN/1.73/M2
GLUCOSE SERPL-MCNC: 96 MG/DL (ref 70–110)
POTASSIUM SERPL-SCNC: 4.3 MMOL/L (ref 3.5–5.1)
SODIUM SERPL-SCNC: 135 MMOL/L (ref 136–145)

## 2025-05-25 PROCEDURE — 25000003 PHARM REV CODE 250: Performed by: INTERNAL MEDICINE

## 2025-05-25 PROCEDURE — 80048 BASIC METABOLIC PNL TOTAL CA: CPT | Performed by: STUDENT IN AN ORGANIZED HEALTH CARE EDUCATION/TRAINING PROGRAM

## 2025-05-25 PROCEDURE — 63600175 PHARM REV CODE 636 W HCPCS: Performed by: STUDENT IN AN ORGANIZED HEALTH CARE EDUCATION/TRAINING PROGRAM

## 2025-05-25 PROCEDURE — 25000003 PHARM REV CODE 250: Performed by: FAMILY MEDICINE

## 2025-05-25 PROCEDURE — 36415 COLL VENOUS BLD VENIPUNCTURE: CPT | Performed by: NURSE PRACTITIONER

## 2025-05-25 PROCEDURE — 25000003 PHARM REV CODE 250: Performed by: STUDENT IN AN ORGANIZED HEALTH CARE EDUCATION/TRAINING PROGRAM

## 2025-05-25 PROCEDURE — 63600175 PHARM REV CODE 636 W HCPCS: Performed by: INTERNAL MEDICINE

## 2025-05-25 PROCEDURE — 25000003 PHARM REV CODE 250: Performed by: NURSE PRACTITIONER

## 2025-05-25 PROCEDURE — 11000001 HC ACUTE MED/SURG PRIVATE ROOM

## 2025-05-25 RX ORDER — LANOLIN ALCOHOL/MO/W.PET/CERES
1 CREAM (GRAM) TOPICAL EVERY OTHER DAY
Status: DISCONTINUED | OUTPATIENT
Start: 2025-05-25 | End: 2025-05-29 | Stop reason: HOSPADM

## 2025-05-25 RX ADMIN — PANTOPRAZOLE SODIUM 40 MG: 40 INJECTION, POWDER, FOR SOLUTION INTRAVENOUS at 08:05

## 2025-05-25 RX ADMIN — SENNOSIDES AND DOCUSATE SODIUM 1 TABLET: 50; 8.6 TABLET ORAL at 08:05

## 2025-05-25 RX ADMIN — BUSPIRONE HYDROCHLORIDE 15 MG: 10 TABLET ORAL at 08:05

## 2025-05-25 RX ADMIN — NYSTATIN 400000 UNITS: 100000 SUSPENSION ORAL at 02:05

## 2025-05-25 RX ADMIN — FERROUS SULFATE TAB 325 MG (65 MG ELEMENTAL FE) 1 EACH: 325 (65 FE) TAB at 09:05

## 2025-05-25 RX ADMIN — GABAPENTIN 800 MG: 300 CAPSULE ORAL at 08:05

## 2025-05-25 RX ADMIN — MUPIROCIN 1 G: 20 OINTMENT TOPICAL at 08:05

## 2025-05-25 RX ADMIN — NYSTATIN 400000 UNITS: 100000 SUSPENSION ORAL at 04:05

## 2025-05-25 RX ADMIN — NYSTATIN 400000 UNITS: 100000 SUSPENSION ORAL at 08:05

## 2025-05-25 RX ADMIN — CYCLOBENZAPRINE 10 MG: 10 TABLET, FILM COATED ORAL at 08:05

## 2025-05-25 RX ADMIN — HYDROCODONE BITARTRATE AND ACETAMINOPHEN 1 TABLET: 5; 325 TABLET ORAL at 04:05

## 2025-05-25 RX ADMIN — MORPHINE SULFATE 2 MG: 2 INJECTION, SOLUTION INTRAMUSCULAR; INTRAVENOUS at 02:05

## 2025-05-25 RX ADMIN — GABAPENTIN 800 MG: 300 CAPSULE ORAL at 02:05

## 2025-05-25 RX ADMIN — LISINOPRIL 40 MG: 20 TABLET ORAL at 08:05

## 2025-05-25 RX ADMIN — TAMSULOSIN HYDROCHLORIDE 0.8 MG: 0.4 CAPSULE ORAL at 08:05

## 2025-05-25 RX ADMIN — HYDROCODONE BITARTRATE AND ACETAMINOPHEN 1 TABLET: 5; 325 TABLET ORAL at 08:05

## 2025-05-25 NOTE — ASSESSMENT & PLAN NOTE
Anemia is likely due to Iron deficiency. Most recent hemoglobin and hematocrit are listed below.  Recent Labs     05/24/25  1202 05/26/25  0356   HGB 10.1* 9.7*   HCT 34.7* 33.9*     Plan  - Monitor serial CBC: Daily  - Transfuse PRBC if patient becomes hemodynamically unstable, symptomatic or H/H drops below 7/21.  - S/P Transfusion  - Patient's anemia is currently stable  - On IV iron  - s/p endoscopy and colonoscopy.   - EGD showed esophageal plaques were found, consistent with candidiasis and tx with nystatin for 7 days. Biopsied.    - Colonoscopy showed single bleeding colonic angioectasia. Treated  with argon plasma coagulation.

## 2025-05-25 NOTE — SUBJECTIVE & OBJECTIVE
Interval History:  Patient said he feels ok.  For daily updates refer to hospital course    Review of Systems   All other systems reviewed and are negative.    Objective:     Vital Signs (Most Recent):  Temp: 97.9 °F (36.6 °C) (05/25/25 1157)  Pulse: 92 (05/25/25 1157)  Resp: (!) 22 (05/25/25 1401)  BP: 121/69 (05/25/25 1157)  SpO2: 95 % (05/25/25 1157) Vital Signs (24h Range):  Temp:  [97.7 °F (36.5 °C)-98.5 °F (36.9 °C)] 97.9 °F (36.6 °C)  Pulse:  [] 92  Resp:  [14-22] 22  SpO2:  [92 %-95 %] 95 %  BP: (121-158)/(60-69) 121/69     Weight: 94.3 kg (207 lb 14.3 oz)  Body mass index is 29 kg/m².    Intake/Output Summary (Last 24 hours) at 5/25/2025 1524  Last data filed at 5/25/2025 1254  Gross per 24 hour   Intake 1157 ml   Output 1850 ml   Net -693 ml         Physical Exam  Cardiovascular:      Rate and Rhythm: Normal rate.      Pulses: Normal pulses.   Pulmonary:      Effort: Pulmonary effort is normal.   Abdominal:      General: There is no distension.      Tenderness: There is no abdominal tenderness.   Neurological:      Mental Status: He is alert and oriented to person, place, and time.               Significant Labs: All pertinent labs within the past 24 hours have been reviewed.    Significant Imaging: I have reviewed all pertinent imaging results/findings within the past 24 hours.

## 2025-05-25 NOTE — ASSESSMENT & PLAN NOTE
Anemia is likely due to Iron deficiency. Most recent hemoglobin and hematocrit are listed below.  Recent Labs     05/23/25  0346 05/24/25  1202   HGB 8.6* 10.1*   HCT 31.0* 34.7*     Plan  - Monitor serial CBC: Daily  - Transfuse PRBC if patient becomes hemodynamically unstable, symptomatic or H/H drops below 7/21.  - S/P Transfusion  - Patient's anemia is currently stable  - On IV iron  - s/p endoscopy and colonoscopy.   - EGD showed esophageal plaques were found, consistent with candidiasis and tx with nystatin for 7 days. Biopsied.    - Colonoscopy showed single bleeding colonic angioectasia. Treated  with argon plasma coagulation.

## 2025-05-25 NOTE — PLAN OF CARE
Patient waiting on SNF facility placement.     Was able to be up in chair for most of the day. Linens changed. Patient declined to shower. May want to encourage this evening or tomorrow morning.    VSS. Afebrile. Pain medication given per PRN order x2

## 2025-05-25 NOTE — SUBJECTIVE & OBJECTIVE
Interval History:  Patient said he feels ok.  For daily updates refer to hospital course    Review of Systems   All other systems reviewed and are negative.    Objective:     Vital Signs (Most Recent):  Temp: 97.9 °F (36.6 °C) (05/25/25 1157)  Pulse: 92 (05/25/25 1157)  Resp: (!) 22 (05/25/25 1401)  BP: 121/69 (05/25/25 1157)  SpO2: 95 % (05/25/25 1157) Vital Signs (24h Range):  Temp:  [97.7 °F (36.5 °C)-98.5 °F (36.9 °C)] 97.9 °F (36.6 °C)  Pulse:  [] 92  Resp:  [14-22] 22  SpO2:  [92 %-95 %] 95 %  BP: (121-158)/(60-69) 121/69     Weight: 94.3 kg (207 lb 14.3 oz)  Body mass index is 29 kg/m².    Intake/Output Summary (Last 24 hours) at 5/25/2025 1521  Last data filed at 5/25/2025 1254  Gross per 24 hour   Intake 1157 ml   Output 1850 ml   Net -693 ml         Physical Exam  Cardiovascular:      Rate and Rhythm: Normal rate.      Pulses: Normal pulses.   Pulmonary:      Effort: Pulmonary effort is normal.   Abdominal:      General: There is no distension.      Tenderness: There is no abdominal tenderness.   Neurological:      Mental Status: He is alert and oriented to person, place, and time.               Significant Labs: All pertinent labs within the past 24 hours have been reviewed.    Significant Imaging: I have reviewed all pertinent imaging results/findings within the past 24 hours.

## 2025-05-25 NOTE — ASSESSMENT & PLAN NOTE
Patient's blood pressure range in the last 24 hours was: BP  Min: 121/69  Max: 158/66.The patient's inpatient anti-hypertensive regimen is listed below:  Current Antihypertensives  lisinopriL tablet 40 mg, Daily, Oral    Plan  - BP is controlled, no changes needed to their regimen  -

## 2025-05-25 NOTE — PROGRESS NOTES
[facility] Medicine  Progress Note    Patient Name: Omar Trotter  MRN: [id number]  Patient Class: IP- Inpatient   Admission Date: 5/19/2025  Length of Stay: 4 days  Attending Physician: Darian Lomas DO  Primary Care Provider: Cam Orozco MD        Subjective     Principal Problem:Symptomatic anemia        HPI:    Sukh Trotter is a 64 year old male with a past medical history of GERD, HTN, and BPH, previous hip surgery ,UGIB, / esophagitis was admitted from ER with possible syncopal episode and concern for right hip fracture. He states that he had a fall at home onto the right hip, and that he felt a little weak and lightheaded prior to the fall. He was walking down stairs when he slipped due to feeling weak. He denies LOC or hitting his head. He denies other precipitating factors.  He denies complaint.    ED work up included a CBC, which showed anemia that is worsening from prior studies. He has a history of iron deficiency anemia, but has not been taking his supplements or followed up with hematology in some time. CMP was unremarkable. ProBNP 189, HS troponin 5 and 4. Magnesium 2.1. XR of the hip showed no acute fracture. CT head showed no acute intracranial abnormality. CT cervical spine showed no acute process. CT hip showed chronic findings but no acute findings. CXR showed no acute cardiopulmonary abnormalities. He was given hydrocodone for pain in the ED. Hospital Medicine consulted for admission and further management.     Overview/Hospital Course:  Omar Trotter is a 64 year old male with a past medical history of GERD, HTN, BPH and tobacco dependence who presented with a fall in the setting of symptomatic anemia. Imaging did not reveal acute fracture (possible C4 pedicle disease on CT). He has received one unit of PRBCs for a Hgb of 7.3 on admission. He is on an IV PPI and s/p endoscopy and colonoscopy. EGD showed esophageal plaques were found, consistent with  candidiasis and tx with nystatin for 7 days. Biopsied.  For thrombocytopenia, d/c fluconazole.  Colonoscopy showed single bleeding colonic angioectasia. Treated  with argon plasma coagulation.  Patient was placed on  iron supplement for iron-deficiency.Pending placement for SNF.     Interval History:  Patient said he feels ok.  For daily updates refer to hospital course    Review of Systems   All other systems reviewed and are negative.    Objective:     Vital Signs (Most Recent):  Temp: 97.9 °F (36.6 °C) (05/25/25 1157)  Pulse: 92 (05/25/25 1157)  Resp: (!) 22 (05/25/25 1401)  BP: 121/69 (05/25/25 1157)  SpO2: 95 % (05/25/25 1157) Vital Signs (24h Range):  Temp:  [97.7 °F (36.5 °C)-98.5 °F (36.9 °C)] 97.9 °F (36.6 °C)  Pulse:  [] 92  Resp:  [14-22] 22  SpO2:  [92 %-95 %] 95 %  BP: (121-158)/(60-69) 121/69     Weight: 94.3 kg (207 lb 14.3 oz)  Body mass index is 29 kg/m².    Intake/Output Summary (Last 24 hours) at 5/25/2025 1524  Last data filed at 5/25/2025 1254  Gross per 24 hour   Intake 1157 ml   Output 1850 ml   Net -693 ml         Physical Exam  Cardiovascular:      Rate and Rhythm: Normal rate.      Pulses: Normal pulses.   Pulmonary:      Effort: Pulmonary effort is normal.   Abdominal:      General: There is no distension.      Tenderness: There is no abdominal tenderness.   Neurological:      Mental Status: He is alert and oriented to person, place, and time.               Significant Labs: All pertinent labs within the past 24 hours have been reviewed.    Significant Imaging: I have reviewed all pertinent imaging results/findings within the past 24 hours.        Assessment & Plan  Symptomatic anemia  Anemia is likely due to Iron deficiency. Most recent hemoglobin and hematocrit are listed below.  Recent Labs     05/23/25  0346 05/24/25  1202   HGB 8.6* 10.1*   HCT 31.0* 34.7*     Plan  - Monitor serial CBC: Daily  - Transfuse PRBC if patient becomes hemodynamically unstable, symptomatic or H/H  drops below 7/21.  - S/P Transfusion  - Patient's anemia is currently stable  - On IV iron  - s/p endoscopy and colonoscopy.   - EGD showed esophageal plaques were found, consistent with candidiasis and tx with nystatin for 7 days. Biopsied.    - Colonoscopy showed single bleeding colonic angioectasia. Treated  with argon plasma coagulation.    GERD (gastroesophageal reflux disease)  Noted, protonix    Hypertension  Patient's blood pressure range in the last 24 hours was: BP  Min: 121/69  Max: 158/66.The patient's inpatient anti-hypertensive regimen is listed below:  Current Antihypertensives  lisinopriL tablet 40 mg, Daily, Oral    Plan  - BP is controlled, no changes needed to their regimen  -   Syncope  Syncope resolved  Previous recent carotid ultrasound negative from 12/2023  Ordered echo EF 50-55%  CT head neg     Thrombocytopathia  D/C fluconazole  Continue to monitor      VTE Risk Mitigation (From admission, onward)           Ordered     Reason for No Pharmacological VTE Prophylaxis  Once        Question:  Reasons:  Answer:  Risk of Bleeding    05/20/25 0225     IP VTE LOW RISK PATIENT  Once         05/20/25 0225     Place sequential compression device  Until discontinued         05/20/25 0225                    Discharge Planning   NATALIA: 5/27/2025     Code Status: Full Code   Medical Readiness for Discharge Date:   Discharge Plan A: Skilled Nursing Facility                Please place Justification for DME        Darian Lomas DO  Department of Hospital Medicine   [facility]

## 2025-05-26 LAB
ANION GAP (SMH): 10 MMOL/L (ref 8–16)
BUN SERPL-MCNC: 31 MG/DL (ref 8–23)
CALCIUM SERPL-MCNC: 9.3 MG/DL (ref 8.7–10.5)
CHLORIDE SERPL-SCNC: 102 MMOL/L (ref 95–110)
CO2 SERPL-SCNC: 23 MMOL/L (ref 23–29)
CREAT SERPL-MCNC: 1 MG/DL (ref 0.5–1.4)
ERYTHROCYTE [DISTWIDTH] IN BLOOD BY AUTOMATED COUNT: 20 % (ref 11.5–14.5)
GFR SERPLBLD CREATININE-BSD FMLA CKD-EPI: >60 ML/MIN/1.73/M2
GLUCOSE SERPL-MCNC: 98 MG/DL (ref 70–110)
HCT VFR BLD AUTO: 33.9 % (ref 40–54)
HGB BLD-MCNC: 9.7 GM/DL (ref 14–18)
MAGNESIUM SERPL-MCNC: 2.1 MG/DL (ref 1.6–2.6)
MCH RBC QN AUTO: 23.5 PG (ref 27–31)
MCHC RBC AUTO-ENTMCNC: 28.6 G/DL (ref 32–36)
MCV RBC AUTO: 82 FL (ref 82–98)
PLATELET # BLD AUTO: 180 K/UL (ref 150–450)
PMV BLD AUTO: 9.7 FL (ref 9.2–12.9)
POTASSIUM SERPL-SCNC: 4.7 MMOL/L (ref 3.5–5.1)
RBC # BLD AUTO: 4.12 M/UL (ref 4.6–6.2)
SODIUM SERPL-SCNC: 135 MMOL/L (ref 136–145)
WBC # BLD AUTO: 5.24 K/UL (ref 3.9–12.7)

## 2025-05-26 PROCEDURE — 36415 COLL VENOUS BLD VENIPUNCTURE: CPT | Performed by: FAMILY MEDICINE

## 2025-05-26 PROCEDURE — 25000003 PHARM REV CODE 250: Performed by: FAMILY MEDICINE

## 2025-05-26 PROCEDURE — 86580 TB INTRADERMAL TEST: CPT | Performed by: FAMILY MEDICINE

## 2025-05-26 PROCEDURE — 25000003 PHARM REV CODE 250: Performed by: STUDENT IN AN ORGANIZED HEALTH CARE EDUCATION/TRAINING PROGRAM

## 2025-05-26 PROCEDURE — 85027 COMPLETE CBC AUTOMATED: CPT | Performed by: FAMILY MEDICINE

## 2025-05-26 PROCEDURE — 25000003 PHARM REV CODE 250: Performed by: NURSE PRACTITIONER

## 2025-05-26 PROCEDURE — 83735 ASSAY OF MAGNESIUM: CPT | Performed by: FAMILY MEDICINE

## 2025-05-26 PROCEDURE — 25000003 PHARM REV CODE 250: Performed by: INTERNAL MEDICINE

## 2025-05-26 PROCEDURE — 97116 GAIT TRAINING THERAPY: CPT

## 2025-05-26 PROCEDURE — 11000001 HC ACUTE MED/SURG PRIVATE ROOM

## 2025-05-26 PROCEDURE — 63600175 PHARM REV CODE 636 W HCPCS: Performed by: STUDENT IN AN ORGANIZED HEALTH CARE EDUCATION/TRAINING PROGRAM

## 2025-05-26 PROCEDURE — 30200315 PPD INTRADERMAL TEST REV CODE 302: Performed by: FAMILY MEDICINE

## 2025-05-26 PROCEDURE — 97535 SELF CARE MNGMENT TRAINING: CPT

## 2025-05-26 PROCEDURE — 80048 BASIC METABOLIC PNL TOTAL CA: CPT | Performed by: STUDENT IN AN ORGANIZED HEALTH CARE EDUCATION/TRAINING PROGRAM

## 2025-05-26 PROCEDURE — 63600175 PHARM REV CODE 636 W HCPCS: Performed by: FAMILY MEDICINE

## 2025-05-26 RX ORDER — PANTOPRAZOLE SODIUM 40 MG/1
40 TABLET, DELAYED RELEASE ORAL 2 TIMES DAILY
Status: DISCONTINUED | OUTPATIENT
Start: 2025-05-26 | End: 2025-05-26

## 2025-05-26 RX ORDER — PANTOPRAZOLE SODIUM 40 MG/10ML
40 INJECTION, POWDER, LYOPHILIZED, FOR SOLUTION INTRAVENOUS 2 TIMES DAILY
Status: DISCONTINUED | OUTPATIENT
Start: 2025-05-26 | End: 2025-05-29 | Stop reason: HOSPADM

## 2025-05-26 RX ADMIN — MUPIROCIN 1 G: 20 OINTMENT TOPICAL at 08:05

## 2025-05-26 RX ADMIN — GABAPENTIN 800 MG: 300 CAPSULE ORAL at 08:05

## 2025-05-26 RX ADMIN — SENNOSIDES AND DOCUSATE SODIUM 1 TABLET: 50; 8.6 TABLET ORAL at 08:05

## 2025-05-26 RX ADMIN — TUBERCULIN PURIFIED PROTEIN DERIVATIVE 5 UNITS: 5 INJECTION, SOLUTION INTRADERMAL at 12:05

## 2025-05-26 RX ADMIN — TAMSULOSIN HYDROCHLORIDE 0.8 MG: 0.4 CAPSULE ORAL at 08:05

## 2025-05-26 RX ADMIN — BUSPIRONE HYDROCHLORIDE 15 MG: 10 TABLET ORAL at 08:05

## 2025-05-26 RX ADMIN — NYSTATIN 400000 UNITS: 100000 SUSPENSION ORAL at 08:05

## 2025-05-26 RX ADMIN — GABAPENTIN 800 MG: 300 CAPSULE ORAL at 02:05

## 2025-05-26 RX ADMIN — PANTOPRAZOLE SODIUM 40 MG: 40 INJECTION, POWDER, FOR SOLUTION INTRAVENOUS at 08:05

## 2025-05-26 RX ADMIN — LISINOPRIL 40 MG: 20 TABLET ORAL at 08:05

## 2025-05-26 RX ADMIN — HYDROCODONE BITARTRATE AND ACETAMINOPHEN 1 TABLET: 5; 325 TABLET ORAL at 08:05

## 2025-05-26 RX ADMIN — CYCLOBENZAPRINE 10 MG: 10 TABLET, FILM COATED ORAL at 02:05

## 2025-05-26 RX ADMIN — NYSTATIN 400000 UNITS: 100000 SUSPENSION ORAL at 05:05

## 2025-05-26 RX ADMIN — NYSTATIN 400000 UNITS: 100000 SUSPENSION ORAL at 12:05

## 2025-05-26 NOTE — PROGRESS NOTES
[facility] Medicine  Progress Note    Patient Name: Omar Trotter  MRN: [id number]  Patient Class: IP- Inpatient   Admission Date: 5/19/2025  Length of Stay: 5 days  Attending Physician: Darian Lomas DO  Primary Care Provider: Cam Orozco MD        Subjective     Principal Problem:Symptomatic anemia        HPI:    Sukh Trotter is a 64 year old male with a past medical history of GERD, HTN, and BPH, previous hip surgery ,UGIB, / esophagitis was admitted from ER with possible syncopal episode and concern for right hip fracture. He states that he had a fall at home onto the right hip, and that he felt a little weak and lightheaded prior to the fall. He was walking down stairs when he slipped due to feeling weak. He denies LOC or hitting his head. He denies other precipitating factors.  He denies complaint.    ED work up included a CBC, which showed anemia that is worsening from prior studies. He has a history of iron deficiency anemia, but has not been taking his supplements or followed up with hematology in some time. CMP was unremarkable. ProBNP 189, HS troponin 5 and 4. Magnesium 2.1. XR of the hip showed no acute fracture. CT head showed no acute intracranial abnormality. CT cervical spine showed no acute process. CT hip showed chronic findings but no acute findings. CXR showed no acute cardiopulmonary abnormalities. He was given hydrocodone for pain in the ED. Hospital Medicine consulted for admission and further management.     Overview/Hospital Course:  Omar Trotter is a 64 year old male with a past medical history of GERD, HTN, BPH and tobacco dependence who presented with a fall in the setting of symptomatic anemia. Imaging did not reveal acute fracture (possible C4 pedicle disease on CT). He has received one unit of PRBCs for a Hgb of 7.3 on admission. He is on an IV PPI and s/p endoscopy and colonoscopy. EGD showed esophageal plaques were found, consistent with  candidiasis and tx with nystatin for 7 days. Biopsied.  For thrombocytopenia, d/c fluconazole.  Colonoscopy showed single bleeding colonic angioectasia. Treated  with argon plasma coagulation.  Patient was placed on  iron supplement for iron-deficiency.Pending placement for SNF.     Interval History:  Patient said he feels ok.  For daily updates refer to hospital course    Review of Systems   All other systems reviewed and are negative.    Objective:     Vital Signs (Most Recent):  Temp: 97.9 °F (36.6 °C) (05/25/25 1157)  Pulse: 92 (05/25/25 1157)  Resp: (!) 22 (05/25/25 1401)  BP: 121/69 (05/25/25 1157)  SpO2: 95 % (05/25/25 1157) Vital Signs (24h Range):  Temp:  [97.7 °F (36.5 °C)-98.5 °F (36.9 °C)] 97.9 °F (36.6 °C)  Pulse:  [] 92  Resp:  [14-22] 22  SpO2:  [92 %-95 %] 95 %  BP: (121-158)/(60-69) 121/69     Weight: 94.3 kg (207 lb 14.3 oz)  Body mass index is 29 kg/m².    Intake/Output Summary (Last 24 hours) at 5/25/2025 1524  Last data filed at 5/25/2025 1254  Gross per 24 hour   Intake 1157 ml   Output 1850 ml   Net -693 ml         Physical Exam  Cardiovascular:      Rate and Rhythm: Normal rate.      Pulses: Normal pulses.   Pulmonary:      Effort: Pulmonary effort is normal.   Abdominal:      General: There is no distension.      Tenderness: There is no abdominal tenderness.   Neurological:      Mental Status: He is alert and oriented to person, place, and time.               Significant Labs: All pertinent labs within the past 24 hours have been reviewed.    Significant Imaging: I have reviewed all pertinent imaging results/findings within the past 24 hours.      Assessment & Plan  Symptomatic anemia  Anemia is likely due to Iron deficiency. Most recent hemoglobin and hematocrit are listed below.  Recent Labs     05/24/25  1202 05/26/25  0356   HGB 10.1* 9.7*   HCT 34.7* 33.9*     Plan  - Monitor serial CBC: Daily  - Transfuse PRBC if patient becomes hemodynamically unstable, symptomatic or H/H drops  below 7/21.  - S/P Transfusion  - Patient's anemia is currently stable  - On IV iron  - s/p endoscopy and colonoscopy.   - EGD showed esophageal plaques were found, consistent with candidiasis and tx with nystatin for 7 days. Biopsied.    - Colonoscopy showed single bleeding colonic angioectasia. Treated  with argon plasma coagulation.    GERD (gastroesophageal reflux disease)  Noted, protonix    Hypertension  Patient's blood pressure range in the last 24 hours was: BP  Min: 103/61  Max: 148/66.The patient's inpatient anti-hypertensive regimen is listed below:  Current Antihypertensives  lisinopriL tablet 40 mg, Daily, Oral    Plan  - BP is controlled, no changes needed to their regimen  -   Syncope  Syncope resolved  Previous recent carotid ultrasound negative from 12/2023  Ordered echo EF 50-55%  CT head neg     Thrombocytopathia  D/C fluconazole  Resolved    VTE Risk Mitigation (From admission, onward)           Ordered     Reason for No Pharmacological VTE Prophylaxis  Once        Question:  Reasons:  Answer:  Risk of Bleeding    05/20/25 0225     IP VTE LOW RISK PATIENT  Once         05/20/25 0225     Place sequential compression device  Until discontinued         05/20/25 0225                    Discharge Planning   NATALIA: 5/29/2025     Code Status: Full Code   Medical Readiness for Discharge Date:   Discharge Plan A: Skilled Nursing Facility                Please place Justification for DME        Darian Lomas DO  Department of Hospital Medicine   [facility]

## 2025-05-26 NOTE — PT/OT/SLP PROGRESS
Occupational Therapy   Treatment    Name: Omar Trotter  MRN: [id number]  Admitting Diagnosis:  Symptomatic anemia  5 Days Post-Op    Recommendations:     Discharge Recommendations: Moderate Intensity Therapy  Discharge Equipment Recommendations:  to be determined by next level of care  Barriers to discharge:  Decreased caregiver support    Assessment:     Omar Trotter is a 64 y.o. male with a medical diagnosis of Symptomatic anemia.  Performance deficits affecting function are weakness, impaired endurance, impaired self care skills, impaired functional mobility, gait instability, impaired balance.     Rehab Prognosis:  Good; patient would benefit from acute skilled OT services to address these deficits and reach maximum level of function.       Plan:     Patient to be seen 5 x/week to address the above listed problems via self-care/home management, therapeutic activities, therapeutic exercises  Plan of Care Expires: 06/23/25  Plan of Care Reviewed with: patient    Subjective     Chief Complaint: feeling weak  Patient/Family Comments/goals: improve strength  Pain/Comfort:  Pain Rating 1:  (not rated)  Pain Rating Post-Intervention 1:  (not rated)    Objective:     Communicated with: nurse prior to session.  Patient found sitting edge of bed with telemetry upon OT entry to room.    General Precautions: Standard, fall    Orthopedic Precautions:N/A  Braces: N/A  Respiratory Status: Room air     Occupational Performance:     Bed Mobility:    Patient completed Sit to Supine with supervision     Functional Mobility/Transfers:  Patient completed Sit <> Stand Transfer with stand by assistance  with  rolling walker   Functional Mobility: SBA with RW EOB to/from sink; slow/labored pace; leaning heavily on UEs     Activities of Daily Living:  Grooming: stand by assistance standing at sink for oral care/face washing; leaning on sink for support   LB Dressing: Mod A; limited reach; pt would benefit from AD training for LB  dressing    Treatment & Education:  Therapist provided facilitation and instruction of proper body mechanics and fall prevention strategies during tasks listed above.   Instructed patient to sit in bedside chair as tolerated daily to increase OOB/activity tolerance.   Instructed patient to use call light to have nursing staff assist with needs/transfers.   Discussed OT POC and answered all questions within OT scope of practice.    Patient left HOB elevated with all lines intact, call button in reach, and bed alarm on    GOALS:   Multidisciplinary Problems       Occupational Therapy Goals          Problem: Occupational Therapy    Goal Priority Disciplines Outcome Interventions   Occupational Therapy Goal     OT, PT/OT     Description: Goals to be met by: 6/23/25     Patient will increase functional independence with ADLs by performing:    UE Dressing with Supervision.  LE Dressing with Supervision.  Grooming while standing at sink with Supervision.  Toileting from toilet with Supervision for hygiene and clothing management.   Toilet transfer to toilet with Supervision.                           Time Tracking:     OT Date of Treatment: 05/26/25  OT Start Time: 1028  OT Stop Time: 1044  OT Total Time (min): 16 min    Billable Minutes:Self Care/Home Management 16    OT/KYLIE: OT          5/26/2025

## 2025-05-26 NOTE — ASSESSMENT & PLAN NOTE
Patient's blood pressure range in the last 24 hours was: BP  Min: 103/61  Max: 148/66.The patient's inpatient anti-hypertensive regimen is listed below:  Current Antihypertensives  lisinopriL tablet 40 mg, Daily, Oral    Plan  - BP is controlled, no changes needed to their regimen  -

## 2025-05-26 NOTE — PT/OT/SLP PROGRESS
Physical Therapy Treatment    Patient Name:  Omar Trtoter   MRN:  [id number]    Recommendations:     Discharge Recommendations: Moderate Intensity Therapy  Discharge Equipment Recommendations: to be determined by next level of care  Barriers to discharge: Decreased caregiver support    Assessment:     Omar Trotter is a 64 y.o. male admitted with a medical diagnosis of Symptomatic anemia.  He presents with the following impairments/functional limitations: weakness, impaired endurance, impaired functional mobility, gait instability, decreased lower extremity function, impaired cardiopulmonary response to activity.    Rehab Prognosis: Fair; patient would benefit from acute skilled PT services to address these deficits and reach maximum level of function.    Recent Surgery: Procedure(s) (LRB):  COLONOSCOPY (N/A)  EGD (ESOPHAGOGASTRODUODENOSCOPY) (N/A) 5 Days Post-Op    Plan:     During this hospitalization, patient to be seen 6 x/week to address the identified rehab impairments via gait training, therapeutic activities, therapeutic exercises and progress toward the following goals:    Plan of Care Expires:  06/22/25    Subjective     Chief Complaint: B hip pain  Patient/Family Comments/goals: Return to prior level of functional mobility.    Pain/Comfort:  Pain Rating 1:  (not rated)  Location - Side 1: Bilateral  Location 1: hip  Pain Rating Post-Intervention 1:  (not rated)      Objective:     Communicated with RN Noe prior to and after session.  Patient found HOB elevated with bed alarm, peripheral IV, telemetry upon PT entry to room.     General Precautions: Standard, fall  Orthopedic Precautions: N/A  Braces: N/A  Respiratory Status: Room air     Functional Mobility:  Bed Mobility:     Supine to Sit: stand by assistance  Transfers:     Sit to Stand:  minimum assistance with rolling walker  Gait: x 60' with rolling walker and minimum assistance > CGA for balance plus vc for remaining inside RW base.      AM-PAC  6 CLICK MOBILITY          Treatment & Education:  Pt was educated on the following: call light use, importance of OOB activity and functional mobility to negate the negative effects of prolonged bed rest during this hospitalization, safe transfers/ambulation and discharge planning recommendations/options.      Patient left up in chair with all lines intact, call button in reach, and RN notified..    GOALS:   Multidisciplinary Problems       Physical Therapy Goals          Problem: Physical Therapy    Goal Priority Disciplines Outcome Interventions   Physical Therapy Goal     PT, PT/OT Progressing    Description: Goals to be met by: 25     Patient will increase functional independence with mobility by performin. Supine to sit with Supervision  2. Sit to stand transfer with Supervision  3. Bed to chair transfer with Supervision using Rolling Walker  4. Gait  x 150 feet with Supervision using Rolling Walker  5. Asc/Desc 5 steps with single HR and supervision                             DME Justifications:  No DME recommended requiring DME justifications    Time Tracking:     PT Received On: 25  PT Start Time: 1401     PT Stop Time: 1413  PT Total Time (min): 12 min     Billable Minutes: Gait Training 12    Treatment Type: Treatment  PT/PTA: PT     Number of PTA visits since last PT visit: 0     2025

## 2025-05-26 NOTE — PLAN OF CARE
05/26/25 1042   Medicare Message   Important Message from Medicare regarding Discharge Appeal Rights Given to patient/caregiver;Explained to patient/caregiver;Signed/date by patient/caregiver   Date IMM was signed 05/26/25   Time IMM was signed 1005

## 2025-05-26 NOTE — PLAN OF CARE
Met with patient this morning to discuss accepting facility. Patient is ok with [facility].            05/26/25 1050   Post-Acute Status   Post-Acute Authorization Placement

## 2025-05-27 PROBLEM — D69.1 THROMBOCYTOPATHIA: Status: RESOLVED | Noted: 2025-05-23 | Resolved: 2025-05-27

## 2025-05-27 LAB
ANION GAP (SMH): 9 MMOL/L (ref 8–16)
BUN SERPL-MCNC: 32 MG/DL (ref 8–23)
CALCIUM SERPL-MCNC: 9.6 MG/DL (ref 8.7–10.5)
CHLORIDE SERPL-SCNC: 103 MMOL/L (ref 95–110)
CO2 SERPL-SCNC: 25 MMOL/L (ref 23–29)
CREAT SERPL-MCNC: 1 MG/DL (ref 0.5–1.4)
ERYTHROCYTE [DISTWIDTH] IN BLOOD BY AUTOMATED COUNT: 19.9 % (ref 11.5–14.5)
GFR SERPLBLD CREATININE-BSD FMLA CKD-EPI: >60 ML/MIN/1.73/M2
GLUCOSE SERPL-MCNC: 97 MG/DL (ref 70–110)
HCT VFR BLD AUTO: 33.9 % (ref 40–54)
HGB BLD-MCNC: 10.1 GM/DL (ref 14–18)
MAGNESIUM SERPL-MCNC: 2.1 MG/DL (ref 1.6–2.6)
MCH RBC QN AUTO: 24.1 PG (ref 27–31)
MCHC RBC AUTO-ENTMCNC: 29.8 G/DL (ref 32–36)
MCV RBC AUTO: 81 FL (ref 82–98)
PLATELET # BLD AUTO: 188 K/UL (ref 150–450)
PMV BLD AUTO: 9.7 FL (ref 9.2–12.9)
POTASSIUM SERPL-SCNC: 4.5 MMOL/L (ref 3.5–5.1)
RBC # BLD AUTO: 4.19 M/UL (ref 4.6–6.2)
SODIUM SERPL-SCNC: 137 MMOL/L (ref 136–145)
WBC # BLD AUTO: 5.03 K/UL (ref 3.9–12.7)

## 2025-05-27 PROCEDURE — 63600175 PHARM REV CODE 636 W HCPCS: Performed by: FAMILY MEDICINE

## 2025-05-27 PROCEDURE — 36415 COLL VENOUS BLD VENIPUNCTURE: CPT | Performed by: STUDENT IN AN ORGANIZED HEALTH CARE EDUCATION/TRAINING PROGRAM

## 2025-05-27 PROCEDURE — 25000003 PHARM REV CODE 250: Performed by: FAMILY MEDICINE

## 2025-05-27 PROCEDURE — 25000003 PHARM REV CODE 250: Performed by: NURSE PRACTITIONER

## 2025-05-27 PROCEDURE — 25000003 PHARM REV CODE 250: Performed by: STUDENT IN AN ORGANIZED HEALTH CARE EDUCATION/TRAINING PROGRAM

## 2025-05-27 PROCEDURE — 85027 COMPLETE CBC AUTOMATED: CPT | Performed by: FAMILY MEDICINE

## 2025-05-27 PROCEDURE — 25000003 PHARM REV CODE 250: Performed by: INTERNAL MEDICINE

## 2025-05-27 PROCEDURE — 83735 ASSAY OF MAGNESIUM: CPT | Performed by: FAMILY MEDICINE

## 2025-05-27 PROCEDURE — 80048 BASIC METABOLIC PNL TOTAL CA: CPT | Performed by: STUDENT IN AN ORGANIZED HEALTH CARE EDUCATION/TRAINING PROGRAM

## 2025-05-27 PROCEDURE — 97116 GAIT TRAINING THERAPY: CPT

## 2025-05-27 PROCEDURE — 11000001 HC ACUTE MED/SURG PRIVATE ROOM

## 2025-05-27 RX ADMIN — NYSTATIN 400000 UNITS: 100000 SUSPENSION ORAL at 08:05

## 2025-05-27 RX ADMIN — TAMSULOSIN HYDROCHLORIDE 0.8 MG: 0.4 CAPSULE ORAL at 08:05

## 2025-05-27 RX ADMIN — GABAPENTIN 800 MG: 300 CAPSULE ORAL at 08:05

## 2025-05-27 RX ADMIN — PANTOPRAZOLE SODIUM 40 MG: 40 INJECTION, POWDER, FOR SOLUTION INTRAVENOUS at 08:05

## 2025-05-27 RX ADMIN — MUPIROCIN 1 G: 20 OINTMENT TOPICAL at 08:05

## 2025-05-27 RX ADMIN — SENNOSIDES AND DOCUSATE SODIUM 1 TABLET: 50; 8.6 TABLET ORAL at 09:05

## 2025-05-27 RX ADMIN — GABAPENTIN 800 MG: 300 CAPSULE ORAL at 02:05

## 2025-05-27 RX ADMIN — CYCLOBENZAPRINE 10 MG: 10 TABLET, FILM COATED ORAL at 10:05

## 2025-05-27 RX ADMIN — Medication 9 MG: at 09:05

## 2025-05-27 RX ADMIN — HYDROCODONE BITARTRATE AND ACETAMINOPHEN 1 TABLET: 5; 325 TABLET ORAL at 05:05

## 2025-05-27 RX ADMIN — BUSPIRONE HYDROCHLORIDE 15 MG: 10 TABLET ORAL at 08:05

## 2025-05-27 RX ADMIN — NYSTATIN 400000 UNITS: 100000 SUSPENSION ORAL at 09:05

## 2025-05-27 RX ADMIN — LISINOPRIL 40 MG: 20 TABLET ORAL at 08:05

## 2025-05-27 RX ADMIN — GABAPENTIN 800 MG: 300 CAPSULE ORAL at 09:05

## 2025-05-27 RX ADMIN — SENNOSIDES AND DOCUSATE SODIUM 1 TABLET: 50; 8.6 TABLET ORAL at 08:05

## 2025-05-27 RX ADMIN — FERROUS SULFATE TAB 325 MG (65 MG ELEMENTAL FE) 1 EACH: 325 (65 FE) TAB at 08:05

## 2025-05-27 RX ADMIN — NYSTATIN 400000 UNITS: 100000 SUSPENSION ORAL at 12:05

## 2025-05-27 RX ADMIN — PANTOPRAZOLE SODIUM 40 MG: 40 INJECTION, POWDER, FOR SOLUTION INTRAVENOUS at 09:05

## 2025-05-27 RX ADMIN — HYDROCODONE BITARTRATE AND ACETAMINOPHEN 1 TABLET: 5; 325 TABLET ORAL at 09:05

## 2025-05-27 RX ADMIN — NYSTATIN 400000 UNITS: 100000 SUSPENSION ORAL at 04:05

## 2025-05-27 RX ADMIN — MUPIROCIN 1 G: 20 OINTMENT TOPICAL at 09:05

## 2025-05-27 NOTE — PROGRESS NOTES
[facility] Medicine  Progress Note    Patient Name: Omar Trotter  MRN: [id number]  Patient Class: IP- Inpatient   Admission Date: 5/19/2025  Length of Stay: 6 days  Attending Physician: James Li MD  Primary Care Provider: Cam Orozco MD        Subjective     Principal Problem:Symptomatic anemia        HPI:    Sukh Trotter is a 64 year old male with a past medical history of GERD, HTN, and BPH, previous hip surgery ,UGIB, / esophagitis was admitted from ER with possible syncopal episode and concern for right hip fracture. He states that he had a fall at home onto the right hip, and that he felt a little weak and lightheaded prior to the fall. He was walking down stairs when he slipped due to feeling weak. He denies LOC or hitting his head. He denies other precipitating factors.  He denies complaint.    ED work up included a CBC, which showed anemia that is worsening from prior studies. He has a history of iron deficiency anemia, but has not been taking his supplements or followed up with hematology in some time. CMP was unremarkable. ProBNP 189, HS troponin 5 and 4. Magnesium 2.1. XR of the hip showed no acute fracture. CT head showed no acute intracranial abnormality. CT cervical spine showed no acute process. CT hip showed chronic findings but no acute findings. CXR showed no acute cardiopulmonary abnormalities. He was given hydrocodone for pain in the ED. Hospital Medicine consulted for admission and further management.     Overview/Hospital Course:  Omar Trotter is a 64 year old male with a past medical history of GERD, HTN, BPH and tobacco dependence who presented with a fall in the setting of symptomatic anemia. Imaging did not reveal acute fracture (possible C4 pedicle disease on CT). He has received one unit of PRBCs for a Hgb of 7.3 on admission. He is on an IV PPI and s/p endoscopy and colonoscopy. EGD showed esophageal plaques were found, consistent with  candidiasis and tx with nystatin for 7 days. Biopsied.  For thrombocytopenia, d/c fluconazole.  Colonoscopy showed single bleeding colonic angioectasia. Treated  with argon plasma coagulation.  Patient was placed on  iron supplement for iron-deficiency.Pending placement for SNF.     Interval History: no acute events overnight. Awaiting placement.     Review of Systems   Musculoskeletal:  Positive for arthralgias and gait problem.   Neurological:  Positive for weakness.   All other systems reviewed and are negative.    Objective:     Vital Signs (Most Recent):  Temp: 97.8 °F (36.6 °C) (05/27/25 1206)  Pulse: (!) 48 (05/27/25 1206)  Resp: 18 (05/27/25 1206)  BP: 119/64 (05/27/25 1206)  SpO2: 99 % (05/27/25 1206) Vital Signs (24h Range):  Temp:  [97.5 °F (36.4 °C)-98.2 °F (36.8 °C)] 97.8 °F (36.6 °C)  Pulse:  [48-98] 48  Resp:  [15-18] 18  SpO2:  [92 %-99 %] 99 %  BP: (118-156)/(64-73) 119/64     Weight: 94.3 kg (207 lb 14.3 oz)  Body mass index is 29 kg/m².    Intake/Output Summary (Last 24 hours) at 5/27/2025 1226  Last data filed at 5/27/2025 0916  Gross per 24 hour   Intake 954 ml   Output 2300 ml   Net -1346 ml         Physical Exam  Constitutional:       General: He is not in acute distress.  HENT:      Head: Normocephalic and atraumatic.      Nose: Nose normal.      Mouth/Throat:      Mouth: Mucous membranes are moist.      Pharynx: Oropharynx is clear.   Eyes:      Conjunctiva/sclera: Conjunctivae normal.      Pupils: Pupils are equal, round, and reactive to light.   Cardiovascular:      Rate and Rhythm: Normal rate and regular rhythm.      Pulses: Normal pulses.   Pulmonary:      Effort: Pulmonary effort is normal.   Abdominal:      General: Bowel sounds are normal. There is no distension.      Palpations: Abdomen is soft.      Tenderness: There is no abdominal tenderness.   Musculoskeletal:         General: Normal range of motion.      Cervical back: Normal range of motion and neck supple.   Skin:      General: Skin is warm.      Capillary Refill: Capillary refill takes less than 2 seconds.   Neurological:      Mental Status: He is alert and oriented to person, place, and time.   Psychiatric:         Mood and Affect: Mood normal.         Behavior: Behavior normal.               Significant Labs: All pertinent labs within the past 24 hours have been reviewed.  Lab Results   Component Value Date    WBC 5.03 05/27/2025    HGB 10.1 (L) 05/27/2025    HCT 33.9 (L) 05/27/2025    MCV 81 (L) 05/27/2025     05/27/2025       BMP  Lab Results   Component Value Date     05/27/2025    K 4.5 05/27/2025     05/27/2025    CO2 25 05/27/2025    BUN 32 (H) 05/27/2025    CREATININE 1.0 05/27/2025    CALCIUM 9.6 05/27/2025    ANIONGAP 9 05/27/2025    EGFRNORACEVR >60 05/27/2025       Significant Imaging: I have reviewed all pertinent imaging results/findings within the past 24 hours.    X-Ray Chest AP Portable  Result Date: 5/20/2025  EXAMINATION: XR CHEST AP PORTABLE CLINICAL HISTORY: Chest Pain; TECHNIQUE: Single frontal view of the chest was performed. COMPARISON: Radiographs of the chest from 10/28/2024. FINDINGS: Heart size and pulmonary vasculature are within normal limits.  No evidence of focal consolidation, pneumothorax, or pleural effusion.  A proximal right humeral prosthesis is redemonstrated.  Moderate chronic degenerative changes are present involving left glenohumeral joint.     No evidence of acute cardiopulmonary process. Electronically signed by: Lopez Cervantes Date:    05/20/2025 Time:    01:16    CT Hip Without Contrast Right  Result Date: 5/20/2025  EXAMINATION: CT HIP WITHOUT CONTRAST RIGHT CLINICAL HISTORY: Hip trauma, fracture suspected, xray done; TECHNIQUE: Axial CT images were obtained of the to right hip without the use of intravenous contrast.  Sagittal and coronal reformats were performed.  3D series were also provided. COMPARISON: CT abdomen pelvis from 03/06/2024. FINDINGS: No  evidence of acute fracture or dislocation involving the right hip.  Severe chronic degenerative changes are present involving the right hip with subchondral cyst formation involving the head of the right femur and the right acetabulum.  These findings are progressed when compared to prior imaging from March 2024.  No evidence of acute process involving the partially visualized pelvic viscera.  No evidence of subcutaneous emphysema, drainable fluid collection, or radiopaque foreign body.  No evidence of soft tissue or osseous mass.     Severe chronic degenerative changes of the right hip, progressed since prior imaging from 2024, without evidence of acute osseous process. Electronically signed by: Lopez Cervantes Date:    05/20/2025 Time:    01:13    CT Cervical Spine Without Contrast  Result Date: 5/20/2025  EXAMINATION: CT CERVICAL SPINE WITHOUT CONTRAST CLINICAL HISTORY: Neck trauma (Age >= 65y); TECHNIQUE: Low dose axial images, sagittal and coronal reformations were performed though the cervical spine.  Contrast was not administered. COMPARISON: None available. FINDINGS: There is an angulated lucency involving the left pedicle of C4 which does not appear to have been present on prior CT of the C-spine from June 2024, however it is unclear if this is a acute process.  It may be associated with prominent multilevel chronic degenerative changes, and it does not represent an unstable fracture if it were acute.  The paraspinal soft tissues are unremarkable.     And angulated lucency is present involving the left pedicle C4 which does not appear to have been present on prior CT of the C-spine from June 2024, however it is unclear if this is an acute process or associated with prominent multilevel chronic degenerative changes involving the cervical spine.  Note, if this were acute, it would not represent an unstable fracture.  No additional evidence of acute fracture or subluxation involving the cervical spine.  Electronically signed by: Lopez Cervantes Date:    05/20/2025 Time:    01:04    CT Head Without Contrast  Result Date: 5/20/2025  EXAMINATION: CT HEAD WITHOUT CONTRAST CLINICAL HISTORY: Head trauma, minor (Age >= 65y); TECHNIQUE: Low dose axial CT images obtained throughout the head without intravenous contrast. Sagittal and coronal reconstructions were performed. COMPARISON: None available. FINDINGS: Intracranial compartment: Ventricles and sulci are normal in size for age without evidence of hydrocephalus. No extra-axial blood or fluid collections. The brain parenchyma appears normal. No parenchymal mass, hemorrhage, edema or major vascular distribution infarct. Skull/extracranial contents (limited evaluation): No fracture. Mastoid air cells and paranasal sinuses are essentially clear.  The orbits are unremarkable.     No evidence of acute intracranial abnormality. Electronically signed by: Lopez Cervantes Date:    05/20/2025 Time:    00:49    X-Ray Hip 2 or 3 views Right with Pelvis when performed  Result Date: 5/19/2025  EXAMINATION: XR HIP WITH PELVIS WHEN PERFORMED 2 OR 3 VIEWS RIGHT CLINICAL HISTORY: fall; TECHNIQUE: Three views of the pelvis and right hip were obtained.. COMPARISON: None FINDINGS: No evidence of acute fracture or dislocation involving the pelvis or right hip.  A left proximal femoral prosthesis is present without evidence of periprosthetic fracture or hardware complication.  Moderate to severe degenerative changes are present involving the right hip joint with subchondral cyst formation involving the acetabulum and head of the right femur.  No evidence of subcutaneous emphysema or nonsurgical radiopaque foreign body.  No evidence of soft tissue or osseous mass.     No evidence of acute fracture or dislocation involving the pelvis or right hip, and no evidence of periprosthetic fracture or hardware complication involving the proximal left femoral prosthesis. Electronically signed  by: Lopez Cervantes Date:    05/19/2025 Time:    23:27          Assessment & Plan  Symptomatic anemia  Anemia is likely due to Iron deficiency. Most recent hemoglobin and hematocrit are listed below.  Recent Labs     05/26/25  0356 05/27/25  0354   HGB 9.7* 10.1*   HCT 33.9* 33.9*     Plan  - Monitor serial CBC: Daily  - Transfuse PRBC if patient becomes hemodynamically unstable, symptomatic or H/H drops below 7/21.  - S/P Transfusion  - Patient's anemia is currently stable  - On IV iron  - s/p endoscopy and colonoscopy.   - EGD showed esophageal plaques were found, consistent with candidiasis and tx with nystatin for 7 days. Biopsied.    - Colonoscopy showed single bleeding colonic angioectasia. Treated  with argon plasma coagulation.    GERD (gastroesophageal reflux disease)  Noted, protonix    Hypertension  Patient's blood pressure range in the last 24 hours was: BP  Min: 118/73  Max: 156/71.The patient's inpatient anti-hypertensive regimen is listed below:  Current Antihypertensives  lisinopriL tablet 40 mg, Daily, Oral    Plan  - BP is controlled, no changes needed to their regimen  -   Syncope  Previous recent carotid ultrasound negative from 12/2023  Ordered echo EF 50-55%  CT head neg     VTE Risk Mitigation (From admission, onward)           Ordered     Reason for No Pharmacological VTE Prophylaxis  Once        Question:  Reasons:  Answer:  Risk of Bleeding    05/20/25 0225     IP VTE LOW RISK PATIENT  Once         05/20/25 0225     Place sequential compression device  Until discontinued         05/20/25 0225                    Discharge Planning   NATALIA: 5/29/2025     Code Status: Full Code   Medical Readiness for Discharge Date:   Discharge Plan A: Skilled Nursing Facility                Please place Justification for DME        Sandy Olivas NP  Department of Hospital Medicine   [facility]

## 2025-05-27 NOTE — PLAN OF CARE
Patient is medically ready for SNF.          05/27/25 1557   Discharge Reassessment   Assessment Type Discharge Planning Reassessment   Did the patient's condition or plan change since previous assessment? No

## 2025-05-27 NOTE — NURSING
Arrived to unit via wheelchair, AAOx4 answered questions appropriately  v/s stable, skin assessment done. pt asking for dinner, dinner isn't on unit yet. Denies any needs at this time. Educate pt to call for assistance, call light and personal items within reach, wheels locked, bed in lowest position, side rails up x2, bed alarm activated, Plan of care ongoing. FELISHA

## 2025-05-27 NOTE — PROGRESS NOTES
1548-SW blasted referrals via [employer]. SW called [facility] [phone] and attempted to leave a , will continue to follow up.

## 2025-05-27 NOTE — PROGRESS NOTES
Received a in-basket message from Shea with [employer], stating she left a message for patient sonia Hensley to complete paperwork and return call. FLORENCIA called patient sonia Hensley [phone] and attempted to leave a VM regarding completing paperwork with [facility]. SW to continue to follow up later.

## 2025-05-27 NOTE — ASSESSMENT & PLAN NOTE
Anemia is likely due to Iron deficiency. Most recent hemoglobin and hematocrit are listed below.  Recent Labs     05/26/25  0356 05/27/25  0354   HGB 9.7* 10.1*   HCT 33.9* 33.9*     Plan  - Monitor serial CBC: Daily  - Transfuse PRBC if patient becomes hemodynamically unstable, symptomatic or H/H drops below 7/21.  - S/P Transfusion  - Patient's anemia is currently stable  - On IV iron  - s/p endoscopy and colonoscopy.   - EGD showed esophageal plaques were found, consistent with candidiasis and tx with nystatin for 7 days. Biopsied.    - Colonoscopy showed single bleeding colonic angioectasia. Treated  with argon plasma coagulation.

## 2025-05-27 NOTE — PT/OT/SLP PROGRESS
Physical Therapy Treatment    Patient Name:  Omar Trotter   MRN:  [id number]    Recommendations:     Discharge Recommendations: Moderate Intensity Therapy  Discharge Equipment Recommendations: to be determined by next level of care  Barriers to discharge: Decreased caregiver support    Assessment:     Omar Trotter is a 64 y.o. male admitted with a medical diagnosis of Symptomatic anemia.  He presents with the following impairments/functional limitations: weakness, impaired endurance, impaired functional mobility, gait instability, decreased lower extremity function, impaired cardiopulmonary response to activity.    Rehab Prognosis: Fair; patient would benefit from acute skilled PT services to address these deficits and reach maximum level of function.    Recent Surgery: Procedure(s) (LRB):  COLONOSCOPY (N/A)  EGD (ESOPHAGOGASTRODUODENOSCOPY) (N/A) 6 Days Post-Op    Plan:     During this hospitalization, patient to be seen 6 x/week to address the identified rehab impairments via gait training, therapeutic activities, therapeutic exercises and progress toward the following goals:    Plan of Care Expires:  06/22/25    Subjective     Chief Complaint: B hip pain  Patient/Family Comments/goals: Return to prior level of functional mobility.    Pain/Comfort:  Pain Rating 1: 5/10  Location - Side 1: Bilateral  Location 1: hip  Pain Addressed 1: Reposition, Distraction, Cessation of Activity, Pre-medicate for activity  Pain Rating Post-Intervention 1: 6/10      Objective:     Communicated with MARY KAY Liu prior to session.  Patient found HOB elevated with peripheral IV, telemetry upon PT entry to room.     General Precautions: Standard, fall  Orthopedic Precautions: N/A  Braces: N/A  Respiratory Status: Room air     Functional Mobility:  Bed Mobility:     Supine to Sit: stand by assistance  Sit to Supine: stand by assistance  Transfers:     Sit to Stand:  contact guard assistance with rolling walker  Gait: x 50' with rolling  walker and min A for balance and vc for safe RW management      AM-PAC 6 CLICK MOBILITY          Treatment & Education:  Pt was educated on the following: call light use, importance of OOB activity and functional mobility to negate the negative effects of prolonged bed rest during this hospitalization, safe transfers/ambulation and discharge planning recommendations/options.      Patient left sitting edge of bed with tray table in front of him with all lines intact and call button in reach..    GOALS:   Multidisciplinary Problems       Physical Therapy Goals          Problem: Physical Therapy    Goal Priority Disciplines Outcome Interventions   Physical Therapy Goal     PT, PT/OT Progressing    Description: Goals to be met by: 25     Patient will increase functional independence with mobility by performin. Supine to sit with Supervision  2. Sit to stand transfer with Supervision  3. Bed to chair transfer with Supervision using Rolling Walker  4. Gait  x 150 feet with Supervision using Rolling Walker  5. Asc/Desc 5 steps with single HR and supervision                             DME Justifications:  No DME recommended requiring DME justifications    Time Tracking:     PT Received On: 25  PT Start Time: 1331     PT Stop Time: 1343  PT Total Time (min): 12 min     Billable Minutes: Gait Training 12    Treatment Type: Treatment  PT/PTA: PT     Number of PTA visits since last PT visit: 0     2025

## 2025-05-27 NOTE — PT/OT/SLP PROGRESS
Occupational Therapy      Patient Name:  Omar Trotter   MRN:  [id number]    Patient not seen today secondary to Pain. Will follow-up next service date.    5/27/2025

## 2025-05-27 NOTE — SUBJECTIVE & OBJECTIVE
Interval History: no acute events overnight. Awaiting placement.     Review of Systems   Musculoskeletal:  Positive for arthralgias and gait problem.   Neurological:  Positive for weakness.   All other systems reviewed and are negative.    Objective:     Vital Signs (Most Recent):  Temp: 97.8 °F (36.6 °C) (05/27/25 1206)  Pulse: (!) 48 (05/27/25 1206)  Resp: 18 (05/27/25 1206)  BP: 119/64 (05/27/25 1206)  SpO2: 99 % (05/27/25 1206) Vital Signs (24h Range):  Temp:  [97.5 °F (36.4 °C)-98.2 °F (36.8 °C)] 97.8 °F (36.6 °C)  Pulse:  [48-98] 48  Resp:  [15-18] 18  SpO2:  [92 %-99 %] 99 %  BP: (118-156)/(64-73) 119/64     Weight: 94.3 kg (207 lb 14.3 oz)  Body mass index is 29 kg/m².    Intake/Output Summary (Last 24 hours) at 5/27/2025 1226  Last data filed at 5/27/2025 0916  Gross per 24 hour   Intake 954 ml   Output 2300 ml   Net -1346 ml         Physical Exam  Constitutional:       General: He is not in acute distress.  HENT:      Head: Normocephalic and atraumatic.      Nose: Nose normal.      Mouth/Throat:      Mouth: Mucous membranes are moist.      Pharynx: Oropharynx is clear.   Eyes:      Conjunctiva/sclera: Conjunctivae normal.      Pupils: Pupils are equal, round, and reactive to light.   Cardiovascular:      Rate and Rhythm: Normal rate and regular rhythm.      Pulses: Normal pulses.   Pulmonary:      Effort: Pulmonary effort is normal.   Abdominal:      General: Bowel sounds are normal. There is no distension.      Palpations: Abdomen is soft.      Tenderness: There is no abdominal tenderness.   Musculoskeletal:         General: Normal range of motion.      Cervical back: Normal range of motion and neck supple.   Skin:     General: Skin is warm.      Capillary Refill: Capillary refill takes less than 2 seconds.   Neurological:      Mental Status: He is alert and oriented to person, place, and time.   Psychiatric:         Mood and Affect: Mood normal.         Behavior: Behavior normal.                Significant Labs: All pertinent labs within the past 24 hours have been reviewed.  Lab Results   Component Value Date    WBC 5.03 05/27/2025    HGB 10.1 (L) 05/27/2025    HCT 33.9 (L) 05/27/2025    MCV 81 (L) 05/27/2025     05/27/2025       BMP  Lab Results   Component Value Date     05/27/2025    K 4.5 05/27/2025     05/27/2025    CO2 25 05/27/2025    BUN 32 (H) 05/27/2025    CREATININE 1.0 05/27/2025    CALCIUM 9.6 05/27/2025    ANIONGAP 9 05/27/2025    EGFRNORACEVR >60 05/27/2025       Significant Imaging: I have reviewed all pertinent imaging results/findings within the past 24 hours.    X-Ray Chest AP Portable  Result Date: 5/20/2025  EXAMINATION: XR CHEST AP PORTABLE CLINICAL HISTORY: Chest Pain; TECHNIQUE: Single frontal view of the chest was performed. COMPARISON: Radiographs of the chest from 10/28/2024. FINDINGS: Heart size and pulmonary vasculature are within normal limits.  No evidence of focal consolidation, pneumothorax, or pleural effusion.  A proximal right humeral prosthesis is redemonstrated.  Moderate chronic degenerative changes are present involving left glenohumeral joint.     No evidence of acute cardiopulmonary process. Electronically signed by: Lopez Cervantes Date:    05/20/2025 Time:    01:16    CT Hip Without Contrast Right  Result Date: 5/20/2025  EXAMINATION: CT HIP WITHOUT CONTRAST RIGHT CLINICAL HISTORY: Hip trauma, fracture suspected, xray done; TECHNIQUE: Axial CT images were obtained of the to right hip without the use of intravenous contrast.  Sagittal and coronal reformats were performed.  3D series were also provided. COMPARISON: CT abdomen pelvis from 03/06/2024. FINDINGS: No evidence of acute fracture or dislocation involving the right hip.  Severe chronic degenerative changes are present involving the right hip with subchondral cyst formation involving the head of the right femur and the right acetabulum.  These findings are progressed when compared  to prior imaging from March 2024.  No evidence of acute process involving the partially visualized pelvic viscera.  No evidence of subcutaneous emphysema, drainable fluid collection, or radiopaque foreign body.  No evidence of soft tissue or osseous mass.     Severe chronic degenerative changes of the right hip, progressed since prior imaging from 2024, without evidence of acute osseous process. Electronically signed by: Lopez Cervantes Date:    05/20/2025 Time:    01:13    CT Cervical Spine Without Contrast  Result Date: 5/20/2025  EXAMINATION: CT CERVICAL SPINE WITHOUT CONTRAST CLINICAL HISTORY: Neck trauma (Age >= 65y); TECHNIQUE: Low dose axial images, sagittal and coronal reformations were performed though the cervical spine.  Contrast was not administered. COMPARISON: None available. FINDINGS: There is an angulated lucency involving the left pedicle of C4 which does not appear to have been present on prior CT of the C-spine from June 2024, however it is unclear if this is a acute process.  It may be associated with prominent multilevel chronic degenerative changes, and it does not represent an unstable fracture if it were acute.  The paraspinal soft tissues are unremarkable.     And angulated lucency is present involving the left pedicle C4 which does not appear to have been present on prior CT of the C-spine from June 2024, however it is unclear if this is an acute process or associated with prominent multilevel chronic degenerative changes involving the cervical spine.  Note, if this were acute, it would not represent an unstable fracture.  No additional evidence of acute fracture or subluxation involving the cervical spine. Electronically signed by: Lopez Cervantes Date:    05/20/2025 Time:    01:04    CT Head Without Contrast  Result Date: 5/20/2025  EXAMINATION: CT HEAD WITHOUT CONTRAST CLINICAL HISTORY: Head trauma, minor (Age >= 65y); TECHNIQUE: Low dose axial CT images obtained throughout the head  without intravenous contrast. Sagittal and coronal reconstructions were performed. COMPARISON: None available. FINDINGS: Intracranial compartment: Ventricles and sulci are normal in size for age without evidence of hydrocephalus. No extra-axial blood or fluid collections. The brain parenchyma appears normal. No parenchymal mass, hemorrhage, edema or major vascular distribution infarct. Skull/extracranial contents (limited evaluation): No fracture. Mastoid air cells and paranasal sinuses are essentially clear.  The orbits are unremarkable.     No evidence of acute intracranial abnormality. Electronically signed by: Lopez Cervantes Date:    05/20/2025 Time:    00:49    X-Ray Hip 2 or 3 views Right with Pelvis when performed  Result Date: 5/19/2025  EXAMINATION: XR HIP WITH PELVIS WHEN PERFORMED 2 OR 3 VIEWS RIGHT CLINICAL HISTORY: fall; TECHNIQUE: Three views of the pelvis and right hip were obtained.. COMPARISON: None FINDINGS: No evidence of acute fracture or dislocation involving the pelvis or right hip.  A left proximal femoral prosthesis is present without evidence of periprosthetic fracture or hardware complication.  Moderate to severe degenerative changes are present involving the right hip joint with subchondral cyst formation involving the acetabulum and head of the right femur.  No evidence of subcutaneous emphysema or nonsurgical radiopaque foreign body.  No evidence of soft tissue or osseous mass.     No evidence of acute fracture or dislocation involving the pelvis or right hip, and no evidence of periprosthetic fracture or hardware complication involving the proximal left femoral prosthesis. Electronically signed by: Lopez Cervantes Date:    05/19/2025 Time:    23:27

## 2025-05-27 NOTE — ASSESSMENT & PLAN NOTE
Patient's blood pressure range in the last 24 hours was: BP  Min: 118/73  Max: 156/71.The patient's inpatient anti-hypertensive regimen is listed below:  Current Antihypertensives  lisinopriL tablet 40 mg, Daily, Oral    Plan  - BP is controlled, no changes needed to their regimen  -

## 2025-05-27 NOTE — PLAN OF CARE
[id number]- reached out to patient again- still has not heard from friend to get daughters updated number.  Patient is going to ask other family members if they would be willing to speak to facility.     Patient is making phone calls trying to find an updated number for his daughter Shellie.

## 2025-05-28 PROBLEM — D62 ACUTE BLOOD LOSS ANEMIA: Status: RESOLVED | Noted: 2023-05-05 | Resolved: 2025-05-28

## 2025-05-28 PROBLEM — D69.6 THROMBOCYTOPENIA: Status: RESOLVED | Noted: 2024-03-15 | Resolved: 2025-05-28

## 2025-05-28 PROBLEM — Z71.89 ADVANCE CARE PLANNING: Status: RESOLVED | Noted: 2024-03-07 | Resolved: 2025-05-28

## 2025-05-28 PROBLEM — S72.002A CLOSED FRACTURE OF LEFT HIP: Status: RESOLVED | Noted: 2024-11-18 | Resolved: 2025-05-28

## 2025-05-28 PROBLEM — M48.061 SPINAL STENOSIS OF LUMBAR REGION: Status: RESOLVED | Noted: 2024-11-18 | Resolved: 2025-05-28

## 2025-05-28 LAB
ANION GAP (SMH): 12 MMOL/L (ref 8–16)
BUN SERPL-MCNC: 32 MG/DL (ref 8–23)
CALCIUM SERPL-MCNC: 9.5 MG/DL (ref 8.7–10.5)
CHLORIDE SERPL-SCNC: 102 MMOL/L (ref 95–110)
CO2 SERPL-SCNC: 22 MMOL/L (ref 23–29)
CREAT SERPL-MCNC: 1 MG/DL (ref 0.5–1.4)
ERYTHROCYTE [DISTWIDTH] IN BLOOD BY AUTOMATED COUNT: 19.9 % (ref 11.5–14.5)
GFR SERPLBLD CREATININE-BSD FMLA CKD-EPI: >60 ML/MIN/1.73/M2
GLUCOSE SERPL-MCNC: 115 MG/DL (ref 70–110)
HCT VFR BLD AUTO: 33.4 % (ref 40–54)
HGB BLD-MCNC: 9.9 GM/DL (ref 14–18)
MCH RBC QN AUTO: 24.2 PG (ref 27–31)
MCHC RBC AUTO-ENTMCNC: 29.6 G/DL (ref 32–36)
MCV RBC AUTO: 82 FL (ref 82–98)
PLATELET # BLD AUTO: 196 K/UL (ref 150–450)
PMV BLD AUTO: 10.3 FL (ref 9.2–12.9)
POTASSIUM SERPL-SCNC: 4.6 MMOL/L (ref 3.5–5.1)
RBC # BLD AUTO: 4.09 M/UL (ref 4.6–6.2)
SODIUM SERPL-SCNC: 136 MMOL/L (ref 136–145)
TB INDURATION 48 - 72 HR READ: 0 MM
TB SKIN TEST 48 - 72 HR READ: NEGATIVE
WBC # BLD AUTO: 4.61 K/UL (ref 3.9–12.7)

## 2025-05-28 PROCEDURE — 25000003 PHARM REV CODE 250: Performed by: NURSE PRACTITIONER

## 2025-05-28 PROCEDURE — 82310 ASSAY OF CALCIUM: CPT | Performed by: NURSE PRACTITIONER

## 2025-05-28 PROCEDURE — 11000001 HC ACUTE MED/SURG PRIVATE ROOM

## 2025-05-28 PROCEDURE — 97116 GAIT TRAINING THERAPY: CPT | Mod: CQ

## 2025-05-28 PROCEDURE — 25000003 PHARM REV CODE 250: Performed by: INTERNAL MEDICINE

## 2025-05-28 PROCEDURE — 25000003 PHARM REV CODE 250: Performed by: STUDENT IN AN ORGANIZED HEALTH CARE EDUCATION/TRAINING PROGRAM

## 2025-05-28 PROCEDURE — 25000003 PHARM REV CODE 250: Performed by: FAMILY MEDICINE

## 2025-05-28 PROCEDURE — 85027 COMPLETE CBC AUTOMATED: CPT | Performed by: NURSE PRACTITIONER

## 2025-05-28 PROCEDURE — 36415 COLL VENOUS BLD VENIPUNCTURE: CPT | Performed by: NURSE PRACTITIONER

## 2025-05-28 PROCEDURE — 63600175 PHARM REV CODE 636 W HCPCS: Performed by: FAMILY MEDICINE

## 2025-05-28 PROCEDURE — 97535 SELF CARE MNGMENT TRAINING: CPT

## 2025-05-28 RX ADMIN — Medication 9 MG: at 09:05

## 2025-05-28 RX ADMIN — LISINOPRIL 40 MG: 20 TABLET ORAL at 10:05

## 2025-05-28 RX ADMIN — NYSTATIN 400000 UNITS: 100000 SUSPENSION ORAL at 06:05

## 2025-05-28 RX ADMIN — HYDROCODONE BITARTRATE AND ACETAMINOPHEN 1 TABLET: 5; 325 TABLET ORAL at 09:05

## 2025-05-28 RX ADMIN — BUSPIRONE HYDROCHLORIDE 15 MG: 10 TABLET ORAL at 10:05

## 2025-05-28 RX ADMIN — SENNOSIDES AND DOCUSATE SODIUM 1 TABLET: 50; 8.6 TABLET ORAL at 09:05

## 2025-05-28 RX ADMIN — SENNOSIDES AND DOCUSATE SODIUM 1 TABLET: 50; 8.6 TABLET ORAL at 10:05

## 2025-05-28 RX ADMIN — TAMSULOSIN HYDROCHLORIDE 0.8 MG: 0.4 CAPSULE ORAL at 10:05

## 2025-05-28 RX ADMIN — GABAPENTIN 800 MG: 300 CAPSULE ORAL at 09:05

## 2025-05-28 RX ADMIN — PANTOPRAZOLE SODIUM 40 MG: 40 INJECTION, POWDER, FOR SOLUTION INTRAVENOUS at 09:05

## 2025-05-28 RX ADMIN — NYSTATIN 400000 UNITS: 100000 SUSPENSION ORAL at 02:05

## 2025-05-28 RX ADMIN — GABAPENTIN 800 MG: 300 CAPSULE ORAL at 02:05

## 2025-05-28 RX ADMIN — GABAPENTIN 800 MG: 300 CAPSULE ORAL at 10:05

## 2025-05-28 RX ADMIN — HYDROCODONE BITARTRATE AND ACETAMINOPHEN 1 TABLET: 5; 325 TABLET ORAL at 04:05

## 2025-05-28 RX ADMIN — NYSTATIN 400000 UNITS: 100000 SUSPENSION ORAL at 09:05

## 2025-05-28 RX ADMIN — CYCLOBENZAPRINE 10 MG: 10 TABLET, FILM COATED ORAL at 10:05

## 2025-05-28 RX ADMIN — NYSTATIN 400000 UNITS: 100000 SUSPENSION ORAL at 10:05

## 2025-05-28 NOTE — PT/OT/SLP PROGRESS
Occupational Therapy   Treatment    Name: Omar Trotter  MRN: [id number]  Admitting Diagnosis:  Symptomatic anemia  7 Days Post-Op    Recommendations:     Discharge Recommendations: Moderate Intensity Therapy  Discharge Equipment Recommendations:  to be determined by next level of care  Barriers to discharge:  Decreased caregiver support    Assessment:     Omar Trotter is a 64 y.o. male with a medical diagnosis of Symptomatic anemia.  He presents with general weakness. Patient participated in function transfer, toilet transfer, grooming standing at sink and ambulation using rolling walker. Performance deficits affecting function are weakness, impaired endurance, impaired self care skills, impaired functional mobility, gait instability, impaired balance, decreased safety awareness, decreased lower extremity function, decreased upper extremity function.     Rehab Prognosis:  Fair; patient would benefit from acute skilled OT services to address these deficits and reach maximum level of function.       Plan:     Patient to be seen 5 x/week to address the above listed problems via self-care/home management, therapeutic activities, therapeutic exercises  Plan of Care Expires: 06/23/25  Plan of Care Reviewed with: patient    Subjective     Chief Complaint: General weakness  Patient/Family Comments/goals: Improved functional mobility and ADL independence.   Pain/Comfort:  Pain Rating 1: 0/10  Pain Rating Post-Intervention 1: 0/10    Objective:     Communicated with: nurse prior to session.  Patient found sitting edge of bed with telemetry, peripheral IV upon OT entry to room.    General Precautions: Standard, fall    Orthopedic Precautions:N/A  Braces: N/A  Respiratory Status: Room air     Occupational Performance:     Functional Mobility/Transfers:  Patient completed Sit <> Stand Transfer with contact guard assistance  with  rolling walker   Patient completed Toilet Transfer Step Transfer technique with minimum  assistance with  rolling walker  Functional Mobility: ambulated 25 feet in the hospital room and bathroom with contact guard assistance using rolling walker.    Activities of Daily Living:  Grooming: contact guard assistance to wash hands standing at sink.      [facility] 6 Click ADL:      Treatment & Education:  Patient reports having low endurance. Will plan on performing more standing ADL activity in future sessions.     Patient left sitting edge of bed with all lines intact and call button in reach    GOALS:   Multidisciplinary Problems       Occupational Therapy Goals          Problem: Occupational Therapy    Goal Priority Disciplines Outcome Interventions   Occupational Therapy Goal     OT, PT/OT     Description: Goals to be met by: 6/23/25     Patient will increase functional independence with ADLs by performing:    UE Dressing with Supervision.  LE Dressing with Supervision.  Grooming while standing at sink with Supervision.  Toileting from toilet with Supervision for hygiene and clothing management.   Toilet transfer to toilet with Supervision.                         Time Tracking:     OT Date of Treatment: 05/28/25  OT Start Time: 1039  OT Stop Time: 1052  OT Total Time (min): 13 min    Billable Minutes:Self Care/Home Management 13    OT/KYLIE: OT          5/28/2025

## 2025-05-28 NOTE — PROGRESS NOTES
Former[facility], later [facility]  Progress Note    Patient Name: Omar Trotter  MRN: [id number]  Patient Class: IP- Inpatient   Admission Date: 5/19/2025  Length of Stay: 7 days  Attending Physician: Scooby Lane MD  Primary Care Provider: Cam Orozco MD        Subjective     Principal Problem:Symptomatic anemia        HPI:    Sukh Trotter is a 64 year old male with a past medical history of GERD, HTN, and BPH, previous hip surgery ,UGIB, / esophagitis was admitted from ER with possible syncopal episode and concern for right hip fracture. He states that he had a fall at home onto the right hip, and that he felt a little weak and lightheaded prior to the fall. He was walking down stairs when he slipped due to feeling weak. He denies LOC or hitting his head. He denies other precipitating factors.  He denies complaint.    ED work up included a CBC, which showed anemia that is worsening from prior studies. He has a history of iron deficiency anemia, but has not been taking his supplements or followed up with hematology in some time. CMP was unremarkable. ProBNP 189, HS troponin 5 and 4. Magnesium 2.1. XR of the hip showed no acute fracture. CT head showed no acute intracranial abnormality. CT cervical spine showed no acute process. CT hip showed chronic findings but no acute findings. CXR showed no acute cardiopulmonary abnormalities. He was given hydrocodone for pain in the ED. Hospital Medicine consulted for admission and further management.     Overview/Hospital Course:  Omar Trotter is a 64 year old male with a past medical history of GERD, HTN, BPH and tobacco dependence who presented with a fall in the setting of symptomatic anemia. Imaging did not reveal acute fracture (possible C4 pedicle disease on CT). He has received one unit of PRBCs for a Hgb of 7.3 on admission. He is on an IV PPI and s/p endoscopy and colonoscopy. EGD showed esophageal plaques were found, consistent with  candidiasis and tx with nystatin for 7 days. Biopsied.  For thrombocytopenia, d/c fluconazole.  Colonoscopy showed single bleeding colonic angioectasia. Treated  with argon plasma coagulation.  Patient was placed on  iron supplement for iron-deficiency.Pending placement for SNF.     Interval History:  Patient seen and examined.  No acute events overnight.  Currently waiting for placement.    Review of Systems  Objective:     Vital Signs (Most Recent):  Temp: 97.2 °F (36.2 °C) (05/28/25 1231)  Pulse: 88 (05/28/25 1231)  Resp: 16 (05/28/25 1231)  BP: 131/77 (05/28/25 1231)  SpO2: 97 % (05/28/25 1231) Vital Signs (24h Range):  Temp:  [97.2 °F (36.2 °C)-98.3 °F (36.8 °C)] 97.2 °F (36.2 °C)  Pulse:  [77-96] 88  Resp:  [16-19] 16  SpO2:  [91 %-97 %] 97 %  BP: (101-151)/(64-77) 131/77     Weight: 94.3 kg (207 lb 14.3 oz)  Body mass index is 29 kg/m².    Intake/Output Summary (Last 24 hours) at 5/28/2025 1435  Last data filed at 5/28/2025 1350  Gross per 24 hour   Intake 480 ml   Output 1350 ml   Net -870 ml         Physical Exam  Vitals and nursing note reviewed.   Eyes:      Pupils: Pupils are equal, round, and reactive to light.   Neck:      Vascular: No JVD.   Cardiovascular:      Rate and Rhythm: Normal rate and regular rhythm.      Heart sounds: Normal heart sounds.   Pulmonary:      Effort: Pulmonary effort is normal.      Breath sounds: Normal breath sounds.   Abdominal:      General: Bowel sounds are normal. There is no distension.      Palpations: Abdomen is soft.      Tenderness: There is no abdominal tenderness.   Musculoskeletal:         General: No deformity.   Lymphadenopathy:      Cervical: No cervical adenopathy.   Skin:     Coloration: Skin is not pale.      Findings: No rash.               Significant Labs: All pertinent labs within the past 24 hours have been reviewed.    Significant Imaging: I have reviewed all pertinent imaging results/findings within the past 24 hours.      Assessment &  Plan  Symptomatic anemia  Anemia is likely due to Iron deficiency. Most recent hemoglobin and hematocrit are listed below.  Recent Labs     05/26/25  0356 05/27/25  0354 05/28/25  0509   HGB 9.7* 10.1* 9.9*   HCT 33.9* 33.9* 33.4*     Plan  - Monitor serial CBC: Daily  - Transfuse PRBC if patient becomes hemodynamically unstable, symptomatic or H/H drops below 7/21.  - S/P Transfusion  - Patient's anemia is currently stable  - s/p endoscopy and colonoscopy.   - EGD showed esophageal plaques were found, consistent with candidiasis and tx with nystatin for 7 days. Biopsied.    - Colonoscopy showed single bleeding colonic angioectasia. Treated  with argon plasma coagulation.    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 Co-morbidities are absent.    Continue chronic meds. Etiology likely ETOH. Will avoid any hepatotoxic meds, and monitor CBC/CMP/INR for synthetic function.   VTE Risk Mitigation (From admission, onward)           Ordered     Reason for No Pharmacological VTE Prophylaxis  Once        Question:  Reasons:  Answer:  Risk of Bleeding    05/20/25 0225     IP VTE LOW RISK PATIENT  Once         05/20/25 0225     Place sequential compression device  Until discontinued         05/20/25 0225                    Discharge Planning   NATALIA: 5/29/2025     Code Status: Full Code   Medical Readiness for Discharge Date:   Discharge Plan A: Skilled Nursing Facility                Please place Justification for DME        Scooby Lane  MD  Department of Hospital Medicine   [facility]

## 2025-05-28 NOTE — ASSESSMENT & PLAN NOTE
Patient with known Cirrhosis with Child's class B. Co-morbidities are absent.    Continue chronic meds. Etiology likely ETOH. Will avoid any hepatotoxic meds, and monitor CBC/CMP/INR for synthetic function.

## 2025-05-28 NOTE — SUBJECTIVE & OBJECTIVE
Interval History:  Patient seen and examined.  No acute events overnight.  Currently waiting for placement.    Review of Systems  Objective:     Vital Signs (Most Recent):  Temp: 97.2 °F (36.2 °C) (05/28/25 1231)  Pulse: 88 (05/28/25 1231)  Resp: 16 (05/28/25 1231)  BP: 131/77 (05/28/25 1231)  SpO2: 97 % (05/28/25 1231) Vital Signs (24h Range):  Temp:  [97.2 °F (36.2 °C)-98.3 °F (36.8 °C)] 97.2 °F (36.2 °C)  Pulse:  [77-96] 88  Resp:  [16-19] 16  SpO2:  [91 %-97 %] 97 %  BP: (101-151)/(64-77) 131/77     Weight: 94.3 kg (207 lb 14.3 oz)  Body mass index is 29 kg/m².    Intake/Output Summary (Last 24 hours) at 5/28/2025 1435  Last data filed at 5/28/2025 1350  Gross per 24 hour   Intake 480 ml   Output 1350 ml   Net -870 ml         Physical Exam  Vitals and nursing note reviewed.   Eyes:      Pupils: Pupils are equal, round, and reactive to light.   Neck:      Vascular: No JVD.   Cardiovascular:      Rate and Rhythm: Normal rate and regular rhythm.      Heart sounds: Normal heart sounds.   Pulmonary:      Effort: Pulmonary effort is normal.      Breath sounds: Normal breath sounds.   Abdominal:      General: Bowel sounds are normal. There is no distension.      Palpations: Abdomen is soft.      Tenderness: There is no abdominal tenderness.   Musculoskeletal:         General: No deformity.   Lymphadenopathy:      Cervical: No cervical adenopathy.   Skin:     Coloration: Skin is not pale.      Findings: No rash.               Significant Labs: All pertinent labs within the past 24 hours have been reviewed.    Significant Imaging: I have reviewed all pertinent imaging results/findings within the past 24 hours.

## 2025-05-28 NOTE — PT/OT/SLP PROGRESS
Physical Therapy Treatment    Patient Name:  Omar Trotter   MRN:  [id number]    Recommendations:     Discharge Recommendations: Moderate Intensity Therapy  Discharge Equipment Recommendations: to be determined by next level of care  Barriers to discharge: decreased mobility, decreased activity tolerance, pain     Assessment:     Omar Trotter is a 64 y.o. male admitted with a medical diagnosis of Symptomatic anemia.  He presents with the following impairments/functional limitations: weakness, impaired balance, impaired endurance, impaired self care skills, impaired functional mobility     Pt was found sitting EOB with nurse present administering medications. Pt was agreeable to treatment.     Pt performed sit to stand with stand by assist and rolling walker. Pt ambulated 80 ft with contact guard assist and rolling walker. Pt with decreased gait speed and slight increase in weightbearing through handles of rolling walker secondary to R hip pain.     Pt was returned to EOB. Pt reported R hip 7/10 pain.     Pt was left on EOB with all lines intact, bed alarm on, and all needs met.    Rehab Prognosis: Fair; patient would benefit from acute skilled PT services to address these deficits and reach maximum level of function.    Recent Surgery: Procedure(s) (LRB):  COLONOSCOPY (N/A)  EGD (ESOPHAGOGASTRODUODENOSCOPY) (N/A) 7 Days Post-Op    Plan:     During this hospitalization, patient to be seen 6 x/week to address the identified rehab impairments via gait training, therapeutic activities, therapeutic exercises and progress toward the following goals:    Plan of Care Expires:  06/22/25    Subjective     Chief Complaint: 7/10 R hip pain  Patient/Family Comments/goals: to be painless and get home  Pain/Comfort:  Pain Rating 1: other (see comments)  Location - Side 1: Right  Location - Orientation 1: lower  Location 1: hip  Pain Addressed 1: Reposition, Pre-medicate for activity, Distraction, Cessation of Activity  Pain  Rating Post-Intervention 1: 7/10      Objective:     Communicated with nurse prior to session.  Patient found sitting edge of bed with peripheral IV, telemetry upon PT entry to room.     General Precautions: Standard, fall  Orthopedic Precautions: N/A  Braces: N/A  Respiratory Status: Room air     Functional Mobility:  Transfers:     Sit to Stand:  stand by assistance with rolling walker  Gait: 80 ft with RW and CGA.       AM-PAC 6 CLICK MOBILITY          Treatment & Education:  Pt educated on importance of time OOB, importance of intermittent mobility, safe techniques for transfers/ambulation, discharge recommendations/options, and use of call light for assistance and fall prevention.     Patient left sitting edge of bed with all lines intact, call button in reach, bed alarm on, and nurse notified..    GOALS:   Multidisciplinary Problems       Physical Therapy Goals          Problem: Physical Therapy    Goal Priority Disciplines Outcome Interventions   Physical Therapy Goal     PT, PT/OT Progressing    Description: Goals to be met by: 25     Patient will increase functional independence with mobility by performin. Supine to sit with Supervision  2. Sit to stand transfer with Supervision  3. Bed to chair transfer with Supervision using Rolling Walker  4. Gait  x 150 feet with Supervision using Rolling Walker  5. Asc/Desc 5 steps with single HR and supervision                                 Time Tracking:     PT Received On: 25  PT Start Time: 1007     PT Stop Time: 1018  PT Total Time (min): 11 min     Billable Minutes: Gait Training 11    Treatment Type: Treatment  PT/PTA: PTA     Number of PTA visits since last PT visit: 2025

## 2025-05-28 NOTE — ASSESSMENT & PLAN NOTE
Patient's blood pressure range in the last 24 hours was: BP  Min: 101/64  Max: 151/73.The patient's inpatient anti-hypertensive regimen is listed below:  Current Antihypertensives  lisinopriL tablet 40 mg, Daily, Oral    Plan  - BP is controlled, no changes needed to their regimen  -

## 2025-05-28 NOTE — ASSESSMENT & PLAN NOTE
Anemia is likely due to Iron deficiency. Most recent hemoglobin and hematocrit are listed below.  Recent Labs     05/26/25  0356 05/27/25  0354 05/28/25  0509   HGB 9.7* 10.1* 9.9*   HCT 33.9* 33.9* 33.4*     Plan  - Monitor serial CBC: Daily  - Transfuse PRBC if patient becomes hemodynamically unstable, symptomatic or H/H drops below 7/21.  - S/P Transfusion  - Patient's anemia is currently stable  - s/p endoscopy and colonoscopy.   - EGD showed esophageal plaques were found, consistent with candidiasis and tx with nystatin for 7 days. Biopsied.    - Colonoscopy showed single bleeding colonic angioectasia. Treated  with argon plasma coagulation.

## 2025-05-28 NOTE — PLAN OF CARE
Patient selected [facility]. Jose submitted. Paperwork for admission to be completed on today.     05/28/25 1149   Post-Acute Status   Post-Acute Authorization Placement   Post-Acute Placement Status Pending payor review/awaiting authorization (if required)

## 2025-05-29 ENCOUNTER — CLINICAL SUPPORT (OUTPATIENT)
Dept: SMOKING CESSATION | Facility: CLINIC | Age: 65
End: 2025-05-29
Payer: COMMERCIAL

## 2025-05-29 VITALS
TEMPERATURE: 98 F | DIASTOLIC BLOOD PRESSURE: 69 MMHG | HEIGHT: 71 IN | WEIGHT: 207.88 LBS | RESPIRATION RATE: 18 BRPM | OXYGEN SATURATION: 96 % | SYSTOLIC BLOOD PRESSURE: 121 MMHG | BODY MASS INDEX: 29.1 KG/M2 | HEART RATE: 114 BPM

## 2025-05-29 DIAGNOSIS — F17.200 NICOTINE DEPENDENCE: Primary | ICD-10-CM

## 2025-05-29 PROCEDURE — 97535 SELF CARE MNGMENT TRAINING: CPT

## 2025-05-29 PROCEDURE — 25000003 PHARM REV CODE 250: Performed by: STUDENT IN AN ORGANIZED HEALTH CARE EDUCATION/TRAINING PROGRAM

## 2025-05-29 PROCEDURE — 97116 GAIT TRAINING THERAPY: CPT | Mod: CQ

## 2025-05-29 PROCEDURE — 25000003 PHARM REV CODE 250: Performed by: NURSE PRACTITIONER

## 2025-05-29 PROCEDURE — 25000003 PHARM REV CODE 250: Performed by: INTERNAL MEDICINE

## 2025-05-29 PROCEDURE — 99406 BEHAV CHNG SMOKING 3-10 MIN: CPT | Performed by: CLINIC/CENTER

## 2025-05-29 PROCEDURE — 25000003 PHARM REV CODE 250: Performed by: FAMILY MEDICINE

## 2025-05-29 PROCEDURE — 63600175 PHARM REV CODE 636 W HCPCS: Performed by: FAMILY MEDICINE

## 2025-05-29 RX ORDER — AMOXICILLIN 250 MG
1 CAPSULE ORAL 2 TIMES DAILY
Qty: 60 TABLET | Refills: 0 | Status: SHIPPED | OUTPATIENT
Start: 2025-05-29 | End: 2025-06-28

## 2025-05-29 RX ORDER — HYDROCODONE BITARTRATE AND ACETAMINOPHEN 5; 325 MG/1; MG/1
1 TABLET ORAL EVERY 6 HOURS PRN
Qty: 20 TABLET | Refills: 0 | Status: SHIPPED | OUTPATIENT
Start: 2025-05-29

## 2025-05-29 RX ORDER — CYCLOBENZAPRINE HCL 10 MG
10 TABLET ORAL 3 TIMES DAILY PRN
Start: 2025-05-29

## 2025-05-29 RX ORDER — TAMSULOSIN HYDROCHLORIDE 0.4 MG/1
2 CAPSULE ORAL NIGHTLY
Start: 2025-05-29

## 2025-05-29 RX ADMIN — SENNOSIDES AND DOCUSATE SODIUM 1 TABLET: 50; 8.6 TABLET ORAL at 08:05

## 2025-05-29 RX ADMIN — HYDROCODONE BITARTRATE AND ACETAMINOPHEN 1 TABLET: 5; 325 TABLET ORAL at 02:05

## 2025-05-29 RX ADMIN — NYSTATIN 400000 UNITS: 100000 SUSPENSION ORAL at 06:05

## 2025-05-29 RX ADMIN — GABAPENTIN 800 MG: 300 CAPSULE ORAL at 02:05

## 2025-05-29 RX ADMIN — NYSTATIN 400000 UNITS: 100000 SUSPENSION ORAL at 08:05

## 2025-05-29 RX ADMIN — TAMSULOSIN HYDROCHLORIDE 0.8 MG: 0.4 CAPSULE ORAL at 08:05

## 2025-05-29 RX ADMIN — BUSPIRONE HYDROCHLORIDE 15 MG: 10 TABLET ORAL at 08:05

## 2025-05-29 RX ADMIN — PANTOPRAZOLE SODIUM 40 MG: 40 INJECTION, POWDER, FOR SOLUTION INTRAVENOUS at 08:05

## 2025-05-29 RX ADMIN — LISINOPRIL 40 MG: 20 TABLET ORAL at 08:05

## 2025-05-29 RX ADMIN — HYDROCODONE BITARTRATE AND ACETAMINOPHEN 1 TABLET: 5; 325 TABLET ORAL at 08:05

## 2025-05-29 RX ADMIN — GABAPENTIN 800 MG: 300 CAPSULE ORAL at 08:05

## 2025-05-29 RX ADMIN — FERROUS SULFATE TAB 325 MG (65 MG ELEMENTAL FE) 1 EACH: 325 (65 FE) TAB at 08:05

## 2025-05-29 RX ADMIN — NYSTATIN 400000 UNITS: 100000 SUSPENSION ORAL at 02:05

## 2025-05-29 NOTE — PLAN OF CARE
Patient cleared for discharge from case management standpoint.    Follow up appointments scheduled and added to AVS.    Chart and discharge orders reviewed.  Patient discharged to [facility] with no further case management needs.     Call report info relayed to attending nurse. Wheel-chair van requested for transportation for 4 PM.     05/29/25 1513   Final Note   Assessment Type Final Discharge Note   Anticipated Discharge Disposition [facility]   Hospital Resources/Appts/Education Provided Provided patient/caregiver with written discharge plan information;Provided education on problems/symptoms using teachback   Post-Acute Status   Post-Acute Authorization Placement   Post-Acute Placement Status Set-up Complete/Auth obtained   Discharge Delays (!) Ambulance Transport/Facility Transport

## 2025-05-29 NOTE — PROGRESS NOTES
"[facility]  Adult Nutrition   Progress Note (Initial Assessment)     SUMMARY     Recommendations  Recommendation/Intervention: 1. Continue Heart healthy diet as tolerated. 2.  to obtain daily menu choices.  Nutrition Goal Status: new    Nutrition Goals:  PO intake will meet >75% of estimated needs by RD follow up.    Nutrition Interventions: Fat modified diet, Cholesterol modified diet, Fiber modified diet, Fluid modified diet, and Sodium modified diet      Nutrition Diagnosis   Nutrition PES Problem: No nutrition diagnosis at this time  Nutrition PES Status: New      Dietitian Rounds Brief  RD screen for LOS. Patient admitted with anemia and possible hip fracture due to fall. No evidence of right hip fracture per MD note. . EGD candidiasis, patient given nystatin; colonoscopy showed single bleeding colonic angioectasia Hematology labs consistent with chronic anemia. Pt received IV Iron. Good po intake prior to and during admit. Last BM 5/27/25. RD to follow for intake, labs, and status change.    Nutrition Related Social Determinants of Health: SDOH: None Identified  Food Insecurity: Patient Declined (5/20/2025)    Hunger Vital Sign     Worried About Running Out of Food in the Last Year: Patient declined     Ran Out of Food in the Last Year: Patient declined       Malnutrition Assessment       No evidence of malnutrition at this time.                                Diet order:   Diet Heart Healthy Standard Tray  Diet Adult Regular                Evaluation of Received Nutrient/Fluid Intake  Energy Calories Required: meeting needs  Protein Required: meeting needs  Fluid Required: meeting needs  Tolerance: tolerating     % Intake of Estimated Energy Needs: 75 - 100 %  % Meal Intake: 75 - 100 %      Intake/Output Summary (Last 24 hours) at 5/29/2025 1414  Last data filed at 5/29/2025 0844  Gross per 24 hour   Intake 720 ml   Output 1250 ml   Net -530 ml        Anthropometrics  Height: 5' 11" " (180.3 cm)  Height (inches): 71 in  Height Method: Measured  Weight: 94.3 kg (207 lb 14.3 oz)  Weight (lb): 207.9 lb  Weight Method: Bed Scale  Ideal Body Weight (IBW), Male: 172 lb  % Ideal Body Weight, Male (lb): 107.56 %  BMI (Calculated): 29       Estimated/Assessed Needs  Weight Used For Calorie Calculations: 94.3 kg (207 lb 14.3 oz)  Energy Calorie Requirements (kcal): 6080-6124 kcal /day (20-25 gm/kg).  Energy Need Method: Kcal/kg  Protein Requirements: 113-151 gm/day (1.2-1.6 gm/kg).  Weight Used For Protein Calculations: 94.3 kg (207 lb 14.3 oz)     Estimated Fluid Requirement Method: RDA Method  RDA Method (mL): 1886       Reason for Assessment  Reason For Assessment: length of stay  Diagnosis: other (see comments) (anemia)  General Information Comments: Patient with history of GERD, HTN, and BPH, previous hip surgery ,UGIB, / esophagitis was admitted with possible syncopal episode and concern for right hip fracture. He states that he had a fall at home onto the right hip, and that he felt a little weak and lightheaded prior to the fall (per H and P note).    Final diagnoses:  [M25.551] Right hip pain (Primary)  [R42] Lightheaded  [D64.9] Symptomatic anemia     Past Medical History:   Diagnosis Date    JACKLYN (acute kidney injury) 11/11/2022    Arthritis     GERD (gastroesophageal reflux disease)     Hepatitis C     STATES DX 10-20. NO S/S. NO FURTHER TESTS OR TX. WITH INSURANCE IN 2021 CAN GET IT EVALUATED.    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 MST 0.  Food Preferences:  obtained.  Spiritual, Cultural Beliefs, Zoroastrian Practices, Values that Affect Care: no  Food Allergies: NKFA  Factors Affecting Nutritional Intake: None identified at this time    Nutrition Risk Screen         MST Score: 0  Have you recently lost weight without trying?: No  Weight loss score: 0  Have you been eating poorly because of a decreased appetite?: No  Appetite score: 0       Weight History:  Wt Readings from Last 10 Encounters:   05/20/25 94.3 kg (207 lb 14.3 oz)   01/27/25 77.1 kg (170 lb)   11/18/24 79.1 kg (174 lb 4.8 oz)   10/29/24 76.2 kg (167 lb 15.9 oz)   10/29/24 76.2 kg (167 lb 15.9 oz)   07/25/24 72.6 kg (160 lb)   06/21/24 79.4 kg (175 lb)   03/15/24 71.7 kg (158 lb)   03/07/24 70.5 kg (155 lb 6.8 oz)   03/06/24 69 kg (152 lb 3.2 oz)        Lab/Procedures/Meds:   Pertinent Labs Reviewed: reviewed  Pertinent Medications Reviewed: reviewed  Medications:Pertinent Medications Reviewed  Scheduled Meds:   busPIRone  15 mg Oral Daily    ferrous sulfate  1 tablet Oral Every other day    gabapentin  800 mg Oral TID    lisinopriL  40 mg Oral Daily    nystatin  4 mL Oral QID    pantoprazole  40 mg Intravenous BID    senna-docusate  1 tablet Oral BID    tamsulosin  2 capsule Oral Daily     Continuous Infusions:  PRN Meds:.  Current Facility-Administered Medications:     0.9%  NaCl infusion (for blood administration), , Intravenous, Q24H PRN    acetaminophen, 650 mg, Oral, Q4H PRN    aluminum-magnesium hydroxide-simethicone, 30 mL, Oral, QID PRN    cyclobenzaprine, 10 mg, Oral, TID PRN    HYDROcodone-acetaminophen, 1 tablet, Oral, Q6H PRN    melatonin, 9 mg, Oral, Nightly PRN    morphine, 2 mg, Intravenous, Q2H PRN    naloxone, 0.02 mg, Intravenous, PRN    ondansetron, 8 mg, Intravenous, Q6H PRN    potassium bicarbonate, 35 mEq, Oral, PRN    potassium bicarbonate, 50 mEq, Oral, PRN    potassium bicarbonate, 60 mEq, Oral, PRN    potassium, sodium phosphates, 2 packet, Oral, PRN    sodium chloride 0.9%, 3 mL, Intravenous, Q12H PRN    Labs: Pertinent Labs Reviewed  Clinical Chemistry:  Recent Labs   Lab 05/27/25  0355 05/28/25  0509    136   K 4.5 4.6    102   CO2 25 22*   GLU 97 115*   BUN 32* 32*  "  CREATININE 1.0 1.0   CALCIUM 9.6 9.5   ANIONGAP 9 12   MG 2.1  --      CBC:   Recent Labs   Lab 05/28/25  0509   WBC 4.61   RBC 4.09*   HGB 9.9*   HCT 33.4*      MCV 82   MCH 24.2*   MCHC 29.6*     Lipid Panel:  No results for input(s): "CHOL", "HDL", "LDLCALC", "TRIG", "CHOLHDL" in the last 168 hours.  Cardiac Profile:  No results for input(s): "BNP", "CPK", "CPKMB", "TROPONINI", "CKTOTAL" in the last 168 hours.  Inflammatory Labs:  No results for input(s): "CRP" in the last 168 hours.  Diabetes:  No results for input(s): "HGBA1C", "POCTGLUCOSE" in the last 168 hours.  Thyroid & Parathyroid:  No results for input(s): "TSH", "FREET4", "I8TGBSL", "B3OOALR", "THYROIDAB" in the last 168 hours.    Monitor and Evaluation  Monitor and Evaluation: Food and beverage intake, Diet order     Discharge Planning  Nutrition Discharge Planning: Therapeutic diet (comments)  Therapeutic diet (comments): Heart Healthy diet    Nutrition Risk  Level of Risk/Frequency of Follow-up:  (1 x / week)     Nutrition Follow-Up  RD Follow-up?: Yes            "

## 2025-05-29 NOTE — PLAN OF CARE
Problem: Physical Therapy  Goal: Physical Therapy Goal  Description: Goals to be met by: 25     Patient will increase functional independence with mobility by performin. Supine to sit with Supervision  2. Sit to stand transfer with Supervision  3. Bed to chair transfer with Supervision using Rolling Walker  4. Gait  x 150 feet with Supervision using Rolling Walker  5. Asc/Desc 5 steps with single HR and supervision        Outcome: Progressing

## 2025-05-29 NOTE — PT/OT/SLP PROGRESS
"Physical Therapy Treatment    Patient Name:  Omar Trotter   MRN:  [id number]    Recommendations:     Discharge Recommendations: Moderate Intensity Therapy  Discharge Equipment Recommendations: to be determined by next level of care  Barriers to discharge: Decreased caregiver support    Assessment:     Omar Trotter is a 64 y.o. male admitted with a medical diagnosis of Symptomatic anemia.  He presents with the following impairments/functional limitations: weakness, impaired balance, impaired endurance, impaired self care skills, impaired functional mobility     Pt was found sitting in bedside chair. Pt reports feeling better and is eager for discharge. Pt was agreeable to treatment.     Pt performed sit to stand with rolling walker and supervision. Pt ambulated 100 ft with rolling walker and stand by assist. During ambulation pt reports "clicking" in R hip.     Pt returned to bedside chair. Pt reports 5-6/10 R hip pain.     Pt was left in bedside chair with all lines intact and all needs met.     Rehab Prognosis: Fair; patient would benefit from acute skilled PT services to address these deficits and reach maximum level of function.    Recent Surgery: Procedure(s) (LRB):  COLONOSCOPY (N/A)  EGD (ESOPHAGOGASTRODUODENOSCOPY) (N/A) 8 Days Post-Op    Plan:     During this hospitalization, patient to be seen 6 x/week to address the identified rehab impairments via gait training, therapeutic activities, therapeutic exercises and progress toward the following goals:    Plan of Care Expires:  06/22/25    Subjective     Chief Complaint: R hip pain  Patient/Family Comments/goals: going home soon  Pain/Comfort:  Pain Rating 1: other (see comments) (pt reports 5-6/10 pain in RLE)  Location - Side 1: Right  Location - Orientation 1: lower  Location 1: hip  Pain Addressed 1: Pre-medicate for activity, Reposition, Distraction, Cessation of Activity  Pain Rating Post-Intervention 1: 6/10      Objective:     Communicated with " nurse prior to session.  Patient found up in chair with telemetry upon PT entry to room.     General Precautions: Standard, fall  Orthopedic Precautions: N/A  Braces: N/A  Respiratory Status: Room air     Functional Mobility:  Transfers:     Sit to Stand:  supervision with rolling walker  Gait: 100 ft with rolling walker and stand by assist      AM-PAC 6 CLICK MOBILITY          Treatment & Education:  Pt educated on importance of time OOB, importance of intermittent mobility, safe techniques for transfers/ambulation, discharge recommendations/options, and use of call light for assistance and fall prevention.     Patient left up in chair with all lines intact, call button in reach, chair alarm on, and nurse notified..    GOALS:   Multidisciplinary Problems       Physical Therapy Goals          Problem: Physical Therapy    Goal Priority Disciplines Outcome Interventions   Physical Therapy Goal     PT, PT/OT Progressing    Description: Goals to be met by: 25     Patient will increase functional independence with mobility by performin. Supine to sit with Supervision  2. Sit to stand transfer with Supervision  3. Bed to chair transfer with Supervision using Rolling Walker  4. Gait  x 150 feet with Supervision using Rolling Walker  5. Asc/Desc 5 steps with single HR and supervision                                 Time Tracking:     PT Received On: 25  PT Start Time: 1113     PT Stop Time: 1121  PT Total Time (min): 8 min     Billable Minutes: Gait Training 8    Treatment Type: Treatment  PT/PTA: PTA     Number of PTA visits since last PT visit: 2     2025

## 2025-05-29 NOTE — PT/OT/SLP PROGRESS
Occupational Therapy   Treatment    Name: Omar Trotter  MRN: [id number]  Admitting Diagnosis:  Symptomatic anemia  8 Days Post-Op    Recommendations:     Discharge Recommendations: Moderate Intensity Therapy  Discharge Equipment Recommendations:  to be determined by next level of care  Barriers to discharge:  Decreased caregiver support    Assessment:     Omar Trotter is a 64 y.o. male with a medical diagnosis of Symptomatic anemia.  He presents with improving medical acuity and functional mobility. Patient participated in grooming standing at sink, LB dressing sitting EOB and ambulation using rolling walker. Performance deficits affecting function are weakness, impaired endurance, impaired self care skills, impaired functional mobility, gait instability, impaired balance, decreased upper extremity function, decreased safety awareness, decreased lower extremity function.     Rehab Prognosis:  Fair; patient would benefit from acute skilled OT services to address these deficits and reach maximum level of function.       Plan:     Patient to be seen 5 x/week to address the above listed problems via self-care/home management, therapeutic activities, therapeutic exercises  Plan of Care Expires: 06/23/25  Plan of Care Reviewed with: patient    Subjective     Chief Complaint: General weakness  Patient/Family Comments/goals: Improved functional mobility and ADL independence.   Pain/Comfort:  Pain Rating 1: 0/10  Pain Rating Post-Intervention 1: 0/10    Objective:     Communicated with: nurse prior to session.  Patient found sitting edge of bed with telemetry, peripheral IV upon OT entry to room.    General Precautions: Standard, fall    Orthopedic Precautions:N/A  Braces: N/A  Respiratory Status: Room air     Occupational Performance:     Functional Mobility/Transfers:  Patient completed Sit <> Stand Transfer with stand by assistance  with  rolling walker   Functional Mobility: ambulated 25 feet in the hospital room  with stand by assistance using rolling walker.    Activities of Daily Living:  Grooming: stand by assistance to brush teeth, wash face and brush hair standing at sink  Lower Body Dressing: minimum assistance to don socks sitting EOB.      [facility] 6 Click ADL: 19    Treatment & Education:  Patient participated in standing ADL activity at this sink for 13 minutes with 4 standing rest breaks holding on rolling walker.    Patient left sitting edge of bed with all lines intact and call button in reach    GOALS:   Multidisciplinary Problems       Occupational Therapy Goals          Problem: Occupational Therapy    Goal Priority Disciplines Outcome Interventions   Occupational Therapy Goal     OT, PT/OT Progressing    Description: Goals to be met by: 6/23/25     Patient will increase functional independence with ADLs by performing:    UE Dressing with Supervision.  LE Dressing with Supervision.  Grooming while standing at sink with Supervision.  Toileting from toilet with Supervision for hygiene and clothing management.   Toilet transfer to toilet with Supervision.                         Time Tracking:     OT Date of Treatment: 05/29/25  OT Start Time: 0955  OT Stop Time: 1008  OT Total Time (min): 13 min    Billable Minutes:Self Care/Home Management 13    OT/KYLIE: OT          5/29/2025

## 2025-05-29 NOTE — PLAN OF CARE
FLORENCIA sent AVS via Epic.     05/29/25 9860   Post-Acute Status   Post-Acute Authorization Placement   Post-Acute Placement Status Pending post-acute provider review/more information requested

## 2025-05-29 NOTE — NURSING
Report called to Nurse Arnold. Awaiting transportation.    1836- [facility] here to transport pt to rehab facility via WC in stable condition. Iv removed w/o difficulty, catheter intact.

## 2025-05-29 NOTE — PLAN OF CARE
Problem: Occupational Therapy  Goal: Occupational Therapy Goal  Description: Goals to be met by: 6/23/25     Patient will increase functional independence with ADLs by performing:    UE Dressing with Supervision.  LE Dressing with Supervision.  Grooming while standing at sink with Supervision.  Toileting from toilet with Supervision for hygiene and clothing management.   Toilet transfer to toilet with Supervision.    Outcome: Progressing

## 2025-05-29 NOTE — PROGRESS NOTES
05/29/25 0900   Tobacco Cessation Intervention/Follow up   Do you use any type of tobacco product? Yes   Are you interested in quitting use of tobacco products? Ready to quit   Are you interested in Nicotine Replacement for symptom relief? No   $ Smoking Cessation Charges Smoking Cessation - Intermediate (CTTS)

## 2025-05-29 NOTE — PLAN OF CARE
FLORENCIA informed that auth approved. FLORENCIA sent 142, PASRR, tb test, and chest x-ray. Dc pending dc orders. SW to confirm transportation.     05/29/25 1007   Post-Acute Status   Post-Acute Authorization Placement   Post-Acute Placement Status Set-up Complete/Auth obtained

## 2025-05-29 NOTE — DISCHARGE INSTRUCTIONS
[facility]  Facility Transfer Orders        Admit to: SNF    Diagnoses:   Active Hospital Problems    Diagnosis  POA    *Symptomatic anemia [D64.9]  Yes     Priority: 1 - High    Syncope [R55]  Yes    Hypertension [I10]  Yes    GERD (gastroesophageal reflux disease) [K21.9]  Yes    Cirrhosis of liver [K74.60]  Yes      Resolved Hospital Problems    Diagnosis Date Resolved POA    Thrombocytopathia [D69.1] 05/27/2025 No     Allergies:   Review of patient's allergies indicates:   Allergen Reactions    Pcn [penicillins]      As a child.       Code Status: full    Vitals: Routine       Diet: regular diet    Activity: Activity as tolerated    Nursing Precautions: Pressure ulcer prevention    Bed/Surface: Low Air Loss    Consults: PT to evaluate and treat- 5 times a week and OT to evaluate and treat- 5 times a week    Oxygen: room air    Dialysis: Patient is not on dialysis.     Labs:  none                 Pending Diagnostic Studies:       None          Imaging: n/a    Miscellaneous Care: n/a    IV Access: none     Medications: Discontinue all previous medication orders, if any. See new list below.  Current Discharge Medication List        START taking these medications    Details   HYDROcodone-acetaminophen (NORCO) 5-325 mg per tablet Take 1 tablet by mouth every 6 (six) hours as needed for Pain.  Qty: 20 tablet, Refills: 0    Comments: Quantity prescribed more than 7 day supply? Yes, quantity medically necessary  Associated Diagnoses: Right hip pain      senna-docusate (PERICOLACE) 8.6-50 mg per tablet Take 1 tablet by mouth 2 (two) times daily.  Qty: 60 tablet, Refills: 0           CONTINUE these medications which have CHANGED    Details   cyclobenzaprine (FLEXERIL) 10 MG tablet Take 1 tablet (10 mg total) by mouth 3 (three) times daily as needed for Muscle spasms.    Associated Diagnoses: Chronic midline low back pain without sciatica      tamsulosin (FLOMAX) 0.4 mg Cap Take 2 capsules (0.8 mg total) by  mouth nightly.    Associated Diagnoses: Benign prostatic hyperplasia with urinary frequency           CONTINUE these medications which have NOT CHANGED    Details   busPIRone (BUSPAR) 15 MG tablet Take 1 tablet by mouth once daily  Qty: 90 tablet, Refills: 3    Associated Diagnoses: Chronic midline low back pain without sciatica      gabapentin (NEURONTIN) 800 MG tablet Take 1 tablet (800 mg total) by mouth 3 (three) times daily.  Qty: 90 tablet, Refills: 11    Associated Diagnoses: Chronic midline low back pain without sciatica      lisinopriL (PRINIVIL,ZESTRIL) 40 MG tablet Take 1 tablet (40 mg total) by mouth once daily.  Qty: 90 tablet, Refills: 3    Comments: .  Associated Diagnoses: Essential hypertension      pantoprazole (PROTONIX) 40 MG tablet Take 1 tablet (40 mg total) by mouth 2 (two) times daily.  Qty: 60 tablet, Refills: 11           STOP taking these medications       oxybutynin (DITROPAN-XL) 5 MG TR24 Comments:   Reason for Stopping:             Follow up:    Contact information for follow-up providers       Cam Orozco MD Follow up.    Specialty: Internal Medicine  Contact information:  [address] [facility] [address]  [phone]                       Contact information for after-discharge care       Destination       [facility] .    Service: Skilled Nursing  Contact information:  [address] [facility] [id number] [phone]                                     Immunizations Administered as of 5/29/2025       No immunizations on file.        .            Scooby Lane MD

## 2025-05-30 DIAGNOSIS — K55.20 ARTERIOVENOUS MALFORMATION OF COLON: Primary | ICD-10-CM

## 2025-05-30 DIAGNOSIS — D50.0 IRON DEFICIENCY ANEMIA SECONDARY TO BLOOD LOSS (CHRONIC): ICD-10-CM

## 2025-05-30 DIAGNOSIS — K74.60 CIRRHOSIS OF LIVER: ICD-10-CM

## 2025-05-30 DIAGNOSIS — B18.2 CHRONIC HEPATITIS C: ICD-10-CM

## 2025-05-30 NOTE — DISCHARGE SUMMARY
[facility] Medicine  Discharge Summary      Patient Name: Omar Trotter  MRN: [id number]  ANGEL: [id number]  Patient Class: IP- Inpatient  Admission Date: 5/19/2025  Hospital Length of Stay: 8 days  Discharge Date and Time: 5/29/2025  6:39 PM  Attending Physician: No att. providers found   Discharging Provider: Scooby Lane MD  Primary Care Provider: Cam Orozco MD    Primary Care Team: Networked reference to record PCT     HPI:     Sukh Trotter is a 64 year old male with a past medical history of GERD, HTN, and BPH, previous hip surgery ,UGIB, / esophagitis was admitted from ER with possible syncopal episode and concern for right hip fracture. He states that he had a fall at home onto the right hip, and that he felt a little weak and lightheaded prior to the fall. He was walking down stairs when he slipped due to feeling weak. He denies LOC or hitting his head. He denies other precipitating factors.  He denies complaint.    ED work up included a CBC, which showed anemia that is worsening from prior studies. He has a history of iron deficiency anemia, but has not been taking his supplements or followed up with hematology in some time. CMP was unremarkable. ProBNP 189, HS troponin 5 and 4. Magnesium 2.1. XR of the hip showed no acute fracture. CT head showed no acute intracranial abnormality. CT cervical spine showed no acute process. CT hip showed chronic findings but no acute findings. CXR showed no acute cardiopulmonary abnormalities. He was given hydrocodone for pain in the ED. Hospital Medicine consulted for admission and further management.     Procedure(s) (LRB):  COLONOSCOPY (N/A)  EGD (ESOPHAGOGASTRODUODENOSCOPY) (N/A)      Hospital Course:   Omar Trotter is a 64 year old male with a past medical history of GERD, HTN, BPH and tobacco dependence who presented with a fall in the setting of symptomatic anemia. Imaging did not reveal acute fracture (possible C4 pedicle disease  on CT). He has received one unit of PRBCs for a Hgb of 7.3 on admission. He is on an IV PPI and s/p endoscopy and colonoscopy. EGD showed esophageal plaques were found, consistent with candidiasis and tx with nystatin for 7 days. Biopsied.  For thrombocytopenia, d/c fluconazole.  Colonoscopy showed single bleeding colonic angioectasia. Treated  with argon plasma coagulation.  Patient was placed on  iron supplement for iron-deficiency. Patient was discharged to SNF.     Goals of Care Treatment Preferences:  Code Status: Full Code      SDOH Screening:  The patient declined to be screened for utility difficulties, food insecurity, transport difficulties, housing insecurity, and interpersonal safety, so no concerns could be identified this admission.     Consults:   Consults (From admission, onward)          Status Ordering Provider     Inpatient consult to Midline team  Once        Provider:  (Not yet assigned)    Completed SONALI LITTLE     Inpatient consult to Gastroenterology  Once        Provider:  Familia Romero III, MD    Completed CHRISTIANO BOYD            Assessment & Plan  Symptomatic anemia  Anemia is likely due to Iron deficiency. Most recent hemoglobin and hematocrit are listed below.  Recent Labs     05/28/25  0509   HGB 9.9*   HCT 33.4*     Plan  - Monitor serial CBC: Daily  - Transfuse PRBC if patient becomes hemodynamically unstable, symptomatic or H/H drops below 7/21.  - S/P Transfusion  - Patient's anemia is currently stable  - s/p endoscopy and colonoscopy.   - EGD showed esophageal plaques were found, consistent with candidiasis and tx with nystatin for 7 days. Biopsied.    - Colonoscopy showed single bleeding colonic angioectasia. Treated  with argon plasma coagulation.    GERD (gastroesophageal reflux disease)  Noted, protonix    Hypertension  Patient's blood pressure range in the last 24 hours was: No data recorded.The patient's inpatient anti-hypertensive regimen is listed  below:  Current Antihypertensives       Plan  - BP is controlled, no changes needed to their regimen  -   Syncope  Previous recent carotid ultrasound negative from 12/2023  Ordered echo EF 50-55%  CT head neg     Cirrhosis of liver  Patient with known Cirrhosis with Child's class B. Co-morbidities are absent.    Continue chronic meds. Etiology likely ETOH. Will avoid any hepatotoxic meds, and monitor CBC/CMP/INR for synthetic function.   Final Active Diagnoses:    Diagnosis Date Noted POA    PRINCIPAL PROBLEM:  Symptomatic anemia [D64.9] 11/11/2022 Yes    Syncope [R55] 10/29/2024 Yes    Hypertension [I10]  Yes    GERD (gastroesophageal reflux disease) [K21.9]  Yes    Cirrhosis of liver [K74.60] 10/05/2020 Yes      Problems Resolved During this Admission:    Diagnosis Date Noted Date Resolved POA    Thrombocytopathia [D69.1] 05/23/2025 05/27/2025 No       Discharged Condition: stable    Disposition: Skilled Nursing Facility    Follow Up:   Contact information for follow-up providers       Cam Orozco MD Follow up.    Specialty: Internal Medicine  Contact information:  [address] [facility] [address]  [phone]                       Contact information for after-discharge care       Destination       [facility] .    Service: Skilled Nursing  Contact information:  [address] [place] [id number]  [phone]                                 Patient Instructions:      Diet Adult Regular     Notify your health care provider if you experience any of the following:  temperature >100.4     Notify your health care provider if you experience any of the following:  severe uncontrolled pain     Activity as tolerated       Significant Diagnostic Studies: N/A    Pending Diagnostic Studies:       None           Medications:  Reconciled Home Medications:      Medication List        START taking these medications      HYDROcodone-acetaminophen 5-325 mg per tablet  Commonly known as:  NORCO  Take 1 tablet by mouth every 6 (six) hours as needed for Pain.     senna-docusate 8.6-50 mg per tablet  Commonly known as: PERICOLACE  Take 1 tablet by mouth 2 (two) times daily.            CHANGE how you take these medications      busPIRone 15 MG tablet  Commonly known as: BUSPAR  Take 1 tablet by mouth once daily  What changed:   how much to take  how to take this  when to take this     lisinopriL 40 MG tablet  Commonly known as: PRINIVIL,ZESTRIL  Take 1 tablet (40 mg total) by mouth once daily.  What changed: when to take this            CONTINUE taking these medications      cyclobenzaprine 10 MG tablet  Commonly known as: FLEXERIL  Take 1 tablet (10 mg total) by mouth 3 (three) times daily as needed for Muscle spasms.     gabapentin 800 MG tablet  Commonly known as: NEURONTIN  Take 1 tablet (800 mg total) by mouth 3 (three) times daily.     pantoprazole 40 MG tablet  Commonly known as: PROTONIX  Take 1 tablet (40 mg total) by mouth 2 (two) times daily.     tamsulosin 0.4 mg Cap  Commonly known as: FLOMAX  Take 2 capsules (0.8 mg total) by mouth nightly.            STOP taking these medications      oxybutynin 5 MG Tr24  Commonly known as: DITROPAN-XL              Indwelling Lines/Drains at time of discharge:   Lines/Drains/Airways       None                   Time spent on the discharge of patient: 37 minutes         Scooby Lane MD  Department of Hospital Medicine  [facility]

## 2025-05-30 NOTE — ASSESSMENT & PLAN NOTE
Patient's blood pressure range in the last 24 hours was: No data recorded.The patient's inpatient anti-hypertensive regimen is listed below:  Current Antihypertensives       Plan  - BP is controlled, no changes needed to their regimen  -

## 2025-05-30 NOTE — ASSESSMENT & PLAN NOTE
Anemia is likely due to Iron deficiency. Most recent hemoglobin and hematocrit are listed below.  Recent Labs     05/28/25  0509   HGB 9.9*   HCT 33.4*     Plan  - Monitor serial CBC: Daily  - Transfuse PRBC if patient becomes hemodynamically unstable, symptomatic or H/H drops below 7/21.  - S/P Transfusion  - Patient's anemia is currently stable  - s/p endoscopy and colonoscopy.   - EGD showed esophageal plaques were found, consistent with candidiasis and tx with nystatin for 7 days. Biopsied.    - Colonoscopy showed single bleeding colonic angioectasia. Treated  with argon plasma coagulation.

## 2025-07-03 ENCOUNTER — OFFICE VISIT (OUTPATIENT)
Dept: ORTHOPEDICS | Facility: CLINIC | Age: 65
End: 2025-07-03
Payer: MEDICARE

## 2025-07-03 ENCOUNTER — PATIENT MESSAGE (OUTPATIENT)
Dept: ORTHOPEDICS | Facility: CLINIC | Age: 65
End: 2025-07-03
Payer: MEDICARE

## 2025-07-03 VITALS — WEIGHT: 195 LBS | HEIGHT: 71 IN | BODY MASS INDEX: 27.3 KG/M2

## 2025-07-03 DIAGNOSIS — Z96.652 HISTORY OF TOTAL KNEE REPLACEMENT, LEFT: ICD-10-CM

## 2025-07-03 DIAGNOSIS — Z01.818 PREOP TESTING: ICD-10-CM

## 2025-07-03 DIAGNOSIS — M16.11 PRIMARY OSTEOARTHRITIS OF RIGHT HIP: Primary | ICD-10-CM

## 2025-07-03 PROCEDURE — 99999 PR PBB SHADOW E&M-EST. PATIENT-LVL III: CPT | Mod: PBBFAC,,, | Performed by: ORTHOPAEDIC SURGERY

## 2025-07-03 RX ORDER — CLINDAMYCIN PHOSPHATE 900 MG/50ML
900 INJECTION, SOLUTION INTRAVENOUS
OUTPATIENT
Start: 2025-07-03

## 2025-07-03 NOTE — PROGRESS NOTES
[facility]    Subjective:     Chief Complaint:   Chief Complaint   Patient presents with    Right Hip - Pain     Right hip pain, pain x January 2025, the pain is in groin, side and back of hip, c/o pain down leg, better with sitting, ambulates with two canes, XR Right hip done 5/19/25 and CT done 5/20/25       Past Medical History:   Diagnosis Date    JACKLYN (acute kidney injury) 11/11/2022    Arthritis     GERD (gastroesophageal reflux disease)     Hepatitis C     STATES DX 10-20. NO S/S. NO FURTHER TESTS OR TX. WITH INSURANCE IN 2021 CAN GET IT EVALUATED.    History of left hip replacement 05/20/2021    History of MRSA infection     History of right shoulder replacement 01/15/2021    Hypertension     Wears glasses        Past Surgical History:   Procedure Laterality Date    APPENDECTOMY      ARTHROPLASTY OF SHOULDER Right 1/13/2021    Procedure: ARTHROPLASTY, SHOULDER TOTAL;  Surgeon: Emilio Siddiqui MD;  [facility];  Service: Orthopedics;  Laterality: Right;  GENERAL AND BLOCK    COLONOSCOPY N/A 5/8/2023    Procedure: COLONOSCOPY;  Surgeon: Bobby Bourgeois MD;  Location: [facility];  Service: Endoscopy;  Laterality: N/A;    COLONOSCOPY N/A 5/21/2025    Procedure: COLONOSCOPY;  Surgeon: Anat Gooden MD;  Location: [facility];  Service: Endoscopy;  Laterality: N/A;    ESOPHAGOGASTRODUODENOSCOPY N/A 5/7/2023    Procedure: EGD (ESOPHAGOGASTRODUODENOSCOPY);  Surgeon: Carloz Christianson Jr., MD;  Location: [facility];  Service: Endoscopy;  Laterality: N/A;    ESOPHAGOGASTRODUODENOSCOPY N/A 3/1/2024    Procedure: EGD (ESOPHAGOGASTRODUODENOSCOPY);  Surgeon: Femi Hyde MD;  Location: [facility];  Service: Endoscopy;  Laterality: N/A;    ESOPHAGOGASTRODUODENOSCOPY N/A 5/21/2025    Procedure: EGD (ESOPHAGOGASTRODUODENOSCOPY);  Surgeon: Anat Gooden MD;  Location: [facility];  Service: Endoscopy;  Laterality: N/A;    HERNIA REPAIR      HIP REPLACEMENT ARTHROPLASTY Left 3/24/2021    Procedure:  ARTHROPLASTY, HIP REPLACEMENT;  Surgeon: Miles Rivas II, MD;  Location: [facility];  Service: Orthopedics;  Laterality: Left;    JOINT REPLACEMENT Right     shoulder    REVISION TOTAL HIP ARTHROPLASTY Left 6/2/2021    Procedure: REVISION, TOTAL ARTHROPLASTY, HIP;  Surgeon: Miles Rivas II, MD;  Location: [facility] OR;  Service: Orthopedics;  Laterality: Left;    SMALL BOWEL ENTEROSCOPY Left 5/8/2023    Procedure: ENTEROSCOPY;  Surgeon: Bobby Bourgeois MD;  Location: [facility];  Service: Endoscopy;  Laterality: Left;       Current Outpatient Medications   Medication Sig    busPIRone (BUSPAR) 15 MG tablet Take 1 tablet by mouth once daily (Patient taking differently: Take 15 mg by mouth nightly. Take 1 tablet by mouth once daily)    cyclobenzaprine (FLEXERIL) 10 MG tablet Take 1 tablet by mouth three times daily as needed for muscle spasm    ferrous sulfate 324 mg (65 mg iron) TbEC Take 1 tablet (324 mg total) by mouth once daily.    gabapentin (NEURONTIN) 800 MG tablet Take 1 tablet (800 mg total) by mouth 3 (three) times daily.    HYDROcodone-acetaminophen (NORCO) 5-325 mg per tablet Take 1 tablet by mouth every 6 (six) hours as needed for Pain.    lisinopriL (PRINIVIL,ZESTRIL) 40 MG tablet Take 1 tablet (40 mg total) by mouth once daily. (Patient taking differently: Take 40 mg by mouth nightly.)    pantoprazole (PROTONIX) 40 MG tablet Take 1 tablet (40 mg total) by mouth 2 (two) times daily.    tamsulosin (FLOMAX) 0.4 mg Cap Take 2 capsules (0.8 mg total) by mouth nightly.     No current facility-administered medications for this visit.       Review of patient's allergies indicates:   Allergen Reactions    Pcn [penicillins]      As a child.       Family History   Problem Relation Name Age of Onset    Cancer Mother          leukemia       Social History[1]    History of present illness: 64-year-old male with known advanced arthrosis in the right hip.  Presents to clinic today for evaluation and anticipation of  right total hip arthroplasty.      He had left hip surgery, left hip arthroplasty done by Dr. Miles Rivas, March 2021 unfortunately suffered an anterior dislocation, he underwent revision arthroplasty in June of 2021.      Review of Systems:    Constitution: Negative for chills, fever, and sweats.  Negative for unexplained weight loss.    HENT:  Negative for headaches and blurry vision.    Cardiovascular:Negative for chest pain or irregular heart beat. Negative for hypertension.    Respiratory:  Negative for cough and shortness of breath.    Gastrointestinal: Negative for abdominal pain, heartburn, melena, nausea, and vomitting.    Genitourinary:  Negative bladder incontinence and dysuria.    Musculoskeletal:  See HPI for details.     Neurological: Negative for numbness.    Psychiatric/Behavioral: Negative for depression.  The patient is not nervous/anxious.      Endocrine: Negative for polyuria    Hematologic/Lymphatic: Negative for bleeding problem.  Does not bruise/bleed easily.    Skin: Negative for poor would healing and rash    Objective:      Physical Examination:    Vital Signs:  There were no vitals filed for this visit.    Body mass index is 27.2 kg/m².    This a well-developed, well nourished patient in no acute distress.  They are alert and oriented and cooperative to examination.         Examination of the right hip, patient with painful and limited range of motion he had the right hip.  Very limited and very painful internal external rotation with groin pain.  Normal range motion in the knee.  Distally neurovascularly intact.  Ambulates with an assistive device, a cane.    Pertinent New Results:  Narrative & Impression  EXAMINATION:  XR HIP WITH PELVIS WHEN PERFORMED 2 OR 3 VIEWS RIGHT     CLINICAL HISTORY:  fall;     TECHNIQUE:  Three views of the pelvis and right hip were obtained..     COMPARISON:  None     FINDINGS:  No evidence of acute fracture or dislocation involving the pelvis or right hip.   A left proximal femoral prosthesis is present without evidence of periprosthetic fracture or hardware complication.  Moderate to severe degenerative changes are present involving the right hip joint with subchondral cyst formation involving the acetabulum and head of the right femur.  No evidence of subcutaneous emphysema or nonsurgical radiopaque foreign body.  No evidence of soft tissue or osseous mass.     Impression:     No evidence of acute fracture or dislocation involving the pelvis or right hip, and no evidence of periprosthetic fracture or hardware complication involving the proximal left femoral prosthesis.        Electronically signed by:Lopez Cervantes  Date:                                            05/19/2025  Time:                                           23:27        Exam Ended: 05/19/25 22:58 CDT Last Resulted: 05/19/25 23:27 CDT     Narrative & Impression  EXAMINATION:  CT HIP WITHOUT CONTRAST RIGHT     CLINICAL HISTORY:  Hip trauma, fracture suspected, xray done;     TECHNIQUE:  Axial CT images were obtained of the to right hip without the use of intravenous contrast.  Sagittal and coronal reformats were performed.  3D series were also provided.     COMPARISON:  CT abdomen pelvis from 03/06/2024.     FINDINGS:  No evidence of acute fracture or dislocation involving the right hip.  Severe chronic degenerative changes are present involving the right hip with subchondral cyst formation involving the head of the right femur and the right acetabulum.  These findings are progressed when compared to prior imaging from March 2024.  No evidence of acute process involving the partially visualized pelvic viscera.  No evidence of subcutaneous emphysema, drainable fluid collection, or radiopaque foreign body.  No evidence of soft tissue or osseous mass.     Impression:     Severe chronic degenerative changes of the right hip, progressed since prior imaging from 2024, without evidence of acute osseous  process.        Electronically signed by:Lopez Cervantes  Date:                                            05/20/2025  Time:                                           01:13        Exam Ended: 05/20/25 00:42 CDT Last Resulted: 05/20/25 01:13 CDT     XRAY Report / Interpretation:    X-rays and CT scan of the right hip reviewed patient with severe arthrosis of the right hip.  Complete collapse of the femoral acetabular joint.  Subchondral cyst formation and sclerosis.    Assessment/Plan:        Severe right hip osteoarthritis, history of left total hip arthroplasty.  Patient desperately needs a right total hip arthroplasty.  We reviewed his past medical history, he states that he has been drug free he has not used methamphetamine since March.  He was recently hospitalized for anemia.  He will need medical clearance.      Patient was consented for surgery. Risks and benefits of the procedure were discussed in great detail to include the expected preoperative, intraoperative and postoperative courses. Complications may include but are not limited to bleeding, infection, damage to nerves, arteries, blood vessels, bones, tendons, ligaments, stiffness, instability, deep vein thrombus (DVT), pulmonary embolus (PE), altered sensation, continued pain, RSD, loss of motion or a need for additional surgery. As well as general anesthetic complications including seizure, stroke, heart attack and even death.      The mobility limitation cannot be sufficiently resolved by the use of a cane. Patient's functional mobility deficit can be sufficiently resolved with the use of a (Rolling Walker or Walker). Patient's mobility limitation significantly impairs their ability to participate in one of more activities of daily living. The use of a (RW or Walker) will significantly improve the patient's ability to participate in MRADLS and the patient will use it on regular basis in the home.      Jacky Polanco, Physician Assistant, served in  "the capacity as a "scribe" for this patient encounter.  A "face-to-face" encounter occurred with Dr. Parminder Franks on this date.  The treatment plan and medical decision-making is outlined above. Patient was seen and examined with a chaperone.       This note was created using Dragon voice recognition software that occasionally misinterpreted phrases or words.             [1]   Social History  Socioeconomic History    Marital status:    Tobacco Use    Smoking status: Every Day     Current packs/day: 0.25     Average packs/day: 0.3 packs/day for 51.5 years (12.9 ttl pk-yrs)     Types: Cigarettes     Start date: 1974     Passive exposure: Current    Smokeless tobacco: Never   Substance and Sexual Activity    Alcohol use: Never    Drug use: Never    Sexual activity: Yes     Partners: Female     Social Drivers of Health     Financial Resource Strain: Patient Declined (5/20/2025)    Overall Financial Resource Strain (CARDIA)     Difficulty of Paying Living Expenses: Patient declined   Food Insecurity: Patient Declined (5/20/2025)    Hunger Vital Sign     Worried About Running Out of Food in the Last Year: Patient declined     Ran Out of Food in the Last Year: Patient declined   Transportation Needs: Patient Declined (5/20/2025)    PRAPARE - Transportation     Lack of Transportation (Medical): Patient declined     Lack of Transportation (Non-Medical): Patient declined   Physical Activity: Insufficiently Active (10/29/2024)    Exercise Vital Sign     Days of Exercise per Week: 7 days     Minutes of Exercise per Session: 20 min   Stress: Patient Declined (5/20/2025)    [employer] [place] [employer] - Occupational Stress Questionnaire     Feeling of Stress : Patient declined   Housing Stability: Patient Declined (5/20/2025)    Housing Stability Vital Sign     Unable to Pay for Housing in the Last Year: Patient declined     Homeless in the Last Year: Patient declined     "

## 2025-07-07 ENCOUNTER — TELEPHONE (OUTPATIENT)
Dept: ORTHOPEDICS | Facility: CLINIC | Age: 65
End: 2025-07-07
Payer: MEDICARE

## 2025-07-07 DIAGNOSIS — M16.11 PRIMARY OSTEOARTHRITIS OF RIGHT HIP: Primary | ICD-10-CM

## 2025-07-07 RX ORDER — TRAMADOL HYDROCHLORIDE 50 MG/1
50 TABLET, FILM COATED ORAL EVERY 8 HOURS PRN
Qty: 21 EACH | Refills: 0 | Status: SHIPPED | OUTPATIENT
Start: 2025-07-07 | End: 2025-07-15

## 2025-07-07 NOTE — TELEPHONE ENCOUNTER
----- Message from Med Assistant Magy sent at 7/7/2025  8:01 AM CDT -----  Regarding: Rx  Patient called and states he was supposed to receive a Rx for pain meds on 7/3/25 when he was seen but nothing was ever sent in.  Uses [facility]

## 2025-07-14 ENCOUNTER — TELEPHONE (OUTPATIENT)
Dept: ORTHOPEDICS | Facility: CLINIC | Age: 65
End: 2025-07-14
Payer: MEDICARE

## 2025-07-14 NOTE — TELEPHONE ENCOUNTER
----- Message from Torrey Bhatti sent at 7/14/2025  1:13 PM CDT -----  Patient would like to cancel his surgery and all preop/f/up appointments as he is having surgery next week with another surgeon

## 2025-07-14 NOTE — TELEPHONE ENCOUNTER
Secure chat sent to OR to cancel case. Pauline notified for auth purposes. All pre/post-op appointments cancelled.
